# Patient Record
Sex: FEMALE | Race: WHITE | Employment: OTHER | URBAN - METROPOLITAN AREA
[De-identification: names, ages, dates, MRNs, and addresses within clinical notes are randomized per-mention and may not be internally consistent; named-entity substitution may affect disease eponyms.]

---

## 2017-01-05 ENCOUNTER — TELEPHONE (OUTPATIENT)
Dept: INTERNAL MEDICINE | Facility: CLINIC | Age: 77
End: 2017-01-05

## 2017-01-05 NOTE — TELEPHONE ENCOUNTER
Reason for Call:  Same Day Appointment, Requested Provider:  Abran Mott MD    PCP: Abran Mott    Reason for visit: chronic pain       Have you been treated for this in the past? Yes    Additional comments: patient not able to make appointment today due to asthma vs cold. She cannot make avail appt tomorrow but thinks Dr. Mott would like to see her this month. Please call her to work her in to his schedule.    Can we leave a detailed message on this number? YES    Phone number patient can be reached at: Home number on file [phone] (home)    Best Time: anytime    Call taken on 1/5/2017 at 12:43 PM by Lo Anderson

## 2017-01-06 NOTE — TELEPHONE ENCOUNTER
Unfortunately, aside from tomorrow (Friday), I really don't have many spots to work patients in the remainder of the month.    Please schedule her for first available, and put her on our wait list.

## 2017-01-16 ENCOUNTER — TELEPHONE (OUTPATIENT)
Dept: CARDIOLOGY | Facility: CLINIC | Age: 77
End: 2017-01-16

## 2017-01-16 NOTE — TELEPHONE ENCOUNTER
"Patient called and reported that she experienced increased SOB over the weekend. Patient reports that currently she is not SOB at rest, but when she moves approx 20ft she becomes SOB and it takes approx 60-90 seconds for her to \"catch her breath\" again. Patient reports that from her baseline weight of approx 151lbs she is up to 154lbs today and was as high as 157lbs over the weekend. Patient denies any chest pain and confirmed that she has been taking her Bumex and other medication as scheduled. Patient denies any recent illness and confirms that SOB is with exertion and not at rest. Advised patient I would forward message onto Dr. Butler and his team for further instructions. Patient acknowledged understanding and agreed with plan.       "

## 2017-01-17 NOTE — TELEPHONE ENCOUNTER
"Called patient this am to inform her of the plan Dr. Butler would like: Bumex 2 mg two tablets daily for 3 days with a BMP and NP visit and low sodium diet.  She stated this am she is back at her baseline weight of 151 pounds, denies shortness of breath, and is \"up and about\" this morning.  Patient stated she took an additional Bumex 2 mg yesterday evening and \"it did the trick\". She does not feel it's necessary to take the additional bumex for three days.  Writer inform her she will check with Dr. Butler to see if he would like to still have the patient take the additional bumex and follow up with a visit and BMP or if he would like to hold off at this time.  Informed patient writer would call her back with his response.  Also encouraged patient to eat a low salt diet.  She sated she is but will work harder at it.  Also informed patient its the symptoms return before she hears back to call team 4 back.  Direct number given.  Will route to Dr. Butler to see if he'd like the patient to take the additional bumex and follow up with an NP apt and BMP or hold off at this time.   "

## 2017-01-17 NOTE — TELEPHONE ENCOUNTER
I would like this patient to take Bumex 2 mg two tablets daily for 3 days.  I would then like her seen by my NP with a BMP the same day.  Low salt diet please.  Jae Butler MD, FACC  January 16, 2017 10:10 PM

## 2017-01-18 NOTE — TELEPHONE ENCOUNTER
So then back to baseline bumex 2 mg daily?  Thanks.  Jae Butler MD, FACC  January 17, 2017 9:43 PM

## 2017-01-31 ENCOUNTER — TRANSFERRED RECORDS (OUTPATIENT)
Dept: CARDIOLOGY | Facility: CLINIC | Age: 77
End: 2017-01-31

## 2017-02-02 ENCOUNTER — OFFICE VISIT (OUTPATIENT)
Dept: INTERNAL MEDICINE | Facility: CLINIC | Age: 77
End: 2017-02-02
Payer: COMMERCIAL

## 2017-02-02 VITALS
SYSTOLIC BLOOD PRESSURE: 122 MMHG | HEIGHT: 64 IN | HEART RATE: 79 BPM | TEMPERATURE: 97.4 F | WEIGHT: 155 LBS | BODY MASS INDEX: 26.46 KG/M2 | OXYGEN SATURATION: 95 % | DIASTOLIC BLOOD PRESSURE: 50 MMHG

## 2017-02-02 DIAGNOSIS — I10 ESSENTIAL HYPERTENSION: ICD-10-CM

## 2017-02-02 DIAGNOSIS — J45.30 MILD PERSISTENT ASTHMA WITHOUT COMPLICATION: ICD-10-CM

## 2017-02-02 DIAGNOSIS — M54.16 LUMBAR RADICULOPATHY: Primary | ICD-10-CM

## 2017-02-02 DIAGNOSIS — G89.4 CHRONIC PAIN SYNDROME: ICD-10-CM

## 2017-02-02 DIAGNOSIS — R32 URINARY INCONTINENCE, UNSPECIFIED TYPE: ICD-10-CM

## 2017-02-02 DIAGNOSIS — N18.30 CKD (CHRONIC KIDNEY DISEASE) STAGE 3, GFR 30-59 ML/MIN (H): ICD-10-CM

## 2017-02-02 LAB
ALBUMIN UR-MCNC: NEGATIVE MG/DL
APPEARANCE UR: CLEAR
BILIRUB UR QL STRIP: NEGATIVE
COLOR UR AUTO: YELLOW
CREAT SERPL-MCNC: 1.07 MG/DL (ref 0.52–1.04)
GFR SERPL CREATININE-BSD FRML MDRD: 50 ML/MIN/1.7M2
GLUCOSE UR STRIP-MCNC: NEGATIVE MG/DL
HGB UR QL STRIP: NEGATIVE
KETONES UR STRIP-MCNC: NEGATIVE MG/DL
LEUKOCYTE ESTERASE UR QL STRIP: NEGATIVE
NITRATE UR QL: NEGATIVE
PH UR STRIP: 5 PH (ref 5–7)
RBC #/AREA URNS AUTO: NORMAL /HPF (ref 0–2)
SP GR UR STRIP: 1.01 (ref 1–1.03)
URN SPEC COLLECT METH UR: NORMAL
UROBILINOGEN UR STRIP-ACNC: 0.2 EU/DL (ref 0.2–1)
WBC #/AREA URNS AUTO: NORMAL /HPF (ref 0–2)

## 2017-02-02 PROCEDURE — 81001 URINALYSIS AUTO W/SCOPE: CPT | Performed by: INTERNAL MEDICINE

## 2017-02-02 PROCEDURE — 36415 COLL VENOUS BLD VENIPUNCTURE: CPT | Performed by: INTERNAL MEDICINE

## 2017-02-02 PROCEDURE — 82565 ASSAY OF CREATININE: CPT | Performed by: INTERNAL MEDICINE

## 2017-02-02 PROCEDURE — 99214 OFFICE O/P EST MOD 30 MIN: CPT | Performed by: INTERNAL MEDICINE

## 2017-02-02 RX ORDER — TRAMADOL HYDROCHLORIDE 50 MG/1
TABLET ORAL
Qty: 100 TABLET | Refills: 0 | Status: SHIPPED | OUTPATIENT
Start: 2017-02-02 | End: 2017-02-16

## 2017-02-02 RX ORDER — METHYLPREDNISOLONE 4 MG
TABLET, DOSE PACK ORAL
Qty: 21 TABLET | Refills: 0 | Status: SHIPPED | OUTPATIENT
Start: 2017-02-02 | End: 2017-02-09

## 2017-02-02 NOTE — MR AVS SNAPSHOT
"              After Visit Summary   2/2/2017    Rupinder Tracy    MRN: [id number]           Patient Information     Date Of Birth          1940        Visit Information        Provider Department      2/2/2017 2:30 PM Abran Mott MD [facility]        Today's Diagnoses     Lumbar radiculopathy    -  1     Urinary incontinence, unspecified type         Mild persistent asthma without complication           Care Instructions    PLAN:                                                    1.  MEDICATIONS:        - Trial of medrol dosepack       - Discontinue ibuprofen for now       - Continue other medications without change, including tramadol for now  2.  Check kidney function today  3.  If things not better, would give short course of vicodin  4.  Call with an update early next week        Follow-ups after your visit        Who to contact     If you have questions or need follow up information about today's clinic visit or your schedule please contact [facility] directly at [phone].  Normal or non-critical lab and imaging results will be communicated to you by MyChart, letter or phone within 4 business days after the clinic has received the results. If you do not hear from us within 7 days, please contact the clinic through [facility] or phone. If you have a critical or abnormal lab result, we will notify you by phone as soon as possible.  Submit refill requests through [employer] or call your pharmacy and they will forward the refill request to us. Please allow 3 business days for your refill to be completed.          Additional Information About Your Visit        [facility] Information     [employer] lets you send messages to your doctor, view your test results, renew your prescriptions, schedule appointments and more. To sign up, go to [email or web address] . Click on \"Log in\" on the left side of the screen, which will take you to the Welcome page. Then click " "on \"Sign up Now\" on the right side of the page.     You will be asked to enter the access code listed below, as well as some personal information. Please follow the directions to create your username and password.     Your access code is: [email or web address]  Expires: 5/3/2017  3:36 PM     Your access code will  in 90 days. If you need help or a new code, please call your [facility] clinic or [phone].        Care EveryWhere ID     This is your Care EveryWhere ID. This could be used by other organizations to access your [facility] medical records  [id number]        Your Vitals Were     Pulse Temperature Height BMI (Body Mass Index) Pulse Oximetry Last Period    79 97.4  F (36.3  C) (Oral) 5' 4\" (1.626 m) 26.59 kg/m2 95% 1990       Blood Pressure from Last 3 Encounters:   17 122/50   16 114/46   10/21/16 120/56    Weight from Last 3 Encounters:   17 155 lb (70.308 kg)   16 152 lb (68.947 kg)   10/21/16 150 lb 6.4 oz (68.221 kg)              We Performed the Following     UA with Microscopic reflex to Culture          Today's Medication Changes          These changes are accurate as of: 17  3:36 PM.  If you have any questions, ask your nurse or doctor.               Start taking these medicines.        Dose/Directions    methylPREDNISolone 4 MG tablet   Commonly known as:  MEDROL DOSEPAK   Used for:  Lumbar radiculopathy   Started by:  Abran Mott MD        Take packet in tapering dose, per protocol   Quantity:  21 tablet   Refills:  0         These medicines have changed or have updated prescriptions.        Dose/Directions    fluticasone-salmeterol 250-50 MCG/DOSE diskus inhaler   Commonly known as:  ADVAIR DISKUS   This may have changed:  how to take this   Used for:  Mild persistent asthma without complication   Changed by:  Abran Mott MD        Dose:  1 puff   Inhale 1 puff into the lungs 2 times daily   Quantity:  60 Inhaler   Refills:  1       vitamin D  " UNITS tablet   This may have changed:  when to take this   Used for:  Vitamin D deficiency        Dose:  2000 Units   Take 2,000 Units by mouth daily   Refills:  0            Where to get your medicines      These medications were sent to [facility] [address]  [address], [facility] [address]     Phone:  [phone]    - fluticasone-salmeterol 250-50 MCG/DOSE diskus inhaler  - methylPREDNISolone 4 MG tablet             Primary Care Provider Office Phone # Fax #    Abran Mott [phone]       [facility] [address]  [facility] [address]        Thank you!     Thank you for choosing [facility]  for your care. Our goal is always to provide you with excellent care. Hearing back from our patients is one way we can continue to improve our services. Please take a few minutes to complete the written survey that you may receive in the mail after your visit with us. Thank you!             Your Updated Medication List - Protect others around you: Learn how to safely use, store and throw away your medicines at www.disposemymeds.org.          This list is accurate as of: 2/2/17  3:36 PM.  Always use your most recent med list.                   Brand Name Dispense Instructions for use    albuterol 108 (90 BASE) MCG/ACT Inhaler    albuterol    1 Inhaler    Inhale 2 puffs into the lungs every 4 hours as needed       bumetanide 2 MG tablet    BUMEX    30 tablet    Take 1 tablet (2 mg) by mouth daily       CALCIUM + D 500-1000-40 MG-UNT-MCG Chew   Generic drug:  Calcium-Vitamin D-Vitamin K      Take 2 tablets by mouth daily       CULTURELLE PO      Take 1 tablet by mouth daily OTC       DOXYCYCLINE HYCLATE PO      Take 50 mg by mouth 3 times per week Monday,wednesday, friday       EYE-VITES Tabs          Ashley-Eye with Lutein 2 tabs twice a day (Rx from [facility])       fluticasone-salmeterol 250-50  MCG/DOSE diskus inhaler    ADVAIR DISKUS    60 Inhaler    Inhale 1 puff into the lungs 2 times daily       methylPREDNISolone 4 MG tablet    MEDROL DOSEPAK    21 tablet    Take packet in tapering dose, per protocol       olmesartan 40 MG tablet    BENICAR    90 tablet    Take 1/2 tablet one time daily.       omeprazole 20 MG CR capsule    priLOSEC    180 capsule    Take 1 capsule (20 mg) by mouth 2 times daily       order for DME      2L of O2 at night       prednisoLONE acetate 1 % ophthalmic susp    PRED FORTE     Place 1 drop Into the left eye 4 times daily       PROLIA 60 MG/ML Soln injection   Generic drug:  denosumab      Inject 60 mg Subcutaneous every 6 months       RESTASIS 0.05 % ophthalmic emulsion   Generic drug:  cycloSPORINE      1 drop to left eye twice daily       rOPINIRole 0.25 MG tablet    REQUIP    30 tablet    Take 2-4 tablets (0.5-1 mg) by mouth At Bedtime       SYSTANE 0.4-0.3 % Soln ophthalmic solution   Generic drug:  polyethylene glycol 0.4%- propylene glycol 0.3%      1 drop to left eye 4 times daily       traMADol 50 MG tablet    ULTRAM    100 tablet    Take up to two twice daily.   Limit to 4 daily, on average.       vitamin D 2000 UNITS tablet      Take 2,000 Units by mouth daily

## 2017-02-02 NOTE — PROGRESS NOTES
"  SUBJECTIVE:                                                    Rupinder Tracy is a 76 year old female who presents to clinic today for the following health issues:      Pan Management:  Follow-up use of tramadol.  Not working very well for her, only take the edge off the pain even taking 2 at a time.   Pain is there mostly all the time, better when sitting (mostly gone) or lying on the R side.  - supplementing with ibuprofen 400 mg daily, which is helpful.   Seems almost to work better.  - most pain from L lumbar area down to mid thigh posteriorly  - also gets more intense burning pains L lumbar, a bit higher  Overall, symptoms much worse past 2 weeks.   Sometimes cannot bear weight on L leg, seems weak and might fall.  Using rolling walker part of the time.   Cane doesn't work for her.         Amount of exercise or physical activity: 2-3 days/week for an average of 30-45 minutes    Problems taking medications regularly: No    Medication side effects: none    Diet: regular (no restrictions)    Problem list and histories reviewed & adjusted, as indicated.  Additional history: as documented    Additional issues to address:  1.  Saw Dr. Jhony Ruiz this week, restless leg syndrome worse.  Refilled requip for her.     ROS:  no bowel control problems, bladder control worse - increased leakage/leaks when stands up.  Was having some dysuria, not now  Constitutional, HEENT, cardiovascular, pulmonary, gi and gu systems are negative, except as otherwise noted.    OBJECTIVE:                                                    /50 mmHg  Pulse 79  Temp(Src) 97.4  F (36.3  C) (Oral)  Ht 5' 4\" (1.626 m)  Wt 155 lb (70.308 kg)  BMI 26.59 kg/m2  SpO2 95%  LMP 01/01/1990  Body mass index is 26.59 kg/(m^2).   No apparent distress  Mild focal tenderness just lateral to left SI joint  Spine nontender, buttocks nontender  Positive right straight leg-raise at about 75   Probably negative left straight leg-raise   Normal " strength in lower extremities  Deep tendon reflexes equal and decreased, but present  Normal range of motion hips, without significant pain     PHQ-9 SCORE 8/18/2011 7/20/2016 2/2/2017   Total Score 9 - -   Total Score - 4 11        MRI from 12/2015 reviewed:       ASSESSMENT:                                                      Left lumbar radiculopathy, symptoms uncontrolled  Chronic pain  CKD, creat higher past year    Situational decrease in mood, without history of depression    PLAN:                                                    1.  MEDICATIONS:        - Trial of medrol dosepack       - Discontinue ibuprofen for now       - Continue other medications without change, including tramadol for now  2.  Check kidney function today -->  nearly normalized  3.  If things not better, would give short course of vicodin  4.  Call with an update early next week    Consider MRI  Will avoid muscle relaxants due to worry of sedation/side effects      Abran Mott MD  [facility]

## 2017-02-02 NOTE — PATIENT INSTRUCTIONS
PLAN:                                                    1.  MEDICATIONS:        - Trial of medrol dosepack       - Discontinue ibuprofen for now       - Continue other medications without change, including tramadol for now  2.  Check kidney function today  3.  If things not better, would give short course of vicodin  4.  Call with an update early next week

## 2017-02-03 ASSESSMENT — PATIENT HEALTH QUESTIONNAIRE - PHQ9: SUM OF ALL RESPONSES TO PHQ QUESTIONS 1-9: 11

## 2017-02-03 NOTE — PROGRESS NOTES
Quick Note:    Please notify patient regarding normal UA and kidney function nearly normalized.  ______

## 2017-02-07 ENCOUNTER — TELEPHONE (OUTPATIENT)
Dept: INTERNAL MEDICINE | Facility: CLINIC | Age: 77
End: 2017-02-07

## 2017-02-07 DIAGNOSIS — M54.16 LUMBAR RADICULOPATHY: Primary | ICD-10-CM

## 2017-02-07 NOTE — TELEPHONE ENCOUNTER
Reason for call: Other   Patient called regarding (reason for call): to let the dr know how she is doing, was seen on 02/02/2017 was told to call back in a few days to let him know how she is doing  Additional comments: Please call patient to discuss this issue    Phone Number Pt can be reached at: Home number on file [phone] (home)  Best Time: anytime  Can we leave a detailed message on this number? YES

## 2017-02-07 NOTE — TELEPHONE ENCOUNTER
Was seen in clinic 2/2/17 for low back pain radiating into left leg.  States Tramadol hadn't been helping her pain at all.  She started a Medrol Dospak and is calling to give update.  On 2/4 she did take Tramadol x2, 2/5 she took Tramadol x1 and had a good weekend, able to be much more productive.  Today feels more tightness in back, took Tramadol x1 and rates pain 1/10 and still feeling pretty good. Doesn't know if she has overdone it being more active Patient asking where to go from here. Pt will finish Medrol dosepak tomorrow.  She is feeling so good right now she would love to plan to go to [place] in March and is hoping that improvement is not too short lived.  JASON Bond R.N.

## 2017-02-08 NOTE — TELEPHONE ENCOUNTER
If symptoms don't remain stable, would suggest that she consider Physical Therapy.   Could also redo steroids, if needed.  Would not repeat imaging at this time.   Please contact patient and advise.

## 2017-02-09 PROBLEM — N18.30 CKD (CHRONIC KIDNEY DISEASE) STAGE 3, GFR 30-59 ML/MIN (H): Status: ACTIVE | Noted: 2017-02-09

## 2017-02-09 RX ORDER — PREDNISONE 20 MG/1
20 TABLET ORAL 2 TIMES DAILY
Qty: 14 TABLET | Refills: 0 | Status: SHIPPED | OUTPATIENT
Start: 2017-02-09 | End: 2017-02-16

## 2017-02-09 NOTE — TELEPHONE ENCOUNTER
Pt is doing really bad today.  Has taken 3 tramadol this morning and 2 this afternoon.  Could hardly get out of bed.  Using heating pad.  Would like to to try the steroids again and will also do PT if she can.  Please order.

## 2017-02-10 ENCOUNTER — TELEPHONE (OUTPATIENT)
Dept: INTERNAL MEDICINE | Facility: CLINIC | Age: 77
End: 2017-02-10

## 2017-02-10 NOTE — TELEPHONE ENCOUNTER
May use a private spot on 2/16.  If having worsened problems before then, may need to see other partner, possibly Lo.  If new neuro symptoms, would need to be seen this weekend.

## 2017-02-10 NOTE — TELEPHONE ENCOUNTER
Reason for call: Other   Patient called regarding (reason for call): needing a appointment within 5 to 7 days after taking new med, which would be 02/10/2017, only wants to see primary dr, Please see all her encounters  Additional comments: Please call patient to discuss her many issues    Phone Number Pt can be reached at: Home number on file [phone] (home)  Best Time: anytime  Can we leave a detailed message on this number? YES

## 2017-02-10 NOTE — TELEPHONE ENCOUNTER
Patient reached.  Pain is in lumbar area, possibly up a bit higher, and into L butt and leg, to ankle earlier today.  Had been doing better earlier.    I have changed her to prednisone, to see if this works better.   Usual dose is 5 days, given enough for 7 days if needed.  PT ordered.

## 2017-02-16 ENCOUNTER — OFFICE VISIT (OUTPATIENT)
Dept: INTERNAL MEDICINE | Facility: CLINIC | Age: 77
End: 2017-02-16
Payer: COMMERCIAL

## 2017-02-16 VITALS
SYSTOLIC BLOOD PRESSURE: 120 MMHG | OXYGEN SATURATION: 95 % | BODY MASS INDEX: 27.14 KG/M2 | TEMPERATURE: 97.5 F | HEART RATE: 70 BPM | DIASTOLIC BLOOD PRESSURE: 50 MMHG | WEIGHT: 159 LBS | HEIGHT: 64 IN

## 2017-02-16 DIAGNOSIS — N18.30 CKD (CHRONIC KIDNEY DISEASE) STAGE 3, GFR 30-59 ML/MIN (H): ICD-10-CM

## 2017-02-16 DIAGNOSIS — G89.4 CHRONIC PAIN SYNDROME: ICD-10-CM

## 2017-02-16 DIAGNOSIS — I10 ESSENTIAL HYPERTENSION: ICD-10-CM

## 2017-02-16 DIAGNOSIS — E55.9 VITAMIN D DEFICIENCY: ICD-10-CM

## 2017-02-16 DIAGNOSIS — M54.42 ACUTE LEFT-SIDED LOW BACK PAIN WITH LEFT-SIDED SCIATICA: ICD-10-CM

## 2017-02-16 DIAGNOSIS — M54.16 LUMBAR RADICULOPATHY: Primary | ICD-10-CM

## 2017-02-16 DIAGNOSIS — G25.81 RESTLESS LEGS SYNDROME (RLS): ICD-10-CM

## 2017-02-16 PROCEDURE — 80307 DRUG TEST PRSMV CHEM ANLYZR: CPT | Mod: 90 | Performed by: INTERNAL MEDICINE

## 2017-02-16 PROCEDURE — 99213 OFFICE O/P EST LOW 20 MIN: CPT | Performed by: INTERNAL MEDICINE

## 2017-02-16 PROCEDURE — 99000 SPECIMEN HANDLING OFFICE-LAB: CPT | Performed by: INTERNAL MEDICINE

## 2017-02-16 RX ORDER — TRAMADOL HYDROCHLORIDE 50 MG/1
TABLET ORAL
Qty: 100 TABLET | Refills: 0 | Status: SHIPPED | OUTPATIENT
Start: 2017-02-16 | End: 2017-02-16

## 2017-02-16 RX ORDER — ROPINIROLE 0.25 MG/1
0.5 TABLET, FILM COATED ORAL AT BEDTIME
COMMUNITY
Start: 2017-02-16 | End: 2017-11-07

## 2017-02-16 RX ORDER — PREDNISONE 20 MG/1
TABLET ORAL
Qty: 30 TABLET | Refills: 0 | Status: SHIPPED | OUTPATIENT
Start: 2017-02-16 | End: 2017-07-20

## 2017-02-16 RX ORDER — TRAMADOL HYDROCHLORIDE 50 MG/1
TABLET ORAL
Qty: 100 TABLET | Refills: 0 | Status: SHIPPED | OUTPATIENT
Start: 2017-02-16 | End: 2017-10-03

## 2017-02-16 RX ORDER — VITAMIN B COMPLEX
1 TABLET ORAL AT BEDTIME
COMMUNITY
Start: 2017-02-16

## 2017-02-16 NOTE — MR AVS SNAPSHOT
After Visit Summary   2/16/2017    Rupinder Tracy    MRN: [id number]           Patient Information     Date Of Birth          1940        Visit Information        Provider Department      2/16/2017 11:00 AM Abran Mott MD [facility]        Today's Diagnoses     Lumbar radiculopathy    -  1    Acute left-sided low back pain with left-sided sciatica        Chronic pain syndrome        Vitamin D deficiency        Restless legs syndrome (RLS)        Essential hypertension        CKD (chronic kidney disease) stage 3, GFR 30-59 ml/min          Care Instructions      PLAN:                                                    1.  MEDICATIONS:        - Discontinue prednisone, hope that radicular/sciatic symptoms do not return       - Continue other medications without change  2.  Collect urine specimen today, or in the near future  3.  If pain/leg symptoms return, resume prednisone at dose of 20 mg daily, and notify MD next week.        Follow-ups after your visit        Future tests that were ordered for you today     Open Future Orders        Priority Expected Expires Ordered    **Basic metabolic panel FUTURE 6mo Routine 8/15/2017 9/14/2017 2/16/2017    Drug Screen Comprehensive , Urine with Reported Meds (Pain Care Package) Routine 2/16/2017 2/16/2018 2/16/2017            Who to contact     If you have questions or need follow up information about today's clinic visit or your schedule please contact [facility] directly at [phone].  Normal or non-critical lab and imaging results will be communicated to you by MyChart, letter or phone within 4 business days after the clinic has received the results. If you do not hear from us within 7 days, please contact the clinic through MyChart or phone. If you have a critical or abnormal lab result, we will notify you by phone as soon as possible.  Submit refill requests through [employer] or call your  "pharmacy and they will forward the refill request to us. Please allow 3 business days for your refill to be completed.          Additional Information About Your Visit        [employer] Information     [employer] lets you send messages to your doctor, view your test results, renew your prescriptions, schedule appointments and more. To sign up, go to [email or web address] . Click on \"Log in\" on the left side of the screen, which will take you to the Welcome page. Then click on \"Sign up Now\" on the right side of the page.     You will be asked to enter the access code listed below, as well as some personal information. Please follow the directions to create your username and password.     Your access code is: [email or web address]  Expires: 5/3/2017  3:36 PM     Your access code will  in 90 days. If you need help or a new code, please call your [place] clinic or [phone].        Care EveryWhere ID     This is your Care EveryWhere ID. This could be used by other organizations to access your [place] medical records  [id number]        Your Vitals Were     Pulse Temperature Height Last Period Pulse Oximetry BMI (Body Mass Index)    70 97.5  F (36.4  C) (Oral) 5' 4\" (1.626 m) 1990 95% 27.29 kg/m2       Blood Pressure from Last 3 Encounters:   17 120/50   17 122/50   16 114/46    Weight from Last 3 Encounters:   17 159 lb (72.1 kg)   17 155 lb (70.3 kg)   16 152 lb (68.9 kg)                 Today's Medication Changes          These changes are accurate as of: 17 11:45 AM.  If you have any questions, ask your nurse or doctor.               Start taking these medicines.        Dose/Directions    traMADol 50 MG tablet   Commonly known as:  ULTRAM   Used for:  Chronic pain syndrome   Started by:  Abran Mott MD        Take up to two twice daily.   Limit to 4 daily, on average.   Quantity:  100 tablet   Refills:  0         These medicines have changed or have updated " prescriptions.        Dose/Directions    predniSONE 20 MG tablet   Commonly known as:  DELTASONE   This may have changed:    - how much to take  - how to take this  - when to take this  - additional instructions   Used for:  Lumbar radiculopathy   Changed by:  Abran Mott MD        Take one daily, as directed   Quantity:  30 tablet   Refills:  0       REQUIP 0.25 MG tablet   This may have changed:    - how much to take  - how to take this  - when to take this  - additional instructions   Used for:  Restless legs syndrome (RLS)   Generic drug:  rOPINIRole   Changed by:  Abran Mott MD        Taking 1 midday and 2 at bedtime, per Dr. Ruiz   Refills:  0            Where to get your medicines      Some of these will need a paper prescription and others can be bought over the counter.  Ask your nurse if you have questions.     Bring a paper prescription for each of these medications     predniSONE 20 MG tablet    traMADol 50 MG tablet                Primary Care Provider Office Phone # Fax #    Abran Mott [phone]       [address] [address]        Thank you!     Thank you for choosing [facility]  for your care. Our goal is always to provide you with excellent care. Hearing back from our patients is one way we can continue to improve our services. Please take a few minutes to complete the written survey that you may receive in the mail after your visit with us. Thank you!             Your Updated Medication List - Protect others around you: Learn how to safely use, store and throw away your medicines at www.disposemymeds.org.          This list is accurate as of: 2/16/17 11:45 AM.  Always use your most recent med list.                   Brand Name Dispense Instructions for use    albuterol 108 (90 BASE) MCG/ACT Inhaler    albuterol    1 Inhaler    Inhale 2 puffs into the lungs every 4 hours as needed        bumetanide 2 MG tablet    BUMEX    30 tablet    Take 1 tablet (2 mg) by mouth daily       CALCIUM + D 500-1000-40 MG-UNT-MCG Chew   Generic drug:  Calcium-Vitamin D-Vitamin K      Take 2 tablets by mouth daily       CULTURELLE PO      Take 1 tablet by mouth daily OTC       DOXYCYCLINE HYCLATE PO      Take 50 mg by mouth 3 times per week Monday,wednesday, friday       EYE-VITES Tabs          [facility] with Lutein 2 tabs twice a day (Rx from [facility])       fluticasone-salmeterol 250-50 MCG/DOSE diskus inhaler    ADVAIR DISKUS    60 Inhaler    Inhale 1 puff into the lungs 2 times daily       olmesartan 40 MG tablet    BENICAR    90 tablet    Take 1/2 tablet one time daily.       omeprazole 20 MG CR capsule    priLOSEC    180 capsule    Take 1 capsule (20 mg) by mouth 2 times daily       order for DME      2L of O2 at night       prednisoLONE acetate 1 % ophthalmic susp    PRED FORTE     Place 1 drop Into the left eye 4 times daily       predniSONE 20 MG tablet    DELTASONE    30 tablet    Take one daily, as directed       PROLIA 60 MG/ML Soln injection   Generic drug:  denosumab      Inject 60 mg Subcutaneous every 6 months       REQUIP 0.25 MG tablet   Generic drug:  rOPINIRole      Taking 1 midday and 2 at bedtime, per Dr. Ruiz       RESTASIS 0.05 % ophthalmic emulsion   Generic drug:  cycloSPORINE      1 drop to left eye twice daily       SYSTANE 0.4-0.3 % Soln ophthalmic solution   Generic drug:  polyethylene glycol 0.4%- propylene glycol 0.3%      1 drop to left eye 4 times daily       traMADol 50 MG tablet    ULTRAM    100 tablet    Take up to two twice daily.   Limit to 4 daily, on average.       vitamin D 2000 UNITS tablet      Take 2,000 Units by mouth 2 times daily

## 2017-02-16 NOTE — PATIENT INSTRUCTIONS
PLAN:                                                    1.  MEDICATIONS:        - Discontinue prednisone, hope that radicular/sciatic symptoms do not return       - Continue other medications without change  2.  Collect urine specimen today, or in the near future  3.  If pain/leg symptoms return, resume prednisone at dose of 20 mg daily, and notify MD next week.

## 2017-02-16 NOTE — PROGRESS NOTES
"  SUBJECTIVE:                                                    Rupinder Tracy is a 76 year old female who presents to clinic today for the following health issues:        1.  Follow-up acute sciatica  2.  Chronic Pain Follow-Up       Type / Location of Pain: lower back, radiating into left leg.   Symptoms   markedly better on medrol dosepack, but then returned when dose was lowered.   Has been on steroids high dose past week.  Analgesia/pain control:       Recent changes:  Improved, not quite resolved, pain in leg gone.   Still has pain at site of fracture.      Overall control: Comfortably manageable at this time.  Activity level/function:      Daily activities:  Able to do all daily activities    Work:  retired  Adverse effects:  No (weight gain)  Adherance    Taking medication as directed?  Yes    Participating in other treatments: no  Risk Factors:    Sleep:  Good    Mood/anxiety:  controlled    Recent family or social stressors:  none noted    Other aggravating factors: none  PHQ-9 SCORE 8/18/2011 7/20/2016 2/2/2017   Total Score 9 - -   Total Score - 4 11     No flowsheet data found.  Encounter-Level CSA - 03/31/2015:                 Controlled Substance Agreement - Scan on 4/1/2015  9:00 AM : Controlled Substance Agreement 3/31/15 (below)                 Amount of exercise or physical activity: 2-3 days/week for an average of 30-45 minutes, also walking more    Problems taking medications regularly: No    Medication side effects: none  Diet: regular (no restrictions)    Problem list and histories reviewed & adjusted, as indicated.  Additional history: as documented      ROS:    No constitutional, cardiorespiratory, gastrointestinal symptoms     OBJECTIVE:                                                    /50 (BP Location: Left arm, Patient Position: Chair, Cuff Size: Adult Regular)  Pulse 70  Temp 97.5  F (36.4  C) (Oral)  Ht 5' 4\" (1.626 m)  Wt 159 lb (72.1 kg)  LMP 01/01/1990  SpO2 95%  BMI " 27.29 kg/m2  Body mass index is 27.29 kg/(m^2).   No apparent distress  Moving well with limp  negative straight leg-raise, normal gait, strength        ASSESSMENT:                                                      1.  Acute sciatica L side, symptoms resolved with steroids  2.  Chronic low back pain, improved.   Currently needing less tramadol    PLAN:                                                    1.  MEDICATIONS:        - Discontinue prednisone, hope that radicular/sciatic symptoms do not return       - Continue other medications without change  2.  Collect urine specimen today, or in the near future -->  Urine tox as expected  3.  If pain/leg symptoms return, resume prednisone at dose of 20 mg daily, and notify MD next week.    Abran Mott MD  [facility]

## 2017-02-16 NOTE — NURSING NOTE
"Chief Complaint   Patient presents with     Musculoskeletal Problem     lower back pain       Initial /50 (BP Location: Left arm, Patient Position: Chair, Cuff Size: Adult Regular)  Pulse 70  Temp 97.5  F (36.4  C) (Oral)  Ht 5' 4\" (1.626 m)  Wt 159 lb (72.1 kg)  LMP 01/01/1990  SpO2 95%  BMI 27.29 kg/m2 Estimated body mass index is 27.29 kg/(m^2) as calculated from the following:    Height as of this encounter: 5' 4\" (1.626 m).    Weight as of this encounter: 159 lb (72.1 kg).  Medication Reconciliation: complete   Radha Hedrick CMA      "

## 2017-02-20 LAB — PAIN DRUG SCR UR W RPTD MEDS: NORMAL

## 2017-03-13 ENCOUNTER — OFFICE VISIT (OUTPATIENT)
Dept: CARDIOLOGY | Facility: CLINIC | Age: 77
End: 2017-03-13
Payer: COMMERCIAL

## 2017-03-13 ENCOUNTER — TELEPHONE (OUTPATIENT)
Dept: CARDIOLOGY | Facility: CLINIC | Age: 77
End: 2017-03-13

## 2017-03-13 VITALS
HEIGHT: 64 IN | SYSTOLIC BLOOD PRESSURE: 116 MMHG | OXYGEN SATURATION: 92 % | WEIGHT: 161.3 LBS | BODY MASS INDEX: 27.54 KG/M2 | HEART RATE: 89 BPM | DIASTOLIC BLOOD PRESSURE: 54 MMHG

## 2017-03-13 DIAGNOSIS — I51.89 DIASTOLIC DYSFUNCTION: ICD-10-CM

## 2017-03-13 DIAGNOSIS — R06.02 SHORTNESS OF BREATH: ICD-10-CM

## 2017-03-13 DIAGNOSIS — I27.20 PULMONARY HTN (H): ICD-10-CM

## 2017-03-13 DIAGNOSIS — I10 ESSENTIAL HYPERTENSION: ICD-10-CM

## 2017-03-13 DIAGNOSIS — I51.7 LVH (LEFT VENTRICULAR HYPERTROPHY): ICD-10-CM

## 2017-03-13 DIAGNOSIS — I73.9 PERIPHERAL VASCULAR DISEASE (H): ICD-10-CM

## 2017-03-13 DIAGNOSIS — I05.9 MITRAL VALVE DISORDER: ICD-10-CM

## 2017-03-13 DIAGNOSIS — E78.5 HYPERLIPIDEMIA LDL GOAL <100: ICD-10-CM

## 2017-03-13 DIAGNOSIS — I27.20 PULMONARY HTN (H): Primary | ICD-10-CM

## 2017-03-13 LAB
ANION GAP SERPL CALCULATED.3IONS-SCNC: 13.4 MMOL/L (ref 6–17)
BUN SERPL-MCNC: 38 MG/DL (ref 7–30)
CALCIUM SERPL-MCNC: 9.8 MG/DL (ref 8.5–10.5)
CHLORIDE SERPL-SCNC: 104 MMOL/L (ref 98–107)
CO2 SERPL-SCNC: 28 MMOL/L (ref 23–29)
CREAT SERPL-MCNC: 1.29 MG/DL (ref 0.7–1.3)
GFR SERPL CREATININE-BSD FRML MDRD: 40 ML/MIN/1.7M2
GLUCOSE SERPL-MCNC: 101 MG/DL (ref 70–105)
NT-PROBNP SERPL-MCNC: 3830 PG/ML (ref 0–450)
POTASSIUM SERPL-SCNC: 4.4 MMOL/L (ref 3.5–5.1)
SODIUM SERPL-SCNC: 141 MMOL/L (ref 136–145)

## 2017-03-13 PROCEDURE — 99214 OFFICE O/P EST MOD 30 MIN: CPT | Mod: 25 | Performed by: PHYSICIAN ASSISTANT

## 2017-03-13 PROCEDURE — 83880 ASSAY OF NATRIURETIC PEPTIDE: CPT | Performed by: PHYSICIAN ASSISTANT

## 2017-03-13 PROCEDURE — 93000 ELECTROCARDIOGRAM COMPLETE: CPT | Performed by: PHYSICIAN ASSISTANT

## 2017-03-13 PROCEDURE — 36415 COLL VENOUS BLD VENIPUNCTURE: CPT | Performed by: PHYSICIAN ASSISTANT

## 2017-03-13 PROCEDURE — 80048 BASIC METABOLIC PNL TOTAL CA: CPT | Performed by: PHYSICIAN ASSISTANT

## 2017-03-13 NOTE — LETTER
3/13/2017    Abran Mott MD  [facility]   [address] [facility] [address]    RE: Rupinder Tracy       Dear Colleague,    I had the pleasure of seeing Rupinder Tracy in the [facility].    PRIMARY CARDIOLOGIST:  Dr. Jae Butler.      REASON FOR CLINIC VISIT:  Weight gain and shortness of breath.      Rupinder Cast is a very delightful 76-year-old female with past medical history notable for hypertension, small PFO, aortic insufficiency, aortic root dilatation at 4.3 cm, ascending aorta dilatation at 3.8 cm, left-sided diastolic dysfunction, secondary pulmonary hypertension (likely secondary to left-sided diastolic dysfunction due to aortic insufficiency, hypertension, and obstructive sleep apnea/asthma), and chronic back pain (was on steroid therapy recently).      Rupinder Cast contacted our clinic today due to weight gain and shortness of breath.  She is leaving for a trip to [place] to visit a friend in 2 days and therefore she wanted to be seen for further evaluation.      Rupinder Cast was having a flare of back pain due to sciatica for which she was started on a high dose of Medrol Dosepak.  She noted that her weight went up from her dry weight of 150 pounds to 159.  Her prednisone was eventually discontinued; however, her weight only came down to 155 pounds.  She has been noticing a little bit more peripheral edema, shortness of breath, but no chest discomfort, palpitations, orthopnea, PND.  She is currently on Bumex 2 mg daily.  This was switched from torsemide in the past when she had significant weight gain.      CURRENT CARDIAC MEDICATIONS:   1.  Bumex 2 mg daily.   2.  Olmesartan 20 mg daily.      The remainder of her allergies, social history and review of systems were reviewed and as are documented separately.      PHYSICAL EXAMINATION:   VITAL SIGNS:  Blood pressure 116/54 mmHg, pulse 89 beats per minute, weight 161 pounds.   GENERAL:  NAD.   RESPIRATORY:   Clear to auscultation bilaterally.   CARDIAC:  Regular rate and rhythm without murmurs, clicks or gallop.   NECK:  Normal JVP, no carotid bruits.   ABDOMEN:  Soft, nontender.   VASCULAR:  Pulses are full and equal.   EXTREMITIES:  1+ pitting edema of lower extremities bilaterally, wearing support stocking.   NEUROLOGIC:  Affect appropriate, oriented to time, person and place.      LABORATORY DATA:  BMP shows sodium 141, potassium 4.4, BUN 38, creatinine 1.29 and GFR 40.      ASSESSMENT AND PLAN:  Rupinder Cast is a very delightful 76-year-old female with past medical history notable for systemic hypertension, small PFO, aortic insufficiency, secondary pulmonary hypertension (likely due to LV diastolic dysfunction, hypertension, obstructive sleep apnea, as well as asthma), aortic root dilatation at 4.3 cm, ascending aortic dilatation at 3.8 cm, and chronic back pain.      Rupinder Cast presents to Cardiology Clinic today for evaluation of recent weight gain as well as increased shortness of breath.  She does have history of asthma for which she is on Advair daily.  She states that this feels different than her shortness of breath that she gets from asthma.  She states that she was recently on Medrol Dosepak for a recent flareup of sciatica related back pain.  Her weight went up significantly from 150 pounds to 159 pounds during her prednisone therapy.  After the discontinuation of prednisone, her weight came down some but not completely.  The patient states that at home she feels much better when her weight is 150 pounds.  At home, her weight is currently 155 pounds.  Her dry weight correlates to 155 pounds in our Cardiology Clinic.  Rupinder Cast does have plans to leave for [place] in 2 days and she wants to make sure she is taking appropriate dose of her Bumex before she leaves.      At this time, I will have her increase her Bumex to 3 mg daily.  Even though on her medication list it says 2 mg tablet, patient states that she  is actually taking 1 mg tablets.  I will have her also stop by our lab before she leaves as we do not have a recent BMP.  If her labwork looks normal, I will have her continue with the increased dose for 3 days.  I will contact her next week for reevaluation of her symptoms.  We will also have her come back in 1 week when she returns from her trip for reevaluation of her symptoms.  Looking at her medication list, I do not see that she is on a beta blockade agent.  I believe lowering her heart rate may help with her symptoms.  We can also consider a small dose of spironolactone.  Addition of these medications may be challenging, however, due to her soft pressures.  At this time, we will focus on diuretic therapy as she is going out of town in 2 days.  Rupinder Cast is instructed to let us know if she has worsening shortness of breath or more weight gain.  She does have an established clinic down in [place] where she will be next week.  If she has worsening symptoms, she will be seen by them. I will also give her a call next week to see how she is doing.     All of her questions were answered to her satisfaction.      Thank you for allowing me to participate in the care of this very delightful patient today.     Sincerely,    Markell Alamo PA-C

## 2017-03-13 NOTE — PATIENT INSTRUCTIONS
Today's Plan:   1. Stop by lab today.     2. Increase Bumex to 3 tablets one time daily.      3. Come back next Wednesday with lab beforehand.    If you have questions or concerns please call my nurse at [phone].     Scheduling phone number: [phone]  Reminder: Please bring in all current medications, over the counter supplements and vitamin bottles to your next appointment.    It was a pleasure seeing you today!     Markell Alamo  3/13/2017

## 2017-03-13 NOTE — TELEPHONE ENCOUNTER
"Patient called to report that she is SOB and has a 4 pound weight gain.  Her baseline weight is 155 and she is up to 159.8.  Her BP is 123/65.      Per Dr. diaz on 11/8/16 \"1. Rupinder Tracy is a delightful 76-year-old female with a small patent foramen ovale that is inconsequential and without hemodynamic effects. She has pulmonary hypertension on a secondary basis, most likely due to diastolic dysfunction of the left ventricle, aortic insufficiency and possible pulmonary causes such as asthma or obstructive sleep apnea. She is tolerating current treatment modalities after the change of her torsemide to Bumex and dropping her Benicar dose down to 20 mg per day. I have made no changes in that. She will continue to follow her weight. \"    Patient is very nervous and leaves for [place] tomorrow and would like to be seen today.  Offered an OV with Markell HERNANDEZ for today at 150.    "

## 2017-03-13 NOTE — MR AVS SNAPSHOT
After Visit Summary   3/13/2017    Rupinder Tracy    MRN: [id number]           Patient Information     Date Of Birth          1940        Visit Information        Provider Department      3/13/2017 1:50 PM Markell Alamo PA-C [facility] AT [place]        Today's Diagnoses     Pulmonary HTN (H)    -  1    Mitral valve disorder        Hyperlipidemia LDL goal <100        Peripheral vascular disease (H)        Left Ventricular Hypertrophy        Essential hypertension        Shortness of breath           Care Instructions    Today's Plan:   1. Stop by lab today.     2. Increase Bumex to 3 tablets one time daily.      3. Come back next Wednesday with lab beforehand.    If you have questions or concerns please call my nurse at [phone].     Scheduling phone number: [phone]  Reminder: Please bring in all current medications, over the counter supplements and vitamin bottles to your next appointment.    It was a pleasure seeing you today!     Markell Alamo  3/13/2017            Follow-ups after your visit        Additional Services     Follow-Up with Cardiac Advanced Practice Provider                 Your next 10 appointments already scheduled     May 10, 2017  9:45 AM CDT   Return Visit with Jae Butler MD   [facility] [place] ([facility])    [address] [place] [id number] [phone]              Future tests that were ordered for you today     Open Future Orders        Priority Expected Expires Ordered    N terminal pro BNP outpatient Routine 3/22/2017 3/13/2018 3/13/2017    N terminal pro BNP outpatient Routine 3/13/2017 3/8/2018 3/13/2017    Basic metabolic panel Routine 3/13/2017 3/8/2018 3/13/2017    Follow-Up with Cardiac Advanced Practice Provider Routine 3/22/2017 3/13/2018 3/13/2017    Basic metabolic panel Routine 3/22/2017 3/13/2018 3/13/2017            Who to contact     If  "you have questions or need follow up information about today's clinic visit or your schedule please contact [facility] [place] directly at [phone].  Normal or non-critical lab and imaging results will be communicated to you by MyChart, letter or phone within 4 business days after the clinic has received the results. If you do not hear from us within 7 days, please contact the clinic through [employer] or phone. If you have a critical or abnormal lab result, we will notify you by phone as soon as possible.  Submit refill requests through [employer] or call your pharmacy and they will forward the refill request to us. Please allow 3 business days for your refill to be completed.          Additional Information About Your Visit        [employer] Information     [employer] lets you send messages to your doctor, view your test results, renew your prescriptions, schedule appointments and more. To sign up, go to [email or web address] . Click on \"Log in\" on the left side of the screen, which will take you to the Welcome page. Then click on \"Sign up Now\" on the right side of the page.     You will be asked to enter the access code listed below, as well as some personal information. Please follow the directions to create your username and password.     Your access code is: [email or web address]  Expires: 5/3/2017  4:36 PM     Your access code will  in 90 days. If you need help or a new code, please call your [place] clinic or [phone].        Care EveryWhere ID     This is your Care EveryWhere ID. This could be used by other organizations to access your [place] medical records  [id number]        Your Vitals Were     Pulse Height Last Period Pulse Oximetry BMI (Body Mass Index)       107 1.626 m (5' 4\") 1990 92% 27.69 kg/m2        Blood Pressure from Last 3 Encounters:   17 116/54   17 120/50   17 122/50    Weight from Last 3 Encounters:   17 73.2 kg (161 lb 4.8 " oz)   02/16/17 72.1 kg (159 lb)   02/02/17 70.3 kg (155 lb)              We Performed the Following     EKG 12-lead complete w/read - Clinics (performed today)        Primary Care Provider Office Phone # Fax #    Abran Mott [phone]       [facility] [address] [facility] [address]        Thank you!     Thank you for choosing [facility] [place]  for your care. Our goal is always to provide you with excellent care. Hearing back from our patients is one way we can continue to improve our services. Please take a few minutes to complete the written survey that you may receive in the mail after your visit with us. Thank you!             Your Updated Medication List - Protect others around you: Learn how to safely use, store and throw away your medicines at www.disposemymeds.org.          This list is accurate as of: 3/13/17  2:39 PM.  Always use your most recent med list.                   Brand Name Dispense Instructions for use    albuterol 108 (90 BASE) MCG/ACT Inhaler    albuterol    1 Inhaler    Inhale 2 puffs into the lungs every 4 hours as needed       bumetanide 2 MG tablet    BUMEX    30 tablet    Take 1 tablet (2 mg) by mouth daily       CALCIUM + D 500-1000-40 MG-UNT-MCG Chew   Generic drug:  Calcium-Vitamin D-Vitamin K      Take 2 tablets by mouth daily       CULTURELLE PO      Take 1 tablet by mouth daily OTC       DOXYCYCLINE HYCLATE PO      Take 50 mg by mouth 3 times per week Monday,wednesday, friday       EYE-VITES Tabs          Slater-Eye with Lutein 2 tabs twice a day (Rx from [facility])       fluticasone-salmeterol 250-50 MCG/DOSE diskus inhaler    ADVAIR DISKUS    60 Inhaler    Inhale 1 puff into the lungs 2 times daily       olmesartan 40 MG tablet    BENICAR    90 tablet    Take 1/2 tablet one time daily.       omeprazole 20 MG CR capsule    priLOSEC    180 capsule    Take 1 capsule (20 mg) by mouth 2 times  daily       order for DME      2L of O2 at night       prednisoLONE acetate 1 % ophthalmic susp    PRED FORTE     Place 1 drop Into the left eye 4 times daily       predniSONE 20 MG tablet    DELTASONE    30 tablet    Take one daily, as directed       PROLIA 60 MG/ML Soln injection   Generic drug:  denosumab      Inject 60 mg Subcutaneous every 6 months       REQUIP 0.25 MG tablet   Generic drug:  rOPINIRole      Taking 1 midday and 2 at bedtime, per Dr. Ruiz       RESTASIS 0.05 % ophthalmic emulsion   Generic drug:  cycloSPORINE      1 drop to left eye twice daily       SYSTANE 0.4-0.3 % Soln ophthalmic solution   Generic drug:  polyethylene glycol 0.4%- propylene glycol 0.3%      1 drop to left eye 4 times daily       traMADol 50 MG tablet    ULTRAM    100 tablet    Take up to two twice daily.   Limit to 4 daily, on average.       vitamin D 2000 UNITS tablet      Take 2,000 Units by mouth 2 times daily

## 2017-03-15 ENCOUNTER — TELEPHONE (OUTPATIENT)
Dept: CARDIOLOGY | Facility: CLINIC | Age: 77
End: 2017-03-15

## 2017-03-15 NOTE — PROGRESS NOTES
PRIMARY CARDIOLOGIST:  Dr. Jae Butler.      REASON FOR CLINIC VISIT:  Weight gain and shortness of breath.      HISTORY OF PRESENT ILLNESS:  Rupinder Cast is a very delightful 76-year-old female with past medical history notable for hypertension, small PFO, aortic insufficiency, aortic root dilatation at 4.3 cm, ascending aorta dilatation at 3.8 cm, left-sided diastolic dysfunction, secondary pulmonary hypertension (likely secondary to left-sided diastolic dysfunction due to aortic insufficiency, hypertension, and obstructive sleep apnea/asthma), and chronic back pain (was on steroid therapy recently).      Rupinder Cast contacted our clinic today due to weight gain and shortness of breath.  She is leaving for a trip to [place] to visit a friend in 2 days and therefore she wanted to be seen for further evaluation.      Rupinder Cast was having a flare of back pain due to sciatica for which she was started on a high dose of Medrol Dosepak.  She noted that her weight went up from her dry weight of 150 pounds to 159.  Her prednisone was eventually discontinued; however, her weight only came down to 155 pounds.  She has been noticing a little bit more peripheral edema, shortness of breath, but no chest discomfort, palpitations, orthopnea, PND.  She is currently on Bumex 2 mg daily.  This was switched from torsemide in the past when she had significant weight gain.      CURRENT CARDIAC MEDICATIONS:   1.  Bumex 2 mg daily.   2.  Olmesartan 20 mg daily.      The remainder of her allergies, social history and review of systems were reviewed and as are documented separately.      PHYSICAL EXAMINATION:   VITAL SIGNS:  Blood pressure 116/54 mmHg, pulse 89 beats per minute, weight 161 pounds.   GENERAL:  NAD.   RESPIRATORY:  Clear to auscultation bilaterally.   CARDIAC:  Regular rate and rhythm without murmurs, clicks or gallop.   NECK:  Normal JVP, no carotid bruits.   ABDOMEN:  Soft, nontender.   VASCULAR:  Pulses are full and equal.    EXTREMITIES:  1+ pitting edema of lower extremities bilaterally, wearing support stocking.   NEUROLOGIC:  Affect appropriate, oriented to time, person and place.      LABORATORY DATA:  BMP shows sodium 141, potassium 4.4, BUN 38, creatinine 1.29 and GFR 40.      ASSESSMENT AND PLAN:  Rupinder Cast is a very delightful 76-year-old female with past medical history notable for systemic hypertension, small PFO, aortic insufficiency, secondary pulmonary hypertension (likely due to LV diastolic dysfunction, hypertension, obstructive sleep apnea, as well as asthma), aortic root dilatation at 4.3 cm, ascending aortic dilatation at 3.8 cm, and chronic back pain.      Rupinder Cast presents to Cardiology Clinic today for evaluation of recent weight gain as well as increased shortness of breath.  She does have history of asthma for which she is on Advair daily.  She states that this feels different than her shortness of breath that she gets from asthma.  She states that she was recently on Medrol Dosepak for a recent flareup of sciatica related back pain.  Her weight went up significantly from 150 pounds to 159 pounds during her prednisone therapy.  After the discontinuation of prednisone, her weight came down some but not completely.  The patient states that at home she feels much better when her weight is 150 pounds.  At home, her weight is currently 155 pounds.  Her dry weight correlates to 155 pounds in our Cardiology Clinic.  Rupinder Cast does have plans to leave for [place] in 2 days and she wants to make sure she is taking appropriate dose of her Bumex before she leaves.      At this time, I will have her increase her Bumex to 3 mg daily.  Even though on her medication list it says 2 mg tablet, patient states that she is actually taking 1 mg tablets.  I will have her also stop by our lab before she leaves as we do not have a recent BMP.  If her labwork looks normal, I will have her continue with the increased dose for 3 days.  I  will contact her next week for reevaluation of her symptoms.  We will also have her come back in 1 week when she returns from her trip for reevaluation of her symptoms.  Looking at her medication list, I do not see that she is on a beta blockade agent.  I believe lowering her heart rate may help with her symptoms.  We can also consider a small dose of spironolactone.  Addition of these medications may be challenging, however, due to her soft pressures.  At this time, we will focus on diuretic therapy as she is going out of town in 2 days.  Judy Cast is instructed to let us know if she has worsening shortness of breath or more weight gain.  She does have an established clinic down in [place] where she will be next week.  If she has worsening symptoms, she will be seen by them. I will also give her a call next week to see how she is doing.     All of her questions were answered to her satisfaction.      Thank you for allowing me to participate in the care of this very delightful patient today.         FILIBERTO HESS PA-C             D: 03/15/2017 09:52   T: 03/15/2017 18:52   MT: al      Name:     JUDY RUSS   MRN:      [id number]        Account:      [id number]   :      1940           Service Date: 2017      Document: [id number]

## 2017-03-15 NOTE — TELEPHONE ENCOUNTER
Contacted Rupinder joya to evaluate her symptoms. She feels much better. I will have her continue with regular dose of Bumex and see her back next Wednesday. She was verbalized understanding and was in agreement.     Marklel Alamo PA-C   3/15/2017  Pager: [phone]

## 2017-03-15 NOTE — PROGRESS NOTES
HPI and Plan:   See dictation. Confirmation # [id number].    Orders this Visit:  Orders Placed This Encounter   Procedures     Basic metabolic panel     N terminal pro BNP outpatient     Basic metabolic panel     N terminal pro BNP outpatient     Follow-Up with Cardiac Advanced Practice Provider     EKG 12-lead complete w/read - Clinics (performed today)     No orders of the defined types were placed in this encounter.    There are no discontinued medications.      Encounter Diagnoses   Name Primary?     Pulmonary HTN (H) Yes     Mitral valve disorder      Hyperlipidemia LDL goal <100      Peripheral vascular disease (H)      Left Ventricular Hypertrophy      Essential hypertension      Shortness of breath       Diastolic dysfunction        CURRENT MEDICATIONS:  Current Outpatient Prescriptions   Medication Sig Dispense Refill     Cholecalciferol (VITAMIN D) 2000 UNITS tablet Take 2,000 Units by mouth 2 times daily       rOPINIRole (REQUIP) 0.25 MG tablet Taking 1 midday and 2 at bedtime, per Dr. Ruiz       traMADol (ULTRAM) 50 MG tablet Take up to two twice daily.   Limit to 4 daily, on average. 100 tablet 0     fluticasone-salmeterol (ADVAIR DISKUS) 250-50 MCG/DOSE diskus inhaler Inhale 1 puff into the lungs 2 times daily 60 Inhaler 1     bumetanide (BUMEX) 2 MG tablet Take 1 tablet (2 mg) by mouth daily 30 tablet 11     Lactobacillus Rhamnosus, GG, (CULTURELLE PO) Take 1 tablet by mouth daily OTC       olmesartan (BENICAR) 40 MG tablet Take 1/2 tablet one time daily. 90 tablet 3     omeprazole (PRILOSEC) 20 MG capsule Take 1 capsule (20 mg) by mouth 2 times daily 180 capsule prn     denosumab (PROLIA) 60 MG/ML SOLN Inject 60 mg Subcutaneous every 6 months       Calcium-Vitamin D-Vitamin K (CALCIUM + D) 500-1000-40 MG-UNT-MCG CHEW Take 2 tablets by mouth daily        DOXYCYCLINE HYCLATE PO Take 50 mg by mouth 3 times per week Monday,wednesday, friday       prednisoLONE acetate (PRED FORTE) 1 % ophthalmic suspension  "Place 1 drop Into the left eye 4 times daily        albuterol (ALBUTEROL) 108 (90 BASE) MCG/ACT inhaler Inhale 2 puffs into the lungs every 4 hours as needed 1 Inhaler 5     RESTASIS 0.05 % OP EMUL 1 drop to left eye twice daily       SYSTANE 0.4-0.3 % OP SOLN 1 drop to left eye 4 times daily       EYE-VITES OR TABS Hydro-Eye with Lutein 2 tabs twice a day (Rx from [facility])       predniSONE (DELTASONE) 20 MG tablet Take one daily, as directed (Patient not taking: Reported on 3/13/2017) 30 tablet 0     order for DME 2L of O2 at night         ALLERGIES     Allergies   Allergen Reactions     Atorvastatin Calcium      myalgias     Celecoxib Swelling     edema     Cholestyramine Diarrhea     Diarrhea       Crestor [Rosuvastatin Calcium]      leg aches, likely from medication     Cyclobenzaprine Hcl      flexeril--gets \"goofy\"     Dulera      tremors     Gabapentin      Nightmares.   Retrial of medication caused tremors.     Lisinopril Cough     Cough/ Dizziness at high dose.     Meperidine Hcl Nausea and Vomiting     demerol-severe nausea     Morphine Nausea and Vomiting     Naprosyn [Naproxen] GI Disturbance     Niaspan [Niacin]      flushing, severe     Percocet [Oxycodone-Acetaminophen] Nausea     Nausea, lightheadedness.   Tolerates med if taken with food.     Pravastatin Swelling     Edema, myalgias     Red Yeast Rice [Cholestin] GI Disturbance     Bloating, etc.     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     Duodenal ulcer, unspecified as acute or chronic, without mention of hemorrhage or perforation 1980's    "  felt due to high dose steroids     Female stress incontinence      GLAUCOMA      H/O right and left heart catheterization      1-     HTN      HYPERLIPIDEMIA      Ischemic colitis 7/2001     [facility]     Lactose Intolerance      Mild     Left Ventricular Hypertrophy      Malignant neoplasm (H) 8/31/2009     left breast DCIS     Mild intermittent asthma ~1980's     MITRAL VALVE Prolapse, with murmur      Obstructive sleep apnea 1/2011     nocturnal desaturation     Osteoporosis, unspecified ~2002     Patent foramen ovale      PERIPHERAL VASCULAR DISEASE 5/05     mesenteric arteries, angioplasty     PRESBYESOPHAGUS 6/04     Pulmonary HTN (H) 11/2014     Serous retinal detachment      False eye right     Subarachnoid hemorrhage following injury (H) 12/10     due to fall, vascular evaluation negative     Syncope and collapse 12/10       PAST SURGICAL HISTORY:  Past Surgical History   Procedure Laterality Date     C nonspecific procedure  1945     adenoidectomy     C appendectomy  1982     C removal gallbladder  1982     Cholecystectomy     C nonspecific procedure       bilat retinal detachment     C nonspecific procedure       blephoroplasty bilat     C nonspecific procedure  1997     glaucoma procedure L     C nonspecific procedure  1997, 2007, 2014     corneal transplant L     C nonspecific procedure  1945     strabismus procedure R eye     C nonspecific procedure  1960's     R breast bx     C nonspecific procedure  5/05     Angioplasty and stenting mesenteric vessels     C nonspecific procedure  2008     L corneal transplant     Mastectomy simple bilateral  10/5/09     bilateral mastectomy     Surgical history of -   6/2010     breast reconstruction     Biopsy       Colonoscopy       due 2015     Heart cath right and left heart cath  1/19/15     Herniorrhaphy ventral N/A 4/19/2016     Procedure: HERNIORRHAPHY VENTRAL;  Surgeon: Hamlet Ness MD;  Location: [facility] OR       FAMILY HISTORY:  Family  "History   Problem Relation Age of Onset     C.A.D. Paternal Uncle      MI 50's     Family History Negative Daughter        SOCIAL HISTORY:  Social History     Social History     Marital status:      Spouse name: N/A     Number of children: N/A     Years of education: N/A     Occupational History      Retired     Social History Main Topics     Smoking status: Never Smoker     Smokeless tobacco: Never Used     Alcohol use Yes      Comment: Occas     Drug use: No     Sexual activity: No     Other Topics Concern     Caffeine Concern No     1- 2 cups coffee     Sleep Concern No     Stress Concern No     Weight Concern No     Special Diet Yes     low sodium     Exercise No     2 days a week (abigail chi), walking program, stationary bike 10 minutes 3 times per week     Seat Belt Yes     Social History Narrative       Review of Systems:  Skin:  Positive for rash   Eyes:  Positive for glasses  ENT:  Negative    Respiratory:  Positive for dyspnea on exertion;wheezing;cough;sleep apnea  Cardiovascular:    edema;Positive for;chest pain  Gastroenterology: Negative    Genitourinary:  not assessed    Musculoskeletal:  Positive for back pain  Neurologic:  Negative    Psychiatric:  Negative    Heme/Lymph/Imm:  Negative    Endocrine:  Negative      Physical Exam:  Vitals: /54  Pulse 89  Ht 1.626 m (5' 4\")  Wt 73.2 kg (161 lb 4.8 oz)  LMP 01/01/1990  SpO2 92%  BMI 27.69 kg/m2   Please refer to dictation for physical exam    Recent Lab Results:  LIPID RESULTS:  Lab Results   Component Value Date    CHOL 208 (A) 01/05/2016    CHOL 208 (A) 01/05/2016    HDL 52 01/05/2016    HDL 52 01/05/2016     01/05/2016     01/05/2016    TRIG 131 01/05/2016    TRIG 131 01/05/2016    CHOLHDLRATIO 2.4 08/25/2014       LIVER ENZYME RESULTS:  Lab Results   Component Value Date    AST 34 02/12/2016    ALT 25 02/12/2016       CBC RESULTS:  Lab Results   Component Value Date    WBC 7.1 10/14/2016    RBC 3.73 (L) 10/14/2016    HGB 12.5 " 10/14/2016    HCT 36.6 10/14/2016    MCV 98 10/14/2016    MCH 33.5 (H) 10/14/2016    MCHC 34.2 10/14/2016    RDW 13.4 10/14/2016     10/14/2016       BMP RESULTS:  Lab Results   Component Value Date     03/13/2017    POTASSIUM 4.4 03/13/2017    CHLORIDE 104 03/13/2017    CO2 28 03/13/2017    ANIONGAP 13.4 03/13/2017     03/13/2017    BUN 38 (H) 03/13/2017    CR 1.29 03/13/2017    GFRESTIMATED 40 (L) 03/13/2017    GFRESTBLACK 48 (L) 03/13/2017    AXEL 9.8 03/13/2017        A1C RESULTS:  No results found for: A1C    INR RESULTS:  Lab Results   Component Value Date    INR 0.95 01/19/2015    INR 1.01 07/02/2010         Markell Alamo PA-C   3/15/2017  Pager: [phone]

## 2017-03-22 ENCOUNTER — OFFICE VISIT (OUTPATIENT)
Dept: CARDIOLOGY | Facility: CLINIC | Age: 77
End: 2017-03-22
Attending: PHYSICIAN ASSISTANT
Payer: COMMERCIAL

## 2017-03-22 VITALS
WEIGHT: 161 LBS | SYSTOLIC BLOOD PRESSURE: 134 MMHG | DIASTOLIC BLOOD PRESSURE: 58 MMHG | BODY MASS INDEX: 27.49 KG/M2 | HEIGHT: 64 IN | HEART RATE: 72 BPM

## 2017-03-22 DIAGNOSIS — R06.00 DYSPNEA, UNSPECIFIED TYPE: ICD-10-CM

## 2017-03-22 DIAGNOSIS — I27.20 PULMONARY HTN (H): ICD-10-CM

## 2017-03-22 DIAGNOSIS — R06.02 SHORTNESS OF BREATH: ICD-10-CM

## 2017-03-22 DIAGNOSIS — I51.89 DIASTOLIC DYSFUNCTION: Primary | ICD-10-CM

## 2017-03-22 DIAGNOSIS — I10 ESSENTIAL HYPERTENSION: ICD-10-CM

## 2017-03-22 DIAGNOSIS — I05.9 MITRAL VALVE DISORDER: ICD-10-CM

## 2017-03-22 LAB
ANION GAP SERPL CALCULATED.3IONS-SCNC: 12.1 MMOL/L (ref 6–17)
BUN SERPL-MCNC: 34 MG/DL (ref 7–30)
CALCIUM SERPL-MCNC: 9.9 MG/DL (ref 8.5–10.5)
CHLORIDE SERPL-SCNC: 107 MMOL/L (ref 98–107)
CO2 SERPL-SCNC: 25 MMOL/L (ref 23–29)
CREAT SERPL-MCNC: 1.26 MG/DL (ref 0.7–1.3)
GFR SERPL CREATININE-BSD FRML MDRD: 41 ML/MIN/1.7M2
GLUCOSE SERPL-MCNC: 133 MG/DL (ref 70–105)
NT-PROBNP SERPL-MCNC: 1628 PG/ML (ref 0–450)
POTASSIUM SERPL-SCNC: 4.1 MMOL/L (ref 3.5–5.1)
SODIUM SERPL-SCNC: 140 MMOL/L (ref 136–145)

## 2017-03-22 PROCEDURE — 36415 COLL VENOUS BLD VENIPUNCTURE: CPT | Performed by: PHYSICIAN ASSISTANT

## 2017-03-22 PROCEDURE — 99214 OFFICE O/P EST MOD 30 MIN: CPT | Performed by: PHYSICIAN ASSISTANT

## 2017-03-22 PROCEDURE — 83880 ASSAY OF NATRIURETIC PEPTIDE: CPT | Performed by: PHYSICIAN ASSISTANT

## 2017-03-22 PROCEDURE — 80048 BASIC METABOLIC PNL TOTAL CA: CPT | Performed by: PHYSICIAN ASSISTANT

## 2017-03-22 RX ORDER — SPIRONOLACTONE 25 MG/1
TABLET ORAL
Qty: 30 TABLET | Refills: 0 | Status: SHIPPED | OUTPATIENT
Start: 2017-03-22 | End: 2017-03-23

## 2017-03-22 RX ORDER — BUMETANIDE 2 MG/1
2 TABLET ORAL DAILY
Qty: 30 TABLET | Refills: 11 | Status: SHIPPED | OUTPATIENT
Start: 2017-03-22 | End: 2017-10-25

## 2017-03-22 NOTE — MR AVS SNAPSHOT
After Visit Summary   3/22/2017    Rupinder Tracy    MRN: [id number]           Patient Information     Date Of Birth          1940        Visit Information        Provider Department      3/22/2017 2:30 PM Markell Alamo PA-C [facility]        Today's Diagnoses     Diastolic dysfunction    -  1    Pulmonary HTN (H)        Essential hypertension        Dyspnea, unspecified type           Care Instructions    Today's Plan:   1. Start Spironolactone- take half tablet one time daily.     2. Come back in 1 week for re-check with non-fasting labs beforehand.     If you have questions or concerns please call my nurse, Essie, at [phone].     Scheduling phone number: [phone]  Reminder: Please bring in all current medications, over the counter supplements and vitamin bottles to your next appointment.    It was a pleasure seeing you today!     Markell Alamo  3/22/2017            Follow-ups after your visit        Additional Services     Follow-Up with Cardiac Advanced Practice Provider                 Your next 10 appointments already scheduled     May 10, 2017  9:45 AM CDT   Return Visit with Jae Butler MD   [facility] ([facility])    [address] [place] [id number] [phone]              Future tests that were ordered for you today     Open Future Orders        Priority Expected Expires Ordered    Basic metabolic panel Routine 3/29/2017 3/22/2018 3/22/2017    N terminal pro BNP outpatient Routine 3/29/2017 3/22/2018 3/22/2017    Follow-Up with Cardiac Advanced Practice Provider Routine 3/29/2017 3/22/2018 3/22/2017            Who to contact     If you have questions or need follow up information about today's clinic visit or your schedule please contact [facility] directly at [phone].  Normal or non-critical lab and  "imaging results will be communicated to you by MyChart, letter or phone within 4 business days after the clinic has received the results. If you do not hear from us within 7 days, please contact the clinic through [employer] or phone. If you have a critical or abnormal lab result, we will notify you by phone as soon as possible.  Submit refill requests through [employer] or call your pharmacy and they will forward the refill request to us. Please allow 3 business days for your refill to be completed.          Additional Information About Your Visit        [employer] Information     [employer] lets you send messages to your doctor, view your test results, renew your prescriptions, schedule appointments and more. To sign up, go to [email or web address] . Click on \"Log in\" on the left side of the screen, which will take you to the Welcome page. Then click on \"Sign up Now\" on the right side of the page.     You will be asked to enter the access code listed below, as well as some personal information. Please follow the directions to create your username and password.     Your access code is: [email or web address]  Expires: 5/3/2017  4:36 PM     Your access code will  in 90 days. If you need help or a new code, please call your [facility] clinic or [phone].        Care EveryWhere ID     This is your Care EveryWhere ID. This could be used by other organizations to access your [facility] medical records  [id number]        Your Vitals Were     Pulse Height Last Period BMI (Body Mass Index)          72 1.626 m (5' 4\") 1990 27.64 kg/m2         Blood Pressure from Last 3 Encounters:   17 134/58   17 116/54   17 120/50    Weight from Last 3 Encounters:   17 73 kg (161 lb)   17 73.2 kg (161 lb 4.8 oz)   17 72.1 kg (159 lb)              We Performed the Following     Follow-Up with Cardiac Advanced Practice Provider          Today's Medication Changes          These changes are accurate as of: " 3/22/17  3:24 PM.  If you have any questions, ask your nurse or doctor.               Start taking these medicines.        Dose/Directions    spironolactone 25 MG tablet   Commonly known as:  ALDACTONE   Used for:  Diastolic dysfunction   Started by:  Markell Alamo PA-C        Take half tablet one time daily.   Quantity:  30 tablet   Refills:  0            Where to get your medicines      These medications were sent to [facility]/pharmacy #[id number] [place], [place] - [address] [facility] [address]  [address] [place] [id number]     Phone:  [phone]     bumetanide 2 MG tablet    spironolactone 25 MG tablet                Primary Care Provider Office Phone # Fax #    Abran Mott [phone]       [facility] [address] [facility] [id number]        Thank you!     Thank you for choosing [facility] [place]  for your care. Our goal is always to provide you with excellent care. Hearing back from our patients is one way we can continue to improve our services. Please take a few minutes to complete the written survey that you may receive in the mail after your visit with us. Thank you!             Your Updated Medication List - Protect others around you: Learn how to safely use, store and throw away your medicines at www.disposemymeds.org.          This list is accurate as of: 3/22/17  3:24 PM.  Always use your most recent med list.                   Brand Name Dispense Instructions for use    albuterol 108 (90 BASE) MCG/ACT Inhaler    albuterol    1 Inhaler    Inhale 2 puffs into the lungs every 4 hours as needed       bumetanide 2 MG tablet    BUMEX    30 tablet    Take 1 tablet (2 mg) by mouth daily       CALCIUM + D 500-1000-40 MG-UNT-MCG Chew   Generic drug:  Calcium-Vitamin D-Vitamin K      Take 2 tablets by mouth daily       CULTURELLE PO      Take 1 tablet by mouth daily OTC       DOXYCYCLINE HYCLATE PO      Take 50 mg by mouth 3 times per  week Monday,wednesday, friday       EYE-VITES Tabs          [place]-Eye with Lutein 2 tabs twice a day (Rx from [facility])       fluticasone-salmeterol 250-50 MCG/DOSE diskus inhaler    ADVAIR DISKUS    60 Inhaler    Inhale 1 puff into the lungs 2 times daily       olmesartan 40 MG tablet    BENICAR    90 tablet    Take 1/2 tablet one time daily.       omeprazole 20 MG CR capsule    priLOSEC    180 capsule    Take 1 capsule (20 mg) by mouth 2 times daily       order for DME      2L of O2 at night       prednisoLONE acetate 1 % ophthalmic susp    PRED FORTE     Place 1 drop Into the left eye 4 times daily       predniSONE 20 MG tablet    DELTASONE    30 tablet    Take one daily, as directed       PROLIA 60 MG/ML Soln injection   Generic drug:  denosumab      Inject 60 mg Subcutaneous every 6 months       REQUIP 0.25 MG tablet   Generic drug:  rOPINIRole      Taking 1 midday and 2 at bedtime, per Dr. Ruiz       RESTASIS 0.05 % ophthalmic emulsion   Generic drug:  cycloSPORINE      1 drop to left eye twice daily       spironolactone 25 MG tablet    ALDACTONE    30 tablet    Take half tablet one time daily.       SYSTANE 0.4-0.3 % Soln ophthalmic solution   Generic drug:  polyethylene glycol 0.4%- propylene glycol 0.3%      1 drop to left eye 4 times daily       traMADol 50 MG tablet    ULTRAM    100 tablet    Take up to two twice daily.   Limit to 4 daily, on average.       vitamin D 2000 UNITS tablet      Take 2,000 Units by mouth 2 times daily

## 2017-03-22 NOTE — PATIENT INSTRUCTIONS
Today's Plan:   1. Start Spironolactone- take half tablet one time daily.     2. Come back in 1 week for re-check with non-fasting labs beforehand.     If you have questions or concerns please call my nurse, Essie, at [phone].     Scheduling phone number: [phone]  Reminder: Please bring in all current medications, over the counter supplements and vitamin bottles to your next appointment.    It was a pleasure seeing you today!     Markell Alamo  3/22/2017

## 2017-03-22 NOTE — LETTER
3/22/2017    Abran Mott MD  [facility]   [address] [facility] [address]    RE: Rupinder Tracy       Dear Colleague,    I had the pleasure of seeing Rupinder Tracy in the [facility].    PRIMARY CARDIOLOGIST:  Dr. Jae Butler.      REASON FOR CLINIC VISIT:  Two-week followup.     Rupinder Cast is a very delightful 76-year-old female with past medical history notable for hypertension, small PFO, aortic insufficiency, aortic root dilatation at 4.3 cm, ascending aortic dilatation at 3.8 cm, left-sided diastolic dysfunction, secondary pulmonary hypertension (likely due to aortic insufficiency, hypertension as well as obstructive sleep apnea/asthma), and chronic back pain.      I saw Rupinder Cast just prior to her trip to [place] on 03/13/2017 for weight gain as well as worsening shortness of breath.  Prior to the development of her symptoms, she was on a brief course of prednisone for back pain.  The patient noticed weight gain, and then later she developed worsening shortness of breath.  We elected to increase her Bumex to 3 mg daily for 3 days, and she was set up for followup 1 week from today for reevaluation.  Her N-terminal BNP at that time was around 3000.      Rupinder Cast presents to Cardiology Clinic today stating that she felt much better with the increase in Bumex for a few days.  She states that she is doing well; however, she has noticed that her weight has come up slightly.  She otherwise has no other questions or concerns at this time.      CURRENT CARDIAC MEDICATIONS:   1.  Bumex 2 mg daily.   2.  Olmesartan 20 mg daily.   3.  Spironolactone 12.5 mg 1 time daily (this was started today).      The remainder of her allergies, social history and review of systems were reviewed and as documented separately.      VITAL SIGNS:  Blood pressure 134/58 mmHg.  Pulse 72 beats per minute, weight 161 pounds.      PHYSICAL EXAMINATION:   GENERAL:  NAD.   RESPIRATORY:   Clear to auscultation bilaterally.   CARDIAC:  Regular rate and rhythm without murmurs, clicks or gallop.   NECK:  Normal JVP, no carotid bruits.   ABDOMEN:  Soft, nontender.   VASCULAR:  Pulses are full and equal.   EXTREMITIES:  1+ pitting edema of lower extremities bilaterally, wearing support stockings.   NEUROLOGIC:  Affect appropriate.  Oriented x3.      LABORATORY DATA:     BMP shows sodium 140, potassium 4.1, BUN 34, creatinine 1.26, GFR 41.   N-terminal BNP has come down to 1600 from 3800 in 1 week.      ASSESSMENT AND PLAN:     Rupinder Cast is a very delightful 76-year-old female who comes in for reevaluation of recent weight gain and shortness of breath.  Her past medical history is notable for hypertension, small PFO, AI, aortic root dilatation as well as ascending aortic dilatation.  She also has left-sided diastolic dysfunction and secondary pulmonary hypertension.  She has obstructive sleep apnea as well as asthma for which she has been using inhalers.      Rupinder Cast was seen by me 1 week ago for shortness of breath and weight gain.  She wanted to be seen in clinic as she was leaving out of town for [place] in a few days.  Her Bumex was increased to 3 mg daily for 3 days, and she was set up to follow up with me after her trip in 1 week.      Rupinder Cast states that she felt better, and her weight came down with 3 mg of Bumex.  She states that since the decrease back to 20 mg daily, she has not noticed any worsening in her symptoms.  She, however, has noticed that her weight has come up gradually.  Her weight today is 161 pounds, exactly the same as the day she came to see me prior to her trip.  Back in 11/2016, her weight was down to 152 pounds.  At this time, we will have her continue with Bumex 2 mg daily.  I will add a very small dose of spironolactone given her diastolic dysfunction.  I will have her come back in 1 week, and we will check her BMP.      Ideally, I would like to add a small dose of beta  blockade agent to control her heart rate a little bit better given her diastolic dysfunction.  She does have history of severe reaction to timolol maleate in the past.  At this time, I will hold off on initiating of beta blockade agent.  I will run this by Dr. Butler to see if she would be a good candidate.      We will also check her N-terminal BNP as well as BMP on her followup.      All of her questions were answered to her satisfaction.      Thank you for allowing me to participate in the care of this delightful patient today.     Sincerely,    Markell Giles PA-C

## 2017-03-22 NOTE — PROGRESS NOTES
HPI and Plan:   See dictation. Confirmation # [id number].    Orders this Visit:  Orders Placed This Encounter   Procedures     Basic metabolic panel     N terminal pro BNP outpatient     Follow-Up with Cardiac Advanced Practice Provider     Orders Placed This Encounter   Medications     spironolactone (ALDACTONE) 25 MG tablet     Sig: Take half tablet one time daily.     Dispense:  30 tablet     Refill:  0     bumetanide (BUMEX) 2 MG tablet     Sig: Take 1 tablet (2 mg) by mouth daily     Dispense:  30 tablet     Refill:  11     Medications Discontinued During This Encounter   Medication Reason     bumetanide (BUMEX) 2 MG tablet Reorder         Encounter Diagnoses   Name Primary?     Pulmonary HTN (H)      Essential hypertension      Diastolic dysfunction Yes     Dyspnea, unspecified type         CURRENT MEDICATIONS:  Current Outpatient Prescriptions   Medication Sig Dispense Refill     spironolactone (ALDACTONE) 25 MG tablet Take half tablet one time daily. 30 tablet 0     bumetanide (BUMEX) 2 MG tablet Take 1 tablet (2 mg) by mouth daily 30 tablet 11     Cholecalciferol (VITAMIN D) 2000 UNITS tablet Take 2,000 Units by mouth 2 times daily       rOPINIRole (REQUIP) 0.25 MG tablet Taking 1 midday and 2 at bedtime, per Dr. Ruiz       traMADol (ULTRAM) 50 MG tablet Take up to two twice daily.   Limit to 4 daily, on average. 100 tablet 0     fluticasone-salmeterol (ADVAIR DISKUS) 250-50 MCG/DOSE diskus inhaler Inhale 1 puff into the lungs 2 times daily 60 Inhaler 1     Lactobacillus Rhamnosus, GG, (CULTURELLE PO) Take 1 tablet by mouth daily OTC       olmesartan (BENICAR) 40 MG tablet Take 1/2 tablet one time daily. 90 tablet 3     omeprazole (PRILOSEC) 20 MG capsule Take 1 capsule (20 mg) by mouth 2 times daily 180 capsule prn     denosumab (PROLIA) 60 MG/ML SOLN Inject 60 mg Subcutaneous every 6 months       order for DME 2L of O2 at night       Calcium-Vitamin D-Vitamin K (CALCIUM + D) 500-1000-40 MG-UNT-MCG CHEW Take  "2 tablets by mouth daily        DOXYCYCLINE HYCLATE PO Take 50 mg by mouth 3 times per week Monday,wednesday, friday       prednisoLONE acetate (PRED FORTE) 1 % ophthalmic suspension Place 1 drop Into the left eye 4 times daily        albuterol (ALBUTEROL) 108 (90 BASE) MCG/ACT inhaler Inhale 2 puffs into the lungs every 4 hours as needed 1 Inhaler 5     RESTASIS 0.05 % OP EMUL 1 drop to left eye twice daily       SYSTANE 0.4-0.3 % OP SOLN 1 drop to left eye 4 times daily       EYE-VITES OR TABS [place]-Eye with Lutein 2 tabs twice a day (Rx from [facility])       predniSONE (DELTASONE) 20 MG tablet Take one daily, as directed (Patient not taking: Reported on 3/13/2017) 30 tablet 0     [DISCONTINUED] bumetanide (BUMEX) 2 MG tablet Take 1 tablet (2 mg) by mouth daily 30 tablet 11       ALLERGIES     Allergies   Allergen Reactions     Atorvastatin Calcium      myalgias     Celecoxib Swelling     edema     Cholestyramine Diarrhea     Diarrhea       Crestor [Rosuvastatin Calcium]      leg aches, likely from medication     Cyclobenzaprine Hcl      flexeril--gets \"goofy\"     Dulera      tremors     Gabapentin      Nightmares.   Retrial of medication caused tremors.     Lisinopril Cough     Cough/ Dizziness at high dose.     Meperidine Hcl Nausea and Vomiting     demerol-severe nausea     Morphine Nausea and Vomiting     Naprosyn [Naproxen] GI Disturbance     Niaspan [Niacin]      flushing, severe     Percocet [Oxycodone-Acetaminophen] Nausea     Nausea, lightheadedness.   Tolerates med if taken with food.     Pravastatin Swelling     Edema, myalgias     Red Yeast Rice [Cholestin] GI Disturbance     Bloating, etc.     Simvastatin      myalgias     Timolol Maleate Difficulty breathing     timoptic--respiratory problems     Hctz Rash     rash     Sulfa Drugs Itching and Rash     rash on all mucous membranes and palms of hands with intense itching       PAST MEDICAL HISTORY:  Past Medical History:   Diagnosis Date "     Arthritis      Cellulitis 4/13 in [place]    R ankle     Difficult airway for intubation      Ductal Carcinoma in Situ of Left Breast 9/09     Duodenal ulcer, unspecified as acute or chronic, without mention of hemorrhage or perforation 1980's    felt due to high dose steroids     Female stress incontinence      GLAUCOMA      H/O right and left heart catheterization     1-     HTN      HYPERLIPIDEMIA      Ischemic colitis 7/2001    [facility]     Lactose Intolerance     Mild     Left Ventricular Hypertrophy      Malignant neoplasm (H) 8/31/2009    left breast DCIS     Mild intermittent asthma ~1980's     MITRAL VALVE Prolapse, with murmur      Obstructive sleep apnea 1/2011    nocturnal desaturation     Osteoporosis, unspecified ~2002     Patent foramen ovale      PERIPHERAL VASCULAR DISEASE 5/05    mesenteric arteries, angioplasty     PRESBYESOPHAGUS 6/04     Pulmonary HTN (H) 11/2014     Serous retinal detachment     False eye right     Subarachnoid hemorrhage following injury (H) 12/10    due to fall, vascular evaluation negative     Syncope and collapse 12/10       PAST SURGICAL HISTORY:  Past Surgical History:   Procedure Laterality Date     BIOPSY       C APPENDECTOMY  1982     C NONSPECIFIC PROCEDURE  1945    adenoidectomy     C NONSPECIFIC PROCEDURE      bilat retinal detachment     C NONSPECIFIC PROCEDURE      blephoroplasty bilat     C NONSPECIFIC PROCEDURE  1997    glaucoma procedure L     C NONSPECIFIC PROCEDURE  1997, 2007, 2014    corneal transplant L     C NONSPECIFIC PROCEDURE  1945    strabismus procedure R eye     C NONSPECIFIC PROCEDURE  1960's    R breast bx     C NONSPECIFIC PROCEDURE  5/05    Angioplasty and stenting mesenteric vessels     C NONSPECIFIC PROCEDURE  2008    L corneal transplant     C REMOVAL GALLBLADDER  1982    Cholecystectomy     COLONOSCOPY      due 2015     HEART CATH RIGHT AND LEFT HEART CATH  1/19/15     HERNIORRHAPHY VENTRAL N/A 4/19/2016    Procedure:  "HERNIORRHAPHY VENTRAL;  Surgeon: Hamlet Ness MD;  Location: [facility] OR     MASTECTOMY SIMPLE BILATERAL  10/5/09    bilateral mastectomy     SURGICAL HISTORY OF -   6/2010    breast reconstruction       FAMILY HISTORY:  Family History   Problem Relation Age of Onset     C.A.D. Paternal Uncle      MI 50's     Family History Negative Daughter        SOCIAL HISTORY:  Social History     Social History     Marital status:      Spouse name: N/A     Number of children: N/A     Years of education: N/A     Occupational History      Retired     Social History Main Topics     Smoking status: Never Smoker     Smokeless tobacco: Never Used     Alcohol use Yes      Comment: Occas     Drug use: No     Sexual activity: No     Other Topics Concern     Caffeine Concern No     1- 2 cups coffee     Sleep Concern No     Stress Concern No     Weight Concern No     Special Diet Yes     low sodium     Exercise No     2 days a week (abigail chi), walking program, stationary bike 10 minutes 3 times per week     Seat Belt Yes     Social History Narrative       Review of Systems:  Skin:  Positive for rash   Eyes:  Positive for glasses  ENT:  Negative    Respiratory:  Positive for dyspnea on exertion;wheezing;cough;sleep apnea  Cardiovascular:    edema;Positive for;chest pain;fatigue  Gastroenterology: Negative    Genitourinary:  Negative    Musculoskeletal:  Positive for back pain  Neurologic:  Positive for tremors;memory problems  Psychiatric:  Negative    Heme/Lymph/Imm:  Negative    Endocrine:  Negative      Physical Exam:  Vitals: /58  Pulse 72  Ht 1.626 m (5' 4\")  Wt 73 kg (161 lb)  LMP 01/01/1990  BMI 27.64 kg/m2   Please refer to dictation for physical exam    Recent Lab Results:  LIPID RESULTS:  Lab Results   Component Value Date    CHOL 208 (A) 01/05/2016    CHOL 208 (A) 01/05/2016    HDL 52 01/05/2016    HDL 52 01/05/2016     01/05/2016     01/05/2016    TRIG 131 01/05/2016    TRIG 131 01/05/2016    " CHOLHDLRATIO 2.4 08/25/2014       LIVER ENZYME RESULTS:  Lab Results   Component Value Date    AST 34 02/12/2016    ALT 25 02/12/2016       CBC RESULTS:  Lab Results   Component Value Date    WBC 7.1 10/14/2016    RBC 3.73 (L) 10/14/2016    HGB 12.5 10/14/2016    HCT 36.6 10/14/2016    MCV 98 10/14/2016    MCH 33.5 (H) 10/14/2016    MCHC 34.2 10/14/2016    RDW 13.4 10/14/2016     10/14/2016       BMP RESULTS:  Lab Results   Component Value Date     03/22/2017    POTASSIUM 4.1 03/22/2017    CHLORIDE 107 03/22/2017    CO2 25 03/22/2017    ANIONGAP 12.1 03/22/2017     (H) 03/22/2017    BUN 34 (H) 03/22/2017    CR 1.26 03/22/2017    GFRESTIMATED 41 (L) 03/22/2017    GFRESTBLACK 50 (L) 03/22/2017    AXEL 9.9 03/22/2017        A1C RESULTS:  No results found for: A1C    INR RESULTS:  Lab Results   Component Value Date    INR 0.95 01/19/2015    INR 1.01 07/02/2010           CC  Markell Alamo PA-C   HEART [facility]  [id number] [id number]    Markell Alamo PA-C   3/22/2017  Pager: [phone]

## 2017-03-23 ENCOUNTER — TELEPHONE (OUTPATIENT)
Dept: CARDIOLOGY | Facility: CLINIC | Age: 77
End: 2017-03-23

## 2017-03-23 NOTE — PROGRESS NOTES
PRIMARY CARDIOLOGIST:  Dr. Jae Butler.      REASON FOR CLINIC VISIT:  Two-week followup.      HISTORY OF PRESENT ILLNESS:     Rupinder Cast is a very delightful 76-year-old female with past medical history notable for hypertension, small PFO, aortic insufficiency, aortic root dilatation at 4.3 cm, ascending aortic dilatation at 3.8 cm, left-sided diastolic dysfunction, secondary pulmonary hypertension (likely due to aortic insufficiency, hypertension as well as obstructive sleep apnea/asthma), and chronic back pain.      I saw Rupinder Cast just prior to her trip to [place] on 03/13/2017 for weight gain as well as worsening shortness of breath.  Prior to the development of her symptoms, she was on a brief course of prednisone for back pain.  The patient noticed weight gain, and then later she developed worsening shortness of breath.  We elected to increase her Bumex to 3 mg daily for 3 days, and she was set up for followup 1 week from today for reevaluation.  Her N-terminal BNP at that time was around 3000.      Rupinder Cast presents to Cardiology Clinic today stating that she felt much better with the increase in Bumex for a few days.  She states that she is doing well; however, she has noticed that her weight has come up slightly.  She otherwise has no other questions or concerns at this time.      CURRENT CARDIAC MEDICATIONS:   1.  Bumex 2 mg daily.   2.  Olmesartan 20 mg daily.   3.  Spironolactone 12.5 mg 1 time daily (this was started today).      The remainder of her allergies, social history and review of systems were reviewed and as documented separately.      VITAL SIGNS:  Blood pressure 134/58 mmHg.  Pulse 72 beats per minute, weight 161 pounds.      PHYSICAL EXAMINATION:   GENERAL:  NAD.   RESPIRATORY:  Clear to auscultation bilaterally.   CARDIAC:  Regular rate and rhythm without murmurs, clicks or gallop.   NECK:  Normal JVP, no carotid bruits.   ABDOMEN:  Soft, nontender.   VASCULAR:  Pulses are full and  equal.   EXTREMITIES:  1+ pitting edema of lower extremities bilaterally, wearing support stockings.   NEUROLOGIC:  Affect appropriate.  Oriented x3.      LABORATORY DATA:     BMP shows sodium 140, potassium 4.1, BUN 34, creatinine 1.26, GFR 41.   N-terminal BNP has come down to 1600 from 3800 in 1 week.      ASSESSMENT AND PLAN:     Rupinder Cast is a very delightful 76-year-old female who comes in for reevaluation of recent weight gain and shortness of breath.  Her past medical history is notable for hypertension, small PFO, AI, aortic root dilatation as well as ascending aortic dilatation.  She also has left-sided diastolic dysfunction and secondary pulmonary hypertension.  She has obstructive sleep apnea as well as asthma for which she has been using inhalers.      Rupinder Cast was seen by me 1 week ago for shortness of breath and weight gain.  She wanted to be seen in clinic as she was leaving out of town for [place] in a few days.  Her Bumex was increased to 3 mg daily for 3 days, and she was set up to follow up with me after her trip in 1 week.      Rupinder Cast states that she felt better, and her weight came down with 3 mg of Bumex.  She states that since the decrease back to 20 mg daily, she has not noticed any worsening in her symptoms.  She, however, has noticed that her weight has come up gradually.  Her weight today is 161 pounds, exactly the same as the day she came to see me prior to her trip.  Back in 11/2016, her weight was down to 152 pounds.  At this time, we will have her continue with Bumex 2 mg daily.  I will add a very small dose of spironolactone given her diastolic dysfunction.  I will have her come back in 1 week, and we will check her BMP.      Ideally, I would like to add a small dose of beta blockade agent to control her heart rate a little bit better given her diastolic dysfunction.  She does have history of severe reaction to timolol maleate in the past.  At this time, I will hold off on  initiating of beta blockade agent.  I will run this by Dr. Butler to see if she would be a good candidate.      We will also check her N-terminal BNP as well as BMP on her followup.      All of her questions were answered to her satisfaction.      Thank you for allowing me to participate in the care of this delightful patient today.         FILIBERTO HESS PA-C             D: 2017 15:58   T: 2017 14:32   MT: STACEY      Name:     JUDY RUSS   MRN:      [id number]        Account:      [id number]   :      1940           Service Date: 2017      Document: [id number]

## 2017-03-23 NOTE — TELEPHONE ENCOUNTER
Discussed with Rupinder Cast that Dr. Butler recommends increasing Olmesartan to decrease afterload. We will cancel the spironolactone prescription and have Rupinder Cast increase the Olmesartan to one full tablet. Patient verbailzed understanding.    Markell Alamo PA-C   3/23/2017  Pager: [phone]

## 2017-03-27 ENCOUNTER — TRANSFERRED RECORDS (OUTPATIENT)
Dept: HEALTH INFORMATION MANAGEMENT | Facility: CLINIC | Age: 77
End: 2017-03-27

## 2017-03-27 ENCOUNTER — TELEPHONE (OUTPATIENT)
Dept: INTERNAL MEDICINE | Facility: CLINIC | Age: 77
End: 2017-03-27

## 2017-03-27 DIAGNOSIS — M81.0 OSTEOPOROSIS: Primary | ICD-10-CM

## 2017-03-27 NOTE — TELEPHONE ENCOUNTER
Reason for Call: Request for an order or referral:    Order or referral being requested: Dexa scan order    Date needed: as soon as possible    Has the patient been seen by the PCP for this problem? NO    Additional comments: pt needs a dexa scan ASAP in order for her to continue taking prolia. Please put order in pt chart, she will be calling back tomorrow to ck in.    Phone number Patient can be reached at:  Home number on file [phone] (home)    Best Time:  anytime    Can we leave a detailed message on this number?  YES    Call taken on 3/27/2017 at 4:32 PM by SHARONDA ALDRIDGE

## 2017-03-29 ENCOUNTER — OFFICE VISIT (OUTPATIENT)
Dept: CARDIOLOGY | Facility: CLINIC | Age: 77
End: 2017-03-29
Attending: PHYSICIAN ASSISTANT
Payer: COMMERCIAL

## 2017-03-29 VITALS
HEIGHT: 64 IN | WEIGHT: 161.8 LBS | DIASTOLIC BLOOD PRESSURE: 60 MMHG | BODY MASS INDEX: 27.62 KG/M2 | SYSTOLIC BLOOD PRESSURE: 130 MMHG | HEART RATE: 75 BPM | OXYGEN SATURATION: 98 %

## 2017-03-29 DIAGNOSIS — R06.00 DYSPNEA, UNSPECIFIED TYPE: ICD-10-CM

## 2017-03-29 DIAGNOSIS — I51.89 DIASTOLIC DYSFUNCTION: ICD-10-CM

## 2017-03-29 DIAGNOSIS — I51.7 LVH (LEFT VENTRICULAR HYPERTROPHY): ICD-10-CM

## 2017-03-29 DIAGNOSIS — I10 ESSENTIAL HYPERTENSION: ICD-10-CM

## 2017-03-29 DIAGNOSIS — I51.89 DIASTOLIC DYSFUNCTION: Primary | ICD-10-CM

## 2017-03-29 DIAGNOSIS — I27.20 PULMONARY HTN (H): ICD-10-CM

## 2017-03-29 DIAGNOSIS — I73.9 PERIPHERAL VASCULAR DISEASE (H): ICD-10-CM

## 2017-03-29 DIAGNOSIS — E78.5 HYPERLIPIDEMIA LDL GOAL <100: ICD-10-CM

## 2017-03-29 DIAGNOSIS — I05.9 MITRAL VALVE DISORDER: ICD-10-CM

## 2017-03-29 LAB
ANION GAP SERPL CALCULATED.3IONS-SCNC: 13.8 MMOL/L (ref 6–17)
BUN SERPL-MCNC: 30 MG/DL (ref 7–30)
CALCIUM SERPL-MCNC: 10 MG/DL (ref 8.5–10.5)
CHLORIDE SERPL-SCNC: 108 MMOL/L (ref 98–107)
CO2 SERPL-SCNC: 28 MMOL/L (ref 23–29)
CREAT SERPL-MCNC: 1.33 MG/DL (ref 0.7–1.3)
GFR SERPL CREATININE-BSD FRML MDRD: 39 ML/MIN/1.7M2
GLUCOSE SERPL-MCNC: 119 MG/DL (ref 70–105)
NT-PROBNP SERPL-MCNC: 1314 PG/ML (ref 0–450)
POTASSIUM SERPL-SCNC: 3.8 MMOL/L (ref 3.5–5.1)
SODIUM SERPL-SCNC: 146 MMOL/L (ref 136–145)

## 2017-03-29 PROCEDURE — 80048 BASIC METABOLIC PNL TOTAL CA: CPT | Performed by: PHYSICIAN ASSISTANT

## 2017-03-29 PROCEDURE — 36415 COLL VENOUS BLD VENIPUNCTURE: CPT | Performed by: PHYSICIAN ASSISTANT

## 2017-03-29 PROCEDURE — 83880 ASSAY OF NATRIURETIC PEPTIDE: CPT | Performed by: PHYSICIAN ASSISTANT

## 2017-03-29 PROCEDURE — 99214 OFFICE O/P EST MOD 30 MIN: CPT | Performed by: PHYSICIAN ASSISTANT

## 2017-03-29 NOTE — MR AVS SNAPSHOT
After Visit Summary   3/29/2017    Rupinder Tracy    MRN: [id number]           Patient Information     Date Of Birth          1940        Visit Information        Provider Department      3/29/2017 2:30 PM Markell Alamo PA-C [facility]        Today's Diagnoses     Pulmonary HTN (H)    -  1    Diastolic dysfunction        Mitral valve disorder        Hyperlipidemia LDL goal <100        Peripheral vascular disease (H)        Left Ventricular Hypertrophy        Essential hypertension          Care Instructions    Today's Plan:   1. Your lab shows that you are a bit dehydrated. Let's monitor your symptoms for now. Continue with same dose of Bumex.     2. We will repeat lab when you see Dr. Butler back in May.     If you have questions or concerns please call my nurse at [phone].     Scheduling phone number: [phone]  Reminder: Please bring in all current medications, over the counter supplements and vitamin bottles to your next appointment.    It was a pleasure seeing you today!     Markell Alamo  3/29/2017            Follow-ups after your visit        Your next 10 appointments already scheduled     Apr 12, 2017  3:00 PM CDT   DX HIP/PELVIS/SPINE with [facility]   [facility]    [address] [address] [phone]           Please do not take any of the following 48 hours prior to your exam: vitamins, calcium tablets, antacids.            May 10, 2017  9:45 AM CDT   Return Visit with Jae Butler MD   [facility] ([facility])    [address] [place] [id number] [phone]              Who to contact     If you have questions or need follow up information about today's clinic visit or your schedule please contact [facility] directly at  "[phone].  Normal or non-critical lab and imaging results will be communicated to you by MyChart, letter or phone within 4 business days after the clinic has received the results. If you do not hear from us within 7 days, please contact the clinic through [employer] or phone. If you have a critical or abnormal lab result, we will notify you by phone as soon as possible.  Submit refill requests through [employer] or call your pharmacy and they will forward the refill request to us. Please allow 3 business days for your refill to be completed.          Additional Information About Your Visit        [employer] Information     [employer] lets you send messages to your doctor, view your test results, renew your prescriptions, schedule appointments and more. To sign up, go to [email or web address] . Click on \"Log in\" on the left side of the screen, which will take you to the Welcome page. Then click on \"Sign up Now\" on the right side of the page.     You will be asked to enter the access code listed below, as well as some personal information. Please follow the directions to create your username and password.     Your access code is: [email or web address]  Expires: 5/3/2017  4:36 PM     Your access code will  in 90 days. If you need help or a new code, please call your [facility] clinic or [phone].        Care EveryWhere ID     This is your Care EveryWhere ID. This could be used by other organizations to access your [facility] medical records  [id number]        Your Vitals Were     Pulse Height Last Period Pulse Oximetry BMI (Body Mass Index)       75 1.626 m (5' 4\") 1990 98% 27.77 kg/m2        Blood Pressure from Last 3 Encounters:   17 130/60   17 134/58   17 116/54    Weight from Last 3 Encounters:   17 73.4 kg (161 lb 12.8 oz)   17 73 kg (161 lb)   17 73.2 kg (161 lb 4.8 oz)              We Performed the Following     Follow-Up with Cardiac Advanced Practice Provider        Primary " Care Provider Office Phone # Fax #    Abran Mott [phone]       [facility] [address]  [facility] [address]        Thank you!     Thank you for choosing [facility] [place]  for your care. Our goal is always to provide you with excellent care. Hearing back from our patients is one way we can continue to improve our services. Please take a few minutes to complete the written survey that you may receive in the mail after your visit with us. Thank you!             Your Updated Medication List - Protect others around you: Learn how to safely use, store and throw away your medicines at www.disposemymeds.org.          This list is accurate as of: 3/29/17  2:56 PM.  Always use your most recent med list.                   Brand Name Dispense Instructions for use    albuterol 108 (90 BASE) MCG/ACT Inhaler    albuterol    1 Inhaler    Inhale 2 puffs into the lungs every 4 hours as needed       bumetanide 2 MG tablet    BUMEX    30 tablet    Take 1 tablet (2 mg) by mouth daily       CALCIUM + D 500-1000-40 MG-UNT-MCG Chew   Generic drug:  Calcium-Vitamin D-Vitamin K      Take 2 tablets by mouth daily       CULTURELLE PO      Take 1 tablet by mouth daily OTC       DOXYCYCLINE HYCLATE PO      Take 50 mg by mouth 3 times per week Monday,wednesday, friday       EYE-VITES Tabs          [place]-Eye with Lutein 2 tabs twice a day (Rx from [facility])       fluticasone-salmeterol 250-50 MCG/DOSE diskus inhaler    ADVAIR DISKUS    60 Inhaler    Inhale 1 puff into the lungs 2 times daily       olmesartan 40 MG tablet    BENICAR    90 tablet    Take 1 tablet one time daily.       omeprazole 20 MG CR capsule    priLOSEC    180 capsule    Take 1 capsule (20 mg) by mouth 2 times daily       order for DME      2L of O2 at night       prednisoLONE acetate 1 % ophthalmic susp    PRED FORTE     Place 1 drop Into the left eye 4 times daily       predniSONE  20 MG tablet    DELTASONE    30 tablet    Take one daily, as directed       PROLIA 60 MG/ML Soln injection   Generic drug:  denosumab      Inject 60 mg Subcutaneous every 6 months       REQUIP 0.25 MG tablet   Generic drug:  rOPINIRole      Taking 1 midday and 2 at bedtime, per Dr. Ruiz       RESTASIS 0.05 % ophthalmic emulsion   Generic drug:  cycloSPORINE      1 drop to left eye twice daily       SYSTANE 0.4-0.3 % Soln ophthalmic solution   Generic drug:  polyethylene glycol 0.4%- propylene glycol 0.3%      1 drop to left eye 4 times daily       traMADol 50 MG tablet    ULTRAM    100 tablet    Take up to two twice daily.   Limit to 4 daily, on average.       vitamin D 2000 UNITS tablet      Take 2,000 Units by mouth 2 times daily

## 2017-03-29 NOTE — PATIENT INSTRUCTIONS
Today's Plan:   1. Your lab shows that you are a bit dehydrated. Let's monitor your symptoms for now. Continue with same dose of Bumex.     2. We will repeat lab when you see Dr. Butler back in May.     If you have questions or concerns please call my nurse at [phone].     Scheduling phone number: [phone]  Reminder: Please bring in all current medications, over the counter supplements and vitamin bottles to your next appointment.    It was a pleasure seeing you today!     Markell Alamo  3/29/2017

## 2017-03-29 NOTE — PROGRESS NOTES
HPI and Plan:   See dictation. Confirmation # [id number].    Orders this Visit:  Orders Placed This Encounter   Procedures     Basic metabolic panel     No orders of the defined types were placed in this encounter.    There are no discontinued medications.      Encounter Diagnoses   Name Primary?     Diastolic dysfunction Yes     Pulmonary HTN (H)      Mitral valve disorder      Hyperlipidemia LDL goal <100      Peripheral vascular disease (H)      Left Ventricular Hypertrophy      Essential hypertension        CURRENT MEDICATIONS:  Current Outpatient Prescriptions   Medication Sig Dispense Refill     bumetanide (BUMEX) 2 MG tablet Take 1 tablet (2 mg) by mouth daily 30 tablet 11     Cholecalciferol (VITAMIN D) 2000 UNITS tablet Take 2,000 Units by mouth 2 times daily       rOPINIRole (REQUIP) 0.25 MG tablet Taking 1 midday and 2 at bedtime, per Dr. Ruiz       traMADol (ULTRAM) 50 MG tablet Take up to two twice daily.   Limit to 4 daily, on average. 100 tablet 0     predniSONE (DELTASONE) 20 MG tablet Take one daily, as directed 30 tablet 0     fluticasone-salmeterol (ADVAIR DISKUS) 250-50 MCG/DOSE diskus inhaler Inhale 1 puff into the lungs 2 times daily 60 Inhaler 1     Lactobacillus Rhamnosus, GG, (CULTURELLE PO) Take 1 tablet by mouth daily OTC       olmesartan (BENICAR) 40 MG tablet Take 1 tablet one time daily. 90 tablet 3     omeprazole (PRILOSEC) 20 MG capsule Take 1 capsule (20 mg) by mouth 2 times daily 180 capsule prn     denosumab (PROLIA) 60 MG/ML SOLN Inject 60 mg Subcutaneous every 6 months       order for DME 2L of O2 at night       Calcium-Vitamin D-Vitamin K (CALCIUM + D) 500-1000-40 MG-UNT-MCG CHEW Take 2 tablets by mouth daily        DOXYCYCLINE HYCLATE PO Take 50 mg by mouth 3 times per week Monday,wednesday, friday       prednisoLONE acetate (PRED FORTE) 1 % ophthalmic suspension Place 1 drop Into the left eye 4 times daily        albuterol (ALBUTEROL) 108 (90 BASE) MCG/ACT inhaler Inhale 2 puffs  "into the lungs every 4 hours as needed 1 Inhaler 5     RESTASIS 0.05 % OP EMUL 1 drop to left eye twice daily       SYSTANE 0.4-0.3 % OP SOLN 1 drop to left eye 4 times daily       EYE-VITES OR TABS Hydro-Eye with Lutein 2 tabs twice a day (Rx from [facility])         ALLERGIES     Allergies   Allergen Reactions     Atorvastatin Calcium      myalgias     Celecoxib Swelling     edema     Cholestyramine Diarrhea     Diarrhea       Crestor [Rosuvastatin Calcium]      leg aches, likely from medication     Cyclobenzaprine Hcl      flexeril--gets \"goofy\"     Dulera      tremors     Gabapentin      Nightmares.   Retrial of medication caused tremors.     Lisinopril Cough     Cough/ Dizziness at high dose.     Meperidine Hcl Nausea and Vomiting     demerol-severe nausea     Morphine Nausea and Vomiting     Naprosyn [Naproxen] GI Disturbance     Niaspan [Niacin]      flushing, severe     Percocet [Oxycodone-Acetaminophen] Nausea     Nausea, lightheadedness.   Tolerates med if taken with food.     Pravastatin Swelling     Edema, myalgias     Red Yeast Rice [Cholestin] GI Disturbance     Bloating, etc.     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     Duodenal ulcer, unspecified as acute or chronic, without mention of hemorrhage or perforation 1980's    felt due to high dose steroids     Female stress incontinence      GLAUCOMA      H/O right and left heart catheterization     1-     HTN      HYPERLIPIDEMIA      Ischemic colitis 7/2001    [facility]     Lactose Intolerance     Mild     Left Ventricular Hypertrophy  "     Malignant neoplasm (H) 8/31/2009    left breast DCIS     Mild intermittent asthma ~1980's     MITRAL VALVE Prolapse, with murmur      Obstructive sleep apnea 1/2011    nocturnal desaturation     Osteoporosis, unspecified ~2002     Patent foramen ovale      PERIPHERAL VASCULAR DISEASE 5/05    mesenteric arteries, angioplasty     PRESBYESOPHAGUS 6/04     Pulmonary HTN (H) 11/2014     Serous retinal detachment     False eye right     Subarachnoid hemorrhage following injury (H) 12/10    due to fall, vascular evaluation negative     Syncope and collapse 12/10       PAST SURGICAL HISTORY:  Past Surgical History:   Procedure Laterality Date     BIOPSY       C APPENDECTOMY  1982     C NONSPECIFIC PROCEDURE  1945    adenoidectomy     C NONSPECIFIC PROCEDURE      bilat retinal detachment     C NONSPECIFIC PROCEDURE      blephoroplasty bilat     C NONSPECIFIC PROCEDURE  1997    glaucoma procedure L     C NONSPECIFIC PROCEDURE  1997, 2007, 2014    corneal transplant L     C NONSPECIFIC PROCEDURE  1945    strabismus procedure R eye     C NONSPECIFIC PROCEDURE  1960's    R breast bx     C NONSPECIFIC PROCEDURE  5/05    Angioplasty and stenting mesenteric vessels     C NONSPECIFIC PROCEDURE  2008    L corneal transplant     C REMOVAL GALLBLADDER  1982    Cholecystectomy     COLONOSCOPY      due 2015     HEART CATH RIGHT AND LEFT HEART CATH  1/19/15     HERNIORRHAPHY VENTRAL N/A 4/19/2016    Procedure: HERNIORRHAPHY VENTRAL;  Surgeon: Hamlet Ness MD;  Location: [facility] OR     MASTECTOMY SIMPLE BILATERAL  10/5/09    bilateral mastectomy     SURGICAL HISTORY OF -   6/2010    breast reconstruction       FAMILY HISTORY:  Family History   Problem Relation Age of Onset     C.A.D. Paternal Uncle      MI 50's     Family History Negative Daughter        SOCIAL HISTORY:  Social History     Social History     Marital status:      Spouse name: N/A     Number of children: N/A     Years of education: N/A     Occupational History  "     Retired     Social History Main Topics     Smoking status: Never Smoker     Smokeless tobacco: Never Used     Alcohol use Yes      Comment: Occas     Drug use: No     Sexual activity: No     Other Topics Concern     Caffeine Concern No     1- 2 cups coffee     Sleep Concern No     Stress Concern No     Weight Concern No     Special Diet Yes     low sodium     Exercise No     2 days a week (abigail chi), walking program, stationary bike 10 minutes 3 times per week     Seat Belt Yes     Social History Narrative       Review of Systems:  Skin:  Negative     Eyes:  Positive for glasses  ENT:  Negative    Respiratory:  Positive for dyspnea on exertion  Cardiovascular:    edema;Positive for  Gastroenterology: Negative    Genitourinary:  not assessed    Musculoskeletal:    arthritis;joint pain;back pain  Neurologic:  Negative    Psychiatric:  Negative    Heme/Lymph/Imm:  Negative    Endocrine:  Negative      Physical Exam:  Vitals: /60  Pulse 75  Ht 1.626 m (5' 4\")  Wt 73.4 kg (161 lb 12.8 oz)  LMP 01/01/1990  SpO2 98%  BMI 27.77 kg/m2   Please refer to dictation for physical exam    Recent Lab Results:  LIPID RESULTS:  Lab Results   Component Value Date    CHOL 208 (A) 01/05/2016    CHOL 208 (A) 01/05/2016    HDL 52 01/05/2016    HDL 52 01/05/2016     01/05/2016     01/05/2016    TRIG 131 01/05/2016    TRIG 131 01/05/2016    CHOLHDLRATIO 2.4 08/25/2014       LIVER ENZYME RESULTS:  Lab Results   Component Value Date    AST 34 02/12/2016    ALT 25 02/12/2016       CBC RESULTS:  Lab Results   Component Value Date    WBC 7.1 10/14/2016    RBC 3.73 (L) 10/14/2016    HGB 12.5 10/14/2016    HCT 36.6 10/14/2016    MCV 98 10/14/2016    MCH 33.5 (H) 10/14/2016    MCHC 34.2 10/14/2016    RDW 13.4 10/14/2016     10/14/2016       BMP RESULTS:  Lab Results   Component Value Date     (H) 03/29/2017    POTASSIUM 3.8 03/29/2017    CHLORIDE 108 (H) 03/29/2017    CO2 28 03/29/2017    ANIONGAP 13.8 " 03/29/2017     (H) 03/29/2017    BUN 30 03/29/2017    CR 1.33 (H) 03/29/2017    GFRESTIMATED 39 (L) 03/29/2017    GFRESTBLACK 47 (L) 03/29/2017    AXEL 10.0 03/29/2017        A1C RESULTS:  No results found for: A1C    INR RESULTS:  Lab Results   Component Value Date    INR 0.95 01/19/2015    INR 1.01 07/02/2010           CC  Markell Alamo PA-C  [facility]  [address] [address]      Markell Alamo PA-C   3/29/2017  Pager: [phone]

## 2017-03-29 NOTE — LETTER
3/29/2017    Abran Mott MD  [facility]   [address] [facility] [address]    RE: Rupinder Tracy       Dear Colleague,    I had the pleasure of seeing Rupinder Tracy in the [facility].    PRIMARY CARDIOLOGIST:  Dr. Jae Butler.      REASON FOR CLINIC VISIT:  One-week followup after up-titration of olmesartan.     Rupinder Cast is a very delightful 76-year-old female with past medical history notable for hypertension, small PFO, aortic insufficiency, aortic root dilatation at 4.3 cm ascending aortic dilatation at 3.8 cm, left-sided diastolic dysfunction, secondary pulmonary hypertension (likely  due to combination of aortic insufficiency and hypertension as well as obstructive sleep apnea) and chronic back pain.      I have met Rupinder Cast in 10/2016 at which time she came in for evaluation of weight gain and shortness of breath.  Her Lasix was up-titrated.  Ultimately, her diuretic was switched to Bumex to which she responded markedly.  More recently, I met Rupinder Cast prior to her trip to [place] in 03/2017 for weight gain as well as shortness of breath.  We elected to increase her Bumex to 3 mg daily for a short period of time with reevaluation after her return.  Her N-terminal was initially 3800, which came down to 1600.  During her followup visit, we increased her olmesartan to 40 mg daily per Dr. Butler's recommendation.  She was set up to see me today for reevaluation.      Rupinder Cast states that she continues to be slightly short of breath.  She denies any chest pain, palpitations, near-syncope, syncope, worsening peripheral edema or abdominal distention.  She does admit to having dry mouth and being quite thirsty.  She has had a viral URI recently and she just recovered from it.      Of note, the patient had a coronary anatomy evaluation with coronary angiography in 01/2015 showing no intragrafic evidence of coronary artery disease.  At that time, she also  had right heart catheterization showing moderate pulmonary hypertension.      Rupinder Cast has been following with Dr. Ruiz through [facility].  She states that she has been getting serial PFTs.  She was recently seen by him last week and she states that she is doing well from a pulmonary standpoint.      Rupinder Cast otherwise has no other questions or concerns at this time.      CURRENT CARDIAC MEDICATIONS:   1.  Bumex 2 mg daily.   2.  Olmesartan 40 mg daily.      The remainder of her allergies, social history and review of systems were reviewed and as are documented separately.      VITAL SIGNS:  Blood pressure 130/60 mmHg, pulse 75 beats per minute, weight 161 pounds.      PHYSICAL EXAMINATION:   GENERAL:  NAD.   RESPIRATORY:  Clear to auscultation bilaterally.   CARDIAC:  Regular rate and rhythm without murmurs, clicks or gallop.   NECK:  Normal JVP.  No carotid bruits.   ABDOMEN:  Soft, nontender.   VASCULAR:  Pulses full and equal.   EXTREMITIES:  No pitting edema of the lower extremities, wearing support stockings.   NEUROLOGIC:  Affect appropriate.  Alert and oriented x3.      LABORATORY DATA:   BMP shows sodium 146, potassium 3.8, BUN 30, creatinine 1.33 and GFR 39.  Her N-terminal BNP is pending.      ASSESSMENT AND PLAN:   Rupinder Cast is a delightful 76-year-old female who I have been following since 10/2016 for shortness of breath and weight gain.  We have made numerous medication changes, ultimately switching her diuretic to Bumex.  More recently, her Bumex was increased to 3 mg daily for weight gain and shortness of breath.  She had improvement with this change and she was instructed to go back to 2 mg daily as she tends to have tenuous kidney function.  During her last office visit with me, we elected to increase her olmesartan to 40 mg daily as this was recommended by Dr. Butler given her diastolic dysfunction.      Rupinder Cast's past medical history is notable for hypertension, small PFO,  aortic insufficiency, aortic dilatation as well as ascending aortic dilatation.  She also has left-sided diastolic dysfunction secondary to pulmonary hypertension which was felt to be caused by aortic insufficiency, hypertension as well as her diagnosis of obstructive sleep apnea and asthma.  Rupinder Cast has been doing quite well from a cardiac standpoint.  She does have intermittent episodes where she has shortness of breath and weight gain.  We have been able to control the symptoms with short-term up-titration of her Bumex from 2 mg to 3 mg.  Her heart rates are well controlled.  She does have history of anaphylactic reaction to timolol maleate.  At this time, we will continue with the current medications.      Her BMP shows evidence of slight dehydration.  The patient does admit to being thirsty and having dry mouth.  She recently went through viral URI and was not eating or drinking enough.  At this time, we will recheck this when she comes back to see Dr. Butler in May.  I think she should continue with the current dose of Bumex.  She denies any lightheadedness, dizziness, near-syncope or syncope.      All of her questions were answered to her satisfaction.      Thank you for allowing me to participate in the care of this delightful patient today.     Sincerely,    Markell Alamo PA-C     [facility]

## 2017-03-30 NOTE — PROGRESS NOTES
PRIMARY CARDIOLOGIST:  Dr. Jae Butler.      REASON FOR CLINIC VISIT:  One-week followup after up-titration of olmesartan.      HISTORY OF PRESENT ILLNESS:     Rupinder Cast is a very delightful 76-year-old female with past medical history notable for hypertension, small PFO, aortic insufficiency, aortic root dilatation at 4.3 cm ascending aortic dilatation at 3.8 cm, left-sided diastolic dysfunction, secondary pulmonary hypertension (likely  due to combination of aortic insufficiency and hypertension as well as obstructive sleep apnea) and chronic back pain.      I have met Rupinder Cast in 10/2016 at which time she came in for evaluation of weight gain and shortness of breath.  Her Lasix was up-titrated.  Ultimately, her diuretic was switched to Bumex to which she responded markedly.  More recently, I met Rupinder Cast prior to her trip to [place] in 03/2017 for weight gain as well as shortness of breath.  We elected to increase her Bumex to 3 mg daily for a short period of time with reevaluation after her return.  Her N-terminal was initially 3800, which came down to 1600.  During her followup visit, we increased her olmesartan to 40 mg daily per Dr. Butler's recommendation.  She was set up to see me today for reevaluation.      Rupinder Cast states that she continues to be slightly short of breath.  She denies any chest pain, palpitations, near-syncope, syncope, worsening peripheral edema or abdominal distention.  She does admit to having dry mouth and being quite thirsty.  She has had a viral URI recently and she just recovered from it.      Of note, the patient had a coronary anatomy evaluation with coronary angiography in 01/2015 showing no intragrafic evidence of coronary artery disease.  At that time, she also had right heart catheterization showing moderate pulmonary hypertension.      Rupinder Cast has been following with Dr. Ruiz through [facility].  She states that she has been getting serial PFTs.  She  was recently seen by him last week and she states that she is doing well from a pulmonary standpoint.      Rupinder Cast otherwise has no other questions or concerns at this time.      CURRENT CARDIAC MEDICATIONS:   1.  Bumex 2 mg daily.   2.  Olmesartan 40 mg daily.      The remainder of her allergies, social history and review of systems were reviewed and as are documented separately.      VITAL SIGNS:  Blood pressure 130/60 mmHg, pulse 75 beats per minute, weight 161 pounds.      PHYSICAL EXAMINATION:   GENERAL:  NAD.   RESPIRATORY:  Clear to auscultation bilaterally.   CARDIAC:  Regular rate and rhythm without murmurs, clicks or gallop.   NECK:  Normal JVP.  No carotid bruits.   ABDOMEN:  Soft, nontender.   VASCULAR:  Pulses full and equal.   EXTREMITIES:  No pitting edema of the lower extremities, wearing support stockings.   NEUROLOGIC:  Affect appropriate.  Alert and oriented x3.      LABORATORY DATA:   BMP shows sodium 146, potassium 3.8, BUN 30, creatinine 1.33 and GFR 39.  Her N-terminal BNP is pending.      ASSESSMENT AND PLAN:   Rupinder Cast is a delightful 76-year-old female who I have been following since 10/2016 for shortness of breath and weight gain.  We have made numerous medication changes, ultimately switching her diuretic to Bumex.  More recently, her Bumex was increased to 3 mg daily for weight gain and shortness of breath.  She had improvement with this change and she was instructed to go back to 2 mg daily as she tends to have tenuous kidney function.  During her last office visit with me, we elected to increase her olmesartan to 40 mg daily as this was recommended by Dr. Butler given her diastolic dysfunction.      Rupinder Cast's past medical history is notable for hypertension, small PFO, aortic insufficiency, aortic dilatation as well as ascending aortic dilatation.  She also has left-sided diastolic dysfunction secondary to pulmonary hypertension which was felt to be caused by aortic insufficiency,  hypertension as well as her diagnosis of obstructive sleep apnea and asthma.  Judy Cast has been doing quite well from a cardiac standpoint.  She does have intermittent episodes where she has shortness of breath and weight gain.  We have been able to control the symptoms with short-term up-titration of her Bumex from 2 mg to 3 mg.  Her heart rates are well controlled.  She does have history of anaphylactic reaction to timolol maleate.  At this time, we will continue with the current medications.      Her BMP shows evidence of slight dehydration.  The patient does admit to being thirsty and having dry mouth.  She recently went through viral URI and was not eating or drinking enough.  At this time, we will recheck this when she comes back to see Dr. Butler in May.  I think she should continue with the current dose of Bumex.  She denies any lightheadedness, dizziness, near-syncope or syncope.      All of her questions were answered to her satisfaction.      Thank you for allowing me to participate in the care of this delightful patient today.         FILIBERTO HESS PA-C             D: 2017 15:10   T: 2017 11:02   MT: [id number]      Name:     JUDY RUSS   MRN:      [id number]        Account:      [id number]   :      1940           Service Date: 2017      Document: [id number]

## 2017-04-03 DIAGNOSIS — I10 ESSENTIAL HYPERTENSION WITH GOAL BLOOD PRESSURE LESS THAN 130/80: ICD-10-CM

## 2017-04-03 RX ORDER — OLMESARTAN MEDOXOMIL 40 MG/1
40 TABLET ORAL DAILY
Qty: 90 TABLET | Refills: 3 | Status: SHIPPED | OUTPATIENT
Start: 2017-04-03 | End: 2017-09-21

## 2017-05-03 ENCOUNTER — RADIANT APPOINTMENT (OUTPATIENT)
Dept: BONE DENSITY | Facility: CLINIC | Age: 77
End: 2017-05-03
Attending: INTERNAL MEDICINE
Payer: COMMERCIAL

## 2017-05-03 DIAGNOSIS — M81.0 OSTEOPOROSIS: ICD-10-CM

## 2017-05-03 PROCEDURE — 77085 DXA BONE DENSITY AXL VRT FX: CPT | Performed by: INTERNAL MEDICINE

## 2017-05-10 ENCOUNTER — OFFICE VISIT (OUTPATIENT)
Dept: CARDIOLOGY | Facility: CLINIC | Age: 77
End: 2017-05-10
Attending: INTERNAL MEDICINE
Payer: COMMERCIAL

## 2017-05-10 VITALS
SYSTOLIC BLOOD PRESSURE: 128 MMHG | HEART RATE: 72 BPM | HEIGHT: 64 IN | DIASTOLIC BLOOD PRESSURE: 58 MMHG | WEIGHT: 157 LBS | BODY MASS INDEX: 26.8 KG/M2

## 2017-05-10 DIAGNOSIS — I77.89 AORTIC ROOT ENLARGEMENT (H): ICD-10-CM

## 2017-05-10 DIAGNOSIS — I27.20 PULMONARY HTN (H): Primary | ICD-10-CM

## 2017-05-10 DIAGNOSIS — I51.89 DIASTOLIC DYSFUNCTION: ICD-10-CM

## 2017-05-10 DIAGNOSIS — I10 ESSENTIAL HYPERTENSION WITH GOAL BLOOD PRESSURE LESS THAN 130/85: ICD-10-CM

## 2017-05-10 DIAGNOSIS — Q21.12 PATENT FORAMEN OVALE: ICD-10-CM

## 2017-05-10 DIAGNOSIS — I73.9 PERIPHERAL VASCULAR DISEASE (H): ICD-10-CM

## 2017-05-10 DIAGNOSIS — I27.20 PULMONARY HTN (H): ICD-10-CM

## 2017-05-10 DIAGNOSIS — R06.02 SHORTNESS OF BREATH: ICD-10-CM

## 2017-05-10 LAB
ANION GAP SERPL CALCULATED.3IONS-SCNC: 12.5 MMOL/L (ref 6–17)
BUN SERPL-MCNC: 28 MG/DL (ref 7–30)
CALCIUM SERPL-MCNC: 9.7 MG/DL (ref 8.5–10.5)
CHLORIDE SERPL-SCNC: 105 MMOL/L (ref 98–107)
CO2 SERPL-SCNC: 27 MMOL/L (ref 23–29)
CREAT SERPL-MCNC: 1.3 MG/DL (ref 0.7–1.3)
GFR SERPL CREATININE-BSD FRML MDRD: 40 ML/MIN/1.7M2
GLUCOSE SERPL-MCNC: 108 MG/DL (ref 70–105)
POTASSIUM SERPL-SCNC: 3.5 MMOL/L (ref 3.5–5.1)
SODIUM SERPL-SCNC: 141 MMOL/L (ref 136–145)

## 2017-05-10 PROCEDURE — 99214 OFFICE O/P EST MOD 30 MIN: CPT | Performed by: INTERNAL MEDICINE

## 2017-05-10 PROCEDURE — 36415 COLL VENOUS BLD VENIPUNCTURE: CPT | Performed by: PHYSICIAN ASSISTANT

## 2017-05-10 PROCEDURE — 80048 BASIC METABOLIC PNL TOTAL CA: CPT | Performed by: PHYSICIAN ASSISTANT

## 2017-05-10 NOTE — LETTER
5/10/2017    Abran Mott MD  [facility]   [address] [facility] [address]    RE: Rupinder Tracy       Dear Colleague,    I again had the pleasure of seeing your patient, Rupinder Tracy, at [facility] for evaluation of pulmonary hypertension, systemic hypertension and patent foramen ovale.  Left and right heart catheterization was performed in 01/2015 for pulmonary hypertension and aortic insufficiency.  Coronary angiography demonstrated no significant coronary artery disease.  Aortogram showed moderate aortic insufficiency and moderate annuloaortic ectasia.  There was moderate pulmonary hypertension and a mean PA pressure of 32 mmHg and a wide pulse pressure in the PA tracing consistent with at least moderate pulmonary valve insufficiency.  Pulmonary vascular resistance was normal and there was no significant gradient between the diastolic and pulmonary capillary wedge pressure and left ventricular end-diastolic pressure.  Pulmonary capillary wedge pressure was only 16 mmHg and PAD 12 mmHg.  This would indicate no significant pulmonary hypertension.  It was felt the pulmonary hypertension was likely secondary to impaired relaxation from both aortic insufficiency, hypertension and pulmonary causes such as obstructive sleep apnea or asthma.  Testing for obstructive sleep apnea showed desaturations but she never reached REM sleep and did not snore.  Left ventricular end-diastolic pressure was 15 mmHg.  The patient's blood pressure has been under excellent control with her current medications.  The patient's weight is variable and I think her salt intake causes much of her weight change.  She is now doing well on Bumex 2 mg per day.  She feels considerably better when she loses weight.  She wears compression stockings on the lower extremities.  Her last echocardiogram 11/03 of last year showed ejection fraction of 60%-65% with mild to moderate concentric left  ventricular hypertrophy.  There was mild mitral stenosis but a valve gradient of only 4 mmHg.  Pulmonary hypertension was present at 46-55 mmHg.  Moderate aortic insufficiency.  Mild to moderate pulmonic valvular insufficiency.  Aortic root was dilated at 4.3 cm, ascending aorta 3.8 cm.  This is unchanged from 1 year before.      PHYSICAL EXAMINATION:  Current blood pressure is 128/58, pulse is 72 and regularly irregular.  Weight is 157 pounds, BMI 27.  O2 saturation sitting is between 94%-95%.  I then walked this patient for approximately 2 minutes in the hallway with her O2 saturations dropping to 90% and her heart rate up to 100 beats per minute.  She was profoundly short of breath with this.  Chest is clear to auscultation.  Cardiac exam:  Regular rate and rhythm with occasional ectopics.  At times, trigeminal ectopics.  No gallop or murmur.  No JVD or HJR.  Pulses are all intact without bruits.  Abdomen is benign.  Extremities are without cyanosis, clubbing or edema.  She wears bilateral support stockings.  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     Outpatient Encounter Prescriptions as of 5/10/2017   Medication Sig Dispense Refill     olmesartan (BENICAR) 40 MG tablet Take 1 tablet (40 mg) by mouth daily 90 tablet 3     bumetanide (BUMEX) 2 MG tablet Take 1 tablet (2 mg) by mouth daily 30 tablet 11     Cholecalciferol (VITAMIN D) 2000 UNITS tablet Take 2,000 Units by mouth 2 times daily       rOPINIRole (REQUIP) 0.25 MG tablet Taking 1 midday and 2 at bedtime, per Dr. Ruiz       traMADol (ULTRAM) 50 MG tablet Take up to two twice daily.   Limit to 4 daily, on average. 100 tablet 0     fluticasone-salmeterol (ADVAIR DISKUS) 250-50 MCG/DOSE diskus inhaler Inhale 1 puff into the lungs 2 times daily 60 Inhaler 1     Lactobacillus Rhamnosus, GG, (CULTURELLE PO) Take 1 tablet by mouth Three times a week       omeprazole (PRILOSEC) 20 MG capsule Take 1 capsule (20  mg) by mouth 2 times daily 180 capsule prn     denosumab (PROLIA) 60 MG/ML SOLN Inject 60 mg Subcutaneous every 6 months       order for DME 2L of O2 at night       Calcium-Vitamin D-Vitamin K (CALCIUM + D) 500-1000-40 MG-UNT-MCG CHEW Take 2 tablets by mouth daily        DOXYCYCLINE HYCLATE PO Take 50 mg by mouth 3 times per week Monday,wednesday, friday       prednisoLONE acetate (PRED FORTE) 1 % ophthalmic suspension Place 1 drop Into the left eye 4 times daily        albuterol (ALBUTEROL) 108 (90 BASE) MCG/ACT inhaler Inhale 2 puffs into the lungs every 4 hours as needed 1 Inhaler 5     RESTASIS 0.05 % OP EMUL 1 drop to left eye twice daily       SYSTANE 0.4-0.3 % OP SOLN 1 drop to left eye 4 times daily       EYE-VITES OR TABS Hydro-Eye with Lutein 2 tabs twice a day (Rx from [facility])       predniSONE (DELTASONE) 20 MG tablet Take one daily, as directed (Patient not taking: Reported on 5/10/2017) 30 tablet 0     No facility-administered encounter medications on file as of 5/10/2017.       ASSESSMENT:   1.  Rupinder Tracy is a delightful 76, almost 77-year-old female with a small patent foramen ovale that is inconsequential and without hemodynamic effects.  She has pulmonary hypertension on a secondary basis, most likely due to diastolic dysfunction of the left ventricle, aortic insufficiency and possible pulmonary causes such as asthma or obstructive sleep apnea.  She is tolerating her current dose of Bumex and her blood pressure is reasonably well controlled.  Her weight is stable and I have made no changes.  She feels reasonably well but does have dyspnea on exertion.   2.  The patient does desaturate with exercise.  I have asked her to begin an exercise program such as a NuStep or walking.   3.  The patient has normal coronary arteries.   4.  Mild aortic insufficiency with mild aortic root enlargement.  We will retest this in 6 months.   5.  She does desaturate a bit with exercise as  mentioned above.  She wears oxygen at night.  I do not believe she needs oxygen during the day.  She will continue to follow a low-salt diet.      It is my pleasure to assist in the care of Rupinder Tracy.  A BMP was performed today and is pending.  I will see her in 6 months with an echocardiogram at that time.  She will call for any worsening shortness of breath, edema or weight gain.  All her questions were answered to her satisfaction.     Sincerely,    Jae Butler MD     [facility]

## 2017-05-10 NOTE — MR AVS SNAPSHOT
"              After Visit Summary   5/10/2017    Rupinder Tracy    MRN: [id number]           Patient Information     Date Of Birth          1940        Visit Information        Provider Department      5/10/2017 9:45 AM Jae Butler MD [facility]        Today's Diagnoses     Pulmonary HTN (H)        Shortness of breath        Peripheral vascular disease (H)        Essential hypertension with goal blood pressure less than 130/85        Patent foramen ovale        Aortic root enlargement (H)           Follow-ups after your visit        Additional Services     Follow-Up with Cardiologist                 Future tests that were ordered for you today     Open Future Orders        Priority Expected Expires Ordered    Follow-Up with Cardiologist Routine 11/6/2017 5/10/2018 5/10/2017    Echocardiogram Routine 11/6/2017 5/10/2018 5/10/2017            Who to contact     If you have questions or need follow up information about today's clinic visit or your schedule please contact [facility] directly at [phone].  Normal or non-critical lab and imaging results will be communicated to you by Keradermhart, letter or phone within 4 business days after the clinic has received the results. If you do not hear from us within 7 days, please contact the clinic through [facility] [employer] or phone. If you have a critical or abnormal lab result, we will notify you by phone as soon as possible.  Submit refill requests through [employer] or call your pharmacy and they will forward the refill request to us. Please allow 3 business days for your refill to be completed.          Additional Information About Your Visit        Keradermhart Information     [employer] lets you send messages to your doctor, view your test results, renew your prescriptions, schedule appointments and more. To sign up, go to [email or web address] . Click on \"Log in\" on the left side of the " "screen, which will take you to the Welcome page. Then click on \"Sign up Now\" on the right side of the page.     You will be asked to enter the access code listed below, as well as some personal information. Please follow the directions to create your username and password.     Your access code is: [email or web address]  Expires: 2017 10:26 AM     Your access code will  in 90 days. If you need help or a new code, please call your [place] clinic or [phone].        Care EveryWhere ID     This is your Care EveryWhere ID. This could be used by other organizations to access your [facility] medical records  [id number]        Your Vitals Were     Pulse Height Last Period BMI (Body Mass Index)          72 1.626 m (5' 4\") 1990 26.95 kg/m2         Blood Pressure from Last 3 Encounters:   05/10/17 128/58   17 130/60   17 134/58    Weight from Last 3 Encounters:   05/10/17 71.2 kg (157 lb)   17 73.4 kg (161 lb 12.8 oz)   17 73 kg (161 lb)              We Performed the Following     Follow-Up with Cardiologist        Primary Care Provider Office Phone # Fax #    Abran Mott [phone]       [facility]3] [address] [place] [id number]        Thank you!     Thank you for choosing [facility] [place]  for your care. Our goal is always to provide you with excellent care. Hearing back from our patients is one way we can continue to improve our services. Please take a few minutes to complete the written survey that you may receive in the mail after your visit with us. Thank you!             Your Updated Medication List - Protect others around you: Learn how to safely use, store and throw away your medicines at www.disposemymeds.org.          This list is accurate as of: 5/10/17 10:26 AM.  Always use your most recent med list.                   Brand Name Dispense Instructions for use    albuterol 108 (90 BASE) MCG/ACT " Inhaler    albuterol    1 Inhaler    Inhale 2 puffs into the lungs every 4 hours as needed       bumetanide 2 MG tablet    BUMEX    30 tablet    Take 1 tablet (2 mg) by mouth daily       CALCIUM + D 500-1000-40 MG-UNT-MCG Chew   Generic drug:  Calcium-Vitamin D-Vitamin K      Take 2 tablets by mouth daily       CULTURELLE PO      Take 1 tablet by mouth Three times a week       DOXYCYCLINE HYCLATE PO      Take 50 mg by mouth 3 times per week Monday,wednesday, friday       EYE-VITES Tabs          [place]-Eye with Lutein 2 tabs twice a day (Rx from [facility])       fluticasone-salmeterol 250-50 MCG/DOSE diskus inhaler    ADVAIR DISKUS    60 Inhaler    Inhale 1 puff into the lungs 2 times daily       olmesartan 40 MG tablet    BENICAR    90 tablet    Take 1 tablet (40 mg) by mouth daily       omeprazole 20 MG CR capsule    priLOSEC    180 capsule    Take 1 capsule (20 mg) by mouth 2 times daily       order for DME      2L of O2 at night       prednisoLONE acetate 1 % ophthalmic susp    PRED FORTE     Place 1 drop Into the left eye 4 times daily       predniSONE 20 MG tablet    DELTASONE    30 tablet    Take one daily, as directed       PROLIA 60 MG/ML Soln injection   Generic drug:  denosumab      Inject 60 mg Subcutaneous every 6 months       REQUIP 0.25 MG tablet   Generic drug:  rOPINIRole      Taking 1 midday and 2 at bedtime, per Dr. Ruiz       RESTASIS 0.05 % ophthalmic emulsion   Generic drug:  cycloSPORINE      1 drop to left eye twice daily       SYSTANE 0.4-0.3 % Soln ophthalmic solution   Generic drug:  polyethylene glycol 0.4%- propylene glycol 0.3%      1 drop to left eye 4 times daily       traMADol 50 MG tablet    ULTRAM    100 tablet    Take up to two twice daily.   Limit to 4 daily, on average.       vitamin D 2000 UNITS tablet      Take 2,000 Units by mouth 2 times daily

## 2017-05-10 NOTE — PROGRESS NOTES
HPI and Plan:   See dictation:[id number]    Orders Placed This Encounter   Procedures     Follow-Up with Cardiologist     Echocardiogram       No orders of the defined types were placed in this encounter.      There are no discontinued medications.      Encounter Diagnoses   Name Primary?     Pulmonary HTN (H)      Shortness of breath      Peripheral vascular disease (H)      Essential hypertension with goal blood pressure less than 130/85      Patent foramen ovale      Aortic root enlargement (H)        CURRENT MEDICATIONS:  Current Outpatient Prescriptions   Medication Sig Dispense Refill     olmesartan (BENICAR) 40 MG tablet Take 1 tablet (40 mg) by mouth daily 90 tablet 3     bumetanide (BUMEX) 2 MG tablet Take 1 tablet (2 mg) by mouth daily 30 tablet 11     Cholecalciferol (VITAMIN D) 2000 UNITS tablet Take 2,000 Units by mouth 2 times daily       rOPINIRole (REQUIP) 0.25 MG tablet Taking 1 midday and 2 at bedtime, per Dr. Ruiz       traMADol (ULTRAM) 50 MG tablet Take up to two twice daily.   Limit to 4 daily, on average. 100 tablet 0     fluticasone-salmeterol (ADVAIR DISKUS) 250-50 MCG/DOSE diskus inhaler Inhale 1 puff into the lungs 2 times daily 60 Inhaler 1     Lactobacillus Rhamnosus, GG, (CULTURELLE PO) Take 1 tablet by mouth Three times a week       omeprazole (PRILOSEC) 20 MG capsule Take 1 capsule (20 mg) by mouth 2 times daily 180 capsule prn     denosumab (PROLIA) 60 MG/ML SOLN Inject 60 mg Subcutaneous every 6 months       order for DME 2L of O2 at night       Calcium-Vitamin D-Vitamin K (CALCIUM + D) 500-1000-40 MG-UNT-MCG CHEW Take 2 tablets by mouth daily        DOXYCYCLINE HYCLATE PO Take 50 mg by mouth 3 times per week Monday,wednesday, friday       prednisoLONE acetate (PRED FORTE) 1 % ophthalmic suspension Place 1 drop Into the left eye 4 times daily        albuterol (ALBUTEROL) 108 (90 BASE) MCG/ACT inhaler Inhale 2 puffs into the lungs every 4 hours as needed 1 Inhaler 5     RESTASIS 0.05 %  "OP EMUL 1 drop to left eye twice daily       SYSTANE 0.4-0.3 % OP SOLN 1 drop to left eye 4 times daily       EYE-VITES OR TABS Hydro-Eye with Lutein 2 tabs twice a day (Rx from [facility])       predniSONE (DELTASONE) 20 MG tablet Take one daily, as directed (Patient not taking: Reported on 5/10/2017) 30 tablet 0       ALLERGIES     Allergies   Allergen Reactions     Atorvastatin Calcium      myalgias     Celecoxib Swelling     edema     Cholestyramine Diarrhea     Diarrhea       Crestor [Rosuvastatin Calcium]      leg aches, likely from medication     Cyclobenzaprine Hcl      flexeril--gets \"goofy\"     Dulera      tremors     Gabapentin      Nightmares.   Retrial of medication caused tremors.     Lisinopril Cough     Cough/ Dizziness at high dose.     Meperidine Hcl Nausea and Vomiting     demerol-severe nausea     Morphine Nausea and Vomiting     Naprosyn [Naproxen] GI Disturbance     Niaspan [Niacin]      flushing, severe     Percocet [Oxycodone-Acetaminophen] Nausea     Nausea, lightheadedness.   Tolerates med if taken with food.     Pravastatin Swelling     Edema, myalgias     Red Yeast Rice [Cholestin] GI Disturbance     Bloating, etc.     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     Duodenal ulcer, unspecified as acute or chronic, without mention of hemorrhage or perforation 1980's    felt due to high dose steroids     Female stress incontinence      GLAUCOMA      H/O right and left heart catheterization     1-     HTN      HYPERLIPIDEMIA      Ischemic colitis 7/2001    [facility]     Lactose " Intolerance     Mild     Left Ventricular Hypertrophy      Malignant neoplasm (H) 8/31/2009    left breast DCIS     Mild intermittent asthma ~1980's     MITRAL VALVE Prolapse, with murmur      Obstructive sleep apnea 1/2011    nocturnal desaturation     Osteoporosis, unspecified ~2002     Patent foramen ovale      PERIPHERAL VASCULAR DISEASE 5/05    mesenteric arteries, angioplasty     PRESBYESOPHAGUS 6/04     Pulmonary HTN (H) 11/2014     Serous retinal detachment     False eye right     Subarachnoid hemorrhage following injury (H) 12/10    due to fall, vascular evaluation negative     Syncope and collapse 12/10       PAST SURGICAL HISTORY:  Past Surgical History:   Procedure Laterality Date     BIOPSY       C APPENDECTOMY  1982     C NONSPECIFIC PROCEDURE  1945    adenoidectomy     C NONSPECIFIC PROCEDURE      bilat retinal detachment     C NONSPECIFIC PROCEDURE      blephoroplasty bilat     C NONSPECIFIC PROCEDURE  1997    glaucoma procedure L     C NONSPECIFIC PROCEDURE  1997, 2007, 2014    corneal transplant L     C NONSPECIFIC PROCEDURE  1945    strabismus procedure R eye     C NONSPECIFIC PROCEDURE  1960's    R breast bx     C NONSPECIFIC PROCEDURE  5/05    Angioplasty and stenting mesenteric vessels     C NONSPECIFIC PROCEDURE  2008    L corneal transplant     C REMOVAL GALLBLADDER  1982    Cholecystectomy     COLONOSCOPY      due 2015     HEART CATH RIGHT AND LEFT HEART CATH  1/19/15     HERNIORRHAPHY VENTRAL N/A 4/19/2016    Procedure: HERNIORRHAPHY VENTRAL;  Surgeon: Hamlet Ness MD;  Location: [facility] OR     MASTECTOMY SIMPLE BILATERAL  10/5/09    bilateral mastectomy     SURGICAL HISTORY OF -   6/2010    breast reconstruction       FAMILY HISTORY:  Family History   Problem Relation Age of Onset     C.A.D. Paternal Uncle      MI 50's     Family History Negative Daughter        SOCIAL HISTORY:  Social History     Social History     Marital status:      Spouse name: N/A     Number of children: N/A  "    Years of education: N/A     Occupational History      Retired     Social History Main Topics     Smoking status: Never Smoker     Smokeless tobacco: Never Used     Alcohol use Yes      Comment: Occas     Drug use: No     Sexual activity: No     Other Topics Concern     Caffeine Concern No     1- 2 cups coffee     Sleep Concern No     Stress Concern No     Weight Concern No     Special Diet Yes     low sodium     Exercise No     2 days a week (abigail chi), walking program, stationary bike 10 minutes 3 times per week     Seat Belt Yes     Social History Narrative       Review of Systems:  Skin:  Negative       Eyes:  Positive for glasses    ENT:  Positive for    (difficulty swallowing episodes )  Respiratory:  Positive for dyspnea on exertion;mucoid expectorant even with normal activity   Cardiovascular:    Positive for;edema (improved in legs. recent morning hand swelling)    Gastroenterology: Positive for vomiting    Genitourinary:  not assessed      Musculoskeletal:  Positive for arthritis;joint pain;back pain    Neurologic:  Negative      Psychiatric:  Negative      Heme/Lymph/Imm:  Negative      Endocrine:  Negative        Physical Exam:  Vitals: /58 (BP Location: Left arm, Cuff Size: Adult Large)  Pulse 72  Ht 1.626 m (5' 4\")  Wt 71.2 kg (157 lb)  LMP 01/01/1990  BMI 26.95 kg/m2    Constitutional:  cooperative, alert and oriented, well developed, well nourished, in no acute distress        Skin:  warm and dry to the touch, no apparent skin lesions or masses noted        Head:  normocephalic, no masses or lesions        Eyes:  pupils equal and round   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    ENT:  no pallor or cyanosis, dentition good        Neck:  carotid pulses are full and equal bilaterally, JVP normal, no carotid bruit, no thyromegaly;JVP normal        Chest:  normal breath sounds, clear to auscultation, normal A-P diameter, normal symmetry, normal respiratory " excursion, no use of accessory muscles          Cardiac: regular rhythm;normal S1 and S2;no S3 or S4;apical impulse not displaced frequent premature beats   no presence of murmur            Abdomen:  abdomen soft, non-tender, BS normoactive, no mass, no HSM, no bruits        Vascular: pulses full and equal, no bruits auscultated                                        Extremities and Back:  no deformities, clubbing, cyanosis, erythema observed;no edema         wearing support stocking     Neurological:  affect appropriate, oriented to time, person and place;no gross motor deficits              CC  Jae Butler MD   [facility]  [address]  [place] [address]

## 2017-05-11 ENCOUNTER — TRANSFERRED RECORDS (OUTPATIENT)
Dept: HEALTH INFORMATION MANAGEMENT | Facility: CLINIC | Age: 77
End: 2017-05-11

## 2017-07-11 DIAGNOSIS — K21.9 GASTROESOPHAGEAL REFLUX DISEASE WITHOUT ESOPHAGITIS: ICD-10-CM

## 2017-07-11 NOTE — TELEPHONE ENCOUNTER
Omeprazole       Last Written Prescription Date: 6/2/16  Last Fill Quantity: 180,  # refills: 3   Last Office Visit with G, P or [facility] prescribing provider: 2/16/17                                         Next 5 appointments (look out 90 days)     Aug 31, 2017  1:15 PM CDT   Return Visit with John Haq MD   [facility] ([facility]    [address] [place] [id number] [phone]

## 2017-07-20 ENCOUNTER — OFFICE VISIT (OUTPATIENT)
Dept: INTERNAL MEDICINE | Facility: CLINIC | Age: 77
End: 2017-07-20
Payer: COMMERCIAL

## 2017-07-20 VITALS
OXYGEN SATURATION: 96 % | TEMPERATURE: 97.4 F | WEIGHT: 158 LBS | BODY MASS INDEX: 27.12 KG/M2 | DIASTOLIC BLOOD PRESSURE: 62 MMHG | SYSTOLIC BLOOD PRESSURE: 123 MMHG | HEART RATE: 76 BPM

## 2017-07-20 DIAGNOSIS — H92.02 OTALGIA OF LEFT EAR: Primary | ICD-10-CM

## 2017-07-20 DIAGNOSIS — S41.112A SKIN TEAR OF LEFT UPPER EXTREMITY: ICD-10-CM

## 2017-07-20 DIAGNOSIS — J30.9 ALLERGIC RHINITIS, UNSPECIFIED CHRONICITY, UNSPECIFIED SEASONALITY, UNSPECIFIED TRIGGER: ICD-10-CM

## 2017-07-20 PROCEDURE — 99214 OFFICE O/P EST MOD 30 MIN: CPT | Performed by: INTERNAL MEDICINE

## 2017-07-20 RX ORDER — FLUTICASONE PROPIONATE 50 MCG
2 SPRAY, SUSPENSION (ML) NASAL DAILY
Qty: 1 BOTTLE | Refills: 3 | Status: SHIPPED | OUTPATIENT
Start: 2017-07-20 | End: 2017-10-11

## 2017-07-20 RX ORDER — LORATADINE 10 MG/1
10 TABLET ORAL DAILY
COMMUNITY
Start: 2017-07-20 | End: 2017-10-11

## 2017-07-20 NOTE — PATIENT INSTRUCTIONS
PLAN:                                                      1.  MEDICATIONS:        - Start taking loratadine (claritin) 10 mg daily, until the end of summer       - Trial of steroid nasal spray daily, in each nostril       - Continue other medications without change  2.  Consider seeing ENT again -->  Dr. Whitney or Jose   [phone]  
shoulder L/shoulder R/thoracic spine/hip R/hip L

## 2017-07-20 NOTE — NURSING NOTE
"Chief Complaint   Patient presents with     Otalgia     Abrasion       Initial /62 (BP Location: Right arm, Patient Position: Chair, Cuff Size: Adult Regular)  Pulse 76  Temp 97.4  F (36.3  C) (Oral)  Wt 158 lb (71.7 kg)  LMP 01/01/1990  SpO2 96%  BMI 27.12 kg/m2 Estimated body mass index is 27.12 kg/(m^2) as calculated from the following:    Height as of 5/10/17: 5' 4\" (1.626 m).    Weight as of this encounter: 158 lb (71.7 kg).  Medication Reconciliation: complete    "

## 2017-07-20 NOTE — MR AVS SNAPSHOT
After Visit Summary   7/20/2017    Rupinder Tracy    MRN: [id number]           Patient Information     Date Of Birth          1940        Visit Information        Provider Department      7/20/2017 9:00 AM Abran Mott MD [facility]        Today's Diagnoses     Otalgia of left ear    -  1    Skin tear of left upper extremity        Allergic rhinitis, unspecified chronicity, unspecified seasonality, unspecified trigger          Care Instructions    PLAN:                                                      1.  MEDICATIONS:        - Start taking loratadine (claritin) 10 mg daily, until the end of summer       - Trial of steroid nasal spray daily, in each nostril       - Continue other medications without change  2.  Consider seeing ENT again -->  Dr. Whitney or Jose   [phone]          Follow-ups after your visit        Your next 10 appointments already scheduled     Aug 31, 2017  1:15 PM CDT   Return Visit with John Haq MD   [facility]    [address] [place] [address] [phone]              Who to contact     If you have questions or need follow up information about today's clinic visit or your schedule please contact [facility] directly at [phone].  Normal or non-critical lab and imaging results will be communicated to you by MyChart, letter or phone within 4 business days after the clinic has received the results. If you do not hear from us within 7 days, please contact the clinic through MyChart or phone. If you have a critical or abnormal lab result, we will notify you by phone as soon as possible.  Submit refill requests through [employer] or call your pharmacy and they will forward the refill request to us. Please allow 3 business days for your refill to be completed.          Additional Information About Your Visit        MyChart  "Information     [employer] lets you send messages to your doctor, view your test results, renew your prescriptions, schedule appointments and more. To sign up, go to [email or web address] . Click on \"Log in\" on the left side of the screen, which will take you to the Welcome page. Then click on \"Sign up Now\" on the right side of the page.     You will be asked to enter the access code listed below, as well as some personal information. Please follow the directions to create your username and password.     Your access code is: [email or web address]  Expires: 2017 10:26 AM     Your access code will  in 90 days. If you need help or a new code, please call your [facility] clinic or [phone].        Care EveryWhere ID     This is your Care EveryWhere ID. This could be used by other organizations to access your [facility] medical records  [id number]        Your Vitals Were     Pulse Temperature Last Period Pulse Oximetry BMI (Body Mass Index)       76 97.4  F (36.3  C) (Oral) 1990 96% 27.12 kg/m2        Blood Pressure from Last 3 Encounters:   17 123/62   05/10/17 128/58   17 130/60    Weight from Last 3 Encounters:   17 158 lb (71.7 kg)   05/10/17 157 lb (71.2 kg)   17 161 lb 12.8 oz (73.4 kg)              Today, you had the following     No orders found for display         Today's Medication Changes          These changes are accurate as of: 17 10:09 AM.  If you have any questions, ask your nurse or doctor.               Start taking these medicines.        Dose/Directions    fluticasone 50 MCG/ACT spray   Commonly known as:  FLONASE   Used for:  Allergic rhinitis, unspecified chronicity, unspecified seasonality, unspecified trigger   Started by:  Abran Mott MD        Dose:  2 spray   Spray 2 sprays into both nostrils daily   Quantity:  1 Bottle   Refills:  3       loratadine 10 MG tablet   Commonly known as:  CLARITIN   Used for:  Allergic rhinitis, unspecified chronicity, " unspecified seasonality, unspecified trigger   Started by:  Abran Mott MD        Dose:  10 mg   Take 1 tablet (10 mg) by mouth daily   Refills:  0         These medicines have changed or have updated prescriptions.        Dose/Directions    omeprazole 20 MG CR capsule   Commonly known as:  priLOSEC   This may have changed:  Another medication with the same name was removed. Continue taking this medication, and follow the directions you see here.   Used for:  Gastroesophageal reflux disease without esophagitis   Changed by:  Abran Mott MD        TAKE 1 CAPSULE (20 MG) BY MOUTH 2 TIMES DAILY   Quantity:  180 capsule   Refills:  1            Where to get your medicines      These medications were sent to [id number] - [place], [place] - [address]  [address] [address]     Phone:  [phone]     fluticasone 50 MCG/ACT spray         Some of these will need a paper prescription and others can be bought over the counter.  Ask your nurse if you have questions.     You don't need a prescription for these medications     loratadine 10 MG tablet                Primary Care Provider Office Phone # Fax #    Abran Mott [phone]       [facility] [address] [facility] [address]        Equal Access to Services     LINUS ASCENCIO AH: Hadii magen ku hadasho Soomaali, waaxda luqadaha, qaybta kaalmada adeegyada, waxay idiin haynicole alan. So [facility] [phone].    ATENCIÓN: Si habla español, tiene a lugo disposición servicios gratuitos de asistencia lingüística. Llmabel al [phone].    We comply with applicable federal civil rights laws and [place] laws. We do not discriminate on the basis of race, color, national origin, age, disability sex, sexual orientation or gender identity.            Thank you!     Thank you for choosing [facility]  for your care. Our goal is always to provide you with  excellent care. Hearing back from our patients is one way we can continue to improve our services. Please take a few minutes to complete the written survey that you may receive in the mail after your visit with us. Thank you!             Your Updated Medication List - Protect others around you: Learn how to safely use, store and throw away your medicines at www.disposemymeds.org.          This list is accurate as of: 7/20/17 10:09 AM.  Always use your most recent med list.                   Brand Name Dispense Instructions for use Diagnosis    albuterol 108 (90 BASE) MCG/ACT Inhaler    albuterol    1 Inhaler    Inhale 2 puffs into the lungs every 4 hours as needed    Mild intermittent asthma       bumetanide 2 MG tablet    BUMEX    30 tablet    Take 1 tablet (2 mg) by mouth daily    Diastolic dysfunction       CALCIUM + D 500-1000-40 MG-UNT-MCG Chew   Generic drug:  Calcium-Vitamin D-Vitamin K      Take 2 tablets by mouth daily        CULTURELLE PO      Take 1 tablet by mouth Three times a week        DOXYCYCLINE HYCLATE PO      Take 50 mg by mouth 3 times per week Monday,wednesday, friday        EYE-VITES Tabs          [place]-Eye with Lutein 2 tabs twice a day (Rx from [facility])        fluticasone 50 MCG/ACT spray    FLONASE    1 Bottle    Spray 2 sprays into both nostrils daily    Allergic rhinitis, unspecified chronicity, unspecified seasonality, unspecified trigger       fluticasone-salmeterol 250-50 MCG/DOSE diskus inhaler    ADVAIR DISKUS    60 Inhaler    Inhale 1 puff into the lungs 2 times daily    Mild persistent asthma without complication       loratadine 10 MG tablet    CLARITIN     Take 1 tablet (10 mg) by mouth daily    Allergic rhinitis, unspecified chronicity, unspecified seasonality, unspecified trigger       olmesartan 40 MG tablet    BENICAR    90 tablet    Take 1 tablet (40 mg) by mouth daily    Essential hypertension with goal blood pressure less than 130/80       omeprazole 20 MG  CR capsule    priLOSEC    180 capsule    TAKE 1 CAPSULE (20 MG) BY MOUTH 2 TIMES DAILY    Gastroesophageal reflux disease without esophagitis       order for DME      2L of O2 at night        prednisoLONE acetate 1 % ophthalmic susp    PRED FORTE     Place 1 drop Into the left eye 4 times daily        PROLIA 60 MG/ML Soln injection   Generic drug:  denosumab      Inject 60 mg Subcutaneous every 6 months        REQUIP 0.25 MG tablet   Generic drug:  rOPINIRole      Taking 1 midday and 2 at bedtime, per Dr. Ruiz    Restless legs syndrome (RLS)       RESTASIS 0.05 % ophthalmic emulsion   Generic drug:  cycloSPORINE      1 drop to left eye twice daily        SYSTANE 0.4-0.3 % Soln ophthalmic solution   Generic drug:  polyethylene glycol 0.4%- propylene glycol 0.3%      1 drop to left eye 4 times daily        traMADol 50 MG tablet    ULTRAM    100 tablet    Take up to two twice daily.   Limit to 4 daily, on average.    Chronic pain syndrome       vitamin D 2000 UNITS tablet      Take 2,000 Units by mouth 2 times daily    Vitamin D deficiency

## 2017-07-20 NOTE — PROGRESS NOTES
"  SUBJECTIVE:                                                    Rupinder Tracy is a 77 year old female who presents to clinic today for the following health issues:      Patient presents today with left ear pain for the last month. Pain is intermittent. Patient had her hearing aid checked and nothing was found, advised to see primary.   Some pain L TMJ area, and persistent \"sinus drainage\" with post nasal drip, thin.  Lots of sneezing.   Taking nothing.      Reviewed and updated as needed this visit by clinical staff:  Medications  Allergies  Soc Hx   Additional history: as documented    Additional issues to address:  1.  L forearm skin tear which happened at home this am when bumped the counter  2.  Very fatigued, can sleep a lot and still feel tired.     ROS:  L sciatica is still better, but some chronic pain L lateral thigh since, hurts to lay on (from trochanteric area down thigh laterally).   Hasn't gone back on steroids.  Went to spiritual healer.    Constitutional, HEENT, cardiovascular, pulmonary, gi and gu systems are negative, except as otherwise noted.      OBJECTIVE:                                                    /62 (BP Location: Right arm, Patient Position: Chair, Cuff Size: Adult Regular)  Pulse 76  Temp 97.4  F (36.3  C) (Oral)  Wt 158 lb (71.7 kg)  LMP 01/01/1990  SpO2 96%  BMI 27.12 kg/m2  Body mass index is 27.12 kg/(m^2).   No apparent distress  Nasal exam noncontributory, sinus nontender  Bilateral ear examination shows no erythema, swelling, abnormal TM or other findings  Subjective mild tenderness with speculum examination of the ear canal on the left  Nonreproducible tenderness anterior to the left mastoid process  Mouth and tongue, salivary glands, cervical nodes noncontributory  Patient has a greater than 3 cm skin tear of the left forearm, which can be approximated this was cleansed and Steri-Stripped       ASSESSMENT:                                                  "     1.  Otalgia, cause not clear.  With some pain related to ear speculum and otoscope, suspect related.  No rashes or signs of trigeminal neuralgia.    2.  Skin tear L forearm.   Dressed with steri strips  3.  Nasal symptoms suggesting allergic rhinitis     PLAN:                                                      1.  MEDICATIONS:        - Start taking loratadine (claritin) 10 mg daily, until the end of summer       - Trial of steroid nasal spray daily, in each nostril       - Continue other medications without change  2.  Consider seeing ENT again, if not improving -->  Dr. Whitney or Jose   [phone]    Abran Mott MD  [facility]     >25 minutes spent with patient, greater than 50% spent on discussion/education/planning - as outlined in assessment and plan

## 2017-08-17 ENCOUNTER — TRANSFERRED RECORDS (OUTPATIENT)
Dept: HEALTH INFORMATION MANAGEMENT | Facility: CLINIC | Age: 77
End: 2017-08-17

## 2017-08-31 ENCOUNTER — OFFICE VISIT (OUTPATIENT)
Dept: DERMATOLOGY | Facility: CLINIC | Age: 77
End: 2017-08-31
Payer: COMMERCIAL

## 2017-08-31 VITALS — OXYGEN SATURATION: 97 % | HEART RATE: 94 BPM | DIASTOLIC BLOOD PRESSURE: 56 MMHG | SYSTOLIC BLOOD PRESSURE: 99 MMHG

## 2017-08-31 DIAGNOSIS — L82.0 INFLAMED SEBORRHEIC KERATOSIS: ICD-10-CM

## 2017-08-31 DIAGNOSIS — D18.00 ANGIOMA: ICD-10-CM

## 2017-08-31 DIAGNOSIS — Z85.828 HISTORY OF SKIN CANCER: Primary | ICD-10-CM

## 2017-08-31 DIAGNOSIS — L82.1 SK (SEBORRHEIC KERATOSIS): ICD-10-CM

## 2017-08-31 DIAGNOSIS — L81.4 LENTIGO: ICD-10-CM

## 2017-08-31 PROCEDURE — 99203 OFFICE O/P NEW LOW 30 MIN: CPT | Performed by: DERMATOLOGY

## 2017-08-31 NOTE — NURSING NOTE
"Chief Complaint   Patient presents with     Derm Problem     FSE - lesion on right cheek       Initial BP 99/56  Pulse 94  LMP 01/01/1990  SpO2 97% Estimated body mass index is 27.12 kg/(m^2) as calculated from the following:    Height as of 5/10/17: 1.626 m (5' 4\").    Weight as of 7/20/17: 71.7 kg (158 lb).  Medication Reconciliation: complete    "

## 2017-08-31 NOTE — MR AVS SNAPSHOT
"              After Visit Summary   8/31/2017    Rupinder Tracy    MRN: [id number]           Patient Information     Date Of Birth          1940        Visit Information        Provider Department      8/31/2017 1:15 PM John Haq MD [facility]        Today's Diagnoses     History of skin cancer    -  1    Lentigo        SK (seborrheic keratosis)        Angioma        Inflamed seborrheic keratosis           Follow-ups after your visit        Your next 10 appointments already scheduled     Aug 31, 2017  1:15 PM CDT   Return Visit with John Haq MD   [facility] ([facility]    [address] [id number] [phone]              Who to contact     If you have questions or need follow up information about today's clinic visit or your schedule please contact [facility] directly at [phone].  Normal or non-critical lab and imaging results will be communicated to you by MyChart, letter or phone within 4 business days after the clinic has received the results. If you do not hear from us within 7 days, please contact the clinic through [employer] or phone. If you have a critical or abnormal lab result, we will notify you by phone as soon as possible.  Submit refill requests through [employer] or call your pharmacy and they will forward the refill request to us. Please allow 3 business days for your refill to be completed.          Additional Information About Your Visit        [employer] Information     [employer] lets you send messages to your doctor, view your test results, renew your prescriptions, schedule appointments and more. To sign up, go to [email or web address] . Click on \"Log in\" on the left side of the screen, which will take you to the Welcome page. Then click on \"Sign up Now\" on the right side of the page.     You will be asked to enter the access code " listed below, as well as some personal information. Please follow the directions to create your username and password.     Your access code is: [email or web address]  Expires: 2017  1:13 PM     Your access code will  in 90 days. If you need help or a new code, please call your [place] clinic or [phone].        Care EveryWhere ID     This is your Care EveryWhere ID. This could be used by other organizations to access your [place] medical records  [id number]        Your Vitals Were     Pulse Last Period Pulse Oximetry             94 1990 97%          Blood Pressure from Last 3 Encounters:   17 99/56   17 123/62   05/10/17 128/58    Weight from Last 3 Encounters:   17 71.7 kg (158 lb)   05/10/17 71.2 kg (157 lb)   17 73.4 kg (161 lb 12.8 oz)              Today, you had the following     No orders found for display       Primary Care Provider Office Phone # Fax #    Abran Pasquale Mott [phone]       [address] [facility] [address]        Equal Access to Services     NIKA ASCENCIO AH: Hadii aad ku hadasho Soomaali, waaxda luqadaha, qaybta kaalmada adeegyada, ethan alan. So [facility] [phone].    ATENCIÓN: Si habla español, tiene a lugo disposición servicios gratuitos de asistencia lingüística. Llame al [phone].    We comply with applicable federal civil rights laws and [place] laws. We do not discriminate on the basis of race, color, national origin, age, disability sex, sexual orientation or gender identity.            Thank you!     Thank you for choosing [facility]  for your care. Our goal is always to provide you with excellent care. Hearing back from our patients is one way we can continue to improve our services. Please take a few minutes to complete the written survey that you may receive in the mail after your visit with us. Thank you!             Your Updated Medication List - Protect  others around you: Learn how to safely use, store and throw away your medicines at www.disposemymeds.org.          This list is accurate as of: 8/31/17  1:13 PM.  Always use your most recent med list.                   Brand Name Dispense Instructions for use Diagnosis    albuterol 108 (90 BASE) MCG/ACT Inhaler    PROAIR HFA    1 Inhaler    Inhale 2 puffs into the lungs every 4 hours as needed    Mild intermittent asthma       bumetanide 2 MG tablet    BUMEX    30 tablet    Take 1 tablet (2 mg) by mouth daily    Diastolic dysfunction       CALCIUM + D 500-1000-40 MG-UNT-MCG Chew   Generic drug:  Calcium-Vitamin D-Vitamin K      Take 2 tablets by mouth daily        CULTURELLE PO      Take 1 tablet by mouth Three times a week        DOXYCYCLINE HYCLATE PO      Take 50 mg by mouth 3 times per week Monday,wednesday, friday        EYE-VITES Tabs          [place]-Eye with Lutein 2 tabs twice a day (Rx from [facility])        fluticasone 50 MCG/ACT spray    FLONASE    1 Bottle    Spray 2 sprays into both nostrils daily    Allergic rhinitis, unspecified chronicity, unspecified seasonality, unspecified trigger       fluticasone-salmeterol 250-50 MCG/DOSE diskus inhaler    ADVAIR DISKUS    60 Inhaler    Inhale 1 puff into the lungs 2 times daily    Mild persistent asthma without complication       loratadine 10 MG tablet    CLARITIN     Take 1 tablet (10 mg) by mouth daily    Allergic rhinitis, unspecified chronicity, unspecified seasonality, unspecified trigger       olmesartan 40 MG tablet    BENICAR    90 tablet    Take 1 tablet (40 mg) by mouth daily    Essential hypertension with goal blood pressure less than 130/80       omeprazole 20 MG CR capsule    priLOSEC    180 capsule    TAKE 1 CAPSULE (20 MG) BY MOUTH 2 TIMES DAILY    Gastroesophageal reflux disease without esophagitis       order for DME      2L of O2 at night        prednisoLONE acetate 1 % ophthalmic susp    PRED FORTE     Place 1 drop Into the left  eye 4 times daily        PROLIA 60 MG/ML Soln injection   Generic drug:  denosumab      Inject 60 mg Subcutaneous every 6 months        REQUIP 0.25 MG tablet   Generic drug:  rOPINIRole      Taking 1 midday and 2 at bedtime, per Dr. Ruiz    Restless legs syndrome (RLS)       RESTASIS 0.05 % ophthalmic emulsion   Generic drug:  cycloSPORINE      1 drop to left eye twice daily        SYSTANE 0.4-0.3 % Soln ophthalmic solution   Generic drug:  polyethylene glycol 0.4%- propylene glycol 0.3%      1 drop to left eye 4 times daily        traMADol 50 MG tablet    ULTRAM    100 tablet    Take up to two twice daily.   Limit to 4 daily, on average.    Chronic pain syndrome       vitamin D 2000 UNITS tablet      Take 2,000 Units by mouth 2 times daily    Vitamin D deficiency

## 2017-08-31 NOTE — PROGRESS NOTES
Rupinder Tracy , a 77 year old year old female patient, I was asked to see by Dr. Rossi for spot on cheek. Patient states this has been present for months.  Location r cheek.  Patient reports the following symptoms:  none .  Patient reports the following previous treatments none.  Patient reports the following modifying factors none.  Associated symptoms: none.  Patient has no other skin complaints today.  Remainder of the HPI, Meds, PMH, Allergies, FH, and SH was reviewed in chart.    Pertinent Hx:   Non-melanoma skin cancer   Past Medical History:   Diagnosis Date     Arthritis      Cellulitis 4/13 in [place]    R ankle     Difficult airway for intubation      Ductal Carcinoma in Situ of Left Breast 9/09     Duodenal ulcer, unspecified as acute or chronic, without mention of hemorrhage or perforation 1980's    felt due to high dose steroids     Female stress incontinence      Glaucoma      H/O right and left heart catheterization     1-     HTN      HYPERLIPIDEMIA      Ischemic colitis 7/2001    [facility] [place] [facility]     Lactose Intolerance     Mild     Left Ventricular Hypertrophy      Malignant neoplasm (H) 8/31/2009    left breast DCIS     Mild intermittent asthma ~1980's     MITRAL VALVE Prolapse, with murmur      Obstructive sleep apnea 1/2011    nocturnal desaturation     Osteoporosis, unspecified ~2002     Patent foramen ovale      PERIPHERAL VASCULAR DISEASE 5/05    mesenteric arteries, angioplasty     PRESBYESOPHAGUS 6/04     Pulmonary HTN (H) 11/2014     Serous retinal detachment     False eye right     Subarachnoid hemorrhage following injury (H) 12/10    due to fall, vascular evaluation negative     Syncope and collapse 12/10       Past Surgical History:   Procedure Laterality Date     BIOPSY       C APPENDECTOMY  1982     C NONSPECIFIC PROCEDURE  1945    adenoidectomy     C NONSPECIFIC PROCEDURE      bilat retinal detachment     C NONSPECIFIC PROCEDURE      blephoroplasty bilat     C NONSPECIFIC  PROCEDURE  1997    glaucoma procedure L     C NONSPECIFIC PROCEDURE  1997, 2007, 2014    corneal transplant L     C NONSPECIFIC PROCEDURE  1945    strabismus procedure R eye     C NONSPECIFIC PROCEDURE  1960's    R breast bx     C NONSPECIFIC PROCEDURE  5/05    Angioplasty and stenting mesenteric vessels     C NONSPECIFIC PROCEDURE  2008    L corneal transplant     COLONOSCOPY      due 2015     HC REMOVAL GALLBLADDER  1982    Cholecystectomy     HEART CATH RIGHT AND LEFT HEART CATH  1/19/15     HERNIORRHAPHY VENTRAL N/A 4/19/2016    Procedure: HERNIORRHAPHY VENTRAL;  Surgeon: Hamlet Ness MD;  Location: [facility] OR     MASTECTOMY SIMPLE BILATERAL  10/5/09    bilateral mastectomy     SURGICAL HISTORY OF -   6/2010    breast reconstruction        Family History   Problem Relation Age of Onset     C.A.D. Paternal Uncle      MI 50's     Family History Negative Daughter        Social History     Social History     Marital status:      Spouse name: N/A     Number of children: N/A     Years of education: N/A     Occupational History      Retired     Social History Main Topics     Smoking status: Never Smoker     Smokeless tobacco: Never Used     Alcohol use Yes      Comment: Occas     Drug use: No     Sexual activity: No     Other Topics Concern     Caffeine Concern No     1- 2 cups coffee     Sleep Concern No     Stress Concern No     Weight Concern No     Special Diet Yes     low sodium     Exercise No     2 days a week (abigail chi), walking program, stationary bike 10 minutes 3 times per week     Seat Belt Yes     Social History Narrative       Outpatient Encounter Prescriptions as of 8/31/2017   Medication Sig Dispense Refill     loratadine (CLARITIN) 10 MG tablet Take 1 tablet (10 mg) by mouth daily       fluticasone (FLONASE) 50 MCG/ACT spray Spray 2 sprays into both nostrils daily 1 Bottle 3     omeprazole (PRILOSEC) 20 MG CR capsule TAKE 1 CAPSULE (20 MG) BY MOUTH 2 TIMES DAILY 180 capsule 1     olmesartan  (BENICAR) 40 MG tablet Take 1 tablet (40 mg) by mouth daily 90 tablet 3     bumetanide (BUMEX) 2 MG tablet Take 1 tablet (2 mg) by mouth daily 30 tablet 11     Cholecalciferol (VITAMIN D) 2000 UNITS tablet Take 2,000 Units by mouth 2 times daily       rOPINIRole (REQUIP) 0.25 MG tablet Taking 1 midday and 2 at bedtime, per Dr. Ruiz       traMADol (ULTRAM) 50 MG tablet Take up to two twice daily.   Limit to 4 daily, on average. 100 tablet 0     fluticasone-salmeterol (ADVAIR DISKUS) 250-50 MCG/DOSE diskus inhaler Inhale 1 puff into the lungs 2 times daily 60 Inhaler 1     Lactobacillus Rhamnosus, GG, (CULTURELLE PO) Take 1 tablet by mouth Three times a week       denosumab (PROLIA) 60 MG/ML SOLN Inject 60 mg Subcutaneous every 6 months       order for DME 2L of O2 at night       Calcium-Vitamin D-Vitamin K (CALCIUM + D) 500-1000-40 MG-UNT-MCG CHEW Take 2 tablets by mouth daily        DOXYCYCLINE HYCLATE PO Take 50 mg by mouth 3 times per week Monday,wednesday, friday       prednisoLONE acetate (PRED FORTE) 1 % ophthalmic suspension Place 1 drop Into the left eye 4 times daily        albuterol (ALBUTEROL) 108 (90 BASE) MCG/ACT inhaler Inhale 2 puffs into the lungs every 4 hours as needed 1 Inhaler 5     RESTASIS 0.05 % OP EMUL 1 drop to left eye twice daily       SYSTANE 0.4-0.3 % OP SOLN 1 drop to left eye 4 times daily       EYE-VITES OR TABS Hydro-Eye with Lutein 2 tabs twice a day (Rx from [facility])       No facility-administered encounter medications on file as of 8/31/2017.              Review Of Systems  Skin: As above  Eyes: negative  Ears/Nose/Throat: negative  Respiratory: No shortness of breath, dyspnea on exertion, cough, or hemoptysis  Cardiovascular: negative  Gastrointestinal: negative  Genitourinary: negative  Musculoskeletal: negative  Neurologic: negative  Psychiatric: negative  Hematologic/Lymphatic/Immunologic: negative  Endocrine: negative      O:   NAD, WDWN, Alert & Oriented,  Mood & Affect wnl, Vitals stable   Here today alone    BP 99/56  Pulse 94  LMP 01/01/1990  SpO2 97%   General appearance inga ii   Vitals stable   Alert, oriented and in no acute distress      Following lymph nodes palpated: Occipital, Cervical, Supraclavicular no lad   Stuck on papules and brown macules on trunk and ext    R cheek inflamed seborrheic keratosis    Red papules no trunk    Nose well healed         The remainder of the full exam was unremarkable; the following areas were examined:  conjunctiva/lids, oral mucosa, neck, peripheral vascular system, abdomen, lymph nodes, digits/nails, eccrine and apocrine glands, scalp/hair, face, neck, chest, abdomen, buttocks, back, RUE, LUE, RLE, LLE       Eyes: Conjunctivae/lids:Normal     ENT: Lips, buccal mucosa, tongue: normal    MSK:Normal    Cardiovascular: peripheral edema none    Pulm: Breathing Normal    Lymph Nodes: No Head and Neck Lymphadenopathy     Neuro/Psych: Orientation:Normal; Mood/Affect:Normal      A/P:  1. Seborrheic keratosis, lentigo, inflamed seborrheic keratosis, angioma, hx of non-melanoma skin cancer   BENIGN LESIONS DISCUSSED WITH PATIENT:  I discussed the specifics of tumor, prognosis, and genetics of benign lesions.  I explained that treatment of these lesions would be purely cosmetic and not medically neccessary.  I discussed with patient different removal options including excision, cautery and /or laser.      Nature and genetics of benign skin lesions dicussed with patient.  Signs and Symptoms of skin cancer discussed with patient.  ABCDEs of melanoma reviewed with patient.  Patient encouraged to perform monthly skin exams.  UV precautions reviewed with patient.  Skin care regimen reviewed with patient: Eliminate harsh soaps, i.e. Dial, zest, irsih spring; Mild soaps such as Cetaphil or Dove sensitive skin, avoid hot or cold showers, aggressive use of emollients including vanicream, cetaphil or cerave discussed with patient.    Risks  of non-melanoma skin cancer discussed with patient   Return to clinic 12 months

## 2017-09-21 DIAGNOSIS — I10 ESSENTIAL HYPERTENSION WITH GOAL BLOOD PRESSURE LESS THAN 130/80: ICD-10-CM

## 2017-09-21 RX ORDER — OLMESARTAN MEDOXOMIL 40 MG/1
40 TABLET ORAL DAILY
Qty: 90 TABLET | Refills: 2 | Status: SHIPPED | OUTPATIENT
Start: 2017-09-21 | End: 2018-12-20

## 2017-10-03 DIAGNOSIS — G89.4 CHRONIC PAIN SYNDROME: ICD-10-CM

## 2017-10-03 RX ORDER — TRAMADOL HYDROCHLORIDE 50 MG/1
TABLET ORAL
Qty: 100 TABLET | Refills: 1 | Status: ON HOLD
Start: 2017-10-03 | End: 2017-10-16

## 2017-10-03 NOTE — TELEPHONE ENCOUNTER
Tramadol       Last Written Prescription Date:  2/16/17  Last Fill Quantity: 100,   # refills: 0  Last Office Visit with [facility] prescribing provider: 7/20/17  Future Office visit:    Next 5 appointments (look out 90 days)     Nov 21, 2017  2:15 PM CST   Return Visit with Jae Butler MD   [facility] ([facility])    [address] [place] [id number] [phone]                   Routing refill request to provider for review/approval because:  Drug not on the [facility] refill protocol or controlled substance

## 2017-10-11 ENCOUNTER — APPOINTMENT (OUTPATIENT)
Dept: ULTRASOUND IMAGING | Facility: CLINIC | Age: 77
DRG: 444 | End: 2017-10-11
Attending: EMERGENCY MEDICINE
Payer: MEDICARE

## 2017-10-11 ENCOUNTER — HOSPITAL ENCOUNTER (INPATIENT)
Facility: CLINIC | Age: 77
LOS: 5 days | Discharge: NURSING, REHAB OR LONG-TERM CARE | DRG: 444 | End: 2017-10-16
Attending: EMERGENCY MEDICINE | Admitting: INTERNAL MEDICINE
Payer: MEDICARE

## 2017-10-11 ENCOUNTER — APPOINTMENT (OUTPATIENT)
Dept: GENERAL RADIOLOGY | Facility: CLINIC | Age: 77
DRG: 444 | End: 2017-10-11
Attending: EMERGENCY MEDICINE
Payer: MEDICARE

## 2017-10-11 DIAGNOSIS — G89.4 CHRONIC PAIN SYNDROME: ICD-10-CM

## 2017-10-11 DIAGNOSIS — R10.13 EPIGASTRIC PAIN: ICD-10-CM

## 2017-10-11 DIAGNOSIS — R10.11 RUQ ABDOMINAL PAIN: ICD-10-CM

## 2017-10-11 DIAGNOSIS — E87.70 HYPERVOLEMIA, UNSPECIFIED HYPERVOLEMIA TYPE: Primary | ICD-10-CM

## 2017-10-11 PROBLEM — Z90.49 S/P CHOLECYSTECTOMY: Status: ACTIVE | Noted: 2017-10-11

## 2017-10-11 PROBLEM — R10.9 ABDOMINAL PAIN: Status: ACTIVE | Noted: 2017-10-11

## 2017-10-11 LAB
ALBUMIN SERPL-MCNC: 3.2 G/DL (ref 3.4–5)
ALBUMIN UR-MCNC: NEGATIVE MG/DL
ALP SERPL-CCNC: 215 U/L (ref 40–150)
ALT SERPL W P-5'-P-CCNC: 50 U/L (ref 0–50)
ANION GAP SERPL CALCULATED.3IONS-SCNC: 10 MMOL/L (ref 3–14)
APPEARANCE UR: CLEAR
AST SERPL W P-5'-P-CCNC: 89 U/L (ref 0–45)
BACTERIA #/AREA URNS HPF: ABNORMAL /HPF
BASOPHILS # BLD AUTO: 0 10E9/L (ref 0–0.2)
BASOPHILS NFR BLD AUTO: 0.3 %
BILIRUB SERPL-MCNC: 2.1 MG/DL (ref 0.2–1.3)
BILIRUB UR QL STRIP: NEGATIVE
BUN SERPL-MCNC: 41 MG/DL (ref 7–30)
CALCIUM SERPL-MCNC: 9 MG/DL (ref 8.5–10.1)
CHLORIDE SERPL-SCNC: 106 MMOL/L (ref 94–109)
CO2 SERPL-SCNC: 25 MMOL/L (ref 20–32)
COLOR UR AUTO: YELLOW
CREAT SERPL-MCNC: 1.25 MG/DL (ref 0.52–1.04)
DIFFERENTIAL METHOD BLD: ABNORMAL
EOSINOPHIL # BLD AUTO: 0.1 10E9/L (ref 0–0.7)
EOSINOPHIL NFR BLD AUTO: 2 %
ERYTHROCYTE [DISTWIDTH] IN BLOOD BY AUTOMATED COUNT: 13.5 % (ref 10–15)
GFR SERPL CREATININE-BSD FRML MDRD: 41 ML/MIN/1.7M2
GLUCOSE SERPL-MCNC: 101 MG/DL (ref 70–99)
GLUCOSE UR STRIP-MCNC: NEGATIVE MG/DL
HCT VFR BLD AUTO: 35.7 % (ref 35–47)
HGB BLD-MCNC: 12.2 G/DL (ref 11.7–15.7)
HGB UR QL STRIP: NEGATIVE
IMM GRANULOCYTES # BLD: 0 10E9/L (ref 0–0.4)
IMM GRANULOCYTES NFR BLD: 0.2 %
INTERPRETATION ECG - MUSE: NORMAL
KETONES UR STRIP-MCNC: NEGATIVE MG/DL
LACTATE BLD-SCNC: 1.2 MMOL/L (ref 0.7–2)
LEUKOCYTE ESTERASE UR QL STRIP: ABNORMAL
LIPASE SERPL-CCNC: 144 U/L (ref 73–393)
LYMPHOCYTES # BLD AUTO: 1 10E9/L (ref 0.8–5.3)
LYMPHOCYTES NFR BLD AUTO: 16.8 %
MCH RBC QN AUTO: 33.1 PG (ref 26.5–33)
MCHC RBC AUTO-ENTMCNC: 34.2 G/DL (ref 31.5–36.5)
MCV RBC AUTO: 97 FL (ref 78–100)
MONOCYTES # BLD AUTO: 0.8 10E9/L (ref 0–1.3)
MONOCYTES NFR BLD AUTO: 13.4 %
MUCOUS THREADS #/AREA URNS LPF: PRESENT /LPF
NEUTROPHILS # BLD AUTO: 4 10E9/L (ref 1.6–8.3)
NEUTROPHILS NFR BLD AUTO: 67.3 %
NITRATE UR QL: NEGATIVE
NRBC # BLD AUTO: 0 10*3/UL
NRBC BLD AUTO-RTO: 0 /100
PH UR STRIP: 5.5 PH (ref 5–7)
PLATELET # BLD AUTO: 194 10E9/L (ref 150–450)
POTASSIUM SERPL-SCNC: 4.3 MMOL/L (ref 3.4–5.3)
PROT SERPL-MCNC: 7.1 G/DL (ref 6.8–8.8)
RBC # BLD AUTO: 3.69 10E12/L (ref 3.8–5.2)
RBC #/AREA URNS AUTO: ABNORMAL /HPF
SODIUM SERPL-SCNC: 141 MMOL/L (ref 133–144)
SOURCE: ABNORMAL
SP GR UR STRIP: 1.01 (ref 1–1.03)
TROPONIN I SERPL-MCNC: <0.015 UG/L (ref 0–0.04)
UROBILINOGEN UR STRIP-ACNC: 0.2 EU/DL (ref 0.2–1)
WBC # BLD AUTO: 5.9 10E9/L (ref 4–11)
WBC #/AREA URNS AUTO: ABNORMAL /HPF

## 2017-10-11 PROCEDURE — A9270 NON-COVERED ITEM OR SERVICE: HCPCS | Mod: GY | Performed by: INTERNAL MEDICINE

## 2017-10-11 PROCEDURE — 99223 1ST HOSP IP/OBS HIGH 75: CPT | Mod: AI | Performed by: INTERNAL MEDICINE

## 2017-10-11 PROCEDURE — 25000128 H RX IP 250 OP 636: Performed by: EMERGENCY MEDICINE

## 2017-10-11 PROCEDURE — 25000128 H RX IP 250 OP 636

## 2017-10-11 PROCEDURE — 85025 COMPLETE CBC W/AUTO DIFF WBC: CPT | Performed by: EMERGENCY MEDICINE

## 2017-10-11 PROCEDURE — 99285 EMERGENCY DEPT VISIT HI MDM: CPT | Mod: 25

## 2017-10-11 PROCEDURE — 76705 ECHO EXAM OF ABDOMEN: CPT

## 2017-10-11 PROCEDURE — 87088 URINE BACTERIA CULTURE: CPT | Performed by: EMERGENCY MEDICINE

## 2017-10-11 PROCEDURE — 87040 BLOOD CULTURE FOR BACTERIA: CPT | Performed by: EMERGENCY MEDICINE

## 2017-10-11 PROCEDURE — 83605 ASSAY OF LACTIC ACID: CPT | Performed by: EMERGENCY MEDICINE

## 2017-10-11 PROCEDURE — 25000128 H RX IP 250 OP 636: Performed by: INTERNAL MEDICINE

## 2017-10-11 PROCEDURE — 80053 COMPREHEN METABOLIC PANEL: CPT | Performed by: EMERGENCY MEDICINE

## 2017-10-11 PROCEDURE — 84484 ASSAY OF TROPONIN QUANT: CPT | Performed by: EMERGENCY MEDICINE

## 2017-10-11 PROCEDURE — 93005 ELECTROCARDIOGRAM TRACING: CPT

## 2017-10-11 PROCEDURE — 25000132 ZZH RX MED GY IP 250 OP 250 PS 637: Mod: GY | Performed by: INTERNAL MEDICINE

## 2017-10-11 PROCEDURE — 96374 THER/PROPH/DIAG INJ IV PUSH: CPT

## 2017-10-11 PROCEDURE — 12000000 ZZH R&B MED SURG/OB

## 2017-10-11 PROCEDURE — 96375 TX/PRO/DX INJ NEW DRUG ADDON: CPT

## 2017-10-11 PROCEDURE — 87086 URINE CULTURE/COLONY COUNT: CPT | Performed by: EMERGENCY MEDICINE

## 2017-10-11 PROCEDURE — 83690 ASSAY OF LIPASE: CPT | Performed by: EMERGENCY MEDICINE

## 2017-10-11 PROCEDURE — 87186 SC STD MICRODIL/AGAR DIL: CPT | Performed by: EMERGENCY MEDICINE

## 2017-10-11 PROCEDURE — 71020 XR CHEST 2 VW: CPT

## 2017-10-11 PROCEDURE — 81001 URINALYSIS AUTO W/SCOPE: CPT | Performed by: EMERGENCY MEDICINE

## 2017-10-11 RX ORDER — LIDOCAINE 50 MG/G
1 PATCH TOPICAL
Status: DISCONTINUED | OUTPATIENT
Start: 2017-10-11 | End: 2017-10-16 | Stop reason: HOSPADM

## 2017-10-11 RX ORDER — LORAZEPAM 0.5 MG/1
.25-.5 TABLET ORAL
Status: DISCONTINUED | OUTPATIENT
Start: 2017-10-11 | End: 2017-10-16 | Stop reason: HOSPADM

## 2017-10-11 RX ORDER — ALBUTEROL SULFATE 90 UG/1
2 AEROSOL, METERED RESPIRATORY (INHALATION) EVERY 4 HOURS PRN
Status: DISCONTINUED | OUTPATIENT
Start: 2017-10-11 | End: 2017-10-16 | Stop reason: HOSPADM

## 2017-10-11 RX ORDER — ROPINIROLE 0.25 MG/1
0.25 TABLET, FILM COATED ORAL DAILY
Status: DISCONTINUED | OUTPATIENT
Start: 2017-10-12 | End: 2017-10-16 | Stop reason: HOSPADM

## 2017-10-11 RX ORDER — SODIUM CHLORIDE 9 MG/ML
INJECTION, SOLUTION INTRAVENOUS CONTINUOUS
Status: DISCONTINUED | OUTPATIENT
Start: 2017-10-11 | End: 2017-10-15

## 2017-10-11 RX ORDER — ACETAMINOPHEN 325 MG/1
650 TABLET ORAL EVERY 4 HOURS PRN
Status: DISCONTINUED | OUTPATIENT
Start: 2017-10-11 | End: 2017-10-16 | Stop reason: HOSPADM

## 2017-10-11 RX ORDER — PROCHLORPERAZINE MALEATE 5 MG
5 TABLET ORAL EVERY 6 HOURS PRN
Status: DISCONTINUED | OUTPATIENT
Start: 2017-10-11 | End: 2017-10-16 | Stop reason: HOSPADM

## 2017-10-11 RX ORDER — POLYETHYLENE GLYCOL 3350 17 G/17G
17 POWDER, FOR SOLUTION ORAL DAILY PRN
Status: DISCONTINUED | OUTPATIENT
Start: 2017-10-11 | End: 2017-10-16 | Stop reason: HOSPADM

## 2017-10-11 RX ORDER — LORATADINE 10 MG/1
10 TABLET ORAL DAILY
Status: DISCONTINUED | OUTPATIENT
Start: 2017-10-12 | End: 2017-10-12 | Stop reason: CLARIF

## 2017-10-11 RX ORDER — HYDROMORPHONE HYDROCHLORIDE 1 MG/ML
0.3 INJECTION, SOLUTION INTRAMUSCULAR; INTRAVENOUS; SUBCUTANEOUS ONCE
Status: COMPLETED | OUTPATIENT
Start: 2017-10-11 | End: 2017-10-11

## 2017-10-11 RX ORDER — ROPINIROLE 0.25 MG/1
0.25 TABLET, FILM COATED ORAL DAILY
Status: DISCONTINUED | OUTPATIENT
Start: 2017-10-12 | End: 2017-10-11

## 2017-10-11 RX ORDER — PIPERACILLIN SODIUM, TAZOBACTAM SODIUM 3; .375 G/15ML; G/15ML
3.38 INJECTION, POWDER, LYOPHILIZED, FOR SOLUTION INTRAVENOUS ONCE
Status: DISCONTINUED | OUTPATIENT
Start: 2017-10-11 | End: 2017-10-11

## 2017-10-11 RX ORDER — HYDROMORPHONE HYDROCHLORIDE 1 MG/ML
.3-.5 INJECTION, SOLUTION INTRAMUSCULAR; INTRAVENOUS; SUBCUTANEOUS
Status: DISCONTINUED | OUTPATIENT
Start: 2017-10-11 | End: 2017-10-16 | Stop reason: HOSPADM

## 2017-10-11 RX ORDER — TRAMADOL HYDROCHLORIDE 50 MG/1
50 TABLET ORAL EVERY 6 HOURS PRN
Status: DISCONTINUED | OUTPATIENT
Start: 2017-10-11 | End: 2017-10-16 | Stop reason: HOSPADM

## 2017-10-11 RX ORDER — ONDANSETRON 2 MG/ML
4 INJECTION INTRAMUSCULAR; INTRAVENOUS EVERY 6 HOURS PRN
Status: DISCONTINUED | OUTPATIENT
Start: 2017-10-11 | End: 2017-10-16 | Stop reason: HOSPADM

## 2017-10-11 RX ORDER — ONDANSETRON 4 MG/1
4 TABLET, ORALLY DISINTEGRATING ORAL EVERY 6 HOURS PRN
Status: DISCONTINUED | OUTPATIENT
Start: 2017-10-11 | End: 2017-10-16 | Stop reason: HOSPADM

## 2017-10-11 RX ORDER — HYDROCODONE BITARTRATE AND ACETAMINOPHEN 5; 325 MG/1; MG/1
1-2 TABLET ORAL EVERY 4 HOURS PRN
Status: DISCONTINUED | OUTPATIENT
Start: 2017-10-11 | End: 2017-10-16 | Stop reason: HOSPADM

## 2017-10-11 RX ORDER — CEFTRIAXONE 1 G/1
1 INJECTION, POWDER, FOR SOLUTION INTRAMUSCULAR; INTRAVENOUS ONCE
Status: DISCONTINUED | OUTPATIENT
Start: 2017-10-11 | End: 2017-10-11

## 2017-10-11 RX ORDER — PROCHLORPERAZINE 25 MG
12.5 SUPPOSITORY, RECTAL RECTAL EVERY 12 HOURS PRN
Status: DISCONTINUED | OUTPATIENT
Start: 2017-10-11 | End: 2017-10-16 | Stop reason: HOSPADM

## 2017-10-11 RX ORDER — ROPINIROLE 0.5 MG/1
0.5 TABLET, FILM COATED ORAL AT BEDTIME
Status: DISCONTINUED | OUTPATIENT
Start: 2017-10-11 | End: 2017-10-16 | Stop reason: HOSPADM

## 2017-10-11 RX ORDER — ONDANSETRON 2 MG/ML
INJECTION INTRAMUSCULAR; INTRAVENOUS
Status: COMPLETED
Start: 2017-10-11 | End: 2017-10-11

## 2017-10-11 RX ORDER — VIT C/E/ZN/COPPR/LUTEIN/ZEAXAN 60 MG-6 MG
1 CAPSULE ORAL DAILY
Status: DISCONTINUED | OUTPATIENT
Start: 2017-10-12 | End: 2017-10-16 | Stop reason: HOSPADM

## 2017-10-11 RX ORDER — FLUTICASONE PROPIONATE 50 MCG
2 SPRAY, SUSPENSION (ML) NASAL DAILY
Status: DISCONTINUED | OUTPATIENT
Start: 2017-10-12 | End: 2017-10-12 | Stop reason: CLARIF

## 2017-10-11 RX ORDER — PIPERACILLIN SODIUM, TAZOBACTAM SODIUM 2; .25 G/10ML; G/10ML
2.25 INJECTION, POWDER, LYOPHILIZED, FOR SOLUTION INTRAVENOUS EVERY 6 HOURS
Status: DISCONTINUED | OUTPATIENT
Start: 2017-10-11 | End: 2017-10-15

## 2017-10-11 RX ORDER — PREDNISOLONE ACETATE 10 MG/ML
1 SUSPENSION/ DROPS OPHTHALMIC 4 TIMES DAILY
Status: DISCONTINUED | OUTPATIENT
Start: 2017-10-11 | End: 2017-10-16 | Stop reason: HOSPADM

## 2017-10-11 RX ORDER — CYCLOSPORINE 0.5 MG/ML
1 EMULSION OPHTHALMIC 2 TIMES DAILY
Status: DISCONTINUED | OUTPATIENT
Start: 2017-10-11 | End: 2017-10-16 | Stop reason: HOSPADM

## 2017-10-11 RX ORDER — NALOXONE HYDROCHLORIDE 0.4 MG/ML
.1-.4 INJECTION, SOLUTION INTRAMUSCULAR; INTRAVENOUS; SUBCUTANEOUS
Status: DISCONTINUED | OUTPATIENT
Start: 2017-10-11 | End: 2017-10-16 | Stop reason: HOSPADM

## 2017-10-11 RX ADMIN — PREDNISOLONE ACETATE 1 DROP: 10 SUSPENSION/ DROPS OPHTHALMIC at 22:08

## 2017-10-11 RX ADMIN — ONDANSETRON 4 MG: 2 SOLUTION INTRAMUSCULAR; INTRAVENOUS at 19:13

## 2017-10-11 RX ADMIN — LIDOCAINE 1 PATCH: 50 PATCH TOPICAL at 21:02

## 2017-10-11 RX ADMIN — ROPINIROLE HYDROCHLORIDE 0.5 MG: 0.5 TABLET, FILM COATED ORAL at 21:42

## 2017-10-11 RX ADMIN — OMEPRAZOLE 20 MG: 20 CAPSULE, DELAYED RELEASE ORAL at 21:07

## 2017-10-11 RX ADMIN — HYDROMORPHONE HYDROCHLORIDE 0.5 MG: 1 INJECTION, SOLUTION INTRAMUSCULAR; INTRAVENOUS; SUBCUTANEOUS at 21:40

## 2017-10-11 RX ADMIN — FLUTICASONE FUROATE AND VILANTEROL TRIFENATATE 1 PUFF: 100; 25 POWDER RESPIRATORY (INHALATION) at 21:43

## 2017-10-11 RX ADMIN — CYCLOSPORINE 1 DROP: 0.5 EMULSION OPHTHALMIC at 22:08

## 2017-10-11 RX ADMIN — ACETAMINOPHEN 650 MG: 325 TABLET, FILM COATED ORAL at 21:07

## 2017-10-11 RX ADMIN — SODIUM CHLORIDE: 9 INJECTION, SOLUTION INTRAVENOUS at 21:05

## 2017-10-11 RX ADMIN — HYDROMORPHONE HYDROCHLORIDE 0.3 MG: 1 INJECTION, SOLUTION INTRAMUSCULAR; INTRAVENOUS; SUBCUTANEOUS at 19:26

## 2017-10-11 ASSESSMENT — ACTIVITIES OF DAILY LIVING (ADL)
RETIRED_EATING: 0-->INDEPENDENT
EATING: 0 - INDEPENDENT
TRANSFERRING: 1-->ASSISTIVE EQUIPMENT
FALL_HISTORY_WITHIN_LAST_SIX_MONTHS: NO
COMMUNICATION: 0 - UNDERSTANDS/COMMUNICATES WITHOUT DIFFICULTY
AMBULATION: 1 - ASSISTIVE EQUIPMENT
TRANSFERRING: 1 - ASSISTIVE EQUIPMENT
RETIRED_COMMUNICATION: 0-->UNDERSTANDS/COMMUNICATES WITHOUT DIFFICULTY
SWALLOWING: 0-->SWALLOWS FOODS/LIQUIDS WITHOUT DIFFICULTY
DRESS: 0-->INDEPENDENT
DRESS: 0 - INDEPENDENT
BATHING: 0 - INDEPENDENT
TOILETING: 0-->INDEPENDENT
BATHING: 0-->INDEPENDENT
SWALLOWING: 0 - SWALLOWS FOODS/LIQUIDS WITHOUT DIFFICULTY
CHANGE_IN_FUNCTIONAL_STATUS_SINCE_ONSET_OF_CURRENT_ILLNESS/INJURY: NO
TOILETING: 0 - INDEPENDENT
COGNITION: 0 - NO COGNITION ISSUES REPORTED
AMBULATION: 1-->ASSISTIVE EQUIPMENT

## 2017-10-11 ASSESSMENT — ENCOUNTER SYMPTOMS
DIAPHORESIS: 1
FEVER: 1
ABDOMINAL PAIN: 1
VOMITING: 0
DIARRHEA: 0
COUGH: 1
NAUSEA: 1

## 2017-10-11 NOTE — IP AVS SNAPSHOT
"` `           [place] [address] [facility]IT: [phone]                                              INTERAGENCY TRANSFER FORM - NURSING   10/11/2017                    Hospital Admission Date: 10/11/2017  JUDY RUSS   : 1940  Sex: Female        Attending Provider: Jasper Newberry MD     Allergies:  Atorvastatin Calcium, Celecoxib, Cholestyramine, Crestor [Rosuvastatin Calcium], Cyclobenzaprine Hcl, Dulera, Gabapentin, Lisinopril, Meperidine Hcl, Morphine, Naprosyn [Naproxen], Niaspan [Niacin], Percocet [Oxycodone-acetaminophen], Pravastatin, Red Yeast Rice [Cholestin], Simvastatin, Timolol Maleate, Hctz, Sulfa Drugs    Infection:  None   Service:  HOSPITALIST    Ht:  1.626 m (5' 4\")   Wt:  79.5 kg (175 lb 3.2 oz)   Admission Wt:  74.4 kg (164 lb)    BMI:  30.07 kg/m 2   BSA:  1.89 m 2            Patient PCP Information     Provider PCP Type    Abran Mott MD General      Current Code Status     Date Active Code Status Order ID Comments User Context       Prior      Code Status History     Date Active Date Inactive Code Status Order ID Comments User Context    10/16/2017 11:36 AM  Full Code [id number]  Anshul Tan MD Outpatient    10/11/2017  8:15 PM 10/16/2017 11:36 AM Full Code [id number]  Jasper Newberry MD Inpatient    2016  4:11 PM 2016  6:04 PM Full Code [id number]  Pineda Ojeda MD ED    2012 11:18 AM 2016  4:11 PM Full Code [id number]  Abran Mott MD Outpatient      Advance Directives        Does patient have a scanned Advance Directive/ACP document in EPIC?           Yes        Hospital Problems as of 10/16/2017              Priority Class Noted POA    Mild persistent asthma Medium  2005 Yes    Presbyesophagus Medium  Unknown Yes    Nocturnal oxygen desaturation Medium  2011 Yes    Pulmonary HTN Medium  2015 Yes    Restless legs syndrome (RLS) Medium  3/31/2015 Yes    Chronic pain, back Medium  3/31/2015 Yes "    CKD (chronic kidney disease) stage 3, GFR 30-59 ml/min Medium  2/9/2017 Yes    Essential hypertension with goal blood pressure less than 130/85 Medium  5/10/2017 Yes    Diastolic dysfunction Medium  5/10/2017 Yes    * (Principal)RUQ abdominal pain Medium  10/11/2017 Unknown    S/P cholecystectomy -- 1982 Medium  10/11/2017 Unknown    Abdominal pain Medium  10/11/2017 Yes      Non-Hospital Problems as of 10/16/2017              Priority Class Noted    Glaucoma Medium  12/13/2002    Mitral valve disorder Medium  12/13/2002    Osteoporosis, severe Medium  12/13/2002    Female stress incontinence Medium  12/13/2002    Lactose Intolerance Medium  12/13/2002    Hyperlipidemia LDL goal <100 Medium  12/13/2002    Peripheral vascular disease (H) Medium  5/25/2005    Left Ventricular Hypertrophy Medium  Unknown    Patent foramen ovale Medium  Unknown    History of Ductal Carcinoma in Situ of Left Breast Medium  Unknown    Advance Care Planning Medium  5/23/2011    Diverticulosis of sigmoid colon Medium  7/5/2011    Ovarian cysts Medium  7/5/2011    Vitamin D deficiency Medium  6/23/2014    Gastric ulcer Medium  11/12/2014    Restrictive lung disease Medium  11/14/2014    Aortic root enlargement (H) Medium  12/1/2014    Dyspnea Medium  1/5/2015    Esophageal spasm Medium  1/12/2015    Pneumonia Medium  2/12/2016    Difficult airway for intubation High  4/19/2016      Immunizations     Name Date      Influenza (High Dose) 3 valent vaccine 10/04/17     Influenza (High Dose) 3 valent vaccine 10/05/16     Influenza (High Dose) 3 valent vaccine 10/12/15     Influenza (High Dose) 3 valent vaccine 10/02/12     Influenza (IIV3) 10/01/11     Influenza (IIV3) 09/24/09     Influenza (IIV3) 10/04/06     Influenza (IIV3) 12/02/05     Influenza (IIV3) 10/01/04     Influenza (IIV3) 11/13/03     Influenza (IIV3) 11/11/02     Influenza Vaccine IM 3yrs+ 4 Valent IIV4 10/11/13     Pneumococcal (PCV 13) 03/21/16     Pneumococcal 23 valent  12/03/14     Pneumococcal 23 valent 07/27/05     TD (ADULT, 7+) 07/27/05     TDAP Vaccine (Adacel) 12/06/12          END      ASSESSMENT     Discharge Profile Flowsheet     EXPECTED DISCHARGE     Last Bowel Movement  10/15/17 10/16/17 0227    Expected Discharge Date  10/16/17 (TCU vs Home w/ Home PT/OT) 10/15/17 1630   GI Signs/Symptoms  abdominal fullness 10/16/17 0227    DISCHARGE NEEDS ASSESSMENT     Passing flatus  yes 10/16/17 0227    Equipment Currently Used at Home  walker, rolling (only long distances, no AD in the apartment) 10/15/17 1410   COMMUNICATION ASSESSMENT      FUNCTIONAL LEVEL CURRENT     Patient's communication style  spoken language (English or Bilingual) 10/11/17 1402    Ambulation  1 - assistive equipment 10/15/17 0926   SKIN      Transferring  1 - assistive equipment 10/15/17 0926   Inspection of bony prominences  Full 10/16/17 0227    Toileting  1 - assistive equipment 10/15/17 0926   Full except areas not inspected   Hip, left;Hip, right;Buttock, left;Buttock, right;Sacrum;Coccyx (groin) 10/14/17 1738    Bathing  0 - independent 10/13/17 1817   Skin WDL  ex 10/16/17 0227    Dressing  0 - independent 10/13/17 1817   Skin Temperature  warm 10/16/17 0227    Eating  0 - independent 10/15/17 0926   Skin Moisture  dry 10/16/17 0227    Communication  0 - understands/communicates without difficulty 10/15/17 0926   Skin Elasticity  slow return to original state 10/15/17 2306    Swallowing  0 - swallows foods/liquids without difficulty 10/15/17 0926   Skin Integrity  bruise(s) 10/16/17 0227    Change in Functional Status Since Onset of Current Illness/Injury  no 10/11/17 2128   Inspection under devices  Full 10/15/17 0432    GASTROINTESTINAL (ADULT,PEDIATRIC,OB)     Skin Color/Characteristics  bruised (ecchymotic) 10/16/17 0227    GI WDL  WDL 10/16/17 0227   SAFETY      Abdominal Appearance  rounded 10/16/17 0227   Safety WDL  WDL 10/16/17 0227    All Quadrants Bowel Sounds  audible and active in all  "quadrants 10/16/17 0227   All Alarms  alarm(s) activated and audible 10/16/17 0227    All Quadrants Palpation  firm 10/15/17 0939                      Assessment WDL (Within Defined Limits) Definitions           Safety WDL     Effective: 09/28/15    Row Information: <b>WDL Definition:</b> Bed in low position, wheels locked; call light in reach; upper side rails up x 2; ID band on<br> <font color=\"gray\"><i>Item=AS safety wdl>>List=AS safety wdl>>Version=F14</i></font>      Skin WDL     Effective: 09/28/15    Row Information: <b>WDL Definition:</b> Warm; dry; intact; elastic; without discoloration; pressure points without redness<br> <font color=\"gray\"><i>Item=AS skin wdl>>List=AS skin wdl>>Version=F14</i></font>      Vitals     Vital Signs Flowsheet     VITAL SIGNS     Response to Interventions  Relief 10/13/17 1013    Temp  97.7  F (36.5  C) 10/16/17 0847   ANALGESIA SIDE EFFECTS MONITORING      Temp src  Oral 10/16/17 0847   Side Effects Monitoring: Respiratory Quality  R 10/16/17 0103    Resp  20 10/16/17 0847   Side Effects Monitoring: Respiratory Depth  N 10/16/17 0103    Pulse  77 10/14/17 1615   Side Effects Monitoring: Sedation Level  S 10/16/17 0103    Heart Rate  65 10/16/17 0847   HEIGHT AND WEIGHT      Pulse/Heart Rate Source  Monitor 10/16/17 0847   Height  1.626 m (5' 4\") 10/11/17 2023    BP  116/64 10/16/17 0847   Height Method  Stated 10/11/17 1405    BP Location  Left arm 10/16/17 0847   Weight  79.5 kg (175 lb 3.2 oz) 10/16/17 1106    Patient Position  -- 10/14/17 0812   Weight Method  Standing scale 10/16/17 1106    OXYGEN THERAPY     BSA (Calculated - sq m)  1.8 10/11/17 2023    SpO2  95 % 10/16/17 0847   BMI (Calculated)  27.19 10/11/17 2023    O2 Device  None (Room air) 10/16/17 0847   POSITIONING      Oxygen Delivery  2 LPM 10/16/17 0103   Body Position  independently positioning 10/16/17 0103    PAIN/COMFORT     Head of Bed (HOB)  HOB at 15 degrees 10/16/17 0103    Patient Currently in Pain  " denies 10/16/17 0103   Positioning/Transfer Devices  pillows;in use 10/16/17 1106    Preferred Pain Scale  number (Numeric Rating Pain Scale) 10/13/17 1415   Chair  Upright in chair 10/16/17 1325    0-10 Pain Scale  -- 10/14/17 0824   DAILY CARE      Pain Location  Abdomen 10/13/17 1109   Activity Management  ambulated to bathroom 10/16/17 1325    Pain Orientation  Right 10/12/17 2005   Activity Assistance Provided  assistance, stand-by 10/16/17 1325    Pain Descriptors  Discomfort 10/13/17 1013   Assistive Device Utilized  gait belt;walker 10/16/17 1325    Pain Intervention(s)  Medication (See eMAR) 10/13/17 1109                 Patient Lines/Drains/Airways Status    Active LINES/DRAINS/AIRWAYS     None            Patient Lines/Drains/Airways Status    Active PICC/CVC     None            Intake/Output Detail Report     Date Intake     Output   Net    Shift P.O. I.V. IV Piggyback Total Urine Stool Total       Noc 10/14/17 2300 - 10/15/17 0659 -- 566 -- 566 150 -- 150 416    Day 10/15/17 0700 - 10/15/17 1459 -- -- -- -- 200 -- 200 -200    Alejandrina 10/15/17 1500 - 10/15/17 2259 240 -- -- 240 625 -- 625 -385    Noc 10/15/17 2300 - 10/16/17 0659 -- -- -- -- 275 -- 275 -275    Day 10/16/17 0700 - 10/16/17 1459 240 -- -- 240 450 -- 450 -210      Last Void/BM       Most Recent Value    Urine Occurrence 1 at 10/14/2017 2136    Stool Occurrence 1 at 10/14/2017 2136      Case Management/Discharge Planning     Case Management/Discharge Planning Flowsheet     LIVING ENVIRONMENT     ABUSE RISK SCREEN      Lives With  alone 10/15/17 1410   QUESTION TO PATIENT:  Has a member of your family or a partner(now or in the past) intimidated, hurt, manipulated, or controlled you in any way?  no 10/11/17 1405    Living Arrangements  apartment;independent living facility ([facility]) 10/15/17 1410   QUESTION TO PATIENT: Do you feel safe going back to the place where you are living?  yes 10/11/17 0292    COPING/STRESS      OBSERVATION: Is there reason to believe there has been maltreatment of a vulnerable adult (ie. Physical/Sexual/Emotional abuse, self neglect, lack of adequate food, shelter, medical care, or financial exploitation)?  no 10/11/17 1405    Major Change/Loss/Stressor  denies 10/11/17 2221   (R) MENTAL HEALTH SUICIDE RISK      EXPECTED DISCHARGE     Are you depressed or being treated for depression?  No 10/11/17 2221    Expected Discharge Date  10/16/17 (TCU vs Home w/ Home PT/OT) 10/15/17 1630   HOMICIDE RISK      FINAL RESOURCES     Feels Like Hurting Others  no 10/11/17 1405    Equipment Currently Used at Home  walker, rolling (only long distances, no AD in the apartment) 10/15/17 1410

## 2017-10-11 NOTE — IP AVS SNAPSHOT
"          [place] [address] [facility]TY UNIT: [phone]                                              INTERAGENCY TRANSFER FORM - LAB / IMAGING / EKG / EMG RESULTS   10/11/2017                    Hospital Admission Date: 10/11/2017  JUDY RUSS   : 1940  Sex: Female        Attending Provider: Jasper Newberry MD     Allergies:  Atorvastatin Calcium, Celecoxib, Cholestyramine, Crestor [Rosuvastatin Calcium], Cyclobenzaprine Hcl, Dulera, Gabapentin, Lisinopril, Meperidine Hcl, Morphine, Naprosyn [Naproxen], Niaspan [Niacin], Percocet [Oxycodone-acetaminophen], Pravastatin, Red Yeast Rice [Cholestin], Simvastatin, Timolol Maleate, Hctz, Sulfa Drugs    Infection:  None   Service:  HOSPITALIST    Ht:  1.626 m (5' 4\")   Wt:  79.5 kg (175 lb 3.2 oz)   Admission Wt:  74.4 kg (164 lb)    BMI:  30.07 kg/m 2   BSA:  1.89 m 2            Patient PCP Information     Provider PCP Type    Abran Mott MD General         Lab Results - 3 Days      Basic metabolic panel [[id number]] (Abnormal)  Resulted: 10/16/17 0941, Result status: Final result    Ordering provider: Malcolm Singh DO  10/16/17 0000 Resulting lab: [facility]    Specimen Information    Type Source Collected On   Blood  10/16/17 0840          Components       Value Reference Range Flag Lab   Sodium 142 133 - 144 mmol/L  FrStHsLb   Potassium 3.8 3.4 - 5.3 mmol/L  FrStHsLb   Chloride 114 94 - 109 mmol/L H FrStHsLb   Carbon Dioxide 19 20 - 32 mmol/L L FrStHsLb   Anion Gap 9 3 - 14 mmol/L  FrStHsLb   Glucose 93 70 - 99 mg/dL  FrStHsLb   Urea Nitrogen 29 7 - 30 mg/dL  FrStHsLb   Creatinine 1.05 0.52 - 1.04 mg/dL H FrStHsLb   GFR Estimate 51 >60 mL/min/1.7m2 L FrStHsLb   Comment:  Non  GFR Calc   GFR Estimate If Black 61 >60 mL/min/1.7m2  FrStHsLb   Comment:  African American GFR Calc   Calcium 8.1 8.5 - 10.1 mg/dL L FrStHsLb            CBC with platelets [[id number]] (Abnormal)  Resulted: 10/16/17 " 0921, Result status: Final result    Ordering provider: Malcolm Singh, DO  10/16/17 0000 Resulting lab: [facility]    Specimen Information    Type Source Collected On   Blood  10/16/17 0840          Components       Value Reference Range Flag Lab   WBC 6.9 4.0 - 11.0 10e9/L  FrStHsLb   RBC Count 3.43 3.8 - 5.2 10e12/L L FrStHsLb   Hemoglobin 11.1 11.7 - 15.7 g/dL L FrStHsLb   Hematocrit 33.3 35.0 - 47.0 % L FrStHsLb   MCV 97 78 - 100 fl  FrStHsLb   MCH 32.4 26.5 - 33.0 pg  FrStHsLb   MCHC 33.3 31.5 - 36.5 g/dL  FrStHsLb   RDW 14.3 10.0 - 15.0 %  FrStHsLb   Platelet Count 179 150 - 450 10e9/L  FrStHsLb            Blood culture [[id number]]  Resulted: 10/16/17 0523, Result status: Preliminary result    Ordering provider: Fernando Medina, DO  10/11/17 1824 Resulting lab: [facility]    Specimen Information    Type Source Collected On   Blood Arm, Forearm Only, Left 10/11/17 1515   Comment:  Left Arm          Components       Value Reference Range Flag Lab   Specimen Description Blood Left Arm      Special Requests Aerobic and anaerobic bottles received   FrStHsLb   Culture Micro No growth after 5 days   226            Blood culture [[id number]]  Resulted: 10/16/17 0523, Result status: Preliminary result    Ordering provider: Fernando Medina DO  10/11/17 1824 Resulting lab: [facility]    Specimen Information    Type Source Collected On   Blood Arm, Forearm Only, Left 10/11/17 1920   Comment:  Left Arm          Components       Value Reference Range Flag Lab   Specimen Description Blood Left Arm      Special Requests Aerobic and anaerobic bottles received   FrStHsLb   Culture Micro No growth after 5 days   226            Comprehensive metabolic panel [[id number]] (Abnormal)  Resulted: 10/15/17 0937, Result status: Final result    Ordering provider: Malcolm Singh, DO  10/15/17 0000 Resulting lab: [facility]    Specimen  Information    Type Source Collected On   Blood  10/15/17 0845          Components       Value Reference Range Flag Lab   Sodium 142 133 - 144 mmol/L  FrStHsLb   Potassium 4.0 3.4 - 5.3 mmol/L  FrStHsLb   Chloride 114 94 - 109 mmol/L H FrStHsLb   Carbon Dioxide 20 20 - 32 mmol/L  FrStHsLb   Anion Gap 8 3 - 14 mmol/L  FrStHsLb   Glucose 94 70 - 99 mg/dL  FrStHsLb   Urea Nitrogen 29 7 - 30 mg/dL  FrStHsLb   Creatinine 1.29 0.52 - 1.04 mg/dL H FrStHsLb   GFR Estimate 40 >60 mL/min/1.7m2 L FrStHsLb   Comment:  Non  GFR Calc   GFR Estimate If Black 48 >60 mL/min/1.7m2 L FrStHsLb   Comment:  African American GFR Calc   Calcium 7.4 8.5 - 10.1 mg/dL L FrStHsLb   Bilirubin Total 0.8 0.2 - 1.3 mg/dL  FrStHsLb   Albumin 2.3 3.4 - 5.0 g/dL L FrStHsLb   Protein Total 5.8 6.8 - 8.8 g/dL L FrStHsLb   Alkaline Phosphatase 224 40 - 150 U/L H FrStHsLb   ALT 39 0 - 50 U/L  FrStHsLb   AST 42 0 - 45 U/L  FrStHsLb            CBC with platelets [[id number]] (Abnormal)  Resulted: 10/15/17 0918, Result status: Final result    Ordering provider: Malcolm Singh DO  10/15/17 0000 Resulting lab: [facility]    Specimen Information    Type Source Collected On   Blood  10/15/17 0845          Components       Value Reference Range Flag Lab   WBC 6.7 4.0 - 11.0 10e9/L  FrStHsLb   RBC Count 3.13 3.8 - 5.2 10e12/L L FrStHsLb   Hemoglobin 10.3 11.7 - 15.7 g/dL L FrStHsLb   Hematocrit 30.7 35.0 - 47.0 % L FrStHsLb   MCV 98 78 - 100 fl  FrStHsLb   MCH 32.9 26.5 - 33.0 pg  FrStHsLb   MCHC 33.6 31.5 - 36.5 g/dL  FrStHsLb   RDW 14.3 10.0 - 15.0 %  FrStHsLb   Platelet Count 145 150 - 450 10e9/L L FrStHsLb            Clostridium difficile toxin B PCR [[id number]]  Resulted: 10/14/17 2207, Result status: Final result    Ordering provider: Malcolm Singh DO  10/14/17 1846 Resulting lab: [facility]    Specimen Information    Type Source Collected On   Feces  10/14/17 9341           Components       Value Reference Range Flag Lab   Specimen Description Feces   FrStHsLb   C Diff Toxin B PCR Negative NEG^Negative  75   Comment:         Negative: Clostridium difficile target DNA sequences NOT detected, presumed   negative for Clostridium difficile toxin B or the number of bacteria present   may be below the limit of detection for the test.  FDA approved assay performed using [employer] GeneXpert real-time PCR.  A negative result does not exclude actual disease due to Clostridium difficile   and may be due to improper collection, handling and storage of the specimen   or the number of organisms in the specimen is below the detection limit of the   assay.              Basic metabolic panel [[id number]] (Abnormal)  Resulted: 10/14/17 0855, Result status: Final result    Ordering provider: Miguel Cameron MD  10/14/17 0000 Resulting lab: [facility]    Specimen Information    Type Source Collected On   Blood  10/14/17 0820          Components       Value Reference Range Flag Lab   Sodium 145 133 - 144 mmol/L H FrStHsLb   Potassium 4.0 3.4 - 5.3 mmol/L  FrStHsLb   Chloride 115 94 - 109 mmol/L H FrStHsLb   Carbon Dioxide 20 20 - 32 mmol/L  FrStHsLb   Anion Gap 10 3 - 14 mmol/L  FrStHsLb   Glucose 91 70 - 99 mg/dL  FrStHsLb   Urea Nitrogen 30 7 - 30 mg/dL  FrStHsLb   Creatinine 1.19 0.52 - 1.04 mg/dL H FrStHsLb   GFR Estimate 44 >60 mL/min/1.7m2 L FrStHsLb   Comment:  Non  GFR Calc   GFR Estimate If Black 53 >60 mL/min/1.7m2 L FrStHsLb   Comment:  African American GFR Calc   Calcium 7.9 8.5 - 10.1 mg/dL L FrStHsLb            Hepatic panel [[id number]] (Abnormal)  Resulted: 10/14/17 0855, Result status: Final result    Ordering provider: Miguel Cameron MD  10/14/17 0000 Resulting lab: [facility]    Specimen Information    Type Source Collected On   Blood  10/14/17 0820          Components       Value Reference Range Flag Lab   Bilirubin Direct 0.6 0.0  - 0.2 mg/dL H FrStHsLb   Bilirubin Total 1.1 0.2 - 1.3 mg/dL  FrStHsLb   Albumin 2.7 3.4 - 5.0 g/dL L FrStHsLb   Protein Total 6.5 6.8 - 8.8 g/dL L FrStHsLb   Alkaline Phosphatase 259 40 - 150 U/L H FrStHsLb   ALT 52 0 - 50 U/L H FrStHsLb   AST 51 0 - 45 U/L H FrStHsLb            Lipase [[id number]] (Abnormal)  Resulted: 10/14/17 0855, Result status: Final result    Ordering provider: Miguel Cameron MD  10/14/17 0000 Resulting lab: [facility]    Specimen Information    Type Source Collected On   Blood  10/14/17 0820          Components       Value Reference Range Flag Lab   Lipase 523 73 - 393 U/L H FrStHsLb            CBC with platelets [[id number]] (Abnormal)  Resulted: 10/14/17 0838, Result status: Final result    Ordering provider: Miguel Cameron MD  10/14/17 0000 Resulting lab: [facility]    Specimen Information    Type Source Collected On   Blood  10/14/17 0820          Components       Value Reference Range Flag Lab   WBC 6.2 4.0 - 11.0 10e9/L  FrStHsLb   RBC Count 3.56 3.8 - 5.2 10e12/L L FrStHsLb   Hemoglobin 11.7 11.7 - 15.7 g/dL  FrStHsLb   Hematocrit 35.3 35.0 - 47.0 %  FrStHsLb   MCV 99 78 - 100 fl  FrStHsLb   MCH 32.9 26.5 - 33.0 pg  FrStHsLb   MCHC 33.1 31.5 - 36.5 g/dL  FrStHsLb   RDW 14.1 10.0 - 15.0 %  FrStHsLb   Platelet Count 179 150 - 450 10e9/L  FrStHsLb            Urine Culture [[id number]] (Abnormal)  Resulted: 10/13/17 2214, Result status: Final result    Ordering provider: Fernando Medina DO  10/11/17 1726 Resulting lab: [facility]    Specimen Information    Type Source Collected On   Catheterized Urine  10/11/17 1644          Components       Value Reference Range Flag Lab   Specimen Description Catheterized Urine      Culture Micro --  A 225   Result:         >100,000 colonies/mL  Klebsiella pneumoniae     Culture Micro --  A 225   Result:         50,000 to 100,000 colonies/mL  Strain 2  Klebsiella  pneumoniae              Basic metabolic panel [[id number]] (Abnormal)  Resulted: 10/13/17 0852, Result status: Final result    Ordering provider: Miguel Cameron MD  10/13/17 0001 Resulting lab: [facility]    Specimen Information    Type Source Collected On   Blood  10/13/17 0810          Components       Value Reference Range Flag Lab   Sodium 142 133 - 144 mmol/L  FrStHsLb   Potassium 4.1 3.4 - 5.3 mmol/L  FrStHsLb   Chloride 111 94 - 109 mmol/L H FrStHsLb   Carbon Dioxide 23 20 - 32 mmol/L  FrStHsLb   Anion Gap 8 3 - 14 mmol/L  FrStHsLb   Glucose 85 70 - 99 mg/dL  FrStHsLb   Urea Nitrogen 38 7 - 30 mg/dL H FrStHsLb   Creatinine 1.47 0.52 - 1.04 mg/dL H FrStHsLb   GFR Estimate 34 >60 mL/min/1.7m2 L FrStHsLb   Comment:  Non  GFR Calc   GFR Estimate If Black 41 >60 mL/min/1.7m2 L FrStHsLb   Comment:  African American GFR Calc   Calcium 8.1 8.5 - 10.1 mg/dL L FrStHsLb            Hepatic panel [[id number]] (Abnormal)  Resulted: 10/13/17 0852, Result status: Final result    Ordering provider: Miguel Cameron MD  10/13/17 0001 Resulting lab: [facility]    Specimen Information    Type Source Collected On   Blood  10/13/17 0810          Components       Value Reference Range Flag Lab   Bilirubin Direct 0.8 0.0 - 0.2 mg/dL H FrStHsLb   Bilirubin Total 1.3 0.2 - 1.3 mg/dL  FrStHsLb   Albumin 2.6 3.4 - 5.0 g/dL L FrStHsLb   Protein Total 6.3 6.8 - 8.8 g/dL L FrStHsLb   Alkaline Phosphatase 247 40 - 150 U/L H FrStHsLb   ALT 62 0 - 50 U/L H FrStHsLb   AST 77 0 - 45 U/L H FrStHsLb            CBC with platelets [[id number]] (Abnormal)  Resulted: 10/13/17 0823, Result status: Final result    Ordering provider: Miguel Cameron MD  10/13/17 0001 Resulting lab: [facility]    Specimen Information    Type Source Collected On   Blood  10/13/17 0810          Components       Value Reference Range Flag Lab   WBC 4.1 4.0 - 11.0 10e9/L  FrStHsLb   RBC Count 3.48  3.8 - 5.2 10e12/L L FrStHsLb   Hemoglobin 11.4 11.7 - 15.7 g/dL L FrStHsLb   Hematocrit 34.6 35.0 - 47.0 % L FrStHsLb   MCV 99 78 - 100 fl  FrStHsLb   MCH 32.8 26.5 - 33.0 pg  FrStHsLb   MCHC 32.9 31.5 - 36.5 g/dL  FrStHsLb   RDW 14.2 10.0 - 15.0 %  FrStHsLb   Platelet Count 168 150 - 450 10e9/L  FrStHsLb            Testing Performed By     Lab - Abbreviation Name Director Address Valid Date Range    14 - FrStHsLb [facility] Unknown [address] [place] [address] 05/08/15 1057 - Present    75 - Unknown [facility] Unknown [address] [facility] [address] 01/15/15 1019 - Present    225 - Unknown [facility] Unknown [address] [facility] [address] 12/19/14 0954 - Present    226 - Unknown [facility] Unknown [address] [facility] [address] 12/19/14 0955 - Present            Unresulted Labs     None         Imaging Results - 3 Days      XR ERCP [[id number]]  Resulted: 10/14/17 1453, Result status: Final result    Ordering provider: Magnolia Carvalho PA-C  10/12/17 1415 Resulted by: Mckenna Zuniga MD    Performed: 10/13/17 1335 - 10/13/17 1400 Resulting lab: RADIOLOGY RESULTS    Narrative:       ERCP   10/13/2017 2:00 PM     HISTORY: Common bile duct stone.    COMPARISON: None.    FINDINGS: Total fluoroscopy time was 4.1 minutes.  Five spot  fluoroscopic images, and/or cine clips were obtained. One view  obtained.       Impression:       IMPRESSION: Prominently dilated intra and extrahepatic biliary tree.  Demonstration of cannulation of the common duct with balloon sweeping.  Bubbles of gas within the biliary tree.    MCKENNA ZUNIGA MD      Testing Performed By     Lab - Abbreviation Name Director Address Valid Date Range    104 - Rad Rslts RADIOLOGY RESULTS Unknown Unknown 02/16/05 1553 - Present            Encounter-Level Documents:     There are no encounter-level documents.      Order-Level  Documents:     There are no order-level documents.

## 2017-10-11 NOTE — ED PROVIDER NOTES
History     Chief Complaint:  Epigastric/right upper quadrant pain    HPI   Rupinder Tracy is a 77 year old female with a history of pulmonary hypertension, breast cancer with mastectomy, multiple issues related to her eyes including detatched retinas, hypertension, and hyperlipidemia, among others. The patient presents to the emergency department today for evaluation of intermittent epigastric/right upper quadrant pain that started yesterday. Pain radiates to chest.. On Monday (2 days ago), the patient went to her [employer] class and then afterwards ate some chicken and sweet potatoes. The next day, she continued to feel the pain and also felt nauseated with some spitting up, but no vomiting. She denies diarrhea yesterday and did not eat anything. She did notes sleeping for roughly 20 hours, which is not normal for the patient. She reports a subjective fever, but no temperature reading was taken. The patient notes have an intermittent cough of late as well. She has been sweating more than her baseline. The patient is currently not experiencing her previously described pain except with palpation.      Allergies:  Atorvastatin Calcium - myalgias  Celecoxib - swelling   Cholestyramine - diarrhea  Crestor [Rosuvastatin Calcium] - achy legs  Cyclobenzaprine Hcl - altered mental status  Dulera - tremors  Gabapentin - nightmares  Lisinopril - dizziness  Meperidine Hcl - nausea  Morphine - nausea  Naprosyn [Naproxen] - GI disturbance  Niaspan [Niacin]   Percocet [Oxycodone-Acetaminophen] - nausea and lightheadedness   Pravastatin - swelling   Simvastatin - myalgias  Timolol Maleate - respiratory problems  Sulfa Drugs  - itchy and rash     Medications:    traMADol (ULTRAM) 50 MG tablet  olmesartan (BENICAR) 40 MG tablet  omeprazole (PRILOSEC) 20 MG CR capsule  bumetanide (BUMEX) 2 MG tablet  rOPINIRole (REQUIP) 0.25 MG tablet  fluticasone-salmeterol (ADVAIR DISKUS) 250-50 MCG/DOSE diskus inhaler  Lactobacillus Rhamnosus,  "GG, (CULTURELLE PO)  denosumab (PROLIA) 60 MG/ML SOLN  albuterol (ALBUTEROL) 108 (90 BASE) MCG/ACT inhaler    Past Medical History:    CKD  Diastolic dysfunction   Glaucoma  Serous retinal detachment   Osteoporosis   Mitral valve prolapse  Ischemic colitis   Asthma  Hyperlipidemia  Hypertension   Arthritis   Subarachnoid hemorrhage  Pulmonary hypertension  Breast cancer status post bilateral mastectomy       Past Surgical History:    Adenoidectomy - 1945  Appendectomy - 1982  Cholecystectomy - 1982  Bilateral retinal detachment  Blepharoplasty bilateral   Left corneal transplant - 1997, 2007, 2014  Angioplasty and stenting mesenteric vessels - 2005  Bilateral mastectomy - 10/5/2009  Breast reconstruction - 6/2010  Right and left heart cath - 1/2015     Family History:  History reviewed. No pertinent family history.      Social History:  Marital Status:   Presents to the ED with a friend  Tobacco Use: Never smoker  Alcohol Use: Yes, socially       Review of Systems   Constitutional: Positive for diaphoresis and fever (subjective).   Respiratory: Positive for cough.    Cardiovascular: Positive for chest pain.   Gastrointestinal: Positive for abdominal pain and nausea. Negative for diarrhea and vomiting.     Physical Exam     /51 (BP Location: Right arm)  Pulse 81  Temp 99.6  F (37.6  C) (Oral)  Resp 24  Ht 1.626 m (5' 4\")  Wt 71.7 kg (158 lb 1.1 oz)  LMP 01/01/1990  SpO2 93%  BMI 27.13 kg/m2    Physical Exam  General: Alert and cooperative with exam. Patient in mild distress. Normal mentation.  Head:  Scalp is NC/AT  Eyes:  Right eye: prosthesis    Left eye: eyelid stenosis, minimally reactive; chronic  ENT:  The external nose and ears are normal. The oropharynx is normal and without erythema; mucus membranes are somewhat dry. Uvula midline, no evidence of deep space infection.  Neck:  Normal range of motion without rigidity.  CV:  Regular rate and rhythm    No pathologic murmur   Resp:  Breath " sounds are clear bilaterally    Non-labored, no retractions or accessory muscle use  GI:  Abdomen is soft, no distension, tenderness to palpation in the right upper quadrant and epigastric area; positive Ramon's sign. No peritoneal signs  MS:  No lower extremity edema   Skin:  Warm and dry, No rash or lesions noted.  Neuro: Oriented x 3. No gross motor deficits.    Emergency Department Course     ECG:  ECG taken at 1405, ECG read at 1440  Sinus rhythm with occasional premature ventricular complexes  Low voltage QRS  Cannot rule out Anteroseptal infarct, age undetermined  Abnormal ECG   No significant changes from previous on 3/13/17  Rate 83 bpm. OK interval 176. QRS duration 88. QT/QTc 388/455. P-R-T axes 53,-16,82.     Imaging:  Radiology findings were communicated with the patient who voiced understanding of the findings.  Abdomen US, limited (RUQ only)  Prior cholecystectomy changes with dilated intrahepatic  and common bile duct similar to prior CT from 2016. Ringdown artifact  in the intrahepatic ducts near the confluence of the right and left  hepatic duct could indicate air in the biliary tree. Cholangitis or  prior sphincterotomy procedure could account for this finding.  Correlate with patient's liver enzymes.  FERMIN TOWNSEND MD    XR Chest 2 Views  Mildly enlarged cardiac silhouette. Mild left basilar  atelectasis/consolidation. Lungs are otherwise clear. No pleural  effusion or pneumothorax.  HECTOR MENARD MD    Laboratory:  Laboratory findings were communicated with the patient who voiced understanding of the findings.  Lipase: 144  Troponin (Collected 1515): <0.015  Lactic Acid: 1.2    CBC: WBC 5.9, HGB 12.2,   CMP: Creatinine 1.25 (H), BUN 41 (H), AST 89 (H), ALKPHOS 215 (H), GFR 41 (L), Albumin 3.2 (L), Bilirubin 2.1 (H), Glucose 101 (H), o/w WNL  UA with micro: LESLIE trace (A) o/w negative   UA, Microscopic: WBC 5-10 (A), RBC 0-2, Bacteria moderate (A), Mucous present (A)     Interventions:  1913  Zofran 4 mg IV  1926 Dilaudid 0.3 mg IV   Zosyn    Emergency Department Course:  Nursing notes and vitals reviewed.  1432 I entered the room.  1436 I performed an exam of the patient as documented above.   IV was inserted and blood was drawn for laboratory testing, results above.  The patient provided a urine sample here in the emergency department. This was sent for laboratory testing, findings above.   The patient was sent for a ultrasound and x-ray while in the emergency department, results above.    1615 the patient was rechecked.  1820 the patient was rechecked and she was updated on the results of her laboratory and imaging studies.    1833 I spoke with Dr. Newberry of the hospitalist service regarding patient's presentation, findings, and plan of care.   I discussed the treatment plan with the patient. They expressed understanding of this plan and consented to admission. I discussed the patient with Dr. Newberry, who will admit the patient to a monitored bed for further evaluation and treatment.     Impression & Plan      Medical Decision Making:  Rupinder Tracy is a 77 year old female who presents to the emergency department today for evaluation of epigastric/right upper quadrant pain that radiates to her chest. The patient's medical history and records were reviewed. Initial consideration for, but not limited to, atypical ACS/MI, esophageal spasm/GERD, pancreatitis, gastritis, biliary pathology, infectious process, among others. Labs, imaging, and EKG. EKG demonstrated no evidence of acute ischemia or infarction; troponin negative and the patient is chest pain-free; atypical ACS unlikely. Chest x-ray notable for atelectasis versus consolidation; more likely atelectasis the patient is without significant cough or respiratory symptoms; pain is right-sided on exam. Labs notable for elevated bilirubin (2.1) and elevated ALK PHOS (215) and mild elevation of AST (89). The patient also has chronic renal  insufficiency (Creatinine 1.25). Urinalysis with evidence of moderate bacteria and mild pyuria, though she is not reporting any significant urinary symptoms. Right upper quadrant ultrasound demonstrates prior cholecystectomy changes with dilated intrahepatic and common bile duct similar to prior CT, however, concern for possible air in the biliary tree concerning for cholangitis; the patient is without recent GI procedures. Given reported subjective fevers over the last 2 days and significant right upper quadrant pain to palpation, concerned for possible cholangitis versus biliary obstruction. The patient was provided dilaudid for pain control and was then admitted to the hospitalist service for further evaluation and care. Additionally, blood cultures were obtained and the patient was started on empiric zosyn.     Diagnosis:    ICD-10-CM    1. RUQ abdominal pain R10.11    2. Epigastric pain R10.13        Disposition:   The patient was admitted to the hospital for further evaluation and care.     Scribe Disclosure:  Geoff MORROW, am serving as a scribe at 2:32 PM on 10/11/2017 to document services personally performed by Fernando Medina DO, based on my observations and the provider's statements to me.    EMERGENCY DEPARTMENT       Fernando Medina DO  10/11/17 6236

## 2017-10-11 NOTE — IP AVS SNAPSHOT
` `     [place] [address] [facility]: [phone]            Medication Administration Report for Rupinder Tracy as of 10/16/17 8490   Legend:    Given Hold Not Given Due Canceled Entry Other Actions    Time Time (Time) Time  Time-Action       Inactive    Active    Linked        Medications 10/10/17 10/11/17 10/12/17 10/13/17 10/14/17 10/15/17 10/16/17    acetaminophen (TYLENOL) tablet 650 mg  Dose: 650 mg Freq: EVERY 4 HOURS PRN Route: PO  PRN Reason: mild pain  Start: 10/11/17 2015   Admin Instructions: Alternate ibuprofen (if ordered) with acetaminophen.  Maximum acetaminophen dose from all sources = 75 mg/kg/day not to exceed 4 grams/day.      2107 (650 mg)-Given         1220-Auto Hold       1505-Unhold              albuterol (PROAIR HFA/PROVENTIL HFA/VENTOLIN HFA) Inhaler 2 puff  Dose: 2 puff Freq: EVERY 4 HOURS PRN Route: IN  PRN Reasons: wheezing,shortness of breath / dyspnea  Start: 10/11/17 2015       1220-Auto Hold       1505-Unhold          0540 (2 puff)-Given           bumetanide (BUMEX) tablet 2 mg  Dose: 2 mg Freq: DAILY Route: PO  Start: 10/15/17 1145         1325 (2 mg)-Given        0936 (2 mg)-Given           cycloSPORINE (RESTASIS) 0.05 % ophthalmic emulsion 1 drop  Dose: 1 drop Freq: 2 TIMES DAILY Route: LEFT EYE  Start: 10/11/17 2100     2208 (1 drop)-Given        0935 (1 drop)-Given       2016 (1 drop)-Given        0956 (1 drop)-Given       1220-Auto Hold       1505-Unhold       2118 (1 drop)-Given        (0858)-Not Given [C]       1322 (1 drop)-Given       2148 (1 drop)-Given        1038 (1 drop)-Given       (2255)-Not Given [C]        0940 (1 drop)-Given       [ ] 2100           fluticasone-salmeterol (ADVAIR) 250-50 MCG/DOSE diskus inhaler 1 puff  Dose: 1 puff Freq: 2 TIMES DAILY Route: IN  Start: 10/12/17 0930   Admin Instructions: *Do not use more frequently than twice daily.*  Rinse mouth after use.<br>USE PATIENT'S OWN SUPPLY<br>       (0333)-Not Given       2015 (1  puff)-Given        0824 (1 puff)-Given       1220-Auto Hold       1505-Unhold       2116 (1 puff)-Given        0846 (1 puff)-Given       2148 (1 puff)-Given        0929 (1 puff)-Given       2255 (1 puff)-Given        0940 (1 puff)-Given       [ ] 2100           HYDROcodone-acetaminophen (NORCO) 5-325 MG per tablet 1-2 tablet  Dose: 1-2 tablet Freq: EVERY 4 HOURS PRN Route: PO  PRN Reason: moderate to severe pain  Start: 10/11/17 2015   Admin Instructions: Maximum acetaminophen dose from all sources= 75 mg/kg/day not to exceed 4 grams       0934 (2 tablet)-Given       2005 (2 tablet)-Given        1220-Auto Hold       1505-Unhold              HYDROmorphone (PF) (DILAUDID) injection 0.3-0.5 mg  Dose: 0.3-0.5 mg Freq: EVERY 2 HOURS PRN Route: IV  PRN Reason: severe pain  Start: 10/11/17 2001     2140 (0.5 mg)-Given        0304 (0.5 mg)-Given        1106 (0.5 mg)-Given       1220-Auto Hold       1505-Unhold        0954 (0.5 mg)-Given             lidocaine (LIDODERM) 5 % Patch 1 patch  Dose: 1 patch Freq: EVERY 24 HOURS 2000 Route: TD  Start: 10/11/17 2100   Admin Instructions: Apply patch(s) to lumbar sacral area. To prevent lidocaine toxicity, patient should be patch free for 12 hrs daily. Patches may be cut to smaller size prior to removing release liner.  NEVER APPLY HEAT OVER PATCH which will increase absorption and may lead to risk of local anesthetic toxicity. Do not apply over area where liposomal bupivacaine was injected for 96 hours post injection.      2102 (1 patch)-Given [C]        2006 (1 patch)-Given        1220-Auto Hold       1505-Unhold       2115 (1 patch)-Given        2017 (1 patch)-Given        2256 (1 patch)-Given [C]        [ ] 2000          And  lidocaine (LIDODERM) patch REMOVAL  Freq: EVERY 24 HOURS 0800 Route: TD  Start: 10/12/17 0800   Admin Instructions: Remove lidocaine Patch.       0738 ( )-Patch Removed [C]        0822 ( )-Patch Removed       1220-Auto Hold       1505-Unhold        0858 (  )-Given        0847 ( )-Given        0935 ( )-Patch Removed          And  lidocaine (LIDODERM) patch in PLACE  Freq: EVERY 8 HOURS Route: TD  Start: 10/11/17 2015   Admin Instructions: Chart every shift, confirming that patch is still in place on patient (no barcode scan needed). See patch order for dose information.  NEVER APPLY HEAT OVER PATCH which will increase absorption and may lead to risk of local anesthetic toxicity. Do not apply over area where liposomal bupivacaine injected for 96 hours.      2110 ( )-Patch in Place        0419 ( )-Positive       (1118)-Not Given [C]       2015 ( )-Patch in Place        0415 ( )-Positive       (1200)-Not Given [C]       1220-Auto Hold       1505-Unhold       2118 ( )-Patch in Place        0415 ( )-Positive              2021 ( )-Patch in Place        0415 ( )-Positive       1224 ( )-Negative       2258 ( )-Patch in Place        0315 ( )-Patch in Place       1256 ( )-Patch Removed [C]       [ ] 2015           LORazepam (ATIVAN) half-tab 0.25-0.5 mg  Dose: 0.25-0.5 mg Freq: AT BEDTIME PRN Route: PO  PRN Reason: other  PRN Comment: restlessness, insomnia  Start: 10/11/17 2013       1220-Auto Hold       1505-Unhold              multivitamin  with lutein (OCUVITE WITH LTEIN) per capsule 1 capsule  Dose: 1 capsule Freq: DAILY Route: PO  Start: 10/12/17 0900   Admin Instructions: October 11, 2017 dose and product adjusted per formulary       0831 (1 capsule)-Given        0819 (1 capsule)-Given       1220-Auto Hold       1505-Unhold        0846 (1 capsule)-Given        0929 (1 capsule)-Given        0936 (1 capsule)-Given           naloxone (NARCAN) injection 0.1-0.4 mg  Dose: 0.1-0.4 mg Freq: EVERY 2 MIN PRN Route: IV  PRN Reason: opioid reversal  Start: 10/11/17 2015   Admin Instructions: For respiratory rate LESS than or EQUAL to 8.  Partial reversal dose:  0.1 mg titrated q 2 minutes for Analgesia Side Effects Monitoring Sedation Level of 3 (frequently drowsy, arousable, drifts  to sleep during conversation).Full reversal dose:  0.4 mg bolus for Analgesia Side Effects Monitoring Sedation Level of 4 (somnolent, minimal or no response to stimulation).        1220-Auto Hold       1505-Unhold              olmesartan (BENICAR) tablet 40 mg  Dose: 40 mg Freq: DAILY Route: PO  Start: 10/15/17 1200   Admin Instructions: Non-formulary          1321 (40 mg)-Given        0936 (40 mg)-Given           omeprazole (priLOSEC) CR capsule 20 mg  Dose: 20 mg Freq: 2 TIMES DAILY BEFORE MEALS Route: PO  Start: 10/15/17 1745         1824 (20 mg)-Given        0628 (20 mg)-Given              [ ] 1630           ondansetron (ZOFRAN-ODT) ODT tab 4 mg  Dose: 4 mg Freq: EVERY 6 HOURS PRN Route: PO  PRN Reasons: nausea,vomiting  Start: 10/11/17 2015   Admin Instructions: This is Step 1 of nausea and vomiting management.  If nausea not resolved in 15 minutes, go to Step 2 prochlorperazine (COMPAZINE). Do not push through foil backing. Peel back foil and gently remove. Place on tongue immediately. Administration with liquid unnecessary        1220-Auto Hold       1505-Unhold             Or  ondansetron (ZOFRAN) injection 4 mg  Dose: 4 mg Freq: EVERY 6 HOURS PRN Route: IV  PRN Reasons: nausea,vomiting  Start: 10/11/17 2015   Admin Instructions: This is Step 1 of nausea and vomiting management.  If nausea not resolved in 15 minutes, go to Step 2 prochlorperazine (COMPAZINE).  Irritant.        1220-Auto Hold       1505-Unhold              polyethylene glycol (MIRALAX/GLYCOLAX) Packet 17 g  Dose: 17 g Freq: DAILY PRN Route: PO  PRN Reason: constipation  Start: 10/11/17 2015   Admin Instructions: Give in 8oz of  water, juice, or soda. Hold for loose stools.  This is the second step of a three step constipation treatment protocol.  1 Packet = 17 grams. Mixed prescribed dose in 8 ounces of water. Follow with 8 oz. of water.        1220-Auto Hold       1505-Unhold              polyethylene glycol 0.4%- propylene glycol 0.3%  (SYSTANE ULTRA) ophthalmic solution 1 drop  Dose: 1 drop Freq: 4 TIMES DAILY Route: LEFT EYE  Start: 10/11/17 2200     2208 (1 drop)-Given        1017 (1 drop)-Given       1332 (1 drop)-Given       1802 (1 drop)-Given               1026 (1 drop)-Given       1220-Auto Hold       1300-Automatically Held       1505-Unhold       1840 (1 drop)-Given       2119 (1 drop)-Given        1034 (1 drop)-Given       1402 (1 drop)-Given       2015 (1 drop)-Given        0104 (1 drop)-Given       1051 (1 drop)-Given       1350 (1 drop)-Given       1824 (1 drop)-Given        0058 (1 drop)-Given       0939 (1 drop)-Given       1303 (1 drop)-Given       [ ] 1900           prednisoLONE acetate (PRED FORTE) 1 % ophthalmic susp 1 drop  Dose: 1 drop Freq: 4 TIMES DAILY Route: LEFT EYE  Start: 10/11/17 2200     2208 (1 drop)-Given        0830 (1 drop)-Given       1206 (1 drop)-Given       1802 (1 drop)-Given       2017 (1 drop)-Given               0820 (1 drop)-Given       1220-Auto Hold       1505-Unhold       1510 (1 drop)-Given       1732 (1 drop)-Given       2113 (1 drop)-Given        0848 (1 drop)-Given       1242 (1 drop)-Given       1717 (1 drop)-Given       2259 (1 drop)-Given        0929 (1 drop)-Given       1323 (1 drop)-Given       1725 (1 drop)-Given       2253 (1 drop)-Given        0939 (1 drop)-Given       1303 (1 drop)-Given       [ ] 1800       [ ] 2200           prochlorperazine (COMPAZINE) injection 5 mg  Dose: 5 mg Freq: EVERY 6 HOURS PRN Route: IV  PRN Reasons: nausea,vomiting  Start: 10/11/17 2015   Admin Instructions: This is Step 2 of nausea and vomiting management.   If nausea not resolved in 15 minutes, give metoclopramide (REGLAN) if ordered (step 3 of nausea and vomiting management)        1220-Auto Hold       1505-Unhold             Or  prochlorperazine (COMPAZINE) tablet 5 mg  Dose: 5 mg Freq: EVERY 6 HOURS PRN Route: PO  PRN Reason: vomiting  Start: 10/11/17 2015   Admin Instructions: This is Step 2 of nausea  and vomiting management.   If nausea not resolved in 15 minutes, give metoclopramide (REGLAN) if ordered (step 3 of nausea and vomiting management)        1220-Auto Hold       1505-Unhold             Or  prochlorperazine (COMPAZINE) Suppository 12.5 mg  Dose: 12.5 mg Freq: EVERY 12 HOURS PRN Route: RE  PRN Reasons: nausea,vomiting  Start: 10/11/17 2015   Admin Instructions: This is Step 2 of nausea and vomiting management.   If nausea not resolved in 15 minutes, give metoclopramide (REGLAN) if ordered (step 3 of nausea and vomiting management)        1220-Auto Hold       1505-Unhold              rOPINIRole (REQUIP) tablet 0.25 mg  Dose: 0.25 mg Freq: DAILY Route: PO  Start: 10/12/17 1500      1650 (0.25 mg)-Given        1220-Auto Hold       1500-Automatically Held       1505-Unhold        1429 (0.25 mg)-Given        1729 (0.25 mg)-Given        [ ] 1500           rOPINIRole (REQUIP) tablet 0.5 mg  Dose: 0.5 mg Freq: AT BEDTIME Route: PO  Start: 10/11/17 2200     2142 (0.5 mg)-Given        2019 (0.5 mg)-Given               1220-Auto Hold       1505-Unhold       2115 (0.5 mg)-Given        2149 (0.5 mg)-Given        2254 (0.5 mg)-Given        [ ] 2200           sodium chloride (PF) 0.9% PF flush 3 mL  Dose: 3 mL Freq: EVERY 1 HOUR PRN Route: IK  PRN Reason: line flush  Start: 10/14/17 1715   Admin Instructions: for peripheral IV flush post IV meds          0736 (6 mL)-Given [C]            traMADol (ULTRAM) tablet 50 mg  Dose: 50 mg Freq: EVERY 6 HOURS PRN Route: PO  PRN Reason: moderate pain  Start: 10/11/17 2015       1220-Auto Hold       1505-Unhold             Completed Medications  Medications 10/10/17 10/11/17 10/12/17 10/13/17 10/14/17 10/15/17 10/16/17         Dose: 2 mg Freq: ONCE Route: PO  Start: 10/16/17 1045   End: 10/16/17 1254          1254 (2 mg)-Given          Discontinued Medications  Medications 10/10/17 10/11/17 10/12/17 10/13/17 10/14/17 10/15/17 10/16/17         Rate: 75 mL/hr Freq: CONTINUOUS  Route: IV  Start: 10/11/17 2030   End: 10/15/17 1025     2105 ( )-New Bag        0534 ( )-Rate/Dose Verify       0730 ( )-New Bag        0656 ( )-New Bag       1308 ( )-Anesthesia Volume Adjustment       1402 ( )-Anesthesia Volume Adjustment        0112 ( )-New Bag       1153 ( )-Rate/Dose Change       1323 ( )-New Bag        0430 ( )-New Bag       1025-Med Discontinued          Dose: 2 mg Freq: DAILY Route: PO  Start: 10/15/17 1115   End: 10/15/17 1143         1143-Med Discontinued                Dose: 40 mg Freq: DAILY Route: PO  Start: 10/15/17 1115   End: 10/15/17 1135                1135-Med Discontinued          Dose: 40 mg Freq: 2 TIMES DAILY BEFORE MEALS Route: IV  Start: 10/12/17 0730   End: 10/15/17 1731   Admin Instructions: Reconstitute vial with 10mLs Saline and administer IV Push  Irritant.       0730 (40 mg)-Given       1650 (40 mg)-Given        0733 (40 mg)-Given       1220-Auto Hold       1505-Unhold       1732 (40 mg)-Given        0705 (40 mg)-Given       1716 (40 mg)-Given        0735 (40 mg)-Given       (1721)-Not Given       1731-Med Discontinued          Dose: 2.25 g Freq: EVERY 6 HOURS Route: IV  Indications of Use: INTRA-ABDOMINAL INFECTION  Last Dose: 2.25 g (10/14/17 2016)  Start: 10/11/17 2000   End: 10/15/17 0852   Admin Instructions: October 11, 2017 dose adjusted for crcl = 36.1      (2144)-Not Given [C]        0235 (2.25 g)-New Bag       0831 (2.25 g)-New Bag       1424 (2.25 g)-New Bag       2012 (2.25 g)-New Bag        0251 (2.25 g)-New Bag       0819 (2.25 g)-New Bag       1220-Auto Hold       1430-Automatically Held       1505-Unhold       1602 (2.25 g)-New Bag       2111 (2.25 g)-New Bag        0229 (2.25 g)-New Bag       0847 (2.25 g)-New Bag       1429 (2.25 g)-New Bag       2016 (2.25 g)-New Bag        0300 (2.25 g)-New Bag       0743 (2.25 g)-New Bag       0852-Med Discontinued          Dose: 3 mL Freq: EVERY 8 HOURS Route: IK  Start: 10/14/17 1730   End: 10/16/17 0022   Admin  Instructions: And Q1H PRN, to lock peripheral IV dormant line.         1716 (3 mL)-Given        (0302)-Not Given       0936 (3 mL)-Given [C]       (1721)-Not Given        0022-Med Discontinued    Medications 10/10/17 10/11/17 10/12/17 10/13/17 10/14/17 10/15/17 10/16/17

## 2017-10-11 NOTE — IP AVS SNAPSHOT
"    [place] [address] MEDICAL SPECIALTY UNIT: [phone]                                              INTERAGENCY TRANSFER FORM - PHYSICIAN ORDERS   10/11/2017                    Hospital Admission Date: 10/11/2017  JUDY RUSS   : 1940  Sex: Female        Attending Provider: Jasper Newberry MD     Allergies:  Atorvastatin Calcium, Celecoxib, Cholestyramine, Crestor [Rosuvastatin Calcium], Cyclobenzaprine Hcl, Dulera, Gabapentin, Lisinopril, Meperidine Hcl, Morphine, Naprosyn [Naproxen], Niaspan [Niacin], Percocet [Oxycodone-acetaminophen], Pravastatin, Red Yeast Rice [Cholestin], Simvastatin, Timolol Maleate, Hctz, Sulfa Drugs    Infection:  None   Service:  HOSPITALIST    Ht:  1.626 m (5' 4\")   Wt:  79.5 kg (175 lb 3.2 oz)   Admission Wt:  74.4 kg (164 lb)    BMI:  30.07 kg/m 2   BSA:  1.89 m 2            Patient PCP Information     Provider PCP Type    Abran Mott MD General      ED Clinical Impression     Diagnosis Description Comment Added By Time Added    RUQ abdominal pain [R10.11] RUQ abdominal pain [R10.11]  Fernando Medina,  10/11/2017 11:21 PM    Epigastric pain [R10.13] Epigastric pain [R10.13]  Fernando Medina, DO 10/11/2017 11:21 PM      Hospital Problems as of 10/16/2017              Priority Class Noted POA    Mild persistent asthma Medium  2005 Yes    Presbyesophagus Medium  Unknown Yes    Nocturnal oxygen desaturation Medium  2011 Yes    Pulmonary HTN Medium  2015 Yes    Restless legs syndrome (RLS) Medium  3/31/2015 Yes    Chronic pain, back Medium  3/31/2015 Yes    CKD (chronic kidney disease) stage 3, GFR 30-59 ml/min Medium  2017 Yes    Essential hypertension with goal blood pressure less than 130/85 Medium  5/10/2017 Yes    Diastolic dysfunction Medium  5/10/2017 Yes    * (Principal)RUQ abdominal pain Medium  10/11/2017 Unknown    S/P cholecystectomy -- 1982 Medium  10/11/2017 Unknown    Abdominal pain Medium  10/11/2017 " Yes      Non-Hospital Problems as of 10/16/2017              Priority Class Noted    Glaucoma Medium  12/13/2002    Mitral valve disorder Medium  12/13/2002    Osteoporosis, severe Medium  12/13/2002    Female stress incontinence Medium  12/13/2002    Lactose Intolerance Medium  12/13/2002    Hyperlipidemia LDL goal <100 Medium  12/13/2002    Peripheral vascular disease (H) Medium  5/25/2005    Left Ventricular Hypertrophy Medium  Unknown    Patent foramen ovale Medium  Unknown    History of Ductal Carcinoma in Situ of Left Breast Medium  Unknown    Advance Care Planning Medium  5/23/2011    Diverticulosis of sigmoid colon Medium  7/5/2011    Ovarian cysts Medium  7/5/2011    Vitamin D deficiency Medium  6/23/2014    Gastric ulcer Medium  11/12/2014    Restrictive lung disease Medium  11/14/2014    Aortic root enlargement (H) Medium  12/1/2014    Dyspnea Medium  1/5/2015    Esophageal spasm Medium  1/12/2015    Pneumonia Medium  2/12/2016    Difficult airway for intubation High  4/19/2016      Code Status History     Date Active Date Inactive Code Status Order ID Comments User Context    10/16/2017 11:36 AM  Full Code [id number]  Anshul Tan MD Outpatient    10/11/2017  8:15 PM 10/16/2017 11:36 AM Full Code [id number]  Jasper Newberry MD Inpatient    2/12/2016  4:11 PM 2/13/2016  6:04 PM Full Code [id number]  Pineda Ojeda MD ED    5/14/2012 11:18 AM 2/12/2016  4:11 PM Full Code [id number]  Abran Mott MD Outpatient         Medication Review      CONTINUE these medications which may have CHANGED, or have new prescriptions. If we are uncertain of the size of tablets/capsules you have at home, strength may be listed as something that might have changed.        Dose / Directions Comments    * bumetanide 2 MG tablet   Commonly known as:  BUMEX   This may have changed:  Another medication with the same name was added. Make sure you understand how and when to take each.   Used for:  Diastolic  dysfunction        Dose:  2 mg   Take 1 tablet (2 mg) by mouth daily   Quantity:  30 tablet   Refills:  11        * bumetanide 1 MG tablet   Commonly known as:  BUMEX   This may have changed:  You were already taking a medication with the same name, and this prescription was added. Make sure you understand how and when to take each.   Used for:  Hypervolemia, unspecified hypervolemia type        Dose:  2 mg   Take 2 tablets (2 mg) by mouth every 24 hours for 3 days   Refills:  0    With Evening meds for 3-4 days pending daily weights and kidney function to be followed       traMADol 50 MG tablet   Commonly known as:  ULTRAM   This may have changed:  See the new instructions.   Used for:  Chronic pain syndrome        Dose:  50 mg   Take 1 tablet (50 mg) by mouth every 6 hours as needed for moderate pain   Quantity:  50 tablet   Refills:  0        * Notice:  This list has 2 medication(s) that are the same as other medications prescribed for you. Read the directions carefully, and ask your doctor or other care provider to review them with you.      CONTINUE these medications which have NOT CHANGED        Dose / Directions Comments    albuterol 108 (90 BASE) MCG/ACT Inhaler   Commonly known as:  PROAIR HFA   Used for:  Mild intermittent asthma        Dose:  2 puff   Inhale 2 puffs into the lungs every 4 hours as needed   Quantity:  1 Inhaler   Refills:  5        CALCIUM + D 500-1000-40 MG-UNT-MCG Chew   Generic drug:  Calcium-Vitamin D-Vitamin K        Dose:  2 tablet   Take 2 tablets by mouth daily   Refills:  0        CULTURELLE PO        Dose:  1 tablet   Take 1 tablet by mouth Three times a week   Refills:  0        DOXYCYCLINE HYCLATE PO        Dose:  50 mg   Take 50 mg by mouth 3 times per week Monday,wednesday, friday   Refills:  0        EYE-VITES Tabs        [place]-Eye with Lutein 2 tabs twice a day (Rx from [facility])   Quantity:      Refills:  0        fluticasone-salmeterol 250-50 MCG/DOSE diskus  inhaler   Commonly known as:  ADVAIR DISKUS   Used for:  Mild persistent asthma without complication        Dose:  1 puff   Inhale 1 puff into the lungs 2 times daily   Quantity:  60 Inhaler   Refills:  1        olmesartan 40 MG tablet   Commonly known as:  BENICAR   Used for:  Essential hypertension with goal blood pressure less than 130/80        Dose:  40 mg   Take 1 tablet (40 mg) by mouth daily   Quantity:  90 tablet   Refills:  2        omeprazole 20 MG CR capsule   Commonly known as:  priLOSEC   Used for:  Gastroesophageal reflux disease without esophagitis        TAKE 1 CAPSULE (20 MG) BY MOUTH 2 TIMES DAILY   Quantity:  180 capsule   Refills:  1        order for DME        2L of O2 at night   Refills:  0        prednisoLONE acetate 1 % ophthalmic susp   Commonly known as:  PRED FORTE        Dose:  1 drop   Place 1 drop Into the left eye 4 times daily   Refills:  0        PROLIA 60 MG/ML Soln injection   Generic drug:  denosumab        Dose:  60 mg   Inject 60 mg Subcutaneous every 6 months   Refills:  0        * REQUIP PO        Dose:  0.25 mg   Take 0.25 mg by mouth daily 3PM   Refills:  0        * REQUIP 0.25 MG tablet   Used for:  Restless legs syndrome (RLS)   Generic drug:  rOPINIRole        Dose:  0.5 mg   Take 0.5 mg by mouth At Bedtime   Refills:  0        RESTASIS 0.05 % ophthalmic emulsion   Generic drug:  cycloSPORINE        1 drop to left eye twice daily   Refills:  0        SYSTANE 0.4-0.3 % Soln ophthalmic solution   Generic drug:  polyethylene glycol 0.4%- propylene glycol 0.3%        1 drop to left eye 4 times daily   Refills:  0        vitamin D 2000 UNITS tablet   Used for:  Vitamin D deficiency        Dose:  2000 Units   Take 2,000 Units by mouth 2 times daily   Refills:  0        * Notice:  This list has 2 medication(s) that are the same as other medications prescribed for you. Read the directions carefully, and ask your doctor or other care provider to review them with you.               Further instructions from your care team       Discharge to [facility]  [phone]    Summary of Visit     Reason for your hospital stay       You were admitted with a stone in your biliary track that has been removed.  Will slowly return your diet.  Also you are volume overloaded so will have to watch this closely over the next few days.             After Care     Activity - Up with nursing assistance           Advance Diet as Tolerated       Follow this diet upon discharge: Orders Placed This Encounter      Advance Diet as Tolerated: Regular Diet Adult, Low Sodium, 2g.       Daily weights       Call Provider for weight gain of more than 2 pounds per day or 5 pounds per week.       Fall precautions           General info for SNF       Length of Stay Estimate: Short Term Care: Estimated # of Days <30  Condition at Discharge: Improving  Level of care:skilled   Rehabilitation Potential: Good  Admission H&P remains valid and up-to-date: Yes  Recent Chemotherapy: N/A  Use Nursing Home Standing Orders: Yes       Intake and output       Every shift       Mantoux instructions       Give two-step Mantoux (PPD) Per Facility Policy Yes             Procedures     Oxygen - Nasal cannula       2 Lpm by nasal cannula to keep O2 sats 92% or greater, baseline is nightly oxygen.             Referrals     Occupational Therapy Adult Consult       Evaluate and treat as clinically indicated.    Reason:  Deconditioning, legally blind       Physical Therapy Adult Consult       Evaluate and treat as clinically indicated.    Reason:  Deconditiong             Your next 10 appointments already scheduled     Nov 21, 2017 10:30 AM CST   Ech Complete with [id number]   [facility] CV Echocardiography ([facility])    [address] [place] [id number] [phone]           1. Please bring or wear a comfortable two-piece outfit. 2. You may eat, drink and take your normal  medicines. 3. For any questions that cannot be answered, please contact the ordering physician            Nov 21, 2017  2:15 PM CST   Return Visit with Jae Butler MD   [facility] AT [place] ([facility])    [facility] [place] [id number]  [facility] [address] [phone]              Follow-Up Appointment Instructions     Future Labs/Procedures    Follow Up and recommended labs and tests     Comments:    Follow up with [facility] physician.  The following labs/tests are recommended: BMP, Daily weights (goal is 165 lbs), Wean Bumex to PTA dose of 2 mg daily..  Follow up with Dr. Butler in after TCU discharge.  Also check with GI to see if they want follow up for her.      Follow-Up Appointment Instructions     Follow Up and recommended labs and tests       Follow up with [facility] physician.  The following labs/tests are recommended: BMP, Daily weights (goal is 165 lbs), Wean Bumex to PTA dose of 2 mg daily..  Follow up with Dr. Butler in after TCU discharge.  Also check with GI to see if they want follow up for her.             Statement of Approval     Ordered          10/16/17 1322  I have reviewed and agree with all the recommendations and orders detailed in this document.  EFFECTIVE NOW     Approved and electronically signed by:  Anshul Tan MD

## 2017-10-11 NOTE — IP AVS SNAPSHOT
` ` Patient Information     Patient Name Sex     Rupinder Tracy ([id number]) Female 1940       Room Bed    [facility] 6608-      Patient Demographics     Address Phone E-mail Address    40 [address] [place] [address] [place] [address] [phone] (Home)  none (Work)  [phone] (Mobile) *Preferred* [email or web address]      Patient Ethnicity & Race     Ethnic Group Patient Race    American White      Emergency Contact(s)     Name Relation Home Work Mobile    laura king Relative [phone] NONE NONE      Documents on File        Status Date Received Description       Documents for the Patient    Privacy Notice - [place] Received 03     Face Sheet  () 05/15/08     Insurance Card  () 03     Insurance Card  () 04     Insurance Card  () 06     Consent Form  08     Other  08 medicare questions    Face Sheet Received () 09     External Medication Information Consent Accepted () 12/21/10     Insurance Card Received () 10/14/09     Face Sheet Received () 10/14/09     Patient ID Received 14 [place] ID - [email or web address]    Consent for Services - Hospital/Clinic Received () 12/21/10     Consent for Services - Hospital/Clinic Received () 12/31/10     Face Sheet Received () 12/31/10     Consent for Services - Hospital/Clinic Received () 11     Consent to Communicate Received () 11     External Medication Information Consent Accepted () 12     Consent for Services - Hospital/Clinic Received () 12     Privacy Notice - [place] Received 12     Insurance Card  ()  BCBS SENIOR GOLD    Other Received 12 Virtual Visit Medical Authorization(Verbal)    Consent for EHR Access  13 Copied from existing Consent for services - C/HOD collected on 2012    [facility] Specified Other       Insurance Card Received ()  13 MEDICARE card    Insurance Card Received () 13 BCBS    Consent to Communicate Received 13     HIM LUCIE Authorization - File Only   MyChart Access  2013    Consent for Services - Hospital/Clinic Received () 13     External Medication Information Consent Accepted 13     Business/Insurance/Care Coordination/Health Form - Patient Received 10/21/13     Insurance Card Received () 14 BCBS    Insurance Card Received () 14     Consent for Services - Hospital/Clinic Received () 14     Advance Directives and Living Will Received 14      Advance Directives and Living Will Received 09/10/14 Health Care Directive 14    Advance Directives and Living Will Not Received  Validation of AD 14    Insurance Card Received () 03/17/15 BCBS    Insurance Card Received 03/17/15 MEDICARE    Consent for Services - Hospital/Clinic Received () 07/24/15     Occupational Therapy Certification Received 08/14/15 7/30/15- 9/30/15    Patient ID Received 16 [place] ID - [email or web address]    Consent for Services/Privacy Notice - Hospital/Clinic Received () 16     HIM LUCIE Authorization  16 [facility] [employer]    Insurance Card Received 16 BCBS PLATINUM    Business/Insurance/Care Coordination/Health Form - Patient  16 [employer]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    HIM LUCIE Authorization - File Only  16 [facility]- LUCIE    Consent for Services/Privacy Notice - Hospital/Clinic Received 17     Insurance Card Received 17 BCBS    Insurance Card Received 10/11/17     [employer] Medicare Estimate Letter  (Deleted)      Advance Directives and Living Will Received (Deleted) 14 Health Care Directive  14    Advance Directives and Living  Will Received (Deleted) 08/26/14 Validation of AD 8/25/14       Documents for the Encounter    CMS IM for Patient Signature Received 10/14/17 1MM    CMS IM for Patient Signature Received 10/16/17 2MM      Admission Information     Attending Provider Admitting Provider Admission Type Admission Date/Time    Jasper Newberry MD McRaith, Joseph Charles, MD Emergency 10/11/17  1421    Discharge Date Hospital Service Auth/Cert Status Service Area     Hospitalist [facility] SERVICES    Unit Room/Bed Admission Status        [facility] SPEC UNIT 6608/6608-01 Admission (Confirmed)       Admission     Complaint    Abdominal pain, COIMMON BILE DUCT STONE       Hospital Account     Name Acct ID Class Status Primary Coverage    Rupinder Tracy [id number] Inpatient Open MEDICARE - MEDICARE FOR HB SUPPLEMENT            Guarantor Account (for Hospital Account #[id number])     Name Relation to Pt Service Area Active? Acct Type    Rupinder Tracy  FCS Yes Personal/Family    Address Phone          [address]  [address]  [place] [address] [phone](H)  none(O)              Coverage Information (for Hospital Account #[id number])     1. MEDICARE/MEDICARE FOR HB SUPPLEMENT     F/O Payor/Plan Precert #    MEDICARE/MEDICARE FOR HB SUPPLEMENT     Subscriber Subscriber #    Rupinder Tracy [id number]    Address Phone    ATT[employer] CLAIMS  [address]  [place] [address] [phone]          2. BCBS/BCBS PLATINUM BLUE     F/O Payor/Plan Precert #    BCBS/BCBS PLATINUM BLUE     Subscriber Subscriber #    Rupinder Tracy [id number]    Address Phone    [address] [address]  [place] [address] [phone]

## 2017-10-11 NOTE — IP AVS SNAPSHOT
MRN:[id number]                      After Visit Summary   10/11/2017    Rupinder Tracy    MRN: [id number]           Thank you!     Thank you for choosing [facility] for your care. Our goal is always to provide you with excellent care. Hearing back from our patients is one way we can continue to improve our services. Please take a few minutes to complete the written survey that you may receive in the mail after you visit with us. Thank you!        Patient Information     Date Of Birth          1940        Designated Caregiver       Most Recent Value    Caregiver    Will someone help with your care after discharge? no      About your hospital stay     You were admitted on:  October 11, 2017 You last received care in the:  [place] [address] [facility]    You were discharged on:  October 16, 2017        Reason for your hospital stay       You were admitted with a stone in your biliary track that has been removed.  Will slowly return your diet.  Also you are volume overloaded so will have to watch this closely over the next few days.                  Who to Call     For medical emergencies, please call 911.  For non-urgent questions about your medical care, please call your primary care provider or clinic, [phone]  For questions related to your surgery, please call your surgery clinic        Attending Provider     Provider Specialty    Fernando Medina DO Emergency Medicine    Jasper Newberry MD Internal Medicine       Primary Care Provider Office Phone # Fax #    Abran Mott [phone]      After Care Instructions     Activity - Up with nursing assistance           Advance Diet as Tolerated       Follow this diet upon discharge: Orders Placed This Encounter      Advance Diet as Tolerated: Regular Diet Adult, Low Sodium, 2g.            Daily weights       Call Provider for weight gain of more than 2 pounds per day or 5 pounds per  week.            Fall precautions           General info for SNF       Length of Stay Estimate: Short Term Care: Estimated # of Days <30  Condition at Discharge: Improving  Level of care:skilled   Rehabilitation Potential: Good  Admission H&P remains valid and up-to-date: Yes  Recent Chemotherapy: N/A  Use Nursing Home Standing Orders: Yes            Intake and output       Every shift            Mantoux instructions       Give two-step Mantoux (PPD) Per Facility Policy Yes            Oxygen - Nasal cannula       2 Lpm by nasal cannula to keep O2 sats 92% or greater, baseline is nightly oxygen.                  Follow-up Appointments     Follow Up and recommended labs and tests       Follow up with longterm physician.  The following labs/tests are recommended: BMP, Daily weights (goal is 165 lbs), Wean Bumex to PTA dose of 2 mg daily..  Follow up with Dr. Butelr in after TCU discharge.  Also check with GI to see if they want follow up for her.                  Your next 10 appointments already scheduled     Nov 21, 2017 10:30 AM CST   Ech Complete with [id number]   [facility] CV Echocardiography ([facility])    [address]  [address]  [place] [address] [phone]           1. Please bring or wear a comfortable two-piece outfit. 2. You may eat, drink and take your normal medicines. 3. For any questions that cannot be answered, please contact the ordering physician            Nov 21, 2017  2:15 PM CST   Return Visit with Jae Butler MD   [facility] [place] ([facility])    [address]  [place] [id number] [phone]              Additional Services     Occupational Therapy Adult Consult       Evaluate and treat as clinically indicated.    Reason:  Deconditioning, legally blind            Physical Therapy Adult Consult       Evaluate and treat as clinically indicated.    Reason:  " Deconditiong                  Further instructions from your care team       [facility]  [phone]    Pending Results     Date and Time Order Name Status Description    10/11/2017 1824 Blood culture Preliminary     10/11/2017 1824 Blood culture Preliminary             Statement of Approval     Ordered          10/16/17 1322  I have reviewed and agree with all the recommendations and orders detailed in this document.  EFFECTIVE NOW     Approved and electronically signed by:  Anshul Tan MD             Admission Information     Date & Time Provider Department Dept. Phone    10/11/2017 Jasper Newberry MD [place] [address] [facility] [phone]      Your Vitals Were     Blood Pressure Pulse Temperature Respirations Height Weight    116/64 (BP Location: Left arm) 77 97.7  F (36.5  C) (Oral) 20 1.626 m (5' 4\") 79.5 kg (175 lb 3.2 oz)    Last Period Pulse Oximetry BMI (Body Mass Index)             1990 95% 30.07 kg/m2         MyChart Information     [email or web address] lets you send messages to your doctor, view your test results, renew your prescriptions, schedule appointments and more. To sign up, go to [email or web address] . Click on \"Log in\" on the left side of the screen, which will take you to the Welcome page. Then click on \"Sign up Now\" on the right side of the page.     You will be asked to enter the access code listed below, as well as some personal information. Please follow the directions to create your username and password.     Your access code is: [email or web address]  Expires: 2017  1:13 PM     Your access code will  in 90 days. If you need help or a new code, please call your [place] clinic or [phone].        Care EveryWhere ID     This is your Care EveryWhere ID. This could be used by other organizations to access your [place] medical records  [id number]        Equal Access to Services     LINUS ASCENCIO AH: jerrell Beasley " osiris jgmu moradesmond alexander, ethan luitony ah. So [facility] [phone].    ATENCIÓN: Si suzanne del angel, tiene a lugo disposición servicios gratuitos de asistencia lingüística. [place] [facility] [phone].    We comply with applicable federal civil rights laws and [place] laws. We do not discriminate on the basis of race, color, national origin, age, disability, sex, sexual orientation, or gender identity.               Review of your medicines      CONTINUE these medicines which may have CHANGED, or have new prescriptions. If we are uncertain of the size of tablets/capsules you have at home, strength may be listed as something that might have changed.        Dose / Directions    * bumetanide 2 MG tablet   Commonly known as:  BUMEX   This may have changed:  Another medication with the same name was added. Make sure you understand how and when to take each.   Used for:  Diastolic dysfunction        Dose:  2 mg   Take 1 tablet (2 mg) by mouth daily   Quantity:  30 tablet   Refills:  11       * bumetanide 1 MG tablet   Commonly known as:  BUMEX   This may have changed:  You were already taking a medication with the same name, and this prescription was added. Make sure you understand how and when to take each.   Used for:  Hypervolemia, unspecified hypervolemia type        Dose:  2 mg   Take 2 tablets (2 mg) by mouth every 24 hours for 3 days   Refills:  0       traMADol 50 MG tablet   Commonly known as:  ULTRAM   This may have changed:  See the new instructions.   Used for:  Chronic pain syndrome        Dose:  50 mg   Take 1 tablet (50 mg) by mouth every 6 hours as needed for moderate pain   Quantity:  50 tablet   Refills:  0       * Notice:  This list has 2 medication(s) that are the same as other medications prescribed for you. Read the directions carefully, and ask your doctor or other care provider to review them with you.      CONTINUE these medicines which have NOT CHANGED        Dose /  Directions    albuterol 108 (90 BASE) MCG/ACT Inhaler   Commonly known as:  PROAIR HFA   Used for:  Mild intermittent asthma        Dose:  2 puff   Inhale 2 puffs into the lungs every 4 hours as needed   Quantity:  1 Inhaler   Refills:  5       CALCIUM + D 500-1000-40 MG-UNT-MCG Chew   Generic drug:  Calcium-Vitamin D-Vitamin K        Dose:  2 tablet   Take 2 tablets by mouth daily   Refills:  0       CULTURELLE PO        Dose:  1 tablet   Take 1 tablet by mouth Three times a week   Refills:  0       DOXYCYCLINE HYCLATE PO        Dose:  50 mg   Take 50 mg by mouth 3 times per week Monday,wednesday, friday   Refills:  0       EYE-VITES Tabs        [place]-Eye with Lutein 2 tabs twice a day (Rx from [address] [facility])   Quantity:      Refills:  0       fluticasone-salmeterol 250-50 MCG/DOSE diskus inhaler   Commonly known as:  ADVAIR DISKUS   Used for:  Mild persistent asthma without complication        Dose:  1 puff   Inhale 1 puff into the lungs 2 times daily   Quantity:  60 Inhaler   Refills:  1       olmesartan 40 MG tablet   Commonly known as:  BENICAR   Used for:  Essential hypertension with goal blood pressure less than 130/80        Dose:  40 mg   Take 1 tablet (40 mg) by mouth daily   Quantity:  90 tablet   Refills:  2       omeprazole 20 MG CR capsule   Commonly known as:  priLOSEC   Used for:  Gastroesophageal reflux disease without esophagitis        TAKE 1 CAPSULE (20 MG) BY MOUTH 2 TIMES DAILY   Quantity:  180 capsule   Refills:  1       order for DME        2L of O2 at night   Refills:  0       prednisoLONE acetate 1 % ophthalmic susp   Commonly known as:  PRED FORTE        Dose:  1 drop   Place 1 drop Into the left eye 4 times daily   Refills:  0       PROLIA 60 MG/ML Soln injection   Generic drug:  denosumab        Dose:  60 mg   Inject 60 mg Subcutaneous every 6 months   Refills:  0       * REQUIP PO        Dose:  0.25 mg   Take 0.25 mg by mouth daily 3PM   Refills:  0       * REQUIP 0.25 MG  tablet   Used for:  Restless legs syndrome (RLS)   Generic drug:  rOPINIRole        Dose:  0.5 mg   Take 0.5 mg by mouth At Bedtime   Refills:  0       RESTASIS 0.05 % ophthalmic emulsion   Generic drug:  cycloSPORINE        1 drop to left eye twice daily   Refills:  0       SYSTANE 0.4-0.3 % Soln ophthalmic solution   Generic drug:  polyethylene glycol 0.4%- propylene glycol 0.3%        1 drop to left eye 4 times daily   Refills:  0       vitamin D 2000 UNITS tablet   Used for:  Vitamin D deficiency        Dose:  2000 Units   Take 2,000 Units by mouth 2 times daily   Refills:  0       * Notice:  This list has 2 medication(s) that are the same as other medications prescribed for you. Read the directions carefully, and ask your doctor or other care provider to review them with you.         Where to get your medicines      Some of these will need a paper prescription and others can be bought over the counter. Ask your nurse if you have questions.     You don't need a prescription for these medications     bumetanide 1 MG tablet         Information about where to get these medications is not yet available     ! Ask your nurse or doctor about these medications     traMADol 50 MG tablet                Protect others around you: Learn how to safely use, store and throw away your medicines at www.disposemymeds.org.             Medication List: This is a list of all your medications and when to take them. Check marks below indicate your daily home schedule. Keep this list as a reference.      Medications           Morning Afternoon Evening Bedtime As Needed    albuterol 108 (90 BASE) MCG/ACT Inhaler   Commonly known as:  PROAIR HFA   Inhale 2 puffs into the lungs every 4 hours as needed   Last time this was given:  2 puffs on 10/16/2017  5:40 AM                                   * bumetanide 2 MG tablet   Commonly known as:  BUMEX   Take 1 tablet (2 mg) by mouth daily   Last time this was given:  2 mg on 10/16/2017 12:54 PM                                    * bumetanide 1 MG tablet   Commonly known as:  BUMEX   Take 2 tablets (2 mg) by mouth every 24 hours for 3 days   Last time this was given:  2 mg on 10/16/2017 12:54 PM                                   CALCIUM + D 500-1000-40 MG-UNT-MCG Chew   Take 2 tablets by mouth daily   Generic drug:  Calcium-Vitamin D-Vitamin K                                CULTURELLE PO   Take 1 tablet by mouth Three times a week                                DOXYCYCLINE HYCLATE PO   Take 50 mg by mouth 3 times per week Monday,wednesday, friday                                EYE-VITES Tabs   [place]-Eye with Lutein 2 tabs twice a day (Rx from [facility])                                fluticasone-salmeterol 250-50 MCG/DOSE diskus inhaler   Commonly known as:  ADVAIR DISKUS   Inhale 1 puff into the lungs 2 times daily   Last time this was given:  1 puff on 10/16/2017  9:40 AM                                      olmesartan 40 MG tablet   Commonly known as:  BENICAR   Take 1 tablet (40 mg) by mouth daily   Last time this was given:  40 mg on 10/16/2017  9:36 AM                                   omeprazole 20 MG CR capsule   Commonly known as:  priLOSEC   TAKE 1 CAPSULE (20 MG) BY MOUTH 2 TIMES DAILY   Last time this was given:  20 mg on 10/16/2017  6:28 AM                                      order for DME   2L of O2 at night                                prednisoLONE acetate 1 % ophthalmic susp   Commonly known as:  PRED FORTE   Place 1 drop Into the left eye 4 times daily   Last time this was given:  1 drop on 10/16/2017  1:03 PM                                            PROLIA 60 MG/ML Soln injection   Inject 60 mg Subcutaneous every 6 months   Generic drug:  denosumab                                * REQUIP PO   Take 0.25 mg by mouth daily 3PM   Last time this was given:  0.5 mg on 10/15/2017 10:54 PM                                   * REQUIP 0.25 MG tablet   Take 0.5 mg by mouth At Bedtime    Last time this was given:  0.5 mg on 10/15/2017 10:54 PM   Generic drug:  rOPINIRole                                   RESTASIS 0.05 % ophthalmic emulsion   1 drop to left eye twice daily   Last time this was given:  1 drop on 10/16/2017  9:40 AM   Generic drug:  cycloSPORINE                                      SYSTANE 0.4-0.3 % Soln ophthalmic solution   1 drop to left eye 4 times daily   Last time this was given:  1 drop on 10/16/2017  1:03 PM   Generic drug:  polyethylene glycol 0.4%- propylene glycol 0.3%                                            traMADol 50 MG tablet   Commonly known as:  ULTRAM   Take 1 tablet (50 mg) by mouth every 6 hours as needed for moderate pain                                   vitamin D 2000 UNITS tablet   Take 2,000 Units by mouth 2 times daily                                      * Notice:  This list has 4 medication(s) that are the same as other medications prescribed for you. Read the directions carefully, and ask your doctor or other care provider to review them with you.

## 2017-10-11 NOTE — IP AVS SNAPSHOT
` `           [place] [address] [facility]: [phone]                 INTERAGENCY TRANSFER FORM - NOTES (H&P, Discharge Summary, Consults, Procedures, Therapies)   10/11/2017                    Hospital Admission Date: 10/11/2017  JUDY TRACY   : 1940  Sex: Female        Patient PCP Information     Provider PCP Type    Abran Mott MD General         History & Physicals      H&P by Jasper Newberry MD at 10/11/2017  8:01 PM     Author:  Jasper Newberry MD Service:  Hospitalist Author Type:  Physician    Filed:  10/12/2017 12:29 AM Date of Service:  10/11/2017  8:01 PM Creation Time:  10/11/2017  8:01 PM    Status:  Signed :  Jasper Newberry MD (Physician)         [facility]    History and Physical  Hospitalist       Date of Admission:  10/11/2017[JM1.1]    Assessment & Plan[JM1.2]   Judy Tracy is a 77 year old female who presents with[JM1.1] RUQ abd pain:    Principal Problem:    RUQ abdominal pain -- S/P alexi, but tenderness with elevated AST and Alk Phos concerning for possible Cholangitis   Also has hx of significant PUD, with last EGD about 5 years ago, and has sx of GERD and dysphagia but is on   Prilosec 20 mg bid.  Dilated bile ducts may just be related to post-cholecystectomy, can not exclude a CBD    Stone.     Active Problems:    Mild persistent asthma    Presbyesophagus    Nocturnal oxygen desaturation -- used 2 lpm O2 at nighttime only    Pulmonary HTN -- mild (not medically treated)    Restless legs syndrome (RLS)    Chronic pain, back    CKD (chronic kidney disease) stage 3, GFR 30-59 ml/min    Essential hypertension with goal blood pressure less than 130/85    Diastolic dysfunction    S/P cholecystectomy -- 1982    Abdominal pain       Plan[JM1.3]:[JM1.1]  Will get blood cultures and start IV Zosyn and repeat LFT's in AM.  Will ask GI to see patient in AM -- ?further evaluation if LFT's not improving or  "persistent pain (?abdominal CT or MRCP), or pursue EGD if GI feels warranted to address underlying PUD and dysphagia (has had prior motility studies at [facility] as an outpt).[JM1.3]      DVT Prophylaxis:[JM1.1] Pneumatic Compression Devices[JM1.3]  Code Status:[JM1.1] Full Code[JM1.3]    Disposition: Expected discharge in[JM1.1] 3[JM1.3] days once[JM1.1] abd pain better and LFT's improved[JM1.3].[JM1.1]    Jasper Smith[JM1.2], MD[JM1.1]  Pager: [phone]  Cell Phone:  [phone][JM1.3]     Primary Care Physician   Abran Mott    Chief Complaint[JM1.2]   RUQ abdominal pain    History is obtained from the patient[JM1.3]    History of Present Illness[JM1.2]   Rupinder Tracy is a 77 year old female[JM1.1] with mild Pulm Htn, Asthma, and has had a cholecystectomy in 1982 and awoke yesterday AM with epigastric pain which radiated substernal area \"like my heart burn\", but she wasn't sure if she was having a heart attack or it was her stomach.  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  No fever or chills, but nausea.  She says the pain does radiate to her RUQ area, but she has not noticed it in her back.  No problems with gall stones since her gall bladder was removed in 1982.  She does have loose stools 3 times a day, but no BM for 1 day, but also hasn't eating anything.[JM1.3]     Past Medical History[JM1.2]    I have reviewed this patient's medical history and updated it with pertinent information if needed.[JM1.3]   Past Medical History:   Diagnosis Date     Arthritis      Cellulitis 4/13 in [place]    R ankle     Difficult airway for intubation      Ductal Carcinoma in Situ of Left Breast 9/09     Duodenal ulcer, unspecified as acute or chronic, without mention of hemorrhage or perforation 1980's "    felt due to high dose steroids     Female stress incontinence      H/O right and left heart catheterization     1-     HTN      HYPERLIPIDEMIA      Ischemic colitis 7/2001    [facility]     Lactose Intolerance     Mild     Left Ventricular Hypertrophy      Legal blindness     Artificial right eye, severe vision loss left (Detached retina's, glaucoma, corneal transplants)     Mild intermittent asthma ~1980's     MITRAL VALVE Prolapse, with murmur      Nocturnal oxygen desaturation      Osteoporosis, unspecified ~2002     Patent foramen ovale      PERIPHERAL VASCULAR DISEASE 5/05    mesenteric arteries, angioplasty     PRESBYESOPHAGUS 6/04     Pulmonary HTN 11/2014     Serous retinal detachment     False eye right     Subarachnoid hemorrhage following injury (H) 12/10    due to fall, vascular evaluation negative     Syncope and collapse 12/10     Unspecified glaucoma(365.9)[JM1.4]        Past Surgical History[JM1.2]   I have reviewed this patient's surgical history and updated it with pertinent information if needed.[JM1.3]  Past Surgical History:   Procedure Laterality Date     BIOPSY       C APPENDECTOMY  1982     C NONSPECIFIC PROCEDURE  1945    adenoidectomy     C NONSPECIFIC PROCEDURE      bilat retinal detachment     C NONSPECIFIC PROCEDURE      blephoroplasty bilat     C NONSPECIFIC PROCEDURE  1997    glaucoma procedure L     C NONSPECIFIC PROCEDURE  1997, 2007, 2014    corneal transplant L     C NONSPECIFIC PROCEDURE  1945    strabismus procedure R eye     C NONSPECIFIC PROCEDURE  1960's    R breast bx     C NONSPECIFIC PROCEDURE  5/05    Angioplasty and stenting mesenteric vessels     C NONSPECIFIC PROCEDURE  2008    L corneal transplant     COLONOSCOPY      due 2015     HC REMOVAL GALLBLADDER  1982    Cholecystectomy     HEART CATH RIGHT AND LEFT HEART CATH  1/19/15     HERNIORRHAPHY VENTRAL N/A 4/19/2016    Procedure: HERNIORRHAPHY VENTRAL;  Surgeon: Hamlet Ness MD;  Location:  OR      MASTECTOMY SIMPLE BILATERAL  10/5/09    bilateral mastectomy     SURGICAL HISTORY OF -   2010    breast reconstruction[JM1.4]       Prior to Admission Medications   Prior to Admission Medications   Prescriptions Last Dose Informant Patient Reported? Taking?   Calcium-Vitamin D-Vitamin K (CALCIUM + D) 500-1000-40 MG-UNT-MCG CHEW Past Week at Unknown time Self Yes Yes   Sig: Take 2 tablets by mouth daily    Cholecalciferol (VITAMIN D) 2000 UNITS tablet Past Week at Unknown time Self Yes Yes   Sig: Take 2,000 Units by mouth 2 times daily   DOXYCYCLINE HYCLATE PO Past Week at Unknown time Self Yes Yes   Sig: Take 50 mg by mouth 3 times per week Monday,wednesday, friday   EYE-VITES OR TABS Past Week at Unknown time Self Yes Yes   Sig: Hydro-Eye with Lutein 2 tabs twice a day (Rx from [facility])   Lactobacillus Rhamnosus, GG, (CULTURELLE PO) Past Week at Unknown time Self Yes Yes   Sig: Take 1 tablet by mouth Three times a week   RESTASIS 0.05 % OP EMUL 10/11/2017 at Unknown time Self Yes Yes   Si drop to left eye twice daily   ROPINIRole HCl (REQUIP PO)  Self Yes Yes   Sig: Take 0.25 mg by mouth daily 3PM   SYSTANE 0.4-0.3 % OP SOLN 10/11/2017 at Unknown time Self Yes Yes   Si drop to left eye 4 times daily   albuterol (ALBUTEROL) 108 (90 BASE) MCG/ACT inhaler  at PRN Self No Yes   Sig: Inhale 2 puffs into the lungs every 4 hours as needed   bumetanide (BUMEX) 2 MG tablet Past Week at Unknown time Self No Yes   Sig: Take 1 tablet (2 mg) by mouth daily   denosumab (PROLIA) 60 MG/ML SOLN  Self Yes Yes   Sig: Inject 60 mg Subcutaneous every 6 months   fluticasone-salmeterol (ADVAIR DISKUS) 250-50 MCG/DOSE diskus inhaler 10/11/2017 at Unknown time Self No Yes   Sig: Inhale 1 puff into the lungs 2 times daily   olmesartan (BENICAR) 40 MG tablet Past Week at Unknown time Self No Yes   Sig: Take 1 tablet (40 mg) by mouth daily   omeprazole (PRILOSEC) 20 MG CR capsule 10/10/2017 at Unknown time Self No  "Yes   Sig: TAKE 1 CAPSULE (20 MG) BY MOUTH 2 TIMES DAILY   order for DME  Self Yes No   SiL of O2 at night   prednisoLONE acetate (PRED FORTE) 1 % ophthalmic suspension 10/11/2017 at Unknown time Self Yes Yes   Sig: Place 1 drop Into the left eye 4 times daily    rOPINIRole (REQUIP) 0.25 MG tablet Past Week at Unknown time Self Yes Yes   Sig: Take 0.5 mg by mouth At Bedtime    traMADol (ULTRAM) 50 MG tablet  at PRN Self No Yes   Sig: TAKE UP TO 2 TABLETS BY MOUTH 2 TIMES DAILY. LIMIT TO 4 TABLET DAILY, ON AVERAGE      Facility-Administered Medications: None     Allergies   Allergies   Allergen Reactions     Atorvastatin Calcium      myalgias     Celecoxib Swelling     edema     Cholestyramine Diarrhea     Diarrhea       Crestor [Rosuvastatin Calcium]      leg aches, likely from medication     Cyclobenzaprine Hcl      flexeril--gets \"goofy\"     Dulera      tremors     Gabapentin      Nightmares.   Retrial of medication caused tremors.     Lisinopril Cough     Cough/ Dizziness at high dose.     Meperidine Hcl Nausea and Vomiting     demerol-severe nausea     Morphine Nausea and Vomiting     Naprosyn [Naproxen] GI Disturbance     Niaspan [Niacin]      flushing, severe     Percocet [Oxycodone-Acetaminophen] Nausea     Nausea, lightheadedness.   Tolerates med if taken with food.     Pravastatin Swelling     Edema, myalgias     Red Yeast Rice [Cholestin] GI Disturbance     Bloating, etc.     Simvastatin      myalgias     Timolol Maleate Difficulty breathing     timoptic--respiratory problems     Hctz Rash     rash     Sulfa Drugs Itching and Rash     rash on all mucous membranes and palms of hands with intense itching       Social History[JM1.2]   I have reviewed this patient's social history and updated it with pertinent information if needed. Rupinder Tracy[JM1.3]  reports that she has never smoked. She has never used smokeless tobacco. She reports that she drinks alcohol. She reports that she does not use " illicit drugs.[JM1.4]    Family History[JM1.2]   I have reviewed this patient's family history and updated it with pertinent information if needed.[JM1.3]   Family History   Problem Relation Age of Onset     Adopted: Yes     C.A.D. Paternal Uncle      MI 50's     Family History Negative Daughter[JM1.4]        Review of Systems[JM1.2]   The 10 point Review of Systems is negative other than noted in the HPI or here.  Does use Lidoderm patch for low back pain related to severe osteoporosis and stress sacral fractures.[JM1.3]     Physical Exam   Temp: 97.9  F (36.6  C) Temp src: Oral BP: 125/60   Heart Rate: 87 Resp: (!) 85 SpO2: 90 % O2 Device: None (Room air)[JM1.2]    Vital Signs with Ranges[JM1.1]  Temp:  [97.9  F (36.6  C)] 97.9  F (36.6  C)  Heart Rate:  [77-98] 87  Resp:  [16-85] 85  BP: (116-143)/(55-89) 125/60  SpO2:  [90 %-97 %] 90 %  164 lbs 0 oz[JM1.2]    Constitutional: Awake, alert, cooperative,[JM1.1] moderately uncomfortable with pain and nausea[JM1.3].  Eyes:[JM1.1] false right eye and left eye lid blepharoplasty to cover most of globe, minimal vision in left eye[JM1.3]  HEENT: Moist mucous membranes, normal dentition.  Respiratory: Clear to auscultation bilaterally, no crackles or wheezing.  Cardiovascular: Regular rate and rhythm, normal S1 and S2, and no murmur noted.  GI: Soft, non-distended[JM1.1], tender over epigastric area and right upper quadrant area, but also right mid abdominal area, BS present but decreased[JM1.3]  Lymph/Hematologic: No anterior cervical or supraclavicular adenopathy.  Skin: No rashes, no cyanosis, no edema.  Musculoskeletal: No joint swelling, erythema or tenderness.  Neurologic: Cranial nerves 2-12 intact, normal strength and sensation.  Psychiatric: Alert, oriented to person, place and time, no obvious anxiety or depression.[JM1.1]    Data[JM1.2]   Data reviewed today:  I personally reviewed[JM1.1] the EKG tracing showing nsr with rate 83 and frequent PVC's, and  PRWP[JM1.3]    Recent Labs  Lab 10/11/17  1515   WBC 5.9   HGB 12.2   MCV 97         POTASSIUM 4.3   CHLORIDE 106   CO2 25   BUN 41*   CR 1.25*   ANIONGAP 10   AXEL 9.0   *   ALBUMIN 3.2*   PROTTOTAL 7.1   BILITOTAL 2.1*   ALKPHOS 215*   ALT 50   AST 89*   LIPASE 144   TROPI <0.015[JM1.2]       Imaging:[JM1.1]  Recent Results (from the past 24 hour(s))   XR Chest 2 Views    Narrative    XR CHEST 2 VW 10/11/2017 4:01 PM    COMPARISON: 10/14/2016    HISTORY: Chest pain.      Impression    IMPRESSION: Mildly enlarged cardiac silhouette. Mild left basilar  atelectasis/consolidation. Lungs are otherwise clear. No pleural  effusion or pneumothorax.    HECTOR MENARD MD   Abdomen US, limited (RUQ only)    Narrative    US ABDOMEN LIMITED 10/11/2017 5:28 PM     HISTORY: RUQ pain.     FINDINGS:  Liver is normal in echogenicity without focal lesions.  Prominent intrahepatic bile ducts are present with ringdown artifact  possibly indicating pneumobilia. Prior CT from February 2016 showed  dilated intrahepatic and common bile duct as well. These findings may  be related to prior cholecystectomy changes. Question whether patient  has had prior sphincterotomy procedure to account for the potential  air in the biliary system. Otherwise cholangitis is possible. The  gallbladder is surgically absent. Common bile duct is normal in  diameter. Pancreas is normal where visualized. Examination of the  right kidney is unremarkable.      Impression    IMPRESSION:  Prior cholecystectomy changes with dilated intrahepatic  and common bile duct similar to prior CT from 2016. Ringdown artifact  in the intrahepatic ducts near the confluence of the right and left  hepatic duct could indicate air in the biliary tree. Cholangitis or  prior sphincterotomy procedure could account for this finding.  Correlate with patient's liver enzymes.    FERMIN TOWNSEND MD[JM1.2]         Revision History        User Key Date/Time User Provider Type  "Action    > JM1.4 10/12/2017 12:29 AM Jasper Newberry MD Physician Sign     JM1.3 10/12/2017 12:10 AM Jasper Newberry MD Physician      JM1.2 10/11/2017  8:04 PM Jasper Newberry MD Physician      JM1.1 10/11/2017  8:01 PM Jasper Newberry MD Physician                      Discharge Summaries      Discharge Summaries by Anshul Tan MD at 10/16/2017 11:39 AM     Author:  Anshul Tan MD Service:  Hospitalist Author Type:  Physician    Filed:  10/16/2017 11:47 AM Date of Service:  10/16/2017 11:39 AM Creation Time:  10/16/2017 11:38 AM    Status:  Signed :  Anshul Tan MD (Physician)         [facility]    Discharge Summary  Hospitalist    Date of Admission:  10/11/2017  Date of Discharge:[JK1.1]  10/16/2017[JK1.2]  Discharging Provider:[CATK1.1] Anshul Tan    Discharge Diagnoses[JK1.2]      RUQ abdominal pain  Choledocholithiasis  Hypervolemia, unspecified hypervolemia type[JK1.1]    History of Present Illness[JK1.2]   Rupinder Tracy is a 77 year old female with mild Pulm Htn, diastolic dysfunction followed by Dr. Butler of cardiology, mild intermittent Asthma, and who also had a cholecystectomy in 1982 awoke on the day of admission with epigastric pain which radiated substernal area \"like my heart burn\", but she wasn't sure if she was having a heart attack or it was her stomach.  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  No fever or chills, but nausea.  She says the pain does radiate to her RUQ area, but she has not noticed it in her back.  No problems with gall stones since her gall bladder was removed in 1982.  US demonstrated findings consistent with possible biliary dysfunction.[JK1.1]    [facility] " Course[JK1.2]     RUQ pain: MRCP with sludge in bile duct, so an ERCP completed, see report below but is now s/p biliary stone extraction with subsequent improvement in pain, and LFTs.  Abx stopped.  Appreciated  GI.  F/u per their request.  ADAT.      Volume overload:  Multifactorial.  IVS.  PTA Bumex on hold.  Gained 20 lbs.  Now on continuous oxygen (usual only on night time).  Restarted PTA Bumex prior to admit.  Given extra dose of Bumex here today.  Will plan for 3 more days of Bumex 2 mg BID (PTA is daily).  Recommend daily weight and is and os at TCU.  Recheck labs in a couple days and adjust Bumex dosing accordingly.  Follow up with Dr. Butler.       Mild persistent asthma - Stable.  Home Advair. Continue albuterol prn.      RLS - Continue Requip      DVT Prophylaxis: Pneumatic Compression Devices      Code: Full Code.  Px legally blind.  To TCU for deconditioning.    Anshul Tan[JK1.1]    Significant Results and Procedures[JK1.2]   None[JK1.1]    Pending Results[JK1.2]   These results will be followed up by None[JK1.1]  Unresulted Labs Ordered in the Past 30 Days of this Admission     Date and Time Order Name Status Description    10/11/2017 1824 Blood culture Preliminary     10/11/2017 1824 Blood culture Preliminary           Code Status[JK1.2]   Full Code[JK1.1]       Primary Care Physician   Abran Mott    Physical Exam   Temp: 97.7  F (36.5  C) Temp src: Oral BP: 116/64   Heart Rate: 65 Resp: 20 SpO2: 95 % O2 Device: None (Room air) Oxygen Delivery: 2 LPM  Vitals:    10/15/17 2255 10/16/17 0703 10/16/17 1100   Weight: 83.3 kg (183 lb 11.2 oz) 86.2 kg (190 lb 0.6 oz) 79.5 kg (175 lb 3.2 oz)[JK1.2]     Vital Signs with Ranges[JK1.1]  Temp:  [97.5  F (36.4  C)-97.7  F (36.5  C)] 97.7  F (36.5  C)  Heart Rate:  [65-70] 65  Resp:  [16-20] 20  BP: (110-153)/(50-64) 116/64  SpO2:  [90 %-99 %] 95 %  I/O last 3 completed shifts:  In: 240 [P.O.:240]  Out: 1100 [Urine:1100][JK1.2]    Constitutional: NAD,    HEENT:  Legally blind.   Respiratory: CTAB, No Wheeze, Rhonchi, or Crackles appreciated.   Cardiovascular: RRR, No m/r/r  GI: Soft, Non-tender, Non-distended.  Ext: No lower extremity edema  Skin/Integumen: Intact, No erythema  Neuro: No new focal deficits appreiated  Psych:  Appropriate Affect   Other:[JK1.1]      Discharge Disposition[JK1.2]   Discharged to rehabilitation facility  Condition at discharge: Stable[JK1.1]    Consultations This Hospital Stay   GASTROENTEROLOGY IP CONSULT  PHYSICAL THERAPY ADULT IP CONSULT  SOCIAL WORK IP CONSULT  PHYSICAL THERAPY ADULT IP CONSULT  OCCUPATIONAL THERAPY ADULT IP CONSULT    Time Spent on this Encounter[JK1.2]   Anshul MORROW, personally saw the patient today and spent greater than 30 minutes discharging this patient.[JK1.1]    Discharge Orders     General info for SNF   Length of Stay Estimate: Short Term Care: Estimated # of Days <30  Condition at Discharge: Improving  Level of care:skilled   Rehabilitation Potential: Good  Admission H&P remains valid and up-to-date: Yes  Recent Chemotherapy: N/A  Use Nursing Home Standing Orders: Yes     Mantoux instructions   Give two-step Mantoux (PPD) Per Facility Policy Yes     Reason for your hospital stay   You were admitted with a stone in your biliary track that has been removed.  Will slowly return your diet.  Also you are volume overloaded so will have to watch this closely over the next few days.     Intake and output   Every shift     Daily weights   Call Provider for weight gain of more than 2 pounds per day or 5 pounds per week.     Follow Up and recommended labs and tests   Follow up with [facility] physician.  The following labs/tests are recommended: BMP, Daily weights (goal is 165 lbs), Wean Bumex to PTA dose of 2 mg daily..  Follow up with Dr. Butler in after TCU discharge.  Also check with GI to see if they want follow up for her.     Activity - Up with nursing assistance     Full Code     Physical  Therapy Adult Consult   Evaluate and treat as clinically indicated.    Reason:  Deconditiong     Occupational Therapy Adult Consult   Evaluate and treat as clinically indicated.    Reason:  Deconditioning, legally blind     Oxygen - Nasal cannula   2 Lpm by nasal cannula to keep O2 sats 92% or greater, baseline is nightly oxygen.     Fall precautions     Advance Diet as Tolerated   Follow this diet upon discharge: Orders Placed This Encounter     Advance Diet as Tolerated: Regular Diet Adult, Low Sodium, 2g.       Discharge Medications   Current Discharge Medication List      START taking these medications    Details   !! bumetanide (BUMEX) 1 MG tablet Take 2 tablets (2 mg) by mouth every 24 hours for 3 days    Comments: With Evening meds for 3-4 days pending daily weights and kidney function to be followed  Associated Diagnoses: Hypervolemia, unspecified hypervolemia type       !! - Potential duplicate medications found. Please discuss with provider.      CONTINUE these medications which have NOT CHANGED    Details   !! ROPINIRole HCl (REQUIP PO) Take 0.25 mg by mouth daily 3PM      traMADol (ULTRAM) 50 MG tablet TAKE UP TO 2 TABLETS BY MOUTH 2 TIMES DAILY. LIMIT TO 4 TABLET DAILY, ON AVERAGE  Qty: 100 tablet, Refills: 1    Comments: This request is for a new prescription for a controlled substance as required by Federal/State law.PT IS REQUESTING.  Associated Diagnoses: Chronic pain syndrome      olmesartan (BENICAR) 40 MG tablet Take 1 tablet (40 mg) by mouth daily  Qty: 90 tablet, Refills: 2    Associated Diagnoses: Essential hypertension with goal blood pressure less than 130/80      omeprazole (PRILOSEC) 20 MG CR capsule TAKE 1 CAPSULE (20 MG) BY MOUTH 2 TIMES DAILY  Qty: 180 capsule, Refills: 1    Associated Diagnoses: Gastroesophageal reflux disease without esophagitis      !! bumetanide (BUMEX) 2 MG tablet Take 1 tablet (2 mg) by mouth daily  Qty: 30 tablet, Refills: 11    Associated Diagnoses: Diastolic  "dysfunction      Cholecalciferol (VITAMIN D) 2000 UNITS tablet Take 2,000 Units by mouth 2 times daily    Associated Diagnoses: Vitamin D deficiency      !! rOPINIRole (REQUIP) 0.25 MG tablet Take 0.5 mg by mouth At Bedtime     Associated Diagnoses: Restless legs syndrome (RLS)      fluticasone-salmeterol (ADVAIR DISKUS) 250-50 MCG/DOSE diskus inhaler Inhale 1 puff into the lungs 2 times daily  Qty: 60 Inhaler, Refills: 1    Associated Diagnoses: Mild persistent asthma without complication      Lactobacillus Rhamnosus, GG, (CULTURELLE PO) Take 1 tablet by mouth Three times a week      denosumab (PROLIA) 60 MG/ML SOLN Inject 60 mg Subcutaneous every 6 months      Calcium-Vitamin D-Vitamin K (CALCIUM + D) 500-1000-40 MG-UNT-MCG CHEW Take 2 tablets by mouth daily       DOXYCYCLINE HYCLATE PO Take 50 mg by mouth 3 times per week Monday,wednesday, friday      prednisoLONE acetate (PRED FORTE) 1 % ophthalmic suspension Place 1 drop Into the left eye 4 times daily       albuterol (ALBUTEROL) 108 (90 BASE) MCG/ACT inhaler Inhale 2 puffs into the lungs every 4 hours as needed  Qty: 1 Inhaler, Refills: 5    Associated Diagnoses: Mild intermittent asthma      RESTASIS 0.05 % OP EMUL 1 drop to left eye twice daily      SYSTANE 0.4-0.3 % OP SOLN 1 drop to left eye 4 times daily      EYE-VITES OR TABS Hydro-Eye with Lutein 2 tabs twice a day (Rx from [facility])  Qty:        order for DME 2L of O2 at night       !! - Potential duplicate medications found. Please discuss with provider.        Allergies   Allergies   Allergen Reactions     Atorvastatin Calcium      myalgias     Celecoxib Swelling     edema     Cholestyramine Diarrhea     Diarrhea       Crestor [Rosuvastatin Calcium]      leg aches, likely from medication     Cyclobenzaprine Hcl      flexeril--gets \"goofy\"     Dulera      tremors     Gabapentin      Nightmares.   Retrial of medication caused tremors.     Lisinopril Cough     Cough/ Dizziness at high " dose.     Meperidine Hcl Nausea and Vomiting     demerol-severe nausea     Morphine Nausea and Vomiting     Naprosyn [Naproxen] GI Disturbance     Niaspan [Niacin]      flushing, severe     Percocet [Oxycodone-Acetaminophen] Nausea     Nausea, lightheadedness.   Tolerates med if taken with food.     Pravastatin Swelling     Edema, myalgias     Red Yeast Rice [Cholestin] GI Disturbance     Bloating, etc.     Simvastatin      myalgias     Timolol Maleate Difficulty breathing     timoptic--respiratory problems     Hctz Rash     rash     Sulfa Drugs Itching and Rash     rash on all mucous membranes and palms of hands with intense itching     Data[JK1.2]   Most Recent 3 CBC's:[JK1.1]  Recent Labs   Lab Test  10/16/17   0840  10/15/17   0845  10/14/17   0820   WBC  6.9  6.7  6.2   HGB  11.1*  10.3*  11.7   MCV  97  98  99   PLT  179  145*  179[JK1.2]      Most Recent 3 BMP's:[JK1.1]  Recent Labs   Lab Test  10/16/17   0840  10/15/17   0845  10/14/17   0820   NA  142  142  145*   POTASSIUM  3.8  4.0  4.0   CHLORIDE  114*  114*  115*   CO2  19*  20  20   BUN  29  29  30   CR  1.05*  1.29*  1.19*   ANIONGAP  9  8  10   AXEL  8.1*  7.4*  7.9*   GLC  93  94  91[JK1.2]     Most Recent 2 LFT's:[JK1.1]  Recent Labs   Lab Test  10/15/17   0845  10/14/17   0820   AST  42  51*   ALT  39  52*   ALKPHOS  224*  259*   BILITOTAL  0.8  1.1[JK1.2]     Most Recent INR's and Anticoagulation Dosing History:  Anticoagulation Dose History     Recent Dosing and Labs Latest Ref Rng & Units 7/2/2010 1/19/2015 10/12/2017    INR 0.86 - 1.14 1.01 0.95 1.09        Most Recent 3 Troponin's:[JK1.1]  Recent Labs   Lab Test  10/11/17   1515  02/12/16   0957  07/02/10   1445   TROPI  <0.015  <0.015  The 99th percentile for upper reference range is 0.045 ug/L.  Troponin values in   the range of 0.045 - 0.120 ug/L may be associated with risks of adverse   clinical events.    <0.012[JK1.2]     Most Recent Cholesterol Panel:[JK1.1]  Recent Labs   Lab Test  01/05/16   CHOL  208*  208*   LDL  130  130   HDL  52  52   TRIG  131  131[JK1.2]     Most Recent 6 Bacteria Isolates From Any Culture (See EPIC Reports for Culture Details):[JK1.1]  Recent Labs   Lab Test  10/11/17   1920  10/11/17   1644  10/11/17   1515  12/29/10   1150  07/02/10   1510  07/02/10   1450   CULT  No growth after 5 days  >100,000 colonies/mL  Klebsiella pneumoniae  *  50,000 to 100,000 colonies/mL  Strain 2  Klebsiella pneumoniae  *  No growth after 5 days  No growth  No growth after 6 days  No growth after 6 days[JK1.2]     Most Recent TSH, T4 and A1c Labs:[JK1.1]  Recent Labs   Lab Test  01/20/16   1506   TSH  1.94[JK1.2]     Results for orders placed or performed during the hospital encounter of 10/11/17   XR Chest 2 Views    Narrative    XR CHEST 2 VW 10/11/2017 4:01 PM    COMPARISON: 10/14/2016    HISTORY: Chest pain.      Impression    IMPRESSION: Mildly enlarged cardiac silhouette. Mild left basilar  atelectasis/consolidation. Lungs are otherwise clear. No pleural  effusion or pneumothorax.    HECTOR MENARD MD   Abdomen US, limited (RUQ only)    Narrative    US ABDOMEN LIMITED 10/11/2017 5:28 PM     HISTORY: RUQ pain.     FINDINGS:  Liver is normal in echogenicity without focal lesions.  Prominent intrahepatic bile ducts are present with ringdown artifact  possibly indicating pneumobilia. Prior CT from February 2016 showed  dilated intrahepatic and common bile duct as well. These findings may  be related to prior cholecystectomy changes. Question whether patient  has had prior sphincterotomy procedure to account for the potential  air in the biliary system. Otherwise cholangitis is possible. The  gallbladder is surgically absent. Common bile duct is normal in  diameter. Pancreas is normal where visualized. Examination of the  right kidney is unremarkable.      Impression    IMPRESSION:  Prior cholecystectomy changes with dilated intrahepatic  and common bile duct similar to prior CT from  2016. Ringdown artifact  in the intrahepatic ducts near the confluence of the right and left  hepatic duct could indicate air in the biliary tree. Cholangitis or  prior sphincterotomy procedure could account for this finding.  Correlate with patient's liver enzymes.    FERMIN TOWNSEND MD   MR Abdomen MRCP w/o & w Contrast    Narrative    MAGNETIC RESONANCE CHOLANGIOPANCREATOGRAPHY  10/12/2017 1:10 PM     HISTORY: Possible air in biliary tree on ultrasound. Elevated liver  function tests.    COMPARISON: Ultrasound from 10/11/2017.    TECHNIQUE: Multiplanar, multisequence images of the abdomen acquired  before and after administration of 7 mL Gadavist intravenous contrast.    FINDINGS: The gallbladder is absent. No obvious air is seen in the  biliary system but this would be better evaluated with CT. There is  intra and extrahepatic biliary dilatation which is moderate to severe  with the extrahepatic bile duct measuring up to 2 cm. This extends to  the sphincter of Oddi. There is some possible sludge and/or stones in  the distal common bile duct. No biliary strictures. The pancreatic  duct is dilated up to 5 mm. No pancreatic duct strictures.  Mild-moderate significant sidebranch visualization. No cystic lesions  within the pancreas. The signal intensity of the liver is within  normal limits without evidence for hepatic steatosis. The signal  intensity of the pancreas within normal limits without evidence for  pancreatitis. Visualized kidneys demonstrate unremarkable signal  intensity without hydronephrosis. Renal cysts noted in both kidneys.  Adrenal glands and spleen are unremarkable. Visualized osseous  structures unremarkable. After administration of intravenous contrast,  solid organs demonstrate no enhancing focal lesions.      Impression    IMPRESSION: Possible sludge and/or stones in the distal common bile  duct, consider ERCP for further evaluation.    SILVIA WILLIAM MD   XR ERCP    Narrative    ERCP   10/13/2017  2:00 PM     HISTORY: Common bile duct stone.    COMPARISON: None.    FINDINGS: Total fluoroscopy time was 4.1 minutes.  Five spot  fluoroscopic images, and/or cine clips were obtained. One view  obtained.       Impression    IMPRESSION: Prominently dilated intra and extrahepatic biliary tree.  Demonstration of cannulation of the common duct with balloon sweeping.  Bubbles of gas within the biliary tree.    MCKENNA ZUNIGA MD[JK1.1]          Revision History        User Key Date/Time User Provider Type Action    > JK1.2 10/16/2017 11:47 AM Anshul Tan MD Physician Sign     JK1.1 10/16/2017 11:38 AM Anshul Tan MD Physician                      Consult Notes      Consults by Mckenna Winn PA-C at 10/12/2017  9:19 AM     Author:  Mckenna Winn PA-C Service:  Gastroenterology Author Type:  Physician Assistant    Filed:  10/12/2017 10:30 AM Date of Service:  10/12/2017  9:19 AM Creation Time:  10/12/2017  9:18 AM    Status:  Attested :  Mckenna Winn PA-C (Physician Assistant)    Cosigner:  Alex Melvin DO at 10/12/2017  3:59 PM         Consult Orders:    1. Gastroenterology IP Consult: ruq pain, ?cholangitis vs PUD; Consultant may enter orders: Yes; Patient to be seen: Routine - within 24 hours; Call back #: Call Coni at [phone] if questions; Requested Clinic/Group: [facility]... [[id number]] ordered by Jasper Newberry MD at 10/11/17 2000           Attestation signed by Alex Melvin DO at 10/12/2017  3:59 PM (Updated)        Physician Attestation   IAlex, have reviewed and discussed with the advanced practice provider their history, physical and plan for Rupinder Tracy. I did not participate in a shared visit by interviewing or examining the patient and this should be billed as an advanced practice provider only visit.    Pt unavailable for interview - having MRCP.  Agree with Mr. Winn's assessment and plan  with working dx of possible retained stone/biliary obstruction, currently without evidence of SIRS/sepsis/cholangitis.     Alex Melvin  Date of Service (when I saw the patient): I did not personally see this patient today.                               GASTROENTEROLOGY CONSULTATION     Rupinder Tracy   [address]  [facility] [id number]   77 year old female   Admission Date/Time: 10/11/2017   Encounter Date: 10/12/2017  Primary Care Provider: Abran Mott     Referring / Attending Physician: Jasper Newberry   We were asked to see the patient in consultation by Dr. Newberry for evaluation of possible cholangitis.     HPI: Rupinder Tracy is a 77 year old female with a past medical history significant for HTN, HLD, PVD, mitral valve prolapse, pulmonary hypertension, leagl blindness and previous duodenal ulcer who presented to the ED for evaluation of RUQ pain. She began having pain two days ago which would originate in her RUQ and radiate to her sternum. She tried eating later that day but it made the pain worse so she came in to the ED for further evaluation.  In the ED she was found to be hemodynamically stable. Alk phose was elevated at 215, AST 89, total bili 2.1 and a normal white count of 5.9. Ultrasound was done which showed a prior cholecystectomy and dilation of intrahepatic and common bile duct similar to prior CT in 2016. There was also a ringdown artifact in the intrahepatic duct which could indicated air in the biliary tree. She was admitted to the floor and started on Zosyn. GI was consulted for further recommendations.[AB1.1]   This morning the patient continues to have abdominal pain mainly in her RUQ/Epigastrium but it also radiates to her sternum. She denies radiation to her back. She denies nausea or vomiting. Her appetite has been decreased since her pain began. She denies vomiting.[AB1.2]     Past Medical History[AB1.1]  Past Medical History:   Diagnosis Date      Arthritis      Cellulitis 4/13 in [place]    R ankle     Difficult airway for intubation      Ductal Carcinoma in Situ of Left Breast 9/09     Duodenal ulcer, unspecified as acute or chronic, without mention of hemorrhage or perforation 1980's    felt due to high dose steroids     Female stress incontinence      H/O right and left heart catheterization     1-     HTN      HYPERLIPIDEMIA      Ischemic colitis 7/2001    [facility]     Lactose Intolerance     Mild     Left Ventricular Hypertrophy      Legal blindness     Artificial right eye, severe vision loss left (Detached retina's, glaucoma, corneal transplants)     Mild intermittent asthma ~1980's     MITRAL VALVE Prolapse, with murmur      Nocturnal oxygen desaturation      Osteoporosis, unspecified ~2002     Patent foramen ovale      PERIPHERAL VASCULAR DISEASE 5/05    mesenteric arteries, angioplasty     PRESBYESOPHAGUS 6/04     Pulmonary HTN 11/2014     Serous retinal detachment     False eye right     Subarachnoid hemorrhage following injury (H) 12/10    due to fall, vascular evaluation negative     Syncope and collapse 12/10     Unspecified glaucoma(365.9)[AB1.3]        Past Surgical History[AB1.1]  Past Surgical History:   Procedure Laterality Date     BIOPSY       C APPENDECTOMY  1982     C NONSPECIFIC PROCEDURE  1945    adenoidectomy     C NONSPECIFIC PROCEDURE      bilat retinal detachment     C NONSPECIFIC PROCEDURE      blephoroplasty bilat     C NONSPECIFIC PROCEDURE  1997    glaucoma procedure L     C NONSPECIFIC PROCEDURE  1997, 2007, 2014    corneal transplant L     C NONSPECIFIC PROCEDURE  1945    strabismus procedure R eye     C NONSPECIFIC PROCEDURE  1960's    R breast bx     C NONSPECIFIC PROCEDURE  5/05    Angioplasty and stenting mesenteric vessels     C NONSPECIFIC PROCEDURE  2008    L corneal transplant     COLONOSCOPY      due 2015     HC REMOVAL GALLBLADDER  1982    Cholecystectomy     HEART CATH RIGHT AND LEFT HEART CATH  1/19/15      HERNIORRHAPHY VENTRAL N/A 4/19/2016    Procedure: HERNIORRHAPHY VENTRAL;  Surgeon: Hamlet Ness MD;  Location: [facility] OR     MASTECTOMY SIMPLE BILATERAL  10/5/09    bilateral mastectomy     SURGICAL HISTORY OF -   6/2010    breast reconstruction[AB1.3]       Family History[AB1.1]  Family History   Problem Relation Age of Onset     Adopted: Yes     C.A.D. Paternal Uncle      MI 50's     Family History Negative Daughter[AB1.3]        Social History[AB1.1]  Social History     Social History     Marital status:      Spouse name: N/A     Number of children: 2     Years of education: N/A     Occupational History     Retired nurse Retired     Social History Main Topics     Smoking status: Never Smoker     Smokeless tobacco: Never Used     Alcohol use Yes      Comment: 3 glasses of wine a week     Drug use: No     Sexual activity: No     Other Topics Concern     Caffeine Concern No     1- 2 cups coffee     Sleep Concern No     Stress Concern No     Weight Concern No     Special Diet Yes     low sodium     Exercise No     2 days a week (abigail chi), walking program, stationary bike 10 minutes 3 times per week     Seat Belt Yes     Social History Narrative    , 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  She walks with a walker.  Is a full code.  (last updated 10/11/2017)[AB1.3]        Medications  Prior to Admission medications    Medication Sig Start Date End Date Taking? Authorizing Provider   ROPINIRole HCl (REQUIP PO) Take 0.25 mg by mouth daily 3PM   Yes Unknown, Entered By History   traMADol (ULTRAM) 50 MG tablet TAKE UP TO 2 TABLETS BY MOUTH 2 TIMES DAILY. LIMIT TO 4 TABLET DAILY, ON AVERAGE 10/3/17  Yes Abran Mott MD   olmesartan (BENICAR) 40 MG tablet Take 1 tablet (40 mg) by mouth daily 9/21/17  Yes Jae Butler MD   omeprazole (PRILOSEC) 20 MG CR capsule TAKE 1 CAPSULE (20 MG) BY MOUTH 2 TIMES DAILY 7/11/17  Yes Abran Mott MD    bumetanide (BUMEX) 2 MG tablet Take 1 tablet (2 mg) by mouth daily 3/22/17  Yes Markell Giles PA-C   Cholecalciferol (VITAMIN D) 2000 UNITS tablet Take 2,000 Units by mouth 2 times daily 2/16/17  Yes Abran Mott MD   rOPINIRole (REQUIP) 0.25 MG tablet Take 0.5 mg by mouth At Bedtime  2/16/17  Yes Abran Mott MD   fluticasone-salmeterol (ADVAIR DISKUS) 250-50 MCG/DOSE diskus inhaler Inhale 1 puff into the lungs 2 times daily 2/2/17  Yes Abran Mott MD   Lactobacillus Rhamnosus, GG, (CULTURELLE PO) Take 1 tablet by mouth Three times a week   Yes Reported, Patient   denosumab (PROLIA) 60 MG/ML SOLN Inject 60 mg Subcutaneous every 6 months   Yes Reported, Patient   Calcium-Vitamin D-Vitamin K (CALCIUM + D) 500-1000-40 MG-UNT-MCG CHEW Take 2 tablets by mouth daily    Yes Reported, Patient   DOXYCYCLINE HYCLATE PO Take 50 mg by mouth 3 times per week Monday,wednesday, friday   Yes Reported, Patient   prednisoLONE acetate (PRED FORTE) 1 % ophthalmic suspension Place 1 drop Into the left eye 4 times daily  6/3/14  Yes Abran Mott MD   albuterol (ALBUTEROL) 108 (90 BASE) MCG/ACT inhaler Inhale 2 puffs into the lungs every 4 hours as needed 12/9/13  Yes Abran Mott MD   RESTASIS 0.05 % OP EMUL 1 drop to left eye twice daily 6/24/10  Yes Abran Mott MD   SYSTANE 0.4-0.3 % OP SOLN 1 drop to left eye 4 times daily 9/28/09  Yes Abran Mott MD   EYE-VITES OR TABS Hydro-Eye with Lutein 2 tabs twice a day (Rx from [facility])   Yes Abran Mott MD   order for DME 2L of O2 at night    Reported, Patient       Allergies:[AB1.1]  Atorvastatin calcium; Celecoxib; Cholestyramine; Crestor [rosuvastatin calcium]; Cyclobenzaprine hcl; Dulera; Gabapentin; Lisinopril; Meperidine hcl; Morphine; Naprosyn [naproxen]; Niaspan [niacin]; Percocet [oxycodone-acetaminophen]; Pravastatin; Red yeast rice [cholestin]; Simvastatin; Timolol maleate; Hctz; and Sulfa  "drugs[AB1.3]    ROS: A ten point review of systems was obtained and negative other than the symptoms noted above in the HPI.     Physical Exam:[AB1.1]   BP 99/55 (BP Location: Left arm)  Pulse 81  Temp 97.3  F (36.3  C) (Oral)  Resp 16  Ht 1.626 m (5' 4\")  Wt 72.3 kg (159 lb 6.3 oz)  LMP 01/01/1990  SpO2 94%  BMI 27.36 kg/m2[AB1.3]   Constitutional:[AB1.1] elderly[AB1.2], alert, no acute distress  Cardiovascular: regular rate and rhythm, no murmurs,rubs or gallops  Respiratory: clear to auscultation bilaterally  Psychiatric: normal pleasant affect  Head: atraumatic, normocephalic  Neck: supple, no thyromegaly  ENT: mucous membranes are moist, no oral lesions are noted  Abdomen: soft, tender, non-distended, normally active bowel sound. No masses or hepatosplenomegaly is appreciated. No rebound tenderness or guarding  Neuro: Neurologically intact grossly  Skin: warm, dry, no rashes are noted    ADDITIONAL COMMENTS:   I reviewed the patient's new clinical lab test results.[AB1.1]   Recent Labs   Lab Test  10/11/17   1515  10/14/16   1627  02/12/16   0957   01/19/15   0834   07/02/10   1445   WBC  5.9  7.1  5.9   < >  5.5   < >  5.8   HGB  12.2  12.5  13.4   < >  13.9   < >  13.2   MCV  97  98  98   < >  99   < >  99   PLT  194  199  282   < >  216   < >  175   INR   --    --    --    --   0.95   --   1.01    < > = values in this interval not displayed.      Recent Labs   Lab Test  10/11/17   1515  05/10/17   0945  03/29/17   1328   NA  141  141  146*   POTASSIUM  4.3  3.5  3.8   CHLORIDE  106  105  108*   CO2  25  27  28   BUN  41*  28  30   CR  1.25*  1.30  1.33*   ANIONGAP  10  12.5  13.8   AXEL  9.0  9.7  10.0      Recent Labs   Lab Test  10/11/17   1644  10/11/17   1515  02/02/17   1550  02/12/16   0957 01/05/16 11/12/14   1021   10/21/13   1050  10/21/13   0945   ALBUMIN   --   3.2*   --   3.2*   --   3.7   --    --   3.8   BILITOTAL   --   2.1*   --   0.7   --   0.6   < >   --   0.7   ALT   --   50   --   " 25   --   19   < >   --   59*   AST   --   89*   --   34  25  20   < >   --   67*   ALKPHOS   --   215*   --   238*   --   79   < >   --   47   PROTEIN  Negative   --   Negative   --    --    --    --   30*   --    LIPASE   --   144   --   153   --    --    --    --   83    < > = values in this interval not displayed.[AB1.3]      10/11/17 Ultrasound abdomen  FINDINGS:  Liver is normal in echogenicity without focal lesions.  Prominent intrahepatic bile ducts are present with ringdown artifact  possibly indicating pneumobilia. Prior CT from February 2016 showed  dilated intrahepatic and common bile duct as well. These findings may  be related to prior cholecystectomy changes. Question whether patient  has had prior sphincterotomy procedure to account for the potential  air in the biliary system. Otherwise cholangitis is possible. The  gallbladder is surgically absent. Common bile duct is normal in  diameter. Pancreas is normal where visualized. Examination of the  right kidney is unremarkable.[AB1.2]    Assessment:[AB1.1]  77 year old female presenting with acute abdominal pain. Found to have elevated total bili and alkphos. Ultrasound showed biliary dilation that appears stable when compared with last year but there is a possible ringdown artifact indicating possible pneumobilia. She has not had an ERCP before and her cholecystectomy was over 30 years ago. Her white count his normal and she is afebrile so I doubt current cholangitis. A retained stone or sludge is possible. PUD is also in the differential, however she has been maintained on a PPI as an outpatient for her reflux symptoms.[AB1.2]     Plan:[AB1.1]   -NPO  -MRCP today  -Will consider EUS/ERCP depending on MRCP results  -Follow LFTs  -Abx per IM  -Continue PPI IV BID for now  -GI following[AB1.2]    I discussed the patient's findings and plan with .[AB1.1] Alex Melvin[AB1.2] who will also independently see and examine the patient.     Sam Winn,  MORENA  [facility]  Cell:  [phone] Monday through Friday [id number]  Office: [phone][AB1.1]       Revision History        User Key Date/Time User Provider Type Action    > AB1.2 10/12/2017 10:30 AM Sam Winn PA-C Physician Assistant Sign     AB1.3 10/12/2017  9:19 AM Sam Winn PA-C Physician Assistant      AB1.1 10/12/2017  9:18 AM Sam Winn PA-C Physician Assistant                      Progress Notes - Physician (Notes from 10/13/17 through 10/16/17)      Progress Notes by Katelin Royal LICSW at 10/16/2017 11:09 AM     Author:  Katelin Royal LICSW Service:  (none) Author Type:      Filed:  10/16/2017  2:06 PM Date of Service:  10/16/2017 11:09 AM Creation Time:  10/16/2017 11:09 AM    Status:  Addendum :  Katelin Royal LICSW ()         SW  D:  Patient ready for d/c today.  TCU is recommended. CC spoke with patient and patient is accepting of entering a TCU.  Her preference is a semi-private/double room at [facility].  Referral made to [facility] thru DOD process, awaiting to hear they have a vacancy for today.[KH1.1]    1400 Update:  Patient accepted at [facility] TCU into a semi-private room.  She has a friend who will transport her. Her friend knows where [facility] is located.  Writer reviewed Medicare SNF benefit and what to expect at the TCU.[KH1.2]       Revision History        User Key Date/Time User Provider Type Action    > KH1.2 10/16/2017  2:06 PM Katelin Royal LICSW  Addend     KH1.1 10/16/2017 11:10 AM Katelin Royal LICSW  Sign            Progress Notes by Roxane Lawson RN at 10/16/2017 10:57 AM     Author:  Roxane Lawson RN Service:  Care Coordinator Author Type:      Filed:  10/16/2017 11:14 AM Date of Service:  10/16/2017 10:57 AM Creation Time:  10/16/2017 10:57 AM    Status:  Signed :  Roxane Lawson RN ()         Care  Coordinator met with pt, with Dr Tan, concerning planning for discharge today.  PT is recommending TCU and noted pt wanted to discharge home.  After further discussion, pt was in agreement for TCU and would like [facility].  SW was updated for referral processing.[HN1.1]     Revision History        User Key Date/Time User Provider Type Action    > HN1.1 10/16/2017 11:14 AM Roxane Lawson, RN Case Manager Sign            Progress Notes by Sanket Claudio at 10/16/2017 10:39 AM     Author:  Sanket Claudio Service:  Spiritual Health Author Type:      Filed:  10/16/2017 10:46 AM Date of Service:  10/16/2017 10:39 AM Creation Time:  10/16/2017 10:39 AM    Status:  Signed :  Sanket Claudio ()         SPIRITUAL HEALTH SERVICES Progress Note  [facility] 64    D: SH, LOS visit. The patient talked about possible discharge and wanting to get home. With emphasis the patient told about being grateful for medical care, home, and [employer] community. The patient recounted life stories of mother's death in childbirth and being raised by a stepmom. The patient talked about here involvement in a spiritual healing ministry at her [employer], every other week.      I:  offered non judegemental reflective listieng.  prayed and offered a scripture blessing.      A: patient is coping well and looks forward to being home in a supportive cooperative community living, support from [employer] friends from spiritual healing ministry (Merritt Iraheta), and some support from stepmothers family    P:  has no further plans          Sanket Claudio  Chaplain Resident[DC1.1]     Revision History        User Key Date/Time User Provider Type Action    > DC1.1 10/16/2017 10:46 AM Sanket Claudioin Sign            Progress Notes by Mabel Watson PT at 10/15/2017  2:33 PM     Author:  Mabel Watson PT Service:  (none) Author Type:  Physical Therapist    Filed:  10/15/2017  2:33 PM Date of Service:  10/15/2017  2:33 PM  Creation Time:  10/15/2017  2:33 PM    Status:  Signed :  Mabel Watson, PT (Physical Therapist)            10/15/17 1400   Quick Adds   Type of Visit Initial PT Evaluation   Living Environment   Lives With alone   Living Arrangements apartment;independent living facility  ([place] [facility]   Home Accessibility no concerns   Number of Stairs to Enter Home 0   Number of Stairs Within Home 0   Self-Care   Usual Activity Tolerance moderate   Current Activity Tolerance fair   Regular Exercise no   Equipment Currently Used at Home walker, rolling  (only long distances, no AD in the apartment)   Functional Level Prior   Ambulation 1-->assistive equipment   Transferring 1-->assistive equipment   Fall history within last six months yes   Number of times patient has fallen within last six months 1   Which of the above functional risks had a recent onset or change? ambulation;transferring   General Information   Onset of Illness/Injury or Date of Surgery - Date 10/11/17   Referring Physician Malcolm Singh MD   Patient/Family Goals Statement return home   Pertinent History of Current Problem (include personal factors and/or comorbidities that impact the POC) Admitted with R UQ pain, had ERCP on 10/13 with biliary stone extraction. Now with SOB, fluid overload. PMH: CHF, asthma.   Precautions/Limitations fall precautions   Weight-Bearing Status - LLE full weight-bearing   Weight-Bearing Status - RLE full weight-bearing   Cognitive Status Examination   Orientation orientation to person, place and time   Level of Consciousness alert   Follows Commands and Answers Questions 100% of the time;able to follow multistep instructions   Personal Safety and Judgment intact   Memory intact   Pain Assessment   Patient Currently in Pain No   Integumentary/Edema   Integumentary/Edema Comments B LE 4+ edema. Pt has compression socks at home. Stated she has used wraps in the past. Declines using wraps here and would  "prefer to wait until she got home.   Posture    Posture Forward head position;Protracted shoulders   Range of Motion (ROM)   ROM Quick Adds No deficits were identified   Strength   Strength Comments B hip flex 3+/5, knee ext 4/5, DF 4/5. Pt endorses feeling weaker.   Bed Mobility   Bed Mobility Comments NT, pt in chair   Transfer Skills   Transfer Comments Sit to stand with SBA and FWW   Gait   Gait Comments Pt amb 10 ft with FWW and SBA, steady balance   Balance   Balance Comments Balance steady with FWW   Sensory Examination   Sensory Perception Comments Denies numbness or tingling   General Therapy Interventions   Planned Therapy Interventions bed mobility training;gait training;neuromuscular re-education;strengthening;transfer training   Clinical Impression   Criteria for Skilled Therapeutic Intervention yes, treatment indicated   PT Diagnosis Difficulty ambulating   Influenced by the following impairments Dec strength, balance, activity tolerance, SOB   Functional limitations due to impairments Difficulty ambulating and transferring   Clinical Presentation Stable/Uncomplicated   Clinical Presentation Rationale medically improved   Clinical Decision Making (Complexity) Low complexity   Therapy Frequency` daily   Predicted Duration of Therapy Intervention (days/wks) 4 days   Anticipated Discharge Disposition Home with Home Therapy   Risk & Benefits of therapy have been explained Yes   Patient, Family & other staff in agreement with plan of care Yes   [facility] [employer] TM \"6 Clicks\"   2016, Trustees of [facility], under license to [employer]..  All rights reserved.   6 Clicks Short Forms Basic Mobility Inpatient Short Form   [facility] [employer] [facility]  \"6 Clicks\" V.2 Basic Mobility Inpatient Short Form   1. Turning from your back to your side while in a flat bed without using bedrails? 3 - A Little   2. Moving from lying on your back to sitting on the side of a flat bed without using bedrails? 3 - A " Little   3. Moving to and from a bed to a chair (including a wheelchair)? 3 - A Little   4. Standing up from a chair using your arms (e.g., wheelchair, or bedside chair)? 3 - A Little   5. To walk in hospital room? 3 - A Little   6. Climbing 3-5 steps with a railing? 2 - A Lot   Basic Mobility Raw Score (Score out of 24.Lower scores equate to lower levels of function) 17   Total Evaluation Time   Total Evaluation Time (Minutes) 15[EW1.1]        Revision History        User Key Date/Time User Provider Type Action    > EW1.1 10/15/2017  2:33 PM Mabel Watson, PT Physical Therapist Sign            Progress Notes by Harrison Gilbert at 10/15/2017  1:32 PM     Author:  Harrison Gilbert Service:  (none) Author Type:  Nursing Student    Filed:  10/15/2017  1:46 PM Date of Service:  10/15/2017  1:32 PM Creation Time:  10/15/2017  1:32 PM    Status:  Attested :  Harrison Gilbert (Nursing Student)    Cosigner:  Mandy Roberts RN at 10/15/2017  2:14 PM        Attestation signed by Mandy Roberts RN at 10/15/2017  2:14 PM        Approved student nurse charting. Mandy Roberts RN                                Pt AOx4. VSS. SBA with gait belt and walker. Pt tolerating regular diet.[HD1.1] After weight taken[HD1.2] yesterday, pt has gained 18 lbs. MD notified. Pt is now taking diuretics and anti-hypertensive medication. Pt abulated about 20 ft. While ambulating, pt c/p of CRUZ[HD1.1]. 89% RA after walking[HD1.3].[HD1.1] Pt oxygen saturation returned to 98% within minutes.[HD1.3]      Revision History        User Key Date/Time User Provider Type Action    > HD1.3 10/15/2017  1:46 PM Harrison Gilbert Nursing Student Sign     HD1.2 10/15/2017  1:42 PM Harrison Gilbert Nursing Student      HD1.1 10/15/2017  1:32 PM Harrison Gilbert Nursing Student             Progress Notes by Malcolm Singh DO at 10/15/2017  8:52 AM     Author:  Malcolm Singh DO Service:  Hospitalist Author Type:  Physician    Filed:  10/15/2017 12:07 PM Date of Service:   "10/15/2017  8:52 AM Creation Time:  10/15/2017  8:52 AM    Status:  Signed :  Malcolm Singh DO (Physician)         [facility]  Hospitalist Progress Note        Malcolm Singh DO  10/15/2017        Interval History:[ZA1.1]      Patient states she is feeling short of breath and fluid overloaded today.[ZA1.2]          Assessment and Plan:        Rupinder Tracy is a 77 year old female who was admitted on 10/11/2017 with RUQ abdominal pain.      RUQ pain: MRCP with sludge in bile duct, ERCP completed, see report. Pt is s/p cholecystectomy. RUQ US demonstrates possible air in biliary tree, concerning for cholangitis or prior sphincterotomy.  - GI following.   - Continue dilaudid.      - S/p ERCP 10/13 with biliary stone extraction (prior alexi).     [ZA1.1]  Chronic diastolic heart failure: Follows with Dr. Butler in cardiology clinic.   - Resumed pta bumex 10/15.[ZA1.2]     Mild persistent asthma - Stable.   - home Advair.    - Continue albuterol prn.      RLS - Continue Requip      DVT Prophylaxis: Pneumatic Compression Devices     Code: Full Code      Disposition: Expected discharge in 1[ZA1.1] day pending improvement in activity and respiratory status.[ZA1.2]                    Physical Exam:      Heart Rate: 65    Blood pressure 106/51, pulse 77, temperature 97.8  F (36.6  C), temperature source Oral, resp. rate 16, height 1.626 m (5' 4\"), weight 73.9 kg (162 lb 14.7 oz), last menstrual period 1990, SpO2 98 %.    Vitals:    10/11/17 2022 10/12/17 0700 10/14/17 0654   Weight: 71.7 kg (158 lb 1.1 oz) 72.3 kg (159 lb 6.3 oz) 73.9 kg (162 lb 14.7 oz)       Vital Sign Ranges  Temperature Temp  Av.8  F (36.6  C)  Min: 97.4  F (36.3  C)  Max: 98.4  F (36.9  C)   Blood pressure Systolic (24hrs), Av , Min:104 , Max:137        Diastolic (24hrs), Av, Min:46, Max:56      Pulse Pulse  Av  Min: 77  Max: 77   Respirations Resp  Av  Min: 16  Max: 16   Pulse " oximetry SpO2  Av.3 %  Min: 96 %  Max: 98 %     Vital Signs with Ranges  Temp:  [97.4  F (36.3  C)-98.4  F (36.9  C)] 97.8  F (36.6  C)  Pulse:  [77] 77  Heart Rate:  [63-65] 65  Resp:  [16] 16  BP: (104-137)/(46-56) 106/51  SpO2:  [96 %-98 %] 98 %    I/O Last 3 Shifts:   I/O last 3 completed shifts:  In: 3440 [P.O.:970; I.V.:2470]  Out: 425 [Urine:425]    I/O past 24 hours:     Intake/Output Summary (Last 24 hours) at 10/15/17 0829  Last data filed at 10/15/17 0430   Gross per 24 hour   Intake             2857 ml   Output                0 ml   Net             2857 ml     GENERAL: Alert and oriented. NAD. Conversational, appropriate.   HEENT: Normocephalic. EOMI. No icterus or injection. Nares normal.   LUNGS: Clear to auscultation[ZA1.1], mild decrement[ZA1.2]. No dyspnea at rest.   HEART: Regular rate. Extremities perfused.   ABDOMEN: Soft, mild RUQ tenderness, and nondistended. Positive bowel sounds.   EXTREMITIES:[ZA1.1] Mild[ZA1.2] LE edema noted.   NEUROLOGIC: Moves extremities x4 on command. No acute focal neurologic abnormalities noted.          Prior to Admission Medications:        Prescriptions Prior to Admission   Medication Sig Dispense Refill Last Dose     ROPINIRole HCl (REQUIP PO) Take 0.25 mg by mouth daily 3PM        traMADol (ULTRAM) 50 MG tablet TAKE UP TO 2 TABLETS BY MOUTH 2 TIMES DAILY. LIMIT TO 4 TABLET DAILY, ON AVERAGE 100 tablet 1  at PRN     olmesartan (BENICAR) 40 MG tablet Take 1 tablet (40 mg) by mouth daily 90 tablet 2 Past Week at Unknown time     omeprazole (PRILOSEC) 20 MG CR capsule TAKE 1 CAPSULE (20 MG) BY MOUTH 2 TIMES DAILY 180 capsule 1 10/10/2017 at Unknown time     bumetanide (BUMEX) 2 MG tablet Take 1 tablet (2 mg) by mouth daily 30 tablet 11 Past Week at Unknown time     Cholecalciferol (VITAMIN D) 2000 UNITS tablet Take 2,000 Units by mouth 2 times daily   Past Week at Unknown time     rOPINIRole (REQUIP) 0.25 MG tablet Take 0.5 mg by mouth At Bedtime    Past Week  at Unknown time     fluticasone-salmeterol (ADVAIR DISKUS) 250-50 MCG/DOSE diskus inhaler Inhale 1 puff into the lungs 2 times daily 60 Inhaler 1 10/11/2017 at Unknown time     Lactobacillus Rhamnosus, GG, (CULTURELLE PO) Take 1 tablet by mouth Three times a week   Past Week at Unknown time     denosumab (PROLIA) 60 MG/ML SOLN Inject 60 mg Subcutaneous every 6 months   Taking     Calcium-Vitamin D-Vitamin K (CALCIUM + D) 500-1000-40 MG-UNT-MCG CHEW Take 2 tablets by mouth daily    Past Week at Unknown time     DOXYCYCLINE HYCLATE PO Take 50 mg by mouth 3 times per week Monday,wednesday, friday   Past Week at Unknown time     prednisoLONE acetate (PRED FORTE) 1 % ophthalmic suspension Place 1 drop Into the left eye 4 times daily    10/11/2017 at Unknown time     albuterol (ALBUTEROL) 108 (90 BASE) MCG/ACT inhaler Inhale 2 puffs into the lungs every 4 hours as needed 1 Inhaler 5  at PRN     RESTASIS 0.05 % OP EMUL 1 drop to left eye twice daily   10/11/2017 at Unknown time     SYSTANE 0.4-0.3 % OP SOLN 1 drop to left eye 4 times daily   10/11/2017 at Unknown time     EYE-VITES OR TABS Hydro-Eye with Lutein 2 tabs twice a day (Rx from [facility])   Past Week at Unknown time     order for DME 2L of O2 at night   Taking            Medications:        Current Facility-Administered Medications   Medication Last Rate     NaCl 75 mL/hr at 10/15/17 0430     Current Facility-Administered Medications   Medication Dose Route Frequency     sodium chloride (PF)  3 mL Intracatheter Q8H     pantoprazole  40 mg Intravenous BID AC     fluticasone-salmeterol  1 puff Inhalation BID     multivitamin  with lutein  1 capsule Oral Daily     polyethylene glycol 0.4%- propylene glycol 0.3%  1 drop Left Eye 4x Daily     rOPINIRole  0.5 mg Oral At Bedtime     cycloSPORINE  1 drop Left Eye BID     prednisoLONE acetate  1 drop Left Eye 4x Daily     piperacillin-tazobactam  2.25 g Intravenous Q6H     lidocaine  1 patch Transdermal Q24h     And     lidocaine   Transdermal Q24H    And     lidocaine   Transdermal Q8H     rOPINIRole  0.25 mg Oral Daily     Current Facility-Administered Medications   Medication Dose Route Frequency     sodium chloride (PF)  3 mL Intracatheter Q1H PRN     albuterol  2 puff Inhalation Q4H PRN     traMADol  50 mg Oral Q6H PRN     naloxone  0.1-0.4 mg Intravenous Q2 Min PRN     acetaminophen  650 mg Oral Q4H PRN     polyethylene glycol  17 g Oral Daily PRN     ondansetron  4 mg Oral Q6H PRN    Or     ondansetron  4 mg Intravenous Q6H PRN     prochlorperazine  5 mg Intravenous Q6H PRN    Or     prochlorperazine  5 mg Oral Q6H PRN    Or     prochlorperazine  12.5 mg Rectal Q12H PRN     HYDROcodone-acetaminophen  1-2 tablet Oral Q4H PRN     HYDROmorphone  0.3-0.5 mg Intravenous Q2H PRN     LORazepam  0.25-0.5 mg Oral At Bedtime PRN            Data:      Lab data reviewed.     Recent Labs  Lab 10/14/17  0820 10/13/17  0810 10/12/17  1437 10/12/17  0927 10/11/17  1515   HGB 11.7 11.4*  --  12.3 12.2   MCV 99 99  --  99 97    168  --  184 194   INR  --   --  1.09  --   --    * 142  --  143 141   POTASSIUM 4.0 4.1  --  4.3 4.3   CHLORIDE 115* 111*  --  109 106   CO2 20 23  --  25 25   BUN 30 38*  --  40* 41*   CR 1.19* 1.47*  --  1.47* 1.25*   ANIONGAP 10 8  --  9 10   AXEL 7.9* 8.1*  --  8.6 9.0   GLC 91 85  --  91 101*   TROPI  --   --   --   --  <0.015           Imaging:      Imaging data reviewed.     Dr. Malcolm Singh D.O.  [place] Hospitalist  Pager [phone][ZA1.1]       Revision History        User Key Date/Time User Provider Type Action    > ZA1.2 10/15/2017 12:07 PM Malcolm Singh DO Physician Sign     ZA1.1 10/15/2017  8:52 AM Malcolm Singh, DO Physician             Progress Notes by Harrison Gilbert at 10/14/2017 12:58 PM     Author:  Harrison Gilbert Service:  (none) Author Type:  Nursing Student    Filed:  10/14/2017  1:54 PM Date of Service:  10/14/2017 12:58 PM Creation Time:   10/14/2017 12:58 PM    Status:  Attested :  Harrison Gilbert (Nursing Student)    Cosigner:  Mandy Roberts, RN at 10/14/2017  1:54 PM        Attestation signed by Mandy Roberts, RN at 10/14/2017  1:54 PM        Approved student nurse charting. Mandy Roberts, RN                                Pt AOx4. VSS. SBA. Pt advanced from clear liquid diet to full liquid diet. During shift, pt had an episode of rigor and chills. Afebrile. Pt given PRN Dilaudid at 0954 and warm blankets as treatment. Multiple small loose stools throughout shift.[HD1.1] Pt ambulated about[HD1.2] 30 ft. While walking, pt reported shortness of breath more than her baseline at home.[HD1.3] Plans for discharge to home upon advancing to regular diet.[HD1.1]      Revision History        User Key Date/Time User Provider Type Action    > HD1.3 10/14/2017  1:54 PM Harrison Gilbert Nursing Student Sign     HD1.2 10/14/2017  1:47 PM Harrison Gilbert Nursing Student Incomplete Revision     HD1.1 10/14/2017  1:13 PM Harrison Gilbert Nursing Student Sign            Progress Notes by Malcolm Singh DO at 10/14/2017  8:36 AM     Author:  Malcolm Singh DO Service:  Hospitalist Author Type:  Physician    Filed:  10/14/2017 11:51 AM Date of Service:  10/14/2017  8:36 AM Creation Time:  10/14/2017  8:36 AM    Status:  Signed :  Malcolm Singh DO (Physician)         [facility]  Hospitalist Progress Note        Malcolm Singh DO  10/14/2017        Interval History:[ZA1.1]      Patient states she is feeling better. Discussed plan of care, verbalized understanding.[ZA1.2]          Assessment and Plan:        Rupinder Tracy is a 77 year old female who was admitted on 10/11/2017 with RUQ abdominal pain.     RUQ pain[ZA1.1]:[ZA1.2] MRCP[ZA1.1] with[ZA1.2] sludge in bile duct, ERCP[ZA1.1] completed, see report.[ZA1.2] Pt is s/p cholecystectomy. RUQ US demonstrates possible air in biliary tree, concerning for cholangitis or prior  "sphincterotomy.[ZA1.1]  - GI following.   -[ZA1.2] Continue Zosyn and dilaudid.[ZA1.1]      - S/p ERCP 10/13 with biliary stone extraction (prior alexi).[ZA1.2]      Mild persistent asthma -[ZA1.1] Stable.   -[ZA1.2] home Advair.[ZA1.1]    -[ZA1.2] Continue albuterol prn.     RLS - Continue Requip     DVT Prophylaxis: Pneumatic Compression Devices    Code: Full Code     Disposition: Expected discharge in[ZA1.1] 1-2[ZA1.2] days pending recovery.                   Physical Exam:      Heart Rate: 61    Blood pressure 103/57, pulse 81, temperature 97.8  F (36.6  C), temperature source Oral, resp. rate 14, height 1.626 m (5' 4\"), weight 73.9 kg (162 lb 14.7 oz), last menstrual period 1990, SpO2 98 %.    Vitals:    10/11/17 2022 10/12/17 0700 10/14/17 0654   Weight: 71.7 kg (158 lb 1.1 oz) 72.3 kg (159 lb 6.3 oz) 73.9 kg (162 lb 14.7 oz)       Vital Sign Ranges  Temperature Temp  Av.9  F (36.1  C)  Min: 96.3  F (35.7  C)  Max: 97.8  F (36.6  C)   Blood pressure Systolic (24hrs), Av , Min:83 , Max:128        Diastolic (24hrs), Av, Min:44, Max:60      Pulse No Data Recorded   Respirations Resp  Av.3  Min: 13  Max: 22   Pulse oximetry SpO2  Av.4 %  Min: 98 %  Max: 100 %     Vital Signs with Ranges  Temp:  [96.3  F (35.7  C)-97.8  F (36.6  C)] 97.8  F (36.6  C)  Heart Rate:  [53-69] 61  Resp:  [13-22] 14  BP: ()/(44-60) 103/57  SpO2:  [98 %-100 %] 98 %    I/O Last 3 Shifts:   I/O last 3 completed shifts:  In: 1796 [P.O.:145; I.V.:1651]  Out: 625 [Urine:625]    I/O past 24 hours:     Intake/Output Summary (Last 24 hours) at 10/14/17 0836  Last data filed at 10/14/17 0804   Gross per 24 hour   Intake             2354 ml   Output              850 ml   Net             1504 ml     GENERAL: Alert and oriented. NAD. Conversational, appropriate.   HEENT: Normocephalic. EOMI. No icterus or injection. Nares normal.   LUNGS: Clear to auscultation. No dyspnea at rest.   HEART: Regular rate. Extremities " perfused.   ABDOMEN: Soft, mild RUQ tenderness, and nondistended. Positive bowel sounds.   EXTREMITIES: No LE edema noted.   NEUROLOGIC: Moves extremities x4 on command. No acute focal neurologic abnormalities noted.          Prior to Admission Medications:        Prescriptions Prior to Admission   Medication Sig Dispense Refill Last Dose     ROPINIRole HCl (REQUIP PO) Take 0.25 mg by mouth daily 3PM        traMADol (ULTRAM) 50 MG tablet TAKE UP TO 2 TABLETS BY MOUTH 2 TIMES DAILY. LIMIT TO 4 TABLET DAILY, ON AVERAGE 100 tablet 1  at PRN     olmesartan (BENICAR) 40 MG tablet Take 1 tablet (40 mg) by mouth daily 90 tablet 2 Past Week at Unknown time     omeprazole (PRILOSEC) 20 MG CR capsule TAKE 1 CAPSULE (20 MG) BY MOUTH 2 TIMES DAILY 180 capsule 1 10/10/2017 at Unknown time     bumetanide (BUMEX) 2 MG tablet Take 1 tablet (2 mg) by mouth daily 30 tablet 11 Past Week at Unknown time     Cholecalciferol (VITAMIN D) 2000 UNITS tablet Take 2,000 Units by mouth 2 times daily   Past Week at Unknown time     rOPINIRole (REQUIP) 0.25 MG tablet Take 0.5 mg by mouth At Bedtime    Past Week at Unknown time     fluticasone-salmeterol (ADVAIR DISKUS) 250-50 MCG/DOSE diskus inhaler Inhale 1 puff into the lungs 2 times daily 60 Inhaler 1 10/11/2017 at Unknown time     Lactobacillus Rhamnosus, GG, (CULTURELLE PO) Take 1 tablet by mouth Three times a week   Past Week at Unknown time     denosumab (PROLIA) 60 MG/ML SOLN Inject 60 mg Subcutaneous every 6 months   Taking     Calcium-Vitamin D-Vitamin K (CALCIUM + D) 500-1000-40 MG-UNT-MCG CHEW Take 2 tablets by mouth daily    Past Week at Unknown time     DOXYCYCLINE HYCLATE PO Take 50 mg by mouth 3 times per week Monday,wednesday, friday   Past Week at Unknown time     prednisoLONE acetate (PRED FORTE) 1 % ophthalmic suspension Place 1 drop Into the left eye 4 times daily    10/11/2017 at Unknown time     albuterol (ALBUTEROL) 108 (90 BASE) MCG/ACT inhaler Inhale 2 puffs into the  lungs every 4 hours as needed 1 Inhaler 5  at PRN     RESTASIS 0.05 % OP EMUL 1 drop to left eye twice daily   10/11/2017 at Unknown time     SYSTANE 0.4-0.3 % OP SOLN 1 drop to left eye 4 times daily   10/11/2017 at Unknown time     EYE-VITES OR TABS Hydro-Eye with Lutein 2 tabs twice a day (Rx from [facility])   Past Week at Unknown time     order for DME 2L of O2 at night   Taking            Medications:        Current Facility-Administered Medications   Medication Last Rate     NaCl 100 mL/hr at 10/14/17 0112     Current Facility-Administered Medications   Medication Dose Route Frequency     pantoprazole  40 mg Intravenous BID AC     fluticasone-salmeterol  1 puff Inhalation BID     multivitamin  with lutein  1 capsule Oral Daily     polyethylene glycol 0.4%- propylene glycol 0.3%  1 drop Left Eye 4x Daily     rOPINIRole  0.5 mg Oral At Bedtime     cycloSPORINE  1 drop Left Eye BID     prednisoLONE acetate  1 drop Left Eye 4x Daily     piperacillin-tazobactam  2.25 g Intravenous Q6H     lidocaine  1 patch Transdermal Q24h    And     lidocaine   Transdermal Q24H    And     lidocaine   Transdermal Q8H     rOPINIRole  0.25 mg Oral Daily     Current Facility-Administered Medications   Medication Dose Route Frequency     albuterol  2 puff Inhalation Q4H PRN     traMADol  50 mg Oral Q6H PRN     naloxone  0.1-0.4 mg Intravenous Q2 Min PRN     acetaminophen  650 mg Oral Q4H PRN     polyethylene glycol  17 g Oral Daily PRN     ondansetron  4 mg Oral Q6H PRN    Or     ondansetron  4 mg Intravenous Q6H PRN     prochlorperazine  5 mg Intravenous Q6H PRN    Or     prochlorperazine  5 mg Oral Q6H PRN    Or     prochlorperazine  12.5 mg Rectal Q12H PRN     HYDROcodone-acetaminophen  1-2 tablet Oral Q4H PRN     HYDROmorphone  0.3-0.5 mg Intravenous Q2H PRN     LORazepam  0.25-0.5 mg Oral At Bedtime PRN            Data:      Lab data reviewed.     Recent Labs  Lab 10/13/17  0810 10/12/17  3584 10/12/17  0665  10/11/17  1515   HGB 11.4*  --  12.3 12.2   MCV 99  --  99 97     --  184 194   INR  --  1.09  --   --      --  143 141   POTASSIUM 4.1  --  4.3 4.3   CHLORIDE 111*  --  109 106   CO2 23  --  25 25   BUN 38*  --  40* 41*   CR 1.47*  --  1.47* 1.25*   ANIONGAP 8  --  9 10   AXEL 8.1*  --  8.6 9.0   GLC 85  --  91 101*   TROPI  --   --   --  <0.015           Imaging:      Imaging data reviewed.     Dr. Malcolm Singh D.O.  [facility]  Pager [phone][ZA1.1]       Revision History        User Key Date/Time User Provider Type Action    > ZA1.2 10/14/2017 11:51 AM Malcolm Singh DO Physician Sign     ZA1.1 10/14/2017  8:36 AM Malcolm Singh DO Physician             Progress Notes by Alex Melvin DO at 10/14/2017  8:58 AM     Author:  Alex Melvin DO Service:  Gastroenterology Author Type:  Physician    Filed:  10/14/2017  9:03 AM Date of Service:  10/14/2017  8:58 AM Creation Time:  10/14/2017  8:58 AM    Status:  Signed :  Alex Melvin DO (Physician)         GI    S/p ERCP yesterday with biliary stone extraction (prior alexi).   Without pain.  LFTs mostly improving    Okay to ADAT, plan dispo once tolerating full diet without narcotics.   Okay to stop abx    Please call with questions.   Will sign off.     Alex Melvin DO   [facility]  Cell [phone][KW1.1]       Revision History        User Key Date/Time User Provider Type Action    > KW1.1 10/14/2017  9:03 AM Alex Melvin DO Physician Sign            Progress Notes by Magnolia Carvalho PA-C at 10/13/2017 10:57 AM     Author:  Magnolia Carvalho PA-C Service:  Gastroenterology Author Type:  Physician Assistant    Filed:  10/13/2017 10:57 AM Date of Service:  10/13/2017 10:57 AM Creation Time:  10/13/2017 10:57 AM    Status:  Signed :  Magnolia Carvalho PA-C (Physician Assistant)         GI  Patient nervous about procedure.  I saw and  explained the procedure as well as the risks/benefits of this procedure.  She had no further questions.    Planned for 1:00 today.      MARLEY Araiza  [facility]  Office:  [phone] call if needed after 5PM  Cell:  [phone], not available after 5PM at this number[AF1.1]       Revision History        User Key Date/Time User Provider Type Action    > AF1.1 10/13/2017 10:57 AM Magnolia Carvalho PA-C Physician Assistant Sign            Progress Notes by Miguel Cameron MD at 10/13/2017 10:47 AM     Author:  Miguel Cameron MD Service:  Hospitalist Author Type:  Physician    Filed:  10/13/2017 10:50 AM Date of Service:  10/13/2017 10:47 AM Creation Time:  10/13/2017 10:47 AM    Status:  Signed :  Miguel Cameron MD (Physician)         [facility]    Hospitalist Progress Note    Date of Service (when I saw the patient): 10/13/2017    Assessment & Plan   Rupinder Tracy is a 77 year old female who was admitted on 10/11/2017 with RUQ abdominal pain.    1. RUQ pain - MRCP suggests sludge in bile duct, ERCP this afternoon.  GI consult appreciated.  Continue Zosyn and dilaudid.  Pt is s/p cholecystectomy.  RUQ US demonstrates possible air in biliary tree, concerning for cholangitis or prior sphincterotomy.    2. Mild persistent asthma - Restart home Advair.  Continue albuterol prn.    3. RLS - Continue Requip    DVT Prophylaxis: Pneumatic Compression Devices  Code Status: Full Code    Disposition: Expected discharge in 2-3 days pending recovery.    Miguel Cameron  Text Page (7 am to 6 pm)    Interval History   The patient continues to have RUQ pain that radiates to chest.  It is worse with deep breaths.    -Data reviewed today: I reviewed all new labs and imaging results over the last 24 hours. I personally reviewed no images or EKG's today.    Physical Exam   Temp: 97.3  F (36.3  C) Temp src: Oral BP: 128/58   Heart Rate: 60 Resp: 22 SpO2: 93 % O2  Device: None (Room air) Oxygen Delivery: 2 LPM  Vitals:    10/11/17 1404 10/11/17 2022 10/12/17 0700   Weight: 74.4 kg (164 lb) 71.7 kg (158 lb 1.1 oz) 72.3 kg (159 lb 6.3 oz)     Vital Signs with Ranges  Temp:  [97.3  F (36.3  C)-97.9  F (36.6  C)] 97.3  F (36.3  C)  Heart Rate:  [58-65] 60  Resp:  [16-22] 22  BP: (103-128)/(48-58) 128/58  SpO2:  [92 %-98 %] 93 %  I/O last 3 completed shifts:  In: 2101 [P.O.:365; I.V.:1736]  Out: 525 [Urine:525]    Gen: Well nourished, well developed, alert and oriented x 3, no acute distressed  HEENT: Atraumatic, normocephalic  Lungs: Clear to ausculation without wheezes, rhonchi, or rales  Heart: Regular rate and rhythm, no murmurs, gallops, or rubs  GI: Bowel sound normal, no hepatosplenomegaly or masses, mild RUQ tenderness w/o guarding  Lymph: No lymphadenopathy or edema  Skin: No rashes     Medications     NaCl 100 mL/hr at 10/13/17 0656       pantoprazole  40 mg Intravenous BID AC     fluticasone-salmeterol  1 puff Inhalation BID     multivitamin  with lutein  1 capsule Oral Daily     polyethylene glycol 0.4%- propylene glycol 0.3%  1 drop Left Eye 4x Daily     rOPINIRole  0.5 mg Oral At Bedtime     cycloSPORINE  1 drop Left Eye BID     prednisoLONE acetate  1 drop Left Eye 4x Daily     piperacillin-tazobactam  2.25 g Intravenous Q6H     lidocaine  1 patch Transdermal Q24h    And     lidocaine   Transdermal Q24H    And     lidocaine   Transdermal Q8H     rOPINIRole  0.25 mg Oral Daily       Data     Recent Labs  Lab 10/13/17  0810 10/12/17  1437 10/12/17  0927 10/11/17  1515   WBC 4.1  --  5.9 5.9   HGB 11.4*  --  12.3 12.2   MCV 99  --  99 97     --  184 194   INR  --  1.09  --   --      --  143 141   POTASSIUM 4.1  --  4.3 4.3   CHLORIDE 111*  --  109 106   CO2 23  --  25 25   BUN 38*  --  40* 41*   CR 1.47*  --  1.47* 1.25*   ANIONGAP 8  --  9 10   AXEL 8.1*  --  8.6 9.0   GLC 85  --  91 101*   ALBUMIN 2.6*  --   --  3.2*   PROTTOTAL 6.3*  --   --  7.1    BILITOTAL 1.3  --  2.6* 2.1*   ALKPHOS 247*  --  267* 215*   ALT 62*  --   --  50   AST 77*  --  114* 89*   LIPASE  --   --   --  144   TROPI  --   --   --  <0.015       Recent Results (from the past 24 hour(s))   MR Abdomen MRCP w/o & w Contrast    Narrative    MAGNETIC RESONANCE CHOLANGIOPANCREATOGRAPHY  10/12/2017 1:10 PM     HISTORY: Possible air in biliary tree on ultrasound. Elevated liver  function tests.    COMPARISON: Ultrasound from 10/11/2017.    TECHNIQUE: Multiplanar, multisequence images of the abdomen acquired  before and after administration of 7 mL Gadavist intravenous contrast.    FINDINGS: The gallbladder is absent. No obvious air is seen in the  biliary system but this would be better evaluated with CT. There is  intra and extrahepatic biliary dilatation which is moderate to severe  with the extrahepatic bile duct measuring up to 2 cm. This extends to  the sphincter of Oddi. There is some possible sludge and/or stones in  the distal common bile duct. No biliary strictures. The pancreatic  duct is dilated up to 5 mm. No pancreatic duct strictures.  Mild-moderate significant sidebranch visualization. No cystic lesions  within the pancreas. The signal intensity of the liver is within  normal limits without evidence for hepatic steatosis. The signal  intensity of the pancreas within normal limits without evidence for  pancreatitis. Visualized kidneys demonstrate unremarkable signal  intensity without hydronephrosis. Renal cysts noted in both kidneys.  Adrenal glands and spleen are unremarkable. Visualized osseous  structures unremarkable. After administration of intravenous contrast,  solid organs demonstrate no enhancing focal lesions.      Impression    IMPRESSION: Possible sludge and/or stones in the distal common bile  duct, consider ERCP for further evaluation.    SILVIA WILLIAM MD[MC1.1]          Revision History        User Key Date/Time User Provider Type Action    > MC1.1 10/13/2017 10:50  AM Miguel Cameron MD Physician Sign                  Procedure Notes     No notes of this type exist for this encounter.         Progress Notes - Therapies (Notes from 10/13/17 through 10/16/17)      Progress Notes by Mabel Watson PT at 10/15/2017  2:33 PM     Author:  Mabel Watson PT Service:  (none) Author Type:  Physical Therapist    Filed:  10/15/2017  2:33 PM Date of Service:  10/15/2017  2:33 PM Creation Time:  10/15/2017  2:33 PM    Status:  Signed :  Mabel Watson PT (Physical Therapist)            10/15/17 1400   Quick Adds   Type of Visit Initial PT Evaluation   Living Environment   Lives With alone   Living Arrangements apartment;independent living facility  ([facility])   Home Accessibility no concerns   Number of Stairs to Enter Home 0   Number of Stairs Within Home 0   Self-Care   Usual Activity Tolerance moderate   Current Activity Tolerance fair   Regular Exercise no   Equipment Currently Used at Home walker, rolling  (only long distances, no AD in the apartment)   Functional Level Prior   Ambulation 1-->assistive equipment   Transferring 1-->assistive equipment   Fall history within last six months yes   Number of times patient has fallen within last six months 1   Which of the above functional risks had a recent onset or change? ambulation;transferring   General Information   Onset of Illness/Injury or Date of Surgery - Date 10/11/17   Referring Physician Malcolm Singh MD   Patient/Family Goals Statement return home   Pertinent History of Current Problem (include personal factors and/or comorbidities that impact the POC) Admitted with R UQ pain, had ERCP on 10/13 with biliary stone extraction. Now with SOB, fluid overload. PMH: CHF, asthma.   Precautions/Limitations fall precautions   Weight-Bearing Status - LLE full weight-bearing   Weight-Bearing Status - RLE full weight-bearing   Cognitive Status Examination   Orientation orientation to  "person, place and time   Level of Consciousness alert   Follows Commands and Answers Questions 100% of the time;able to follow multistep instructions   Personal Safety and Judgment intact   Memory intact   Pain Assessment   Patient Currently in Pain No   Integumentary/Edema   Integumentary/Edema Comments B LE 4+ edema. Pt has compression socks at home. Stated she has used wraps in the past. Declines using wraps here and would prefer to wait until she got home.   Posture    Posture Forward head position;Protracted shoulders   Range of Motion (ROM)   ROM Quick Adds No deficits were identified   Strength   Strength Comments B hip flex 3+/5, knee ext 4/5, DF 4/5. Pt endorses feeling weaker.   Bed Mobility   Bed Mobility Comments NT, pt in chair   Transfer Skills   Transfer Comments Sit to stand with SBA and FWW   Gait   Gait Comments Pt amb 10 ft with FWW and SBA, steady balance   Balance   Balance Comments Balance steady with FWW   Sensory Examination   Sensory Perception Comments Denies numbness or tingling   General Therapy Interventions   Planned Therapy Interventions bed mobility training;gait training;neuromuscular re-education;strengthening;transfer training   Clinical Impression   Criteria for Skilled Therapeutic Intervention yes, treatment indicated   PT Diagnosis Difficulty ambulating   Influenced by the following impairments Dec strength, balance, activity tolerance, SOB   Functional limitations due to impairments Difficulty ambulating and transferring   Clinical Presentation Stable/Uncomplicated   Clinical Presentation Rationale medically improved   Clinical Decision Making (Complexity) Low complexity   Therapy Frequency` daily   Predicted Duration of Therapy Intervention (days/wks) 4 days   Anticipated Discharge Disposition Home with Home Therapy   Risk & Benefits of therapy have been explained Yes   Patient, Family & other staff in agreement with plan of care Yes   [facility] AM-PAC TM \"6 Clicks\"   " "2016, Trustees of [facility], under license to [employer].  All rights reserved.   6 Clicks Short Forms Basic Mobility Inpatient Short Form   [facility] AM-PAC  \"6 Clicks\" V.2 Basic Mobility Inpatient Short Form   1. Turning from your back to your side while in a flat bed without using bedrails? 3 - A Little   2. Moving from lying on your back to sitting on the side of a flat bed without using bedrails? 3 - A Little   3. Moving to and from a bed to a chair (including a wheelchair)? 3 - A Little   4. Standing up from a chair using your arms (e.g., wheelchair, or bedside chair)? 3 - A Little   5. To walk in hospital room? 3 - A Little   6. Climbing 3-5 steps with a railing? 2 - A Lot   Basic Mobility Raw Score (Score out of 24.Lower scores equate to lower levels of function) 17   Total Evaluation Time   Total Evaluation Time (Minutes) 15[EW1.1]        Revision History        User Key Date/Time User Provider Type Action    > EW1.1 10/15/2017  2:33 PM Mabel Watson PT Physical Therapist Sign            "

## 2017-10-12 ENCOUNTER — APPOINTMENT (OUTPATIENT)
Dept: MRI IMAGING | Facility: CLINIC | Age: 77
DRG: 444 | End: 2017-10-12
Attending: PHYSICIAN ASSISTANT
Payer: MEDICARE

## 2017-10-12 LAB
ALP SERPL-CCNC: 267 U/L (ref 40–150)
ANION GAP SERPL CALCULATED.3IONS-SCNC: 9 MMOL/L (ref 3–14)
AST SERPL W P-5'-P-CCNC: 114 U/L (ref 0–45)
BILIRUB DIRECT SERPL-MCNC: 1.5 MG/DL (ref 0–0.2)
BILIRUB SERPL-MCNC: 2.6 MG/DL (ref 0.2–1.3)
BUN SERPL-MCNC: 40 MG/DL (ref 7–30)
CALCIUM SERPL-MCNC: 8.6 MG/DL (ref 8.5–10.1)
CHLORIDE SERPL-SCNC: 109 MMOL/L (ref 94–109)
CO2 SERPL-SCNC: 25 MMOL/L (ref 20–32)
CREAT SERPL-MCNC: 1.47 MG/DL (ref 0.52–1.04)
ERYTHROCYTE [DISTWIDTH] IN BLOOD BY AUTOMATED COUNT: 14.1 % (ref 10–15)
GFR SERPL CREATININE-BSD FRML MDRD: 34 ML/MIN/1.7M2
GLUCOSE SERPL-MCNC: 91 MG/DL (ref 70–99)
HCT VFR BLD AUTO: 36.8 % (ref 35–47)
HGB BLD-MCNC: 12.3 G/DL (ref 11.7–15.7)
INR PPP: 1.09 (ref 0.86–1.14)
MCH RBC QN AUTO: 33.1 PG (ref 26.5–33)
MCHC RBC AUTO-ENTMCNC: 33.4 G/DL (ref 31.5–36.5)
MCV RBC AUTO: 99 FL (ref 78–100)
PLATELET # BLD AUTO: 184 10E9/L (ref 150–450)
POTASSIUM SERPL-SCNC: 4.3 MMOL/L (ref 3.4–5.3)
RBC # BLD AUTO: 3.72 10E12/L (ref 3.8–5.2)
SODIUM SERPL-SCNC: 143 MMOL/L (ref 133–144)
WBC # BLD AUTO: 5.9 10E9/L (ref 4–11)

## 2017-10-12 PROCEDURE — 25000128 H RX IP 250 OP 636: Performed by: INTERNAL MEDICINE

## 2017-10-12 PROCEDURE — 25000125 ZZHC RX 250: Performed by: INTERNAL MEDICINE

## 2017-10-12 PROCEDURE — 74183 MRI ABD W/O CNTR FLWD CNTR: CPT

## 2017-10-12 PROCEDURE — 36415 COLL VENOUS BLD VENIPUNCTURE: CPT | Performed by: PHYSICIAN ASSISTANT

## 2017-10-12 PROCEDURE — 82247 BILIRUBIN TOTAL: CPT | Performed by: INTERNAL MEDICINE

## 2017-10-12 PROCEDURE — 84075 ASSAY ALKALINE PHOSPHATASE: CPT | Performed by: INTERNAL MEDICINE

## 2017-10-12 PROCEDURE — 99232 SBSQ HOSP IP/OBS MODERATE 35: CPT | Performed by: INTERNAL MEDICINE

## 2017-10-12 PROCEDURE — 85610 PROTHROMBIN TIME: CPT | Performed by: PHYSICIAN ASSISTANT

## 2017-10-12 PROCEDURE — A9270 NON-COVERED ITEM OR SERVICE: HCPCS | Mod: GY | Performed by: INTERNAL MEDICINE

## 2017-10-12 PROCEDURE — 85027 COMPLETE CBC AUTOMATED: CPT | Performed by: INTERNAL MEDICINE

## 2017-10-12 PROCEDURE — 12000000 ZZH R&B MED SURG/OB

## 2017-10-12 PROCEDURE — 84450 TRANSFERASE (AST) (SGOT): CPT | Performed by: INTERNAL MEDICINE

## 2017-10-12 PROCEDURE — 82248 BILIRUBIN DIRECT: CPT | Performed by: INTERNAL MEDICINE

## 2017-10-12 PROCEDURE — 25000132 ZZH RX MED GY IP 250 OP 250 PS 637: Mod: GY | Performed by: INTERNAL MEDICINE

## 2017-10-12 PROCEDURE — 80048 BASIC METABOLIC PNL TOTAL CA: CPT | Performed by: INTERNAL MEDICINE

## 2017-10-12 PROCEDURE — 36415 COLL VENOUS BLD VENIPUNCTURE: CPT | Performed by: INTERNAL MEDICINE

## 2017-10-12 PROCEDURE — A9585 GADOBUTROL INJECTION: HCPCS | Performed by: INTERNAL MEDICINE

## 2017-10-12 RX ORDER — ROPINIROLE 0.25 MG/1
0.25 TABLET, FILM COATED ORAL DAILY
Status: DISCONTINUED | OUTPATIENT
Start: 2017-10-12 | End: 2017-10-12

## 2017-10-12 RX ORDER — GADOBUTROL 604.72 MG/ML
7 INJECTION INTRAVENOUS ONCE
Status: COMPLETED | OUTPATIENT
Start: 2017-10-12 | End: 2017-10-12

## 2017-10-12 RX ADMIN — PIPERACILLIN SODIUM,TAZOBACTAM SODIUM 2.25 G: 2; .25 INJECTION, POWDER, FOR SOLUTION INTRAVENOUS at 08:31

## 2017-10-12 RX ADMIN — PREDNISOLONE ACETATE 1 DROP: 10 SUSPENSION/ DROPS OPHTHALMIC at 08:30

## 2017-10-12 RX ADMIN — CYCLOSPORINE 1 DROP: 0.5 EMULSION OPHTHALMIC at 20:16

## 2017-10-12 RX ADMIN — HYDROMORPHONE HYDROCHLORIDE 0.5 MG: 1 INJECTION, SOLUTION INTRAMUSCULAR; INTRAVENOUS; SUBCUTANEOUS at 03:04

## 2017-10-12 RX ADMIN — PIPERACILLIN SODIUM,TAZOBACTAM SODIUM 2.25 G: 2; .25 INJECTION, POWDER, FOR SOLUTION INTRAVENOUS at 02:35

## 2017-10-12 RX ADMIN — PANTOPRAZOLE SODIUM 40 MG: 40 INJECTION, POWDER, FOR SOLUTION INTRAVENOUS at 16:50

## 2017-10-12 RX ADMIN — PANTOPRAZOLE SODIUM 40 MG: 40 INJECTION, POWDER, FOR SOLUTION INTRAVENOUS at 07:30

## 2017-10-12 RX ADMIN — PIPERACILLIN SODIUM,TAZOBACTAM SODIUM 2.25 G: 2; .25 INJECTION, POWDER, FOR SOLUTION INTRAVENOUS at 14:24

## 2017-10-12 RX ADMIN — PREDNISOLONE ACETATE 1 DROP: 10 SUSPENSION/ DROPS OPHTHALMIC at 12:06

## 2017-10-12 RX ADMIN — LIDOCAINE 1 PATCH: 50 PATCH TOPICAL at 20:06

## 2017-10-12 RX ADMIN — ROPINIROLE HYDROCHLORIDE 0.5 MG: 0.5 TABLET, FILM COATED ORAL at 20:19

## 2017-10-12 RX ADMIN — SODIUM CHLORIDE: 9 INJECTION, SOLUTION INTRAVENOUS at 07:30

## 2017-10-12 RX ADMIN — PIPERACILLIN SODIUM,TAZOBACTAM SODIUM 2.25 G: 2; .25 INJECTION, POWDER, FOR SOLUTION INTRAVENOUS at 20:12

## 2017-10-12 RX ADMIN — ROPINIROLE HYDROCHLORIDE 0.25 MG: 0.25 TABLET, FILM COATED ORAL at 16:50

## 2017-10-12 RX ADMIN — CYCLOSPORINE 1 DROP: 0.5 EMULSION OPHTHALMIC at 09:35

## 2017-10-12 RX ADMIN — PREDNISOLONE ACETATE 1 DROP: 10 SUSPENSION/ DROPS OPHTHALMIC at 18:02

## 2017-10-12 RX ADMIN — GADOBUTROL 7 ML: 604.72 INJECTION INTRAVENOUS at 13:11

## 2017-10-12 RX ADMIN — HYDROCODONE BITARTRATE AND ACETAMINOPHEN 2 TABLET: 5; 325 TABLET ORAL at 20:05

## 2017-10-12 RX ADMIN — HYDROCODONE BITARTRATE AND ACETAMINOPHEN 2 TABLET: 5; 325 TABLET ORAL at 09:34

## 2017-10-12 RX ADMIN — Medication 1 CAPSULE: at 08:31

## 2017-10-12 RX ADMIN — PREDNISOLONE ACETATE 1 DROP: 10 SUSPENSION/ DROPS OPHTHALMIC at 20:17

## 2017-10-12 NOTE — PLAN OF CARE
Problem: Patient Care Overview  Goal: Plan of Care/Patient Progress Review  Outcome: No Change  Pt is A/O x 4. Up with SBA and walker. VSS on 2L of oxygen except soft BP. Pt uses 2L of oxygen overnight, baseline. C/o RUQ abdominal pain, Dilaudid given x1 with relief. No BM this shift, positive BS. Denies nausea. NPO, ice chips ok. Continues on IV antibiotic. Lidocaine patch to the back for chronic pain. IVF infusing at 100 ml/hr.

## 2017-10-12 NOTE — PLAN OF CARE
Problem: Patient Care Overview  Goal: Plan of Care/Patient Progress Review  Outcome: No Change  Pt remains A&OX4, calm and coop, visually impaired. Up with SBA in room, up in chair x1, amb to BR. VSS. [place] PRN effective for abd pain. NPO x ice/meds. C/O restless legs, PTA requip sched. IVF's, zosyn. Pt states she is feeling better since admission. MRI performed, per GI, ok to start on clear liq, npo after midnight for ERCP tomorrow, pt aware.

## 2017-10-12 NOTE — PHARMACY-ADMISSION MEDICATION HISTORY
Admission medication history interview status for the 10/11/2017  admission is complete. See EPIC admission navigator for prior to admission medications     Medication history source reliability:Good    Actions taken by pharmacist (provider contacted, etc):None     Additional medication history information not noted on PTA med list :PATIENT HAS HER OWN EYE DROPS WITH HER AND WOULD LIKE TO USE HER OWN    Medication reconciliation/reorder completed by provider prior to medication history? Yes    Time spent in this activity: 15 MINUTES    Prior to Admission medications    Medication Sig Last Dose Taking? Auth Provider   ROPINIRole HCl (REQUIP PO) Take 0.25 mg by mouth daily 3PM  Yes Unknown, Entered By History   traMADol (ULTRAM) 50 MG tablet TAKE UP TO 2 TABLETS BY MOUTH 2 TIMES DAILY. LIMIT TO 4 TABLET DAILY, ON AVERAGE  at PRN Yes Abran Mott MD   olmesartan (BENICAR) 40 MG tablet Take 1 tablet (40 mg) by mouth daily Past Week at Unknown time Yes Jae Butler MD   omeprazole (PRILOSEC) 20 MG CR capsule TAKE 1 CAPSULE (20 MG) BY MOUTH 2 TIMES DAILY 10/10/2017 at Unknown time Yes Abran Mott MD   bumetanide (BUMEX) 2 MG tablet Take 1 tablet (2 mg) by mouth daily Past Week at Unknown time Yes Markell Giles PA-C   Cholecalciferol (VITAMIN D) 2000 UNITS tablet Take 2,000 Units by mouth 2 times daily Past Week at Unknown time Yes Abran Mott MD   rOPINIRole (REQUIP) 0.25 MG tablet Take 0.5 mg by mouth At Bedtime  Past Week at Unknown time Yes Abran Mott MD   fluticasone-salmeterol (ADVAIR DISKUS) 250-50 MCG/DOSE diskus inhaler Inhale 1 puff into the lungs 2 times daily 10/11/2017 at Unknown time Yes Abran Mott MD   Lactobacillus Rhamnosus, GG, (CULTURELLE PO) Take 1 tablet by mouth Three times a week Past Week at Unknown time Yes Reported, Patient   denosumab (PROLIA) 60 MG/ML SOLN Inject 60 mg Subcutaneous every 6 months  Yes Reported, Patient   Calcium-Vitamin  D-Vitamin K (CALCIUM + D) 500-1000-40 MG-UNT-MCG CHEW Take 2 tablets by mouth daily  Past Week at Unknown time Yes Reported, Patient   DOXYCYCLINE HYCLATE PO Take 50 mg by mouth 3 times per week Monday,wednesday, friday Past Week at Unknown time Yes Reported, Patient   prednisoLONE acetate (PRED FORTE) 1 % ophthalmic suspension Place 1 drop Into the left eye 4 times daily  10/11/2017 at Unknown time Yes Abran Mott MD   albuterol (ALBUTEROL) 108 (90 BASE) MCG/ACT inhaler Inhale 2 puffs into the lungs every 4 hours as needed  at PRN Yes Abran Mott MD   RESTASIS 0.05 % OP EMUL 1 drop to left eye twice daily 10/11/2017 at Unknown time Yes Abran Mott MD   SYSTANE 0.4-0.3 % OP SOLN 1 drop to left eye 4 times daily 10/11/2017 at Unknown time Yes Abran Mott MD   EYE-VITES OR TABS Hydro-Eye with Lutein 2 tabs twice a day (Rx from [facility]) Past Week at Unknown time Yes Abran Mott MD   order for DME 2L of O2 at night   Reported, Patient       Eloina Goode, PharmD, BCPS  October 11, 2017

## 2017-10-12 NOTE — CONSULTS
GASTROENTEROLOGY CONSULTATION     Rupinder Tracy   [address]  [facility] [address]   77 year old female   Admission Date/Time: 10/11/2017   Encounter Date: 10/12/2017  Primary Care Provider: Abran Mott     Referring / Attending Physician: Jasper Newberry   We were asked to see the patient in consultation by Dr. Newberry for evaluation of possible cholangitis.     HPI: Rupinder Tracy is a 77 year old female with a past medical history significant for HTN, HLD, PVD, mitral valve prolapse, pulmonary hypertension, leagl blindness and previous duodenal ulcer who presented to the ED for evaluation of RUQ pain. She began having pain two days ago which would originate in her RUQ and radiate to her sternum. She tried eating later that day but it made the pain worse so she came in to the ED for further evaluation.  In the ED she was found to be hemodynamically stable. Alk phose was elevated at 215, AST 89, total bili 2.1 and a normal white count of 5.9. Ultrasound was done which showed a prior cholecystectomy and dilation of intrahepatic and common bile duct similar to prior CT in 2016. There was also a ringdown artifact in the intrahepatic duct which could indicated air in the biliary tree. She was admitted to the floor and started on Zosyn. GI was consulted for further recommendations.   This morning the patient continues to have abdominal pain mainly in her RUQ/Epigastrium but it also radiates to her sternum. She denies radiation to her back. She denies nausea or vomiting. Her appetite has been decreased since her pain began. She denies vomiting.     Past Medical History  Past Medical History:   Diagnosis Date     Arthritis      Cellulitis 4/13 in [place]    R ankle     Difficult airway for intubation      Ductal Carcinoma in Situ of Left Breast 9/09     Duodenal ulcer, unspecified as acute or chronic, without mention of hemorrhage or perforation 1980's    felt due to high dose steroids     Female  stress incontinence      H/O right and left heart catheterization     1-     HTN      HYPERLIPIDEMIA      Ischemic colitis 7/2001    [facility]     Lactose Intolerance     Mild     Left Ventricular Hypertrophy      Legal blindness     Artificial right eye, severe vision loss left (Detached retina's, glaucoma, corneal transplants)     Mild intermittent asthma ~1980's     MITRAL VALVE Prolapse, with murmur      Nocturnal oxygen desaturation      Osteoporosis, unspecified ~2002     Patent foramen ovale      PERIPHERAL VASCULAR DISEASE 5/05    mesenteric arteries, angioplasty     PRESBYESOPHAGUS 6/04     Pulmonary HTN 11/2014     Serous retinal detachment     False eye right     Subarachnoid hemorrhage following injury (H) 12/10    due to fall, vascular evaluation negative     Syncope and collapse 12/10     Unspecified glaucoma(365.9)        Past Surgical History  Past Surgical History:   Procedure Laterality Date     BIOPSY       C APPENDECTOMY  1982     C NONSPECIFIC PROCEDURE  1945    adenoidectomy     C NONSPECIFIC PROCEDURE      bilat retinal detachment     C NONSPECIFIC PROCEDURE      blephoroplasty bilat     C NONSPECIFIC PROCEDURE  1997    glaucoma procedure L     C NONSPECIFIC PROCEDURE  1997, 2007, 2014    corneal transplant L     C NONSPECIFIC PROCEDURE  1945    strabismus procedure R eye     C NONSPECIFIC PROCEDURE  1960's    R breast bx     C NONSPECIFIC PROCEDURE  5/05    Angioplasty and stenting mesenteric vessels     C NONSPECIFIC PROCEDURE  2008    L corneal transplant     COLONOSCOPY      due 2015     HC REMOVAL GALLBLADDER  1982    Cholecystectomy     HEART CATH RIGHT AND LEFT HEART CATH  1/19/15     HERNIORRHAPHY VENTRAL N/A 4/19/2016    Procedure: HERNIORRHAPHY VENTRAL;  Surgeon: Hamlet Ness MD;  Location: [facility] OR     MASTECTOMY SIMPLE BILATERAL  10/5/09    bilateral mastectomy     SURGICAL HISTORY OF -   6/2010    breast reconstruction       Family History  Family History   Problem  Relation Age of Onset     Adopted: Yes     C.A.D. Paternal Uncle      MI 50's     Family History Negative Daughter        Social History  Social History     Social History     Marital status:      Spouse name: N/A     Number of children: 2     Years of education: N/A     Occupational History     Retired nurse Retired     Social History Main Topics     Smoking status: Never Smoker     Smokeless tobacco: Never Used     Alcohol use Yes      Comment: 3 glasses of wine a week     Drug use: No     Sexual activity: No     Other Topics Concern     Caffeine Concern No     1- 2 cups coffee     Sleep Concern No     Stress Concern No     Weight Concern No     Special Diet Yes     low sodium     Exercise No     2 days a week (abigail chi), walking program, stationary bike 10 minutes 3 times per week     Seat Belt Yes     Social History Narrative    , 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  She walks with a walker.  Is a full code.  (last updated 10/11/2017)        Medications  Prior to Admission medications    Medication Sig Start Date End Date Taking? Authorizing Provider   ROPINIRole HCl (REQUIP PO) Take 0.25 mg by mouth daily 3PM   Yes Unknown, Entered By History   traMADol (ULTRAM) 50 MG tablet TAKE UP TO 2 TABLETS BY MOUTH 2 TIMES DAILY. LIMIT TO 4 TABLET DAILY, ON AVERAGE 10/3/17  Yes Abran Mott MD   olmesartan (BENICAR) 40 MG tablet Take 1 tablet (40 mg) by mouth daily 9/21/17  Yes Jae Butler MD   omeprazole (PRILOSEC) 20 MG CR capsule TAKE 1 CAPSULE (20 MG) BY MOUTH 2 TIMES DAILY 7/11/17  Yes Abran Mott MD   bumetanide (BUMEX) 2 MG tablet Take 1 tablet (2 mg) by mouth daily 3/22/17  Yes Markell Giles PA-C   Cholecalciferol (VITAMIN D) 2000 UNITS tablet Take 2,000 Units by mouth 2 times daily 2/16/17  Yes Abran Mott MD   rOPINIRole (REQUIP) 0.25 MG tablet Take 0.5 mg by mouth At Bedtime  2/16/17  Yes Abran Mott  "MD   fluticasone-salmeterol (ADVAIR DISKUS) 250-50 MCG/DOSE diskus inhaler Inhale 1 puff into the lungs 2 times daily 2/2/17  Yes Abran Mott MD   Lactobacillus Rhamnosus, GG, (CULTURELLE PO) Take 1 tablet by mouth Three times a week   Yes Reported, Patient   denosumab (PROLIA) 60 MG/ML SOLN Inject 60 mg Subcutaneous every 6 months   Yes Reported, Patient   Calcium-Vitamin D-Vitamin K (CALCIUM + D) 500-1000-40 MG-UNT-MCG CHEW Take 2 tablets by mouth daily    Yes Reported, Patient   DOXYCYCLINE HYCLATE PO Take 50 mg by mouth 3 times per week Monday,wednesday, friday   Yes Reported, Patient   prednisoLONE acetate (PRED FORTE) 1 % ophthalmic suspension Place 1 drop Into the left eye 4 times daily  6/3/14  Yes Abran Mott MD   albuterol (ALBUTEROL) 108 (90 BASE) MCG/ACT inhaler Inhale 2 puffs into the lungs every 4 hours as needed 12/9/13  Yes Abran Mott MD   RESTASIS 0.05 % OP EMUL 1 drop to left eye twice daily 6/24/10  Yes Abran Mott MD   SYSTANE 0.4-0.3 % OP SOLN 1 drop to left eye 4 times daily 9/28/09  Yes Abran Mott MD   EYE-VITES OR TABS Hydro-Eye with Lutein 2 tabs twice a day (Rx from [facility])   Yes Abran Mott MD   order for DME 2L of O2 at night    Reported, Patient       Allergies:  Atorvastatin calcium; Celecoxib; Cholestyramine; Crestor [rosuvastatin calcium]; Cyclobenzaprine hcl; Dulera; Gabapentin; Lisinopril; Meperidine hcl; Morphine; Naprosyn [naproxen]; Niaspan [niacin]; Percocet [oxycodone-acetaminophen]; Pravastatin; Red yeast rice [cholestin]; Simvastatin; Timolol maleate; Hctz; and Sulfa drugs    ROS: A ten point review of systems was obtained and negative other than the symptoms noted above in the HPI.     Physical Exam:   BP 99/55 (BP Location: Left arm)  Pulse 81  Temp 97.3  F (36.3  C) (Oral)  Resp 16  Ht 1.626 m (5' 4\")  Wt 72.3 kg (159 lb 6.3 oz)  LMP 01/01/1990  SpO2 94%  BMI 27.36 kg/m2   Constitutional: elderly, alert, " no acute distress  Cardiovascular: regular rate and rhythm, no murmurs,rubs or gallops  Respiratory: clear to auscultation bilaterally  Psychiatric: normal pleasant affect  Head: atraumatic, normocephalic  Neck: supple, no thyromegaly  ENT: mucous membranes are moist, no oral lesions are noted  Abdomen: soft, tender, non-distended, normally active bowel sound. No masses or hepatosplenomegaly is appreciated. No rebound tenderness or guarding  Neuro: Neurologically intact grossly  Skin: warm, dry, no rashes are noted    ADDITIONAL COMMENTS:   I reviewed the patient's new clinical lab test results.   Recent Labs   Lab Test  10/11/17   1515  10/14/16   1627  02/12/16   0957   01/19/15   0834   07/02/10   1445   WBC  5.9  7.1  5.9   < >  5.5   < >  5.8   HGB  12.2  12.5  13.4   < >  13.9   < >  13.2   MCV  97  98  98   < >  99   < >  99   PLT  194  199  282   < >  216   < >  175   INR   --    --    --    --   0.95   --   1.01    < > = values in this interval not displayed.      Recent Labs   Lab Test  10/11/17   1515  05/10/17   0945  03/29/17   1328   NA  141  141  146*   POTASSIUM  4.3  3.5  3.8   CHLORIDE  106  105  108*   CO2  25  27  28   BUN  41*  28  30   CR  1.25*  1.30  1.33*   ANIONGAP  10  12.5  13.8   AXEL  9.0  9.7  10.0      Recent Labs   Lab Test  10/11/17   1644  10/11/17   1515  02/02/17   1550  02/12/16   0957 01/05/16 11/12/14   1021   10/21/13   1050  10/21/13   0945   ALBUMIN   --   3.2*   --   3.2*   --   3.7   --    --   3.8   BILITOTAL   --   2.1*   --   0.7   --   0.6   < >   --   0.7   ALT   --   50   --   25   --   19   < >   --   59*   AST   --   89*   --   34  25  20   < >   --   67*   ALKPHOS   --   215*   --   238*   --   79   < >   --   47   PROTEIN  Negative   --   Negative   --    --    --    --   30*   --    LIPASE   --   144   --   153   --    --    --    --   83    < > = values in this interval not displayed.      10/11/17 Ultrasound abdomen  FINDINGS:  Liver is normal in echogenicity  without focal lesions.  Prominent intrahepatic bile ducts are present with ringdown artifact  possibly indicating pneumobilia. Prior CT from February 2016 showed  dilated intrahepatic and common bile duct as well. These findings may  be related to prior cholecystectomy changes. Question whether patient  has had prior sphincterotomy procedure to account for the potential  air in the biliary system. Otherwise cholangitis is possible. The  gallbladder is surgically absent. Common bile duct is normal in  diameter. Pancreas is normal where visualized. Examination of the  right kidney is unremarkable.    Assessment:  77 year old female presenting with acute abdominal pain. Found to have elevated total bili and alkphos. Ultrasound showed biliary dilation that appears stable when compared with last year but there is a possible ringdown artifact indicating possible pneumobilia. She has not had an ERCP before and her cholecystectomy was over 30 years ago. Her white count his normal and she is afebrile so I doubt current cholangitis. A retained stone or sludge is possible. PUD is also in the differential, however she has been maintained on a PPI as an outpatient for her reflux symptoms.     Plan:   -NPO  -MRCP today  -Will consider EUS/ERCP depending on MRCP results  -Follow LFTs  -Abx per IM  -Continue PPI IV BID for now  -GI following    I discussed the patient's findings and plan with Dr. Alex Melvin who will also independently see and examine the patient.     Sam Winn PA-C  [facility]  Cell:  [phone] Monday through Friday 1228-0849  Office: [phone]

## 2017-10-12 NOTE — H&P
"[facility]    History and Physical  Hospitalist       Date of Admission:  10/11/2017    Assessment & Plan   Rupinder Tracy is a 77 year old female who presents with RUQ abd pain:    Principal Problem:    RUQ abdominal pain -- S/P alexi, but tenderness with elevated AST and Alk Phos concerning for possible Cholangitis   Also has hx of significant PUD, with last EGD about 5 years ago, and has sx of GERD and dysphagia but is on   Prilosec 20 mg bid.  Dilated bile ducts may just be related to post-cholecystectomy, can not exclude a CBD    Stone.     Active Problems:    Mild persistent asthma    Presbyesophagus    Nocturnal oxygen desaturation -- used 2 lpm O2 at nighttime only    Pulmonary HTN -- mild (not medically treated)    Restless legs syndrome (RLS)    Chronic pain, back    CKD (chronic kidney disease) stage 3, GFR 30-59 ml/min    Essential hypertension with goal blood pressure less than 130/85    Diastolic dysfunction    S/P cholecystectomy -- 1982    Abdominal pain       Plan:  Will get blood cultures and start IV Zosyn and repeat LFT's in AM.  Will ask GI to see patient in AM -- ?further evaluation if LFT's not improving or persistent pain (?abdominal CT or MRCP), or pursue EGD if GI feels warranted to address underlying PUD and dysphagia (has had prior motility studies at [facility] as an outpt).      DVT Prophylaxis: Pneumatic Compression Devices  Code Status: Full Code    Disposition: Expected discharge in 3 days once abd pain better and LFT's improved.    Jasper Smith MD  Pager: [phone]  Cell Phone:  [phone]     Primary Care Physician   Abran Mott    Chief Complaint   RUQ abdominal pain    History is obtained from the patient    History of Present Illness   Rupinder Tracy is a 77 year old female with mild Pulm Htn, Asthma, and has had a cholecystectomy in 1982 and awoke yesterday AM with epigastric pain which radiated substernal area \"like my heart burn\", but she " wasn't sure if she was having a heart attack or it was her stomach.  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  No fever or chills, but nausea.  She says the pain does radiate to her RUQ area, but she has not noticed it in her back.  No problems with gall stones since her gall bladder was removed in 1982.  She does have loose stools 3 times a day, but no BM for 1 day, but also hasn't eating anything.     Past Medical History    I have reviewed this patient's medical history and updated it with pertinent information if needed.   Past Medical History:   Diagnosis Date     Arthritis      Cellulitis 4/13 in [place]    R ankle     Difficult airway for intubation      Ductal Carcinoma in Situ of Left Breast 9/09     Duodenal ulcer, unspecified as acute or chronic, without mention of hemorrhage or perforation 1980's    felt due to high dose steroids     Female stress incontinence      H/O right and left heart catheterization     1-     HTN      HYPERLIPIDEMIA      Ischemic colitis 7/2001    [facility]     Lactose Intolerance     Mild     Left Ventricular Hypertrophy      Legal blindness     Artificial right eye, severe vision loss left (Detached retina's, glaucoma, corneal transplants)     Mild intermittent asthma ~1980's     MITRAL VALVE Prolapse, with murmur      Nocturnal oxygen desaturation      Osteoporosis, unspecified ~2002     Patent foramen ovale      PERIPHERAL VASCULAR DISEASE 5/05    mesenteric arteries, angioplasty     PRESBYESOPHAGUS 6/04     Pulmonary HTN 11/2014     Serous retinal detachment     False eye right     Subarachnoid hemorrhage following injury (H) 12/10    due to fall, vascular evaluation negative     Syncope and collapse 12/10     Unspecified glaucoma(365.9)        Past  Surgical History   I have reviewed this patient's surgical history and updated it with pertinent information if needed.  Past Surgical History:   Procedure Laterality Date     BIOPSY       C APPENDECTOMY  1982     C NONSPECIFIC PROCEDURE  1945    adenoidectomy     C NONSPECIFIC PROCEDURE      bilat retinal detachment     C NONSPECIFIC PROCEDURE      blephoroplasty bilat     C NONSPECIFIC PROCEDURE  1997    glaucoma procedure L     C NONSPECIFIC PROCEDURE  1997, 2007, 2014    corneal transplant L     C NONSPECIFIC PROCEDURE  1945    strabismus procedure R eye     C NONSPECIFIC PROCEDURE  1960's    R breast bx     C NONSPECIFIC PROCEDURE  5/05    Angioplasty and stenting mesenteric vessels     C NONSPECIFIC PROCEDURE  2008    L corneal transplant     COLONOSCOPY      due 2015     HC REMOVAL GALLBLADDER  1982    Cholecystectomy     HEART CATH RIGHT AND LEFT HEART CATH  1/19/15     HERNIORRHAPHY VENTRAL N/A 4/19/2016    Procedure: HERNIORRHAPHY VENTRAL;  Surgeon: Hamlet Ness MD;  Location: [facility] OR     MASTECTOMY SIMPLE BILATERAL  10/5/09    bilateral mastectomy     SURGICAL HISTORY OF -   6/2010    breast reconstruction       Prior to Admission Medications   Prior to Admission Medications   Prescriptions Last Dose Informant Patient Reported? Taking?   Calcium-Vitamin D-Vitamin K (CALCIUM + D) 500-1000-40 MG-UNT-MCG CHEW Past Week at Unknown time Self Yes Yes   Sig: Take 2 tablets by mouth daily    Cholecalciferol (VITAMIN D) 2000 UNITS tablet Past Week at Unknown time Self Yes Yes   Sig: Take 2,000 Units by mouth 2 times daily   DOXYCYCLINE HYCLATE PO Past Week at Unknown time Self Yes Yes   Sig: Take 50 mg by mouth 3 times per week Monday,wednesday, friday   EYE-VITES OR TABS Past Week at Unknown time Self Yes Yes   Sig: Hydro-Eye with Lutein 2 tabs twice a day (Rx from [facility])   Lactobacillus Rhamnosus, GG, (CULTURELLE PO) Past Week at Unknown time Self Yes Yes   Sig: Take 1 tablet by mouth Three  "times a week   RESTASIS 0.05 % OP EMUL 10/11/2017 at Unknown time Self Yes Yes   Si drop to left eye twice daily   ROPINIRole HCl (REQUIP PO)  Self Yes Yes   Sig: Take 0.25 mg by mouth daily 3PM   SYSTANE 0.4-0.3 % OP SOLN 10/11/2017 at Unknown time Self Yes Yes   Si drop to left eye 4 times daily   albuterol (ALBUTEROL) 108 (90 BASE) MCG/ACT inhaler  at PRN Self No Yes   Sig: Inhale 2 puffs into the lungs every 4 hours as needed   bumetanide (BUMEX) 2 MG tablet Past Week at Unknown time Self No Yes   Sig: Take 1 tablet (2 mg) by mouth daily   denosumab (PROLIA) 60 MG/ML SOLN  Self Yes Yes   Sig: Inject 60 mg Subcutaneous every 6 months   fluticasone-salmeterol (ADVAIR DISKUS) 250-50 MCG/DOSE diskus inhaler 10/11/2017 at Unknown time Self No Yes   Sig: Inhale 1 puff into the lungs 2 times daily   olmesartan (BENICAR) 40 MG tablet Past Week at Unknown time Self No Yes   Sig: Take 1 tablet (40 mg) by mouth daily   omeprazole (PRILOSEC) 20 MG CR capsule 10/10/2017 at Unknown time Self No Yes   Sig: TAKE 1 CAPSULE (20 MG) BY MOUTH 2 TIMES DAILY   order for DME  Self Yes No   SiL of O2 at night   prednisoLONE acetate (PRED FORTE) 1 % ophthalmic suspension 10/11/2017 at Unknown time Self Yes Yes   Sig: Place 1 drop Into the left eye 4 times daily    rOPINIRole (REQUIP) 0.25 MG tablet Past Week at Unknown time Self Yes Yes   Sig: Take 0.5 mg by mouth At Bedtime    traMADol (ULTRAM) 50 MG tablet  at PRN Self No Yes   Sig: TAKE UP TO 2 TABLETS BY MOUTH 2 TIMES DAILY. LIMIT TO 4 TABLET DAILY, ON AVERAGE      Facility-Administered Medications: None     Allergies   Allergies   Allergen Reactions     Atorvastatin Calcium      myalgias     Celecoxib Swelling     edema     Cholestyramine Diarrhea     Diarrhea       Crestor [Rosuvastatin Calcium]      leg aches, likely from medication     Cyclobenzaprine Hcl      flexeril--gets \"goofy\"     Dulera      tremors     Gabapentin      Nightmares.   Retrial of medication " caused tremors.     Lisinopril Cough     Cough/ Dizziness at high dose.     Meperidine Hcl Nausea and Vomiting     demerol-severe nausea     Morphine Nausea and Vomiting     Naprosyn [Naproxen] GI Disturbance     Niaspan [Niacin]      flushing, severe     Percocet [Oxycodone-Acetaminophen] Nausea     Nausea, lightheadedness.   Tolerates med if taken with food.     Pravastatin Swelling     Edema, myalgias     Red Yeast Rice [Cholestin] GI Disturbance     Bloating, etc.     Simvastatin      myalgias     Timolol Maleate Difficulty breathing     timoptic--respiratory problems     Hctz Rash     rash     Sulfa Drugs Itching and Rash     rash on all mucous membranes and palms of hands with intense itching       Social History   I have reviewed this patient's social history and updated it with pertinent information if needed. Rupinder Tracy  reports that she has never smoked. She has never used smokeless tobacco. She reports that she drinks alcohol. She reports that she does not use illicit drugs.    Family History   I have reviewed this patient's family history and updated it with pertinent information if needed.   Family History   Problem Relation Age of Onset     Adopted: Yes     C.A.D. Paternal Uncle      MI 50's     Family History Negative Daughter        Review of Systems   The 10 point Review of Systems is negative other than noted in the HPI or here.  Does use Lidoderm patch for low back pain related to severe osteoporosis and stress sacral fractures.     Physical Exam   Temp: 97.9  F (36.6  C) Temp src: Oral BP: 125/60   Heart Rate: 87 Resp: (!) 85 SpO2: 90 % O2 Device: None (Room air)    Vital Signs with Ranges  Temp:  [97.9  F (36.6  C)] 97.9  F (36.6  C)  Heart Rate:  [77-98] 87  Resp:  [16-85] 85  BP: (116-143)/(55-89) 125/60  SpO2:  [90 %-97 %] 90 %  164 lbs 0 oz    Constitutional: Awake, alert, cooperative, moderately uncomfortable with pain and nausea.  Eyes: false right eye and left eye lid  blepharoplasty to cover most of globe, minimal vision in left eye  HEENT: Moist mucous membranes, normal dentition.  Respiratory: Clear to auscultation bilaterally, no crackles or wheezing.  Cardiovascular: Regular rate and rhythm, normal S1 and S2, and no murmur noted.  GI: Soft, non-distended, tender over epigastric area and right upper quadrant area, but also right mid abdominal area, BS present but decreased  Lymph/Hematologic: No anterior cervical or supraclavicular adenopathy.  Skin: No rashes, no cyanosis, no edema.  Musculoskeletal: No joint swelling, erythema or tenderness.  Neurologic: Cranial nerves 2-12 intact, normal strength and sensation.  Psychiatric: Alert, oriented to person, place and time, no obvious anxiety or depression.    Data   Data reviewed today:  I personally reviewed the EKG tracing showing nsr with rate 83 and frequent PVC's, and PRWP    Recent Labs  Lab 10/11/17  1515   WBC 5.9   HGB 12.2   MCV 97         POTASSIUM 4.3   CHLORIDE 106   CO2 25   BUN 41*   CR 1.25*   ANIONGAP 10   AXEL 9.0   *   ALBUMIN 3.2*   PROTTOTAL 7.1   BILITOTAL 2.1*   ALKPHOS 215*   ALT 50   AST 89*   LIPASE 144   TROPI <0.015       Imaging:  Recent Results (from the past 24 hour(s))   XR Chest 2 Views    Narrative    XR CHEST 2 VW 10/11/2017 4:01 PM    COMPARISON: 10/14/2016    HISTORY: Chest pain.      Impression    IMPRESSION: Mildly enlarged cardiac silhouette. Mild left basilar  atelectasis/consolidation. Lungs are otherwise clear. No pleural  effusion or pneumothorax.    HECTOR MENARD MD   Abdomen US, limited (RUQ only)    Narrative    US ABDOMEN LIMITED 10/11/2017 5:28 PM     HISTORY: RUQ pain.     FINDINGS:  Liver is normal in echogenicity without focal lesions.  Prominent intrahepatic bile ducts are present with ringdown artifact  possibly indicating pneumobilia. Prior CT from February 2016 showed  dilated intrahepatic and common bile duct as well. These findings may  be related to  prior cholecystectomy changes. Question whether patient  has had prior sphincterotomy procedure to account for the potential  air in the biliary system. Otherwise cholangitis is possible. The  gallbladder is surgically absent. Common bile duct is normal in  diameter. Pancreas is normal where visualized. Examination of the  right kidney is unremarkable.      Impression    IMPRESSION:  Prior cholecystectomy changes with dilated intrahepatic  and common bile duct similar to prior CT from 2016. Ringdown artifact  in the intrahepatic ducts near the confluence of the right and left  hepatic duct could indicate air in the biliary tree. Cholangitis or  prior sphincterotomy procedure could account for this finding.  Correlate with patient's liver enzymes.    FERMIN TOWNSEND MD

## 2017-10-12 NOTE — ED NOTES
"[facility]  ED Nurse Handoff Report    ED Chief complaint: Chest Pain (Started yesterday.  )      ED Diagnosis:   Final diagnoses:   Chest pain       Code Status: Full Code    Allergies:   Allergies   Allergen Reactions     Atorvastatin Calcium      myalgias     Celecoxib Swelling     edema     Cholestyramine Diarrhea     Diarrhea       Crestor [Rosuvastatin Calcium]      leg aches, likely from medication     Cyclobenzaprine Hcl      flexeril--gets \"goofy\"     Dulera      tremors     Gabapentin      Nightmares.   Retrial of medication caused tremors.     Lisinopril Cough     Cough/ Dizziness at high dose.     Meperidine Hcl Nausea and Vomiting     demerol-severe nausea     Morphine Nausea and Vomiting     Naprosyn [Naproxen] GI Disturbance     Niaspan [Niacin]      flushing, severe     Percocet [Oxycodone-Acetaminophen] Nausea     Nausea, lightheadedness.   Tolerates med if taken with food.     Pravastatin Swelling     Edema, myalgias     Red Yeast Rice [Cholestin] GI Disturbance     Bloating, etc.     Simvastatin      myalgias     Timolol Maleate Difficulty breathing     timoptic--respiratory problems     Hctz Rash     rash     Sulfa Drugs Itching and Rash     rash on all mucous membranes and palms of hands with intense itching       Activity level - Baseline/Home:  Independent    Activity Level - Current:   Stand with Assist     Needed?: No    Isolation: No  Infection: Not Applicable    Bariatric?: No    Vital Signs:   Vitals:    10/11/17 1404   BP: 129/89   Resp: 16   Temp: 97.9  F (36.6  C)   TempSrc: Oral   SpO2: 97%   Weight: 74.4 kg (164 lb)   Height: 1.626 m (5' 4\")       Cardiac Rhythm: ,        Pain level: 0-10 Pain Scale: 3    Is this patient confused?: No    Patient Report: Initial Complaint:  intermittent chest and abdominal pain that started yesterday  Focused Assessment: Abd pain, nausea  Tests Performed: labs, ekg, u/s  Abnormal Results: air in abdomen  Treatments " provided: zosyn, dilaudid, ondatestron, pt may need to take her own eyedrops (specialty)  Has not taken meds for a couple of days    Family Comments: friend here    OBS brochure/video discussed/provided to patient: N/A    ED Medications:   Medications   piperacillin-tazobactam (ZOSYN) 3.375 g vial to attach to  mL bag (not administered)   HYDROmorphone (PF) (DILAUDID) injection 0.3 mg (0.3 mg Intravenous Given 10/11/17 1926)   ondansetron (ZOFRAN) 2 MG/ML injection (4 mg  Given 10/11/17 1913)       Drips infusing?:  Yes      ED NURSE PHONE NUMBER: *[phone]

## 2017-10-12 NOTE — PLAN OF CARE
Problem: Patient Care Overview  Goal: Plan of Care/Patient Progress Review  Outcome: No Change  Admitted from ED this evening with abdominal pain.  Pain redeveloped this evening which was relieved by 1 dose of IV dilaudid 0.3 mg.  Tylenol previously given for headache with relief.  NPO except for sips with meds and ice chips.  Up with SBA/walker, gait steady; fall risk due to vision impairment.  Oxygen at 2 lpm overnight per her usual routine.  Continues on IVF and IV Abx.  GI to consult tomorrow.

## 2017-10-13 ENCOUNTER — ANESTHESIA EVENT (OUTPATIENT)
Dept: SURGERY | Facility: CLINIC | Age: 77
DRG: 444 | End: 2017-10-13
Payer: MEDICARE

## 2017-10-13 ENCOUNTER — SURGERY (OUTPATIENT)
Age: 77
End: 2017-10-13

## 2017-10-13 ENCOUNTER — ANESTHESIA (OUTPATIENT)
Dept: SURGERY | Facility: CLINIC | Age: 77
DRG: 444 | End: 2017-10-13
Payer: MEDICARE

## 2017-10-13 ENCOUNTER — APPOINTMENT (OUTPATIENT)
Dept: GENERAL RADIOLOGY | Facility: CLINIC | Age: 77
DRG: 444 | End: 2017-10-13
Attending: PHYSICIAN ASSISTANT
Payer: MEDICARE

## 2017-10-13 LAB
ALBUMIN SERPL-MCNC: 2.6 G/DL (ref 3.4–5)
ALP SERPL-CCNC: 247 U/L (ref 40–150)
ALT SERPL W P-5'-P-CCNC: 62 U/L (ref 0–50)
ANION GAP SERPL CALCULATED.3IONS-SCNC: 8 MMOL/L (ref 3–14)
AST SERPL W P-5'-P-CCNC: 77 U/L (ref 0–45)
BACTERIA SPEC CULT: ABNORMAL
BACTERIA SPEC CULT: ABNORMAL
BILIRUB DIRECT SERPL-MCNC: 0.8 MG/DL (ref 0–0.2)
BILIRUB SERPL-MCNC: 1.3 MG/DL (ref 0.2–1.3)
BUN SERPL-MCNC: 38 MG/DL (ref 7–30)
CALCIUM SERPL-MCNC: 8.1 MG/DL (ref 8.5–10.1)
CHLORIDE SERPL-SCNC: 111 MMOL/L (ref 94–109)
CO2 SERPL-SCNC: 23 MMOL/L (ref 20–32)
CREAT SERPL-MCNC: 1.47 MG/DL (ref 0.52–1.04)
ERCP: NORMAL
ERYTHROCYTE [DISTWIDTH] IN BLOOD BY AUTOMATED COUNT: 14.2 % (ref 10–15)
GFR SERPL CREATININE-BSD FRML MDRD: 34 ML/MIN/1.7M2
GLUCOSE SERPL-MCNC: 85 MG/DL (ref 70–99)
HCT VFR BLD AUTO: 34.6 % (ref 35–47)
HGB BLD-MCNC: 11.4 G/DL (ref 11.7–15.7)
MCH RBC QN AUTO: 32.8 PG (ref 26.5–33)
MCHC RBC AUTO-ENTMCNC: 32.9 G/DL (ref 31.5–36.5)
MCV RBC AUTO: 99 FL (ref 78–100)
PLATELET # BLD AUTO: 168 10E9/L (ref 150–450)
POTASSIUM SERPL-SCNC: 4.1 MMOL/L (ref 3.4–5.3)
PROT SERPL-MCNC: 6.3 G/DL (ref 6.8–8.8)
RBC # BLD AUTO: 3.48 10E12/L (ref 3.8–5.2)
SODIUM SERPL-SCNC: 142 MMOL/L (ref 133–144)
SPECIMEN SOURCE: ABNORMAL
WBC # BLD AUTO: 4.1 10E9/L (ref 4–11)

## 2017-10-13 PROCEDURE — 99232 SBSQ HOSP IP/OBS MODERATE 35: CPT | Performed by: INTERNAL MEDICINE

## 2017-10-13 PROCEDURE — 36000056 ZZH SURGERY LEVEL 3 1ST 30 MIN: Performed by: INTERNAL MEDICINE

## 2017-10-13 PROCEDURE — 25000125 ZZHC RX 250: Performed by: NURSE ANESTHETIST, CERTIFIED REGISTERED

## 2017-10-13 PROCEDURE — C1887 CATHETER, GUIDING: HCPCS | Performed by: INTERNAL MEDICINE

## 2017-10-13 PROCEDURE — 80076 HEPATIC FUNCTION PANEL: CPT | Performed by: INTERNAL MEDICINE

## 2017-10-13 PROCEDURE — 36000058 ZZH SURGERY LEVEL 3 EA 15 ADDTL MIN: Performed by: INTERNAL MEDICINE

## 2017-10-13 PROCEDURE — 36415 COLL VENOUS BLD VENIPUNCTURE: CPT | Performed by: INTERNAL MEDICINE

## 2017-10-13 PROCEDURE — 74330 X-RAY BILE/PANC ENDOSCOPY: CPT

## 2017-10-13 PROCEDURE — A9270 NON-COVERED ITEM OR SERVICE: HCPCS | Mod: GY | Performed by: INTERNAL MEDICINE

## 2017-10-13 PROCEDURE — 25000128 H RX IP 250 OP 636: Performed by: NURSE ANESTHETIST, CERTIFIED REGISTERED

## 2017-10-13 PROCEDURE — 85027 COMPLETE CBC AUTOMATED: CPT | Performed by: INTERNAL MEDICINE

## 2017-10-13 PROCEDURE — 71000012 ZZH RECOVERY PHASE 1 LEVEL 1 FIRST HR: Performed by: INTERNAL MEDICINE

## 2017-10-13 PROCEDURE — C1769 GUIDE WIRE: HCPCS | Performed by: INTERNAL MEDICINE

## 2017-10-13 PROCEDURE — 25000125 ZZHC RX 250: Performed by: INTERNAL MEDICINE

## 2017-10-13 PROCEDURE — 37000009 ZZH ANESTHESIA TECHNICAL FEE, EACH ADDTL 15 MIN: Performed by: INTERNAL MEDICINE

## 2017-10-13 PROCEDURE — 25000128 H RX IP 250 OP 636: Performed by: INTERNAL MEDICINE

## 2017-10-13 PROCEDURE — 40000170 ZZH STATISTIC PRE-PROCEDURE ASSESSMENT II: Performed by: INTERNAL MEDICINE

## 2017-10-13 PROCEDURE — 25000132 ZZH RX MED GY IP 250 OP 250 PS 637: Mod: GY | Performed by: INTERNAL MEDICINE

## 2017-10-13 PROCEDURE — 27210286 ZZH BALLOON ADDITIONAL: Performed by: INTERNAL MEDICINE

## 2017-10-13 PROCEDURE — 80048 BASIC METABOLIC PNL TOTAL CA: CPT | Performed by: INTERNAL MEDICINE

## 2017-10-13 PROCEDURE — 37000008 ZZH ANESTHESIA TECHNICAL FEE, 1ST 30 MIN: Performed by: INTERNAL MEDICINE

## 2017-10-13 PROCEDURE — 0FC98ZZ EXTIRPATION OF MATTER FROM COMMON BILE DUCT, VIA NATURAL OR ARTIFICIAL OPENING ENDOSCOPIC: ICD-10-PCS | Performed by: INTERNAL MEDICINE

## 2017-10-13 PROCEDURE — 12000000 ZZH R&B MED SURG/OB

## 2017-10-13 RX ORDER — EPHEDRINE SULFATE 50 MG/ML
INJECTION, SOLUTION INTRAMUSCULAR; INTRAVENOUS; SUBCUTANEOUS PRN
Status: DISCONTINUED | OUTPATIENT
Start: 2017-10-13 | End: 2017-10-13

## 2017-10-13 RX ORDER — ONDANSETRON 2 MG/ML
INJECTION INTRAMUSCULAR; INTRAVENOUS PRN
Status: DISCONTINUED | OUTPATIENT
Start: 2017-10-13 | End: 2017-10-13

## 2017-10-13 RX ORDER — ALBUTEROL SULFATE 0.83 MG/ML
2.5 SOLUTION RESPIRATORY (INHALATION) EVERY 4 HOURS PRN
Status: CANCELLED | OUTPATIENT
Start: 2017-10-13

## 2017-10-13 RX ORDER — SODIUM CHLORIDE, SODIUM LACTATE, POTASSIUM CHLORIDE, CALCIUM CHLORIDE 600; 310; 30; 20 MG/100ML; MG/100ML; MG/100ML; MG/100ML
INJECTION, SOLUTION INTRAVENOUS CONTINUOUS
Status: DISCONTINUED | OUTPATIENT
Start: 2017-10-13 | End: 2017-10-13 | Stop reason: HOSPADM

## 2017-10-13 RX ORDER — FENTANYL CITRATE 50 UG/ML
25-50 INJECTION, SOLUTION INTRAMUSCULAR; INTRAVENOUS
Status: CANCELLED | OUTPATIENT
Start: 2017-10-13

## 2017-10-13 RX ORDER — HYDRALAZINE HYDROCHLORIDE 20 MG/ML
2.5-5 INJECTION INTRAMUSCULAR; INTRAVENOUS EVERY 10 MIN PRN
Status: CANCELLED | OUTPATIENT
Start: 2017-10-13

## 2017-10-13 RX ORDER — HYDROMORPHONE HYDROCHLORIDE 1 MG/ML
.3-.5 INJECTION, SOLUTION INTRAMUSCULAR; INTRAVENOUS; SUBCUTANEOUS EVERY 5 MIN PRN
Status: CANCELLED | OUTPATIENT
Start: 2017-10-13

## 2017-10-13 RX ORDER — ONDANSETRON 2 MG/ML
4 INJECTION INTRAMUSCULAR; INTRAVENOUS EVERY 30 MIN PRN
Status: CANCELLED | OUTPATIENT
Start: 2017-10-13

## 2017-10-13 RX ORDER — PROPOFOL 10 MG/ML
INJECTION, EMULSION INTRAVENOUS CONTINUOUS PRN
Status: DISCONTINUED | OUTPATIENT
Start: 2017-10-13 | End: 2017-10-13

## 2017-10-13 RX ORDER — LABETALOL HYDROCHLORIDE 5 MG/ML
10 INJECTION, SOLUTION INTRAVENOUS
Status: CANCELLED | OUTPATIENT
Start: 2017-10-13

## 2017-10-13 RX ORDER — MEPERIDINE HYDROCHLORIDE 25 MG/ML
12.5 INJECTION INTRAMUSCULAR; INTRAVENOUS; SUBCUTANEOUS EVERY 5 MIN PRN
Status: CANCELLED | OUTPATIENT
Start: 2017-10-13

## 2017-10-13 RX ORDER — LIDOCAINE 40 MG/G
CREAM TOPICAL
Status: DISCONTINUED | OUTPATIENT
Start: 2017-10-13 | End: 2017-10-13 | Stop reason: HOSPADM

## 2017-10-13 RX ORDER — SODIUM CHLORIDE, SODIUM LACTATE, POTASSIUM CHLORIDE, CALCIUM CHLORIDE 600; 310; 30; 20 MG/100ML; MG/100ML; MG/100ML; MG/100ML
INJECTION, SOLUTION INTRAVENOUS CONTINUOUS
Status: CANCELLED | OUTPATIENT
Start: 2017-10-13

## 2017-10-13 RX ORDER — ONDANSETRON 4 MG/1
4 TABLET, ORALLY DISINTEGRATING ORAL EVERY 30 MIN PRN
Status: CANCELLED | OUTPATIENT
Start: 2017-10-13

## 2017-10-13 RX ADMIN — PIPERACILLIN SODIUM,TAZOBACTAM SODIUM 2.25 G: 2; .25 INJECTION, POWDER, FOR SOLUTION INTRAVENOUS at 21:11

## 2017-10-13 RX ADMIN — LIDOCAINE 1 PATCH: 50 PATCH TOPICAL at 21:15

## 2017-10-13 RX ADMIN — PREDNISOLONE ACETATE 1 DROP: 10 SUSPENSION/ DROPS OPHTHALMIC at 17:32

## 2017-10-13 RX ADMIN — DEXMEDETOMIDINE HYDROCHLORIDE 8 MCG: 100 INJECTION, SOLUTION INTRAVENOUS at 13:33

## 2017-10-13 RX ADMIN — DEXMEDETOMIDINE HYDROCHLORIDE 12 MCG: 100 INJECTION, SOLUTION INTRAVENOUS at 13:24

## 2017-10-13 RX ADMIN — PIPERACILLIN SODIUM,TAZOBACTAM SODIUM 2.25 G: 2; .25 INJECTION, POWDER, FOR SOLUTION INTRAVENOUS at 08:19

## 2017-10-13 RX ADMIN — DEXMEDETOMIDINE HYDROCHLORIDE 4 MCG: 100 INJECTION, SOLUTION INTRAVENOUS at 13:44

## 2017-10-13 RX ADMIN — Medication 5 MG: at 14:03

## 2017-10-13 RX ADMIN — SODIUM CHLORIDE: 9 INJECTION, SOLUTION INTRAVENOUS at 06:56

## 2017-10-13 RX ADMIN — PREDNISOLONE ACETATE 1 DROP: 10 SUSPENSION/ DROPS OPHTHALMIC at 08:20

## 2017-10-13 RX ADMIN — HYDROMORPHONE HYDROCHLORIDE 0.5 MG: 1 INJECTION, SOLUTION INTRAMUSCULAR; INTRAVENOUS; SUBCUTANEOUS at 11:06

## 2017-10-13 RX ADMIN — PROPOFOL 75 MCG/KG/MIN: 10 INJECTION, EMULSION INTRAVENOUS at 13:23

## 2017-10-13 RX ADMIN — CYCLOSPORINE 1 DROP: 0.5 EMULSION OPHTHALMIC at 09:56

## 2017-10-13 RX ADMIN — DEXMEDETOMIDINE HYDROCHLORIDE 8 MCG: 100 INJECTION, SOLUTION INTRAVENOUS at 13:28

## 2017-10-13 RX ADMIN — PREDNISOLONE ACETATE 1 DROP: 10 SUSPENSION/ DROPS OPHTHALMIC at 21:13

## 2017-10-13 RX ADMIN — PIPERACILLIN SODIUM,TAZOBACTAM SODIUM 2.25 G: 2; .25 INJECTION, POWDER, FOR SOLUTION INTRAVENOUS at 16:02

## 2017-10-13 RX ADMIN — ONDANSETRON 4 MG: 2 INJECTION INTRAMUSCULAR; INTRAVENOUS at 13:27

## 2017-10-13 RX ADMIN — MIDAZOLAM HYDROCHLORIDE 0.5 MG: 1 INJECTION, SOLUTION INTRAMUSCULAR; INTRAVENOUS at 13:23

## 2017-10-13 RX ADMIN — DEXMEDETOMIDINE HYDROCHLORIDE 8 MCG: 100 INJECTION, SOLUTION INTRAVENOUS at 13:47

## 2017-10-13 RX ADMIN — DEXMEDETOMIDINE HYDROCHLORIDE 8 MCG: 100 INJECTION, SOLUTION INTRAVENOUS at 13:31

## 2017-10-13 RX ADMIN — PIPERACILLIN SODIUM,TAZOBACTAM SODIUM 2.25 G: 2; .25 INJECTION, POWDER, FOR SOLUTION INTRAVENOUS at 02:51

## 2017-10-13 RX ADMIN — ROPINIROLE HYDROCHLORIDE 0.5 MG: 0.5 TABLET, FILM COATED ORAL at 21:15

## 2017-10-13 RX ADMIN — PANTOPRAZOLE SODIUM 40 MG: 40 INJECTION, POWDER, FOR SOLUTION INTRAVENOUS at 07:33

## 2017-10-13 RX ADMIN — Medication 1 CAPSULE: at 08:19

## 2017-10-13 RX ADMIN — PANTOPRAZOLE SODIUM 40 MG: 40 INJECTION, POWDER, FOR SOLUTION INTRAVENOUS at 17:32

## 2017-10-13 RX ADMIN — PREDNISOLONE ACETATE 1 DROP: 10 SUSPENSION/ DROPS OPHTHALMIC at 15:10

## 2017-10-13 RX ADMIN — CYCLOSPORINE 1 DROP: 0.5 EMULSION OPHTHALMIC at 21:18

## 2017-10-13 NOTE — PROGRESS NOTES
[facility]    Hospitalist Progress Note    Date of Service (when I saw the patient): 10/13/2017    Assessment & Plan   Rupinder Tracy is a 77 year old female who was admitted on 10/11/2017 with RUQ abdominal pain.    1. RUQ pain - MRCP suggests sludge in bile duct, ERCP this afternoon.  GI consult appreciated.  Continue Zosyn and dilaudid.  Pt is s/p cholecystectomy.  RUQ US demonstrates possible air in biliary tree, concerning for cholangitis or prior sphincterotomy.    2. Mild persistent asthma - Restart home Advair.  Continue albuterol prn.    3. RLS - Continue Requip    DVT Prophylaxis: Pneumatic Compression Devices  Code Status: Full Code    Disposition: Expected discharge in 2-3 days pending recovery.    Miguel Cameron  Text Page (7 am to 6 pm)    Interval History   The patient continues to have RUQ pain that radiates to chest.  It is worse with deep breaths.    -Data reviewed today: I reviewed all new labs and imaging results over the last 24 hours. I personally reviewed no images or EKG's today.    Physical Exam   Temp: 97.3  F (36.3  C) Temp src: Oral BP: 128/58   Heart Rate: 60 Resp: 22 SpO2: 93 % O2 Device: None (Room air) Oxygen Delivery: 2 LPM  Vitals:    10/11/17 1404 10/11/17 2022 10/12/17 0700   Weight: 74.4 kg (164 lb) 71.7 kg (158 lb 1.1 oz) 72.3 kg (159 lb 6.3 oz)     Vital Signs with Ranges  Temp:  [97.3  F (36.3  C)-97.9  F (36.6  C)] 97.3  F (36.3  C)  Heart Rate:  [58-65] 60  Resp:  [16-22] 22  BP: (103-128)/(48-58) 128/58  SpO2:  [92 %-98 %] 93 %  I/O last 3 completed shifts:  In: 2101 [P.O.:365; I.V.:1736]  Out: 525 [Urine:525]    Gen: Well nourished, well developed, alert and oriented x 3, no acute distressed  HEENT: Atraumatic, normocephalic  Lungs: Clear to ausculation without wheezes, rhonchi, or rales  Heart: Regular rate and rhythm, no murmurs, gallops, or rubs  GI: Bowel sound normal, no hepatosplenomegaly or masses, mild RUQ tenderness w/o guarding  Lymph: No  lymphadenopathy or edema  Skin: No rashes     Medications     NaCl 100 mL/hr at 10/13/17 0656       pantoprazole  40 mg Intravenous BID AC     fluticasone-salmeterol  1 puff Inhalation BID     multivitamin  with lutein  1 capsule Oral Daily     polyethylene glycol 0.4%- propylene glycol 0.3%  1 drop Left Eye 4x Daily     rOPINIRole  0.5 mg Oral At Bedtime     cycloSPORINE  1 drop Left Eye BID     prednisoLONE acetate  1 drop Left Eye 4x Daily     piperacillin-tazobactam  2.25 g Intravenous Q6H     lidocaine  1 patch Transdermal Q24h    And     lidocaine   Transdermal Q24H    And     lidocaine   Transdermal Q8H     rOPINIRole  0.25 mg Oral Daily       Data     Recent Labs  Lab 10/13/17  0810 10/12/17  1437 10/12/17  0927 10/11/17  1515   WBC 4.1  --  5.9 5.9   HGB 11.4*  --  12.3 12.2   MCV 99  --  99 97     --  184 194   INR  --  1.09  --   --      --  143 141   POTASSIUM 4.1  --  4.3 4.3   CHLORIDE 111*  --  109 106   CO2 23  --  25 25   BUN 38*  --  40* 41*   CR 1.47*  --  1.47* 1.25*   ANIONGAP 8  --  9 10   AXEL 8.1*  --  8.6 9.0   GLC 85  --  91 101*   ALBUMIN 2.6*  --   --  3.2*   PROTTOTAL 6.3*  --   --  7.1   BILITOTAL 1.3  --  2.6* 2.1*   ALKPHOS 247*  --  267* 215*   ALT 62*  --   --  50   AST 77*  --  114* 89*   LIPASE  --   --   --  144   TROPI  --   --   --  <0.015       Recent Results (from the past 24 hour(s))   MR Abdomen MRCP w/o & w Contrast    Narrative    MAGNETIC RESONANCE CHOLANGIOPANCREATOGRAPHY  10/12/2017 1:10 PM     HISTORY: Possible air in biliary tree on ultrasound. Elevated liver  function tests.    COMPARISON: Ultrasound from 10/11/2017.    TECHNIQUE: Multiplanar, multisequence images of the abdomen acquired  before and after administration of 7 mL Gadavist intravenous contrast.    FINDINGS: The gallbladder is absent. No obvious air is seen in the  biliary system but this would be better evaluated with CT. There is  intra and extrahepatic biliary dilatation which is moderate  to severe  with the extrahepatic bile duct measuring up to 2 cm. This extends to  the sphincter of Oddi. There is some possible sludge and/or stones in  the distal common bile duct. No biliary strictures. The pancreatic  duct is dilated up to 5 mm. No pancreatic duct strictures.  Mild-moderate significant sidebranch visualization. No cystic lesions  within the pancreas. The signal intensity of the liver is within  normal limits without evidence for hepatic steatosis. The signal  intensity of the pancreas within normal limits without evidence for  pancreatitis. Visualized kidneys demonstrate unremarkable signal  intensity without hydronephrosis. Renal cysts noted in both kidneys.  Adrenal glands and spleen are unremarkable. Visualized osseous  structures unremarkable. After administration of intravenous contrast,  solid organs demonstrate no enhancing focal lesions.      Impression    IMPRESSION: Possible sludge and/or stones in the distal common bile  duct, consider ERCP for further evaluation.    SILVIA WILLIAM MD

## 2017-10-13 NOTE — ANESTHESIA PREPROCEDURE EVALUATION
Anesthesia Evaluation     .             ROS/MED HX    ENT/Pulmonary:     (+)asthma , . .   (-) sleep apnea   Neurologic:     (+)other neuro RLS    Cardiovascular:     (+) hypertension----. : . . . :. .       METS/Exercise Tolerance:     Hematologic:         Musculoskeletal:         GI/Hepatic:        (-) GERD   Renal/Genitourinary:     (+) chronic renal disease, type: CRI,       Endo:         Psychiatric:         Infectious Disease:         Malignancy:         Other:                                    Anesthesia Plan      History & Physical Review  History and physical reviewed and following examination; no interval change.    ASA Status:  2 .    NPO Status:  > 8 hours    Plan for MAC Reason for MAC:  Deep or markedly invasive procedure (G8)  PONV prophylaxis:  Ondansetron (or other 5HT-3) (Propofol gtt)       Postoperative Care  Postoperative pain management:  IV analgesics and Oral pain medications.      Consents  Anesthetic plan, risks, benefits and alternatives discussed with:  Patient..                          .

## 2017-10-13 NOTE — PROGRESS NOTES
GI  Patient nervous about procedure.  I saw and explained the procedure as well as the risks/benefits of this procedure.  She had no further questions.    Planned for 1:00 today.      Magnolia Carvalho, [facility]  Office:  [phone] call if needed after 5PM  Cell:  [phone], not available after 5PM at this number

## 2017-10-13 NOTE — PLAN OF CARE
Problem: Patient Care Overview  Goal: Plan of Care/Patient Progress Review  Outcome: Improving  VSS on __, up SBA, A&Ox4. Pt slept well this shift. IVF infusing. IV abx. NPO as of midnight; pt complained of gastro pain with Clear Liquid diet. ERCP planned for today. Lidoderm patch in place. Denies pain. Voiding appropriately. Some crackles in the bases, improved with coughing. Plan to DC 10/4. Continue to monitor.

## 2017-10-13 NOTE — ANESTHESIA CARE TRANSFER NOTE
Patient: Rupinder Tracy    Procedure(s):  ENDOSCOPIC RETROGRADE CHOLANGIOPANCREATOGRAM  - Wound Class: II-Clean Contaminated    Diagnosis: COIMMON BILE DUCT STONE   Diagnosis Additional Information: No value filed.    Anesthesia Type:   MAC     Note:  Airway :Nasal Cannula  Patient transferred to:PACU  Comments: Pt to PACU with O2 via nasal cannula, airway patent, VSS.  Report to RN.Handoff Report: Identifed the Patient, Identified the Reponsible Provider, Reviewed the pertinent medical history, Discussed the surgical course, Reviewed Intra-OP anesthesia mangement and issues during anesthesia, Set expectations for post-procedure period and Allowed opportunity for questions and acknowledgement of understanding      Vitals: (Last set prior to Anesthesia Care Transfer)    CRNA VITALS  10/13/2017 1335 - 10/13/2017 1410      10/13/2017             Resp Rate (set): 10                Electronically Signed By: YUNIEL Ernst CRNA  October 13, 2017  2:10 PM

## 2017-10-13 NOTE — PLAN OF CARE
Problem: Patient Care Overview  Goal: Plan of Care/Patient Progress Review  Outcome: Improving  Patient eating small amount of clear liquids, states pain gets worse after eating. Patient taking Norco prn for pain management and is getting adequate relief. Patient up with assist of one, A&O x4. Patient will be NPO after midnight for ERCP tomorrow.

## 2017-10-13 NOTE — ANESTHESIA POSTPROCEDURE EVALUATION
Patient: Rupinder Tracy    Procedure(s):  ENDOSCOPIC RETROGRADE CHOLANGIOPANCREATOGRAM  - Wound Class: II-Clean Contaminated    Diagnosis:COIMMON BILE DUCT STONE   Diagnosis Additional Information: No value filed.    Anesthesia Type:  MAC    Note:  Anesthesia Post Evaluation    Patient location during evaluation: PACU  Patient participation: Able to fully participate in evaluation  Level of consciousness: awake  Pain management: adequate  Airway patency: patent  Cardiovascular status: acceptable  Respiratory status: acceptable  Hydration status: acceptable  PONV: none     Anesthetic complications: None          Last vitals:  Vitals:    10/13/17 1130 10/13/17 1408 10/13/17 1410   BP:  90/48 94/44   Pulse:      Resp: 18 16 13   Temp:  35.9  C (96.7  F)    SpO2:   99%         Electronically Signed By: Keturah Cota MD, MD  October 13, 2017  2:32 PM

## 2017-10-14 LAB
ALBUMIN SERPL-MCNC: 2.7 G/DL (ref 3.4–5)
ALP SERPL-CCNC: 259 U/L (ref 40–150)
ALT SERPL W P-5'-P-CCNC: 52 U/L (ref 0–50)
ANION GAP SERPL CALCULATED.3IONS-SCNC: 10 MMOL/L (ref 3–14)
AST SERPL W P-5'-P-CCNC: 51 U/L (ref 0–45)
BILIRUB DIRECT SERPL-MCNC: 0.6 MG/DL (ref 0–0.2)
BILIRUB SERPL-MCNC: 1.1 MG/DL (ref 0.2–1.3)
BUN SERPL-MCNC: 30 MG/DL (ref 7–30)
C DIFF TOX B STL QL: NEGATIVE
CALCIUM SERPL-MCNC: 7.9 MG/DL (ref 8.5–10.1)
CHLORIDE SERPL-SCNC: 115 MMOL/L (ref 94–109)
CO2 SERPL-SCNC: 20 MMOL/L (ref 20–32)
CREAT SERPL-MCNC: 1.19 MG/DL (ref 0.52–1.04)
ERYTHROCYTE [DISTWIDTH] IN BLOOD BY AUTOMATED COUNT: 14.1 % (ref 10–15)
GFR SERPL CREATININE-BSD FRML MDRD: 44 ML/MIN/1.7M2
GLUCOSE SERPL-MCNC: 91 MG/DL (ref 70–99)
HCT VFR BLD AUTO: 35.3 % (ref 35–47)
HGB BLD-MCNC: 11.7 G/DL (ref 11.7–15.7)
LIPASE SERPL-CCNC: 523 U/L (ref 73–393)
MCH RBC QN AUTO: 32.9 PG (ref 26.5–33)
MCHC RBC AUTO-ENTMCNC: 33.1 G/DL (ref 31.5–36.5)
MCV RBC AUTO: 99 FL (ref 78–100)
PLATELET # BLD AUTO: 179 10E9/L (ref 150–450)
POTASSIUM SERPL-SCNC: 4 MMOL/L (ref 3.4–5.3)
PROT SERPL-MCNC: 6.5 G/DL (ref 6.8–8.8)
RBC # BLD AUTO: 3.56 10E12/L (ref 3.8–5.2)
SODIUM SERPL-SCNC: 145 MMOL/L (ref 133–144)
SPECIMEN SOURCE: NORMAL
WBC # BLD AUTO: 6.2 10E9/L (ref 4–11)

## 2017-10-14 PROCEDURE — 25000132 ZZH RX MED GY IP 250 OP 250 PS 637: Mod: GY | Performed by: INTERNAL MEDICINE

## 2017-10-14 PROCEDURE — 25000128 H RX IP 250 OP 636: Performed by: INTERNAL MEDICINE

## 2017-10-14 PROCEDURE — 80048 BASIC METABOLIC PNL TOTAL CA: CPT | Performed by: INTERNAL MEDICINE

## 2017-10-14 PROCEDURE — 25000125 ZZHC RX 250: Performed by: INTERNAL MEDICINE

## 2017-10-14 PROCEDURE — 83690 ASSAY OF LIPASE: CPT | Performed by: INTERNAL MEDICINE

## 2017-10-14 PROCEDURE — 87493 C DIFF AMPLIFIED PROBE: CPT | Performed by: HOSPITALIST

## 2017-10-14 PROCEDURE — 25000128 H RX IP 250 OP 636: Performed by: HOSPITALIST

## 2017-10-14 PROCEDURE — 80076 HEPATIC FUNCTION PANEL: CPT | Performed by: INTERNAL MEDICINE

## 2017-10-14 PROCEDURE — 36415 COLL VENOUS BLD VENIPUNCTURE: CPT | Performed by: INTERNAL MEDICINE

## 2017-10-14 PROCEDURE — 12000000 ZZH R&B MED SURG/OB

## 2017-10-14 PROCEDURE — A9270 NON-COVERED ITEM OR SERVICE: HCPCS | Mod: GY | Performed by: INTERNAL MEDICINE

## 2017-10-14 PROCEDURE — 99232 SBSQ HOSP IP/OBS MODERATE 35: CPT | Performed by: HOSPITALIST

## 2017-10-14 PROCEDURE — 85027 COMPLETE CBC AUTOMATED: CPT | Performed by: INTERNAL MEDICINE

## 2017-10-14 RX ADMIN — PIPERACILLIN SODIUM,TAZOBACTAM SODIUM 2.25 G: 2; .25 INJECTION, POWDER, FOR SOLUTION INTRAVENOUS at 20:16

## 2017-10-14 RX ADMIN — PREDNISOLONE ACETATE 1 DROP: 10 SUSPENSION/ DROPS OPHTHALMIC at 08:48

## 2017-10-14 RX ADMIN — PIPERACILLIN SODIUM,TAZOBACTAM SODIUM 2.25 G: 2; .25 INJECTION, POWDER, FOR SOLUTION INTRAVENOUS at 14:29

## 2017-10-14 RX ADMIN — ROPINIROLE HYDROCHLORIDE 0.25 MG: 0.25 TABLET, FILM COATED ORAL at 14:29

## 2017-10-14 RX ADMIN — Medication 1 CAPSULE: at 08:46

## 2017-10-14 RX ADMIN — CYCLOSPORINE 1 DROP: 0.5 EMULSION OPHTHALMIC at 13:22

## 2017-10-14 RX ADMIN — ROPINIROLE HYDROCHLORIDE 0.5 MG: 0.5 TABLET, FILM COATED ORAL at 21:49

## 2017-10-14 RX ADMIN — PANTOPRAZOLE SODIUM 40 MG: 40 INJECTION, POWDER, FOR SOLUTION INTRAVENOUS at 07:05

## 2017-10-14 RX ADMIN — PANTOPRAZOLE SODIUM 40 MG: 40 INJECTION, POWDER, FOR SOLUTION INTRAVENOUS at 17:16

## 2017-10-14 RX ADMIN — PREDNISOLONE ACETATE 1 DROP: 10 SUSPENSION/ DROPS OPHTHALMIC at 12:42

## 2017-10-14 RX ADMIN — PREDNISOLONE ACETATE 1 DROP: 10 SUSPENSION/ DROPS OPHTHALMIC at 22:59

## 2017-10-14 RX ADMIN — LIDOCAINE 1 PATCH: 50 PATCH TOPICAL at 20:17

## 2017-10-14 RX ADMIN — PREDNISOLONE ACETATE 1 DROP: 10 SUSPENSION/ DROPS OPHTHALMIC at 17:17

## 2017-10-14 RX ADMIN — PIPERACILLIN SODIUM,TAZOBACTAM SODIUM 2.25 G: 2; .25 INJECTION, POWDER, FOR SOLUTION INTRAVENOUS at 08:47

## 2017-10-14 RX ADMIN — PIPERACILLIN SODIUM,TAZOBACTAM SODIUM 2.25 G: 2; .25 INJECTION, POWDER, FOR SOLUTION INTRAVENOUS at 02:29

## 2017-10-14 RX ADMIN — SODIUM CHLORIDE: 9 INJECTION, SOLUTION INTRAVENOUS at 13:23

## 2017-10-14 RX ADMIN — CYCLOSPORINE 1 DROP: 0.5 EMULSION OPHTHALMIC at 21:48

## 2017-10-14 RX ADMIN — SODIUM CHLORIDE: 9 INJECTION, SOLUTION INTRAVENOUS at 01:12

## 2017-10-14 RX ADMIN — HYDROMORPHONE HYDROCHLORIDE 0.5 MG: 1 INJECTION, SOLUTION INTRAMUSCULAR; INTRAVENOUS; SUBCUTANEOUS at 09:54

## 2017-10-14 NOTE — PLAN OF CARE
Problem: Patient Care Overview  Goal: Plan of Care/Patient Progress Review  Outcome: Improving  A&O.  VSS, RA.  CMS intact.  Legally blind, and [place] has hearing aid.  Denies pain.  Lidoderm patch on lower back.  +2 edema on Bilateral LE.  Tolerating clear liquid diet.  IV infusing @ 100 ml/hr.  Eye drops are in pt's room.  Niece wants to be notified of any update.

## 2017-10-14 NOTE — PROVIDER NOTIFICATION
Notified MD that patient is requiring an Ax1 to SBA at times, she lives at home independently, PT eval?

## 2017-10-14 NOTE — PROGRESS NOTES
"[facility]  Hospitalist Progress Note        Malcolm Singh, DO  10/14/2017        Interval History:      Patient states she is feeling better. Discussed plan of care, verbalized understanding.          Assessment and Plan:        Rupinder Tracy is a 77 year old female who was admitted on 10/11/2017 with RUQ abdominal pain.     RUQ pain: MRCP with sludge in bile duct, ERCP completed, see report. Pt is s/p cholecystectomy. RUQ US demonstrates possible air in biliary tree, concerning for cholangitis or prior sphincterotomy.  - GI following.   - Continue Zosyn and dilaudid.      - S/p ERCP 10/13 with biliary stone extraction (prior alexi).      Mild persistent asthma - Stable.   - home Advair.    - Continue albuterol prn.     RLS - Continue Requip     DVT Prophylaxis: Pneumatic Compression Devices    Code: Full Code     Disposition: Expected discharge in 1-2 days pending recovery.                   Physical Exam:      Heart Rate: 61    Blood pressure 103/57, pulse 81, temperature 97.8  F (36.6  C), temperature source Oral, resp. rate 14, height 1.626 m (5' 4\"), weight 73.9 kg (162 lb 14.7 oz), last menstrual period 1990, SpO2 98 %.    Vitals:    10/11/17 2022 10/12/17 0700 10/14/17 0654   Weight: 71.7 kg (158 lb 1.1 oz) 72.3 kg (159 lb 6.3 oz) 73.9 kg (162 lb 14.7 oz)       Vital Sign Ranges  Temperature Temp  Av.9  F (36.1  C)  Min: 96.3  F (35.7  C)  Max: 97.8  F (36.6  C)   Blood pressure Systolic (24hrs), Av , Min:83 , Max:128        Diastolic (24hrs), Av, Min:44, Max:60      Pulse No Data Recorded   Respirations Resp  Av.3  Min: 13  Max: 22   Pulse oximetry SpO2  Av.4 %  Min: 98 %  Max: 100 %     Vital Signs with Ranges  Temp:  [96.3  F (35.7  C)-97.8  F (36.6  C)] 97.8  F (36.6  C)  Heart Rate:  [53-69] 61  Resp:  [13-22] 14  BP: ()/(44-60) 103/57  SpO2:  [98 %-100 %] 98 %    I/O Last 3 Shifts:   I/O last 3 completed shifts:  In: 1796 [P.O.:145; " I.V.:1651]  Out: 625 [Urine:625]    I/O past 24 hours:     Intake/Output Summary (Last 24 hours) at 10/14/17 0836  Last data filed at 10/14/17 0804   Gross per 24 hour   Intake             2354 ml   Output              850 ml   Net             1504 ml     GENERAL: Alert and oriented. NAD. Conversational, appropriate.   HEENT: Normocephalic. EOMI. No icterus or injection. Nares normal.   LUNGS: Clear to auscultation. No dyspnea at rest.   HEART: Regular rate. Extremities perfused.   ABDOMEN: Soft, mild RUQ tenderness, and nondistended. Positive bowel sounds.   EXTREMITIES: No LE edema noted.   NEUROLOGIC: Moves extremities x4 on command. No acute focal neurologic abnormalities noted.          Prior to Admission Medications:        Prescriptions Prior to Admission   Medication Sig Dispense Refill Last Dose     ROPINIRole HCl (REQUIP PO) Take 0.25 mg by mouth daily 3PM        traMADol (ULTRAM) 50 MG tablet TAKE UP TO 2 TABLETS BY MOUTH 2 TIMES DAILY. LIMIT TO 4 TABLET DAILY, ON AVERAGE 100 tablet 1  at PRN     olmesartan (BENICAR) 40 MG tablet Take 1 tablet (40 mg) by mouth daily 90 tablet 2 Past Week at Unknown time     omeprazole (PRILOSEC) 20 MG CR capsule TAKE 1 CAPSULE (20 MG) BY MOUTH 2 TIMES DAILY 180 capsule 1 10/10/2017 at Unknown time     bumetanide (BUMEX) 2 MG tablet Take 1 tablet (2 mg) by mouth daily 30 tablet 11 Past Week at Unknown time     Cholecalciferol (VITAMIN D) 2000 UNITS tablet Take 2,000 Units by mouth 2 times daily   Past Week at Unknown time     rOPINIRole (REQUIP) 0.25 MG tablet Take 0.5 mg by mouth At Bedtime    Past Week at Unknown time     fluticasone-salmeterol (ADVAIR DISKUS) 250-50 MCG/DOSE diskus inhaler Inhale 1 puff into the lungs 2 times daily 60 Inhaler 1 10/11/2017 at Unknown time     Lactobacillus Rhamnosus, GG, (CULTURELLE PO) Take 1 tablet by mouth Three times a week   Past Week at Unknown time     denosumab (PROLIA) 60 MG/ML SOLN Inject 60 mg Subcutaneous every 6 months    Taking     Calcium-Vitamin D-Vitamin K (CALCIUM + D) 500-1000-40 MG-UNT-MCG CHEW Take 2 tablets by mouth daily    Past Week at Unknown time     DOXYCYCLINE HYCLATE PO Take 50 mg by mouth 3 times per week Monday,wednesday, friday   Past Week at Unknown time     prednisoLONE acetate (PRED FORTE) 1 % ophthalmic suspension Place 1 drop Into the left eye 4 times daily    10/11/2017 at Unknown time     albuterol (ALBUTEROL) 108 (90 BASE) MCG/ACT inhaler Inhale 2 puffs into the lungs every 4 hours as needed 1 Inhaler 5  at PRN     RESTASIS 0.05 % OP EMUL 1 drop to left eye twice daily   10/11/2017 at Unknown time     SYSTANE 0.4-0.3 % OP SOLN 1 drop to left eye 4 times daily   10/11/2017 at Unknown time     EYE-VITES OR TABS Hydro-Eye with Lutein 2 tabs twice a day (Rx from [facility])   Past Week at Unknown time     order for DME 2L of O2 at night   Taking            Medications:        Current Facility-Administered Medications   Medication Last Rate     NaCl 100 mL/hr at 10/14/17 0112     Current Facility-Administered Medications   Medication Dose Route Frequency     pantoprazole  40 mg Intravenous BID AC     fluticasone-salmeterol  1 puff Inhalation BID     multivitamin  with lutein  1 capsule Oral Daily     polyethylene glycol 0.4%- propylene glycol 0.3%  1 drop Left Eye 4x Daily     rOPINIRole  0.5 mg Oral At Bedtime     cycloSPORINE  1 drop Left Eye BID     prednisoLONE acetate  1 drop Left Eye 4x Daily     piperacillin-tazobactam  2.25 g Intravenous Q6H     lidocaine  1 patch Transdermal Q24h    And     lidocaine   Transdermal Q24H    And     lidocaine   Transdermal Q8H     rOPINIRole  0.25 mg Oral Daily     Current Facility-Administered Medications   Medication Dose Route Frequency     albuterol  2 puff Inhalation Q4H PRN     traMADol  50 mg Oral Q6H PRN     naloxone  0.1-0.4 mg Intravenous Q2 Min PRN     acetaminophen  650 mg Oral Q4H PRN     polyethylene glycol  17 g Oral Daily PRN     ondansetron  4  mg Oral Q6H PRN    Or     ondansetron  4 mg Intravenous Q6H PRN     prochlorperazine  5 mg Intravenous Q6H PRN    Or     prochlorperazine  5 mg Oral Q6H PRN    Or     prochlorperazine  12.5 mg Rectal Q12H PRN     HYDROcodone-acetaminophen  1-2 tablet Oral Q4H PRN     HYDROmorphone  0.3-0.5 mg Intravenous Q2H PRN     LORazepam  0.25-0.5 mg Oral At Bedtime PRN            Data:      Lab data reviewed.     Recent Labs  Lab 10/13/17  0810 10/12/17  1437 10/12/17  0927 10/11/17  1515   HGB 11.4*  --  12.3 12.2   MCV 99  --  99 97     --  184 194   INR  --  1.09  --   --      --  143 141   POTASSIUM 4.1  --  4.3 4.3   CHLORIDE 111*  --  109 106   CO2 23  --  25 25   BUN 38*  --  40* 41*   CR 1.47*  --  1.47* 1.25*   ANIONGAP 8  --  9 10   AXEL 8.1*  --  8.6 9.0   GLC 85  --  91 101*   TROPI  --   --   --  <0.015           Imaging:      Imaging data reviewed.     NORMA StrongO.  [facility]  Pager [phone]

## 2017-10-14 NOTE — PLAN OF CARE
Problem: Patient Care Overview  Goal: Plan of Care/Patient Progress Review  Outcome: Improving  VSS on 2L, up SBA, A&Ox4. Pt denies pain, no tenderness in abd.Oxygen on overnight. IVF infusing. IV abx. Tolerating clears, BS hypoactive and distant. Plan to DC pending progress. Continue to monitor.

## 2017-10-14 NOTE — PLAN OF CARE
Problem: Patient Care Overview  Goal: Plan of Care/Patient Progress Review  Outcome: Improving  A/O x4, legally blind and [place]. VSS on RA, wears 2 L at NOC/PRN comfort. Up w/1 GB/walker, gen weakness, ambulated x1, PT consulted. C/o abdominal cramping/pain, given dilaudid x1 with relief. Denies nausea. Had episode of rigors this morning with SOB, afebrile/sats mid 90's on RA, MD aware, resolved after dilaudid given. GI saw patient, okay to advance diet as tolerated, tolerated clears (poor appetite) and advanced to fulls. IVF decreased to 75 cc/hr. MD wants to continue abx given rigors this AM. Creat improving 1.19. LFTs improving. Small looses stools x2, incontinent at times, monitor. Nursing will continue to monitor.

## 2017-10-14 NOTE — PROGRESS NOTES
GI    S/p ERCP yesterday with biliary stone extraction (prior alexi).   Without pain.  LFTs mostly improving    Okay to ADAT, plan dispo once tolerating full diet without narcotics.   Okay to stop abx    Please call with questions.   Will sign off.     DO Charli [facility]  Cell [phone]

## 2017-10-14 NOTE — PROGRESS NOTES
Pt AOx4. VSS. SBA. Pt advanced from clear liquid diet to full liquid diet. During shift, pt had an episode of rigor and chills. Afebrile. Pt given PRN Dilaudid at 0954 and warm blankets as treatment. Multiple small loose stools throughout shift. Pt ambulated about 30 ft. While walking, pt reported shortness of breath more than her baseline at home. Plans for discharge to home upon advancing to regular diet.

## 2017-10-15 ENCOUNTER — APPOINTMENT (OUTPATIENT)
Dept: PHYSICAL THERAPY | Facility: CLINIC | Age: 77
DRG: 444 | End: 2017-10-15
Attending: HOSPITALIST
Payer: MEDICARE

## 2017-10-15 LAB
ALBUMIN SERPL-MCNC: 2.3 G/DL (ref 3.4–5)
ALP SERPL-CCNC: 224 U/L (ref 40–150)
ALT SERPL W P-5'-P-CCNC: 39 U/L (ref 0–50)
ANION GAP SERPL CALCULATED.3IONS-SCNC: 8 MMOL/L (ref 3–14)
AST SERPL W P-5'-P-CCNC: 42 U/L (ref 0–45)
BILIRUB SERPL-MCNC: 0.8 MG/DL (ref 0.2–1.3)
BUN SERPL-MCNC: 29 MG/DL (ref 7–30)
CALCIUM SERPL-MCNC: 7.4 MG/DL (ref 8.5–10.1)
CHLORIDE SERPL-SCNC: 114 MMOL/L (ref 94–109)
CO2 SERPL-SCNC: 20 MMOL/L (ref 20–32)
CREAT SERPL-MCNC: 1.29 MG/DL (ref 0.52–1.04)
ERYTHROCYTE [DISTWIDTH] IN BLOOD BY AUTOMATED COUNT: 14.3 % (ref 10–15)
GFR SERPL CREATININE-BSD FRML MDRD: 40 ML/MIN/1.7M2
GLUCOSE SERPL-MCNC: 94 MG/DL (ref 70–99)
HCT VFR BLD AUTO: 30.7 % (ref 35–47)
HGB BLD-MCNC: 10.3 G/DL (ref 11.7–15.7)
MCH RBC QN AUTO: 32.9 PG (ref 26.5–33)
MCHC RBC AUTO-ENTMCNC: 33.6 G/DL (ref 31.5–36.5)
MCV RBC AUTO: 98 FL (ref 78–100)
PLATELET # BLD AUTO: 145 10E9/L (ref 150–450)
POTASSIUM SERPL-SCNC: 4 MMOL/L (ref 3.4–5.3)
PROT SERPL-MCNC: 5.8 G/DL (ref 6.8–8.8)
RBC # BLD AUTO: 3.13 10E12/L (ref 3.8–5.2)
SODIUM SERPL-SCNC: 142 MMOL/L (ref 133–144)
WBC # BLD AUTO: 6.7 10E9/L (ref 4–11)

## 2017-10-15 PROCEDURE — A9270 NON-COVERED ITEM OR SERVICE: HCPCS | Mod: GY | Performed by: INTERNAL MEDICINE

## 2017-10-15 PROCEDURE — 97116 GAIT TRAINING THERAPY: CPT | Mod: GP

## 2017-10-15 PROCEDURE — 25000128 H RX IP 250 OP 636: Performed by: INTERNAL MEDICINE

## 2017-10-15 PROCEDURE — 25000132 ZZH RX MED GY IP 250 OP 250 PS 637: Mod: GY | Performed by: INTERNAL MEDICINE

## 2017-10-15 PROCEDURE — 99232 SBSQ HOSP IP/OBS MODERATE 35: CPT | Performed by: HOSPITALIST

## 2017-10-15 PROCEDURE — A9270 NON-COVERED ITEM OR SERVICE: HCPCS | Mod: GY

## 2017-10-15 PROCEDURE — 25000128 H RX IP 250 OP 636: Performed by: HOSPITALIST

## 2017-10-15 PROCEDURE — 25000132 ZZH RX MED GY IP 250 OP 250 PS 637: Mod: GY

## 2017-10-15 PROCEDURE — 97161 PT EVAL LOW COMPLEX 20 MIN: CPT | Mod: GP

## 2017-10-15 PROCEDURE — A9270 NON-COVERED ITEM OR SERVICE: HCPCS | Mod: GY | Performed by: HOSPITALIST

## 2017-10-15 PROCEDURE — 25000132 ZZH RX MED GY IP 250 OP 250 PS 637: Mod: GY | Performed by: HOSPITALIST

## 2017-10-15 PROCEDURE — 25000125 ZZHC RX 250: Performed by: INTERNAL MEDICINE

## 2017-10-15 PROCEDURE — 12000000 ZZH R&B MED SURG/OB

## 2017-10-15 PROCEDURE — 97110 THERAPEUTIC EXERCISES: CPT | Mod: GP

## 2017-10-15 PROCEDURE — 85027 COMPLETE CBC AUTOMATED: CPT | Performed by: HOSPITALIST

## 2017-10-15 PROCEDURE — 36415 COLL VENOUS BLD VENIPUNCTURE: CPT | Performed by: HOSPITALIST

## 2017-10-15 PROCEDURE — 40000275 ZZH STATISTIC RCP TIME EA 10 MIN

## 2017-10-15 PROCEDURE — 40000274 ZZH STATISTIC RCP CONSULT EA 30 MIN

## 2017-10-15 PROCEDURE — 80053 COMPREHEN METABOLIC PANEL: CPT | Performed by: HOSPITALIST

## 2017-10-15 PROCEDURE — 40000193 ZZH STATISTIC PT WARD VISIT

## 2017-10-15 RX ORDER — OLMESARTAN MEDOXOMIL 20 MG/1
40 TABLET ORAL DAILY
Status: DISCONTINUED | OUTPATIENT
Start: 2017-10-15 | End: 2017-10-16 | Stop reason: HOSPADM

## 2017-10-15 RX ORDER — BUMETANIDE 2 MG/1
2 TABLET ORAL DAILY
Status: DISCONTINUED | OUTPATIENT
Start: 2017-10-15 | End: 2017-10-15

## 2017-10-15 RX ORDER — OLMESARTAN MEDOXOMIL 20 MG/1
40 TABLET ORAL DAILY
Status: DISCONTINUED | OUTPATIENT
Start: 2017-10-15 | End: 2017-10-15 | Stop reason: CLARIF

## 2017-10-15 RX ORDER — BUMETANIDE 1 MG/1
2 TABLET ORAL DAILY
Status: DISCONTINUED | OUTPATIENT
Start: 2017-10-15 | End: 2017-10-16 | Stop reason: HOSPADM

## 2017-10-15 RX ORDER — LOSARTAN POTASSIUM 100 MG/1
100 TABLET ORAL DAILY
Status: DISCONTINUED | OUTPATIENT
Start: 2017-10-15 | End: 2017-10-15 | Stop reason: CLARIF

## 2017-10-15 RX ADMIN — SODIUM CHLORIDE: 9 INJECTION, SOLUTION INTRAVENOUS at 04:30

## 2017-10-15 RX ADMIN — PIPERACILLIN SODIUM,TAZOBACTAM SODIUM 2.25 G: 2; .25 INJECTION, POWDER, FOR SOLUTION INTRAVENOUS at 03:00

## 2017-10-15 RX ADMIN — PREDNISOLONE ACETATE 1 DROP: 10 SUSPENSION/ DROPS OPHTHALMIC at 17:25

## 2017-10-15 RX ADMIN — OMEPRAZOLE 20 MG: 20 CAPSULE, DELAYED RELEASE ORAL at 18:24

## 2017-10-15 RX ADMIN — OLMESARTAN MEDOXOMIL 40 MG: 20 TABLET, FILM COATED ORAL at 13:21

## 2017-10-15 RX ADMIN — LIDOCAINE 1 PATCH: 50 PATCH TOPICAL at 22:56

## 2017-10-15 RX ADMIN — PREDNISOLONE ACETATE 1 DROP: 10 SUSPENSION/ DROPS OPHTHALMIC at 09:29

## 2017-10-15 RX ADMIN — PREDNISOLONE ACETATE 1 DROP: 10 SUSPENSION/ DROPS OPHTHALMIC at 13:23

## 2017-10-15 RX ADMIN — PIPERACILLIN SODIUM,TAZOBACTAM SODIUM 2.25 G: 2; .25 INJECTION, POWDER, FOR SOLUTION INTRAVENOUS at 07:43

## 2017-10-15 RX ADMIN — ROPINIROLE HYDROCHLORIDE 0.5 MG: 0.5 TABLET, FILM COATED ORAL at 22:54

## 2017-10-15 RX ADMIN — CYCLOSPORINE 1 DROP: 0.5 EMULSION OPHTHALMIC at 10:38

## 2017-10-15 RX ADMIN — BUMETANIDE 2 MG: 1 TABLET ORAL at 13:25

## 2017-10-15 RX ADMIN — Medication 1 CAPSULE: at 09:29

## 2017-10-15 RX ADMIN — PREDNISOLONE ACETATE 1 DROP: 10 SUSPENSION/ DROPS OPHTHALMIC at 22:53

## 2017-10-15 RX ADMIN — PANTOPRAZOLE SODIUM 40 MG: 40 INJECTION, POWDER, FOR SOLUTION INTRAVENOUS at 07:35

## 2017-10-15 RX ADMIN — ROPINIROLE HYDROCHLORIDE 0.25 MG: 0.25 TABLET, FILM COATED ORAL at 17:29

## 2017-10-15 NOTE — PROGRESS NOTES
Pt AOx4. VSS. SBA with gait belt and walker. Pt tolerating regular diet. After weight taken yesterday, pt has gained 18 lbs. MD notified. Pt is now taking diuretics and anti-hypertensive medication. Pt abulated about 20 ft. While ambulating, pt c/p of CRUZ. 89% RA after walking. Pt oxygen saturation returned to 98% within minutes.

## 2017-10-15 NOTE — PLAN OF CARE
Problem: Patient Care Overview  Goal: Plan of Care/Patient Progress Review  PT: Orders received, eval completed, treatment initiated. Pt admitted with R upper quadrant pain, POD 3 biliary stone extraction with ERCP, now with SOB and fluid overload. Prior to admit pt was living in an independent living apartment, uses 4ww only in the community, independent with mobility and ADLs. Pt is legally blind. Pt demonstrates dec strength, activity tolerance and difficulty ambulating and would benefit from skilled PT services in order to improve this.      Discharge Planner PT   Patient plan for discharge: Return home  Current status: Currently requires SBA for transfers, gait of 75 ft with FWW on RA with sats 90% post with complaints of CRUZ. Participated in standing LE strengthening with standing rest break needed due to SOB.  Barriers to return to prior living situation: Dec activity tolerance  Recommendations for discharge: Pt would benefit from at TCU stay to improve activity tolerance, strength and balance but pt very much wants to return home. If she continues to refuse TCU, then recommend home PT and OT.  Rationale for recommendations: Pt would benefit from continued PT to improve strength, balance, mobility to increase independence, reduce falls risk and increase safety before returning home.       Entered by: Mabel Watson 10/15/2017 2:49 PM

## 2017-10-15 NOTE — PROGRESS NOTES
10/15/17 1400   Quick Adds   Type of Visit Initial PT Evaluation   Living Environment   Lives With alone   Living Arrangements apartment;independent living facility  ([place] [email or web address])   Home Accessibility no concerns   Number of Stairs to Enter Home 0   Number of Stairs Within Home 0   Self-Care   Usual Activity Tolerance moderate   Current Activity Tolerance fair   Regular Exercise no   Equipment Currently Used at Home walker, rolling  (only long distances, no AD in the apartment)   Functional Level Prior   Ambulation 1-->assistive equipment   Transferring 1-->assistive equipment   Fall history within last six months yes   Number of times patient has fallen within last six months 1   Which of the above functional risks had a recent onset or change? ambulation;transferring   General Information   Onset of Illness/Injury or Date of Surgery - Date 10/11/17   Referring Physician Malcolm Singh MD   Patient/Family Goals Statement return home   Pertinent History of Current Problem (include personal factors and/or comorbidities that impact the POC) Admitted with R UQ pain, had ERCP on 10/13 with biliary stone extraction. Now with SOB, fluid overload. PMH: CHF, asthma.   Precautions/Limitations fall precautions   Weight-Bearing Status - LLE full weight-bearing   Weight-Bearing Status - RLE full weight-bearing   Cognitive Status Examination   Orientation orientation to person, place and time   Level of Consciousness alert   Follows Commands and Answers Questions 100% of the time;able to follow multistep instructions   Personal Safety and Judgment intact   Memory intact   Pain Assessment   Patient Currently in Pain No   Integumentary/Edema   Integumentary/Edema Comments B LE 4+ edema. Pt has compression socks at home. Stated she has used wraps in the past. Declines using wraps here and would prefer to wait until she got home.   Posture    Posture Forward head position;Protracted shoulders   Range of Motion  "(ROM)   ROM Quick Adds No deficits were identified   Strength   Strength Comments B hip flex 3+/5, knee ext 4/5, DF 4/5. Pt endorses feeling weaker.   Bed Mobility   Bed Mobility Comments NT, pt in chair   Transfer Skills   Transfer Comments Sit to stand with SBA and FWW   Gait   Gait Comments Pt amb 10 ft with FWW and SBA, steady balance   Balance   Balance Comments Balance steady with FWW   Sensory Examination   Sensory Perception Comments Denies numbness or tingling   General Therapy Interventions   Planned Therapy Interventions bed mobility training;gait training;neuromuscular re-education;strengthening;transfer training   Clinical Impression   Criteria for Skilled Therapeutic Intervention yes, treatment indicated   PT Diagnosis Difficulty ambulating   Influenced by the following impairments Dec strength, balance, activity tolerance, SOB   Functional limitations due to impairments Difficulty ambulating and transferring   Clinical Presentation Stable/Uncomplicated   Clinical Presentation Rationale medically improved   Clinical Decision Making (Complexity) Low complexity   Therapy Frequency` daily   Predicted Duration of Therapy Intervention (days/wks) 4 days   Anticipated Discharge Disposition Home with Home Therapy   Risk & Benefits of therapy have been explained Yes   Patient, Family & other staff in agreement with plan of care Yes   [facility] TM \"6 Clicks\"   2016, Trustees of [facility], under license to [employer].  All rights reserved.   6 Clicks Short Forms Basic Mobility Inpatient Short Form   [facility]  \"6 Clicks\" V.2 Basic Mobility Inpatient Short Form   1. Turning from your back to your side while in a flat bed without using bedrails? 3 - A Little   2. Moving from lying on your back to sitting on the side of a flat bed without using bedrails? 3 - A Little   3. Moving to and from a bed to a chair (including a wheelchair)? 3 - A Little   4. Standing up from a chair " using your arms (e.g., wheelchair, or bedside chair)? 3 - A Little   5. To walk in hospital room? 3 - A Little   6. Climbing 3-5 steps with a railing? 2 - A Lot   Basic Mobility Raw Score (Score out of 24.Lower scores equate to lower levels of function) 17   Total Evaluation Time   Total Evaluation Time (Minutes) 15

## 2017-10-15 NOTE — PROGRESS NOTES
"[facility]  Hospitalist Progress Note        Malcolm Daphne Francisco, DO  10/15/2017        Interval History:      Patient states she is feeling short of breath and fluid overloaded today.          Assessment and Plan:        Rupinder Tracy is a 77 year old female who was admitted on 10/11/2017 with RUQ abdominal pain.      RUQ pain: MRCP with sludge in bile duct, ERCP completed, see report. Pt is s/p cholecystectomy. RUQ US demonstrates possible air in biliary tree, concerning for cholangitis or prior sphincterotomy.  - GI following.   - Continue dilaudid.      - S/p ERCP 10/13 with biliary stone extraction (prior alexi).       Chronic diastolic heart failure: Follows with Dr. Butler in cardiology clinic.   - Resumed pta bumex 10/15.     Mild persistent asthma - Stable.   - home Advair.    - Continue albuterol prn.      RLS - Continue Requip      DVT Prophylaxis: Pneumatic Compression Devices     Code: Full Code      Disposition: Expected discharge in 1 day pending improvement in activity and respiratory status.                    Physical Exam:      Heart Rate: 65    Blood pressure 106/51, pulse 77, temperature 97.8  F (36.6  C), temperature source Oral, resp. rate 16, height 1.626 m (5' 4\"), weight 73.9 kg (162 lb 14.7 oz), last menstrual period 1990, SpO2 98 %.    Vitals:    10/11/17 2022 10/12/17 0700 10/14/17 0654   Weight: 71.7 kg (158 lb 1.1 oz) 72.3 kg (159 lb 6.3 oz) 73.9 kg (162 lb 14.7 oz)       Vital Sign Ranges  Temperature Temp  Av.8  F (36.6  C)  Min: 97.4  F (36.3  C)  Max: 98.4  F (36.9  C)   Blood pressure Systolic (24hrs), Av , Min:104 , Max:137        Diastolic (24hrs), Av, Min:46, Max:56      Pulse Pulse  Av  Min: 77  Max: 77   Respirations Resp  Av  Min: 16  Max: 16   Pulse oximetry SpO2  Av.3 %  Min: 96 %  Max: 98 %     Vital Signs with Ranges  Temp:  [97.4  F (36.3  C)-98.4  F (36.9  C)] 97.8  F (36.6  C)  Pulse:  [77] 77  Heart Rate:  " [63-65] 65  Resp:  [16] 16  BP: (104-137)/(46-56) 106/51  SpO2:  [96 %-98 %] 98 %    I/O Last 3 Shifts:   I/O last 3 completed shifts:  In: 3440 [P.O.:970; I.V.:2470]  Out: 425 [Urine:425]    I/O past 24 hours:     Intake/Output Summary (Last 24 hours) at 10/15/17 0852  Last data filed at 10/15/17 0430   Gross per 24 hour   Intake             2857 ml   Output                0 ml   Net             2857 ml     GENERAL: Alert and oriented. NAD. Conversational, appropriate.   HEENT: Normocephalic. EOMI. No icterus or injection. Nares normal.   LUNGS: Clear to auscultation, mild decrement. No dyspnea at rest.   HEART: Regular rate. Extremities perfused.   ABDOMEN: Soft, mild RUQ tenderness, and nondistended. Positive bowel sounds.   EXTREMITIES: Mild LE edema noted.   NEUROLOGIC: Moves extremities x4 on command. No acute focal neurologic abnormalities noted.          Prior to Admission Medications:        Prescriptions Prior to Admission   Medication Sig Dispense Refill Last Dose     ROPINIRole HCl (REQUIP PO) Take 0.25 mg by mouth daily 3PM        traMADol (ULTRAM) 50 MG tablet TAKE UP TO 2 TABLETS BY MOUTH 2 TIMES DAILY. LIMIT TO 4 TABLET DAILY, ON AVERAGE 100 tablet 1  at PRN     olmesartan (BENICAR) 40 MG tablet Take 1 tablet (40 mg) by mouth daily 90 tablet 2 Past Week at Unknown time     omeprazole (PRILOSEC) 20 MG CR capsule TAKE 1 CAPSULE (20 MG) BY MOUTH 2 TIMES DAILY 180 capsule 1 10/10/2017 at Unknown time     bumetanide (BUMEX) 2 MG tablet Take 1 tablet (2 mg) by mouth daily 30 tablet 11 Past Week at Unknown time     Cholecalciferol (VITAMIN D) 2000 UNITS tablet Take 2,000 Units by mouth 2 times daily   Past Week at Unknown time     rOPINIRole (REQUIP) 0.25 MG tablet Take 0.5 mg by mouth At Bedtime    Past Week at Unknown time     fluticasone-salmeterol (ADVAIR DISKUS) 250-50 MCG/DOSE diskus inhaler Inhale 1 puff into the lungs 2 times daily 60 Inhaler 1 10/11/2017 at Unknown time     Lactobacillus Rhamnosus,  GG, (CULTURELLE PO) Take 1 tablet by mouth Three times a week   Past Week at Unknown time     denosumab (PROLIA) 60 MG/ML SOLN Inject 60 mg Subcutaneous every 6 months   Taking     Calcium-Vitamin D-Vitamin K (CALCIUM + D) 500-1000-40 MG-UNT-MCG CHEW Take 2 tablets by mouth daily    Past Week at Unknown time     DOXYCYCLINE HYCLATE PO Take 50 mg by mouth 3 times per week Monday,wednesday, friday   Past Week at Unknown time     prednisoLONE acetate (PRED FORTE) 1 % ophthalmic suspension Place 1 drop Into the left eye 4 times daily    10/11/2017 at Unknown time     albuterol (ALBUTEROL) 108 (90 BASE) MCG/ACT inhaler Inhale 2 puffs into the lungs every 4 hours as needed 1 Inhaler 5  at PRN     RESTASIS 0.05 % OP EMUL 1 drop to left eye twice daily   10/11/2017 at Unknown time     SYSTANE 0.4-0.3 % OP SOLN 1 drop to left eye 4 times daily   10/11/2017 at Unknown time     EYE-VITES OR TABS Hydro-Eye with Lutein 2 tabs twice a day (Rx from [facility])   Past Week at Unknown time     order for DME 2L of O2 at night   Taking            Medications:        Current Facility-Administered Medications   Medication Last Rate     NaCl 75 mL/hr at 10/15/17 0430     Current Facility-Administered Medications   Medication Dose Route Frequency     sodium chloride (PF)  3 mL Intracatheter Q8H     pantoprazole  40 mg Intravenous BID AC     fluticasone-salmeterol  1 puff Inhalation BID     multivitamin  with lutein  1 capsule Oral Daily     polyethylene glycol 0.4%- propylene glycol 0.3%  1 drop Left Eye 4x Daily     rOPINIRole  0.5 mg Oral At Bedtime     cycloSPORINE  1 drop Left Eye BID     prednisoLONE acetate  1 drop Left Eye 4x Daily     piperacillin-tazobactam  2.25 g Intravenous Q6H     lidocaine  1 patch Transdermal Q24h    And     lidocaine   Transdermal Q24H    And     lidocaine   Transdermal Q8H     rOPINIRole  0.25 mg Oral Daily     Current Facility-Administered Medications   Medication Dose Route Frequency      sodium chloride (PF)  3 mL Intracatheter Q1H PRN     albuterol  2 puff Inhalation Q4H PRN     traMADol  50 mg Oral Q6H PRN     naloxone  0.1-0.4 mg Intravenous Q2 Min PRN     acetaminophen  650 mg Oral Q4H PRN     polyethylene glycol  17 g Oral Daily PRN     ondansetron  4 mg Oral Q6H PRN    Or     ondansetron  4 mg Intravenous Q6H PRN     prochlorperazine  5 mg Intravenous Q6H PRN    Or     prochlorperazine  5 mg Oral Q6H PRN    Or     prochlorperazine  12.5 mg Rectal Q12H PRN     HYDROcodone-acetaminophen  1-2 tablet Oral Q4H PRN     HYDROmorphone  0.3-0.5 mg Intravenous Q2H PRN     LORazepam  0.25-0.5 mg Oral At Bedtime PRN            Data:      Lab data reviewed.     Recent Labs  Lab 10/14/17  0820 10/13/17  0810 10/12/17  1437 10/12/17  0927 10/11/17  1515   HGB 11.7 11.4*  --  12.3 12.2   MCV 99 99  --  99 97    168  --  184 194   INR  --   --  1.09  --   --    * 142  --  143 141   POTASSIUM 4.0 4.1  --  4.3 4.3   CHLORIDE 115* 111*  --  109 106   CO2 20 23  --  25 25   BUN 30 38*  --  40* 41*   CR 1.19* 1.47*  --  1.47* 1.25*   ANIONGAP 10 8  --  9 10   AXEL 7.9* 8.1*  --  8.6 9.0   GLC 91 85  --  91 101*   TROPI  --   --   --   --  <0.015           Imaging:      Imaging data reviewed.     Dr. Malcolm Singh D.O.  [facility]  Pager [phone]

## 2017-10-15 NOTE — PLAN OF CARE
Problem: Patient Care Overview  Goal: Plan of Care/Patient Progress Review  Outcome: Improving  VSS on 2L, up SBA/belt/walker, A&Ox4. Pt c/o CRUZ when amb to bathroom. Gave PRN albuterol. IVF at 75 mL/hr. IV abx. Smear soft BM. Voiding appropriately. BS audible/normoactive, tolerating regular diet. Plan to DC 10/15. Continue to monitor.

## 2017-10-15 NOTE — PLAN OF CARE
Problem: Patient Care Overview  Goal: Plan of Care/Patient Progress Review  Outcome: Improving  A&O x4.  Legally blind.  Wears hearing aids.  SBA with GB and walker.  Ambulated halls x1.  Refuses PCD's.  Tolerated advancement to regular diet without issue.  Denies pain. NS @ 75/hr.  Tolerated IV Zosyn tonight at a slower rate (100/hr); no chills, SOB, or rigors with administration.  Several more small loose stools this evening; C. Diff negative.  Incontinent of stool due to urgency.  Continent of urine.  VSS.  O2 at 1.5 LPM PRN this hayden due to CRUZ.  2LPM via NC at HS.  GI signed off.  Discharge 1-2 days pending toleration of diet and off narcotics.

## 2017-10-15 NOTE — PLAN OF CARE
Problem: Patient Care Overview  Goal: Plan of Care/Patient Progress Review  Outcome: Improving  VSS on RA, up with SBA, A&Ox4. Voiding appropriately, wt is up from yesterday. Lasix PO given. /50. HTN medication restarted. Pt states she has not been on home meds (diuretic/HTN) for the last week. Denies pain. DC pending progress. Continue to monitor.

## 2017-10-15 NOTE — PROVIDER NOTIFICATION
MD Notification    Notified Person:  DO    Notified Persons Name: Dr. Singh    Notification Date/Time: 10/15/17 3602    Notification Interaction:  Web paged Physician    Purpose of Notification: Lost IV access and is due for IV Protonix.  Patient to discharge tomorrow and does not want another access placed at this time.    Orders Received: Start PTA Omeprazole.    Comments:

## 2017-10-16 VITALS
BODY MASS INDEX: 29.91 KG/M2 | OXYGEN SATURATION: 95 % | DIASTOLIC BLOOD PRESSURE: 64 MMHG | TEMPERATURE: 97.7 F | SYSTOLIC BLOOD PRESSURE: 116 MMHG | HEIGHT: 64 IN | HEART RATE: 77 BPM | RESPIRATION RATE: 20 BRPM | WEIGHT: 175.2 LBS

## 2017-10-16 LAB
ANION GAP SERPL CALCULATED.3IONS-SCNC: 9 MMOL/L (ref 3–14)
BUN SERPL-MCNC: 29 MG/DL (ref 7–30)
CALCIUM SERPL-MCNC: 8.1 MG/DL (ref 8.5–10.1)
CHLORIDE SERPL-SCNC: 114 MMOL/L (ref 94–109)
CO2 SERPL-SCNC: 19 MMOL/L (ref 20–32)
CREAT SERPL-MCNC: 1.05 MG/DL (ref 0.52–1.04)
ERYTHROCYTE [DISTWIDTH] IN BLOOD BY AUTOMATED COUNT: 14.3 % (ref 10–15)
GFR SERPL CREATININE-BSD FRML MDRD: 51 ML/MIN/1.7M2
GLUCOSE SERPL-MCNC: 93 MG/DL (ref 70–99)
HCT VFR BLD AUTO: 33.3 % (ref 35–47)
HGB BLD-MCNC: 11.1 G/DL (ref 11.7–15.7)
MCH RBC QN AUTO: 32.4 PG (ref 26.5–33)
MCHC RBC AUTO-ENTMCNC: 33.3 G/DL (ref 31.5–36.5)
MCV RBC AUTO: 97 FL (ref 78–100)
PLATELET # BLD AUTO: 179 10E9/L (ref 150–450)
POTASSIUM SERPL-SCNC: 3.8 MMOL/L (ref 3.4–5.3)
RBC # BLD AUTO: 3.43 10E12/L (ref 3.8–5.2)
SODIUM SERPL-SCNC: 142 MMOL/L (ref 133–144)
WBC # BLD AUTO: 6.9 10E9/L (ref 4–11)

## 2017-10-16 PROCEDURE — A9270 NON-COVERED ITEM OR SERVICE: HCPCS | Mod: GY | Performed by: HOSPITALIST

## 2017-10-16 PROCEDURE — A9270 NON-COVERED ITEM OR SERVICE: HCPCS | Mod: GY | Performed by: INTERNAL MEDICINE

## 2017-10-16 PROCEDURE — 36415 COLL VENOUS BLD VENIPUNCTURE: CPT | Performed by: HOSPITALIST

## 2017-10-16 PROCEDURE — 25000132 ZZH RX MED GY IP 250 OP 250 PS 637: Mod: GY | Performed by: HOSPITALIST

## 2017-10-16 PROCEDURE — 25000132 ZZH RX MED GY IP 250 OP 250 PS 637: Mod: GY | Performed by: INTERNAL MEDICINE

## 2017-10-16 PROCEDURE — A9270 NON-COVERED ITEM OR SERVICE: HCPCS | Mod: GY

## 2017-10-16 PROCEDURE — 80048 BASIC METABOLIC PNL TOTAL CA: CPT | Performed by: HOSPITALIST

## 2017-10-16 PROCEDURE — 99239 HOSP IP/OBS DSCHRG MGMT >30: CPT | Performed by: HOSPITALIST

## 2017-10-16 PROCEDURE — 85027 COMPLETE CBC AUTOMATED: CPT | Performed by: HOSPITALIST

## 2017-10-16 PROCEDURE — 25000132 ZZH RX MED GY IP 250 OP 250 PS 637: Mod: GY

## 2017-10-16 RX ORDER — TRAMADOL HYDROCHLORIDE 50 MG/1
50 TABLET ORAL EVERY 6 HOURS PRN
Qty: 50 TABLET | Refills: 0 | Status: SHIPPED | DISCHARGE
Start: 2017-10-16 | End: 2018-03-08

## 2017-10-16 RX ORDER — BUMETANIDE 1 MG/1
2 TABLET ORAL ONCE
Status: COMPLETED | OUTPATIENT
Start: 2017-10-16 | End: 2017-10-16

## 2017-10-16 RX ORDER — BUMETANIDE 1 MG/1
2 TABLET ORAL EVERY 24 HOURS
DISCHARGE
Start: 2017-10-16 | End: 2017-10-20

## 2017-10-16 RX ADMIN — OMEPRAZOLE 20 MG: 20 CAPSULE, DELAYED RELEASE ORAL at 06:28

## 2017-10-16 RX ADMIN — PREDNISOLONE ACETATE 1 DROP: 10 SUSPENSION/ DROPS OPHTHALMIC at 09:39

## 2017-10-16 RX ADMIN — Medication 1 CAPSULE: at 09:36

## 2017-10-16 RX ADMIN — PREDNISOLONE ACETATE 1 DROP: 10 SUSPENSION/ DROPS OPHTHALMIC at 13:03

## 2017-10-16 RX ADMIN — CYCLOSPORINE 1 DROP: 0.5 EMULSION OPHTHALMIC at 09:40

## 2017-10-16 RX ADMIN — ALBUTEROL SULFATE 2 PUFF: 90 AEROSOL, METERED RESPIRATORY (INHALATION) at 05:40

## 2017-10-16 RX ADMIN — BUMETANIDE 2 MG: 1 TABLET ORAL at 12:54

## 2017-10-16 RX ADMIN — OLMESARTAN MEDOXOMIL 40 MG: 20 TABLET, FILM COATED ORAL at 09:36

## 2017-10-16 RX ADMIN — BUMETANIDE 2 MG: 1 TABLET ORAL at 09:36

## 2017-10-16 NOTE — PLAN OF CARE
Problem: Patient Care Overview  Goal: Plan of Care/Patient Progress Review  PT-  Pt discharged to TCU prior to PT appt.     Physical Therapy Discharge Summary    Reason for therapy discharge:    Discharged to transitional care facility.    Progress towards therapy goal(s). See goals on Care Plan in [facility] electronic health record for goal details.  Goals not met.  Barriers to achieving goals:   discharge from facility.    Therapy recommendation(s):    Continued therapy is recommended.  Rationale/Recommendations:  PT at TCU to progress mobility.

## 2017-10-16 NOTE — PROGRESS NOTES
ROSITA  D:  Patient ready for d/c today.  TCU is recommended. CC spoke with patient and patient is accepting of entering a TCU.  Her preference is a semi-private/double room at [facility].  Referral made to [facility] thru DOD process, awaiting to hear they have a vacancy for today.    1400 Update:  Patient accepted at [facility] TCU into a semi-private room.  She has a friend who will transport her. Her friend knows where [facility] is located.  Writer reviewed Medicare SNF benefit and what to expect at the TCU.   PAS-RR    D: Per DHS regulation, SW completed and submitted PAS-RR to [place] [employer] Direct Connect via the Senior LinkAge Line.  PAS-RR confirmation # is : [id number]    I: SW spoke with patient and they are aware a PAS-RR has been submitted.  SW reviewed with patient that they may be contacted for a follow up appointment within 10 days of hospital discharge if their SNF stay is < 30 days.  Contact information for Senior LinkAge Line was also provided.    A: Patient verbalized understanding.    P: Further questions may be directed to Senior LinkAge Line at #[phone], option #4 for PAS-RR staff.

## 2017-10-16 NOTE — PLAN OF CARE
Problem: Patient Care Overview  Goal: Plan of Care/Patient Progress Review  Outcome: No Change  A&O x4.  Legally blind and [place].  Hearing aids out at HS.  Tolerating regular diet well.  SBA with GB/walker.  Ambulated hallways x2.  Refuses PCD's.  VSS on RA.  On 2 LPM via NC at HS per baseline.  CRUZ.  Lungs with expiratory wheezes post ambulation; fatigued.  Continues with BLE non-pitting edema.  I&O's charted.  Up in chair most of shift and does not elevate legs when up.  Lost IV access and did not want another access placed.  Discharge 10/16 if activity level/tolerance and respiratory status has improved.

## 2017-10-16 NOTE — DISCHARGE SUMMARY
"[facility]    Discharge Summary  Hospitalist    Date of Admission:  10/11/2017  Date of Discharge:  10/16/2017  Discharging Provider: Anshul Tan    Discharge Diagnoses      RUQ abdominal pain  Choledocholithiasis  Hypervolemia, unspecified hypervolemia type    History of Present Illness   Rupinder Tracy is a 77 year old female with mild Pulm Htn, diastolic dysfunction followed by Dr. Butler of cardiology, mild intermittent Asthma, and who also had a cholecystectomy in 1982 awoke on the day of admission with epigastric pain which radiated substernal area \"like my heart burn\", but she wasn't sure if she was having a heart attack or it was her stomach.  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  No fever or chills, but nausea.  She says the pain does radiate to her RUQ area, but she has not noticed it in her back.  No problems with gall stones since her gall bladder was removed in 1982.  US demonstrated findings consistent with possible biliary dysfunction.    Hospital Course     RUQ pain: MRCP with sludge in bile duct, so an ERCP completed, see report below but is now s/p biliary stone extraction with subsequent improvement in pain, and LFTs.  Abx stopped.  Appreciated  GI.  F/u per their request.  ADAT.      Volume overload suspect primarily to acute on chronic diastolic congestive heart failure: But also likely Multifactorial with IVS and PTA Bumex requirement of being on hold.  Gained 20 lbs.  Now on continuous oxygen (usual only on night time).  Restarted PTA Bumex prior to admit.  Given extra dose of Bumex here today.  Will plan for 3 more days of Bumex 2 mg BID (PTA is daily).  Recommend daily weight and is and os at TCU.  Recheck labs in a couple days and adjust Bumex " dosing accordingly.  Follow up with Dr. Butler.       Mild persistent asthma - Stable.  Home Advair. Continue albuterol prn.      RLS - Continue Requip      DVT Prophylaxis: Pneumatic Compression Devices      Code: Full Code.  Px legally blind.  To TCU for deconditioning.    Anshul Tan    Significant Results and Procedures   None    Pending Results   These results will be followed up by None  Unresulted Labs Ordered in the Past 30 Days of this Admission     Date and Time Order Name Status Description    10/11/2017 1824 Blood culture Preliminary     10/11/2017 1824 Blood culture Preliminary           Code Status   Full Code       Primary Care Physician   Abran Mtot    Physical Exam   Temp: 97.7  F (36.5  C) Temp src: Oral BP: 116/64   Heart Rate: 65 Resp: 20 SpO2: 95 % O2 Device: None (Room air) Oxygen Delivery: 2 LPM  Vitals:    10/15/17 2255 10/16/17 0703 10/16/17 1100   Weight: 83.3 kg (183 lb 11.2 oz) 86.2 kg (190 lb 0.6 oz) 79.5 kg (175 lb 3.2 oz)     Vital Signs with Ranges  Temp:  [97.5  F (36.4  C)-97.7  F (36.5  C)] 97.7  F (36.5  C)  Heart Rate:  [65-70] 65  Resp:  [16-20] 20  BP: (110-153)/(50-64) 116/64  SpO2:  [90 %-99 %] 95 %  I/O last 3 completed shifts:  In: 240 [P.O.:240]  Out: 1100 [Urine:1100]    Constitutional: NAD,   HEENT:  Legally blind.   Respiratory: CTAB, No Wheeze, Rhonchi, or Crackles appreciated.   Cardiovascular: RRR, No m/r/r  GI: Soft, Non-tender, Non-distended.  Ext: No lower extremity edema  Skin/Integumen: Intact, No erythema  Neuro: No new focal deficits appreiated  Psych:  Appropriate Affect   Other:      Discharge Disposition   Discharged to rehabilitation facility  Condition at discharge: Stable    Consultations This Hospital Stay   GASTROENTEROLOGY IP CONSULT  PHYSICAL THERAPY ADULT IP CONSULT  SOCIAL WORK IP CONSULT  PHYSICAL THERAPY ADULT IP CONSULT  OCCUPATIONAL THERAPY ADULT IP CONSULT    Time Spent on this Encounter   I, Anshul Tan, personally saw the  patient today and spent greater than 30 minutes discharging this patient.    Discharge Orders     General info for SNF   Length of Stay Estimate: Short Term Care: Estimated # of Days <30  Condition at Discharge: Improving  Level of care:skilled   Rehabilitation Potential: Good  Admission H&P remains valid and up-to-date: Yes  Recent Chemotherapy: N/A  Use Nursing Home Standing Orders: Yes     Mantoux instructions   Give two-step Mantoux (PPD) Per Facility Policy Yes     Reason for your hospital stay   You were admitted with a stone in your biliary track that has been removed.  Will slowly return your diet.  Also you are volume overloaded so will have to watch this closely over the next few days.     Intake and output   Every shift     Daily weights   Call Provider for weight gain of more than 2 pounds per day or 5 pounds per week.     Follow Up and recommended labs and tests   Follow up with correction physician.  The following labs/tests are recommended: BMP, Daily weights (goal is 165 lbs), Wean Bumex to PTA dose of 2 mg daily..  Follow up with Dr. Butler in after TCU discharge.  Also check with GI to see if they want follow up for her.     Activity - Up with nursing assistance     Full Code     Physical Therapy Adult Consult   Evaluate and treat as clinically indicated.    Reason:  Deconditiong     Occupational Therapy Adult Consult   Evaluate and treat as clinically indicated.    Reason:  Deconditioning, legally blind     Oxygen - Nasal cannula   2 Lpm by nasal cannula to keep O2 sats 92% or greater, baseline is nightly oxygen.     Fall precautions     Advance Diet as Tolerated   Follow this diet upon discharge: Orders Placed This Encounter     Advance Diet as Tolerated: Regular Diet Adult, Low Sodium, 2g.       Discharge Medications   Current Discharge Medication List      START taking these medications    Details   !! bumetanide (BUMEX) 1 MG tablet Take 2 tablets (2 mg) by mouth every 24 hours for 3 days     Comments: With Evening meds for 3-4 days pending daily weights and kidney function to be followed  Associated Diagnoses: Hypervolemia, unspecified hypervolemia type       !! - Potential duplicate medications found. Please discuss with provider.      CONTINUE these medications which have NOT CHANGED    Details   !! ROPINIRole HCl (REQUIP PO) Take 0.25 mg by mouth daily 3PM      traMADol (ULTRAM) 50 MG tablet TAKE UP TO 2 TABLETS BY MOUTH 2 TIMES DAILY. LIMIT TO 4 TABLET DAILY, ON AVERAGE  Qty: 100 tablet, Refills: 1    Comments: This request is for a new prescription for a controlled substance as required by Federal/State law.PT IS REQUESTING.  Associated Diagnoses: Chronic pain syndrome      olmesartan (BENICAR) 40 MG tablet Take 1 tablet (40 mg) by mouth daily  Qty: 90 tablet, Refills: 2    Associated Diagnoses: Essential hypertension with goal blood pressure less than 130/80      omeprazole (PRILOSEC) 20 MG CR capsule TAKE 1 CAPSULE (20 MG) BY MOUTH 2 TIMES DAILY  Qty: 180 capsule, Refills: 1    Associated Diagnoses: Gastroesophageal reflux disease without esophagitis      !! bumetanide (BUMEX) 2 MG tablet Take 1 tablet (2 mg) by mouth daily  Qty: 30 tablet, Refills: 11    Associated Diagnoses: Diastolic dysfunction      Cholecalciferol (VITAMIN D) 2000 UNITS tablet Take 2,000 Units by mouth 2 times daily    Associated Diagnoses: Vitamin D deficiency      !! rOPINIRole (REQUIP) 0.25 MG tablet Take 0.5 mg by mouth At Bedtime     Associated Diagnoses: Restless legs syndrome (RLS)      fluticasone-salmeterol (ADVAIR DISKUS) 250-50 MCG/DOSE diskus inhaler Inhale 1 puff into the lungs 2 times daily  Qty: 60 Inhaler, Refills: 1    Associated Diagnoses: Mild persistent asthma without complication      Lactobacillus Rhamnosus, GG, (CULTURELLE PO) Take 1 tablet by mouth Three times a week      denosumab (PROLIA) 60 MG/ML SOLN Inject 60 mg Subcutaneous every 6 months      Calcium-Vitamin D-Vitamin K (CALCIUM + D)  "500-1000-40 MG-UNT-MCG CHEW Take 2 tablets by mouth daily       DOXYCYCLINE HYCLATE PO Take 50 mg by mouth 3 times per week Monday,wednesday, friday      prednisoLONE acetate (PRED FORTE) 1 % ophthalmic suspension Place 1 drop Into the left eye 4 times daily       albuterol (ALBUTEROL) 108 (90 BASE) MCG/ACT inhaler Inhale 2 puffs into the lungs every 4 hours as needed  Qty: 1 Inhaler, Refills: 5    Associated Diagnoses: Mild intermittent asthma      RESTASIS 0.05 % OP EMUL 1 drop to left eye twice daily      SYSTANE 0.4-0.3 % OP SOLN 1 drop to left eye 4 times daily      EYE-VITES OR TABS Hydro-Eye with Lutein 2 tabs twice a day (Rx from [facility])  Qty:        order for DME 2L of O2 at night       !! - Potential duplicate medications found. Please discuss with provider.        Allergies   Allergies   Allergen Reactions     Atorvastatin Calcium      myalgias     Celecoxib Swelling     edema     Cholestyramine Diarrhea     Diarrhea       Crestor [Rosuvastatin Calcium]      leg aches, likely from medication     Cyclobenzaprine Hcl      flexeril--gets \"goofy\"     Dulera      tremors     Gabapentin      Nightmares.   Retrial of medication caused tremors.     Lisinopril Cough     Cough/ Dizziness at high dose.     Meperidine Hcl Nausea and Vomiting     demerol-severe nausea     Morphine Nausea and Vomiting     Naprosyn [Naproxen] GI Disturbance     Niaspan [Niacin]      flushing, severe     Percocet [Oxycodone-Acetaminophen] Nausea     Nausea, lightheadedness.   Tolerates med if taken with food.     Pravastatin Swelling     Edema, myalgias     Red Yeast Rice [Cholestin] GI Disturbance     Bloating, etc.     Simvastatin      myalgias     Timolol Maleate Difficulty breathing     timoptic--respiratory problems     Hctz Rash     rash     Sulfa Drugs Itching and Rash     rash on all mucous membranes and palms of hands with intense itching     Data   Most Recent 3 CBC's:  Recent Labs   Lab Test  10/16/17   0840  " 10/15/17   0845  10/14/17   0820   WBC  6.9  6.7  6.2   HGB  11.1*  10.3*  11.7   MCV  97  98  99   PLT  179  145*  179      Most Recent 3 BMP's:  Recent Labs   Lab Test  10/16/17   0840  10/15/17   0845  10/14/17   0820   NA  142  142  145*   POTASSIUM  3.8  4.0  4.0   CHLORIDE  114*  114*  115*   CO2  19*  20  20   BUN  29  29  30   CR  1.05*  1.29*  1.19*   ANIONGAP  9  8  10   AXEL  8.1*  7.4*  7.9*   GLC  93  94  91     Most Recent 2 LFT's:  Recent Labs   Lab Test  10/15/17   0845  10/14/17   0820   AST  42  51*   ALT  39  52*   ALKPHOS  224*  259*   BILITOTAL  0.8  1.1     Most Recent INR's and Anticoagulation Dosing History:  Anticoagulation Dose History     Recent Dosing and Labs Latest Ref Rng & Units 7/2/2010 1/19/2015 10/12/2017    INR 0.86 - 1.14 1.01 0.95 1.09        Most Recent 3 Troponin's:  Recent Labs   Lab Test  10/11/17   1515  02/12/16   0957  07/02/10   1445   TROPI  <0.015  <0.015  The 99th percentile for upper reference range is 0.045 ug/L.  Troponin values in   the range of 0.045 - 0.120 ug/L may be associated with risks of adverse   clinical events.    <0.012     Most Recent Cholesterol Panel:  Recent Labs   Lab Test 01/05/16   CHOL  208*  208*   LDL  130  130   HDL  52  52   TRIG  131  131     Most Recent 6 Bacteria Isolates From Any Culture (See EPIC Reports for Culture Details):  Recent Labs   Lab Test  10/11/17   1920  10/11/17   1644  10/11/17   1515  12/29/10   1150  07/02/10   1510  07/02/10   1450   CULT  No growth after 5 days  >100,000 colonies/mL  Klebsiella pneumoniae  *  50,000 to 100,000 colonies/mL  Strain 2  Klebsiella pneumoniae  *  No growth after 5 days  No growth  No growth after 6 days  No growth after 6 days     Most Recent TSH, T4 and A1c Labs:  Recent Labs   Lab Test  01/20/16   1506   TSH  1.94     Results for orders placed or performed during the hospital encounter of 10/11/17   XR Chest 2 Views    Narrative    XR CHEST 2 VW 10/11/2017 4:01 PM    COMPARISON:  10/14/2016    HISTORY: Chest pain.      Impression    IMPRESSION: Mildly enlarged cardiac silhouette. Mild left basilar  atelectasis/consolidation. Lungs are otherwise clear. No pleural  effusion or pneumothorax.    HECTOR MENARD MD   Abdomen US, limited (RUQ only)    Narrative    US ABDOMEN LIMITED 10/11/2017 5:28 PM     HISTORY: RUQ pain.     FINDINGS:  Liver is normal in echogenicity without focal lesions.  Prominent intrahepatic bile ducts are present with ringdown artifact  possibly indicating pneumobilia. Prior CT from February 2016 showed  dilated intrahepatic and common bile duct as well. These findings may  be related to prior cholecystectomy changes. Question whether patient  has had prior sphincterotomy procedure to account for the potential  air in the biliary system. Otherwise cholangitis is possible. The  gallbladder is surgically absent. Common bile duct is normal in  diameter. Pancreas is normal where visualized. Examination of the  right kidney is unremarkable.      Impression    IMPRESSION:  Prior cholecystectomy changes with dilated intrahepatic  and common bile duct similar to prior CT from 2016. Ringdown artifact  in the intrahepatic ducts near the confluence of the right and left  hepatic duct could indicate air in the biliary tree. Cholangitis or  prior sphincterotomy procedure could account for this finding.  Correlate with patient's liver enzymes.    FERMIN TOWNSEND MD   MR Abdomen MRCP w/o & w Contrast    Narrative    MAGNETIC RESONANCE CHOLANGIOPANCREATOGRAPHY  10/12/2017 1:10 PM     HISTORY: Possible air in biliary tree on ultrasound. Elevated liver  function tests.    COMPARISON: Ultrasound from 10/11/2017.    TECHNIQUE: Multiplanar, multisequence images of the abdomen acquired  before and after administration of 7 mL Gadavist intravenous contrast.    FINDINGS: The gallbladder is absent. No obvious air is seen in the  biliary system but this would be better evaluated with CT. There is  intra  and extrahepatic biliary dilatation which is moderate to severe  with the extrahepatic bile duct measuring up to 2 cm. This extends to  the sphincter of Oddi. There is some possible sludge and/or stones in  the distal common bile duct. No biliary strictures. The pancreatic  duct is dilated up to 5 mm. No pancreatic duct strictures.  Mild-moderate significant sidebranch visualization. No cystic lesions  within the pancreas. The signal intensity of the liver is within  normal limits without evidence for hepatic steatosis. The signal  intensity of the pancreas within normal limits without evidence for  pancreatitis. Visualized kidneys demonstrate unremarkable signal  intensity without hydronephrosis. Renal cysts noted in both kidneys.  Adrenal glands and spleen are unremarkable. Visualized osseous  structures unremarkable. After administration of intravenous contrast,  solid organs demonstrate no enhancing focal lesions.      Impression    IMPRESSION: Possible sludge and/or stones in the distal common bile  duct, consider ERCP for further evaluation.    SILVIA WILLIAM MD   XR ERCP    Narrative    ERCP   10/13/2017 2:00 PM     HISTORY: Common bile duct stone.    COMPARISON: None.    FINDINGS: Total fluoroscopy time was 4.1 minutes.  Five spot  fluoroscopic images, and/or cine clips were obtained. One view  obtained.       Impression    IMPRESSION: Prominently dilated intra and extrahepatic biliary tree.  Demonstration of cannulation of the common duct with balloon sweeping.  Bubbles of gas within the biliary tree.    MCKENNA ZUNIGA MD

## 2017-10-16 NOTE — PLAN OF CARE
Problem: Patient Care Overview  Goal: Plan of Care/Patient Progress Review  Outcome: No Change  A&O x4. VSS on 2L NC, which is baseline at home to wear oxygen at HS. SBA with walker, ambulates to bathroom. Continent, some dribbling. CRUZ. BLE edema. Refused to elevate legs on pillows. Legally blind and [place]. Hearing aids out. No IV access. Wheezing on exertion, PRN albuterol inhaler given x1.

## 2017-10-16 NOTE — PROGRESS NOTES
Care Coordinator met with pt, with Dr Tan, concerning planning for discharge today.  PT is recommending TCU and noted pt wanted to discharge home.  After further discussion, pt was in agreement for TCU and would like [facility].  SW was updated for referral processing.

## 2017-10-16 NOTE — PROGRESS NOTES
SPIRITUAL HEALTH SERVICES Progress Note  [facility] 64    D: , LOS visit. The patient talked about possible discharge and wanting to get home. With emphasis the patient told about being grateful for medical care, home, and [employer] community. The patient recounted life stories of mother's death in childbirth and being raised by a stepmom. The patient talked about here involvement in a spiritual healing ministry at her [employer], every other week.      I:  offered non judegemental reflective listieng.  prayed and offered a scripture blessing.      A: patient is coping well and looks forward to being home in a supportive cooperative community living, support from [employer] friends from spiritual healing ministry (Merritt Iraheta), and some support from stepmothers family    P:  has no further plans          Sanket Claudio  Chaplain Resident

## 2017-10-16 NOTE — PLAN OF CARE
Problem: Patient Care Overview  Goal: Plan of Care/Patient Progress Review  Outcome: Adequate for Discharge Date Met:  10/16/17  A&Ox4, [place] corrected with bilat hearing aides. VSS on RA. Mild CRUZ. Up SBA/W/GB. BLE edema. Extra dose Bumex per orders, voiding adequately. Regular diet, tolerates well. +BM. Denies pain. D/C to [facility] @1520 with medications, Tramadol script, paperwork and pt belongings. Transport via friend.

## 2017-10-17 ENCOUNTER — NURSING HOME VISIT (OUTPATIENT)
Dept: GERIATRICS | Facility: CLINIC | Age: 77
End: 2017-10-17
Payer: COMMERCIAL

## 2017-10-17 VITALS
OXYGEN SATURATION: 97 % | HEART RATE: 77 BPM | HEIGHT: 63 IN | WEIGHT: 180.3 LBS | DIASTOLIC BLOOD PRESSURE: 82 MMHG | RESPIRATION RATE: 20 BRPM | BODY MASS INDEX: 31.95 KG/M2 | SYSTOLIC BLOOD PRESSURE: 157 MMHG | TEMPERATURE: 98.2 F

## 2017-10-17 DIAGNOSIS — I10 ESSENTIAL HYPERTENSION WITH GOAL BLOOD PRESSURE LESS THAN 130/85: ICD-10-CM

## 2017-10-17 DIAGNOSIS — R53.81 PHYSICAL DECONDITIONING: ICD-10-CM

## 2017-10-17 DIAGNOSIS — J98.4 RESTRICTIVE LUNG DISEASE: ICD-10-CM

## 2017-10-17 DIAGNOSIS — R10.11 RUQ ABDOMINAL PAIN: Primary | ICD-10-CM

## 2017-10-17 DIAGNOSIS — I50.33 ACUTE ON CHRONIC DIASTOLIC CONGESTIVE HEART FAILURE (H): ICD-10-CM

## 2017-10-17 DIAGNOSIS — N18.30 CKD (CHRONIC KIDNEY DISEASE) STAGE 3, GFR 30-59 ML/MIN (H): ICD-10-CM

## 2017-10-17 LAB
BACTERIA SPEC CULT: NO GROWTH
BACTERIA SPEC CULT: NO GROWTH
Lab: NORMAL
Lab: NORMAL
SPECIMEN SOURCE: NORMAL
SPECIMEN SOURCE: NORMAL

## 2017-10-17 PROCEDURE — 99309 SBSQ NF CARE MODERATE MDM 30: CPT | Performed by: NURSE PRACTITIONER

## 2017-10-17 NOTE — PROGRESS NOTES
[facility]  PRIMARY CARE PROVIDER AND CLINIC:  Abran Mott [address]  / [facility] [id number]  Chief Complaint   Patient presents with     Hospital F/U       HPI:    Rupinder Tracy is a 77 year old  (1940),PMH of mild Pulm Htn, diastolic dysfunction followed by Dr. Butler of cardiology, mild intermittent Asthma, and who also had a cholecystectomy in 1982 awoke on the day of admission with epigastric pain which radiated substernal area admitted to the [facility] from [facility].  Hospital stay 10/11/17 through 10/16/17.    RUQ pain: s/p ERCP, biliary stone extraction (prior cholecystectomy) with improvement in pain and LFT's  Hypervolemia: restarted bumex PTA dosing is once a day increased to twice daily at discharge   Admitted to this facility for  rehab, medical management and nursing care.  HPI information obtained from: facility chart records, facility staff, patient report and [facility] chart review.  Current issues are:  On exam today patient is alert, sitting up in WC denies abdominal pain or discomfort, states she has had 2 BM today one was formed and one slightly loose, denies N/V, main c/o today is increased SOB, having SOB at rest and CRUZ worse than at baseline, patient states her normal weight ranges from 159-164lbs and today weight was 180lbs, patient has LE edema which is new. No other concerns at this time.      Last 3 BPs:146/66, 122/70, 123/56  HR Ranges: 57-77  Admission Weight: 180.3lbs  Current Weight: 180.3lbs    CPR/Full code   CODE STATUS/ADVANCE DIRECTIVES DISCUSSION:     Patient's living condition: lives in a skilled nursing facility    ALLERGIES:Atorvastatin calcium; Celecoxib; Cholestyramine; Crestor [rosuvastatin calcium]; Cyclobenzaprine hcl; Dulera; Gabapentin; Lisinopril; Meperidine hcl; Morphine; Naprosyn [naproxen]; Niaspan [niacin]; Percocet [oxycodone-acetaminophen]; Pravastatin; Red yeast rice [cholestin];  Simvastatin; Timolol maleate; Hctz; and Sulfa drugs  PAST MEDICAL HISTORY:  has a past medical history of Arthritis; Cellulitis (4/13 in [place]); Difficult airway for intubation; 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); Duodenal ulcer, unspecified as acute or chronic, without mention of hemorrhage or perforation (1980's); Female stress incontinence; H/O right and left heart catheterization; HTN; HYPERLIPIDEMIA; Ischemic colitis (7/2001); Lactose Intolerance; Left Ventricular Hypertrophy; Legal blindness; Mild intermittent asthma (~1980's); MITRAL VALVE Prolapse, with murmur; Nocturnal oxygen desaturation; Osteoporosis, unspecified (~2002); Patent foramen ovale; PERIPHERAL VASCULAR DISEASE (5/05); PRESBYESOPHAGUS (6/04); Pulmonary HTN (11/2014); Serous retinal detachment; Subarachnoid hemorrhage following injury (H) (12/10); Syncope and collapse (12/10); and Unspecified glaucoma(365.9). She also has no past medical history of Basal cell carcinoma; Blood transfusion; Chronic infection; Congestive heart failure, unspecified; COPD (chronic obstructive pulmonary disease) (H); Diabetes mellitus (H); Malignant hyperthermia; Malignant melanoma (H); Skin cancer; Squamous cell carcinoma; Thyroid disease; or Unspecified cerebral artery occlusion with cerebral infarction.  PAST SURGICAL HISTORY:  has a past surgical history that includes NONSPECIFIC PROCEDURE (1945); APPENDECTOMY (1982); REMOVAL GALLBLADDER (1982); NONSPECIFIC PROCEDURE; NONSPECIFIC PROCEDURE; NONSPECIFIC PROCEDURE (1997); NONSPECIFIC PROCEDURE (1997, 2007, 2014); NONSPECIFIC PROCEDURE (1945); NONSPECIFIC PROCEDURE (1960's); NONSPECIFIC PROCEDURE (5/05); NONSPECIFIC PROCEDURE (2008); Mastectomy simple bilateral (10/5/09); surgical history of - (6/2010); biopsy; colonoscopy; Heart Cath Right and left heart cath (1/19/15); Herniorrhaphy ventral (N/A, 4/19/2016); and Endoscopic retrograde cholangiopancreatogram (N/A, 10/13/2017).  FAMILY HISTORY: family history  includes C.A.D. in her paternal uncle; Family History Negative in her daughter. She was adopted.  SOCIAL HISTORY:  reports that she has never smoked. She has never used smokeless tobacco. She reports that she drinks alcohol. She reports that she does not use illicit drugs.    Post Discharge Medication Reconciliation Status: discharge medications reconciled, continue medications without change.  Current Outpatient Prescriptions   Medication Sig Dispense Refill     bumetanide (BUMEX) 1 MG tablet Take 2 tablets (2 mg) by mouth every 24 hours for 3 days       traMADol (ULTRAM) 50 MG tablet Take 1 tablet (50 mg) by mouth every 6 hours as needed for moderate pain 50 tablet 0     ROPINIRole HCl (REQUIP PO) Take 0.25 mg by mouth daily 3PM       olmesartan (BENICAR) 40 MG tablet Take 1 tablet (40 mg) by mouth daily 90 tablet 2     omeprazole (PRILOSEC) 20 MG CR capsule TAKE 1 CAPSULE (20 MG) BY MOUTH 2 TIMES DAILY 180 capsule 1     bumetanide (BUMEX) 2 MG tablet Take 1 tablet (2 mg) by mouth daily 30 tablet 11     Cholecalciferol (VITAMIN D) 2000 UNITS tablet Take 2,000 Units by mouth 2 times daily       rOPINIRole (REQUIP) 0.25 MG tablet Take 0.5 mg by mouth At Bedtime        fluticasone-salmeterol (ADVAIR DISKUS) 250-50 MCG/DOSE diskus inhaler Inhale 1 puff into the lungs 2 times daily 60 Inhaler 1     Lactobacillus Rhamnosus, GG, (CULTURELLE PO) Take 1 tablet by mouth Three times a week       order for DME 2L of O2 at night       Calcium-Vitamin D-Vitamin K (CALCIUM + D) 500-1000-40 MG-UNT-MCG CHEW Take 2 tablets by mouth daily        DOXYCYCLINE HYCLATE PO Take 50 mg by mouth 3 times per week Monday,wednesday, friday       prednisoLONE acetate (PRED FORTE) 1 % ophthalmic suspension Place 1 drop Into the left eye 4 times daily        albuterol (ALBUTEROL) 108 (90 BASE) MCG/ACT inhaler Inhale 2 puffs into the lungs every 4 hours as needed 1 Inhaler 5     RESTASIS 0.05 % OP EMUL 1 drop to left eye twice daily       SYSTANE  "0.4-0.3 % OP SOLN 1 drop to left eye 4 times daily       denosumab (PROLIA) 60 MG/ML SOLN Inject 60 mg Subcutaneous every 6 months       EYE-VITES OR TABS Hydro-Eye with Lutein 2 tabs twice a day (Rx from [facility])         ROS:  10 point ROS of systems including Constitutional, Eyes, Respiratory, Cardiovascular, Gastroenterology, Genitourinary, Integumentary, Muscularskeletal, Psychiatric were all negative except for pertinent positives noted in my HPI.    Exam:  /82  Pulse 77  Temp 98.2  F (36.8  C)  Resp 20  Ht 5' 3\" (1.6 m)  Wt 180 lb 4.8 oz (81.8 kg)  LMP 01/01/1990  SpO2 97%  BMI 31.94 kg/m2  GENERAL APPEARANCE:  Alert, slightly SOB with conversation  ENT:  Mouth and posterior oropharynx normal, moist mucous membranes, [place]  RESP:  respiratory effort and palpation of chest normal, lungs clear to auscultation , diminished breath sounds bases bilaterally, no adventitious sounds, slightly SOB with conversation  CV:  Palpation and auscultation of heart done , regular rate and rhythm, no murmur, rub, or gallop, peripheral edema 1+ in LE bilaterally  ABDOMEN:  normal bowel sounds, soft, nontender, no hepatosplenomegaly or other masses  M/S:   Examination of:   right upper extremity, left upper extremity, right lower extremity and left lower extremity  Inspection, ROM, stability and muscle strength normal  SKIN:  Inspection of skin and subcutaneous tissue baseline  NEURO:   Cranial nerves 2-12 are normal tested and grossly at patient's baseline, speech WNL    Lab/Diagnostic data:  CBC RESULTS:   Recent Labs   Lab Test  10/16/17   0840  10/15/17   0845   WBC  6.9  6.7   RBC  3.43*  3.13*   HGB  11.1*  10.3*   HCT  33.3*  30.7*   MCV  97  98   MCH  32.4  32.9   MCHC  33.3  33.6   RDW  14.3  14.3   PLT  179  145*       Last Basic Metabolic Panel:  Recent Labs   Lab Test  10/16/17   0840  10/15/17   0845   NA  142  142   POTASSIUM  3.8  4.0   CHLORIDE  114*  114*   AXEL  8.1*  7.4*   CO2  19*  20 "   BUN  29  29   CR  1.05*  1.29*   GLC  93  94       Liver Function Studies -   Recent Labs   Lab Test  10/15/17   0845  10/14/17   0820   PROTTOTAL  5.8*  6.5*   ALBUMIN  2.3*  2.7*   BILITOTAL  0.8  1.1   ALKPHOS  224*  259*   AST  42  51*   ALT  39  52*       TSH   Date Value Ref Range Status   01/20/2016 1.94 0.40 - 4.00 mU/L Final   11/12/2014 1.71 0.40 - 4.00 mU/L Final     Comment:     Effective 7/30/2014, the reference range for this assay has changed to reflect   new instrumentation/methodology.     ]    No results found for: A1C    ASSESSMENT/PLAN:  RUQ abdominal pain  S/p ERCP and biliary stone extraction   Physical deconditioning  Acute/ongoing: PT and OT for strengthening, ultram 50mg q 6 hours prn     Acute on chronic diastolic congestive heart failure (H)  Acute/ongoing: daily weights, vitals daily and prn, BMP follow, continue bumex 2mg QD (PTA dose is 1mg QD) continue olmesartan 40mg QD    Essential hypertension with goal blood pressure less than 130/85  CKD (chronic kidney disease) stage 3, GFR 30-59 ml/min  Ongoing: vitals daily and prn, BMP follow, continue olmesartan 40mg QD and bumex as above,     Restrictive lung disease  Ongoing: monitor SaO2 at rest and with activity continue    albuterol MDI 108mcg/act 2 puffs q 4 hours prn, advair 250/50mcg 1 puff BID    Orders:  BMP and Hgb in AM  bumex 2mg QD no end date        Electronically signed by:  Tonya Lynn Haase, APRN CNP

## 2017-10-18 ENCOUNTER — TRANSFERRED RECORDS (OUTPATIENT)
Dept: HEALTH INFORMATION MANAGEMENT | Facility: CLINIC | Age: 77
End: 2017-10-18

## 2017-10-18 LAB
BUN SERPL-MCNC: 26 MG/DL (ref 9–26)
CALCIUM SERPL-MCNC: 8.7 MG/DL (ref 8.4–10.2)
CHLORIDE SERPLBLD-SCNC: 110 MMOL/L (ref 98–109)
CO2 SERPL-SCNC: 21 MMOL/L (ref 22–31)
CREAT SERPL-MCNC: 1.03 MG/DL (ref 0.55–1.02)
GFR SERPL CREATININE-BSD FRML MDRD: 52 ML/MIN/1.73M2
GLUCOSE SERPL-MCNC: 96 MG/DL (ref 70–100)
HEMOGLOBIN: 10.9 G/DL (ref 11.8–15.5)
POTASSIUM SERPL-SCNC: 3.9 MMOL/L (ref 3.5–5.2)
SODIUM SERPL-SCNC: 141 MMOL/L (ref 136–145)

## 2017-10-19 ENCOUNTER — TRANSFERRED RECORDS (OUTPATIENT)
Dept: HEALTH INFORMATION MANAGEMENT | Facility: CLINIC | Age: 77
End: 2017-10-19

## 2017-10-19 LAB
DIFFERENTIAL: ABNORMAL
ERYTHROCYTE [DISTWIDTH] IN BLOOD BY AUTOMATED COUNT: 14.7 % (ref 11–15)
HCT VFR BLD AUTO: 33.9 % (ref 35–46)
HEMOGLOBIN: 11 G/DL (ref 11.8–15.5)
MCV RBC AUTO: 98.5 FL (ref 80–100)
PLATELET # BLD AUTO: 243 K/CMM (ref 140–450)
RBC # BLD AUTO: 3.44 M/CMM (ref 3.7–5.2)
WBC # BLD AUTO: 7.2 K/CMM (ref 3.8–11)

## 2017-10-20 ENCOUNTER — NURSING HOME VISIT (OUTPATIENT)
Dept: GERIATRICS | Facility: CLINIC | Age: 77
End: 2017-10-20
Payer: COMMERCIAL

## 2017-10-20 VITALS
SYSTOLIC BLOOD PRESSURE: 158 MMHG | TEMPERATURE: 96.5 F | RESPIRATION RATE: 18 BRPM | DIASTOLIC BLOOD PRESSURE: 82 MMHG | BODY MASS INDEX: 31.17 KG/M2 | HEART RATE: 83 BPM | OXYGEN SATURATION: 94 % | WEIGHT: 175.9 LBS | HEIGHT: 63 IN

## 2017-10-20 DIAGNOSIS — R53.81 PHYSICAL DECONDITIONING: ICD-10-CM

## 2017-10-20 DIAGNOSIS — M10.9 ACUTE GOUT OF LEFT FOOT, UNSPECIFIED CAUSE: Primary | ICD-10-CM

## 2017-10-20 DIAGNOSIS — M79.662 PAIN OF LEFT LOWER LEG: ICD-10-CM

## 2017-10-20 DIAGNOSIS — J98.4 RESTRICTIVE LUNG DISEASE: ICD-10-CM

## 2017-10-20 DIAGNOSIS — I50.33 ACUTE ON CHRONIC DIASTOLIC CONGESTIVE HEART FAILURE (H): ICD-10-CM

## 2017-10-20 DIAGNOSIS — R10.11 ABDOMINAL PAIN, RIGHT UPPER QUADRANT: ICD-10-CM

## 2017-10-20 DIAGNOSIS — I10 ESSENTIAL HYPERTENSION WITH GOAL BLOOD PRESSURE LESS THAN 130/85: ICD-10-CM

## 2017-10-20 PROCEDURE — 99310 SBSQ NF CARE HIGH MDM 45: CPT | Performed by: NURSE PRACTITIONER

## 2017-10-20 RX ORDER — CALCIUM CARBONATE 500 MG/1
2 TABLET, CHEWABLE ORAL 3 TIMES DAILY PRN
COMMUNITY
End: 2017-11-02

## 2017-10-20 RX ORDER — LACTOBACILLUS RHAMNOSUS GG 10B CELL
1 CAPSULE ORAL DAILY
COMMUNITY
End: 2018-03-02

## 2017-10-20 RX ORDER — ZINC OXIDE 216 MG/ML
LOTION TOPICAL
COMMUNITY
End: 2017-11-02

## 2017-10-20 NOTE — PROGRESS NOTES
[facility]    Chief Complaint   Patient presents with     Nursing Home Acute       HPI:    Rupinder Tracy is a 77 year old  (1940), who is being seen today for an episodic care visit at [facility].  HPI information obtained from: facility chart records, facility staff, patient report and [facility] chart review.Today's concern is:    Acute gout of left foot, unspecified cause  Pain of left lower leg  Patient with acute on chronic edema, developed extra edema redness and warmth left distal lateral foot on 10/19. Area marked - did not increase in size today and actually appears improved with use of ACE wraps last night. No fevers and no changes in CBC. Check uric acid on 10/19 and it is elevated at 11. Patient also c/o left calf pain and bruising is present on back of left lower leg. Has been at TCU and in therapies with recent hospitalization due to biliary stone and ERCP.   Lab Results   Component Value Date    WBC 7.2 10/19/2017    WBC 6.9 10/16/2017       Acute on chronic diastolic congestive heart failure (H)  Essential hypertension with goal blood pressure less than 130/85  Seen by NP on 10/17 and Bumex dose increased to 2 mg PO in AM. Recent BPs:152/82, 144/69, 146/69 and HR Ranges: 57-83 with Admission Weight: 180.3lbs and Current Weight: (10/17) 179.4, (10/18)178.1, (10/19) 175.9 lbs. Takes Bumex 3 mg PO daily on 10/19-10/21, then back to 2 mg PO daily.   Lab Results   Component Value Date    CR 1.03 10/18/2017    CR 1.05 10/16/2017     Lab Results   Component Value Date    POTASSIUM 3.9 10/18/2017    POTASSIUM 3.8 10/16/2017     Lab Results   Component Value Date    BUN 26 10/18/2017    BUN 29 10/16/2017       Restrictive lung disease  On room air at this time, 94% room air. No cough. Some dyspnea when resting flat in bed d/t HF as noted above.     Abdominal pain, right upper quadrant  Currently no discomfort but does c/o reflux/heart burn after breakfast. On PPI, agrees to  adding PRN TUMs.     Physical deconditioning  Continues therapies. Per therapies:  Standing exercises in // bars x15 reps: marching, mini squats, hip abd, hip flexion w/extended knee. Verbal  and tactile cues throughout for performance with exercises, short rest breaks as needed throughout for muscle fatigue. After exercises O2 sats 92% on RA after standing exercise. Seated exercises with blue t-band x15 reps each: ham  curls, hip abd, hip ext, hip add with ball squeeze. At rest, O2 sats 95% on RA and HR 73 bpm. Pt ambulated 90' x1, 35' x1, 80' x1, 40' x1 with 4ww and CGASBA. Pt fatigues quickly with increase in SOB. After ambulation O2 sats 85-86% on RA and HR 73 bpm. Pt needs seated rests between reps and cues for PLB to recover, able to increase O2 sats to >90% within approx 2 minutes seated rests.Pt performs sit<>stand from w/c and also from 4ww with mod (I)       ALLERGIES: Atorvastatin calcium; Celecoxib; Cholestyramine; Crestor [rosuvastatin calcium]; Cyclobenzaprine hcl; Dulera; Gabapentin; Lisinopril; Meperidine hcl; Morphine; Naprosyn [naproxen]; Niaspan [niacin]; Percocet [oxycodone-acetaminophen]; Pravastatin; Red yeast rice [cholestin]; Simvastatin; Timolol maleate; Hctz; and Sulfa drugs  Past Medical, Surgical, Family and Social History reviewed and updated in [facility].    Current Outpatient Prescriptions   Medication Sig Dispense Refill     NUTRITIONAL SUPPLEMENT LIQD Ensure Plus  one time a day for poor appetite. 8  oz chocolate.       [START ON 10/21/2017] COLCHICINE PO Take 0.6 mg by mouth daily On 10/20 Colchicine 1.2 mg PO, then 0.6 mg PO one hour later. Starting 10/21 Colchicine 0.6 mg PO daily x 14 days diagnosis gout       traMADol (ULTRAM) 50 MG tablet Take 1 tablet (50 mg) by mouth every 6 hours as needed for moderate pain 50 tablet 0     ROPINIRole HCl (REQUIP PO) Take 0.25 mg by mouth daily 3PM       olmesartan (BENICAR) 40 MG tablet Take 1 tablet (40 mg) by mouth daily 90 tablet 2      omeprazole (PRILOSEC) 20 MG CR capsule TAKE 1 CAPSULE (20 MG) BY MOUTH 2 TIMES DAILY 180 capsule 1     bumetanide (BUMEX) 2 MG tablet Take 1 tablet (2 mg) by mouth daily (Patient taking differently: Take 2 mg by mouth daily Give 3 mg by mouth one time a  day for edema for 3 Days take  3mg for 3 days and then go back  to 2mg daily (done on 21st last dose of 3mg)) 30 tablet 11     Cholecalciferol (VITAMIN D) 2000 UNITS tablet Take 2,000 Units by mouth 2 times daily       rOPINIRole (REQUIP) 0.25 MG tablet Take 0.5 mg by mouth At Bedtime        fluticasone-salmeterol (ADVAIR DISKUS) 250-50 MCG/DOSE diskus inhaler Inhale 1 puff into the lungs 2 times daily 60 Inhaler 1     Lactobacillus Rhamnosus, GG, (CULTURELLE PO) Take 1 tablet by mouth Three times a week  *ALSO TAKING Give 1 capsule by mouth one time  a day for diarrhea obtain C-diff  sample for subsequent loose  stools. Per NP Vazquez       order for DME 2L of O2 at night       Calcium-Vitamin D-Vitamin K (CALCIUM + D) 500-1000-40 MG-UNT-MCG CHEW Take 2 tablets by mouth daily        DOXYCYCLINE HYCLATE PO Take 50 mg by mouth 3 times per week Monday,wednesday, friday       prednisoLONE acetate (PRED FORTE) 1 % ophthalmic suspension Place 1 drop Into the left eye 4 times daily        albuterol (ALBUTEROL) 108 (90 BASE) MCG/ACT inhaler Inhale 2 puffs into the lungs every 4 hours as needed 1 Inhaler 5     RESTASIS 0.05 % OP EMUL 1 drop to left eye twice daily       SYSTANE 0.4-0.3 % OP SOLN 1 drop to left eye 4 times daily       [DISCONTINUED] bumetanide (BUMEX) 1 MG tablet Take 2 tablets (2 mg) by mouth every 24 hours for 3 days       Medications reviewed:  Medications reconciled to facility chart and changes were made to reflect current medications as identified as above med list. Below are the changes that were made:   Medications stopped since last EPIC medication reconciliation:   There are no discontinued medications.    Medications started since last EPIC  "medication reconciliation:  No orders of the defined types were placed in this encounter.    REVIEW OF SYSTEMS:  10 point ROS of systems including Constitutional, Eyes, Respiratory, Cardiovascular, Gastroenterology, Genitourinary, Integumentary, Musculoskeletal, Psychiatric were all negative except for pertinent positives noted in my HPI.    Physical Exam:  /82  Pulse 83  Temp 96.5  F (35.8  C)  Resp 18  Ht 5' 3\" (1.6 m)  Wt 175 lb 14.4 oz (79.8 kg)  LMP 01/01/1990  SpO2 94%  BMI 31.16 kg/m2  GENERAL APPEARANCE:  Alert, in no distress, pleasant, cooperative, oriented x 4  EYES:  EOM, lids, pupils and irises normal, sclera clear and conjunctiva normal, no discharge or mattering on lids or lashes noted  ENT:  Mouth normal, moist mucous membranes, nose normal without drainage or crusting, external ears without lesions, hearing acuity impaired wears hearing aids  NECK:  Nontender, supple, symmetrical; no adenopathy, masses or thyromegaly, trachea midline  RESP:  respiratory effort and palpation of chest normal, no chest wall tenderness, no respiratory distress, Lung sounds diminished at bases, patient is on room air at rest  CV:  Palpation and auscultation of heart done, rate and rhythm controlled and regular today, no murmur, no rub or gallop. Edema +3 left leg/foot and + 2 right pitting bilateral lower extremities. VASCULAR: left foot lateral redness minimal, left calf bruising new  ABDOMEN:  normal bowel sounds, soft, nontender, no grimacing or guarding with palpation, no hepatosplenomegaly or other masses  M/S:   Gait and station walks with assist  and unsafe without assistance, Digits and nails normal, no tenderness or swelling of the joints; able to move all extremities  NEURO: cranial nerves 2-12 grossly intact, no facial asymmetry, no speech deficits and able to follow directions, moves all extremities symmetrically  PSYCH:  insight and judgement intact, memory intact, affect and mood normal "     Recent Labs:    CBC RESULTS:   Recent Labs   Lab Test 10/19/17 10/18/17  10/16/17   0840  10/15/17   0845   WBC  7.2   --   6.9  6.7   RBC  3.44*   --   3.43*  3.13*   HGB  11.0*  10.9*  11.1*  10.3*   HCT  33.9*   --   33.3*  30.7*   MCV  98.5   --   97  98   MCH   --    --   32.4  32.9   MCHC   --    --   33.3  33.6   RDW  14.7   --   14.3  14.3   PLT  243   --   179  145*       Last Basic Metabolic Panel:  Recent Labs   Lab Test 10/18/17  10/16/17   0840   NA  141  142   POTASSIUM  3.9  3.8   CHLORIDE  110*  114*   AXEL  8.7  8.1*   CO2  21*  19*   BUN  26  29   CR  1.03*  1.05*   GLC  96  93       Liver Function Studies -   Recent Labs   Lab Test  10/15/17   0845  10/14/17   0820   PROTTOTAL  5.8*  6.5*   ALBUMIN  2.3*  2.7*   BILITOTAL  0.8  1.1   ALKPHOS  224*  259*   AST  42  51*   ALT  39  52*       TSH   Date Value Ref Range Status   01/20/2016 1.94 0.40 - 4.00 mU/L Final   11/12/2014 1.71 0.40 - 4.00 mU/L Final     Comment:     Effective 7/30/2014, the reference range for this assay has changed to reflect   new instrumentation/methodology.         10/19 Uric acid 11.3 (ref range 2.6-6)    Assessment/Plan:  Acute gout of left foot, unspecified cause  Pain of left lower leg  Acute onset gout, edema, bruising. Start colchicine, doppler left lower leg today. F/U with results.     Acute on chronic diastolic congestive heart failure (H)  Essential hypertension with goal blood pressure less than 130/85  Chronic issues - wt decreasing, last BMP stable. Monitor VS, wt as ordered and increased Bumex dose as ordered. F/U PRN.     Restrictive lung disease  Chronic. Monitor, f/u as needed.     Abdominal pain, right upper quadrant  Appears to be stable. Adding TUMS for reflux/heart burn    Physical deconditioning  Ongoing therapies, poor endurance due to decreased sats. Monitor.       Orders:  1. Colchicine 1.2 mg PO now, then 0.6 mg PO one hour after initial dose. Starting 10/21 give Colchicine 0.6 mg PO daily x 14  days diagnosis gout. Staff to update provider if not effective.  2. Left lower leg doppler venous US today diagnosis pain, bruising, edema. Let provider know if positive.   3. TUMS 2 tabs PO TID PRN heart burn  4. Change Culturelle order to 1 capsule 10 billion PO daily diagnosis nutritional supplement    Total time spent with patient visit at the [facility] nursing facility was 35 min including patient visit and review of past records. Greater than 50% of total time spent with counseling and coordinating care due to review history, status and POC    Electronically signed by  YUNIEL Manzano CNP

## 2017-10-20 NOTE — MR AVS SNAPSHOT
After Visit Summary   10/20/2017    Rupinder Tracy    MRN: [id number]           Patient Information     Date Of Birth          1940        Visit Information        Provider Department      10/20/2017 9:00 AM Xin Hernandez APRN CNP Geriatrics Transitional Care        Today's Diagnoses     Acute gout of left foot, unspecified cause    -  1    Pain of left lower leg        Acute on chronic diastolic congestive heart failure (H)        Essential hypertension with goal blood pressure less than 130/85        Restrictive lung disease        Abdominal pain, right upper quadrant        Physical deconditioning           Follow-ups after your visit        Your next 10 appointments already scheduled     Nov 21, 2017 10:30 AM CST   Ech Complete with SHCVECHR2   [facility] CV Echocardiography (Cardiovascular Imaging at [facility])    [address]  [address]  [facility] [address] [phone]           1. Please bring or wear a comfortable two-piece outfit. 2. You may eat, drink and take your normal medicines. 3. For any questions that cannot be answered, please contact the ordering physician            Nov 21, 2017  2:15 PM CST   Return Visit with Jae Butler MD   [facility] PHYSICI[facility] HEART AT [place] ([facility])    [address]  [facility] [id number] [phone]              Who to contact     If you have questions or need follow up information about today's clinic visit or your schedule please contact GERIATRICS TRANSITIONAL CARE directly at [phone].  Normal or non-critical lab and imaging results will be communicated to you by MyChart, letter or phone within 4 business days after the clinic has received the results. If you do not hear from us within 7 days, please contact the clinic through MyChart or phone. If you have a critical or abnormal lab result, we will notify you by phone as soon as possible.  Submit  "refill requests through [employer] or call your pharmacy and they will forward the refill request to us. Please allow 3 business days for your refill to be completed.          Additional Information About Your Visit        CareFamilyhareBrevia Information     [employer] lets you send messages to your doctor, view your test results, renew your prescriptions, schedule appointments and more. To sign up, go to [email or web address] . Click on \"Log in\" on the left side of the screen, which will take you to the Welcome page. Then click on \"Sign up Now\" on the right side of the page.     You will be asked to enter the access code listed below, as well as some personal information. Please follow the directions to create your username and password.     Your access code is: [email or web address]  Expires: 2017  1:13 PM     Your access code will  in 90 days. If you need help or a new code, please call your [facility] clinic or [phone].        Care EveryWhere ID     This is your Care EveryWhere ID. This could be used by other organizations to access your [facility] medical records  [id number]        Your Vitals Were     Pulse Temperature Respirations Height Last Period Pulse Oximetry    83 96.5  F (35.8  C) 18 5' 3\" (1.6 m) 1990 94%    BMI (Body Mass Index)                   31.16 kg/m2            Blood Pressure from Last 3 Encounters:   10/20/17 158/82   10/17/17 157/82   10/16/17 116/64    Weight from Last 3 Encounters:   10/20/17 175 lb 14.4 oz (79.8 kg)   10/17/17 180 lb 4.8 oz (81.8 kg)   10/16/17 175 lb 3.2 oz (79.5 kg)              Today, you had the following     No orders found for display         Today's Medication Changes          These changes are accurate as of: 10/20/17 11:29 AM.  If you have any questions, ask your nurse or doctor.               These medicines have changed or have updated prescriptions.        Dose/Directions    bumetanide 2 MG tablet   Commonly known as:  BUMEX   This may have changed:    - " additional instructions  - Another medication with the same name was removed. Continue taking this medication, and follow the directions you see here.   Used for:  Diastolic dysfunction   Changed by:  Markell Giles PA-C        Dose:  2 mg   Take 1 tablet (2 mg) by mouth daily   Quantity:  30 tablet   Refills:  11       lactobacillus rhamnosus (GG) capsule   This may have changed:  Another medication with the same name was removed. Continue taking this medication, and follow the directions you see here.   Changed by:  Xin Hernandez, YUNIEL CNP        Dose:  1 capsule   Take 1 capsule by mouth daily   Refills:  0                Primary Care Provider Office Phone # Fax #    Abran Pasquale Mott [phone]       [address] [place] [phone]        Equal Access to Services     NIKA ASCENCIO : Richard marieo Sorosario, waaxda luqadaha, qaybta kaalmada adeegyada, ethan huntley . [facility] [phone].    ATENCIÓN: Si habla español, tiene a lugo disposición servicios gratuitos de asistencia lingüística. [facility] [phone].    We comply with applicable federal civil rights laws and [place] laws. We do not discriminate on the basis of race, color, national origin, age, disability, sex, sexual orientation, or gender identity.            Thank you!     Thank you for choosing GERIATRICS TRANSITIONAL CARE  for your care. Our goal is always to provide you with excellent care. Hearing back from our patients is one way we can continue to improve our services. Please take a few minutes to complete the written survey that you may receive in the mail after your visit with us. Thank you!             Your Updated Medication List - Protect others around you: Learn how to safely use, store and throw away your medicines at www.disposemymeds.org.          This list is accurate as of: 10/20/17 11:29 AM.  Always use your most recent med list.                   Brand Name  Dispense Instructions for use Diagnosis    albuterol 108 (90 BASE) MCG/ACT Inhaler    PROAIR HFA    1 Inhaler    Inhale 2 puffs into the lungs every 4 hours as needed    Mild intermittent asthma       bumetanide 2 MG tablet    BUMEX    30 tablet    Take 1 tablet (2 mg) by mouth daily    Diastolic dysfunction       CALCIUM + D 500-1000-40 MG-UNT-MCG Chew   Generic drug:  Calcium-Vitamin D-Vitamin K      Take 2 tablets by mouth daily        calcium carbonate 500 MG chewable tablet    TUMS     Take 2 chew tab by mouth 3 times daily as needed for heartburn        COLCHICINE PO   Start taking on:  10/21/2017      Take 0.6 mg by mouth daily On 10/20 Colchicine 1.2 mg PO, then 0.6 mg PO one hour later. Starting 10/21 Colchicine 0.6 mg PO daily x 14 days diagnosis gout        DOXYCYCLINE HYCLATE PO      Take 50 mg by mouth 3 times per week Monday,wednesday, friday        fluticasone-salmeterol 250-50 MCG/DOSE diskus inhaler    ADVAIR DISKUS    60 Inhaler    Inhale 1 puff into the lungs 2 times daily    Mild persistent asthma without complication       lactobacillus rhamnosus (GG) capsule      Take 1 capsule by mouth daily        NUTRITIONAL SUPPLEMENT Liqd      Ensure Plus one time a day for poor appetite. 8 oz chocolate.        olmesartan 40 MG tablet    BENICAR    90 tablet    Take 1 tablet (40 mg) by mouth daily    Essential hypertension with goal blood pressure less than 130/80       omeprazole 20 MG CR capsule    priLOSEC    180 capsule    TAKE 1 CAPSULE (20 MG) BY MOUTH 2 TIMES DAILY    Gastroesophageal reflux disease without esophagitis       order for DME      2L of O2 at night        prednisoLONE acetate 1 % ophthalmic susp    PRED FORTE     Place 1 drop Into the left eye 4 times daily        * REQUIP PO      Take 0.25 mg by mouth daily 3PM        * REQUIP 0.25 MG tablet   Generic drug:  rOPINIRole      Take 0.5 mg by mouth At Bedtime    Restless legs syndrome (RLS)       RESTASIS 0.05 % ophthalmic emulsion    Generic drug:  cycloSPORINE      1 drop to left eye twice daily        SYSTANE 0.4-0.3 % Soln ophthalmic solution   Generic drug:  polyethylene glycol 0.4%- propylene glycol 0.3%      1 drop to left eye 4 times daily        traMADol 50 MG tablet    ULTRAM    50 tablet    Take 1 tablet (50 mg) by mouth every 6 hours as needed for moderate pain    Chronic pain syndrome       vitamin D 2000 UNITS tablet      Take 2,000 Units by mouth 2 times daily    Vitamin D deficiency       * Notice:  This list has 2 medication(s) that are the same as other medications prescribed for you. Read the directions carefully, and ask your doctor or other care provider to review them with you.

## 2017-10-21 ENCOUNTER — TRANSFERRED RECORDS (OUTPATIENT)
Dept: HEALTH INFORMATION MANAGEMENT | Facility: CLINIC | Age: 77
End: 2017-10-21

## 2017-10-24 ENCOUNTER — TELEPHONE (OUTPATIENT)
Dept: CARDIOLOGY | Facility: CLINIC | Age: 77
End: 2017-10-24

## 2017-10-24 NOTE — PROGRESS NOTES
"[facility] DISCHARGE SUMMARY    PATIENT'S NAME: Rupinder Tracy  YOB: 1940  MEDICAL RECORD NUMBER:  [id number]    PRIMARY CARE PROVIDER AND CLINIC RESPONSIBLE AFTER TRANSFER: Abran Mott [address] [place] [phone]     CODE STATUS/ADVANCE DIRECTIVES DISCUSSION:   DNR / DNI       Allergies   Allergen Reactions     Atorvastatin Calcium      myalgias     Celecoxib Swelling     edema     Cholestyramine Diarrhea     Diarrhea       Crestor [Rosuvastatin Calcium]      leg aches, likely from medication     Cyclobenzaprine Hcl      flexeril--gets \"goofy\"     Dulera      tremors     Gabapentin      Nightmares.   Retrial of medication caused tremors.     Lisinopril Cough     Cough/ Dizziness at high dose.     Meperidine Hcl Nausea and Vomiting     demerol-severe nausea     Morphine Nausea and Vomiting     Naprosyn [Naproxen] GI Disturbance     Niaspan [Niacin]      flushing, severe     Percocet [Oxycodone-Acetaminophen] Nausea     Nausea, lightheadedness.   Tolerates med if taken with food.     Pravastatin Swelling     Edema, myalgias     Red Yeast Rice [Cholestin] GI Disturbance     Bloating, etc.     Simvastatin      myalgias     Timolol Maleate Difficulty breathing     timoptic--respiratory problems     Hctz Rash     rash     Sulfa Drugs Itching and Rash     rash on all mucous membranes and palms of hands with intense itching       TRANSFERRING PROVIDERS: Tonya Lynn Haase, APRN CNP, Dr. Sanket Maddox MD  DATE OF SNF ADMISSION:  October / 16 / 2017  DATE OF SNF (anticipated) DISCHARGE: October / 26 / 2017  DISCHARGE DISPOSITION: FM Provider   Nursing Facility: [facility] stay 10/11/17 to 10/16/17.     Condition on Discharge:  Stable.  Function:  Walking up to 200 feet with a RW indep, indep with ADL's  Cognitive Scores: BIMS 15 and SBT -0    Equipment: walker    DISCHARGE DIAGNOSIS:   1. RUQ abdominal pain    2. S/P " cholecystectomy -- 1982    3. Physical deconditioning    4. Acute on chronic diastolic congestive heart failure (H)    5. Essential hypertension with goal blood pressure less than 130/85    6. CKD (chronic kidney disease) stage 3, GFR 30-59 ml/min    7. 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); Duodenal ulcer, unspecified as acute or chronic, without mention of hemorrhage or perforation (1980's); Female stress incontinence; H/O right and left heart catheterization; HTN; HYPERLIPIDEMIA; Ischemic colitis (7/2001); Lactose Intolerance; Left Ventricular Hypertrophy; Legal blindness; Mild intermittent asthma (~1980's); MITRAL VALVE Prolapse, with murmur; Nocturnal oxygen desaturation; Osteoporosis, unspecified (~2002); Patent foramen ovale; PERIPHERAL VASCULAR DISEASE (5/05); PRESBYESOPHAGUS (6/04); Pulmonary HTN (11/2014); Serous retinal detachment; Subarachnoid hemorrhage following injury (H) (12/10); Syncope and collapse (12/10); and Unspecified glaucoma(365.9). She also has no past medical history of Basal cell carcinoma; Blood transfusion; Chronic infection; Congestive heart failure, unspecified; COPD (chronic obstructive pulmonary disease) (H); Diabetes mellitus (H); Malignant hyperthermia; Malignant melanoma (H); Skin cancer; Squamous cell carcinoma; Thyroid disease; or Unspecified cerebral artery occlusion with cerebral infarction.    DISCHARGE MEDICATIONS:  Current Outpatient Prescriptions   Medication Sig Dispense Refill     BUMETANIDE PO Take 2 mg by mouth daily       NUTRITIONAL SUPPLEMENT LIQD Ensure Plus  one time a day for poor appetite. 8  oz chocolate.       COLCHICINE PO Take 0.6 mg by mouth daily Give 0.6 mg by mouth one time a  day for Gout for 15 Administrations  Give 0.6 mg 1 hour after 1.2 mg on  10/20/17, then 0.6 mg PO daily  starting  10/21       calcium carbonate (TUMS) 500 MG chewable tablet Take 2 chew tab by mouth 3 times daily as needed for heartburn       lactobacillus rhamnosus, GG, (CULTURELL) capsule Take 1 capsule by mouth daily       traMADol (ULTRAM) 50 MG tablet Take 1 tablet (50 mg) by mouth every 6 hours as needed for moderate pain 50 tablet 0     ROPINIRole HCl (REQUIP PO) Take 0.25 mg by mouth daily 3PM       olmesartan (BENICAR) 40 MG tablet Take 1 tablet (40 mg) by mouth daily 90 tablet 2     omeprazole (PRILOSEC) 20 MG CR capsule TAKE 1 CAPSULE (20 MG) BY MOUTH 2 TIMES DAILY 180 capsule 1     Cholecalciferol (VITAMIN D) 2000 UNITS tablet Take 2,000 Units by mouth 2 times daily       rOPINIRole (REQUIP) 0.25 MG tablet Take 0.5 mg by mouth At Bedtime        fluticasone-salmeterol (ADVAIR DISKUS) 250-50 MCG/DOSE diskus inhaler Inhale 1 puff into the lungs 2 times daily 60 Inhaler 1     order for DME 2L of O2 at night       Calcium-Vitamin D-Vitamin K (CALCIUM + D) 500-1000-40 MG-UNT-MCG CHEW Take 2 tablets by mouth daily        DOXYCYCLINE HYCLATE PO Take 50 mg by mouth 3 times per week Monday,wednesday, friday       prednisoLONE acetate (PRED FORTE) 1 % ophthalmic suspension Place 1 drop Into the left eye 4 times daily        albuterol (ALBUTEROL) 108 (90 BASE) MCG/ACT inhaler Inhale 2 puffs into the lungs every 4 hours as needed 1 Inhaler 5     RESTASIS 0.05 % OP EMUL 1 drop to left eye twice daily       SYSTANE 0.4-0.3 % OP SOLN 1 drop to left eye 4 times daily         MEDICATION CHANGES/RATIONALE:   Increased bumex to 3mg QD for 3 days due to LE edema, now at discharge patient back to PTA bumex 2mg QD  Last 3 BPs:150/66, 162/77, 163/81  HR Ranges: 56-83  Admission Weight: 180.3lbs  Current Weight: (10/24) 168.9, (10/23) 170.5, (10/22) 170.6 lbs    Controlled medications sent with patient: not applicable/none     ROS:    10 point ROS of systems including Constitutional, Eyes, Respiratory, Cardiovascular, Gastroenterology,  "Genitourinary, Integumentary, Muscularskeletal, Psychiatric were all negative except for pertinent positives noted in my HPI.    Physical Exam:   Vitals: /66  Pulse 74  Temp 97  F (36.1  C)  Resp 18  Ht 5' 2\" (1.575 m)  Wt 168 lb 14.4 oz (76.6 kg)  LMP 01/01/1990  SpO2 94%  BMI 30.89 kg/m2  BMI= Body mass index is 30.89 kg/(m^2).     GENERAL APPEARANCE:  Alert, slightly SOB with conversation  ENT:  Mouth and posterior oropharynx normal, moist mucous membranes, [place]  RESP:  respiratory effort and palpation of chest normal, lungs clear to auscultation , diminished breath sounds bases bilaterally, no adventitious sounds, slightly SOB with conversation  CV:  Palpation and auscultation of heart done , regular rate and rhythm, no murmur, rub, or gallop, peripheral edema 1+ in LE bilaterally  ABDOMEN:  normal bowel sounds, soft, nontender, no hepatosplenomegaly or other masses  M/S:   Examination of:   right upper extremity, left upper extremity, right lower extremity and left lower extremity  Inspection, ROM, stability and muscle strength normal  SKIN:  Inspection of skin and subcutaneous tissue baseline  NEURO:   Cranial nerves 2-12 are normal tested and grossly at patient's baseline, speech WNL     HPI Nursing Facility Course:  HPI information obtained from: facility chart records, facility staff, patient report and [facility] chart review.      ASSESSMENT/PLAN:  RUQ abdominal pain  S/p ERCP and biliary stone extraction   Physical deconditioning  Acute/ongoing: DC home with home PT and HHA, no further pain at this time      Acute on chronic diastolic congestive heart failure (H)  Acute/ongoing: continue daily weights,   Cardiology is Dr. Maurice patient states she has f/u appt next week   continue bumex 2mg QD patient states PTA dose is 2mg QD , continue olmesartan 40mg QD     Essential hypertension with goal blood pressure less than 130/85  CKD (chronic kidney disease) stage 3, GFR 30-59 " ml/min  Ongoing: continue olmesartan 40mg QD and bumex as above,      Restrictive lung disease  Ongoing:  continue albuterol MDI 108mcg/act 2 puffs q 4 hours prn, advair 250/50mcg 1 puff BID     DISCHARGE PLAN:  Physical Therapy  Patient instructed to follow-up with:  PCP in 7 days      [facility] scheduled appointments:  Future Appointments  Date Time Provider Department Center   11/7/2017 1:40 PM Miguel Richey MD OXIM OX   11/21/2017 10:30 AM SHCVECHR2 [facility] CVIMG   11/21/2017 2:15 PM [facility]       MTM referral needed and placed by this provider: No    Pending labs: none  SNF labs   CBC RESULTS:   Recent Labs   Lab Test 10/19/17 10/18/17  10/16/17   0840  10/15/17   0845   WBC  7.2   --   6.9  6.7   RBC  3.44*   --   3.43*  3.13*   HGB  11.0*  10.9*  11.1*  10.3*   HCT  33.9*   --   33.3*  30.7*   MCV  98.5   --   97  98   MCH   --    --   32.4  32.9   MCHC   --    --   33.3  33.6   RDW  14.7   --   14.3  14.3   PLT  243   --   179  145*       Last Basic Metabolic Panel:  Recent Labs   Lab Test 10/18/17  10/16/17   0840   NA  141  142   POTASSIUM  3.9  3.8   CHLORIDE  110*  114*   AXEL  8.7  8.1*   CO2  21*  19*   BUN  26  29   CR  1.03*  1.05*   GLC  96  93       Liver Function Studies -   Recent Labs   Lab Test  10/15/17   0845  10/14/17   0820   PROTTOTAL  5.8*  6.5*   ALBUMIN  2.3*  2.7*   BILITOTAL  0.8  1.1   ALKPHOS  224*  259*   AST  42  51*   ALT  39  52*       TSH   Date Value Ref Range Status   01/20/2016 1.94 0.40 - 4.00 mU/L Final   11/12/2014 1.71 0.40 - 4.00 mU/L Final     Comment:     Effective 7/30/2014, the reference range for this assay has changed to reflect   new instrumentation/methodology.         Discharge Treatments:none    TOTAL DISCHARGE TIME:   Greater than 30 minutes  Electronically signed by:  Tonya Lynn Haase, APRN CNP

## 2017-10-24 NOTE — TELEPHONE ENCOUNTER
Patient called and left a message asking to be called back. Called patient back but she did not answer. Left a message to call team 4 with the direct number.

## 2017-10-25 ENCOUNTER — NURSING HOME VISIT (OUTPATIENT)
Dept: GERIATRICS | Facility: CLINIC | Age: 77
End: 2017-10-25
Payer: COMMERCIAL

## 2017-10-25 VITALS
DIASTOLIC BLOOD PRESSURE: 66 MMHG | RESPIRATION RATE: 18 BRPM | SYSTOLIC BLOOD PRESSURE: 150 MMHG | HEIGHT: 62 IN | OXYGEN SATURATION: 94 % | TEMPERATURE: 97 F | WEIGHT: 168.9 LBS | HEART RATE: 74 BPM | BODY MASS INDEX: 31.08 KG/M2

## 2017-10-25 DIAGNOSIS — Z90.49 S/P CHOLECYSTECTOMY: ICD-10-CM

## 2017-10-25 DIAGNOSIS — I50.33 ACUTE ON CHRONIC DIASTOLIC CONGESTIVE HEART FAILURE (H): ICD-10-CM

## 2017-10-25 DIAGNOSIS — I10 ESSENTIAL HYPERTENSION WITH GOAL BLOOD PRESSURE LESS THAN 130/85: ICD-10-CM

## 2017-10-25 DIAGNOSIS — R10.11 RUQ ABDOMINAL PAIN: Primary | ICD-10-CM

## 2017-10-25 DIAGNOSIS — J98.4 RESTRICTIVE LUNG DISEASE: ICD-10-CM

## 2017-10-25 DIAGNOSIS — R53.81 PHYSICAL DECONDITIONING: ICD-10-CM

## 2017-10-25 DIAGNOSIS — N18.30 CKD (CHRONIC KIDNEY DISEASE) STAGE 3, GFR 30-59 ML/MIN (H): ICD-10-CM

## 2017-10-25 PROCEDURE — 99316 NF DSCHRG MGMT 30 MIN+: CPT | Performed by: NURSE PRACTITIONER

## 2017-10-25 NOTE — TELEPHONE ENCOUNTER
Pt left  stating that she is at [facility] post being admitted for RUQ abdominal pain , Choledocholithiasis and Hypervolemia. Pt is discharging from the TCU and they recommend patient get Lymphedema therapy. Pt wondering if Dr. Butler would be willing to order it. Pt has OV with Dr. Butler set up for 11/21/17. Writer reviewed TCU notes and hospital notes. Did not find anything about lymphedema. Tried to call patient back. No answer. Left team 4 direct number for patient to call back.     Spoke to care coordinator at [facility] stating patient is being discharged from the TCU tomorrow (10/26/17) but is still 10 lbs from baseline of 160lbs. Care coordinator stated the patient was originally up 20 lbs but did Bumex 2 mg BID for 3 days and has now resumed to Bumex 2 mg daily. TCU is requesting recommendations from Dr. Butler and also wondering if he would like patient to have lymphedema therapy.     Chart reviewed:   10/16/17 DC summary   Volume overload:  Multifactorial.  IVS.  PTA Bumex on hold.  Gained 20 lbs.  Now on continuous oxygen (usual only on night time).  Restarted PTA Bumex prior to admit.  Given extra dose of Bumex here today.  Will plan for 3 more days of Bumex 2 mg BID (PTA is daily).  Recommend daily weight and is and os at TCU.  Recheck labs in a couple days and adjust Bumex dosing accordingly.  Follow up with Dr. Butler.      Pt previously scheduled to see Dr. Butler 11/21/17 with an echo prior. Writer rescheduled patient to see Dr. Butler 10/30/17 and echo 10/27/17. Writer will route to Dr. Butler to see if he would like any labs prior to OV  And/or make any recommendations prior to OV.     Care Coordinator requested writer call her back with Dr. Butler's recommendations:  [phone]

## 2017-10-26 NOTE — TELEPHONE ENCOUNTER
Spoke to Regina- care coordinator at TCU, she stated patient would like to be seen by Dr. Butler and then move forward if she needs vascular consult. Regina agrees to plan, they will call with any status changes between now and Monday. Pt scheduled for echo on 10/27/17 and OV 10/30/17. Team 4 direct number given.

## 2017-10-26 NOTE — TELEPHONE ENCOUNTER
If the question of lymphedema is in question, I would have the patient see Dr. Serna or Travis to assess.  Thanks.  Jae Butler MD, FACC  October 25, 2017 10:49 PM

## 2017-10-27 ENCOUNTER — HOSPITAL ENCOUNTER (OUTPATIENT)
Dept: CARDIOLOGY | Facility: CLINIC | Age: 77
Discharge: HOME | End: 2017-10-27
Attending: INTERNAL MEDICINE | Admitting: INTERNAL MEDICINE
Payer: MEDICARE

## 2017-10-27 ENCOUNTER — TELEPHONE (OUTPATIENT)
Dept: INTERNAL MEDICINE | Facility: CLINIC | Age: 77
End: 2017-10-27

## 2017-10-27 DIAGNOSIS — I77.89 AORTIC ROOT ENLARGEMENT (H): ICD-10-CM

## 2017-10-27 DIAGNOSIS — Q21.12 PATENT FORAMEN OVALE: ICD-10-CM

## 2017-10-27 DIAGNOSIS — I27.20 PULMONARY HTN (H): ICD-10-CM

## 2017-10-27 PROCEDURE — 93306 TTE W/DOPPLER COMPLETE: CPT

## 2017-10-27 PROCEDURE — 93306 TTE W/DOPPLER COMPLETE: CPT | Mod: 26 | Performed by: INTERNAL MEDICINE

## 2017-10-30 ENCOUNTER — OFFICE VISIT (OUTPATIENT)
Dept: CARDIOLOGY | Facility: CLINIC | Age: 77
End: 2017-10-30
Attending: INTERNAL MEDICINE
Payer: COMMERCIAL

## 2017-10-30 VITALS
BODY MASS INDEX: 27.66 KG/M2 | OXYGEN SATURATION: 96 % | HEIGHT: 64 IN | WEIGHT: 162 LBS | SYSTOLIC BLOOD PRESSURE: 126 MMHG | DIASTOLIC BLOOD PRESSURE: 58 MMHG | HEART RATE: 76 BPM

## 2017-10-30 DIAGNOSIS — I73.9 PERIPHERAL VASCULAR DISEASE (H): ICD-10-CM

## 2017-10-30 DIAGNOSIS — I87.2 EDEMA OF BOTH LOWER LEGS DUE TO PERIPHERAL VENOUS INSUFFICIENCY: ICD-10-CM

## 2017-10-30 DIAGNOSIS — I77.89 AORTIC ROOT ENLARGEMENT (H): ICD-10-CM

## 2017-10-30 DIAGNOSIS — Q21.12 PATENT FORAMEN OVALE: ICD-10-CM

## 2017-10-30 DIAGNOSIS — I27.20 PULMONARY HTN (H): Primary | ICD-10-CM

## 2017-10-30 DIAGNOSIS — I10 ESSENTIAL HYPERTENSION WITH GOAL BLOOD PRESSURE LESS THAN 130/85: ICD-10-CM

## 2017-10-30 DIAGNOSIS — R60.0 EDEMA OF BOTH LOWER LEGS DUE TO PERIPHERAL VENOUS INSUFFICIENCY: ICD-10-CM

## 2017-10-30 DIAGNOSIS — R06.02 SHORTNESS OF BREATH: ICD-10-CM

## 2017-10-30 PROCEDURE — 99214 OFFICE O/P EST MOD 30 MIN: CPT | Performed by: INTERNAL MEDICINE

## 2017-10-30 NOTE — LETTER
10/30/2017    Abran Mott MD  [address] [facility] [address]    RE: Rupinder Tracy       Dear Colleague,    PRIMARY CARE PHYSICIAN:  Dr. Abran Mott.      I again had the pleasure of seeing your patient, Rupinder Tracy, at [facility] for evaluation of pulmonary hypertension, systemic hypertension and patent foramen ovale.  She also has a history of mild aortic root enlargement.  Left and right heart catheterization was performed 01/2015 for pulmonary hypertension and aortic insufficiency.  Coronary angiography demonstrated no significant coronary artery disease.  Aortogram showed moderate aortic insufficiency and moderate annuloaortic ectasia.  There was moderate pulmonary hypertension and a mean PA pressure of 32 mmHg and a wide pulse pressure in the PA tracing consistent with at least moderate pulmonary valve insufficiency.  Pulmonary vascular resistance was normal and there was no significant gradient between the end-diastolic pressure and the pulmonary capillary wedge pressure and the left ventricular end-diastolic pressure.  Pulmonary capillary wedge pressure was only 16 mmHg and PAD 12 mmHg.  This indicated no significant pulmonary hypertension.  It was felt the elevated pulmonary pressure was likely secondary to impaired relaxation both from aortic insufficiency, hypertension and perhaps pulmonary causes such as obstructive sleep apnea or asthma.  We tested for obstructive sleep apnea and showed desaturations but she never reached REM sleep and did not snore.  The patient's blood pressure has been under excellent control with her current medications.  The patient was hospitalized on 10/16 for acute abdominal pain.  She was found to have choledocholithiasis.  She was treated with antibiotics and her discomfort resolved.  She gained considerable weight due to IV fluids.  She notes that her weight went up to 190 and she was quite swollen.  She has gradually lost the water  weight.  She is avoiding salt in her diet.  She remains on Bumex 2 mg per day.  Her blood pressure has been under excellent control with olmesartan 40 mg per day.  She wears compression stockings on her lower extremities.  Her left lower extremity is much more swollen than usual.  She has been diagnosed with gout and is currently on colchicine as well.  Echocardiogram was performed on 10/27 and compared to her previous study of 11/03/2016.  The echocardiogram demonstrated severe pulmonary hypertension with RVSP of 55 mmHg plus right atrial pressure.  The inferior vena cava was dilated with less than 50% respiratory collapse consistent with a right atrial pressure of 15-20 mmHg.  There was mild to moderate aortic insufficiency and no aortic stenosis.  The aortic root was 4.3 cm and the ascending aorta had moved from 3.8 to 4 cm.  Left ventricular size and function was normal, although there was moderate concentric left ventricular hypertrophy and increased left ventricular filling pressures.  The right ventricular size was normal but there was mildly decreased systolic function.  Left atrium was mildly dilated and the right atrium interestingly was normal size.  A patent foramen ovale was suspected.  Only 1+ tricuspid regurgitation.  The patient notes shortness of breath at 60 feet of walking.  She has had 2 episodes of coughing up foamy phlegm.  This is not chronic for her.  She notes that her peripheral edema was much improved previously with [facility] wrapping.  She uses 2 liters of nasal cannula oxygen at night.  She denies hemoptysis.      PHYSICAL EXAMINATION:   VITAL SIGNS:  Current blood pressure is 126/58, pulse is 76 and regularly irregular.  Weight is 162 pounds compared to 157 pounds in May.  BMI is 28.   CHEST:  Demonstrates dry bibasilar rales.   CARDIAC:  Regular rate and rhythm with occasional ectopics.  At times there is trigeminal ectopics.  No gallop or murmur.  No JVD or HJR.  Pulses  were all intact without bruits.   ABDOMEN:  Benign.   EXTREMITIES:  Show trace pitting edema on the right lower extremity with knee high support stockings.  No support stockings on the left and she has 1 to 2+ pitting edema to the midcalf area but no higher.  She is wearing a calf sock on the left lower extremity.   HEENT:  She is blind in the right eye and her left eye is partially stitched closed.     Outpatient Encounter Prescriptions as of 10/30/2017   Medication Sig Dispense Refill     BUMETANIDE PO Take 2 mg by mouth daily       NUTRITIONAL SUPPLEMENT LIQD Ensure Plus  one time a day for poor appetite. 8  oz chocolate.       COLCHICINE PO Take 0.6 mg by mouth daily Give 0.6 mg by mouth one time a  day for Gout for 15 Administrations  Give 0.6 mg 1 hour after 1.2 mg on  10/20/17, then 0.6 mg PO daily  starting 10/21       calcium carbonate (TUMS) 500 MG chewable tablet Take 2 chew tab by mouth 3 times daily as needed for heartburn       lactobacillus rhamnosus, GG, (CULTURELL) capsule Take 1 capsule by mouth daily       traMADol (ULTRAM) 50 MG tablet Take 1 tablet (50 mg) by mouth every 6 hours as needed for moderate pain 50 tablet 0     ROPINIRole HCl (REQUIP PO) Take 0.25 mg by mouth daily 3PM       olmesartan (BENICAR) 40 MG tablet Take 1 tablet (40 mg) by mouth daily 90 tablet 2     omeprazole (PRILOSEC) 20 MG CR capsule TAKE 1 CAPSULE (20 MG) BY MOUTH 2 TIMES DAILY 180 capsule 1     Cholecalciferol (VITAMIN D) 2000 UNITS tablet Take 2,000 Units by mouth 2 times daily       rOPINIRole (REQUIP) 0.25 MG tablet Take 0.5 mg by mouth At Bedtime        fluticasone-salmeterol (ADVAIR DISKUS) 250-50 MCG/DOSE diskus inhaler Inhale 1 puff into the lungs 2 times daily 60 Inhaler 1     order for DME 2L of O2 at night       Calcium-Vitamin D-Vitamin K (CALCIUM + D) 500-1000-40 MG-UNT-MCG CHEW Take 2 tablets by mouth daily        DOXYCYCLINE HYCLATE PO Take 50 mg by mouth 3 times per week Monday,wednesday, friday        prednisoLONE acetate (PRED FORTE) 1 % ophthalmic suspension Place 1 drop Into the left eye 4 times daily        albuterol (ALBUTEROL) 108 (90 BASE) MCG/ACT inhaler Inhale 2 puffs into the lungs every 4 hours as needed 1 Inhaler 5     RESTASIS 0.05 % OP EMUL 1 drop to left eye twice daily       SYSTANE 0.4-0.3 % OP SOLN 1 drop to left eye 4 times daily       No facility-administered encounter medications on file as of 10/30/2017.       ASSESSMENT:   1.  Rupinder Tracy is a delightful 77-year-old female with a small patent foramen ovale that is inconsequential and without hemodynamic effects.  She has pulmonary hypertension on a secondary basis most likely due to diastolic dysfunction of the left ventricle, aortic insufficiency and possibly some pulmonary causes such as asthma or obstructive sleep apnea.  She is tolerating her current dose of Bumex and her blood pressure is reasonably well controlled.  Her weight is moving back towards her standard dry weight.  She continues to have dyspnea on exertion.  I am concerned enough about her ongoing pulmonary hypertension where I am going to ask Dr. Regla Silva to review this patient's chart to see if there are any other reasons for pulmonary hypertension that we have not considered.   2.  The patient does desaturate with exercise in the past and we will see if she is able to do some exercising.   3.  Normal coronary arteries.   4.  Mild aortic insufficiency with mild aortic root enlargement.  We will continue to monitor this annually.   5.  The patient's peripheral edema is worse than previous.  Although her inferior vena cava is enlarged she does not appear to have significant right heart failure.  I think for now we will ask this patient to be seen in the [facility] to see if there is some venous insufficiency contributing to her peripheral edema.   6.  The patient appears stable from both gout and choledocholithiasis.  We will continue to monitor.      It is my  pleasure to assist in the care of Rupinder Tracy.  I will see her again in 6 months with an echocardiogram in 1 year.  She will call for any worsening shortness of breath, edema or weight gain.  All her questions were answered to her satisfaction.  I will conference with Dr. Regla Silva on this patient's pulmonary hypertension.     Sincerely,    Jae Butler MD     [facility]

## 2017-10-30 NOTE — MR AVS SNAPSHOT
After Visit Summary   10/30/2017    Rupinder Tracy    MRN: [id number]           Patient Information     Date Of Birth          1940        Visit Information        Provider Department      10/30/2017 8:15 AM Jae Butler MD [facility] [place]        Today's Diagnoses     Pulmonary HTN    -  1    Shortness of breath        Peripheral vascular disease (H)        Essential hypertension with goal blood pressure less than 130/85        Patent foramen ovale        Aortic root enlargement (H)        Edema of both lower legs due to peripheral venous insufficiency           Follow-ups after your visit        Additional Services     Follow-Up with Vascular Cardiologist           Follow-Up with Cardiologist                 Your next 10 appointments already scheduled     Nov 07, 2017  1:40 PM CST   [facility]    [address] [place] [id number] [phone]           Bring a current list of meds and any records pertaining to this visit. For Physicals, please bring immunization records and any forms needing to be filled out. Please arrive 10 minutes early to complete paperwork.              Future tests that were ordered for you today     Open Future Orders        Priority Expected Expires Ordered    Follow-Up with Cardiologist Routine 4/28/2018 10/30/2018 10/30/2017    Follow-Up with Vascular Cardiologist Routine 11/8/2017 10/30/2018 10/30/2017            Who to contact     If you have questions or need follow up information about today's clinic visit or your schedule please contact [facility] directly at [phone].  Normal or non-critical lab and imaging results will be communicated to you by MyChart, letter or phone within 4 business days after the clinic has received the results. If you do not hear  "from us within 7 days, please contact the clinic through [employer] or phone. If you have a critical or abnormal lab result, we will notify you by phone as soon as possible.  Submit refill requests through [employer] or call your pharmacy and they will forward the refill request to us. Please allow 3 business days for your refill to be completed.          Additional Information About Your Visit        [employer] Information     [employer] lets you send messages to your doctor, view your test results, renew your prescriptions, schedule appointments and more. To sign up, go to [email or web address] . Click on \"Log in\" on the left side of the screen, which will take you to the Welcome page. Then click on \"Sign up Now\" on the right side of the page.     You will be asked to enter the access code listed below, as well as some personal information. Please follow the directions to create your username and password.     Your access code is: [email or web address]  Expires: 2017  1:13 PM     Your access code will  in 90 days. If you need help or a new code, please call your [place] clinic or [phone].        Care EveryWhere ID     This is your Care EveryWhere ID. This could be used by other organizations to access your [place] medical records  [id number]        Your Vitals Were     Pulse Height Last Period Pulse Oximetry BMI (Body Mass Index)       76 1.626 m (5' 4\") 1990 96% 27.81 kg/m2        Blood Pressure from Last 3 Encounters:   10/30/17 126/58   10/24/17 150/66   10/20/17 158/82    Weight from Last 3 Encounters:   10/30/17 73.5 kg (162 lb)   10/24/17 76.6 kg (168 lb 14.4 oz)   10/20/17 79.8 kg (175 lb 14.4 oz)              We Performed the Following     Follow-Up with Cardiologist        Primary Care Provider Office Phone # Fax #    Abran Mott [phone]       [address] [place] [id number]        Equal Access to Services     LINUS ASCENCIO AH: jerrell Beasley " osiris jgmu moraethan mead. So [facility] [phone].    ATENCIÓN: Si suzanne del angel, tiene a lugo disposición servicios gratuitos de asistencia lingüística. [place] al [phone].    We comply with applicable federal civil rights laws and [place] laws. We do not discriminate on the basis of race, color, national origin, age, disability, sex, sexual orientation, or gender identity.            Thank you!     Thank you for choosing [facility] [place]  for your care. Our goal is always to provide you with excellent care. Hearing back from our patients is one way we can continue to improve our services. Please take a few minutes to complete the written survey that you may receive in the mail after your visit with us. Thank you!             Your Updated Medication List - Protect others around you: Learn how to safely use, store and throw away your medicines at www.disposemymeds.org.          This list is accurate as of: 10/30/17  8:56 AM.  Always use your most recent med list.                   Brand Name Dispense Instructions for use Diagnosis    albuterol 108 (90 BASE) MCG/ACT Inhaler    PROAIR HFA    1 Inhaler    Inhale 2 puffs into the lungs every 4 hours as needed    Mild intermittent asthma       BUMETANIDE PO      Take 2 mg by mouth daily        CALCIUM + D 500-1000-40 MG-UNT-MCG Chew   Generic drug:  Calcium-Vitamin D-Vitamin K      Take 2 tablets by mouth daily        calcium carbonate 500 MG chewable tablet    TUMS     Take 2 chew tab by mouth 3 times daily as needed for heartburn        COLCHICINE PO      Take 0.6 mg by mouth daily Give 0.6 mg by mouth one time a day for Gout for 15 Administrations Give 0.6 mg 1 hour after 1.2 mg on 10/20/17, then 0.6 mg PO daily starting 10/21        DOXYCYCLINE HYCLATE PO      Take 50 mg by mouth 3 times per week Monday,wednesday, friday        fluticasone-salmeterol 250-50 MCG/DOSE diskus  inhaler    ADVAIR DISKUS    60 Inhaler    Inhale 1 puff into the lungs 2 times daily    Mild persistent asthma without complication       lactobacillus rhamnosus (GG) capsule      Take 1 capsule by mouth daily        NUTRITIONAL SUPPLEMENT Liqd      Ensure Plus one time a day for poor appetite. 8 oz chocolate.        olmesartan 40 MG tablet    BENICAR    90 tablet    Take 1 tablet (40 mg) by mouth daily    Essential hypertension with goal blood pressure less than 130/80       omeprazole 20 MG CR capsule    priLOSEC    180 capsule    TAKE 1 CAPSULE (20 MG) BY MOUTH 2 TIMES DAILY    Gastroesophageal reflux disease without esophagitis       order for DME      2L of O2 at night        prednisoLONE acetate 1 % ophthalmic susp    PRED FORTE     Place 1 drop Into the left eye 4 times daily        * REQUIP PO      Take 0.25 mg by mouth daily 3PM        * REQUIP 0.25 MG tablet   Generic drug:  rOPINIRole      Take 0.5 mg by mouth At Bedtime    Restless legs syndrome (RLS)       RESTASIS 0.05 % ophthalmic emulsion   Generic drug:  cycloSPORINE      1 drop to left eye twice daily        SYSTANE 0.4-0.3 % Soln ophthalmic solution   Generic drug:  polyethylene glycol 0.4%- propylene glycol 0.3%      1 drop to left eye 4 times daily        traMADol 50 MG tablet    ULTRAM    50 tablet    Take 1 tablet (50 mg) by mouth every 6 hours as needed for moderate pain    Chronic pain syndrome       vitamin D 2000 UNITS tablet      Take 2,000 Units by mouth 2 times daily    Vitamin D deficiency       * Notice:  This list has 2 medication(s) that are the same as other medications prescribed for you. Read the directions carefully, and ask your doctor or other care provider to review them with you.

## 2017-10-30 NOTE — PROGRESS NOTES
HPI and Plan:   See dictation:[id number]    Orders Placed This Encounter   Procedures     Follow-Up with Vascular Cardiologist     Follow-Up with Cardiologist       No orders of the defined types were placed in this encounter.      There are no discontinued medications.      Encounter Diagnoses   Name Primary?     Pulmonary HTN Yes     Shortness of breath      Peripheral vascular disease (H)      Essential hypertension with goal blood pressure less than 130/85      Patent foramen ovale      Aortic root enlargement (H)      Edema of both lower legs due to peripheral venous insufficiency        CURRENT MEDICATIONS:  Current Outpatient Prescriptions   Medication Sig Dispense Refill     BUMETANIDE PO Take 2 mg by mouth daily       NUTRITIONAL SUPPLEMENT LIQD Ensure Plus  one time a day for poor appetite. 8  oz chocolate.       COLCHICINE PO Take 0.6 mg by mouth daily Give 0.6 mg by mouth one time a  day for Gout for 15 Administrations  Give 0.6 mg 1 hour after 1.2 mg on  10/20/17, then 0.6 mg PO daily  starting 10/21       calcium carbonate (TUMS) 500 MG chewable tablet Take 2 chew tab by mouth 3 times daily as needed for heartburn       lactobacillus rhamnosus, GG, (CULTURELL) capsule Take 1 capsule by mouth daily       traMADol (ULTRAM) 50 MG tablet Take 1 tablet (50 mg) by mouth every 6 hours as needed for moderate pain 50 tablet 0     ROPINIRole HCl (REQUIP PO) Take 0.25 mg by mouth daily 3PM       olmesartan (BENICAR) 40 MG tablet Take 1 tablet (40 mg) by mouth daily 90 tablet 2     omeprazole (PRILOSEC) 20 MG CR capsule TAKE 1 CAPSULE (20 MG) BY MOUTH 2 TIMES DAILY 180 capsule 1     Cholecalciferol (VITAMIN D) 2000 UNITS tablet Take 2,000 Units by mouth 2 times daily       rOPINIRole (REQUIP) 0.25 MG tablet Take 0.5 mg by mouth At Bedtime        fluticasone-salmeterol (ADVAIR DISKUS) 250-50 MCG/DOSE diskus inhaler Inhale 1 puff into the lungs 2 times daily 60 Inhaler 1     order for DME 2L of O2 at night        "Calcium-Vitamin D-Vitamin K (CALCIUM + D) 500-1000-40 MG-UNT-MCG CHEW Take 2 tablets by mouth daily        DOXYCYCLINE HYCLATE PO Take 50 mg by mouth 3 times per week Monday,wednesday, friday       prednisoLONE acetate (PRED FORTE) 1 % ophthalmic suspension Place 1 drop Into the left eye 4 times daily        albuterol (ALBUTEROL) 108 (90 BASE) MCG/ACT inhaler Inhale 2 puffs into the lungs every 4 hours as needed 1 Inhaler 5     RESTASIS 0.05 % OP EMUL 1 drop to left eye twice daily       SYSTANE 0.4-0.3 % OP SOLN 1 drop to left eye 4 times daily         ALLERGIES     Allergies   Allergen Reactions     Atorvastatin Calcium      myalgias     Celecoxib Swelling     edema     Cholestyramine Diarrhea     Diarrhea       Crestor [Rosuvastatin Calcium]      leg aches, likely from medication     Cyclobenzaprine Hcl      flexeril--gets \"goofy\"     Dulera      tremors     Gabapentin      Nightmares.   Retrial of medication caused tremors.     Lisinopril Cough     Cough/ Dizziness at high dose.     Meperidine Hcl Nausea and Vomiting     demerol-severe nausea     Morphine Nausea and Vomiting     Naprosyn [Naproxen] GI Disturbance     Niaspan [Niacin]      flushing, severe     Percocet [Oxycodone-Acetaminophen] Nausea     Nausea, lightheadedness.   Tolerates med if taken with food.     Pravastatin Swelling     Edema, myalgias     Red Yeast Rice [Cholestin] GI Disturbance     Bloating, etc.     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     Duodenal ulcer, unspecified as acute or chronic, without mention of hemorrhage or perforation 1980's    " felt due to high dose steroids     Female stress incontinence      H/O right and left heart catheterization     1-     HTN      HYPERLIPIDEMIA      Ischemic colitis 7/2001    [facility]     Lactose Intolerance     Mild     Left Ventricular Hypertrophy      Legal blindness     Artificial right eye, severe vision loss left (Detached retina's, glaucoma, corneal transplants)     Mild intermittent asthma ~1980's     MITRAL VALVE Prolapse, with murmur      Nocturnal oxygen desaturation      Osteoporosis, unspecified ~2002     Patent foramen ovale      PERIPHERAL VASCULAR DISEASE 5/05    mesenteric arteries, angioplasty     PRESBYESOPHAGUS 6/04     Pulmonary HTN 11/2014     Serous retinal detachment     False eye right     Subarachnoid hemorrhage following injury (H) 12/10    due to fall, vascular evaluation negative     Syncope and collapse 12/10     Unspecified glaucoma(365.9)        PAST SURGICAL HISTORY:  Past Surgical History:   Procedure Laterality Date     BIOPSY       C APPENDECTOMY  1982     C NONSPECIFIC PROCEDURE  1945    adenoidectomy     C NONSPECIFIC PROCEDURE      bilat retinal detachment     C NONSPECIFIC PROCEDURE      blephoroplasty bilat     C NONSPECIFIC PROCEDURE  1997    glaucoma procedure L     C NONSPECIFIC PROCEDURE  1997, 2007, 2014    corneal transplant L     C NONSPECIFIC PROCEDURE  1945    strabismus procedure R eye     C NONSPECIFIC PROCEDURE  1960's    R breast bx     C NONSPECIFIC PROCEDURE  5/05    Angioplasty and stenting mesenteric vessels     C NONSPECIFIC PROCEDURE  2008    L corneal transplant     COLONOSCOPY      due 2015     ENDOSCOPIC RETROGRADE CHOLANGIOPANCREATOGRAM N/A 10/13/2017    Procedure: ENDOSCOPIC RETROGRADE CHOLANGIOPANCREATOGRAM;  ENDOSCOPIC RETROGRADE CHOLANGIOPANCREATOGRAM ;  Surgeon: Isabel Yousif MD;  Location: [facility] OR     HC REMOVAL GALLBLADDER  1982    Cholecystectomy     HEART CATH RIGHT AND LEFT HEART CATH  1/19/15     HERNIORRHAPHY VENTRAL N/A  "4/19/2016    Procedure: HERNIORRHAPHY VENTRAL;  Surgeon: Hamlet Ness MD;  Location: [facility] OR     MASTECTOMY SIMPLE BILATERAL  10/5/09    bilateral mastectomy     SURGICAL HISTORY OF -   6/2010    breast reconstruction       FAMILY HISTORY:  Family History   Problem Relation Age of Onset     Adopted: Yes     C.A.D. Paternal Uncle      MI 50's     Family History Negative Daughter        SOCIAL HISTORY:  Social History     Social History     Marital status:      Spouse name: N/A     Number of children: 2     Years of education: N/A     Occupational History     Retired nurse Retired     Social History Main Topics     Smoking status: Never Smoker     Smokeless tobacco: Never Used     Alcohol use Yes      Comment: 3 glasses of wine a week     Drug use: No     Sexual activity: No     Other Topics Concern     Caffeine Concern No     1- 2 cups coffee     Sleep Concern No     Stress Concern No     Weight Concern No     Special Diet Yes     low sodium     Exercise No     2 days a week (abigail chi), walking program, stationary bike 10 minutes 3 times per week     Seat Belt Yes     Social History Narrative    , 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  She walks with a walker.  Is a full code.  (last updated 10/11/2017)        Review of Systems:  Skin:  Negative       Eyes:  Positive for glasses    ENT:  Positive for nasal congestion    Respiratory:  Positive for dyspnea on exertion;mucoid expectorant     Cardiovascular:    Positive for;edema;lower extremity symptoms;chest pain    Gastroenterology: Positive for heartburn    Genitourinary:  not assessed      Musculoskeletal:  Positive for arthritis;joint pain;back pain;gout    Neurologic:  Negative      Psychiatric:  Negative      Heme/Lymph/Imm:  Positive for weight loss    Endocrine:  Negative        Physical Exam:  Vitals: /58  Pulse 76  Ht 1.626 m (5' 4\")  Wt 73.5 kg (162 lb)  LMP 01/01/1990  SpO2 " 96%  BMI 27.81 kg/m2    Constitutional:  cooperative, alert and oriented, well developed, well nourished, in no acute distress        Skin:  warm and dry to the touch, no apparent skin lesions or masses noted        Head:  normocephalic, no masses or lesions        Eyes:  pupils equal and round   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    ENT:  no pallor or cyanosis, dentition good        Neck:  carotid pulses are full and equal bilaterally, JVP normal, no carotid bruit, no thyromegaly        Chest:  normal breath sounds, clear to auscultation, normal A-P diameter, normal symmetry, normal respiratory excursion, no use of accessory muscles          Cardiac: regular rhythm;normal S1 and S2;no S3 or S4;apical impulse not displaced frequent premature beats   no presence of murmur            Abdomen:  abdomen soft, non-tender, BS normoactive, no mass, no HSM, no bruits        Vascular: pulses full and equal, no bruits auscultated                                        Extremities and Back:  no deformities, clubbing, cyanosis, erythema observed     RLE edema;2+;pitting LLE edema;trace;pitting wearing support stocking on right LE    Neurological:  affect appropriate, oriented to time, person and place;no gross motor deficits              CC  Jae Butler MD  [address]  [place] [id number]

## 2017-10-30 NOTE — PROGRESS NOTES
PRIMARY CARE PHYSICIAN:  Dr. Abran Mott.      HISTORY OF PRESENT ILLNESS:  I again had the pleasure of seeing your patient, Rupinder Tracy, at [facility] for evaluation of pulmonary hypertension, systemic hypertension and patent foramen ovale.  She also has a history of mild aortic root enlargement.  Left and right heart catheterization was performed 01/2015 for pulmonary hypertension and aortic insufficiency.  Coronary angiography demonstrated no significant coronary artery disease.  Aortogram showed moderate aortic insufficiency and moderate annuloaortic ectasia.  There was moderate pulmonary hypertension and a mean PA pressure of 32 mmHg and a wide pulse pressure in the PA tracing consistent with at least moderate pulmonary valve insufficiency.  Pulmonary vascular resistance was normal and there was no significant gradient between the end-diastolic pressure and the pulmonary capillary wedge pressure and the left ventricular end-diastolic pressure.  Pulmonary capillary wedge pressure was only 16 mmHg and PAD 12 mmHg.  This indicated no significant pulmonary hypertension.  It was felt the elevated pulmonary pressure was likely secondary to impaired relaxation both from aortic insufficiency, hypertension and perhaps pulmonary causes such as obstructive sleep apnea or asthma.  We tested for obstructive sleep apnea and showed desaturations but she never reached REM sleep and did not snore.  The patient's blood pressure has been under excellent control with her current medications.  The patient was hospitalized on 10/16 for acute abdominal pain.  She was found to have choledocholithiasis.  She was treated with antibiotics and her discomfort resolved.  She gained considerable weight due to IV fluids.  She notes that her weight went up to 190 and she was quite swollen.  She has gradually lost the water weight.  She is avoiding salt in her diet.  She remains on Bumex 2 mg per day.  Her blood  pressure has been under excellent control with olmesartan 40 mg per day.  She wears compression stockings on her lower extremities.  Her left lower extremity is much more swollen than usual.  She has been diagnosed with gout and is currently on colchicine as well.  Echocardiogram was performed on 10/27 and compared to her previous study of 11/03/2016.  The echocardiogram demonstrated severe pulmonary hypertension with RVSP of 55 mmHg plus right atrial pressure.  The inferior vena cava was dilated with less than 50% respiratory collapse consistent with a right atrial pressure of 15-20 mmHg.  There was mild to moderate aortic insufficiency and no aortic stenosis.  The aortic root was 4.3 cm and the ascending aorta had moved from 3.8 to 4 cm.  Left ventricular size and function was normal, although there was moderate concentric left ventricular hypertrophy and increased left ventricular filling pressures.  The right ventricular size was normal but there was mildly decreased systolic function.  Left atrium was mildly dilated and the right atrium interestingly was normal size.  A patent foramen ovale was suspected.  Only 1+ tricuspid regurgitation.  The patient notes shortness of breath at 60 feet of walking.  She has had 2 episodes of coughing up foamy phlegm.  This is not chronic for her.  She notes that her peripheral edema was much improved previously with [facility] wrapping.  She uses 2 liters of nasal cannula oxygen at night.  She denies hemoptysis.      PHYSICAL EXAMINATION:   VITAL SIGNS:  Current blood pressure is 126/58, pulse is 76 and regularly irregular.  Weight is 162 pounds compared to 157 pounds in May.  BMI is 28.   CHEST:  Demonstrates dry bibasilar rales.   CARDIAC:  Regular rate and rhythm with occasional ectopics.  At times there is trigeminal ectopics.  No gallop or murmur.  No JVD or HJR.  Pulses were all intact without bruits.   ABDOMEN:  Benign.   EXTREMITIES:  Show trace pitting  edema on the right lower extremity with knee high support stockings.  No support stockings on the left and she has 1 to 2+ pitting edema to the midcalf area but no higher.  She is wearing a calf sock on the left lower extremity.   HEENT:  She is blind in the right eye and her left eye is partially stitched closed.      ASSESSMENT:   1.  Rupinder Tracy is a delightful 77-year-old female with a small patent foramen ovale that is inconsequential and without hemodynamic effects.  She has pulmonary hypertension on a secondary basis most likely due to diastolic dysfunction of the left ventricle, aortic insufficiency and possibly some pulmonary causes such as asthma or obstructive sleep apnea.  She is tolerating her current dose of Bumex and her blood pressure is reasonably well controlled.  Her weight is moving back towards her standard dry weight.  She continues to have dyspnea on exertion.  I am concerned enough about her ongoing pulmonary hypertension where I am going to ask Dr. Regla Silva to review this patient's chart to see if there are any other reasons for pulmonary hypertension that we have not considered.   2.  The patient does desaturate with exercise in the past and we will see if she is able to do some exercising.   3.  Normal coronary arteries.   4.  Mild aortic insufficiency with mild aortic root enlargement.  We will continue to monitor this annually.   5.  The patient's peripheral edema is worse than previous.  Although her inferior vena cava is enlarged she does not appear to have significant right heart failure.  I think for now we will ask this patient to be seen in the [facility] to see if there is some venous insufficiency contributing to her peripheral edema.   6.  The patient appears stable from both gout and choledocholithiasis.  We will continue to monitor.      It is my pleasure to assist in the care of Rupinder Tracy.  I will see her again in 6 months with an echocardiogram in 1 year.   She will call for any worsening shortness of breath, edema or weight gain.  All her questions were answered to her satisfaction.  I will conference with Dr. Regla Silva on this patient's pulmonary hypertension.      Henrietta [place] MD       cc:   Abran Mott MD    [address]  [id number]         [place]EN[place] MD, [facility]             D: 10/30/2017 09:12   T: 10/30/2017 12:02   MT: STACEY      Name:     JUDY RUSS   MRN:      [id number]        Account:      [id number]   :      1940           Service Date: 10/30/2017      Document: [id number]

## 2017-10-31 ENCOUNTER — TRANSFERRED RECORDS (OUTPATIENT)
Dept: HEALTH INFORMATION MANAGEMENT | Facility: CLINIC | Age: 77
End: 2017-10-31

## 2017-11-01 ENCOUNTER — TELEPHONE (OUTPATIENT)
Dept: INTERNAL MEDICINE | Facility: CLINIC | Age: 77
End: 2017-11-01

## 2017-11-01 NOTE — TELEPHONE ENCOUNTER
HC RN saw patient today for services,   Patient admitted to services with the request of orders for disease management, education, medication management with education, cardio/pulmonary monitoring, nutrition/hydration, skin integrity and safety.   1 time a week for this week,   then 2 times a week for 2 weeks  then 1 time a week for 2 weeks.  and 3 as needed visits.   Also requesting PT, OT and SW for eval and treat.   Any further questions, please don't hesitant to call   Feli Singh RN BSN  [phone]

## 2017-11-02 ENCOUNTER — OFFICE VISIT (OUTPATIENT)
Dept: CARDIOLOGY | Facility: CLINIC | Age: 77
End: 2017-11-02
Attending: INTERNAL MEDICINE
Payer: COMMERCIAL

## 2017-11-02 VITALS
WEIGHT: 159.3 LBS | DIASTOLIC BLOOD PRESSURE: 57 MMHG | SYSTOLIC BLOOD PRESSURE: 129 MMHG | BODY MASS INDEX: 27.2 KG/M2 | HEART RATE: 73 BPM | HEIGHT: 64 IN

## 2017-11-02 DIAGNOSIS — I87.2 EDEMA OF BOTH LOWER LEGS DUE TO PERIPHERAL VENOUS INSUFFICIENCY: ICD-10-CM

## 2017-11-02 DIAGNOSIS — R60.0 EDEMA OF BOTH LOWER LEGS DUE TO PERIPHERAL VENOUS INSUFFICIENCY: ICD-10-CM

## 2017-11-02 DIAGNOSIS — I89.0 LYMPHEDEMA OF EXTREMITY: Primary | ICD-10-CM

## 2017-11-02 PROCEDURE — 99204 OFFICE O/P NEW MOD 45 MIN: CPT | Performed by: INTERNAL MEDICINE

## 2017-11-02 NOTE — TELEPHONE ENCOUNTER
Per standing orders, verbal order given to home care.  PCP will receive a faxed copy of orders for signature.  Myrna Coffman RN

## 2017-11-02 NOTE — MR AVS SNAPSHOT
After Visit Summary   11/2/2017    Rupinder Tracy    MRN: [id number]           Patient Information     Date Of Birth          1940        Visit Information        Provider Department      11/2/2017 9:15 AM Mika Robles MD [facility]        Today's Diagnoses     Lymphedema of extremity    -  1    Edema of both lower legs due to peripheral venous insufficiency           Follow-ups after your visit        Additional Services     Follow-Up with Cardiac Advanced Practice Provider                 Your next 10 appointments already scheduled     Nov 06, 2017  1:00 PM CST   US LOWER EXTREMITY VENOUS COMPETENCY BILATERAL with SUVUS1   [facility] AT [place] ([facility])    [address] [place], [place] [address]  [facility] [address] [phone]           Please bring a list of your medicines (including vitamins, minerals and over-the-counter drugs). Also, tell your doctor about any allergies you may have. Wear comfortable clothes and leave your valuables at home.  You do not need to do anything special to prepare for your exam.  Please call the Imaging Department at your exam site with any questions.            Nov 07, 2017  1:40 PM CST   Office Visit with Miguel Richey MD   [facility]    [address] [address] [phone]           Bring a current list of meds and any records pertaining to this visit. For Physicals, please bring immunization records and any forms needing to be filled out. Please arrive 10 minutes early to complete paperwork.            Dec 14, 2017 10:30 AM CST   Return Visit with YUNIEL Solorzano [facility] ([facility])    [address] [place], [place] [address]  [facility] [id number] [phone]              Future tests that were ordered for you today   "   Open Future Orders        Priority Expected Expires Ordered    Follow-Up with Cardiac Advanced Practice Provider Routine 2017    US Venous Competency Bilateral Routine 2017            Who to contact     If you have questions or need follow up information about today's clinic visit or your schedule please contact [facility] directly at [phone].  Normal or non-critical lab and imaging results will be communicated to you by Tellagencehart, letter or phone within 4 business days after the clinic has received the results. If you do not hear from us within 7 days, please contact the clinic through [employer] or phone. If you have a critical or abnormal lab result, we will notify you by phone as soon as possible.  Submit refill requests through [employer] or call your pharmacy and they will forward the refill request to us. Please allow 3 business days for your refill to be completed.          Additional Information About Your Visit        Tellagenceharwalkby Information     [employer] lets you send messages to your doctor, view your test results, renew your prescriptions, schedule appointments and more. To sign up, go to [email or web address] . Click on \"Log in\" on the left side of the screen, which will take you to the Welcome page. Then click on \"Sign up Now\" on the right side of the page.     You will be asked to enter the access code listed below, as well as some personal information. Please follow the directions to create your username and password.     Your access code is: [email or web address]  Expires: 2017  1:13 PM     Your access code will  in 90 days. If you need help or a new code, please call your [facility] clinic or [phone].        Care EveryWhere ID     This is your Care EveryWhere ID. This could be used by other organizations to access your [facility] medical records  [id number]        Your Vitals Were     Pulse Height Last " "Period BMI (Body Mass Index)          73 1.626 m (5' 4\") 01/01/1990 27.34 kg/m2         Blood Pressure from Last 3 Encounters:   11/02/17 129/57   10/30/17 126/58   10/24/17 150/66    Weight from Last 3 Encounters:   11/02/17 72.3 kg (159 lb 4.8 oz)   10/30/17 73.5 kg (162 lb)   10/24/17 76.6 kg (168 lb 14.4 oz)              We Performed the Following     Follow-Up with Vascular Cardiologist        Primary Care Provider Office Phone # Fax #    Abran Mott [phone]       [address] [place] [phone]        Equal Access to Services     LINUS ASCENCIO : Hadii magen marieo Sorosario, waaxda luqadaha, qaybta kaalmada adeegyada, ethan huntley . So [facility] [phone].    ATENCIÓN: Si habla español, tiene a lugo disposición servicios gratuitos de asistencia lingüística. [facility] [phone].    We comply with applicable federal civil rights laws and [place] laws. We do not discriminate on the basis of race, color, national origin, age, disability, sex, sexual orientation, or gender identity.            Thank you!     Thank you for choosing [facility]  for your care. Our goal is always to provide you with excellent care. Hearing back from our patients is one way we can continue to improve our services. Please take a few minutes to complete the written survey that you may receive in the mail after your visit with us. Thank you!             Your Updated Medication List - Protect others around you: Learn how to safely use, store and throw away your medicines at www.disposemymeds.org.          This list is accurate as of: 11/2/17 10:31 AM.  Always use your most recent med list.                   Brand Name Dispense Instructions for use Diagnosis    albuterol 108 (90 BASE) MCG/ACT Inhaler    PROAIR HFA    1 Inhaler    Inhale 2 puffs into the lungs every 4 hours as needed    Mild intermittent asthma       BUMETANIDE PO      Take " 2 mg by mouth daily        CALCIUM + D 500-1000-40 MG-UNT-MCG Chew   Generic drug:  Calcium-Vitamin D-Vitamin K      Take 2 tablets by mouth daily        COLCHICINE PO      Take 0.6 mg by mouth daily Give 0.6 mg by mouth one time a day for Gout for 15 Administrations Give 0.6 mg 1 hour after 1.2 mg on 10/20/17, then 0.6 mg PO daily starting 10/21        DOXYCYCLINE HYCLATE PO      Take 50 mg by mouth 3 times per week Monday,wednesday, friday        fluticasone-salmeterol 250-50 MCG/DOSE diskus inhaler    ADVAIR DISKUS    60 Inhaler    Inhale 1 puff into the lungs 2 times daily    Mild persistent asthma without complication       lactobacillus rhamnosus (GG) capsule      Take 1 capsule by mouth daily        olmesartan 40 MG tablet    BENICAR    90 tablet    Take 1 tablet (40 mg) by mouth daily    Essential hypertension with goal blood pressure less than 130/80       omeprazole 20 MG CR capsule    priLOSEC    180 capsule    TAKE 1 CAPSULE (20 MG) BY MOUTH 2 TIMES DAILY    Gastroesophageal reflux disease without esophagitis       order for DME      2L of O2 at night        prednisoLONE acetate 1 % ophthalmic susp    PRED FORTE     Place 1 drop Into the left eye 4 times daily        * REQUIP PO      Take 0.25 mg by mouth daily 3PM        * REQUIP 0.25 MG tablet   Generic drug:  rOPINIRole      Take 0.5 mg by mouth At Bedtime    Restless legs syndrome (RLS)       RESTASIS 0.05 % ophthalmic emulsion   Generic drug:  cycloSPORINE      1 drop to left eye twice daily        SPIRIVA RESPIMAT 1.25 MCG/ACT Aers   Generic drug:  Tiotropium Bromide Monohydrate           SYSTANE 0.4-0.3 % Soln ophthalmic solution   Generic drug:  polyethylene glycol 0.4%- propylene glycol 0.3%      1 drop to left eye 4 times daily        traMADol 50 MG tablet    ULTRAM    50 tablet    Take 1 tablet (50 mg) by mouth every 6 hours as needed for moderate pain    Chronic pain syndrome       vitamin D 2000 UNITS tablet      Take 2,000 Units by mouth 2  times daily    Vitamin D deficiency       * Notice:  This list has 2 medication(s) that are the same as other medications prescribed for you. Read the directions carefully, and ask your doctor or other care provider to review them with you.

## 2017-11-02 NOTE — PROGRESS NOTES
HPI and Plan:   See dictation    Orders Placed This Encounter   Procedures     US Venous Competency Bilateral     Follow-Up with Cardiac Advanced Practice Provider       Orders Placed This Encounter   Medications     Tiotropium Bromide Monohydrate (SPIRIVA RESPIMAT) 1.25 MCG/ACT AERS       Medications Discontinued During This Encounter   Medication Reason     calcium carbonate (TUMS) 500 MG chewable tablet Stopped by Patient     NUTRITIONAL SUPPLEMENT LIQD Stopped by Patient         Encounter Diagnoses   Name Primary?     Edema of both lower legs due to peripheral venous insufficiency      Lymphedema of extremity Yes       CURRENT MEDICATIONS:  Current Outpatient Prescriptions   Medication Sig Dispense Refill     Tiotropium Bromide Monohydrate (SPIRIVA RESPIMAT) 1.25 MCG/ACT AERS        BUMETANIDE PO Take 2 mg by mouth daily       COLCHICINE PO Take 0.6 mg by mouth daily Give 0.6 mg by mouth one time a  day for Gout for 15 Administrations  Give 0.6 mg 1 hour after 1.2 mg on  10/20/17, then 0.6 mg PO daily  starting 10/21       lactobacillus rhamnosus, GG, (CULTURELL) capsule Take 1 capsule by mouth daily       traMADol (ULTRAM) 50 MG tablet Take 1 tablet (50 mg) by mouth every 6 hours as needed for moderate pain 50 tablet 0     ROPINIRole HCl (REQUIP PO) Take 0.25 mg by mouth daily 3PM       olmesartan (BENICAR) 40 MG tablet Take 1 tablet (40 mg) by mouth daily 90 tablet 2     omeprazole (PRILOSEC) 20 MG CR capsule TAKE 1 CAPSULE (20 MG) BY MOUTH 2 TIMES DAILY 180 capsule 1     Cholecalciferol (VITAMIN D) 2000 UNITS tablet Take 2,000 Units by mouth 2 times daily       rOPINIRole (REQUIP) 0.25 MG tablet Take 0.5 mg by mouth At Bedtime        fluticasone-salmeterol (ADVAIR DISKUS) 250-50 MCG/DOSE diskus inhaler Inhale 1 puff into the lungs 2 times daily 60 Inhaler 1     order for DME 2L of O2 at night       Calcium-Vitamin D-Vitamin K (CALCIUM + D) 500-1000-40 MG-UNT-MCG CHEW Take 2 tablets by mouth daily         "DOXYCYCLINE HYCLATE PO Take 50 mg by mouth 3 times per week Monday,wednesday, friday       prednisoLONE acetate (PRED FORTE) 1 % ophthalmic suspension Place 1 drop Into the left eye 4 times daily        albuterol (ALBUTEROL) 108 (90 BASE) MCG/ACT inhaler Inhale 2 puffs into the lungs every 4 hours as needed 1 Inhaler 5     RESTASIS 0.05 % OP EMUL 1 drop to left eye twice daily       SYSTANE 0.4-0.3 % OP SOLN 1 drop to left eye 4 times daily         ALLERGIES     Allergies   Allergen Reactions     Atorvastatin Calcium      myalgias     Celecoxib Swelling     edema     Cholestyramine Diarrhea     Diarrhea       Crestor [Rosuvastatin Calcium]      leg aches, likely from medication     Cyclobenzaprine Hcl      flexeril--gets \"goofy\"     Dulera      tremors     Gabapentin      Nightmares.   Retrial of medication caused tremors.     Lisinopril Cough     Cough/ Dizziness at high dose.     Meperidine Hcl Nausea and Vomiting     demerol-severe nausea     Morphine Nausea and Vomiting     Naprosyn [Naproxen] GI Disturbance     Niaspan [Niacin]      flushing, severe     Percocet [Oxycodone-Acetaminophen] Nausea     Nausea, lightheadedness.   Tolerates med if taken with food.     Pravastatin Swelling     Edema, myalgias     Red Yeast Rice [Cholestin] GI Disturbance     Bloating, etc.     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     Duodenal ulcer, unspecified as acute or chronic, without mention of hemorrhage or perforation 1980's    felt due to high dose steroids     Female stress incontinence      H/O right and left heart catheterization  "    1-     HTN      HYPERLIPIDEMIA      Ischemic colitis 7/2001    [facility]     Lactose Intolerance     Mild     Left Ventricular Hypertrophy      Legal blindness     Artificial right eye, severe vision loss left (Detached retina's, glaucoma, corneal transplants)     Mild intermittent asthma ~1980's     MITRAL VALVE Prolapse, with murmur      Nocturnal oxygen desaturation      Osteoporosis, unspecified ~2002     Patent foramen ovale      PERIPHERAL VASCULAR DISEASE 5/05    mesenteric arteries, angioplasty     PRESBYESOPHAGUS 6/04     Pulmonary HTN 11/2014     Serous retinal detachment     False eye right     Subarachnoid hemorrhage following injury (H) 12/10    due to fall, vascular evaluation negative     Syncope and collapse 12/10     Unspecified glaucoma(365.9)        PAST SURGICAL HISTORY:  Past Surgical History:   Procedure Laterality Date     BIOPSY       C APPENDECTOMY  1982     C NONSPECIFIC PROCEDURE  1945    adenoidectomy     C NONSPECIFIC PROCEDURE      bilat retinal detachment     C NONSPECIFIC PROCEDURE      blephoroplasty bilat     C NONSPECIFIC PROCEDURE  1997    glaucoma procedure L     C NONSPECIFIC PROCEDURE  1997, 2007, 2014    corneal transplant L     C NONSPECIFIC PROCEDURE  1945    strabismus procedure R eye     C NONSPECIFIC PROCEDURE  1960's    R breast bx     C NONSPECIFIC PROCEDURE  5/05    Angioplasty and stenting mesenteric vessels     C NONSPECIFIC PROCEDURE  2008    L corneal transplant     COLONOSCOPY      due 2015     ENDOSCOPIC RETROGRADE CHOLANGIOPANCREATOGRAM N/A 10/13/2017    Procedure: ENDOSCOPIC RETROGRADE CHOLANGIOPANCREATOGRAM;  ENDOSCOPIC RETROGRADE CHOLANGIOPANCREATOGRAM ;  Surgeon: Isabel Yousif MD;  Location: [facility] OR     HC REMOVAL GALLBLADDER  1982    Cholecystectomy     HEART CATH RIGHT AND LEFT HEART CATH  1/19/15     HERNIORRHAPHY VENTRAL N/A 4/19/2016    Procedure: HERNIORRHAPHY VENTRAL;  Surgeon: Hamlet Ness MD;  Location: [facility] OR     MASTECTOMY  "SIMPLE BILATERAL  10/5/09    bilateral mastectomy     SURGICAL HISTORY OF -   6/2010    breast reconstruction       FAMILY HISTORY:  Family History   Problem Relation Age of Onset     Adopted: Yes     C.A.D. Paternal Uncle      MI 50's     Family History Negative Daughter        SOCIAL HISTORY:  Social History     Social History     Marital status:      Spouse name: N/A     Number of children: 2     Years of education: N/A     Occupational History     Retired nurse Retired     Social History Main Topics     Smoking status: Never Smoker     Smokeless tobacco: Never Used     Alcohol use Yes      Comment: 3 glasses of wine a week     Drug use: No     Sexual activity: No     Other Topics Concern     Caffeine Concern No     1- 2 cups coffee     Sleep Concern No     Stress Concern No     Weight Concern No     Special Diet Yes     low sodium     Exercise No     2 days a week (abigail chi), walking program, stationary bike 10 minutes 3 times per week     Seat Belt Yes     Social History Narrative    , 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  She walks with a walker.  Is a full code.  (last updated 10/11/2017)        Review of Systems:  Skin:  Positive for scaling left leg   Eyes:    glasses    ENT:  Negative      Respiratory:  Positive for dyspnea on exertion O2 at night   Cardiovascular:    edema;Positive for    Gastroenterology: Positive for heartburn    Genitourinary:  Negative      Musculoskeletal:  Positive for gout;back pain    Neurologic:  Negative      Psychiatric:  Negative      Heme/Lymph/Imm:  Negative      Endocrine:  Negative        Physical Exam:  Vitals: /57  Pulse 73  Ht 1.626 m (5' 4\")  Wt 72.3 kg (159 lb 4.8 oz)  LMP 01/01/1990  BMI 27.34 kg/m2    Constitutional:  cooperative;in no acute distress        Skin:  warm and dry to the touch venous stasis changes        Head:  normocephalic        Eyes:  conjunctivae and lids unremarkable   "      Lymph:No Cervical lymphadenopathy present     ENT:  no pallor or cyanosis        Neck:  carotid pulses are full and equal bilaterally        Respiratory:  clear to auscultation         Cardiac: regular rhythm;normal S1 and S2;no murmurs, gallops or rubs detected                pulses full and equal                                        GI:  abdomen soft;non-tender        Extremities and Muscular Skeletal:    stasis pigmentation   RLE edema;1+ LLE edema;2+      Neurological:  no gross motor deficits        Psych:  affect appropriate, oriented to time, person and place        CC  Abran Mott MD  [address] [place] [address]

## 2017-11-02 NOTE — PROGRESS NOTES
REASON FOR CONSULTATION:  Bilateral lower extremity edema.      HISTORY OF PRESENT ILLNESS:  The patient is a very pleasant 77-year-old female with a history of hypertension and chronic lower extremity edema who was referred for further evaluation.       She reports lower extremity edema over the last 5-10 years.  However, over the last month, the swelling in her left lower extremity became more severe.  She was diagnosed with gout and has been treated for that.  With the treatment for gout, her symptoms have improved somewhat.  She did have an ultrasound at an outside facility which showed no evidence of left lower extremity DVT.      PHYSICAL EXAMINATION:     EXTREMITIES:  She has evidence of chronic venous stasis changes as well as healed venous ulcers.  She also has findings consistent with advanced lymphedema.  She denies any symptoms concerning for arterial insufficiency.      IMPRESSION, REPORT AND PLAN:   1.  Chronic lower extremity edema due to chronic venous insufficiency, CEAP classification 5.   2.  Lymphedema.   3.  Hypertension.      The etiology of the patient's lower extremity edema is likely multifactorial.  She has evidence of advanced lymphedema.  This is most likely secondary lymphedema, perhaps due to chronic lower extremity venous insufficiency.  She does have evidence of chronic venous stasis changes as well as healed venous ulcerations.  Her symptoms have been stable over the last several years with compression stockings.  However, over the last several months, her symptoms progressed.      We will obtain venous ultrasound to confirm presence of significant venous reflux.  I also recommended that we would measure her for compression socks here, as she will benefit from continuous use of compression garments.        Finally, she will need treatment for lymphedema.  We will likely refer her to the Lymphedema Clinic after the venous ultrasound is reviewed.      I appreciate the opportunity to be  part of this patient's care.         SHANKAR AMOR MD             D: 2017 10:27   T: 2017 13:24   MT: AISHA      Name:     JUDY RUSS   MRN:      [id number]        Account:      [id number]   :      1940           Service Date: 2017      Document: [id number]

## 2017-11-02 NOTE — LETTER
2017    Abran Mott MD  [address] [place] [address]    RE: Rupinder Tracy       Dear Colleague,    I had the pleasure of seeing Rupinder Tracy in the [facility].    REASON FOR CONSULTATION:  Bilateral lower extremity edema.      HISTORY OF PRESENT ILLNESS:  The patient is a very pleasant 77-year-old female with a history of hypertension and chronic lower extremity edema who was referred for further evaluation.       She reports lower extremity edema over the last 5-10 years.  However, over the last month, the swelling in her left lower extremity became more severe.  She was diagnosed with gout and has been treated for that.  With the treatment for gout, her symptoms have improved somewhat.  She did have an ultrasound at an outside facility which showed no evidence of left lower extremity DVT.      PHYSICAL EXAMINATION:     EXTREMITIES:  She has evidence of chronic venous stasis changes as well as healed venous ulcers.  She also has findings consistent with advanced lymphedema.  She denies any symptoms concerning for arterial insufficiency.     Outpatient Encounter Prescriptions as of 2017   Medication Sig Dispense Refill     [DISCONTINUED] Tiotropium Bromide Monohydrate (SPIRIVA RESPIMAT) 1.25 MCG/ACT AERS        [DISCONTINUED] BUMETANIDE PO Take 2 mg by mouth daily       [] COLCHICINE PO Take 0.6 mg by mouth daily Give 0.6 mg by mouth one time a  day for Gout for 15 Administrations  Give 0.6 mg 1 hour after 1.2 mg on  10/20/17, then 0.6 mg PO daily  starting 10/21       lactobacillus rhamnosus, GG, (CULTURELL) capsule Take 1 capsule by mouth daily       traMADol (ULTRAM) 50 MG tablet Take 1 tablet (50 mg) by mouth every 6 hours as needed for moderate pain 50 tablet 0     [DISCONTINUED] ROPINIRole HCl (REQUIP PO) Take 0.25 mg by mouth daily 3PM       olmesartan (BENICAR) 40 MG tablet Take 1 tablet (40 mg) by mouth daily 90 tablet 2      omeprazole (PRILOSEC) 20 MG CR capsule TAKE 1 CAPSULE (20 MG) BY MOUTH 2 TIMES DAILY 180 capsule 1     Cholecalciferol (VITAMIN D) 2000 UNITS tablet Take 2,000 Units by mouth 2 times daily       [DISCONTINUED] rOPINIRole (REQUIP) 0.25 MG tablet Take 0.5 mg by mouth At Bedtime        fluticasone-salmeterol (ADVAIR DISKUS) 250-50 MCG/DOSE diskus inhaler Inhale 1 puff into the lungs 2 times daily 60 Inhaler 1     order for DME 2L of O2 at night       Calcium-Vitamin D-Vitamin K (CALCIUM + D) 500-1000-40 MG-UNT-MCG CHEW Take 2 tablets by mouth daily        DOXYCYCLINE HYCLATE PO Take 50 mg by mouth 3 times per week Monday,wednesday, friday       prednisoLONE acetate (PRED FORTE) 1 % ophthalmic suspension Place 1 drop Into the left eye 4 times daily        albuterol (ALBUTEROL) 108 (90 BASE) MCG/ACT inhaler Inhale 2 puffs into the lungs every 4 hours as needed 1 Inhaler 5     RESTASIS 0.05 % OP EMUL 1 drop to left eye twice daily       SYSTANE 0.4-0.3 % OP SOLN 1 drop to left eye 4 times daily       [DISCONTINUED] NUTRITIONAL SUPPLEMENT LIQD Ensure Plus  one time a day for poor appetite. 8  oz chocolate.       [DISCONTINUED] calcium carbonate (TUMS) 500 MG chewable tablet Take 2 chew tab by mouth 3 times daily as needed for heartburn       No facility-administered encounter medications on file as of 11/2/2017.       IMPRESSION, REPORT AND PLAN:   1.  Chronic lower extremity edema due to chronic venous insufficiency, CEAP classification 5.   2.  Lymphedema.   3.  Hypertension.      The etiology of the patient's lower extremity edema is likely multifactorial.  She has evidence of advanced lymphedema.  This is most likely secondary lymphedema, perhaps due to chronic lower extremity venous insufficiency.  She does have evidence of chronic venous stasis changes as well as healed venous ulcerations.  Her symptoms have been stable over the last several years with compression stockings.  However, over the last several months,  her symptoms progressed.      We will obtain venous ultrasound to confirm presence of significant venous reflux.  I also recommended that we would measure her for compression socks here, as she will benefit from continuous use of compression garments.        Finally, she will need treatment for lymphedema.  We will likely refer her to the Lymphedema Clinic after the venous ultrasound is reviewed.      I appreciate the opportunity to be part of this patient's care.     Sincerely,    Mika Robles MD     [facility]

## 2017-11-06 ENCOUNTER — TELEPHONE (OUTPATIENT)
Dept: INTERNAL MEDICINE | Facility: CLINIC | Age: 77
End: 2017-11-06

## 2017-11-06 NOTE — TELEPHONE ENCOUNTER
Called patient, she is not available to make it today for the 4:30 spot. Where else can you fit her in this week?

## 2017-11-06 NOTE — TELEPHONE ENCOUNTER
Reason for Call:  Same Day Appointment, Requested Provider:  Dr Mott    PCP: Abran Mott    Reason for visit: TCU follow up for gravel in her bile duct    Duration of symptoms:     Have you been treated for this in the past? Yes    Additional comments: Pt only wants to see Dr Mott.  She discharged 10/26/17.  She is seeing several other specialists.    Can we leave a detailed message on this number? YES    Phone number patient can be reached at: Home number on file [phone] (home)    Best Time: anytime    Call taken on 11/6/2017 at 1:26 PM by BILL PALOMARES

## 2017-11-07 ENCOUNTER — OFFICE VISIT (OUTPATIENT)
Dept: INTERNAL MEDICINE | Facility: CLINIC | Age: 77
End: 2017-11-07
Payer: COMMERCIAL

## 2017-11-07 VITALS
DIASTOLIC BLOOD PRESSURE: 52 MMHG | OXYGEN SATURATION: 96 % | WEIGHT: 159.4 LBS | HEIGHT: 64 IN | HEART RATE: 84 BPM | SYSTOLIC BLOOD PRESSURE: 108 MMHG | TEMPERATURE: 98.1 F | BODY MASS INDEX: 27.21 KG/M2

## 2017-11-07 DIAGNOSIS — N18.30 CKD (CHRONIC KIDNEY DISEASE) STAGE 3, GFR 30-59 ML/MIN (H): ICD-10-CM

## 2017-11-07 DIAGNOSIS — E87.79 OTHER HYPERVOLEMIA: Primary | ICD-10-CM

## 2017-11-07 DIAGNOSIS — I77.89 AORTIC ROOT ENLARGEMENT (H): ICD-10-CM

## 2017-11-07 DIAGNOSIS — G89.4 CHRONIC PAIN SYNDROME: ICD-10-CM

## 2017-11-07 DIAGNOSIS — H53.131 ACUTE LOSS OF VISION, RIGHT: ICD-10-CM

## 2017-11-07 DIAGNOSIS — J45.30 MILD PERSISTENT ASTHMA WITHOUT COMPLICATION: ICD-10-CM

## 2017-11-07 DIAGNOSIS — H54.40: ICD-10-CM

## 2017-11-07 DIAGNOSIS — G25.81 RESTLESS LEGS SYNDROME (RLS): ICD-10-CM

## 2017-11-07 LAB
ANION GAP SERPL CALCULATED.3IONS-SCNC: 8 MMOL/L (ref 3–14)
BUN SERPL-MCNC: 24 MG/DL (ref 7–30)
CALCIUM SERPL-MCNC: 9.3 MG/DL (ref 8.5–10.1)
CHLORIDE SERPL-SCNC: 105 MMOL/L (ref 94–109)
CO2 SERPL-SCNC: 29 MMOL/L (ref 20–32)
CREAT SERPL-MCNC: 1.12 MG/DL (ref 0.52–1.04)
GFR SERPL CREATININE-BSD FRML MDRD: 47 ML/MIN/1.7M2
GLUCOSE SERPL-MCNC: 114 MG/DL (ref 70–99)
POTASSIUM SERPL-SCNC: 4.1 MMOL/L (ref 3.4–5.3)
SODIUM SERPL-SCNC: 142 MMOL/L (ref 133–144)

## 2017-11-07 PROCEDURE — 36415 COLL VENOUS BLD VENIPUNCTURE: CPT | Performed by: INTERNAL MEDICINE

## 2017-11-07 PROCEDURE — 99215 OFFICE O/P EST HI 40 MIN: CPT | Performed by: INTERNAL MEDICINE

## 2017-11-07 PROCEDURE — 80048 BASIC METABOLIC PNL TOTAL CA: CPT | Performed by: INTERNAL MEDICINE

## 2017-11-07 RX ORDER — ROPINIROLE 0.25 MG/1
TABLET, FILM COATED ORAL
COMMUNITY
Start: 2017-11-07 | End: 2020-01-01

## 2017-11-07 ASSESSMENT — PATIENT HEALTH QUESTIONNAIRE - PHQ9: 5. POOR APPETITE OR OVEREATING: NOT AT ALL

## 2017-11-07 ASSESSMENT — ANXIETY QUESTIONNAIRES
GAD7 TOTAL SCORE: 3
1. FEELING NERVOUS, ANXIOUS, OR ON EDGE: MORE THAN HALF THE DAYS
5. BEING SO RESTLESS THAT IT IS HARD TO SIT STILL: NOT AT ALL
2. NOT BEING ABLE TO STOP OR CONTROL WORRYING: NOT AT ALL
6. BECOMING EASILY ANNOYED OR IRRITABLE: NOT AT ALL
3. WORRYING TOO MUCH ABOUT DIFFERENT THINGS: NOT AT ALL
7. FEELING AFRAID AS IF SOMETHING AWFUL MIGHT HAPPEN: SEVERAL DAYS

## 2017-11-07 NOTE — PATIENT INSTRUCTIONS
PLAN:                                                      1.  MEDICATIONS:  Continue current medications without change  2.  Weight self daily, contact cardiology if increasing  3.  Check labs today  4.  Follow-up Dr. Robles 12/14, as planned

## 2017-11-07 NOTE — NURSING NOTE
"Chief Complaint   Patient presents with     Hospital F/U     TCU f/u        Initial /52  Pulse 84  Temp 98.1  F (36.7  C) (Oral)  Ht 5' 4\" (1.626 m)  Wt 159 lb 6.4 oz (72.3 kg)  LMP 01/01/1990  SpO2 96%  BMI 27.36 kg/m2 Estimated body mass index is 27.36 kg/(m^2) as calculated from the following:    Height as of this encounter: 5' 4\" (1.626 m).    Weight as of this encounter: 159 lb 6.4 oz (72.3 kg).  Medication Reconciliation: complete   Lorna Gonzalez MA   "

## 2017-11-07 NOTE — PROGRESS NOTES
SUBJECTIVE:   Rupinder Tracy is a 77 year old female who presents to clinic today for the following health issues:      Hospital Follow-up Visit:    Hospital/Nursing Home/IP Rehab Facility: [facility]  Date of Admission: 10/16/2017  Date of Discharge: 10/26/2017  Reason(s) for Admission: RUQ abdominal pain             Problems taking medications regularly:  None       Medication changes since discharge: cholchacine d/c'd       Problems adhering to non-medication therapy:  None    Summary of hospitalization:  [facility] discharge summary reviewed  Diagnostic Tests/Treatments reviewed.  Follow up needed: none  Other Healthcare Providers Involved in Patient s Care:         as described  Update since discharge: improved.     Post Discharge Medication Reconciliation: discharge medications reconciled and changed, per note/orders (see AVS).  Plan of care communicated with patient     Coding guidelines for this visit:  Type of Medical   Decision Making Face-to-Face Visit       within 7 Days of discharge Face-to-Face Visit        within 14 days of discharge   Moderate Complexity [id number]   High Complexity [id number]          Patient presented for RUQ pain, found to have sludge in bile duct.   ERCP completed, with sphinterotomy and biliary stone extraction.   While in hospital, diuretic was not given for a few days, and patient reports weight up significantly (patient reports 26 lbs, discharge summary states 20 lbs)   Bumex was resumed, and weight dropped.   Was discharged to rehab.   Significant diuresis.  No abdominal symptoms since hospital discharge.      Reviewed and updated as needed this visit by clinical staff:  Medications  Allergies  Soc Hx   Additional history: as documented    Additional issues to address:  1.  Follow-up pulm visit to Dr. Ruiz last week, O2 level was down to 86% after walking.  PFT was worse.  doing home PT, and O2 sat dropping into mid 80's then.  Started on spiriva last  "week, has noted improvement in clearing phlegm (clear white, frothy), and feels her breathing is significantly better.   -  Still wearing O2 at night  2.  Follow-up cardiology visit.  Echo showed ascending aorta increased to 4.0, pulm HTN severe, as before.   Referred to Dr. Robles, seen last week, felt lower extremity lymphedema was multifactorial - venous insuff, started back on support hose.   Can now get her shoes on once again.   Ultrasound ordered, pending, advised to be seen in lymphedema clinic  3.  Patient has chosen to become DNR/DNI, has completed POLST.      ROS:    Constitutional, HEENT, cardiovascular, pulmonary, gi and gu systems are negative, except as otherwise noted.      OBJECTIVE:                                                    /52  Pulse 84  Temp 98.1  F (36.7  C) (Oral)  Ht 5' 4\" (1.626 m)  Wt 159 lb 6.4 oz (72.3 kg)  LMP 01/01/1990  SpO2 96%  BMI 27.36 kg/m2  Body mass index is 27.36 kg/(m^2).   No apparent distress  Reg heart tones  Abdomen soft, nontender   1+ pitting edema, wearing support hose        ASSESSMENT:                                                      Choledocholithiasis, status post successful instrumentation  Volume overload while hospitalized, much improved, possibly back to baseline  Venous insufficiency,/Chronic lymphedemac, probably stable  Ascending aortic enlargement, plan recheck in 2 years  Asthma, symptoms improved on spiriva  DNR per patient wishes, POLST signed  Severely limited vision, metro mobility form completed    PLAN:                                                      1.  MEDICATIONS:  Continue current medications without change  2.  Weight self daily, contact cardiology if increasing  3.  Check labs -->  Stable BMP  4.  Follow-up Dr. Robles 12/14, as planned    Abran Mott MD  [facility]    >40 min spent with patient, greater than 50% spent on discussion/education/planning - as outlined in assessment and plan     "

## 2017-11-07 NOTE — MR AVS SNAPSHOT
After Visit Summary   11/7/2017    Rupinder Tracy    MRN: [id number]           Patient Information     Date Of Birth          1940        Visit Information        Provider Department      11/7/2017 2:30 PM Abran Mott MD [facility]        Today's Diagnoses     Other hypervolemia    -  1    Mild persistent asthma without complication        Restless legs syndrome (RLS)        CKD (chronic kidney disease) stage 3, GFR 30-59 ml/min        Chronic pain syndrome        Severe vision loss of one eye        Acute loss of vision, right          Care Instructions    PLAN:                                                      1.  MEDICATIONS:  Continue current medications without change  2.  Weight self daily, contact cardiology if increasing  3.  Check labs today  4.  Follow-up Dr. Robles 12/14, as planned          Follow-ups after your visit        Your next 10 appointments already scheduled     Nov 14, 2017  2:00 PM CST   US LOWER EXTREMITY VENOUS COMPETENCY BILATERAL with SHVUS1   [facility] ([facility])    [address] [place] [address]. [address]  [place] [address]   [phone]           Please bring a list of your medicines (including vitamins, minerals and over-the-counter drugs). Also, tell your doctor about any allergies you may have. Wear comfortable clothes and leave your valuables at home.  You do not need to do anything special to prepare for your exam.  Please call the Imaging Department at your exam site with any questions.            Dec 14, 2017 10:30 AM CST   Return Visit with YUNIEL Solorzano CNP   [facility]   [place] ([facility])    [address]  [facility] [id number] [phone]              Who to contact     If you have questions or need follow up information about today's clinic visit or your schedule please contact [place]  [facility] directly at [phone].  Normal or non-critical lab and imaging results will be communicated to you by MyChart, letter or phone within 4 business days after the clinic has received the results. If you do not hear from us within 7 days, please contact the clinic through [employer] or phone. If you have a critical or abnormal lab result, we will notify you by phone as soon as possible.  Submit refill requests through [facility] [employer] ([facility] [place]) or call your pharmacy and they will forward the refill request to us. Please allow 3 business days for your refill to be completed.          Additional Information About Your Visit        [employer] Information     [facility] [employer] ([facility] [place]) lets you send messages to your doctor, view your test results, renew your prescriptions, schedule appointments and more. To sign up, go to [email or web address] ([facility] [place]) . Click on \"Log in\" on the left side of the screen, which will take you to the Welcome page. Then click on \"Sign up Now\" on the right side of the page.     You will be asked to enter the access code listed below, as well as some personal information. Please follow the directions to create your username and password.     Your access code is: [email or web address]  Expires: 2017 12:13 PM     Your access code will  in 90 days. If you need help or a new code, please call your [facility] clinic or [phone].        Care EveryWhere ID     This is your Care EveryWhere ID. This could be used by other organizations to access your [facility] medical records  [id number]        Your Vitals Were     Pulse Temperature Height Last Period Pulse Oximetry BMI (Body Mass Index)    84 98.1  F (36.7  C) (Oral) 5' 4\" (1.626 m) 1990 96% 27.36 kg/m2       Blood Pressure from Last 3 Encounters:   17 108/52   17 129/57   10/30/17 126/58    Weight from Last 3 Encounters:   17 159 lb 6.4 oz (72.3 kg)   17 159 lb 4.8 oz (72.3 kg)   10/30/17 162 lb (73.5 kg)              We Performed the " Following     Basic metabolic panel     DNR/DNI          Today's Medication Changes          These changes are accurate as of: 11/7/17  3:53 PM.  If you have any questions, ask your nurse or doctor.               These medicines have changed or have updated prescriptions.        Dose/Directions    REQUIP 0.25 MG tablet   This may have changed:    - how much to take  - how to take this  - when to take this  - additional instructions  - Another medication with the same name was removed. Continue taking this medication, and follow the directions you see here.   Used for:  Restless legs syndrome (RLS)   Generic drug:  rOPINIRole   Changed by:  Abrna Mott MD        Take 2 nightly, and one in the afternoon as needed   Refills:  0       SPIRIVA RESPIMAT 1.25 MCG/ACT Aers   This may have changed:    - how much to take  - when to take this   Used for:  Mild persistent asthma without complication   Generic drug:  Tiotropium Bromide Monohydrate   Changed by:  Abran Mott MD        Dose:  2 puff   Inhale 2 puffs into the lungs daily   Refills:  0                Primary Care Provider Office Phone # Fax #    Abran Mott [phone]       [address] [facility] [address]        Equal Access to Services     LINUS ASCENCIO AH: Hadii magen fountain Sorosario, waaxda luqadaha, qaybta kaalmada adeegyada, ethan alan. So [facility] [phone].    ATENCIÓN: Si habla español, tiene a lugo disposición servicios gratuitos de asistencia lingüística. [place] al [phone].    We comply with applicable federal civil rights laws and [place] laws. We do not discriminate on the basis of race, color, national origin, age, disability, sex, sexual orientation, or gender identity.            Thank you!     Thank you for choosing [facility]  for your care. Our goal is always to provide you with excellent care. Hearing back from our patients is one way we can  continue to improve our services. Please take a few minutes to complete the written survey that you may receive in the mail after your visit with us. Thank you!             Your Updated Medication List - Protect others around you: Learn how to safely use, store and throw away your medicines at www.disposemymeds.org.          This list is accurate as of: 11/7/17  3:53 PM.  Always use your most recent med list.                   Brand Name Dispense Instructions for use Diagnosis    albuterol 108 (90 BASE) MCG/ACT Inhaler    PROAIR HFA    1 Inhaler    Inhale 2 puffs into the lungs every 4 hours as needed    Mild intermittent asthma       BUMETANIDE PO      Take 2 mg by mouth daily        CALCIUM + D 500-1000-40 MG-UNT-MCG Chew   Generic drug:  Calcium-Vitamin D-Vitamin K      Take 2 tablets by mouth daily        DOXYCYCLINE HYCLATE PO      Take 50 mg by mouth 3 times per week Monday,wednesday, friday        fluticasone-salmeterol 250-50 MCG/DOSE diskus inhaler    ADVAIR DISKUS    60 Inhaler    Inhale 1 puff into the lungs 2 times daily    Mild persistent asthma without complication       lactobacillus rhamnosus (GG) capsule      Take 1 capsule by mouth daily        olmesartan 40 MG tablet    BENICAR    90 tablet    Take 1 tablet (40 mg) by mouth daily    Essential hypertension with goal blood pressure less than 130/80       omeprazole 20 MG CR capsule    priLOSEC    180 capsule    TAKE 1 CAPSULE (20 MG) BY MOUTH 2 TIMES DAILY    Gastroesophageal reflux disease without esophagitis       order for DME      2L of O2 at night        prednisoLONE acetate 1 % ophthalmic susp    PRED FORTE     Place 1 drop Into the left eye 4 times daily        REQUIP 0.25 MG tablet   Generic drug:  rOPINIRole      Take 2 nightly, and one in the afternoon as needed    Restless legs syndrome (RLS)       RESTASIS 0.05 % ophthalmic emulsion   Generic drug:  cycloSPORINE      1 drop to left eye twice daily        SPIRIVA RESPIMAT 1.25 MCG/ACT  Aers   Generic drug:  Tiotropium Bromide Monohydrate      Inhale 2 puffs into the lungs daily    Mild persistent asthma without complication       SYSTANE 0.4-0.3 % Soln ophthalmic solution   Generic drug:  polyethylene glycol 0.4%- propylene glycol 0.3%      1 drop to left eye 4 times daily        traMADol 50 MG tablet    ULTRAM    50 tablet    Take 1 tablet (50 mg) by mouth every 6 hours as needed for moderate pain    Chronic pain syndrome       vitamin D 2000 UNITS tablet      Take 2,000 Units by mouth 2 times daily    Vitamin D deficiency

## 2017-11-08 ASSESSMENT — ASTHMA QUESTIONNAIRES: ACT_TOTALSCORE: 19

## 2017-11-08 ASSESSMENT — ANXIETY QUESTIONNAIRES: GAD7 TOTAL SCORE: 3

## 2017-11-10 ENCOUNTER — CARE COORDINATION (OUTPATIENT)
Dept: CARDIOLOGY | Facility: CLINIC | Age: 77
End: 2017-11-10

## 2017-11-14 ENCOUNTER — RADIANT APPOINTMENT (OUTPATIENT)
Dept: VASCULAR ULTRASOUND | Facility: CLINIC | Age: 77
End: 2017-11-14
Attending: INTERNAL MEDICINE
Payer: COMMERCIAL

## 2017-11-14 ENCOUNTER — DOCUMENTATION ONLY (OUTPATIENT)
Dept: CARDIOLOGY | Facility: CLINIC | Age: 77
End: 2017-11-14

## 2017-11-14 DIAGNOSIS — I89.0 LYMPHEDEMA OF EXTREMITY: ICD-10-CM

## 2017-11-14 DIAGNOSIS — I27.0 PRIMARY PULMONARY HYPERTENSION (H): Primary | ICD-10-CM

## 2017-11-14 DIAGNOSIS — I87.2 EDEMA OF BOTH LOWER LEGS DUE TO PERIPHERAL VENOUS INSUFFICIENCY: ICD-10-CM

## 2017-11-14 DIAGNOSIS — R60.0 EDEMA OF BOTH LOWER LEGS DUE TO PERIPHERAL VENOUS INSUFFICIENCY: ICD-10-CM

## 2017-11-14 PROCEDURE — 93970 EXTREMITY STUDY: CPT | Performed by: INTERNAL MEDICINE

## 2017-11-14 NOTE — PROGRESS NOTES
Faxed Dr. Silva's recommendations and Dr. Butler's last OV note to patient's Pulmonologist. Dr. Silva's RN stated she will order the 6 minute walk. See below.     Dear Regla,   Thank you for reviewing this chart.  I agree with your analysis (which is the reasoning I did not send her to your clinic first!).  We will proceed with continued COPD treatment and assess her 6 minute walk.     Jaquan Butler MD, [facility]   November 12, 2017 11:01 PM            Previous Messages       ----- Message -----      From: Antoni Silva MD      Sent: 11/10/2017   5:22 PM        To: Jae Butler MD, Venkatesh Chen RN     Dear Jaquan     Thank you for involving me in the care of this patient. I reviewed all her testing including the right heart cath, echocardiogram as well as the [place] lung records.     Although her RHC in 2015 suggested mild pulmonary hypertension (mean PAP 32, PVR 4.3, PCWP 16), based on review of her most recent echocardiogram, I suspect the pulmonary hypertension might have worsened. Right ventricle is now slightly dilated but systolic function is preserved. PH is likely predominantly from her severe COPD, with some contribution from left heart disease. PH therapies have not been proven to be particularly helpful for these patients.     I recommend:   1. Six minute walk and VQ scan.   2. Continuous oxygen supplementation. Per Dr. Mott's note, she has been desaturating to 86%.   3. I am happy to see her in the PH clinic after that. I might repeat right heart catheter and consider Tadalafil. However, I note that she has chosen DNR status, so I am not sure she would want all this.     I am copying my nurse, Rupinder on this message, and we will be happy to coordinate care.

## 2017-11-15 NOTE — PROGRESS NOTES
Antoni Silva MD Ketroser, Robert A, MD Cc: Venkatesh Chen RN                   Dear Jaquan     Thank you for involving me in the care of this patient. I reviewed all her testing including the right heart cath, echocardiogram as well as the [place] lung records.     Although her RHC in 2015 suggested mild pulmonary hypertension (mean PAP 32, PVR 4.3, PCWP 16), based on review of her most recent echocardiogram, I suspect the pulmonary hypertension might have worsened. Right ventricle is now slightly dilated but systolic function is preserved. PH is likely predominantly from her severe COPD, with some contribution from left heart disease. PH therapies have not been proven to be particularly helpful for these patients.     I recommend:   1. Six minute walk and VQ scan.   2. Continuous oxygen supplementation. Per Dr. Mott's note, she has been desaturating to 86%.   3. I am happy to see her in the PH clinic after that. I might repeat right heart catheter and consider Tadalafil. However, I note that she has chosen DNR status, so I am not sure she would want all this.     I am copying my nurse, Rupinder on this message, and we will be happy to coordinate care.                Called to patient with above - she agrees to plan.  Added to Dr Silva's schedule for 12/22/2017 (non- PH day). Transferred to scheduling to arrange testing.  Rupinder Riddle RN 11/15/17 10:41 AM

## 2017-11-17 NOTE — PROGRESS NOTES
Pt called stating that she got a call to schedule the tests that Dr. Silva had recommended. Pt ok with doing the 6 minute walk and chest x ray but wants to know more information about the VQ scan. Writer informed patient that Rupinder AVALOS for Dr. Silva will give her a call as she is more knowledgeable about the PH tests.    Will route to Rupinder AVALOS.

## 2017-11-27 ENCOUNTER — TELEPHONE (OUTPATIENT)
Dept: INTERNAL MEDICINE | Facility: CLINIC | Age: 77
End: 2017-11-27

## 2017-11-27 ENCOUNTER — TELEPHONE (OUTPATIENT)
Dept: CARDIOLOGY | Facility: CLINIC | Age: 77
End: 2017-11-27

## 2017-11-27 DIAGNOSIS — I10 HTN (HYPERTENSION): ICD-10-CM

## 2017-11-27 DIAGNOSIS — R60.9 EDEMA: Primary | ICD-10-CM

## 2017-11-28 RX ORDER — BUMETANIDE 1 MG/1
1 TABLET ORAL DAILY
Qty: 1 TABLET | Refills: 0 | COMMUNITY
Start: 2017-11-28 | End: 2017-12-14

## 2017-11-28 NOTE — TELEPHONE ENCOUNTER
[facility] would like to extend orders for SN,     1 time a week for 2 more weeks with 3 prn.     Pt has upcoming cardiology appts, c/o diziness, and wt loss.   Cardiology updated.     Thank you  Feli Singh RN BSN   Cell [phone]

## 2017-11-28 NOTE — TELEPHONE ENCOUNTER
Agree with plan of care. Authorize care.    Elana Nj MD   [place] [address] [address] [address]  T: [phone], F: [phone]

## 2017-11-28 NOTE — TELEPHONE ENCOUNTER
I am fine going to Bumex 1 mg every day.  I would like a BMP and an update on her weight after one week of this new treatment course.  Thanks.  Jae Butler MD, FACC  November 27, 2017 8:28 PM

## 2017-11-28 NOTE — TELEPHONE ENCOUNTER
Med list updated with Bumex 1 mg daily and ordered BMP for one week. Routed Dr. Butler's recommendations [facility] RN Singh

## 2017-11-28 NOTE — TELEPHONE ENCOUNTER
[facility] RN involved in patient care in the home,     Concern with patient being dry, pt has lost about 10 pounds since 11/1  Current wt 147 today, with 145 on Saturday. Pt admission wt with [facility] 11/1 was 157  Pt skin is dry, and eyes appear sunken. Pt does feel dizzy occasionally. Chronic SOB is stable with O2 at [facility].    Pt BP today in the home 120's over 50s, HR 90s   Lymphedema better in last 2 weeks with support socks, bilat legs 1 plus     Could we try Bumex 2 mg 3x a week MWF and 1 mg the other days? Or reduce to 1mg QD with wt parameters?     I could also grab labs on patient if you are interested?     Concern with the wt loss, pt expressed concern also.     Thank you  Feli Singh RN BSN   [facility]   Cell [phone]

## 2017-12-06 ENCOUNTER — APPOINTMENT (OUTPATIENT)
Dept: LAB | Facility: CLINIC | Age: 77
End: 2017-12-06
Attending: INTERNAL MEDICINE
Payer: MEDICARE

## 2017-12-07 DIAGNOSIS — Z53.9 DIAGNOSIS NOT YET DEFINED: Primary | ICD-10-CM

## 2017-12-07 PROCEDURE — G0180 MD CERTIFICATION HHA PATIENT: HCPCS | Performed by: INTERNAL MEDICINE

## 2017-12-07 NOTE — TELEPHONE ENCOUNTER
Hello! Update  Wt trended nice until yesterday, 145, 147... Then 12/4 147.2 then 12/5 150.2 and again today wt 150.2   Pt reports her breathing is a little more difficult, and edema a trace worse.   BP also lower 102/54 x 2, otherwise since admission 118-124/50s, HT RR. Denies dizziness.   I failed at lab draw x 2, she has frail tiny veins.   Pt willing to go into lab or otherwise she has appt next week in Cardiology.     Plan [facility] DC 12/18 unless change in condition.     Thanks  Feli Singh RN BSN  [facility]   [phone]

## 2017-12-14 ENCOUNTER — TELEPHONE (OUTPATIENT)
Dept: CARDIOLOGY | Facility: CLINIC | Age: 77
End: 2017-12-14

## 2017-12-14 ENCOUNTER — HOSPITAL ENCOUNTER (OUTPATIENT)
Dept: GENERAL RADIOLOGY | Facility: CLINIC | Age: 77
Discharge: HOME | End: 2017-12-14
Attending: INTERNAL MEDICINE | Admitting: INTERNAL MEDICINE
Payer: MEDICARE

## 2017-12-14 ENCOUNTER — OFFICE VISIT (OUTPATIENT)
Dept: CARDIOLOGY | Facility: CLINIC | Age: 77
End: 2017-12-14
Attending: INTERNAL MEDICINE
Payer: COMMERCIAL

## 2017-12-14 ENCOUNTER — HOSPITAL ENCOUNTER (OUTPATIENT)
Dept: RESPIRATORY THERAPY | Facility: CLINIC | Age: 77
End: 2017-12-14
Attending: INTERNAL MEDICINE
Payer: MEDICARE

## 2017-12-14 VITALS
HEIGHT: 64 IN | DIASTOLIC BLOOD PRESSURE: 83 MMHG | SYSTOLIC BLOOD PRESSURE: 121 MMHG | WEIGHT: 152.2 LBS | BODY MASS INDEX: 25.99 KG/M2 | HEART RATE: 70 BPM

## 2017-12-14 DIAGNOSIS — I27.0 PRIMARY PULMONARY HYPERTENSION (H): ICD-10-CM

## 2017-12-14 DIAGNOSIS — R60.0 LOCALIZED EDEMA: ICD-10-CM

## 2017-12-14 DIAGNOSIS — I87.2 EDEMA OF BOTH LOWER LEGS DUE TO PERIPHERAL VENOUS INSUFFICIENCY: ICD-10-CM

## 2017-12-14 DIAGNOSIS — R60.9 EDEMA: Primary | ICD-10-CM

## 2017-12-14 DIAGNOSIS — R60.9 EDEMA: ICD-10-CM

## 2017-12-14 DIAGNOSIS — I27.20 PULMONARY HTN (H): ICD-10-CM

## 2017-12-14 DIAGNOSIS — R60.0 EDEMA OF BOTH LOWER LEGS DUE TO PERIPHERAL VENOUS INSUFFICIENCY: ICD-10-CM

## 2017-12-14 DIAGNOSIS — I10 HTN (HYPERTENSION): ICD-10-CM

## 2017-12-14 DIAGNOSIS — I10 ESSENTIAL HYPERTENSION: Primary | ICD-10-CM

## 2017-12-14 DIAGNOSIS — I89.0 LYMPHEDEMA OF EXTREMITY: ICD-10-CM

## 2017-12-14 DIAGNOSIS — I10 ESSENTIAL HYPERTENSION WITH GOAL BLOOD PRESSURE LESS THAN 130/85: ICD-10-CM

## 2017-12-14 LAB
6 MIN WALK (FT): 1000 FT
6 MIN WALK (M): 305 M
ANION GAP SERPL CALCULATED.3IONS-SCNC: 13.9 MMOL/L (ref 6–17)
BUN SERPL-MCNC: 46 MG/DL (ref 7–30)
CALCIUM SERPL-MCNC: 9.8 MG/DL (ref 8.5–10.5)
CHLORIDE SERPL-SCNC: 105 MMOL/L (ref 98–107)
CO2 SERPL-SCNC: 25 MMOL/L (ref 23–29)
CREAT SERPL-MCNC: 1.36 MG/DL (ref 0.7–1.3)
GFR SERPL CREATININE-BSD FRML MDRD: 38 ML/MIN/1.7M2
GLUCOSE SERPL-MCNC: 100 MG/DL (ref 70–105)
HGB BLD-MCNC: 13.2 G/DL (ref 11.7–15.7)
POTASSIUM SERPL-SCNC: 4.9 MMOL/L (ref 3.5–5.1)
SODIUM SERPL-SCNC: 139 MMOL/L (ref 136–145)

## 2017-12-14 PROCEDURE — 80048 BASIC METABOLIC PNL TOTAL CA: CPT | Performed by: INTERNAL MEDICINE

## 2017-12-14 PROCEDURE — 71020 XR CHEST 2 VW: CPT

## 2017-12-14 PROCEDURE — 36415 COLL VENOUS BLD VENIPUNCTURE: CPT | Performed by: INTERNAL MEDICINE

## 2017-12-14 PROCEDURE — 94620 ZZHC SIX MINUTE WALK TEST: CPT

## 2017-12-14 PROCEDURE — 99215 OFFICE O/P EST HI 40 MIN: CPT | Performed by: NURSE PRACTITIONER

## 2017-12-14 PROCEDURE — 85018 HEMOGLOBIN: CPT | Performed by: NURSE PRACTITIONER

## 2017-12-14 RX ORDER — BUMETANIDE 0.5 MG/1
0.5 TABLET ORAL DAILY
Qty: 30 TABLET | Refills: 0 | Status: SHIPPED | OUTPATIENT
Start: 2017-12-14 | End: 2018-01-15

## 2017-12-14 RX ORDER — BUMETANIDE 1 MG/1
1 TABLET ORAL DAILY
Qty: 30 TABLET | Refills: 1 | Status: SHIPPED | OUTPATIENT
Start: 2017-12-14 | End: 2018-01-15

## 2017-12-14 NOTE — MR AVS SNAPSHOT
After Visit Summary   12/14/2017    Rupinder Tracy    MRN: [id number]           Patient Information     Date Of Birth          1940        Visit Information        Provider Department      12/14/2017 10:30 AM Lilliana Truong APRN CNP [facility]        Today's Diagnoses     Essential hypertension    -  1    Edema of both lower legs due to peripheral venous insufficiency        Lymphedema of extremity        Essential hypertension with goal blood pressure less than 130/85        Pulmonary HTN        Localized edema           Follow-ups after your visit        Your next 10 appointments already scheduled     Hector 15, 2018  9:45 AM CST   Pulmonary Hypertension New with Mothilal Regla Silva MD   [facility] ([facility])    [address]  [facility] [id number] [phone]              Future tests that were ordered for you today     Open Future Orders        Priority Expected Expires Ordered    Basic metabolic panel Routine 1/13/2018 12/14/2018 12/14/2017            Who to contact     If you have questions or need follow up information about today's clinic visit or your schedule please contact [facility] directly at [phone].  Normal or non-critical lab and imaging results will be communicated to you by MyChart, letter or phone within 4 business days after the clinic has received the results. If you do not hear from us within 7 days, please contact the clinic through MyChart or phone. If you have a critical or abnormal lab result, we will notify you by phone as soon as possible.  Submit refill requests through Secret or call your pharmacy and they will forward the refill request to us. Please allow 3 business days for your refill to be completed.          Additional Information About Your Visit        MyChart Information     Secret lets you  "send messages to your doctor, view your test results, renew your prescriptions, schedule appointments and more. To sign up, go to [email or web address] . Click on \"Log in\" on the left side of the screen, which will take you to the Welcome page. Then click on \"Sign up Now\" on the right side of the page.     You will be asked to enter the access code listed below, as well as some personal information. Please follow the directions to create your username and password.     Your access code is: [email or web address]  Expires: 3/14/2018 12:47 PM     Your access code will  in 90 days. If you need help or a new code, please call your [facility] clinic or [phone].        Care EveryWhere ID     This is your [facility] EveryWhere ID. This could be used by other organizations to access your [facility] medical records  [id number]        Your Vitals Were     Pulse Height Last Period BMI (Body Mass Index)          70 1.626 m (5' 4\") 1990 26.13 kg/m2         Blood Pressure from Last 3 Encounters:   17 121/83   17 108/52   17 129/57    Weight from Last 3 Encounters:   17 69 kg (152 lb 3.2 oz)   17 72.3 kg (159 lb 6.4 oz)   17 72.3 kg (159 lb 4.8 oz)              We Performed the Following     Follow-Up with Cardiac Advanced Practice Provider          Today's Medication Changes          These changes are accurate as of: 17 12:47 PM.  If you have any questions, ask your nurse or doctor.               These medicines have changed or have updated prescriptions.        Dose/Directions    * bumetanide 1 MG tablet   Commonly known as:  BUMEX   Indication:  Edema   This may have changed:  Another medication with the same name was added. Make sure you understand how and when to take each.   Used for:  Localized edema, Essential hypertension   Changed by:  Lilliana Truong APRN CNP        Dose:  1 mg   Take 1 tablet (1 mg) by mouth daily   Quantity:  30 tablet   Refills:  1       * " bumetanide 0.5 MG tablet   Commonly known as:  BUMEX   This may have changed:  You were already taking a medication with the same name, and this prescription was added. Make sure you understand how and when to take each.   Used for:  Pulmonary HTN, Localized edema   Changed by:  Lilliana Truong APRN CNP        Dose:  0.5 mg   Take 1 tablet (0.5 mg) by mouth daily   Quantity:  30 tablet   Refills:  0       * Notice:  This list has 2 medication(s) that are the same as other medications prescribed for you. Read the directions carefully, and ask your doctor or other care provider to review them with you.         Where to get your medicines      These medications were sent to [id number] - [place], [place] - [address] [facility]  [address] [place] [address]     Phone:  [phone]     bumetanide 0.5 MG tablet    bumetanide 1 MG tablet                Primary Care Provider Office Phone # Fax #    Abran Mott [phone]       [address] [address]        Equal Access to Services     [facility] : Hadii magen kirkpatrick hadasho Sorosario, waaxda luqadaha, qaybta kaalmada adebrendayakaushik, ethan huntley . So [facility] [phone].    ATENCIÓN: Si habla español, tiene a lugo disposición servicios gratuitos de asistencia lingüística. [facility] [phone].    We comply with applicable federal civil rights laws and [place] laws. We do not discriminate on the basis of race, color, national origin, age, disability, sex, sexual orientation, or gender identity.            Thank you!     Thank you for choosing [facility]  for your care. Our goal is always to provide you with excellent care. Hearing back from our patients is one way we can continue to improve our services. Please take a few minutes to complete the written survey that you may receive in the mail after your visit with us. Thank you!              Your Updated Medication List - Protect others around you: Learn how to safely use, store and throw away your medicines at www.disposemymeds.org.          This list is accurate as of: 12/14/17 12:47 PM.  Always use your most recent med list.                   Brand Name Dispense Instructions for use Diagnosis    albuterol 108 (90 BASE) MCG/ACT Inhaler    PROAIR HFA    1 Inhaler    Inhale 2 puffs into the lungs every 4 hours as needed    Mild intermittent asthma       * bumetanide 1 MG tablet    BUMEX    30 tablet    Take 1 tablet (1 mg) by mouth daily    Localized edema, Essential hypertension       * bumetanide 0.5 MG tablet    BUMEX    30 tablet    Take 1 tablet (0.5 mg) by mouth daily    Pulmonary HTN, Localized edema       CALCIUM + D 500-1000-40 MG-UNT-MCG Chew   Generic drug:  Calcium-Vitamin D-Vitamin K      Take 2 tablets by mouth daily        DOXYCYCLINE HYCLATE PO      Take 50 mg by mouth 3 times per week Monday,wednesday, friday        fluticasone-salmeterol 250-50 MCG/DOSE diskus inhaler    ADVAIR DISKUS    60 Inhaler    Inhale 1 puff into the lungs 2 times daily    Mild persistent asthma without complication       lactobacillus rhamnosus (GG) capsule      Take 1 capsule by mouth daily        olmesartan 40 MG tablet    BENICAR    90 tablet    Take 1 tablet (40 mg) by mouth daily    Essential hypertension with goal blood pressure less than 130/80       omeprazole 20 MG CR capsule    priLOSEC    180 capsule    TAKE 1 CAPSULE (20 MG) BY MOUTH 2 TIMES DAILY    Gastroesophageal reflux disease without esophagitis       order for DME      2L of O2 at night        prednisoLONE acetate 1 % ophthalmic susp    PRED FORTE     Place 1 drop Into the left eye 4 times daily        REQUIP 0.25 MG tablet   Generic drug:  rOPINIRole      Take 2 nightly, and one in the afternoon as needed    Restless legs syndrome (RLS)       RESTASIS 0.05 % ophthalmic emulsion   Generic drug:  cycloSPORINE      1 drop to left eye twice  daily        SYSTANE 0.4-0.3 % Soln ophthalmic solution   Generic drug:  polyethylene glycol 0.4%- propylene glycol 0.3%      1 drop to left eye 4 times daily        traMADol 50 MG tablet    ULTRAM    50 tablet    Take 1 tablet (50 mg) by mouth every 6 hours as needed for moderate pain    Chronic pain syndrome       vitamin D 2000 UNITS tablet      Take 2,000 Units by mouth 2 times daily    Vitamin D deficiency       * Notice:  This list has 2 medication(s) that are the same as other medications prescribed for you. Read the directions carefully, and ask your doctor or other care provider to review them with you.

## 2017-12-14 NOTE — TELEPHONE ENCOUNTER
Received call from scheduling stating that patient is here to see Gail but she thought she was supposed to get a lab drawn before and nothing is scheduled. Chart reviewed. Appears as though homecare RN was unable to get BMP. Writer ordered and scheduled BMP for prior to OV with Gail NP.     Home care RN message: 12/6/17   Hello! Update  Wt trended nice until yesterday, 145, 147... Then 12/4 147.2 then 12/5 150.2 and again today wt 150.2   Pt reports her breathing is a little more difficult, and edema a trace worse.   BP also lower 102/54 x 2, otherwise since admission 118-124/50s, HT RR. Denies dizziness.   I failed at lab draw x 2, she has frail tiny veins.   Pt willing to go into lab or otherwise she has appt next week in Cardiology.      Plan [facility] DC 12/18 unless change in condition.      Thanks  Feli Singh RN BSN  [facility]   [phone]

## 2017-12-14 NOTE — LETTER
12/14/2017    Abran Mott MD  [address] [facility] [address]    RE: Rupinder Tracy       Dear Colleague,    I had the pleasure of seeing Rupinder Tracy in the [facility].    HPI and Plan:   I had the pleasure of meeting Marguerite Tracy today in cardiology clinic follow-up.  She is a pleasant 77-year-old patient of Dr. Butler.  She also follows with Dr. Robles for her venous insufficiency.  She is here today to review the results of her venous insufficiency studies as well as her response to diuretic therapy.      She has a history of pulmonary hypertension, systemic hypertension and patent foramen ovale.  She has a history of mild aortic root enlargement.  In 2015 she had a right and left heart cath for pulmonary hypertension and aortic insufficiency.  Coronary angiography has not demonstrated significant coronary artery disease.  Aortogram showed moderate aortic insufficiency and moderate annual aortic ecstasia.  There was moderate pulmonary hypertension and a mean PA pressure 32 and a wide pulse pressure and the PA tracing consistent with at least moderate pulmonary valve insufficiency. Pulmonary vascular resistance was normal and there was no significant gradient between the end-diastolic pressure and the pulmonary capillary wedge pressure and the left ventricular end-diastolic pressure.  Pulmonary capillary wedge pressure was only 16 mmHg and PAD 12 mmHg.  This indicated no significant pulmonary hypertension.  It was felt the elevated pulmonary pressure was likely secondary to impaired relaxation both from aortic insufficiency, hypertension and perhaps pulmonary causes such as obstructive sleep apnea or asthma.  We tested for obstructive sleep apnea and showed desaturations but she never reached REM sleep and did not snore.  Recently Dr. Butler recommended pulmonary function tests and follow-up with Dr. Caldwell, this is set up for early January.  She did have a 6  minute walk this morning and I will have Dr. Silva's nurse review if it was a adequate study or if it needs to be repeated, the patient had to urinate urgently and the tech wondered if her study was adequate.    Mrs. Tracy was hospitalized earlier this year when she saw Dr. Butler in follow-up she was significantly fluid overloaded.  She did well on Bumex 2 mg daily and ultimately diuresed a bit too much and was down 145 pounds at [facility], at that time her Bumex was decreased to 1 mg.  Dr. Robles was consulted at that time to look for venous insufficiency as a cause of her lower extremity edema.  Her venous competency studies do suggest some bilateral venous insufficiency, however at this time I would describe it as mild and would recommend compression therapy until she has had her workup completed for pulmonary hypertension.  She continues to wear her compression stockings daily and gets good relief with this, in fact she tells me her legs look better than they have been quite some time.    Today Rupinder Cast feels great.  Her weight is down 10 pounds from when she saw Dr. barb delgado at the end of October.  She denies any worsening of her breathlessness, and denies exertional chest discomfort.  She is very happy with the appearance of her legs and ankles now that she is lost weight and had resolution of most of her lower extremity edema.    Labs reviewed.  Sodium 139, potassium 4.9, BUN 46, creatinine 1.36 and GFR 38.  Hemoglobin 11.    Physical exam  Please see below    Assessment and plan  1. Diastolic heart failure.  Her fluid volume status is better on exam, by symptoms.  It looks like her creatinine and BUN both increased, suggesting she is getting a little bit on the dry side.  These results came back to me after she left my office.  I called her and asked her to alternate doses of 1 mg, and 0.5 mg of Bumex every other day.  She can repeat her BMP when she sees Dr. Silva in January.  She understands that  she should call if she has more than a 2 pound weight gain in a day or 5 pound weight gain in a week.  2. Venous insufficiency.  She does have venous stasis changes.  She had evidence of focal venous insufficiency of the GS V on the right at the level of her ankle at 2.4 seconds and mild focal venous insufficiency of the GS V at the level of the midcalf.  I recommend that she continue to wear her compression stockings and would  pursue conservative management at this time.  3. Hypertension.  This is controlled.  She is currently on Benicar 40    >50% of this 45 min visit was spent face-to-face counseling and coordination of care     YUNIEL Cage, CNP    Orders Placed This Encounter   Procedures     Hemoglobin       Orders Placed This Encounter   Medications     bumetanide (BUMEX) 1 MG tablet     Sig: Take 1 tablet (1 mg) by mouth daily     Dispense:  30 tablet     Refill:  1       Medications Discontinued During This Encounter   Medication Reason     Tiotropium Bromide Monohydrate (SPIRIVA RESPIMAT) 1.25 MCG/ACT AERS Therapy completed     bumetanide (BUMEX) 1 MG tablet Reorder         Encounter Diagnoses   Name Primary?     Edema of both lower legs due to peripheral venous insufficiency      Lymphedema of extremity      Essential hypertension with goal blood pressure less than 130/85 Yes     Pulmonary HTN      Localized edema      Essential hypertension        CURRENT MEDICATIONS:  Current Outpatient Prescriptions   Medication Sig Dispense Refill     bumetanide (BUMEX) 1 MG tablet Take 1 tablet (1 mg) by mouth daily 30 tablet 1     rOPINIRole (REQUIP) 0.25 MG tablet Take 2 nightly, and one in the afternoon as needed       lactobacillus rhamnosus, GG, (CULTURELL) capsule Take 1 capsule by mouth daily       traMADol (ULTRAM) 50 MG tablet Take 1 tablet (50 mg) by mouth every 6 hours as needed for moderate pain 50 tablet 0     olmesartan (BENICAR) 40 MG tablet Take 1 tablet (40 mg) by mouth daily 90 tablet 2  "    omeprazole (PRILOSEC) 20 MG CR capsule TAKE 1 CAPSULE (20 MG) BY MOUTH 2 TIMES DAILY 180 capsule 1     Cholecalciferol (VITAMIN D) 2000 UNITS tablet Take 2,000 Units by mouth 2 times daily       fluticasone-salmeterol (ADVAIR DISKUS) 250-50 MCG/DOSE diskus inhaler Inhale 1 puff into the lungs 2 times daily 60 Inhaler 1     order for DME 2L of O2 at night       Calcium-Vitamin D-Vitamin K (CALCIUM + D) 500-1000-40 MG-UNT-MCG CHEW Take 2 tablets by mouth daily        DOXYCYCLINE HYCLATE PO Take 50 mg by mouth 3 times per week Monday,wednesday, friday       prednisoLONE acetate (PRED FORTE) 1 % ophthalmic suspension Place 1 drop Into the left eye 4 times daily        albuterol (ALBUTEROL) 108 (90 BASE) MCG/ACT inhaler Inhale 2 puffs into the lungs every 4 hours as needed 1 Inhaler 5     RESTASIS 0.05 % OP EMUL 1 drop to left eye twice daily       SYSTANE 0.4-0.3 % OP SOLN 1 drop to left eye 4 times daily       [DISCONTINUED] bumetanide (BUMEX) 1 MG tablet Take 1 tablet (1 mg) by mouth daily 1 tablet 0       ALLERGIES     Allergies   Allergen Reactions     Atorvastatin Calcium      myalgias     Celecoxib Swelling     edema     Cholestyramine Diarrhea     Diarrhea       Crestor [Rosuvastatin Calcium]      leg aches, likely from medication     Cyclobenzaprine Hcl      flexeril--gets \"goofy\"     Dulera      tremors     Gabapentin      Nightmares.   Retrial of medication caused tremors.     Lisinopril Cough     Cough/ Dizziness at high dose.     Meperidine Hcl Nausea and Vomiting     demerol-severe nausea     Morphine Nausea and Vomiting     Naprosyn [Naproxen] GI Disturbance     Niaspan [Niacin]      flushing, severe     Percocet [Oxycodone-Acetaminophen] Nausea     Nausea, lightheadedness.   Tolerates med if taken with food.     Pravastatin Swelling     Edema, myalgias     Red Yeast Rice [Cholestin] GI Disturbance     Bloating, etc.     Simvastatin      myalgias     Timolol Maleate Difficulty breathing     " timoptic--respiratory problems     Hctz Rash     rash     Sulfa Drugs Itching and Rash     rash on all mucous membranes and palms of hands with intense itching       PAST MEDICAL HISTORY:  Past Medical History:   Diagnosis Date     Arthritis      Cellulitis 4/13 in [place]    R ankle     Difficult airway for intubation      Ductal Carcinoma in Situ of Left Breast 9/09     Duodenal ulcer, unspecified as acute or chronic, without mention of hemorrhage or perforation 1980's    felt due to high dose steroids     Female stress incontinence      H/O right and left heart catheterization     1-     HTN      HYPERLIPIDEMIA      Ischemic colitis 7/2001    [facility]     Lactose Intolerance     Mild     Left Ventricular Hypertrophy      Legal blindness     Artificial right eye, severe vision loss left (Detached retina's, glaucoma, corneal transplants)     Mild intermittent asthma ~1980's     MITRAL VALVE Prolapse, with murmur      Nocturnal oxygen desaturation      Osteoporosis, unspecified ~2002     Patent foramen ovale      PERIPHERAL VASCULAR DISEASE 5/05    mesenteric arteries, angioplasty     PRESBYESOPHAGUS 6/04     Pulmonary HTN 11/2014     Serous retinal detachment     False eye right     Subarachnoid hemorrhage following injury (H) 12/10    due to fall, vascular evaluation negative     Syncope and collapse 12/10     Unspecified glaucoma(365.9)        PAST SURGICAL HISTORY:  Past Surgical History:   Procedure Laterality Date     BIOPSY       C APPENDECTOMY  1982     C NONSPECIFIC PROCEDURE  1945    adenoidectomy     C NONSPECIFIC PROCEDURE      bilat retinal detachment     C NONSPECIFIC PROCEDURE      blephoroplasty bilat     C NONSPECIFIC PROCEDURE  1997    glaucoma procedure L     C NONSPECIFIC PROCEDURE  1997, 2007, 2014    corneal transplant L     C NONSPECIFIC PROCEDURE  1945    strabismus procedure R eye     C NONSPECIFIC PROCEDURE  1960's    R breast bx     C NONSPECIFIC PROCEDURE  5/05    Angioplasty and  stenting mesenteric vessels     C NONSPECIFIC PROCEDURE  2008    L corneal transplant     COLONOSCOPY      due 2015     ENDOSCOPIC RETROGRADE CHOLANGIOPANCREATOGRAM N/A 10/13/2017    Procedure: ENDOSCOPIC RETROGRADE CHOLANGIOPANCREATOGRAM;  ENDOSCOPIC RETROGRADE CHOLANGIOPANCREATOGRAM ;  Surgeon: Isabel Yousif MD;  Location: [facility] OR     HC REMOVAL GALLBLADDER  1982    Cholecystectomy     HEART CATH RIGHT AND LEFT HEART CATH  1/19/15     HERNIORRHAPHY VENTRAL N/A 4/19/2016    Procedure: HERNIORRHAPHY VENTRAL;  Surgeon: Hamlet Ness MD;  Location: [facility] OR     MASTECTOMY SIMPLE BILATERAL  10/5/09    bilateral mastectomy     SURGICAL HISTORY OF -   6/2010    breast reconstruction       FAMILY HISTORY:  Family History   Problem Relation Age of Onset     Adopted: Yes     C.A.D. Paternal Uncle      MI 50's     Family History Negative Daughter        SOCIAL HISTORY:  Social History     Social History     Marital status:      Spouse name: N/A     Number of children: 2     Years of education: N/A     Occupational History     Retired nurse Retired     Social History Main Topics     Smoking status: Never Smoker     Smokeless tobacco: Never Used     Alcohol use Yes      Comment: 3 glasses of wine a week     Drug use: No     Sexual activity: No     Other Topics Concern     Caffeine Concern No     1- 2 cups coffee     Sleep Concern No     Stress Concern No     Weight Concern No     Special Diet Yes     low sodium     Exercise No     2 days a week (abigail chi), walking program, stationary bike 10 minutes 3 times per week     Seat Belt Yes     Social History Narrative    , 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  She walks with a walker.  Is a full code.  (last updated 10/11/2017)        Review of Systems:  Skin:  Positive for scaling left leg   Eyes:  Positive for glasses Foggy vision  ENT:  Positive for   Dry nose  Respiratory:  Positive for dyspnea on  "exertion;shortness of breath;cough O2 at night, SOB and CRUZ and coughing are occassionally   Cardiovascular:  Negative      Gastroenterology: Positive for heartburn    Genitourinary:  not assessed      Musculoskeletal:  Positive for back pain    Neurologic:  Negative      Psychiatric:  Positive for depression When she can't leave her house  Heme/Lymph/Imm:  Positive for allergies    Endocrine:  Negative        Physical Exam:  Vitals: /83  Pulse 70  Ht 1.626 m (5' 4\")  Wt 69 kg (152 lb 3.2 oz)  LMP 01/01/1990  BMI 26.13 kg/m2    Constitutional:  cooperative;in no acute distress        Skin:  warm and dry to the touch venous stasis changes        Head:  normocephalic        Eyes:      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    Lymph:No Cervical lymphadenopathy present     ENT:  no pallor or cyanosis        Neck:           Respiratory:  clear to auscultation         Cardiac: regular rhythm;normal S1 and S2;no murmurs, gallops or rubs detected     no presence of murmur                                                   GI:  abdomen soft;non-tender        Extremities and Muscular Skeletal:    stasis pigmentation bilateral LE edema;trace;1+     wearing compression stockings    Neurological:  no gross motor deficits   walks with a walker    Psych:  affect appropriate, oriented to time, person and place      Thank you for allowing me to participate in the care of your patient.    Sincerely,     YUNIEL Johnson [facility]    "

## 2017-12-14 NOTE — PROGRESS NOTES
HPI and Plan:   I had the pleasure of meeting Marguerite Tracy today in cardiology clinic follow-up.  She is a pleasant 77-year-old patient of Dr. Butler.  She also follows with Dr. Robles for her venous insufficiency.  She is here today to review the results of her venous insufficiency studies as well as her response to diuretic therapy.      She has a history of pulmonary hypertension, systemic hypertension and patent foramen ovale.  She has a history of mild aortic root enlargement.  In 2015 she had a right and left heart cath for pulmonary hypertension and aortic insufficiency.  Coronary angiography has not demonstrated significant coronary artery disease.  Aortogram showed moderate aortic insufficiency and moderate annual aortic ecstasia.  There was moderate pulmonary hypertension and a mean PA pressure 32 and a wide pulse pressure and the PA tracing consistent with at least moderate pulmonary valve insufficiency. Pulmonary vascular resistance was normal and there was no significant gradient between the end-diastolic pressure and the pulmonary capillary wedge pressure and the left ventricular end-diastolic pressure.  Pulmonary capillary wedge pressure was only 16 mmHg and PAD 12 mmHg.  This indicated no significant pulmonary hypertension.  It was felt the elevated pulmonary pressure was likely secondary to impaired relaxation both from aortic insufficiency, hypertension and perhaps pulmonary causes such as obstructive sleep apnea or asthma.  We tested for obstructive sleep apnea and showed desaturations but she never reached REM sleep and did not snore.  Recently Dr. Butler recommended pulmonary function tests and follow-up with Dr. Caldwell, this is set up for early January.  She did have a 6 minute walk this morning and I will have Dr. Silva's nurse review if it was a adequate study or if it needs to be repeated, the patient had to urinate urgently and the tech wondered if her study was adequate.    Mrs.  Rossana was hospitalized earlier this year when she saw Dr. Butler in follow-up she was significantly fluid overloaded.  She did well on Bumex 2 mg daily and ultimately diuresed a bit too much and was down 145 pounds at [facility], at that time her Bumex was decreased to 1 mg.  Dr. Robles was consulted at that time to look for venous insufficiency as a cause of her lower extremity edema.  Her venous competency studies do suggest some bilateral venous insufficiency, however at this time I would describe it as mild and would recommend compression therapy until she has had her workup completed for pulmonary hypertension.  She continues to wear her compression stockings daily and gets good relief with this, in fact she tells me her legs look better than they have been quite some time.    Today Rupinder Cast feels great.  Her weight is down 10 pounds from when she saw Dr. barb delgado at the end of October.  She denies any worsening of her breathlessness, and denies exertional chest discomfort.  She is very happy with the appearance of her legs and ankles now that she is lost weight and had resolution of most of her lower extremity edema.    Labs reviewed.  Sodium 139, potassium 4.9, BUN 46, creatinine 1.36 and GFR 38.  Hemoglobin 11.    Physical exam  Please see below    Assessment and plan  1. Diastolic heart failure.  Her fluid volume status is better on exam, by symptoms.  It looks like her creatinine and BUN both increased, suggesting she is getting a little bit on the dry side.  These results came back to me after she left my office.  I called her and asked her to alternate doses of 1 mg, and 0.5 mg of Bumex every other day.  She can repeat her BMP when she sees Dr. Silva in January.  She understands that she should call if she has more than a 2 pound weight gain in a day or 5 pound weight gain in a week.  2. Venous insufficiency.  She does have venous stasis changes.  She had evidence of focal venous insufficiency of  the GS V on the right at the level of her ankle at 2.4 seconds and mild focal venous insufficiency of the GS V at the level of the midcalf.  I recommend that she continue to wear her compression stockings and would  pursue conservative management at this time.  3. Hypertension.  This is controlled.  She is currently on Benicar 40    >50% of this 45 min visit was spent face-to-face counseling and coordination of care     YUNIEL Cage, CNP    Orders Placed This Encounter   Procedures     Hemoglobin       Orders Placed This Encounter   Medications     bumetanide (BUMEX) 1 MG tablet     Sig: Take 1 tablet (1 mg) by mouth daily     Dispense:  30 tablet     Refill:  1       Medications Discontinued During This Encounter   Medication Reason     Tiotropium Bromide Monohydrate (SPIRIVA RESPIMAT) 1.25 MCG/ACT AERS Therapy completed     bumetanide (BUMEX) 1 MG tablet Reorder         Encounter Diagnoses   Name Primary?     Edema of both lower legs due to peripheral venous insufficiency      Lymphedema of extremity      Essential hypertension with goal blood pressure less than 130/85 Yes     Pulmonary HTN      Localized edema      Essential hypertension        CURRENT MEDICATIONS:  Current Outpatient Prescriptions   Medication Sig Dispense Refill     bumetanide (BUMEX) 1 MG tablet Take 1 tablet (1 mg) by mouth daily 30 tablet 1     rOPINIRole (REQUIP) 0.25 MG tablet Take 2 nightly, and one in the afternoon as needed       lactobacillus rhamnosus, GG, (CULTURELL) capsule Take 1 capsule by mouth daily       traMADol (ULTRAM) 50 MG tablet Take 1 tablet (50 mg) by mouth every 6 hours as needed for moderate pain 50 tablet 0     olmesartan (BENICAR) 40 MG tablet Take 1 tablet (40 mg) by mouth daily 90 tablet 2     omeprazole (PRILOSEC) 20 MG CR capsule TAKE 1 CAPSULE (20 MG) BY MOUTH 2 TIMES DAILY 180 capsule 1     Cholecalciferol (VITAMIN D) 2000 UNITS tablet Take 2,000 Units by mouth 2 times daily        "fluticasone-salmeterol (ADVAIR DISKUS) 250-50 MCG/DOSE diskus inhaler Inhale 1 puff into the lungs 2 times daily 60 Inhaler 1     order for DME 2L of O2 at night       Calcium-Vitamin D-Vitamin K (CALCIUM + D) 500-1000-40 MG-UNT-MCG CHEW Take 2 tablets by mouth daily        DOXYCYCLINE HYCLATE PO Take 50 mg by mouth 3 times per week Monday,wednesday, friday       prednisoLONE acetate (PRED FORTE) 1 % ophthalmic suspension Place 1 drop Into the left eye 4 times daily        albuterol (ALBUTEROL) 108 (90 BASE) MCG/ACT inhaler Inhale 2 puffs into the lungs every 4 hours as needed 1 Inhaler 5     RESTASIS 0.05 % OP EMUL 1 drop to left eye twice daily       SYSTANE 0.4-0.3 % OP SOLN 1 drop to left eye 4 times daily       [DISCONTINUED] bumetanide (BUMEX) 1 MG tablet Take 1 tablet (1 mg) by mouth daily 1 tablet 0       ALLERGIES     Allergies   Allergen Reactions     Atorvastatin Calcium      myalgias     Celecoxib Swelling     edema     Cholestyramine Diarrhea     Diarrhea       Crestor [Rosuvastatin Calcium]      leg aches, likely from medication     Cyclobenzaprine Hcl      flexeril--gets \"goofy\"     Dulera      tremors     Gabapentin      Nightmares.   Retrial of medication caused tremors.     Lisinopril Cough     Cough/ Dizziness at high dose.     Meperidine Hcl Nausea and Vomiting     demerol-severe nausea     Morphine Nausea and Vomiting     Naprosyn [Naproxen] GI Disturbance     Niaspan [Niacin]      flushing, severe     Percocet [Oxycodone-Acetaminophen] Nausea     Nausea, lightheadedness.   Tolerates med if taken with food.     Pravastatin Swelling     Edema, myalgias     Red Yeast Rice [Cholestin] GI Disturbance     Bloating, etc.     Simvastatin      myalgias     Timolol Maleate Difficulty breathing     timoptic--respiratory problems     Hctz Rash     rash     Sulfa Drugs Itching and Rash     rash on all mucous membranes and palms of hands with intense itching       PAST MEDICAL HISTORY:  Past Medical History: "   Diagnosis Date     Arthritis      Cellulitis 4/13 in [place]    R ankle     Difficult airway for intubation      Ductal Carcinoma in Situ of Left Breast 9/09     Duodenal ulcer, unspecified as acute or chronic, without mention of hemorrhage or perforation 1980's    felt due to high dose steroids     Female stress incontinence      H/O right and left heart catheterization     1-     HTN      HYPERLIPIDEMIA      Ischemic colitis 7/2001    [facility]     Lactose Intolerance     Mild     Left Ventricular Hypertrophy      Legal blindness     Artificial right eye, severe vision loss left (Detached retina's, glaucoma, corneal transplants)     Mild intermittent asthma ~1980's     MITRAL VALVE Prolapse, with murmur      Nocturnal oxygen desaturation      Osteoporosis, unspecified ~2002     Patent foramen ovale      PERIPHERAL VASCULAR DISEASE 5/05    mesenteric arteries, angioplasty     PRESBYESOPHAGUS 6/04     Pulmonary HTN 11/2014     Serous retinal detachment     False eye right     Subarachnoid hemorrhage following injury (H) 12/10    due to fall, vascular evaluation negative     Syncope and collapse 12/10     Unspecified glaucoma(365.9)        PAST SURGICAL HISTORY:  Past Surgical History:   Procedure Laterality Date     BIOPSY       C APPENDECTOMY  1982     C NONSPECIFIC PROCEDURE  1945    adenoidectomy     C NONSPECIFIC PROCEDURE      bilat retinal detachment     C NONSPECIFIC PROCEDURE      blephoroplasty bilat     C NONSPECIFIC PROCEDURE  1997    glaucoma procedure L     C NONSPECIFIC PROCEDURE  1997, 2007, 2014    corneal transplant L     C NONSPECIFIC PROCEDURE  1945    strabismus procedure R eye     C NONSPECIFIC PROCEDURE  1960's    R breast bx     C NONSPECIFIC PROCEDURE  5/05    Angioplasty and stenting mesenteric vessels     C NONSPECIFIC PROCEDURE  2008    L corneal transplant     COLONOSCOPY      due 2015     ENDOSCOPIC RETROGRADE CHOLANGIOPANCREATOGRAM N/A 10/13/2017    Procedure: ENDOSCOPIC  RETROGRADE CHOLANGIOPANCREATOGRAM;  ENDOSCOPIC RETROGRADE CHOLANGIOPANCREATOGRAM ;  Surgeon: Isabel Yousif MD;  Location: [facility] OR     HC REMOVAL GALLBLADDER  1982    Cholecystectomy     HEART CATH RIGHT AND LEFT HEART CATH  1/19/15     HERNIORRHAPHY VENTRAL N/A 4/19/2016    Procedure: HERNIORRHAPHY VENTRAL;  Surgeon: Hamlet Ness MD;  Location: [facility] OR     MASTECTOMY SIMPLE BILATERAL  10/5/09    bilateral mastectomy     SURGICAL HISTORY OF -   6/2010    breast reconstruction       FAMILY HISTORY:  Family History   Problem Relation Age of Onset     Adopted: Yes     C.A.D. Paternal Uncle      MI 50's     Family History Negative Daughter        SOCIAL HISTORY:  Social History     Social History     Marital status:      Spouse name: N/A     Number of children: 2     Years of education: N/A     Occupational History     Retired nurse Retired     Social History Main Topics     Smoking status: Never Smoker     Smokeless tobacco: Never Used     Alcohol use Yes      Comment: 3 glasses of wine a week     Drug use: No     Sexual activity: No     Other Topics Concern     Caffeine Concern No     1- 2 cups coffee     Sleep Concern No     Stress Concern No     Weight Concern No     Special Diet Yes     low sodium     Exercise No     2 days a week (abigail chi), walking program, stationary bike 10 minutes 3 times per week     Seat Belt Yes     Social History Narrative    , 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  She walks with a walker.  Is a full code.  (last updated 10/11/2017)        Review of Systems:  Skin:  Positive for scaling left leg   Eyes:  Positive for glasses Foggy vision  ENT:  Positive for   Dry nose  Respiratory:  Positive for dyspnea on exertion;shortness of breath;cough O2 at night, SOB and CRUZ and coughing are occassionally   Cardiovascular:  Negative      Gastroenterology: Positive for heartburn    Genitourinary:  not assessed     "  Musculoskeletal:  Positive for back pain    Neurologic:  Negative      Psychiatric:  Positive for depression When she can't leave her house  Heme/Lymph/Imm:  Positive for allergies    Endocrine:  Negative        Physical Exam:  Vitals: /83  Pulse 70  Ht 1.626 m (5' 4\")  Wt 69 kg (152 lb 3.2 oz)  LMP 01/01/1990  BMI 26.13 kg/m2    Constitutional:  cooperative;in no acute distress        Skin:  warm and dry to the touch venous stasis changes        Head:  normocephalic        Eyes:      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    Lymph:No Cervical lymphadenopathy present     ENT:  no pallor or cyanosis        Neck:           Respiratory:  clear to auscultation         Cardiac: regular rhythm;normal S1 and S2;no murmurs, gallops or rubs detected     no presence of murmur                                                   GI:  abdomen soft;non-tender        Extremities and Muscular Skeletal:    stasis pigmentation bilateral LE edema;trace;1+     wearing compression stockings    Neurological:  no gross motor deficits   walks with a walker    Psych:  affect appropriate, oriented to time, person and place          Mika Robles MD  [address]  [place] [id number]              "

## 2017-12-20 ENCOUNTER — CARE COORDINATION (OUTPATIENT)
Dept: CARDIOLOGY | Facility: CLINIC | Age: 77
End: 2017-12-20

## 2017-12-20 DIAGNOSIS — I27.20 PULMONARY HYPERTENSION (H): Primary | ICD-10-CM

## 2017-12-20 DIAGNOSIS — I50.30 DIASTOLIC HEART FAILURE (H): ICD-10-CM

## 2017-12-20 NOTE — PROGRESS NOTES
6 minute walk result placed on Dr Silva desk for review.  Rupinder Riddle RN 12/20/17 4:32 PM    12/20/2017 Reviewed with Dr Silva - plan to review 6 minute walk at visit 1/15/2017 - do not repeat at this time.  Rupinder Riddle RN 12/20/17 5:14 PM

## 2017-12-21 ENCOUNTER — TELEPHONE (OUTPATIENT)
Dept: CARDIOLOGY | Facility: CLINIC | Age: 77
End: 2017-12-21

## 2017-12-21 NOTE — TELEPHONE ENCOUNTER
Call from patient.  She had seen Lilliana Truong on 12/14/17 and Lilliana had called to tell her to take 1 mg Bumex one day and 1/2 mg the next day, in ongoing alternating doses.  2 mg every day had been too much, and she had gotten too dry.  On the prescription, it says to take both medications once a day.  She just wanted to be sure that she was dosing herself correctly.  Read Gail's last visit note to pt where it gives directions on how Lilliana wanted patient to take the 1 mg tablet and the 1/2 mg tablet on alternate days, so she was reassured that she has been taking them correctly.  Will send this information to Lilliana's nurses.  BAILEY Simmons 12/21/2017

## 2018-01-15 ENCOUNTER — OFFICE VISIT (OUTPATIENT)
Dept: CARDIOLOGY | Facility: CLINIC | Age: 78
End: 2018-01-15
Attending: INTERNAL MEDICINE
Payer: COMMERCIAL

## 2018-01-15 VITALS
WEIGHT: 153.8 LBS | DIASTOLIC BLOOD PRESSURE: 52 MMHG | HEART RATE: 81 BPM | SYSTOLIC BLOOD PRESSURE: 104 MMHG | HEIGHT: 64 IN | OXYGEN SATURATION: 99 % | BODY MASS INDEX: 26.26 KG/M2

## 2018-01-15 DIAGNOSIS — I27.20 PULMONARY HTN (H): ICD-10-CM

## 2018-01-15 DIAGNOSIS — I10 BENIGN ESSENTIAL HYPERTENSION: ICD-10-CM

## 2018-01-15 DIAGNOSIS — I50.30 DIASTOLIC HEART FAILURE (H): ICD-10-CM

## 2018-01-15 DIAGNOSIS — I27.20 PULMONARY HYPERTENSION (H): ICD-10-CM

## 2018-01-15 DIAGNOSIS — I10 ESSENTIAL HYPERTENSION: ICD-10-CM

## 2018-01-15 DIAGNOSIS — I27.20 PULMONARY HYPERTENSION (H): Primary | ICD-10-CM

## 2018-01-15 DIAGNOSIS — R60.0 EDEMA OF BOTH LOWER LEGS DUE TO PERIPHERAL VENOUS INSUFFICIENCY: ICD-10-CM

## 2018-01-15 DIAGNOSIS — I35.1 AORTIC VALVE REGURGITATION, NONRHEUMATIC: ICD-10-CM

## 2018-01-15 DIAGNOSIS — J44.9 CHRONIC OBSTRUCTIVE PULMONARY DISEASE, UNSPECIFIED COPD TYPE (H): ICD-10-CM

## 2018-01-15 DIAGNOSIS — R60.0 LOCALIZED EDEMA: ICD-10-CM

## 2018-01-15 DIAGNOSIS — I87.2 EDEMA OF BOTH LOWER LEGS DUE TO PERIPHERAL VENOUS INSUFFICIENCY: ICD-10-CM

## 2018-01-15 LAB
ANION GAP SERPL CALCULATED.3IONS-SCNC: 14.6 MMOL/L (ref 6–17)
BUN SERPL-MCNC: 55 MG/DL (ref 7–30)
CALCIUM SERPL-MCNC: 9.3 MG/DL (ref 8.5–10.5)
CHLORIDE SERPL-SCNC: 110 MMOL/L (ref 98–107)
CO2 SERPL-SCNC: 21 MMOL/L (ref 23–29)
CREAT SERPL-MCNC: 1.63 MG/DL (ref 0.7–1.3)
GFR SERPL CREATININE-BSD FRML MDRD: 30 ML/MIN/1.7M2
GLUCOSE SERPL-MCNC: 110 MG/DL (ref 70–105)
POTASSIUM SERPL-SCNC: 4.6 MMOL/L (ref 3.5–5.1)
RHEUMATOID FACT SER NEPH-ACNC: <20 IU/ML (ref 0–20)
SODIUM SERPL-SCNC: 141 MMOL/L (ref 136–145)
T4 FREE SERPL-MCNC: 0.93 NG/DL (ref 0.76–1.46)
TSH SERPL DL<=0.005 MIU/L-ACNC: 4.13 MU/L (ref 0.4–4)

## 2018-01-15 PROCEDURE — 80048 BASIC METABOLIC PNL TOTAL CA: CPT | Performed by: NURSE PRACTITIONER

## 2018-01-15 PROCEDURE — 36415 COLL VENOUS BLD VENIPUNCTURE: CPT | Performed by: NURSE PRACTITIONER

## 2018-01-15 PROCEDURE — 86038 ANTINUCLEAR ANTIBODIES: CPT | Performed by: NURSE PRACTITIONER

## 2018-01-15 PROCEDURE — 84443 ASSAY THYROID STIM HORMONE: CPT | Performed by: NURSE PRACTITIONER

## 2018-01-15 PROCEDURE — 86431 RHEUMATOID FACTOR QUANT: CPT | Performed by: NURSE PRACTITIONER

## 2018-01-15 PROCEDURE — 84439 ASSAY OF FREE THYROXINE: CPT | Performed by: NURSE PRACTITIONER

## 2018-01-15 PROCEDURE — 99215 OFFICE O/P EST HI 40 MIN: CPT | Performed by: INTERNAL MEDICINE

## 2018-01-15 RX ORDER — BUMETANIDE 0.5 MG/1
0.5 TABLET ORAL
Qty: 30 TABLET | Refills: 11 | Status: SHIPPED | OUTPATIENT
Start: 2018-01-15 | End: 2018-05-10

## 2018-01-15 RX ORDER — SILICONE ADHESIVE 1.5" X 3"
1 SHEET (EA) TOPICAL AT BEDTIME
COMMUNITY
End: 2019-01-01

## 2018-01-15 RX ORDER — BUMETANIDE 0.5 MG/1
0.5 TABLET ORAL
Qty: 30 TABLET | Refills: 3 | COMMUNITY
Start: 2018-01-15 | End: 2018-01-15

## 2018-01-15 NOTE — PROGRESS NOTES
LOCATION:  Pulmonary Hypertension Clinic, [facility]      REFERRING PROVIDER:  Jae Butler MD, cardiologist at [facility]       PRIMARY CARE PROVIDER:  Abran Mott MD      REASON FOR VISIT:  Pulmonary hypertension.      HISTORY OF PRESENT ILLNESS:  Rupinder Cast is new to my practice, but she has established Cardiology care with my cardiologist partner, Dr. Jae Butler.  The patient was unaccompanied today.  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  She has significant medical comorbidities as listed below.  She lives alone in an apartment in [place].  She has a daughter who lives in [place].  She has 2 nieces who live locally, and one, Lo Mohamud, has the power of .      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  Despite all of this, patient is fairly independent at self-caring.  She lives in an apartment on her own, is , does her own cooking, understandably does not drive and is fairly technically savvy and has a smartphone.        I am seeing her for the question of pulmonary hypertension in the context of recent worsening dyspnea.  Her last echocardiogram in 10/2017 revealed an estimated RVSP of about 60 mmHg with normal right ventricular size and mildly decreased systolic function.      The patient was diagnosed with asthma in the 1980s.  She says she gets some improvement  with inhalers.  She follows with Dr. Jhony Ruiz at the [facility].  She has never smoked, has had a couple of sleep studies and was noted to have nocturnal desaturation in the absence of sleep apnea (did not achieve REM sleep), never had a DVT or a PE.  In 10/2017, she was admitted with acute choledocholithiasis (status post previous cholecystectomy) and underwent an ERCP.  During that hospitalization, she had significant fluid overload, which was deemed secondary to diastolic heart failure.  At that time an echocardiogram was obtained, which showed moderate concentric left ventricular hypertrophy with an LVEF of 55%-60%, grade 2 diastolic function, borderline dilated right ventricle with mild to moderately decreased systolic function (TAPSE 13), moderate left atrial enlargement with a suspected patent foramen ovale, mild tricuspid regurgitation, estimated RVSP of 60 mmHg, aortic root dilatation (4.3 cm), ascending aorta dilatation (4.0 cm) with moderate aortic regurgitation, mild mitral regurgitation, mild tricuspid regurgitation and a dilated IVC with trivial pericardial effusion.  In 2015, she had a right and left heart cath.  This showed that she had fairly normal right hemodynamics with a mean RA pressure of 7, mean RV of 9, mildly elevated wedge pressure of 16, PA pressure 58/12 with a mean of 32, Elvira cardiac output 3.8, thermodilution cardiac output 3.25, no evidence of shunt on oximetry run and mildly elevated pulmonary vascular resistance of 4.2 Wood units.  She did not have any significant coronary artery disease on angiogram.  There was moderate aortic insufficiency on aortogram.  She was also noted to have moderate pulmonary regurgitation, which was confirmed on echocardiography.      Since October, the patient has had recurrent issues with fluid overload.  She is currently on bumetanide 0.5 mg alternating with 1 mg.  Her most recent renal function shows a sodium of 141, potassium 4.6, BUN  55 and creatinine 1.6 (up from her baseline of 1.0-1.1).  She is mildly anemic with a hemoglobin of 11.0.  She says that she has been getting progressively short of breath.  Because of her background training as an RN, she is able to give an excellent history.  She says she walks about 100-200 feet and has to stop because of dyspnea, which is relieved within 2-3 minutes of resting.  At home, her sats have been noted to drop as low as 89%.  She had a 6 minute walk test, in which she desaturated down to 87% at 5 minutes 30 seconds with an appropriate tachycardic response of 117, and with oxygen, this went up to 96%.  She does wear supplemental oxygen at night because of nocturnal hypoxemia demonstrated on her recent sleep study.  She says her inhalers help her.  She was seen for her lower extremity edema.  She was seen in the [facility] and deemed to have mild venous insufficiency as well as lymphedema.  Today she has nonpitting edema.  She wears compression stockings, and this has been stable.  There is no chest pain, orthopnea, PND, palpitations, presyncope or syncope.  She has never known to have previous arrhythmias.      There is no personal history of hepatitis, anorexiant drug use or connective tissue disease.  No family history of pulmonary hypertension.      CURRENT MEDICATIONS:     1.  Bumetanide 0.5 mg on odd days and 1 mg on even days.   2.  Ropinirole 0.5 mg as needed for leg cramps.   3.  Tramadol 50 mg as needed.   4.  Olmesartan (Benicar) 40 mg daily.     5.  Omeprazole 20 mg b.i.d.   6.  Vitamin D supplement.   7.  Advair inhaler 1 puff b.i.d.   8.  Calcium, vitamin D, vitamin K supplement.   9.  Doxycycline 50 mg per mouth 3 times a week.   10.  Albuterol inhaler.   11.  Restasis eyedrops.   12.  Systane eyedrops.   13.  Hyaluronate eyedrops and sodium chloride eyedrops.      Please see my attached note in Epic for a comprehensive review of systems, medications, past medical, past surgical, social  and family history.        PHYSICAL EXAMINATION:   VITAL SIGNS:  /52, pulse 81 per minute and regular, height 1.626 m, weight 69.8 kg (153 pounds), respiratory rate 14 per minute, saturations 99% on room air, BMI 26.4 kg/m2.   CONSTITUTIONAL:  The patient has an obvious prosthetic right eye and is legally blind, but she is comfortable at rest without conversational dyspnea.  Cooperative, alert, oriented, well developed and nourished and able to give a detailed and excellent medical history.     EYES:  As above.     ENT:  Satisfactory dentition.  No cyanosis or pallor.   NECK:  Normal carotid pulses.  No carotid bruit or thyromegaly.     RESPIRATORY:  Normal respiratory effort, symmetrical chest wall movements, bilateral good air entry.  Normal breath sounds.  No rales or wheeze.   CARDIOVASCULAR:  Normal JVP, no carotid bruit.  Apical impulses normal to palpation.  No parasternal heave or thrill.  First heart sound is normal.  Second heart sound has a slightly prominent P2 component.  Very soft, early diastolic murmur consistent with mild to moderate aortic regurgitation.  No S3, S4 or friction rub.     GASTROINTESTINAL:  Has multiple abdominal scars, but overall soft, nontender.  No hepatosplenomegaly or masses.  Active bowel sounds.   MUSCULOSKELETAL:  Normal muscle tone and strength.  No spinal deformities.     NEUROPSYCHIATRIC:  Oriented to time, place and person.  Normal affect.  No gross motor deficits.     EXTREMITIES:  She has lymphedema (nonpitting) with skin changes suggestive of chronic venous stasis of both lower extremities.  No clubbing or deformities.        DIAGNOSTIC DATA:   LABORATORY:  Sodium 141, potassium 4.6, BUN 55 (from a baseline of 26), creatinine 1.63 (up from baseline of 1.0), TSH 4.13, free T4 of 0.93, serum albumin is low at 2.3, total bilirubin 0.8, alkaline phosphatase elevated at 224 (but these were last obtained in October during her acute hospitalization), hemoglobin 13.2.       ECG:  Sinus rhythm, poor R-wave progression in anterior leads with low QRS voltage, occasional PVC.      I have reviewed her right and left heart catheterization from 2015, recent transthoracic echocardiogram (10/2017), a 6 minute walk, pulmonary function tests as documented in HPI.      Her PFTs as documented in the [facility] report suggest moderate to severe obstructive impairment with an FVC that is 2.26 or 83% of predicted, FEV1 of 1.1 or 57% of predicted and a ratio of 51%.  DLCO not included.      DIAGNOSES:     1.  Moderate pulmonary hypertension based on right heart catheterization in 2015 (mean PA pressure 32, PVR 4.7, wedge pressure 16 mmHg) with interval clinical deterioration.   2.  Mild right ventricular enlargement with mildly decreased systolic function.   3.  Moderate to severe COPD, presumed secondary to asthma.   4.  Nocturnal and exertional desaturation.   5.  Systemic hypertension, well controlled.   6.  Moderate aortic regurgitation secondary to aortic root and ascending aorta dilatation.   7.  Moderate pulmonary artery enlargement with moderate pulmonary regurgitation.   8.  Legally blind.   9.  Stage 3 chronic kidney disease, baseline creatinine 1.1.      ASSESSMENT/PLAN:    1.  Based on symptoms, review of tests and exam, it is likely the patient has at least moderate pulmonary hypertension.  In fact, I suspect that the pulmonary hypertension may have worsened since her right heart catheterization in 2015.  She will need additional testing and optimization of her oxygen therapy and lung status before we will repeat her right heart catheterization and consider pulmonary hypertension-specific drugs.     2.  I suspect the pulmonary hypertension is secondary to her severe COPD - presumed asthma that started in the 1980s.  She has never smoked or had a pulmonary embolus.  I would like her to have a Pulmonology opinion at the [facility] to assess just how extensive her  lung disease is.  Downstream, if we choose to start her on a PD5 inhibitor, such as tadalafil, it can potentially make her hypoxemia worse.     3.  Additional testing:  V/Q scan, abdominal ultrasound with Doppler (to assess liver, portal vein and mesenteric circulation), repeat transthoracic echocardiogram with bubble study to rule out inter-atrial shunt.   4.  Start oxygen supplementation to keep sats above 95% during sleep and exertion.   5.  Referral to pulmonary rehabilitation for COPD and pulmonary hypertension.   6.  BUN and creatinine are increasing.  Cut back bumetanide to 0.5 mg every day.  Check basic metabolic panel and NT-proBNP in 1 week.   7.  Follow up in the Pulmonary Hypertension Clinic with test results.  At that point, we will discuss proceeding to right heart catheterization.      It was my pleasure to be involved in the care of this highly intelligent patient with realistic expectations and a great attitude about her medical comorbidities. I spent 60 minutes with her; greater than 50% of the time was spent in counseling and coordination of care.  I look forward to seeing her again.      cc:   Jae Butler MD, [facility] [place] [address]       Abran Mott MD   [address] [address]         [facility] LEONARD YOUSSEF MD             D: 01/15/2018 11:36   T: 01/15/2018 13:14   MT: shannon      Name:     JUDY RUSS   MRN:      [id number]        Account:      [id number]   :      1940           Service Date: 01/15/2018      Document: [id number]

## 2018-01-15 NOTE — MR AVS SNAPSHOT
After Visit Summary   1/15/2018    Rupinder Tracy    MRN: [id number]           Patient Information     Date Of Birth          1940        Visit Information        Provider Department      1/15/2018 9:45 AM Antoni Silva MD [facility]   [place]        Today's Diagnoses     Pulmonary hypertension    -  1    Chronic obstructive pulmonary disease, unspecified COPD type (H)        Aortic valve regurgitation, nonrheumatic        Benign essential hypertension        Pulmonary HTN        Localized edema          Care Instructions    1. V/Q scan.    2. Apply for continuous oxygen supplementation 2 L/m, to keep sats above 95%.    3. Refer to the [facility] pulmonology for a second opinion.  [facility]: [facility] [phone]      4. Abdominal ultrasound with Doppler - to assess liver, portal veins, mesenteric circulation.    5. Repeat transthoracic echocardiogram with bubble study.    6. Refer to pulmonary rehabilitation. If you have not heard to schedule please call: [phone]    7. Change Bumex dose 0.5 mg, one tablet every day.    8. Basic metabolic panel and NT proBNP in one week.     9. Follow-up for test results. At that point, we will discuss proceeding to right heart catheterization.    If you have any questions or concerns, please call my nurse Rupinder Chen at [phone].            Follow-ups after your visit        Additional Services     PULMONARY MEDICINE REFERRAL       Your provider has referred you to: [facility]: [facility] [phone]   [email or web address]    Please be aware that coverage of these services is subject to the terms and limitations of your health insurance plan.  Call member services at your health plan with any benefit or coverage questions.      Please bring the following with you to your  appointment:    (1) Any X-Rays, CTs or MRIs which have been performed.  Contact the facility where they were done to arrange for  prior to your scheduled appointment.    (2) List of current medications   (3) This referral request   (4) Any documents/labs given to you for this referral            PULMONARY REHAB REFERRAL           Follow-Up with Pulmonary Hypertension Clinic                 Future tests that were ordered for you today     Open Future Orders        Priority Expected Expires Ordered    N terminal pro BNP outpatient Routine 1/22/2018 1/15/2019 1/15/2018    Echo Complete Bubble Routine 1/22/2018 1/15/2019 1/15/2018    US Abdomen Complete w Doppler Complete Routine 1/29/2018 1/15/2019 1/15/2018    PULMONARY REHAB REFERRAL Routine 1/22/2018 1/15/2019 1/15/2018    Follow-Up with Pulmonary Hypertension Clinic Routine 2/28/2018 1/15/2019 1/15/2018    V/Q Scan Routine 1/22/2018 1/15/2019 1/15/2018    Basic metabolic panel Routine 1/23/2018 1/15/2019 1/15/2018            Who to contact     If you have questions or need follow up information about today's clinic visit or your schedule please contact [facility] directly at [phone].  Normal or non-critical lab and imaging results will be communicated to you by [employer], letter or phone within 4 business days after the clinic has received the results. If you do not hear from us within 7 days, please contact the clinic through [employer] or phone. If you have a critical or abnormal lab result, we will notify you by phone as soon as possible.  Submit refill requests through [employer] or call your pharmacy and they will forward the refill request to us. Please allow 3 business days for your refill to be completed.          Additional Information About Your Visit        [employer] Information     [employer] lets you send messages to your doctor, view your test results, renew your prescriptions, schedule appointments and more. To  "sign up, go to [email or web address] . Click on \"Log in\" on the left side of the screen, which will take you to the Welcome page. Then click on \"Sign up Now\" on the right side of the page.     You will be asked to enter the access code listed below, as well as some personal information. Please follow the directions to create your username and password.     Your access code is: [email or web address]  Expires: 3/14/2018 12:47 PM     Your access code will  in 90 days. If you need help or a new code, please call your [place] clinic or [phone].        Care EveryWhere ID     This is your Care EveryWhere ID. This could be used by other organizations to access your [place] medical records  [id number]        Your Vitals Were     Pulse Height Last Period Pulse Oximetry BMI (Body Mass Index)       81 1.626 m (5' 4\") 1990 99% 26.4 kg/m2        Blood Pressure from Last 3 Encounters:   01/15/18 104/52   17 121/83   17 108/52    Weight from Last 3 Encounters:   01/15/18 69.8 kg (153 lb 12.8 oz)   17 69 kg (152 lb 3.2 oz)   17 72.3 kg (159 lb 6.4 oz)              We Performed the Following     Follow-Up with Pulmonary Hypertension Clinic     PULMONARY MEDICINE REFERRAL          Today's Medication Changes          These changes are accurate as of: 1/15/18 11:31 AM.  If you have any questions, ask your nurse or doctor.               These medicines have changed or have updated prescriptions.        Dose/Directions    bumetanide 0.5 MG tablet   Commonly known as:  BUMEX   This may have changed:    - when to take this  - Another medication with the same name was removed. Continue taking this medication, and follow the directions you see here.   Used for:  Pulmonary HTN, Localized edema   Changed by:  Antoni Silva MD        Dose:  0.5 mg   Take 1 tablet (0.5 mg) by mouth daily (with breakfast)   Quantity:  30 tablet   Refills:  3                Primary Care Provider Office Phone # Fax #    " Abran Mott [phone]       [address] [facility] [address]        Equal Access to Services     LINUS ASCENCIO : Richard magen kirkpatrick александр Jama, waanahyda luqfareed, jorgeta kaalmada benjamin, ethan jean lapettytony dayanna. So [facility] [phone].    ATENCIÓN: Si habla español, tiene a lugo disposición servicios gratuitos de asistencia lingüística. Llame al [phone].    We comply with applicable federal civil rights laws and [place] laws. We do not discriminate on the basis of race, color, national origin, age, disability, sex, sexual orientation, or gender identity.            Thank you!     Thank you for choosing [facility]  for your care. Our goal is always to provide you with excellent care. Hearing back from our patients is one way we can continue to improve our services. Please take a few minutes to complete the written survey that you may receive in the mail after your visit with us. Thank you!             Your Updated Medication List - Protect others around you: Learn how to safely use, store and throw away your medicines at www.disposemymeds.org.          This list is accurate as of: 1/15/18 11:31 AM.  Always use your most recent med list.                   Brand Name Dispense Instructions for use Diagnosis    albuterol 108 (90 BASE) MCG/ACT Inhaler    PROAIR HFA    1 Inhaler    Inhale 2 puffs into the lungs every 4 hours as needed    Mild intermittent asthma       bumetanide 0.5 MG tablet    BUMEX    30 tablet    Take 1 tablet (0.5 mg) by mouth daily (with breakfast)    Pulmonary HTN, Localized edema       CALCIUM + D 500-1000-40 MG-UNT-MCG Chew   Generic drug:  Calcium-Vitamin D-Vitamin K      Take 2 tablets by mouth daily        DOXYCYCLINE HYCLATE PO      Take 50 mg by mouth 3 times per week Monday,wednesday, friday        fluticasone-salmeterol 250-50 MCG/DOSE diskus inhaler    ADVAIR DISKUS    60 Inhaler    Inhale 1 puff  into the lungs 2 times daily    Mild persistent asthma without complication       hyaluronate in balanced salt ophthalmic solution solution    HEALON IN BSS     1 drop every 4 hours (while awake)        lactobacillus rhamnosus (GG) capsule      Take 1 capsule by mouth daily        MARGAUX 128 5 % ophthalmic solution   Generic drug:  sodium chloride      1 drop At Bedtime        OCUSOFT EYE WASH OP           olmesartan 40 MG tablet    BENICAR    90 tablet    Take 1 tablet (40 mg) by mouth daily    Essential hypertension with goal blood pressure less than 130/80       omeprazole 20 MG CR capsule    priLOSEC    180 capsule    TAKE 1 CAPSULE (20 MG) BY MOUTH 2 TIMES DAILY    Gastroesophageal reflux disease without esophagitis       order for DME      2L of O2 at night        prednisoLONE acetate 1 % ophthalmic susp    PRED FORTE     Place 1 drop Into the left eye 4 times daily        REQUIP 0.25 MG tablet   Generic drug:  rOPINIRole      Take 2 nightly, and one in the afternoon as needed    Restless legs syndrome (RLS)       RESTASIS 0.05 % ophthalmic emulsion   Generic drug:  cycloSPORINE      1 drop to left eye twice daily        SYSTANE 0.4-0.3 % Soln ophthalmic solution   Generic drug:  polyethylene glycol 0.4%- propylene glycol 0.3%      1 drop to left eye 4 times daily        traMADol 50 MG tablet    ULTRAM    50 tablet    Take 1 tablet (50 mg) by mouth every 6 hours as needed for moderate pain    Chronic pain syndrome       vitamin D 2000 UNITS tablet      Take 2,000 Units by mouth 2 times daily    Vitamin D deficiency

## 2018-01-15 NOTE — PROGRESS NOTES
Dictation reference number - [id number]      PRIMARY CARE PROVIDER:  Abran Mott  [address] [facility] [address]      REVIEW OF SYSTEMS:  A comprehensive 10-point review of systems was completed and the pertinent positives are documented in the history of present illness.    Skin:  Negative     Eyes:  Positive for glasses  ENT:  Positive for nasal congestion  Respiratory:  Positive for dyspnea on exertion;cough  Cardiovascular:    Positive for;edema  Gastroenterology: Positive for heartburn  Genitourinary:  not assessed    Musculoskeletal:  Positive for back pain  Neurologic:  Negative    Psychiatric:  Negative    Heme/Lymph/Imm:  Positive for allergies  Endocrine:  Negative      CURRENT MEDICATIONS:  Current Outpatient Prescriptions   Medication Sig Dispense Refill     hyaluronate in balanced salt ophthalmic solution (HEALON IN BSS) solution 1 drop every 4 hours (while awake)       sodium chloride (MARGAUX 128) 5 % ophthalmic solution 1 drop At Bedtime       Ophthalmic Irrigation Solution (OCUSOFT EYE WASH OP)        bumetanide (BUMEX) 0.5 MG tablet Take 1 tablet (0.5 mg) by mouth daily (with breakfast) 30 tablet 3     rOPINIRole (REQUIP) 0.25 MG tablet Take 2 nightly, and one in the afternoon as needed       lactobacillus rhamnosus, GG, (CULTURELL) capsule Take 1 capsule by mouth daily       traMADol (ULTRAM) 50 MG tablet Take 1 tablet (50 mg) by mouth every 6 hours as needed for moderate pain 50 tablet 0     olmesartan (BENICAR) 40 MG tablet Take 1 tablet (40 mg) by mouth daily 90 tablet 2     omeprazole (PRILOSEC) 20 MG CR capsule TAKE 1 CAPSULE (20 MG) BY MOUTH 2 TIMES DAILY 180 capsule 1     Cholecalciferol (VITAMIN D) 2000 UNITS tablet Take 2,000 Units by mouth 2 times daily       fluticasone-salmeterol (ADVAIR DISKUS) 250-50 MCG/DOSE diskus inhaler Inhale 1 puff into the lungs 2 times daily 60 Inhaler 1     order for DME 2L of O2 at night       Calcium-Vitamin D-Vitamin K (CALCIUM + D) 500-1000-40  "MG-UNT-MCG CHEW Take 2 tablets by mouth daily        DOXYCYCLINE HYCLATE PO Take 50 mg by mouth 3 times per week Monday,wednesday, friday       prednisoLONE acetate (PRED FORTE) 1 % ophthalmic suspension Place 1 drop Into the left eye 4 times daily        albuterol (ALBUTEROL) 108 (90 BASE) MCG/ACT inhaler Inhale 2 puffs into the lungs every 4 hours as needed 1 Inhaler 5     RESTASIS 0.05 % OP EMUL 1 drop to left eye twice daily       SYSTANE 0.4-0.3 % OP SOLN 1 drop to left eye 4 times daily       [DISCONTINUED] bumetanide (BUMEX) 1 MG tablet Take 1 tablet (1 mg) by mouth daily (Patient taking differently: Take 1 mg by mouth 1 tab on even days) 30 tablet 1     [DISCONTINUED] bumetanide (BUMEX) 0.5 MG tablet Take 1 tablet (0.5 mg) by mouth daily (Patient taking differently: Take 0.5 mg by mouth 1 tab on odd days) 30 tablet 0         ALLERGIES:  Allergies   Allergen Reactions     Atorvastatin Calcium      myalgias     Celecoxib Swelling     edema     Cholestyramine Diarrhea     Diarrhea       Crestor [Rosuvastatin Calcium]      leg aches, likely from medication     Cyclobenzaprine Hcl      flexeril--gets \"goofy\"     Dulera      tremors     Gabapentin      Nightmares.   Retrial of medication caused tremors.     Lisinopril Cough     Cough/ Dizziness at high dose.     Meperidine Hcl Nausea and Vomiting     demerol-severe nausea     Morphine Nausea and Vomiting     Naprosyn [Naproxen] GI Disturbance     Niaspan [Niacin]      flushing, severe     Percocet [Oxycodone-Acetaminophen] Nausea     Nausea, lightheadedness.   Tolerates med if taken with food.     Pravastatin Swelling     Edema, myalgias     Red Yeast Rice [Cholestin] GI Disturbance     Bloating, etc.     Simvastatin      myalgias     Timolol Maleate Difficulty breathing     timoptic--respiratory problems     Hctz Rash     rash     Sulfa Drugs Itching and Rash     rash on all mucous membranes and palms of hands with intense itching       PAST MEDICAL " HISTORY:    Past Medical History:   Diagnosis Date     Arthritis      Asthma      Cellulitis 4/13 in [place]    R ankle     Difficult airway for intubation      Ductal Carcinoma in Situ of Left Breast 9/09     Duodenal ulcer, unspecified as acute or chronic, without mention of hemorrhage or perforation 1980's    felt due to high dose steroids     Female stress incontinence      H/O right and left heart catheterization     1-     HYPERLIPIDEMIA      Hypertension      Ischemic colitis 7/2001    [facility]     Lactose Intolerance     Mild     Legal blindness     Artificial right eye, severe vision loss left (Detached retina's, glaucoma, corneal transplants)     Mild intermittent asthma ~1980's     Nocturnal oxygen desaturation      Osteoporosis, unspecified ~2002     Patent foramen ovale      PERIPHERAL VASCULAR DISEASE 5/05    mesenteric arteries, angioplasty     PRESBYESOPHAGUS 6/04     Pulmonary HTN 11/2014     Serous retinal detachment     False eye right     Subarachnoid hemorrhage following injury (H) 12/10    due to fall, vascular evaluation negative     Syncope and collapse 12/10     Unspecified glaucoma(365.9)        PAST SURGICAL HISTORY:    Past Surgical History:   Procedure Laterality Date     BIOPSY       C APPENDECTOMY  1982     C NONSPECIFIC PROCEDURE  1945    adenoidectomy     C NONSPECIFIC PROCEDURE      bilat retinal detachment     C NONSPECIFIC PROCEDURE      blephoroplasty bilat     C NONSPECIFIC PROCEDURE  1997    glaucoma procedure L     C NONSPECIFIC PROCEDURE  1997, 2007, 2014    corneal transplant L     C NONSPECIFIC PROCEDURE  1945    strabismus procedure R eye     C NONSPECIFIC PROCEDURE  1960's    R breast bx     C NONSPECIFIC PROCEDURE  5/05    Angioplasty and stenting mesenteric vessels     C NONSPECIFIC PROCEDURE  2008    L corneal transplant     COLONOSCOPY      due 2015     ENDOSCOPIC RETROGRADE CHOLANGIOPANCREATOGRAM N/A 10/13/2017    Procedure: ENDOSCOPIC RETROGRADE  "CHOLANGIOPANCREATOGRAM;  ENDOSCOPIC RETROGRADE CHOLANGIOPANCREATOGRAM ;  Surgeon: Isabel Yousif MD;  Location: [facility] OR     HC REMOVAL GALLBLADDER  1982    Cholecystectomy     HEART CATH RIGHT AND LEFT HEART CATH  1/19/15     HERNIORRHAPHY VENTRAL N/A 4/19/2016    Procedure: HERNIORRHAPHY VENTRAL;  Surgeon: Hamlet Ness MD;  Location: [facility] OR     MASTECTOMY SIMPLE BILATERAL  10/5/09    bilateral mastectomy     SURGICAL HISTORY OF -   6/2010    breast reconstruction       FAMILY HISTORY:    Family History   Problem Relation Age of Onset     Adopted: Yes     C.A.D. Paternal Uncle      MI 50's     Family History Negative Daughter        SOCIAL HISTORY:    Social History     Social History     Marital status:      Spouse name: N/A     Number of children: 4     Years of education: N/A     Occupational History     Retired nurse Retired     Social History Main Topics     Smoking status: Never Smoker     Smokeless tobacco: Never Used     Alcohol use 4.2 oz/week     7 Glasses of wine per week     Drug use: No     Sexual activity: No     Other Topics Concern     Caffeine Concern No     1- 2 cups coffee     Sleep Concern No     Stress Concern No     Weight Concern No     Special Diet Yes     low sodium     Exercise No     2 days a week (abigail chi), walking program, stationary bike 10 minutes 3 times per week     Seat Belt Yes     Social History Narrative    , 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  She walks with a walker.  Is a full code.  (last updated 10/11/2017)        PHYSICAL EXAM:    Vitals: /52  Pulse 81  Ht 1.626 m (5' 4\")  Wt 69.8 kg (153 lb 12.8 oz)  LMP 01/01/1990  SpO2 99%  BMI 26.4 kg/m2  Wt Readings from Last 5 Encounters:   01/15/18 69.8 kg (153 lb 12.8 oz)   12/14/17 69 kg (152 lb 3.2 oz)   11/07/17 72.3 kg (159 lb 6.4 oz)   11/02/17 72.3 kg (159 lb 4.8 oz)   10/30/17 73.5 kg (162 lb)           Encounter Diagnoses   Name Primary? "     Pulmonary hypertension Yes     Chronic obstructive pulmonary disease, unspecified COPD type (H)      Aortic valve regurgitation, nonrheumatic      Benign essential hypertension      Pulmonary HTN      Localized edema        Orders Placed This Encounter   Procedures     V/Q Scan     US Abdomen Complete w Doppler Complete     Basic metabolic panel     N terminal pro BNP outpatient     Follow-Up with Pulmonary Hypertension Clinic     PULMONARY REHAB REFERRAL     PULMONARY MEDICINE REFERRAL     Echo Complete Bubble       CC  REFERRING PROVIDER:  Antoni Silva MD  [address] [facility]  [address]  [place] [address]

## 2018-01-15 NOTE — LETTER
1/15/2018      Abran Mott MD  [address] [facility] [address]      RE: Rupinder Tracy       Dear Colleague,    I had the pleasure of seeing Rupinder Tracy in the [facility].    LOCATION:  Pulmonary Hypertension Clinic, [facility]      REFERRING PROVIDER:  Jae Butler MD, cardiologist at [facility]       PRIMARY CARE PROVIDER:  Abran Mott MD      REASON FOR VISIT:  Pulmonary hypertension.      HISTORY OF PRESENT ILLNESS:  Rupinder Cast is new to my practice, but she has established Cardiology care with my cardiologist partner, Dr. Jae Butler.  The patient was unaccompanied today.  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  She has significant medical comorbidities as listed below.  She lives alone in an apartment in [place].  She has a daughter who lives in [place].  She has 2 nieces who live locally, and one, Lo Mohamud, has the power of .      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  Despite all of this, patient is fairly independent at self-caring.  She lives in an apartment on her own, is , does her own cooking, understandably does not drive and is fairly technically savvy and has a smartphone.        I am seeing her for the question of pulmonary hypertension in the context of recent worsening dyspnea.  Her last echocardiogram  in 10/2017 revealed an estimated RVSP of about 60 mmHg with normal right ventricular size and mildly decreased systolic function.      The patient was diagnosed with asthma in the 1980s.  She says she gets some improvement with inhalers.  She follows with Dr. Jhony Ruiz at the [facility].  She has never smoked, has had a couple of sleep studies and was noted to have nocturnal desaturation in the absence of sleep apnea (did not achieve REM sleep), never had a DVT or a PE.  In 10/2017, she was admitted with acute choledocholithiasis (status post previous cholecystectomy) and underwent an ERCP.  During that hospitalization, she had significant fluid overload, which was deemed secondary to diastolic heart failure.  At that time an echocardiogram was obtained, which showed moderate concentric left ventricular hypertrophy with an LVEF of 55%-60%, grade 2 diastolic function, borderline dilated right ventricle with mild to moderately decreased systolic function (TAPSE 13), moderate left atrial enlargement with a suspected patent foramen ovale, mild tricuspid regurgitation, estimated RVSP of 60 mmHg, aortic root dilatation (4.3 cm), ascending aorta dilatation (4.0 cm) with moderate aortic regurgitation, mild mitral regurgitation, mild tricuspid regurgitation and a dilated IVC with trivial pericardial effusion.  In 2015, she had a right and left heart cath.  This showed that she had fairly normal right hemodynamics with a mean RA pressure of 7, mean RV of 9, mildly elevated wedge pressure of 16, PA pressure 58/12 with a mean of 32, Elvira cardiac output 3.8, thermodilution cardiac output 3.25, no evidence of shunt on oximetry run and mildly elevated pulmonary vascular resistance of 4.2 Wood units.  She did not have any significant coronary artery disease on angiogram.  There was moderate aortic insufficiency on aortogram.  She was also noted to have moderate pulmonary regurgitation, which was confirmed on  echocardiography.      Since October, the patient has had recurrent issues with fluid overload.  She is currently on bumetanide 0.5 mg alternating with 1 mg.  Her most recent renal function shows a sodium of 141, potassium 4.6, BUN 55 and creatinine 1.6 (up from her baseline of 1.0-1.1).  She is mildly anemic with a hemoglobin of 11.0.  She says that she has been getting progressively short of breath.  Because of her background training as an RN, she is able to give an excellent history.  She says she walks about 100-200 feet and has to stop because of dyspnea, which is relieved within 2-3 minutes of resting.  At home, her sats have been noted to drop as low as 89%.  She had a 6 minute walk test, in which she desaturated down to 87% at 5 minutes 30 seconds with an appropriate tachycardic response of 117, and with oxygen, this went up to 96%.  She does wear supplemental oxygen at night because of nocturnal hypoxemia demonstrated on her recent sleep study.  She says her inhalers help her.  She was seen for her lower extremity edema.  She was seen in the [facility] and deemed to have mild venous insufficiency as well as lymphedema.  Today she has nonpitting edema.  She wears compression stockings, and this has been stable.  There is no chest pain, orthopnea, PND, palpitations, presyncope or syncope.  She has never known to have previous arrhythmias.      There is no personal history of hepatitis, anorexiant drug use or connective tissue disease.  No family history of pulmonary hypertension.      CURRENT MEDICATIONS:     1.  Bumetanide 0.5 mg on odd days and 1 mg on even days.   2.  Ropinirole 0.5 mg as needed for leg cramps.   3.  Tramadol 50 mg as needed.   4.  Olmesartan (Benicar) 40 mg daily.     5.  Omeprazole 20 mg b.i.d.   6.  Vitamin D supplement.   7.  Advair inhaler 1 puff b.i.d.   8.  Calcium, vitamin D, vitamin K supplement.   9.  Doxycycline 50 mg per mouth 3 times a week.   10.  Albuterol inhaler.   11.   Restasis eyedrops.   12.  Systane eyedrops.   13.  Hyaluronate eyedrops and sodium chloride eyedrops.      Please see my attached note in Epic for a comprehensive review of systems, medications, past medical, past surgical, social and family history.        PHYSICAL EXAMINATION:   VITAL SIGNS:  /52, pulse 81 per minute and regular, height 1.626 m, weight 69.8 kg (153 pounds), respiratory rate 14 per minute, saturations 99% on room air, BMI 26.4 kg/m2.   CONSTITUTIONAL:  The patient has an obvious prosthetic right eye and is legally blind, but she is comfortable at rest without conversational dyspnea.  Cooperative, alert, oriented, well developed and nourished and able to give a detailed and excellent medical history.     EYES:  As above.     ENT:  Satisfactory dentition.  No cyanosis or pallor.   NECK:  Normal carotid pulses.  No carotid bruit or thyromegaly.     RESPIRATORY:  Normal respiratory effort, symmetrical chest wall movements, bilateral good air entry.  Normal breath sounds.  No rales or wheeze.   CARDIOVASCULAR:  Normal JVP, no carotid bruit.  Apical impulses normal to palpation.  No parasternal heave or thrill.  First heart sound is normal.  Second heart sound has a slightly prominent P2 component.  Very soft, early diastolic murmur consistent with mild to moderate aortic regurgitation.  No S3, S4 or friction rub.     GASTROINTESTINAL:  Has multiple abdominal scars, but overall soft, nontender.  No hepatosplenomegaly or masses.  Active bowel sounds.   MUSCULOSKELETAL:  Normal muscle tone and strength.  No spinal deformities.     NEUROPSYCHIATRIC:  Oriented to time, place and person.  Normal affect.  No gross motor deficits.     EXTREMITIES:  She has lymphedema (nonpitting) with skin changes suggestive of chronic venous stasis of both lower extremities.  No clubbing or deformities.        DIAGNOSTIC DATA:   LABORATORY:  Sodium 141, potassium 4.6, BUN 55 (from a baseline of 26), creatinine 1.63 (up  from baseline of 1.0), TSH 4.13, free T4 of 0.93, serum albumin is low at 2.3, total bilirubin 0.8, alkaline phosphatase elevated at 224 (but these were last obtained in October during her acute hospitalization), hemoglobin 13.2.      ECG:  Sinus rhythm, poor R-wave progression in anterior leads with low QRS voltage, occasional PVC.      I have reviewed her right and left heart catheterization from 2015, recent transthoracic echocardiogram (10/2017), a 6 minute walk, pulmonary function tests as documented in HPI.      Her PFTs as documented in the [facility] report suggest moderate to severe obstructive impairment with an FVC that is 2.26 or 83% of predicted, FEV1 of 1.1 or 57% of predicted and a ratio of 51%.  DLCO not included.      DIAGNOSES:     1.  Moderate pulmonary hypertension based on right heart catheterization in 2015 (mean PA pressure 32, PVR 4.7, wedge pressure 16 mmHg) with interval clinical deterioration.   2.  Mild right ventricular enlargement with mildly decreased systolic function.   3.  Moderate to severe COPD, presumed secondary to asthma.   4.  Nocturnal and exertional desaturation.   5.  Systemic hypertension, well controlled.   6.  Moderate aortic regurgitation secondary to aortic root and ascending aorta dilatation.   7.  Moderate pulmonary artery enlargement with moderate pulmonary regurgitation.   8.  Legally blind.   9.  Stage 3 chronic kidney disease, baseline creatinine 1.1.      ASSESSMENT/PLAN:    1.  Based on symptoms, review of tests and exam, it is likely the patient has at least moderate pulmonary hypertension.  In fact, I suspect that the pulmonary hypertension may have worsened since her right heart catheterization in 2015.  She will need additional testing and optimization of her oxygen therapy and lung status before we will repeat her right heart catheterization and consider pulmonary hypertension-specific drugs.     2.  I suspect the pulmonary hypertension is  secondary to her severe COPD - presumed asthma that started in the 1980s.  She has never smoked or had a pulmonary embolus.  I would like her to have a Pulmonology opinion at the [facility] to assess just how extensive her lung disease is.  Downstream, if we choose to start her on a PD5 inhibitor, such as tadalafil, it can potentially make her hypoxemia worse.     3.  Additional testing:  V/Q scan, abdominal ultrasound with Doppler (to assess liver, portal vein and mesenteric circulation), repeat transthoracic echocardiogram with bubble study to rule out inter-atrial shunt.   4.  Start oxygen supplementation to keep sats above 95% during sleep and exertion.   5.  Referral to pulmonary rehabilitation for COPD and pulmonary hypertension.   6.  BUN and creatinine are increasing.  Cut back bumetanide to 0.5 mg every day.  Check basic metabolic panel and NT-proBNP in 1 week.   7.  Follow up in the Pulmonary Hypertension Clinic with test results.  At that point, we will discuss proceeding to right heart catheterization.      It was my pleasure to be involved in the care of this highly intelligent patient with realistic expectations and a great attitude about her medical comorbidities. I spent 60 minutes with her; greater than 50% of the time was spent in counseling and coordination of care.  I look forward to seeing her again.      cc:   Jae Butler MD, [facility]   [place] [address]       Abran Mott MD   [address] [address]         [facility] LEONARD YOUSSEF MD             D: 01/15/2018 11:36   T: 01/15/2018 13:14   MT: shannon      Name:     JUDY RUSS   MRN:      [id number]        Account:      [id number]   :      1940           Service Date: 01/15/2018      Document: [id number]           Outpatient Encounter Prescriptions as of 1/15/2018   Medication Sig Dispense Refill     hyaluronate  in balanced salt ophthalmic solution (HEALON IN BSS) solution 1 drop every 4 hours (while awake)       sodium chloride (MARGAUX 128) 5 % ophthalmic solution 1 drop At Bedtime       Ophthalmic Irrigation Solution (OCUSOFT EYE WASH OP)        bumetanide (BUMEX) 0.5 MG tablet Take 1 tablet (0.5 mg) by mouth daily (with breakfast) 30 tablet 11     rOPINIRole (REQUIP) 0.25 MG tablet Take 2 nightly, and one in the afternoon as needed       lactobacillus rhamnosus, GG, (CULTURELL) capsule Take 1 capsule by mouth daily       traMADol (ULTRAM) 50 MG tablet Take 1 tablet (50 mg) by mouth every 6 hours as needed for moderate pain 50 tablet 0     olmesartan (BENICAR) 40 MG tablet Take 1 tablet (40 mg) by mouth daily 90 tablet 2     [DISCONTINUED] omeprazole (PRILOSEC) 20 MG CR capsule TAKE 1 CAPSULE (20 MG) BY MOUTH 2 TIMES DAILY 180 capsule 1     Cholecalciferol (VITAMIN D) 2000 UNITS tablet Take 2,000 Units by mouth 2 times daily       fluticasone-salmeterol (ADVAIR DISKUS) 250-50 MCG/DOSE diskus inhaler Inhale 1 puff into the lungs 2 times daily 60 Inhaler 1     order for DME 2L of O2 at night       Calcium-Vitamin D-Vitamin K (CALCIUM + D) 500-1000-40 MG-UNT-MCG CHEW Take 2 tablets by mouth daily        DOXYCYCLINE HYCLATE PO Take 50 mg by mouth 3 times per week Monday,wednesday, friday       prednisoLONE acetate (PRED FORTE) 1 % ophthalmic suspension Place 1 drop Into the left eye 4 times daily        albuterol (ALBUTEROL) 108 (90 BASE) MCG/ACT inhaler Inhale 2 puffs into the lungs every 4 hours as needed 1 Inhaler 5     RESTASIS 0.05 % OP EMUL 1 drop to left eye twice daily       SYSTANE 0.4-0.3 % OP SOLN 1 drop to left eye 4 times daily       [DISCONTINUED] bumetanide (BUMEX) 0.5 MG tablet Take 1 tablet (0.5 mg) by mouth daily (with breakfast) 30 tablet 3     [DISCONTINUED] bumetanide (BUMEX) 1 MG tablet Take 1 tablet (1 mg) by mouth daily (Patient taking differently: Take 1 mg by mouth 1 tab on even days) 30 tablet 1      [DISCONTINUED] bumetanide (BUMEX) 0.5 MG tablet Take 1 tablet (0.5 mg) by mouth daily (Patient taking differently: Take 0.5 mg by mouth 1 tab on odd days) 30 tablet 0     No facility-administered encounter medications on file as of 1/15/2018.        Again, thank you for allowing me to participate in the care of your patient.      Sincerely,    Antoni Silva MD     [facility]

## 2018-01-15 NOTE — PATIENT INSTRUCTIONS
1. V/Q scan.    2. Apply for continuous oxygen supplementation 2 L/m, to keep sats above 95%.    3. Refer to the [facility] pulmonology for a second opinion.  [facility] [phone]      4. Abdominal ultrasound with Doppler - to assess liver, portal veins, mesenteric circulation.    5. Repeat transthoracic echocardiogram with bubble study.    6. Refer to pulmonary rehabilitation. If you have not heard to schedule please call: [phone]    7. Change Bumex dose 0.5 mg, one tablet every day.    8. Basic metabolic panel and NT proBNP in one week.     9. Follow-up for test results. At that point, we will discuss proceeding to right heart catheterization.    If you have any questions or concerns, please call my nurse Rupinder Chen at [phone].

## 2018-01-15 NOTE — NURSING NOTE
The After Visit Summary was reviewed with the patient following their office visit with Dr. Silva. Patient was educated about any medication changes, the importance of follow up testing, importance of recording daily weights, and when to call PHTN clinic. Patient verbalized understanding of the information and agrees to call with any questions or concerns. Patient given numbers for Pulmonary Rehab and Pulmonary Referral scheduling at [facility]--advised her to call these places if she hasn't heard from them by 1/18/18.    Placed orders per Dr. Silva.     Return appointment: Patient was walked to scheduling to arrange for multiple f/u visits, imaging, lab.     Medication changes: Decrease bumex to 0.5mg daily. Call if wt gain >2# in 24 hrs, 5# in one day.       Claudette Waller RN BSN  [facility] Nurse  [phone]

## 2018-01-16 ENCOUNTER — DOCUMENTATION ONLY (OUTPATIENT)
Dept: CARDIOLOGY | Facility: CLINIC | Age: 78
End: 2018-01-16

## 2018-01-16 DIAGNOSIS — J44.9 COPD (CHRONIC OBSTRUCTIVE PULMONARY DISEASE) (H): Primary | ICD-10-CM

## 2018-01-16 LAB — ANA SER QL IF: NEGATIVE

## 2018-01-16 NOTE — PROGRESS NOTES
Updated Dr. Silva's RN that Dr. Silva rec'd to pt that she begin wearing O2 24/7. Requested that Dr. Silva's RN coordinate pt's need for addnl O2 supplies and RN agrees to plan. Of note, pt told me yesterday she thought her original O2 order came from pulmonary/Dr. Ruiz and her supplier is [employer].    Claudette Waller [facility] RN 12:09 PM 01/16/18  [phone]

## 2018-01-18 ENCOUNTER — TELEPHONE (OUTPATIENT)
Dept: INTERNAL MEDICINE | Facility: CLINIC | Age: 78
End: 2018-01-18

## 2018-01-18 NOTE — TELEPHONE ENCOUNTER
Patient is requesting a phone visit. She went to a pulmonologist who ordered an echo with bubbles and a lung scan. Wants more info on these, right now she doesn't want them. Wants to discuss with Dr. Mott if he thinks these tests are necessary. Thinks pulmonologist said 79% ineffective.   [phone] today before 6:30 or tomorrow morning before noon. Ok to lv detailed message, may have voicemail difficulty. Can text the message.

## 2018-01-19 NOTE — TELEPHONE ENCOUNTER
Unfortunately, these times do not work, as I have other patients scheduled at these times.  I could do this after about 3 pm Friday, or could do it during the day on Saturday if she would want.   Will need to be added to my schedule...

## 2018-01-22 ENCOUNTER — TELEPHONE (OUTPATIENT)
Dept: INTERNAL MEDICINE | Facility: CLINIC | Age: 78
End: 2018-01-22

## 2018-01-22 NOTE — TELEPHONE ENCOUNTER
Reason for Call:  Other call back    Detailed comments: Pt wants a call from Dr Mott about an appt she has 1/25 for an echo with bubbles.   She had a scheduled telephone appt 1/19 at 3pm with Dr Mott, but states that she didn't receive a call.  Please call pt back.  Phone Number Patient can be reached at: Home number on file [phone] (home)    Best Time: 1/23/18 before 1:30pm or after 3:30pm    Can we leave a detailed message on this number? YES    Call taken on 1/22/2018 at 2:07 PM by BILL PALOMARES

## 2018-01-23 ENCOUNTER — HOSPITAL ENCOUNTER (OUTPATIENT)
Dept: CARDIAC REHAB | Facility: CLINIC | Age: 78
End: 2018-01-23
Attending: INTERNAL MEDICINE
Payer: MEDICARE

## 2018-01-23 DIAGNOSIS — I27.20 PULMONARY HYPERTENSION (H): ICD-10-CM

## 2018-01-23 PROCEDURE — 40000244 ZZH STATISTIC VISIT PULM REHAB

## 2018-01-23 PROCEDURE — G0238 OTH RESP PROC, INDIV: HCPCS

## 2018-01-23 PROCEDURE — G0237 THERAPEUTIC PROCD STRG ENDUR: HCPCS

## 2018-01-23 NOTE — TELEPHONE ENCOUNTER
I can do a telephone visit this afternoon about 5 pm, or tomorrow afternoon around 2 or 4 pm.    Please apologize for the confusion last week.   I never heard back on time, and the time I realized things, it was past.

## 2018-01-23 NOTE — TELEPHONE ENCOUNTER
I had called this patient for a med rec before I left at noon on 1/19, but there was no answer. Did you try calling her again at 3 when her appt was? Anyways, would you like to schedule another telephone visit? Please advise.

## 2018-01-24 ENCOUNTER — VIRTUAL VISIT (OUTPATIENT)
Dept: INTERNAL MEDICINE | Facility: CLINIC | Age: 78
End: 2018-01-24
Payer: COMMERCIAL

## 2018-01-24 DIAGNOSIS — R30.0 DYSURIA: ICD-10-CM

## 2018-01-24 DIAGNOSIS — I27.20 PULMONARY HTN (H): Primary | ICD-10-CM

## 2018-01-24 DIAGNOSIS — N18.30 CKD (CHRONIC KIDNEY DISEASE) STAGE 3, GFR 30-59 ML/MIN (H): ICD-10-CM

## 2018-01-24 DIAGNOSIS — N39.0 URINARY TRACT INFECTION WITHOUT HEMATURIA, SITE UNSPECIFIED: ICD-10-CM

## 2018-01-24 PROCEDURE — 99442 ZZC PHYSICIAN TELEPHONE EVALUATION 11-20 MIN: CPT | Performed by: INTERNAL MEDICINE

## 2018-01-24 NOTE — TELEPHONE ENCOUNTER
Left pt a detailed VM that she needs to call us back if she would like a telephone visit today at 4pm with Dr. Mott. She may be put on the schedule if she calls back before the afternoon so Dr. Mott has enough time to get the message.

## 2018-01-24 NOTE — Clinical Note
Please contact patient and advise positive UC, prescription sent. Needs follow-up UA/UC if symptoms not fully resolved

## 2018-01-24 NOTE — TELEPHONE ENCOUNTER
Pt called .  She will be available today, 1/24 at 4 pm for a phone visit.  Please call the pt back.

## 2018-01-24 NOTE — TELEPHONE ENCOUNTER
Dr. Mott-patient is available at 4pm today for you to call her. Your schedule is closed, so we are waiting for Esperanza to open up your schedule so we can put the appt in.

## 2018-01-24 NOTE — PROGRESS NOTES
SCHEDULED PHONE ENCOUNTER (Virtual)  The medication list has been reconciled by nursing staff.     Reason for visit:   1.  Patient was referred to Dr. Silva by Dr. Butler for pulm HTN.   Dr. Silva advised 24 hour use of O2 - hated due to the tubing, etc.   O2 sat was 97%+ on O2.    Reports without O2 was 93-94 at rest, lowest 90-91% with light activity.  - referred for respiratory therapy, first visit yesterday.   O2 sats remained > 90 with walking, per patient.  - has an echo with bubble tomorrow, wonders if that is needed as had echo 10/2017  2.  Lab work planned for tomorrow, to follow-up elevated creat, etc.  bumex dose decreased on 1/15 -->  Weight up a bit, 1-3 lbs since.  Slight increase edema, despite support hose.   3.  Was told she was depressed.   Patient feels her mood is OK.   Sleeping more than usual, fatigued during the day, concentration fine, motivation OK to down a bit.  Vision gets her down some, some mild secondary anhedonia - cannot read or watch TV as well.  No thoughts of harm.    ROS: no cardiac symptoms, GI symptoms.   Some dysuria x 2-3 days, no vaginal symptoms     ASSESSMENT:   1.  Pulmonary HTN.   At this time, ongoing evaluation appears appropriate to complete, to assist in decision-making.  2.  CKD, recheck pending  3.  Rule out depression.   Also had decreased mood when seen 2/2017.    Patient does not want to go on medication, will continue to observe, for now.      PLAN:  1.  Check UA - will get us a sample tomorrow.  2.  I support doing this further evaluation, at least until the point that her quality of life does not warrant this aggressive approach.   She concurs.    Time spent on phone with patient:  20 minutes         Addendum 2/4/2018:   UC positive, prescription sent

## 2018-01-24 NOTE — MR AVS SNAPSHOT
After Visit Summary   1/24/2018    Rupinder Tracy    MRN: [id number]           Patient Information     Date Of Birth          1940        Visit Information        Provider Department      1/24/2018 4:00 PM Abran Mott MD [facility]        Today's Diagnoses     Dysuria    -  1       Follow-ups after your visit        Your next 10 appointments already scheduled     Jan 25, 2018 10:00 AM CST   LAB with MEJIA LAB   [facility] HEART AT [place] ([facility])    [address]  [facility] [address] [phone]           Please do not eat 10-12 hours before your appointment if you are coming in fasting for labs on lipids, cholesterol, or glucose (sugar). This does not apply to pregnant women. Water, hot tea and black coffee (with nothing added) are okay. Do not drink other fluids, diet soda or chew gum.            Jan 25, 2018 10:30 AM CST   Ech Complete Bubble with [id number]   [facility] CV Echocardiography (Cardiovascular Imaging at [facility])    [address]  [place] [facility] [place] [address] [phone]           1.  Please bring or wear a comfortable two-piece outfit. 2.  You may eat, drink and take your normal medicines. 3.  For any questions that cannot be answered, please contact the ordering physician            Jan 30, 2018  1:00 PM CST   CONSULT with  Pulmonary Rehab 1   [facility] Cardiac Rehab ([facility])    [address], [address]  [facility] [id number] [phone]            Feb 01, 2018  9:50 AM CST   US ABDOMEN/PELVIS DUPLEX COMPLETE with US1   [facility] Ultrasound ([facility])    [address] [id number] [phone]           Please bring a list of your medicines (including vitamins, minerals and over-the-counter drugs). Also, tell your doctor about any allergies you may have. Wear  comfortable clothes and leave your valuables at home.  Adults: No eating or drinking for 8 hours before the exam. You may take medicine with a small sip of water.  Children: - Children 6+ years: No food or drink for 6 hours before exam. - Children 1-5 years: No food or drink for 4 hours before exam. - Infants, breast-fed: may have breast milk up to 2 hours before exam. - Infants, formula: may have bottle until 4 hours before exam.  Please call the Imaging Department at your exam site with any questions.            Feb 01, 2018 10:40 AM CST   (Arrive by 10:25 AM)   XR CHEST 2 VIEWS with XR3   [facility] Rad[facility] ([facility])    [address] [address] [phone]           Please bring a list of your current medicines to your exam. (Include vitamins, minerals and over-thecounter medicines.) Leave your valuables at home.  Tell your doctor if there is a chance you may be pregnant.  You do not need to do anything special for this exam.            Feb 01, 2018 11:00 AM CST   (Arrive by 10:45 AM)   NM LUNG SCAN VENTILATION AND PERFUSION with NM1   [facility] Nuclear Medicine ([facility])    [address] [id number] [phone]           Please bring a list of your medicines to the exam. (Include vitamins, minerals and over-the-counter drugs.) You should wear comfortable clothes. Leave your valuables at home. Please bring related prior results and films.  Tell your doctor:   If you are breastfeeding or may be pregnant.   If you have had a barium test within the past few days. Barium may change the results of certain exams.   If you think you may need sedation (medicine to help you relax).  You may eat and drink as normal.  Please call your Imaging Department at your exam site with any questions.            Feb 06, 2018 12:00 PM CST   Pulmonary Treatment with  Pulmonary Rehab 2   [facility] Cardiac Re[facility]  ([facility])    [address]  [place] [address]   [phone]            Feb 08, 2018 12:00 PM CST   Pulmonary Treatment with Sh Pulmonary Rehab 2   [facility] Cardiac Rehab ([facility])    [address]  [place] [address]   [phone]            Feb 13, 2018 12:00 PM CST   Pulmonary Treatment with Sh Pulmonary Rehab 2   [facility] Cardiac Rehab ([facility])    [address]  [place] [address]   [phone]            Feb 15, 2018 12:00 PM CST   Pulmonary Treatment with Sh Pulmonary Rehab 2   [facility] Cardiac Rehab ([facility])    [address]  [place] [address]   [phone]              Future tests that were ordered for you today     Open Future Orders        Priority Expected Expires Ordered    UA with Microscopic reflex to Culture Routine 1/24/2018 2/24/2018 1/24/2018            Who to contact     If you have questions or need follow up information about today's clinic visit or your schedule please contact [facility] directly at [phone].  Normal or non-critical lab and imaging results will be communicated to you by [employer], letter or phone within 4 business days after the clinic has received the results. If you do not hear from us within 7 days, please contact the clinic through [employer] or phone. If you have a critical or abnormal lab result, we will notify you by phone as soon as possible.  Submit refill requests through [facility] or call your pharmacy and they will forward the refill request to us. Please allow 3 business days for your refill to be completed.          Additional Information About Your Visit        [facility] Information     [facility] lets you send messages to your doctor, view your test results, renew your prescriptions, schedule appointments and more. To sign up, go to [email or web address] .  "Click on \"Log in\" on the left side of the screen, which will take you to the Welcome page. Then click on \"Sign up Now\" on the right side of the page.     You will be asked to enter the access code listed below, as well as some personal information. Please follow the directions to create your username and password.     Your access code is: [email or web address]  Expires: 3/14/2018 12:47 PM     Your access code will  in 90 days. If you need help or a new code, please call your [place] clinic or [phone].        Care EveryWhere ID     This is your Care EveryWhere ID. This could be used by other organizations to access your [place] medical records  [id number]        Your Vitals Were     Last Period                   1990            Blood Pressure from Last 3 Encounters:   01/15/18 104/52   17 121/83   17 108/52    Weight from Last 3 Encounters:   01/15/18 153 lb 12.8 oz (69.8 kg)   17 152 lb 3.2 oz (69 kg)   17 159 lb 6.4 oz (72.3 kg)               Primary Care Provider Office Phone # Fax #    Abran Pasquale Mott [phone]       [address] [place] [address]        Equal Access to Services     LINUS ASCENCIO AH: Hadii aad ku hadasho Soomaali, waaxda luqadaha, qaybta kaalmada adeegyada, ethan alan. So [facility] [place] [facility] [phone].    ATENCIÓN: Si habla español, tiene a lugo disposición servicios gratuitos de asistencia lingüística. Llame al [phone].    We comply with applicable federal civil rights laws and [place] laws. We do not discriminate on the basis of race, color, national origin, age, disability, sex, sexual orientation, or gender identity.            Thank you!     Thank you for choosing [facility]  for your care. Our goal is always to provide you with excellent care. Hearing back from our patients is one way we can continue to improve our services. Please take a few minutes to complete the written survey " that you may receive in the mail after your visit with us. Thank you!             Your Updated Medication List - Protect others around you: Learn how to safely use, store and throw away your medicines at www.disposemymeds.org.          This list is accurate as of 1/24/18  4:32 PM.  Always use your most recent med list.                   Brand Name Dispense Instructions for use Diagnosis    albuterol 108 (90 BASE) MCG/ACT Inhaler    PROAIR HFA    1 Inhaler    Inhale 2 puffs into the lungs every 4 hours as needed    Mild intermittent asthma       bumetanide 0.5 MG tablet    BUMEX    30 tablet    Take 1 tablet (0.5 mg) by mouth daily (with breakfast)    Pulmonary HTN, Localized edema       CALCIUM + D 500-1000-40 MG-UNT-MCG Chew   Generic drug:  Calcium-Vitamin D-Vitamin K      Take 2 tablets by mouth daily        DOXYCYCLINE HYCLATE PO      Take 50 mg by mouth 3 times per week Monday,wednesday, friday        fluticasone-salmeterol 250-50 MCG/DOSE diskus inhaler    ADVAIR DISKUS    60 Inhaler    Inhale 1 puff into the lungs 2 times daily    Mild persistent asthma without complication       hyaluronate in balanced salt ophthalmic solution solution    HEALON IN BSS     1 drop every 4 hours (while awake)        lactobacillus rhamnosus (GG) capsule      Take 1 capsule by mouth daily        MARGAUX 128 5 % ophthalmic solution   Generic drug:  sodium chloride      1 drop At Bedtime        OCUSOFT EYE WASH OP           olmesartan 40 MG tablet    BENICAR    90 tablet    Take 1 tablet (40 mg) by mouth daily    Essential hypertension with goal blood pressure less than 130/80       omeprazole 20 MG CR capsule    priLOSEC    180 capsule    TAKE 1 CAPSULE (20 MG) BY MOUTH 2 TIMES DAILY    Gastroesophageal reflux disease without esophagitis       order for DME      2L of O2 at night        prednisoLONE acetate 1 % ophthalmic susp    PRED FORTE     Place 1 drop Into the left eye 4 times daily        REQUIP 0.25 MG tablet   Generic drug:   rOPINIRole      Take 2 nightly, and one in the afternoon as needed    Restless legs syndrome (RLS)       RESTASIS 0.05 % ophthalmic emulsion   Generic drug:  cycloSPORINE      1 drop to left eye twice daily        SYSTANE 0.4-0.3 % Soln ophthalmic solution   Generic drug:  polyethylene glycol 0.4%- propylene glycol 0.3%      1 drop to left eye 4 times daily        traMADol 50 MG tablet    ULTRAM    50 tablet    Take 1 tablet (50 mg) by mouth every 6 hours as needed for moderate pain    Chronic pain syndrome       vitamin D 2000 UNITS tablet      Take 2,000 Units by mouth 2 times daily    Vitamin D deficiency

## 2018-01-25 DIAGNOSIS — K21.9 GASTROESOPHAGEAL REFLUX DISEASE WITHOUT ESOPHAGITIS: ICD-10-CM

## 2018-01-25 NOTE — PROGRESS NOTES
Called to patient ie O2 - patient states she has been in contact with ordering physician Dr Mott and that she was retested with Oxygen saturations remaining above 90; patient states she is working with Primary MD for oxygen orders and recommendations.  Rupinder Riddle RN 01/25/18 11:35 AM

## 2018-01-29 DIAGNOSIS — R30.0 DYSURIA: ICD-10-CM

## 2018-01-29 DIAGNOSIS — I27.20 PULMONARY HYPERTENSION (H): ICD-10-CM

## 2018-01-29 LAB
ALBUMIN UR-MCNC: NEGATIVE MG/DL
ANION GAP SERPL CALCULATED.3IONS-SCNC: 8 MMOL/L (ref 3–14)
APPEARANCE UR: CLEAR
BACTERIA #/AREA URNS HPF: ABNORMAL /HPF
BILIRUB UR QL STRIP: NEGATIVE
BUN SERPL-MCNC: 48 MG/DL (ref 7–30)
CALCIUM SERPL-MCNC: 9.2 MG/DL (ref 8.5–10.1)
CHLORIDE SERPL-SCNC: 111 MMOL/L (ref 94–109)
CO2 SERPL-SCNC: 24 MMOL/L (ref 20–32)
COLOR UR AUTO: YELLOW
CREAT SERPL-MCNC: 1.27 MG/DL (ref 0.52–1.04)
GFR SERPL CREATININE-BSD FRML MDRD: 41 ML/MIN/1.7M2
GLUCOSE SERPL-MCNC: 79 MG/DL (ref 70–99)
GLUCOSE UR STRIP-MCNC: NEGATIVE MG/DL
HGB UR QL STRIP: NEGATIVE
KETONES UR STRIP-MCNC: NEGATIVE MG/DL
LEUKOCYTE ESTERASE UR QL STRIP: ABNORMAL
NITRATE UR QL: NEGATIVE
NT-PROBNP SERPL-MCNC: 5310 PG/ML (ref 0–450)
PH UR STRIP: 5 PH (ref 5–7)
POTASSIUM SERPL-SCNC: 5 MMOL/L (ref 3.4–5.3)
RBC #/AREA URNS AUTO: ABNORMAL /HPF
SODIUM SERPL-SCNC: 143 MMOL/L (ref 133–144)
SOURCE: ABNORMAL
SP GR UR STRIP: 1.01 (ref 1–1.03)
UROBILINOGEN UR STRIP-ACNC: 0.2 EU/DL (ref 0.2–1)
WBC #/AREA URNS AUTO: ABNORMAL /HPF

## 2018-01-29 PROCEDURE — 80048 BASIC METABOLIC PNL TOTAL CA: CPT | Performed by: INTERNAL MEDICINE

## 2018-01-29 PROCEDURE — 36415 COLL VENOUS BLD VENIPUNCTURE: CPT | Performed by: INTERNAL MEDICINE

## 2018-01-29 PROCEDURE — 81001 URINALYSIS AUTO W/SCOPE: CPT | Performed by: INTERNAL MEDICINE

## 2018-01-29 PROCEDURE — 83880 ASSAY OF NATRIURETIC PEPTIDE: CPT | Performed by: INTERNAL MEDICINE

## 2018-01-30 ENCOUNTER — HOSPITAL ENCOUNTER (OUTPATIENT)
Dept: CARDIAC REHAB | Facility: CLINIC | Age: 78
End: 2018-01-30
Attending: INTERNAL MEDICINE
Payer: MEDICARE

## 2018-01-30 PROCEDURE — G0238 OTH RESP PROC, INDIV: HCPCS

## 2018-01-30 PROCEDURE — 40000244 ZZH STATISTIC VISIT PULM REHAB

## 2018-01-31 ENCOUNTER — CARE COORDINATION (OUTPATIENT)
Dept: CARDIOLOGY | Facility: CLINIC | Age: 78
End: 2018-01-31

## 2018-01-31 NOTE — PROGRESS NOTES
Call from patient stating she had a long discussion with her family and they have all decided to cancel testing ordered by DR Silva and scheduled 2/1/2018 and follow up visit 2/26/2016; patient states she will be staying with her care management with DR Butler and Markell HERNANDEZ/. Patient states at this time she is feeling well without complaints. Scheduling informed and update sent to Dr Silva, DR Butler, and Markell her providers.  Rupinder Riddle RN 01/31/18 11:03 AM

## 2018-02-01 ENCOUNTER — HOSPITAL ENCOUNTER (OUTPATIENT)
Dept: CARDIAC REHAB | Facility: CLINIC | Age: 78
End: 2018-02-01
Attending: INTERNAL MEDICINE
Payer: MEDICARE

## 2018-02-01 PROCEDURE — G0239 OTH RESP PROC, GROUP: HCPCS

## 2018-02-01 PROCEDURE — 40000244 ZZH STATISTIC VISIT PULM REHAB

## 2018-02-02 DIAGNOSIS — R30.0 DYSURIA: ICD-10-CM

## 2018-02-02 PROCEDURE — 87086 URINE CULTURE/COLONY COUNT: CPT | Performed by: INTERNAL MEDICINE

## 2018-02-02 PROCEDURE — 87186 SC STD MICRODIL/AGAR DIL: CPT | Performed by: INTERNAL MEDICINE

## 2018-02-02 PROCEDURE — 87088 URINE BACTERIA CULTURE: CPT | Performed by: INTERNAL MEDICINE

## 2018-02-04 LAB
BACTERIA SPEC CULT: ABNORMAL
BACTERIA SPEC CULT: ABNORMAL
SPECIMEN SOURCE: ABNORMAL

## 2018-02-04 RX ORDER — CIPROFLOXACIN 250 MG/1
250 TABLET, FILM COATED ORAL 2 TIMES DAILY
Qty: 14 TABLET | Refills: 0 | Status: SHIPPED | OUTPATIENT
Start: 2018-02-04 | End: 2018-02-11

## 2018-02-05 ENCOUNTER — TELEPHONE (OUTPATIENT)
Dept: INTERNAL MEDICINE | Facility: CLINIC | Age: 78
End: 2018-02-05

## 2018-02-05 NOTE — TELEPHONE ENCOUNTER
Pt is calling back, she says she was told to call today regarding the urine results. She says she will start on the ciprofloxacin (CIPRO) 250 MG tablet today. She says she has been drinking lots of water, and the urinary burning has decreased and there is no blood in the urine.

## 2018-02-06 ENCOUNTER — HOSPITAL ENCOUNTER (OUTPATIENT)
Dept: CARDIAC REHAB | Facility: CLINIC | Age: 78
End: 2018-02-06
Attending: INTERNAL MEDICINE
Payer: MEDICARE

## 2018-02-06 PROCEDURE — G0239 OTH RESP PROC, GROUP: HCPCS | Performed by: CLINICAL EXERCISE PHYSIOLOGIST

## 2018-02-06 PROCEDURE — 40000244 ZZH STATISTIC VISIT PULM REHAB: Performed by: CLINICAL EXERCISE PHYSIOLOGIST

## 2018-02-08 ENCOUNTER — HOSPITAL ENCOUNTER (OUTPATIENT)
Dept: CARDIAC REHAB | Facility: CLINIC | Age: 78
End: 2018-02-08
Attending: INTERNAL MEDICINE
Payer: MEDICARE

## 2018-02-08 PROCEDURE — G0239 OTH RESP PROC, GROUP: HCPCS | Performed by: CLINICAL EXERCISE PHYSIOLOGIST

## 2018-02-08 PROCEDURE — 40000244 ZZH STATISTIC VISIT PULM REHAB: Performed by: CLINICAL EXERCISE PHYSIOLOGIST

## 2018-02-12 ENCOUNTER — TELEPHONE (OUTPATIENT)
Dept: NURSING | Facility: CLINIC | Age: 78
End: 2018-02-12

## 2018-02-12 NOTE — TELEPHONE ENCOUNTER
Patient states she missed a call from Dr. Mott/nurse last Monday. Cannot find anything charted. She does note she finished the Cipro and is doing fine. Symptoms seem to have resolved. She will call back if any new symptoms.

## 2018-02-12 NOTE — TELEPHONE ENCOUNTER
APPT INFO    Date /Time: 2/19/18, 2pm   Reason for Appt: COPD   Ref Provider/Clinic: [facility]EVIN YOUSSEF   Are there internal records? Yes/No?  IF YES, list clinic names: [facility]   Are there outside records? Yes/No? No    Patient Contact (Y/N) & Call Details: No, referred    Action:

## 2018-02-13 ENCOUNTER — DOCUMENTATION ONLY (OUTPATIENT)
Dept: CARDIOLOGY | Facility: CLINIC | Age: 78
End: 2018-02-13

## 2018-02-13 NOTE — PROGRESS NOTES
The patient informed us that she has decided not to proceed with additional testing to evaluate pulmonary hypertension.  She feels that her dyspnea is stable.  She will continue follow-up with her established providers, Dr. Butler and Markell Dyson.    If she changes her mind, we will be happy to see her back in the pulmonary hypertension clinic and facilitate additional testing/treatment.    Dr. Ricardo WALLACE [facility]  Cardiology

## 2018-02-15 ENCOUNTER — HOSPITAL ENCOUNTER (OUTPATIENT)
Dept: CARDIAC REHAB | Facility: CLINIC | Age: 78
End: 2018-02-15
Attending: INTERNAL MEDICINE
Payer: MEDICARE

## 2018-02-15 PROCEDURE — 40000244 ZZH STATISTIC VISIT PULM REHAB: Performed by: CLINICAL EXERCISE PHYSIOLOGIST

## 2018-02-15 PROCEDURE — G0239 OTH RESP PROC, GROUP: HCPCS | Performed by: CLINICAL EXERCISE PHYSIOLOGIST

## 2018-02-19 ENCOUNTER — PRE VISIT (OUTPATIENT)
Dept: PULMONOLOGY | Facility: CLINIC | Age: 78
End: 2018-02-19

## 2018-02-20 ENCOUNTER — HOSPITAL ENCOUNTER (OUTPATIENT)
Dept: CARDIAC REHAB | Facility: CLINIC | Age: 78
End: 2018-02-20
Attending: INTERNAL MEDICINE
Payer: MEDICARE

## 2018-02-20 PROCEDURE — 40000244 ZZH STATISTIC VISIT PULM REHAB: Performed by: CLINICAL EXERCISE PHYSIOLOGIST

## 2018-02-20 PROCEDURE — G0239 OTH RESP PROC, GROUP: HCPCS | Performed by: CLINICAL EXERCISE PHYSIOLOGIST

## 2018-02-22 ENCOUNTER — HOSPITAL ENCOUNTER (OUTPATIENT)
Dept: CARDIAC REHAB | Facility: CLINIC | Age: 78
End: 2018-02-22
Attending: INTERNAL MEDICINE
Payer: MEDICARE

## 2018-02-22 ENCOUNTER — DOCUMENTATION ONLY (OUTPATIENT)
Dept: CARDIOLOGY | Facility: CLINIC | Age: 78
End: 2018-02-22

## 2018-02-22 DIAGNOSIS — I10 BENIGN ESSENTIAL HYPERTENSION: Primary | ICD-10-CM

## 2018-02-22 DIAGNOSIS — R06.02 SOB (SHORTNESS OF BREATH): ICD-10-CM

## 2018-02-22 PROCEDURE — G0239 OTH RESP PROC, GROUP: HCPCS | Performed by: CLINICAL EXERCISE PHYSIOLOGIST

## 2018-02-22 PROCEDURE — 40000244 ZZH STATISTIC VISIT PULM REHAB: Performed by: CLINICAL EXERCISE PHYSIOLOGIST

## 2018-02-22 NOTE — PROGRESS NOTES
"Spoke with Gail NP about patient. Per Gail, \"have her take another 1/2 tablet tonight. Take 1 tablet tomorrow, and then back to a 1/2 tablet on Saturday, Sunday, and Monday. Have her get a BMP on Monday and be seen by Aybike or myself next week.\" Orders placed for NENO f/u and BMP on Monday. Spoke with patient about Gail's recommendations. Patient is agreeable to plan. Instructed patient to call us back on Monday to report how her shortness of breath is doing and what her weight is. Patient connected with scheduling to set up lab appointment for Monday and to set up NENO OV with Gail on 3/2 at 8:30am.   "

## 2018-02-22 NOTE — PROGRESS NOTES
VM from patient stating that her weight has been up in the last couple days. Patient states that on Monday she weighed 151lbs and today her weight is 155.2 lbs. Attempted to call patient back, she did not answer. Left message for patient to call back.

## 2018-02-22 NOTE — PROGRESS NOTES
Call back from patient. Patient states that she has been gaining about 1 pound a day since Monday. On Monday, her weight was 151 lbs, Tuesday 153 lbs, Wednesday 154 lbs, and today her weight was 155 lbs. Patient states that she has been noticing increased shortness of breath and also increased ankle swelling. Patient states that she goes to pulmonary rehab on Tuesdays and Thursdays, and her rehab session today was much harder than Tuesday's session due to shortness of breath. Patient was last seen by Dr. Silva, however patient has declined any additional f/u from pulmonary HTN clinic. Will route to [facility] for review.

## 2018-02-26 DIAGNOSIS — I10 BENIGN ESSENTIAL HYPERTENSION: ICD-10-CM

## 2018-02-26 LAB
ANION GAP SERPL CALCULATED.3IONS-SCNC: 11.4 MMOL/L (ref 6–17)
BUN SERPL-MCNC: 29 MG/DL (ref 7–30)
CALCIUM SERPL-MCNC: 10.1 MG/DL (ref 8.5–10.5)
CHLORIDE SERPL-SCNC: 106 MMOL/L (ref 98–107)
CO2 SERPL-SCNC: 27 MMOL/L (ref 23–29)
CREAT SERPL-MCNC: 0.96 MG/DL (ref 0.7–1.3)
GFR SERPL CREATININE-BSD FRML MDRD: 56 ML/MIN/1.7M2
GLUCOSE SERPL-MCNC: 116 MG/DL (ref 70–105)
POTASSIUM SERPL-SCNC: 4.4 MMOL/L (ref 3.5–5.1)
SODIUM SERPL-SCNC: 140 MMOL/L (ref 136–145)

## 2018-02-26 PROCEDURE — 36415 COLL VENOUS BLD VENIPUNCTURE: CPT | Performed by: NURSE PRACTITIONER

## 2018-02-26 PROCEDURE — 80048 BASIC METABOLIC PNL TOTAL CA: CPT | Performed by: NURSE PRACTITIONER

## 2018-03-01 ENCOUNTER — HOSPITAL ENCOUNTER (OUTPATIENT)
Dept: CARDIAC REHAB | Facility: CLINIC | Age: 78
End: 2018-03-01
Attending: INTERNAL MEDICINE
Payer: MEDICARE

## 2018-03-01 PROCEDURE — 40000244 ZZH STATISTIC VISIT PULM REHAB: Performed by: CLINICAL EXERCISE PHYSIOLOGIST

## 2018-03-01 PROCEDURE — G0239 OTH RESP PROC, GROUP: HCPCS | Performed by: CLINICAL EXERCISE PHYSIOLOGIST

## 2018-03-02 ENCOUNTER — OFFICE VISIT (OUTPATIENT)
Dept: CARDIOLOGY | Facility: CLINIC | Age: 78
End: 2018-03-02
Attending: NURSE PRACTITIONER
Payer: COMMERCIAL

## 2018-03-02 VITALS
HEIGHT: 64 IN | WEIGHT: 153 LBS | HEART RATE: 75 BPM | DIASTOLIC BLOOD PRESSURE: 64 MMHG | SYSTOLIC BLOOD PRESSURE: 108 MMHG | BODY MASS INDEX: 26.12 KG/M2

## 2018-03-02 DIAGNOSIS — R06.02 SOB (SHORTNESS OF BREATH): ICD-10-CM

## 2018-03-02 DIAGNOSIS — I10 ESSENTIAL HYPERTENSION WITH GOAL BLOOD PRESSURE LESS THAN 130/85: ICD-10-CM

## 2018-03-02 DIAGNOSIS — R60.0 EDEMA OF BOTH LOWER LEGS DUE TO PERIPHERAL VENOUS INSUFFICIENCY: Primary | ICD-10-CM

## 2018-03-02 DIAGNOSIS — I87.2 EDEMA OF BOTH LOWER LEGS DUE TO PERIPHERAL VENOUS INSUFFICIENCY: Primary | ICD-10-CM

## 2018-03-02 PROCEDURE — 99214 OFFICE O/P EST MOD 30 MIN: CPT | Performed by: NURSE PRACTITIONER

## 2018-03-02 NOTE — LETTER
3/2/2018    Abran Mott MD  [address] [facility] [id number]    RE: Rupinder Tracy       Dear Colleague,    I had the pleasure of seeing Rupinder Tracy in the [facility].    HPI and Plan:   Had the pleasure of seeing Rupinder Tracy today in cardiology clinic, she is accompanied by a friend of hers who drives her today.  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  She is a lifelong non-smoker.  She has a history of pulmonary hypertension which was described as mild in 2015 but which has likely progressed, she saw Dr. Silva for an evaluation in January.  She has been participating in pulmonary rehab since then and feels that it is quite beneficial to her.  She elected not to proceed with further testing including the VQ scan and echo with bubble study and right heart cath, she tells me she discussed extensively with her family and has decided she does not want to pursue that.  She did however try wearing O2 around the clock for 3 days and her oxygen saturations were 98%.  When she did not wear O2 around-the-clock her oxygen saturations were 95-96%.  She felt no benefit and in fact her quality of life went down dragging the O2 tank behind her.  She has a history of COPD secondary to asthma asthma, she has a history of hypertension, she has a history of aortic regurgitation with aortic root and aortic and ascending aortic dilatation.  She has chronic kidney disease.  She has venous insufficiency which we are managing conservatively with compression stockings, she is wearing them today.    Rupinder Cast called our office last week about a weight gain.  She diligently weighs herself daily and had noticed a 3 pound weight gain over short period of time associated worsening dyspnea on exertion.  I gave her an extra half a tablet of Bumex on the day of her call, and the following day she took  1 mg.  Apart from those 2 days she has been on 0.5 mg of Bumex for quite some time.  Her repeat labs look excellent, sodium 140, potassium 4.4, creatinine 0.96.  She said her weight went back down to her normal which at home is 151 and her symptoms resolved.  She has no active concerns today.  She denies any chest pain.  Her breathing is at her baseline.  She is quite bright and thoughtful about her personal health.    Physical Exam  Please see Below     Assessment and Plan  1.  Diastolic heart failure.  She had a 3 pound weight gain which definitely caused her to be symptomatic, her weight resolved with an additional half a tablet of Bumex for 2 days.  She has returned to Bumex 0.5 mg daily and has been doing well, her renal function looks good.  In the past she has been on alternating doses of 1 mg in half a milligram every other day, we talked about that if she is having more frequent weight changes like she did, that we could consider changing her dosing schedule.  For now I think she can continue on half a milligram of Bumex daily, if she notices a 3 pound weight gain, I instructed her to simply take an extra half a tablet for 2 days and see if that helps.  If she needs to do that frequently then we will of course need to see her and have close follow-up with BMPs.  Given her knowledge of nursing, I have no doubt she will be capable of doing all of this.  2.  Aortic root and ascending aortic root dilatation. Dr. Butler is following this with serial echoes.    Thank you for allowing me to see Rupinder Cast today.  She will follow-up with Dr. Butler in April or whenever she can get in to see him.  For now she does not plan to continue in pulmonary hypertension clinic, except to the extent that she feels that she gets benefit out of pulmonary rehab.      Gail Truong APRN, CNP      Orders Placed This Encounter   Procedures     Basic metabolic panel     No orders of the defined types were placed in this  encounter.    Medications Discontinued During This Encounter   Medication Reason     lactobacillus rhamnosus, GG, (CULTURELL) capsule Stopped by Patient         CURRENT MEDICATIONS:  Current Outpatient Prescriptions   Medication Sig Dispense Refill     omeprazole (PRILOSEC) 20 MG CR capsule TAKE 1 CAPSULE (20 MG) BY MOUTH 2 TIMES DAILY 180 capsule 1     hyaluronate in balanced salt ophthalmic solution (HEALON IN BSS) solution 1 drop every 4 hours (while awake)       sodium chloride (MARGAUX 128) 5 % ophthalmic solution 1 drop At Bedtime       Ophthalmic Irrigation Solution (OCUSOFT EYE WASH OP)        bumetanide (BUMEX) 0.5 MG tablet Take 1 tablet (0.5 mg) by mouth daily (with breakfast) 30 tablet 11     rOPINIRole (REQUIP) 0.25 MG tablet Take 2 nightly, and one in the afternoon as needed       traMADol (ULTRAM) 50 MG tablet Take 1 tablet (50 mg) by mouth every 6 hours as needed for moderate pain 50 tablet 0     olmesartan (BENICAR) 40 MG tablet Take 1 tablet (40 mg) by mouth daily 90 tablet 2     Cholecalciferol (VITAMIN D) 2000 UNITS tablet Take 2,000 Units by mouth 2 times daily       fluticasone-salmeterol (ADVAIR DISKUS) 250-50 MCG/DOSE diskus inhaler Inhale 1 puff into the lungs 2 times daily 60 Inhaler 1     order for DME 2L of O2 at night       Calcium-Vitamin D-Vitamin K (CALCIUM + D) 500-1000-40 MG-UNT-MCG CHEW Take 2 tablets by mouth daily        DOXYCYCLINE HYCLATE PO Take 50 mg by mouth 3 times per week Monday,wednesday, friday       prednisoLONE acetate (PRED FORTE) 1 % ophthalmic suspension Place 1 drop Into the left eye 4 times daily        albuterol (ALBUTEROL) 108 (90 BASE) MCG/ACT inhaler Inhale 2 puffs into the lungs every 4 hours as needed 1 Inhaler 5     RESTASIS 0.05 % OP EMUL 1 drop to left eye twice daily       SYSTANE 0.4-0.3 % OP SOLN 1 drop to left eye 4 times daily         ALLERGIES     Allergies   Allergen Reactions     Atorvastatin Calcium      myalgias     Celecoxib Swelling     edema  "    Cholestyramine Diarrhea     Diarrhea       Crestor [Rosuvastatin Calcium]      leg aches, likely from medication     Cyclobenzaprine Hcl      flexeril--gets \"goofy\"     Dulera      tremors     Gabapentin      Nightmares.   Retrial of medication caused tremors.     Lisinopril Cough     Cough/ Dizziness at high dose.     Meperidine Hcl Nausea and Vomiting     demerol-severe nausea     Morphine Nausea and Vomiting     Naprosyn [Naproxen] GI Disturbance     Niaspan [Niacin]      flushing, severe     Percocet [Oxycodone-Acetaminophen] Nausea     Nausea, lightheadedness.   Tolerates med if taken with food.     Pravastatin Swelling     Edema, myalgias     Red Yeast Rice [Cholestin] GI Disturbance     Bloating, etc.     Simvastatin      myalgias     Timolol Maleate Difficulty breathing     timoptic--respiratory problems     Hctz Rash     rash     Sulfa Drugs Itching and Rash     rash on all mucous membranes and palms of hands with intense itching       PAST MEDICAL HISTORY:  Past Medical History:   Diagnosis Date     Arthritis      Asthma      Cellulitis 4/13 in [place]    R ankle     Difficult airway for intubation      Ductal Carcinoma in Situ of Left Breast 9/09     Duodenal ulcer, unspecified as acute or chronic, without mention of hemorrhage or perforation 1980's    felt due to high dose steroids     Female stress incontinence      H/O right and left heart catheterization     1-     HYPERLIPIDEMIA      Hypertension      Ischemic colitis 7/2001    [facility]     Lactose Intolerance     Mild     Legal blindness     Artificial right eye, severe vision loss left (Detached retina's, glaucoma, corneal transplants)     Mild intermittent asthma ~1980's     Nocturnal oxygen desaturation      Osteoporosis, unspecified ~2002     Patent foramen ovale      PERIPHERAL VASCULAR DISEASE 5/05    mesenteric arteries, angioplasty     PRESBYESOPHAGUS 6/04     Pulmonary HTN 11/2014     Serous retinal detachment     False eye " right     Subarachnoid hemorrhage following injury (H) 12/10    due to fall, vascular evaluation negative     Syncope and collapse 12/10     Unspecified glaucoma(365.9)        PAST SURGICAL HISTORY:  Past Surgical History:   Procedure Laterality Date     BIOPSY       C APPENDECTOMY  1982     C NONSPECIFIC PROCEDURE  1945    adenoidectomy     C NONSPECIFIC PROCEDURE      bilat retinal detachment     C NONSPECIFIC PROCEDURE      blephoroplasty bilat     C NONSPECIFIC PROCEDURE  1997    glaucoma procedure L     C NONSPECIFIC PROCEDURE  1997, 2007, 2014    corneal transplant L     C NONSPECIFIC PROCEDURE  1945    strabismus procedure R eye     C NONSPECIFIC PROCEDURE  1960's    R breast bx     C NONSPECIFIC PROCEDURE  5/05    Angioplasty and stenting mesenteric vessels     C NONSPECIFIC PROCEDURE  2008    L corneal transplant     COLONOSCOPY      due 2015     ENDOSCOPIC RETROGRADE CHOLANGIOPANCREATOGRAM N/A 10/13/2017    Procedure: ENDOSCOPIC RETROGRADE CHOLANGIOPANCREATOGRAM;  ENDOSCOPIC RETROGRADE CHOLANGIOPANCREATOGRAM ;  Surgeon: Isabel Yousif MD;  Location: [facility] OR     HC REMOVAL GALLBLADDER  1982    Cholecystectomy     HEART CATH RIGHT AND LEFT HEART CATH  1/19/15     HERNIORRHAPHY VENTRAL N/A 4/19/2016    Procedure: HERNIORRHAPHY VENTRAL;  Surgeon: Hamlet Ness MD;  Location: [facility] OR     MASTECTOMY SIMPLE BILATERAL  10/5/09    bilateral mastectomy     SURGICAL HISTORY OF -   6/2010    breast reconstruction       FAMILY HISTORY:  Family History   Problem Relation Age of Onset     Adopted: Yes     C.A.D. Paternal Uncle      MI 50's     Family History Negative Daughter        SOCIAL HISTORY:  Social History     Social History     Marital status:      Spouse name: N/A     Number of children: 4     Years of education: N/A     Occupational History     Retired nurse Retired     Social History Main Topics     Smoking status: Never Smoker     Smokeless tobacco: Never Used     Alcohol use 4.2 oz/week     7  "Glasses of wine per week     Drug use: No     Sexual activity: No     Other Topics Concern     Caffeine Concern No     1- 2 cups coffee     Sleep Concern No     Stress Concern No     Weight Concern No     Special Diet Yes     low sodium     Exercise No     2 days a week (abigail chi), walking program, stationary bike 10 minutes 3 times per week     Seat Belt Yes     Social History Narrative    , 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  She walks with a walker.  Is a full code.  (last updated 10/11/2017)        Review of Systems:  Skin:  Negative       Eyes:  Positive for glasses    ENT:  Positive for nasal congestion    Respiratory:  Positive for wheezing O2 at night, SOB and CRUZ and coughing are occassionally   Cardiovascular:    Positive for;lower extremity symptoms;edema    Gastroenterology: Positive for heartburn    Genitourinary:  not assessed      Musculoskeletal:  Positive for back pain left leg at night  Neurologic:  Negative      Psychiatric:  Negative      Heme/Lymph/Imm:  Positive for allergies    Endocrine:  Negative        Physical Exam:  Vitals: /64  Pulse 75  Ht 1.626 m (5' 4\")  Wt 69.4 kg (153 lb)  LMP 01/01/1990  BMI 26.26 kg/m2    Constitutional:  cooperative;in no acute distress        Skin:  warm and dry to the touch venous stasis changes        Head:  normocephalic        Eyes:      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    Lymph:      ENT:  no pallor or cyanosis        Neck:  JVP normal;no stiffness        Respiratory:  clear to auscultation;normal symmetry    right basilar crackle    Cardiac: regular rhythm;normal S1 and S2;no murmurs, gallops or rubs detected     no presence of murmur                                                   GI:  abdomen soft;non-tender        Extremities and Muscular Skeletal:    stasis pigmentation bilateral LE edema;trace;1+     wearing compression " stockings    Neurological:  no gross motor deficits   walks with a walker    Psych:  affect appropriate, oriented to time, person and place    Encounter Diagnoses   Name Primary?     SOB (shortness of breath)      Edema of both lower legs due to peripheral venous insufficiency Yes     Essential hypertension with goal blood pressure less than 130/85        Recent Lab Results:  LIPID RESULTS:  Lab Results   Component Value Date    CHOL 208 (A) 01/05/2016    CHOL 208 (A) 01/05/2016    HDL 52 01/05/2016    HDL 52 01/05/2016     01/05/2016     01/05/2016    TRIG 131 01/05/2016    TRIG 131 01/05/2016    CHOLHDLRATIO 2.4 08/25/2014       LIVER ENZYME RESULTS:  Lab Results   Component Value Date    AST 42 10/15/2017    ALT 39 10/15/2017       CBC RESULTS:  Lab Results   Component Value Date    WBC 7.2 10/19/2017    RBC 3.44 (L) 10/19/2017    HGB 13.2 12/14/2017    HCT 33.9 (L) 10/19/2017    MCV 98.5 10/19/2017    MCH 32.4 10/16/2017    MCHC 33.3 10/16/2017    RDW 14.7 10/19/2017     10/19/2017       BMP RESULTS:  Lab Results   Component Value Date     02/26/2018    POTASSIUM 4.4 02/26/2018    CHLORIDE 106 02/26/2018    CO2 27 02/26/2018    ANIONGAP 11.4 02/26/2018     (H) 02/26/2018    BUN 29 02/26/2018    CR 0.96 02/26/2018    GFRESTIMATED 56 (L) 02/26/2018    GFRESTBLACK 68 02/26/2018    AXEL 10.1 02/26/2018        A1C RESULTS:  No results found for: A1C    INR RESULTS:  Lab Results   Component Value Date    INR 1.09 10/12/2017    INR 0.95 01/19/2015         Thank you for allowing me to participate in the care of your patient.    Sincerely,     YUNIEL Johnson [facility]

## 2018-03-02 NOTE — PROGRESS NOTES
HPI and Plan:   Had the pleasure of seeing Rupinder Tracy today in cardiology clinic, she is accompanied by a friend of hers who drives her today.  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  She is a lifelong non-smoker.  She has a history of pulmonary hypertension which was described as mild in 2015 but which has likely progressed, she saw Dr. Silva for an evaluation in January.  She has been participating in pulmonary rehab since then and feels that it is quite beneficial to her.  She elected not to proceed with further testing including the VQ scan and echo with bubble study and right heart cath, she tells me she discussed extensively with her family and has decided she does not want to pursue that.  She did however try wearing O2 around the clock for 3 days and her oxygen saturations were 98%.  When she did not wear O2 around-the-clock her oxygen saturations were 95-96%.  She felt no benefit and in fact her quality of life went down dragging the O2 tank behind her.  She has a history of COPD secondary to asthma asthma, she has a history of hypertension, she has a history of aortic regurgitation with aortic root and aortic and ascending aortic dilatation.  She has chronic kidney disease.  She has venous insufficiency which we are managing conservatively with compression stockings, she is wearing them today.    Rupinder Cast called our office last week about a weight gain.  She diligently weighs herself daily and had noticed a 3 pound weight gain over short period of time associated worsening dyspnea on exertion.  I gave her an extra half a tablet of Bumex on the day of her call, and the following day she took 1 mg.  Apart from those 2 days she has been on 0.5 mg of Bumex for quite some time.  Her repeat labs look excellent, sodium 140, potassium 4.4, creatinine 0.96.  She said her weight went back down to her normal which at  home is 151 and her symptoms resolved.  She has no active concerns today.  She denies any chest pain.  Her breathing is at her baseline.  She is quite bright and thoughtful about her personal health.    Physical Exam  Please see Below     Assessment and Plan  1.  Diastolic heart failure.  She had a 3 pound weight gain which definitely caused her to be symptomatic, her weight resolved with an additional half a tablet of Bumex for 2 days.  She has returned to Bumex 0.5 mg daily and has been doing well, her renal function looks good.  In the past she has been on alternating doses of 1 mg in half a milligram every other day, we talked about that if she is having more frequent weight changes like she did, that we could consider changing her dosing schedule.  For now I think she can continue on half a milligram of Bumex daily, if she notices a 3 pound weight gain, I instructed her to simply take an extra half a tablet for 2 days and see if that helps.  If she needs to do that frequently then we will of course need to see her and have close follow-up with BMPs.  Given her knowledge of nursing, I have no doubt she will be capable of doing all of this.  2.  Aortic root and ascending aortic root dilatation. Dr. Butler is following this with serial echoes.    Thank you for allowing me to see Rupinder Cast today.  She will follow-up with Dr. Butler in April or whenever she can get in to see him.  For now she does not plan to continue in pulmonary hypertension clinic, except to the extent that she feels that she gets benefit out of pulmonary rehab.      YUNIEL Cage, CNP      Orders Placed This Encounter   Procedures     Basic metabolic panel     No orders of the defined types were placed in this encounter.    Medications Discontinued During This Encounter   Medication Reason     lactobacillus rhamnosus, GG, (CULTURELL) capsule Stopped by Patient         CURRENT MEDICATIONS:  Current Outpatient Prescriptions   Medication  "Sig Dispense Refill     omeprazole (PRILOSEC) 20 MG CR capsule TAKE 1 CAPSULE (20 MG) BY MOUTH 2 TIMES DAILY 180 capsule 1     hyaluronate in balanced salt ophthalmic solution (HEALON IN BSS) solution 1 drop every 4 hours (while awake)       sodium chloride (MARGAUX 128) 5 % ophthalmic solution 1 drop At Bedtime       Ophthalmic Irrigation Solution (OCUSOFT EYE WASH OP)        bumetanide (BUMEX) 0.5 MG tablet Take 1 tablet (0.5 mg) by mouth daily (with breakfast) 30 tablet 11     rOPINIRole (REQUIP) 0.25 MG tablet Take 2 nightly, and one in the afternoon as needed       traMADol (ULTRAM) 50 MG tablet Take 1 tablet (50 mg) by mouth every 6 hours as needed for moderate pain 50 tablet 0     olmesartan (BENICAR) 40 MG tablet Take 1 tablet (40 mg) by mouth daily 90 tablet 2     Cholecalciferol (VITAMIN D) 2000 UNITS tablet Take 2,000 Units by mouth 2 times daily       fluticasone-salmeterol (ADVAIR DISKUS) 250-50 MCG/DOSE diskus inhaler Inhale 1 puff into the lungs 2 times daily 60 Inhaler 1     order for DME 2L of O2 at night       Calcium-Vitamin D-Vitamin K (CALCIUM + D) 500-1000-40 MG-UNT-MCG CHEW Take 2 tablets by mouth daily        DOXYCYCLINE HYCLATE PO Take 50 mg by mouth 3 times per week Monday,wednesday, friday       prednisoLONE acetate (PRED FORTE) 1 % ophthalmic suspension Place 1 drop Into the left eye 4 times daily        albuterol (ALBUTEROL) 108 (90 BASE) MCG/ACT inhaler Inhale 2 puffs into the lungs every 4 hours as needed 1 Inhaler 5     RESTASIS 0.05 % OP EMUL 1 drop to left eye twice daily       SYSTANE 0.4-0.3 % OP SOLN 1 drop to left eye 4 times daily         ALLERGIES     Allergies   Allergen Reactions     Atorvastatin Calcium      myalgias     Celecoxib Swelling     edema     Cholestyramine Diarrhea     Diarrhea       Crestor [Rosuvastatin Calcium]      leg aches, likely from medication     Cyclobenzaprine Hcl      flexeril--gets \"goofy\"     Dulera      tremors     Gabapentin      Nightmares.   " Retrial of medication caused tremors.     Lisinopril Cough     Cough/ Dizziness at high dose.     Meperidine Hcl Nausea and Vomiting     demerol-severe nausea     Morphine Nausea and Vomiting     Naprosyn [Naproxen] GI Disturbance     Niaspan [Niacin]      flushing, severe     Percocet [Oxycodone-Acetaminophen] Nausea     Nausea, lightheadedness.   Tolerates med if taken with food.     Pravastatin Swelling     Edema, myalgias     Red Yeast Rice [Cholestin] GI Disturbance     Bloating, etc.     Simvastatin      myalgias     Timolol Maleate Difficulty breathing     timoptic--respiratory problems     Hctz Rash     rash     Sulfa Drugs Itching and Rash     rash on all mucous membranes and palms of hands with intense itching       PAST MEDICAL HISTORY:  Past Medical History:   Diagnosis Date     Arthritis      Asthma      Cellulitis 4/13 in [place]    R ankle     Difficult airway for intubation      Ductal Carcinoma in Situ of Left Breast 9/09     Duodenal ulcer, unspecified as acute or chronic, without mention of hemorrhage or perforation 1980's    felt due to high dose steroids     Female stress incontinence      H/O right and left heart catheterization     1-     HYPERLIPIDEMIA      Hypertension      Ischemic colitis 7/2001    [facility]     Lactose Intolerance     Mild     Legal blindness     Artificial right eye, severe vision loss left (Detached retina's, glaucoma, corneal transplants)     Mild intermittent asthma ~1980's     Nocturnal oxygen desaturation      Osteoporosis, unspecified ~2002     Patent foramen ovale      PERIPHERAL VASCULAR DISEASE 5/05    mesenteric arteries, angioplasty     PRESBYESOPHAGUS 6/04     Pulmonary HTN 11/2014     Serous retinal detachment     False eye right     Subarachnoid hemorrhage following injury (H) 12/10    due to fall, vascular evaluation negative     Syncope and collapse 12/10     Unspecified glaucoma(365.9)        PAST SURGICAL HISTORY:  Past Surgical History:    Procedure Laterality Date     BIOPSY       C APPENDECTOMY  1982     C NONSPECIFIC PROCEDURE  1945    adenoidectomy     C NONSPECIFIC PROCEDURE      bilat retinal detachment     C NONSPECIFIC PROCEDURE      blephoroplasty bilat     C NONSPECIFIC PROCEDURE  1997    glaucoma procedure L     C NONSPECIFIC PROCEDURE  1997, 2007, 2014    corneal transplant L     C NONSPECIFIC PROCEDURE  1945    strabismus procedure R eye     C NONSPECIFIC PROCEDURE  1960's    R breast bx     C NONSPECIFIC PROCEDURE  5/05    Angioplasty and stenting mesenteric vessels     C NONSPECIFIC PROCEDURE  2008    L corneal transplant     COLONOSCOPY      due 2015     ENDOSCOPIC RETROGRADE CHOLANGIOPANCREATOGRAM N/A 10/13/2017    Procedure: ENDOSCOPIC RETROGRADE CHOLANGIOPANCREATOGRAM;  ENDOSCOPIC RETROGRADE CHOLANGIOPANCREATOGRAM ;  Surgeon: Isabel Yousif MD;  Location: [facility] OR     HC REMOVAL GALLBLADDER  1982    Cholecystectomy     HEART CATH RIGHT AND LEFT HEART CATH  1/19/15     HERNIORRHAPHY VENTRAL N/A 4/19/2016    Procedure: HERNIORRHAPHY VENTRAL;  Surgeon: Hamlet Ness MD;  Location: [facility] OR     MASTECTOMY SIMPLE BILATERAL  10/5/09    bilateral mastectomy     SURGICAL HISTORY OF -   6/2010    breast reconstruction       FAMILY HISTORY:  Family History   Problem Relation Age of Onset     Adopted: Yes     C.A.D. Paternal Uncle      MI 50's     Family History Negative Daughter        SOCIAL HISTORY:  Social History     Social History     Marital status:      Spouse name: N/A     Number of children: 4     Years of education: N/A     Occupational History     Retired nurse Retired     Social History Main Topics     Smoking status: Never Smoker     Smokeless tobacco: Never Used     Alcohol use 4.2 oz/week     7 Glasses of wine per week     Drug use: No     Sexual activity: No     Other Topics Concern     Caffeine Concern No     1- 2 cups coffee     Sleep Concern No     Stress Concern No     Weight Concern No     Special Diet Yes      "low sodium     Exercise No     2 days a week (abigail chi), walking program, stationary bike 10 minutes 3 times per week     Seat Belt Yes     Social History Narrative    , 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  She walks with a walker.  Is a full code.  (last updated 10/11/2017)        Review of Systems:  Skin:  Negative       Eyes:  Positive for glasses    ENT:  Positive for nasal congestion    Respiratory:  Positive for wheezing O2 at night, SOB and CRUZ and coughing are occassionally   Cardiovascular:    Positive for;lower extremity symptoms;edema    Gastroenterology: Positive for heartburn    Genitourinary:  not assessed      Musculoskeletal:  Positive for back pain left leg at night  Neurologic:  Negative      Psychiatric:  Negative      Heme/Lymph/Imm:  Positive for allergies    Endocrine:  Negative        Physical Exam:  Vitals: /64  Pulse 75  Ht 1.626 m (5' 4\")  Wt 69.4 kg (153 lb)  LMP 01/01/1990  BMI 26.26 kg/m2    Constitutional:  cooperative;in no acute distress        Skin:  warm and dry to the touch venous stasis changes        Head:  normocephalic        Eyes:      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    Lymph:      ENT:  no pallor or cyanosis        Neck:  JVP normal;no stiffness        Respiratory:  clear to auscultation;normal symmetry    right basilar crackle    Cardiac: regular rhythm;normal S1 and S2;no murmurs, gallops or rubs detected     no presence of murmur                                                   GI:  abdomen soft;non-tender        Extremities and Muscular Skeletal:    stasis pigmentation bilateral LE edema;trace;1+     wearing compression stockings    Neurological:  no gross motor deficits   walks with a walker    Psych:  affect appropriate, oriented to time, person and place    Encounter Diagnoses   Name Primary?     SOB (shortness of breath)      Edema of both lower legs due to " peripheral venous insufficiency Yes     Essential hypertension with goal blood pressure less than 130/85        Recent Lab Results:  LIPID RESULTS:  Lab Results   Component Value Date    CHOL 208 (A) 01/05/2016    CHOL 208 (A) 01/05/2016    HDL 52 01/05/2016    HDL 52 01/05/2016     01/05/2016     01/05/2016    TRIG 131 01/05/2016    TRIG 131 01/05/2016    CHOLHDLRATIO 2.4 08/25/2014       LIVER ENZYME RESULTS:  Lab Results   Component Value Date    AST 42 10/15/2017    ALT 39 10/15/2017       CBC RESULTS:  Lab Results   Component Value Date    WBC 7.2 10/19/2017    RBC 3.44 (L) 10/19/2017    HGB 13.2 12/14/2017    HCT 33.9 (L) 10/19/2017    MCV 98.5 10/19/2017    MCH 32.4 10/16/2017    MCHC 33.3 10/16/2017    RDW 14.7 10/19/2017     10/19/2017       BMP RESULTS:  Lab Results   Component Value Date     02/26/2018    POTASSIUM 4.4 02/26/2018    CHLORIDE 106 02/26/2018    CO2 27 02/26/2018    ANIONGAP 11.4 02/26/2018     (H) 02/26/2018    BUN 29 02/26/2018    CR 0.96 02/26/2018    GFRESTIMATED 56 (L) 02/26/2018    GFRESTBLACK 68 02/26/2018    AXEL 10.1 02/26/2018        A1C RESULTS:  No results found for: A1C    INR RESULTS:  Lab Results   Component Value Date    INR 1.09 10/12/2017    INR 0.95 01/19/2015           CC  Lilliana Truong, APRN CNP  [address]  [place] [address]

## 2018-03-02 NOTE — MR AVS SNAPSHOT
After Visit Summary   3/2/2018    Rupinder Tracy    MRN: [id number]           Patient Information     Date Of Birth          1940        Visit Information        Provider Department      3/2/2018 8:30 AM Lilliana Truong APRN [facility]   [place]        Today's Diagnoses     Edema of both lower legs due to peripheral venous insufficiency    -  1    SOB (shortness of breath)        Essential hypertension with goal blood pressure less than 130/85           Follow-ups after your visit        Your next 10 appointments already scheduled     Mar 06, 2018 12:00 PM CST   Pulmonary Treatment with  Pulmonary Rehab 2   [facility] ([facility])    [address]  [place] [address]   [phone]            Mar 08, 2018 12:00 PM CST   Pulmonary Treatment with  Pulmonary Rehab 2   [facility])    [address]  [facility] [address]   [phone]            Mar 13, 2018 12:00 PM CDT   Pulmonary Treatment with  Pulmonary Rehab 2   [facility] ([facility])    [address]  [facility] [address]   [phone]            Mar 15, 2018 12:00 PM CDT   Pulmonary Treatment with  Pulmonary Rehab 2   [facility] ([facility])    [address]  [facility] [address]   [phone]            Mar 20, 2018 12:00 PM CDT   Pulmonary Treatment with  Pulmonary Rehab 2   [facility] ([facility])    [address]  [facility] [address]   [phone]            Mar 22, 2018 12:00 PM CDT   Pulmonary Treatment with  Pulmonary Rehab 2   [facility] ([facility])    [address]  [facility] [address]   [phone]            Mar 27, 2018 12:00  PM CDT   Pulmonary Treatment with Sh Pulmonary Rehab 2   [facility] ([facility])    [address]  [place] [address]   [phone]            Mar 29, 2018 12:00 PM CDT   Pulmonary Treatment with Sh Pulmonary Rehab 2   [facility] ([facility])    [address]  [place] [address]   [phone]            Apr 03, 2018 12:00 PM CDT   Pulmonary Treatment with Sh Pulmonary Rehab 2   [facility] ([facility])    [address]  [place] [address]   [phone]            Apr 05, 2018 12:00 PM CDT   Pulmonary Treatment with Sh Pulmonary Rehab 2   [facility] ([facility])    [address]  [place] [address]   [phone]              Future tests that were ordered for you today     Open Future Orders        Priority Expected Expires Ordered    Basic metabolic panel Routine 5/31/2018 3/2/2019 3/2/2018            Who to contact     If you have questions or need follow up information about today's clinic visit or your schedule please contact [facility]   [place] directly at [phone].  Normal or non-critical lab and imaging results will be communicated to you by [employer], letter or phone within 4 business days after the clinic has received the results. If you do not hear from us within 7 days, please contact the clinic through [employer] or phone. If you have a critical or abnormal lab result, we will notify you by phone as soon as possible.  Submit refill requests through [employer] or call your pharmacy and they will forward the refill request to us. Please allow 3 business days for your refill to be completed.          Additional Information About Your Visit        [employer] Information     [employer] lets you send messages to your doctor, view your test results, renew your  "prescriptions, schedule appointments and more. To sign up, go to [email or web address] . Click on \"Log in\" on the left side of the screen, which will take you to the Welcome page. Then click on \"Sign up Now\" on the right side of the page.     You will be asked to enter the access code listed below, as well as some personal information. Please follow the directions to create your username and password.     Your access code is: [email or web address]  Expires: 3/14/2018 12:47 PM     Your access code will  in 90 days. If you need help or a new code, please call your [place] clinic or [phone].        Care EveryWhere ID     This is your Care EveryWhere ID. This could be used by other organizations to access your [place] medical records  [id number]        Your Vitals Were     Pulse Height Last Period BMI (Body Mass Index)          75 1.626 m (5' 4\") 1990 26.26 kg/m2         Blood Pressure from Last 3 Encounters:   18 108/64   01/15/18 104/52   17 121/83    Weight from Last 3 Encounters:   18 69.4 kg (153 lb)   01/15/18 69.8 kg (153 lb 12.8 oz)   17 69 kg (152 lb 3.2 oz)              We Performed the Following     Follow-Up with Cardiac Advanced Practice Provider        Primary Care Provider Office Phone # Fax #    Abranjeannie Mott [phone]       [address] [place] [phone]        Equal Access to Services     [facility]: Hadii aad kaya hadasho Soomaali, waaxda luqadaha, qaybta kaalmada benjamin, ethan alan. So [facility] [phone].    ATENCIÓN: Si habla español, tiene a lugo disposición servicios gratuitos de asistencia lingüística. Llame al [phone].    We comply with applicable federal civil rights laws and [place] laws. We do not discriminate on the basis of race, color, national origin, age, disability, sex, sexual orientation, or gender identity.            Thank you!     Thank you for choosing [facility]  for your care. Our goal is always to provide you with excellent care. Hearing back from our patients is one way we can continue to improve our services. Please take a few minutes to complete the written survey that you may receive in the mail after your visit with us. Thank you!             Your Updated Medication List - Protect others around you: Learn how to safely use, store and throw away your medicines at www.disposemymeds.org.          This list is accurate as of 3/2/18  9:14 AM.  Always use your most recent med list.                   Brand Name Dispense Instructions for use Diagnosis    albuterol 108 (90 BASE) MCG/ACT Inhaler    PROAIR HFA    1 Inhaler    Inhale 2 puffs into the lungs every 4 hours as needed    Mild intermittent asthma       bumetanide 0.5 MG tablet    BUMEX    30 tablet    Take 1 tablet (0.5 mg) by mouth daily (with breakfast)    Pulmonary HTN, Localized edema       CALCIUM + D 500-1000-40 MG-UNT-MCG Chew   Generic drug:  Calcium-Vitamin D-Vitamin K      Take 2 tablets by mouth daily        DOXYCYCLINE HYCLATE PO      Take 50 mg by mouth 3 times per week Monday,wednesday, friday        fluticasone-salmeterol 250-50 MCG/DOSE diskus inhaler    ADVAIR DISKUS    60 Inhaler    Inhale 1 puff into the lungs 2 times daily    Mild persistent asthma without complication       hyaluronate in balanced salt ophthalmic solution solution    HEALON IN BSS     1 drop every 4 hours (while awake)        MARGAUX 128 5 % ophthalmic solution   Generic drug:  sodium chloride      1 drop At Bedtime        OCUSOFT EYE WASH OP           olmesartan 40 MG tablet    BENICAR    90 tablet    Take 1 tablet (40 mg) by mouth daily    Essential hypertension with goal blood pressure less than 130/80       omeprazole 20 MG CR capsule    priLOSEC    180 capsule    TAKE 1 CAPSULE (20 MG) BY MOUTH 2 TIMES DAILY    Gastroesophageal reflux disease without esophagitis       order for DME      2L of  O2 at night        prednisoLONE acetate 1 % ophthalmic susp    PRED FORTE     Place 1 drop Into the left eye 4 times daily        REQUIP 0.25 MG tablet   Generic drug:  rOPINIRole      Take 2 nightly, and one in the afternoon as needed    Restless legs syndrome (RLS)       RESTASIS 0.05 % ophthalmic emulsion   Generic drug:  cycloSPORINE      1 drop to left eye twice daily        SYSTANE 0.4-0.3 % Soln ophthalmic solution   Generic drug:  polyethylene glycol 0.4%- propylene glycol 0.3%      1 drop to left eye 4 times daily        traMADol 50 MG tablet    ULTRAM    50 tablet    Take 1 tablet (50 mg) by mouth every 6 hours as needed for moderate pain    Chronic pain syndrome       vitamin D 2000 UNITS tablet      Take 2,000 Units by mouth 2 times daily    Vitamin D deficiency

## 2018-03-06 ENCOUNTER — HOSPITAL ENCOUNTER (OUTPATIENT)
Dept: CARDIAC REHAB | Facility: CLINIC | Age: 78
End: 2018-03-06
Attending: INTERNAL MEDICINE
Payer: MEDICARE

## 2018-03-06 PROCEDURE — 40000244 ZZH STATISTIC VISIT PULM REHAB

## 2018-03-06 PROCEDURE — G0239 OTH RESP PROC, GROUP: HCPCS

## 2018-03-08 ENCOUNTER — HOSPITAL ENCOUNTER (OUTPATIENT)
Dept: CARDIAC REHAB | Facility: CLINIC | Age: 78
End: 2018-03-08
Attending: INTERNAL MEDICINE
Payer: MEDICARE

## 2018-03-08 DIAGNOSIS — G89.4 CHRONIC PAIN SYNDROME: ICD-10-CM

## 2018-03-08 PROCEDURE — 40000244 ZZH STATISTIC VISIT PULM REHAB

## 2018-03-08 PROCEDURE — G0239 OTH RESP PROC, GROUP: HCPCS

## 2018-03-08 RX ORDER — TRAMADOL HYDROCHLORIDE 50 MG/1
TABLET ORAL
Qty: 100 TABLET | Refills: 1 | Status: SHIPPED | OUTPATIENT
Start: 2018-03-08 | End: 2018-10-02

## 2018-03-08 NOTE — TELEPHONE ENCOUNTER
Tramadol       Last Written Prescription Date:  10/16/17  Last Fill Quantity: 50,   # refills: 0  Last Office Visit: 11/7/17  Future Office visit:    Next 5 appointments (look out 90 days)     May 10, 2018  3:45 PM CDT   Return Visit with Jae Butler MD   [facility] ([facility])    [address] [place] [id number] [phone]                   Routing refill request to provider for review/approval because:  Drug not on the [facility] or [facility] refill protocol or controlled substance    [place] reviewed:  reviewed- recommend provider review - last prescribed by different provider     [place] profile:  [email or web address]      Controlled Substance Refill Request for Tramadol   Problem List Complete:  Yes   checked in past 6 months?  Yes 3/8/18    Patient is followed by BETTIE MARIN for ongoing prescription of pain medication.  All refills should be approved by this provider, or covering partner.    Medication(s): Tramadol.   Maximum quantity per month: 100  Clinic visit frequency required: Q 6  months     Controlled substance agreement on file: Yes       Date(s): 3/31/2015    Pain Clinic evaluation in the past: No    DIRE Total Score(s):    3/31/2015   Total Score 18       Last [facility] website verification:  done on 2/16/17   [email or web address]

## 2018-03-13 ENCOUNTER — HOSPITAL ENCOUNTER (OUTPATIENT)
Dept: CARDIAC REHAB | Facility: CLINIC | Age: 78
End: 2018-03-13
Attending: INTERNAL MEDICINE
Payer: MEDICARE

## 2018-03-13 PROCEDURE — G0239 OTH RESP PROC, GROUP: HCPCS | Performed by: CLINICAL EXERCISE PHYSIOLOGIST

## 2018-03-13 PROCEDURE — 40000244 ZZH STATISTIC VISIT PULM REHAB: Performed by: CLINICAL EXERCISE PHYSIOLOGIST

## 2018-03-15 ENCOUNTER — HOSPITAL ENCOUNTER (OUTPATIENT)
Dept: CARDIAC REHAB | Facility: CLINIC | Age: 78
End: 2018-03-15
Attending: INTERNAL MEDICINE
Payer: MEDICARE

## 2018-03-15 PROCEDURE — G0239 OTH RESP PROC, GROUP: HCPCS | Performed by: CLINICAL EXERCISE PHYSIOLOGIST

## 2018-03-15 PROCEDURE — 40000244 ZZH STATISTIC VISIT PULM REHAB: Performed by: CLINICAL EXERCISE PHYSIOLOGIST

## 2018-03-22 ENCOUNTER — HOSPITAL ENCOUNTER (OUTPATIENT)
Dept: CARDIAC REHAB | Facility: CLINIC | Age: 78
End: 2018-03-22
Attending: INTERNAL MEDICINE
Payer: MEDICARE

## 2018-03-22 PROCEDURE — G0239 OTH RESP PROC, GROUP: HCPCS | Performed by: CLINICAL EXERCISE PHYSIOLOGIST

## 2018-03-22 PROCEDURE — 40000244 ZZH STATISTIC VISIT PULM REHAB: Performed by: CLINICAL EXERCISE PHYSIOLOGIST

## 2018-03-27 ENCOUNTER — HOSPITAL ENCOUNTER (OUTPATIENT)
Dept: CARDIAC REHAB | Facility: CLINIC | Age: 78
End: 2018-03-27
Attending: INTERNAL MEDICINE
Payer: MEDICARE

## 2018-03-27 PROCEDURE — G0239 OTH RESP PROC, GROUP: HCPCS

## 2018-03-27 PROCEDURE — 40000244 ZZH STATISTIC VISIT PULM REHAB

## 2018-03-30 ENCOUNTER — OFFICE VISIT (OUTPATIENT)
Dept: INTERNAL MEDICINE | Facility: CLINIC | Age: 78
End: 2018-03-30
Payer: COMMERCIAL

## 2018-03-30 ENCOUNTER — HOSPITAL ENCOUNTER (OUTPATIENT)
Dept: ULTRASOUND IMAGING | Facility: CLINIC | Age: 78
End: 2018-03-30
Attending: PHYSICIAN ASSISTANT
Payer: MEDICARE

## 2018-03-30 ENCOUNTER — HOSPITAL ENCOUNTER (OUTPATIENT)
Dept: GENERAL RADIOLOGY | Facility: CLINIC | Age: 78
End: 2018-03-30
Attending: PHYSICIAN ASSISTANT
Payer: MEDICARE

## 2018-03-30 ENCOUNTER — TELEPHONE (OUTPATIENT)
Dept: NURSING | Facility: CLINIC | Age: 78
End: 2018-03-30

## 2018-03-30 ENCOUNTER — HOSPITAL ENCOUNTER (OUTPATIENT)
Dept: GENERAL RADIOLOGY | Facility: CLINIC | Age: 78
Discharge: HOME | End: 2018-03-30
Attending: PHYSICIAN ASSISTANT | Admitting: PHYSICIAN ASSISTANT
Payer: MEDICARE

## 2018-03-30 VITALS
BODY MASS INDEX: 26.14 KG/M2 | TEMPERATURE: 97.7 F | HEART RATE: 74 BPM | OXYGEN SATURATION: 94 % | DIASTOLIC BLOOD PRESSURE: 52 MMHG | RESPIRATION RATE: 24 BRPM | WEIGHT: 152.3 LBS | SYSTOLIC BLOOD PRESSURE: 132 MMHG

## 2018-03-30 DIAGNOSIS — M79.662 PAIN OF LEFT LOWER LEG: Primary | ICD-10-CM

## 2018-03-30 DIAGNOSIS — M79.89 SWELLING OF LIMB: ICD-10-CM

## 2018-03-30 DIAGNOSIS — M79.662 PAIN OF LEFT LOWER LEG: ICD-10-CM

## 2018-03-30 DIAGNOSIS — R10.32 GROIN PAIN, LEFT: ICD-10-CM

## 2018-03-30 LAB
ALBUMIN SERPL-MCNC: 3.6 G/DL (ref 3.4–5)
ALP SERPL-CCNC: 160 U/L (ref 40–150)
ALT SERPL W P-5'-P-CCNC: 14 U/L (ref 0–50)
ANION GAP SERPL CALCULATED.3IONS-SCNC: 6 MMOL/L (ref 3–14)
AST SERPL W P-5'-P-CCNC: 24 U/L (ref 0–45)
BILIRUB SERPL-MCNC: 0.7 MG/DL (ref 0.2–1.3)
BUN SERPL-MCNC: 27 MG/DL (ref 7–30)
CALCIUM SERPL-MCNC: 9.7 MG/DL (ref 8.5–10.1)
CHLORIDE SERPL-SCNC: 110 MMOL/L (ref 94–109)
CO2 SERPL-SCNC: 26 MMOL/L (ref 20–32)
CREAT SERPL-MCNC: 0.9 MG/DL (ref 0.52–1.04)
ERYTHROCYTE [DISTWIDTH] IN BLOOD BY AUTOMATED COUNT: 14.5 % (ref 10–15)
GFR SERPL CREATININE-BSD FRML MDRD: 61 ML/MIN/1.7M2
GLUCOSE SERPL-MCNC: 102 MG/DL (ref 70–99)
HCT VFR BLD AUTO: 39.7 % (ref 35–47)
HGB BLD-MCNC: 12.4 G/DL (ref 11.7–15.7)
MCH RBC QN AUTO: 32.7 PG (ref 26.5–33)
MCHC RBC AUTO-ENTMCNC: 31.2 G/DL (ref 31.5–36.5)
MCV RBC AUTO: 105 FL (ref 78–100)
PLATELET # BLD AUTO: 268 10E9/L (ref 150–450)
POTASSIUM SERPL-SCNC: 4.1 MMOL/L (ref 3.4–5.3)
PROT SERPL-MCNC: 7.4 G/DL (ref 6.8–8.8)
RBC # BLD AUTO: 3.79 10E12/L (ref 3.8–5.2)
SODIUM SERPL-SCNC: 142 MMOL/L (ref 133–144)
WBC # BLD AUTO: 5.4 10E9/L (ref 4–11)

## 2018-03-30 PROCEDURE — 72100 X-RAY EXAM L-S SPINE 2/3 VWS: CPT

## 2018-03-30 PROCEDURE — 85027 COMPLETE CBC AUTOMATED: CPT | Performed by: PHYSICIAN ASSISTANT

## 2018-03-30 PROCEDURE — 73502 X-RAY EXAM HIP UNI 2-3 VIEWS: CPT

## 2018-03-30 PROCEDURE — 36415 COLL VENOUS BLD VENIPUNCTURE: CPT | Performed by: PHYSICIAN ASSISTANT

## 2018-03-30 PROCEDURE — 93971 EXTREMITY STUDY: CPT | Mod: LT

## 2018-03-30 PROCEDURE — 99214 OFFICE O/P EST MOD 30 MIN: CPT | Performed by: PHYSICIAN ASSISTANT

## 2018-03-30 PROCEDURE — 80053 COMPREHEN METABOLIC PANEL: CPT | Performed by: PHYSICIAN ASSISTANT

## 2018-03-30 RX ORDER — CEPHALEXIN 500 MG/1
500 CAPSULE ORAL 3 TIMES DAILY
Qty: 21 CAPSULE | Refills: 0 | Status: SHIPPED | OUTPATIENT
Start: 2018-03-30 | End: 2018-04-30

## 2018-03-30 ASSESSMENT — PAIN SCALES - GENERAL: PAINLEVEL: MILD PAIN (2)

## 2018-03-30 NOTE — MR AVS SNAPSHOT
After Visit Summary   3/30/2018    Rupinder Tracy    MRN: [id number]           Patient Information     Date Of Birth          1940        Visit Information        Provider Department      3/30/2018 8:00 AM Mary Marvin PA-C [facility]        Today's Diagnoses     Pain of left lower leg    -  1    Swelling of limb        Groin pain, left           Follow-ups after your visit        Your next 10 appointments already scheduled     Mar 30, 2018 10:00 AM CDT   (Arrive by 9:45 AM)   XR HIP LEFT G/E 2 VIEWS with SHXR3   [facility] Radiology ([facility])    [address] [address]   [phone]           Please bring a list of your current medicines to your exam. (Include vitamins, minerals and over-thecounter medicines.) Leave your valuables at home.  Tell your doctor if there is a chance you may be pregnant.  You do not need to do anything special for this exam.            Mar 30, 2018 10:20 AM CDT   (Arrive by 10:05 AM)   XR LUMBAR SPINE 2/3 VIEWS with SHXR3   [facility]iology ([facility])    [address] [address]   [phone]           Please bring a list of your current medicines to your exam. (Include vitamins, minerals and over-thecounter medicines.) Leave your valuables at home.  Tell your doctor if there is a chance you may be pregnant.  You do not need to do anything special for this exam.            Mar 30, 2018 11:30 AM CDT   US LOWER EXTREMITY VENOUS DUPLEX LEFT with SHUS1   [facility] Ultrasound ([facility])    [address] [address] [phone]           Please bring a list of your medicines (including vitamins, minerals and over-the-counter drugs). Also, tell your doctor about any allergies you may have. Wear comfortable clothes and leave your valuables at home.  You do not need to do anything special to  prepare for your exam.  Please call the Imaging Department at your exam site with any questions.            Apr 03, 2018 12:00 PM CDT   Pulmonary Treatment with Sh Pulmonary Rehab 2   [facility] Cardiac Rehab ([facility])    [address]  [place] [address]   [phone]            Apr 05, 2018 12:00 PM CDT   Pulmonary Treatment with Sh Pulmonary Rehab 2   [facility] ([facility])    [address]  [place] [address]   [phone]            May 10, 2018  2:50 PM CDT   LAB with MEJIA LAB   [facility] [place] ([facility])    [address]  [place]  [place] [address]   [phone]           Please do not eat 10-12 hours before your appointment if you are coming in fasting for labs on lipids, cholesterol, or glucose (sugar). This does not apply to pregnant women. Water, hot tea and black coffee (with nothing added) are okay. Do not drink other fluids, diet soda or chew gum.            May 10, 2018  3:45 PM CDT   Return Visit with Jae Butler MD   [facility] ([facility])    [address]  [facility] [address]   [phone] OPT 2              Future tests that were ordered for you today     Open Future Orders        Priority Expected Expires Ordered    US Lower Extremity Venous Duplex Left STAT  3/30/2019 3/30/2018            Who to contact     If you have questions or need follow up information about today's clinic visit or your schedule please contact [facility] directly at [phone].  Normal or non-critical lab and imaging results will be communicated to you by MyChart, letter or phone within 4 business days after the clinic has received the results. If you do not hear from us within 7 days, please contact the clinic through MyChart or phone. If you have a critical  "or abnormal lab result, we will notify you by phone as soon as possible.  Submit refill requests through [employer] or call your pharmacy and they will forward the refill request to us. Please allow 3 business days for your refill to be completed.          Additional Information About Your Visit        DailyDealhart Information     [employer] lets you send messages to your doctor, view your test results, renew your prescriptions, schedule appointments and more. To sign up, go to [email or web address] . Click on \"Log in\" on the left side of the screen, which will take you to the Welcome page. Then click on \"Sign up Now\" on the right side of the page.     You will be asked to enter the access code listed below, as well as some personal information. Please follow the directions to create your username and password.     Your access code is: [email or web address]  Expires: 2018  8:51 AM     Your access code will  in 90 days. If you need help or a new code, please call your [place] clinic or [phone].        Care EveryWhere ID     This is your Care EveryWhere ID. This could be used by other organizations to access your [place] medical records  [id number]        Your Vitals Were     Pulse Temperature Respirations Last Period Pulse Oximetry Breastfeeding?    74 97.7  F (36.5  C) (Oral) 24 1990 94% No    BMI (Body Mass Index)                   26.14 kg/m2            Blood Pressure from Last 3 Encounters:   18 132/52   18 108/64   01/15/18 104/52    Weight from Last 3 Encounters:   18 152 lb 4.8 oz (69.1 kg)   18 153 lb (69.4 kg)   01/15/18 153 lb 12.8 oz (69.8 kg)              We Performed the Following     CBC with platelets     Comprehensive metabolic panel     XR Hip Left 2-3 Views     XR Lumbar Spine 2/3 Views        Primary Care Provider Office Phone # Fax #    Abran Mott [phone]       [address] [place] [address]        Equal Access to Services     " LINUS ASCENCIO : Hadii aad ku александр Soetienneali, waaxda luqadaha, qaybta kaalmada ademicaela, ethan dannain hayaatony morinbrenda jean audi . So [facility] [phone].    ATENCIÓN: Si habla espbelgica, tiene a lugo disposición servicios gratuitos de asistencia lingüística. Llame al [phone].    We comply with applicable federal civil rights laws and [place] laws. We do not discriminate on the basis of race, color, national origin, age, disability, sex, sexual orientation, or gender identity.            Thank you!     Thank you for choosing [facility]  for your care. Our goal is always to provide you with excellent care. Hearing back from our patients is one way we can continue to improve our services. Please take a few minutes to complete the written survey that you may receive in the mail after your visit with us. Thank you!             Your Updated Medication List - Protect others around you: Learn how to safely use, store and throw away your medicines at www.disposemymeds.org.          This list is accurate as of 3/30/18  8:51 AM.  Always use your most recent med list.                   Brand Name Dispense Instructions for use Diagnosis    albuterol 108 (90 BASE) MCG/ACT Inhaler    PROAIR HFA    1 Inhaler    Inhale 2 puffs into the lungs every 4 hours as needed    Mild intermittent asthma       bumetanide 0.5 MG tablet    BUMEX    30 tablet    Take 1 tablet (0.5 mg) by mouth daily (with breakfast)    Pulmonary HTN, Localized edema       CALCIUM + D 500-1000-40 MG-UNT-MCG Chew   Generic drug:  Calcium-Vitamin D-Vitamin K      Take 2 tablets by mouth daily        DOXYCYCLINE HYCLATE PO      Take 50 mg by mouth 3 times per week Monday,wednesday, friday        fluticasone-salmeterol 250-50 MCG/DOSE diskus inhaler    ADVAIR DISKUS    60 Inhaler    Inhale 1 puff into the lungs 2 times daily    Mild persistent asthma without complication       hyaluronate in balanced salt ophthalmic solution solution     HEALON IN BSS     1 drop every 4 hours (while awake)        MARGAUX 128 5 % ophthalmic solution   Generic drug:  sodium chloride      1 drop At Bedtime        OCUSOFT EYE WASH OP           olmesartan 40 MG tablet    BENICAR    90 tablet    Take 1 tablet (40 mg) by mouth daily    Essential hypertension with goal blood pressure less than 130/80       omeprazole 20 MG CR capsule    priLOSEC    180 capsule    TAKE 1 CAPSULE (20 MG) BY MOUTH 2 TIMES DAILY    Gastroesophageal reflux disease without esophagitis       order for DME      2L of O2 at night        prednisoLONE acetate 1 % ophthalmic susp    PRED FORTE     Place 1 drop Into the left eye 4 times daily        REQUIP 0.25 MG tablet   Generic drug:  rOPINIRole      Take 2 nightly, and one in the afternoon as needed    Restless legs syndrome (RLS)       RESTASIS 0.05 % ophthalmic emulsion   Generic drug:  cycloSPORINE      1 drop to left eye twice daily        SYSTANE 0.4-0.3 % Soln ophthalmic solution   Generic drug:  polyethylene glycol 0.4%- propylene glycol 0.3%      1 drop to left eye 4 times daily        traMADol 50 MG tablet    ULTRAM    100 tablet    TAKE 1-2 TABLETS BY MOUTH 2 TIMES DAILY MAX 4 TABLETS PER DAY    Chronic pain syndrome       vitamin D 2000 UNITS tablet      Take 2,000 Units by mouth 2 times daily    Vitamin D deficiency
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
## 2018-04-05 ENCOUNTER — HOSPITAL ENCOUNTER (OUTPATIENT)
Dept: CARDIAC REHAB | Facility: CLINIC | Age: 78
End: 2018-04-05
Attending: INTERNAL MEDICINE
Payer: MEDICARE

## 2018-04-05 PROCEDURE — G0239 OTH RESP PROC, GROUP: HCPCS

## 2018-04-05 PROCEDURE — 40000244 ZZH STATISTIC VISIT PULM REHAB

## 2018-04-12 ENCOUNTER — HOSPITAL ENCOUNTER (OUTPATIENT)
Dept: CARDIAC REHAB | Facility: CLINIC | Age: 78
End: 2018-04-12
Attending: INTERNAL MEDICINE
Payer: MEDICARE

## 2018-04-12 PROCEDURE — 40000244 ZZH STATISTIC VISIT PULM REHAB

## 2018-04-12 PROCEDURE — G0239 OTH RESP PROC, GROUP: HCPCS

## 2018-04-17 ENCOUNTER — APPOINTMENT (OUTPATIENT)
Dept: ULTRASOUND IMAGING | Facility: CLINIC | Age: 78
End: 2018-04-17
Attending: EMERGENCY MEDICINE
Payer: MEDICARE

## 2018-04-17 ENCOUNTER — HOSPITAL ENCOUNTER (EMERGENCY)
Facility: CLINIC | Age: 78
Discharge: HOME | End: 2018-04-17
Attending: EMERGENCY MEDICINE | Admitting: EMERGENCY MEDICINE
Payer: MEDICARE

## 2018-04-17 ENCOUNTER — APPOINTMENT (OUTPATIENT)
Dept: GENERAL RADIOLOGY | Facility: CLINIC | Age: 78
End: 2018-04-17
Attending: EMERGENCY MEDICINE
Payer: MEDICARE

## 2018-04-17 VITALS
HEIGHT: 64 IN | SYSTOLIC BLOOD PRESSURE: 126 MMHG | OXYGEN SATURATION: 96 % | RESPIRATION RATE: 20 BRPM | BODY MASS INDEX: 25.95 KG/M2 | WEIGHT: 152 LBS | HEART RATE: 103 BPM | DIASTOLIC BLOOD PRESSURE: 71 MMHG | TEMPERATURE: 98.5 F

## 2018-04-17 DIAGNOSIS — M71.22 BAKER'S CYST OF KNEE, LEFT: ICD-10-CM

## 2018-04-17 DIAGNOSIS — R10.32 GROIN PAIN, LEFT: ICD-10-CM

## 2018-04-17 DIAGNOSIS — R60.0 LEG EDEMA, LEFT: ICD-10-CM

## 2018-04-17 LAB
ANION GAP SERPL CALCULATED.3IONS-SCNC: 7 MMOL/L (ref 3–14)
BASOPHILS # BLD AUTO: 0 10E9/L (ref 0–0.2)
BASOPHILS NFR BLD AUTO: 0.4 %
BUN SERPL-MCNC: 20 MG/DL (ref 7–30)
CALCIUM SERPL-MCNC: 9.4 MG/DL (ref 8.5–10.1)
CHLORIDE SERPL-SCNC: 111 MMOL/L (ref 94–109)
CO2 SERPL-SCNC: 27 MMOL/L (ref 20–32)
CREAT SERPL-MCNC: 0.98 MG/DL (ref 0.52–1.04)
CRP SERPL-MCNC: 9 MG/L (ref 0–8)
DIFFERENTIAL METHOD BLD: NORMAL
EOSINOPHIL # BLD AUTO: 0.2 10E9/L (ref 0–0.7)
EOSINOPHIL NFR BLD AUTO: 2.6 %
ERYTHROCYTE [DISTWIDTH] IN BLOOD BY AUTOMATED COUNT: 14.3 % (ref 10–15)
ERYTHROCYTE [SEDIMENTATION RATE] IN BLOOD BY WESTERGREN METHOD: 50 MM/H (ref 0–30)
GFR SERPL CREATININE-BSD FRML MDRD: 55 ML/MIN/1.7M2
GLUCOSE SERPL-MCNC: 86 MG/DL (ref 70–99)
HCT VFR BLD AUTO: 37.9 % (ref 35–47)
HGB BLD-MCNC: 12.2 G/DL (ref 11.7–15.7)
IMM GRANULOCYTES # BLD: 0 10E9/L (ref 0–0.4)
IMM GRANULOCYTES NFR BLD: 0.1 %
LYMPHOCYTES # BLD AUTO: 2 10E9/L (ref 0.8–5.3)
LYMPHOCYTES NFR BLD AUTO: 29 %
MCH RBC QN AUTO: 32.1 PG (ref 26.5–33)
MCHC RBC AUTO-ENTMCNC: 32.2 G/DL (ref 31.5–36.5)
MCV RBC AUTO: 100 FL (ref 78–100)
MONOCYTES # BLD AUTO: 0.9 10E9/L (ref 0–1.3)
MONOCYTES NFR BLD AUTO: 13.3 %
NEUTROPHILS # BLD AUTO: 3.8 10E9/L (ref 1.6–8.3)
NEUTROPHILS NFR BLD AUTO: 54.6 %
PLATELET # BLD AUTO: 185 10E9/L (ref 150–450)
POTASSIUM SERPL-SCNC: 4 MMOL/L (ref 3.4–5.3)
RBC # BLD AUTO: 3.8 10E12/L (ref 3.8–5.2)
SODIUM SERPL-SCNC: 145 MMOL/L (ref 133–144)
WBC # BLD AUTO: 7 10E9/L (ref 4–11)

## 2018-04-17 PROCEDURE — 93971 EXTREMITY STUDY: CPT | Mod: LT

## 2018-04-17 PROCEDURE — 85652 RBC SED RATE AUTOMATED: CPT | Performed by: EMERGENCY MEDICINE

## 2018-04-17 PROCEDURE — 86140 C-REACTIVE PROTEIN: CPT | Performed by: EMERGENCY MEDICINE

## 2018-04-17 PROCEDURE — 99285 EMERGENCY DEPT VISIT HI MDM: CPT | Mod: 25

## 2018-04-17 PROCEDURE — 73502 X-RAY EXAM HIP UNI 2-3 VIEWS: CPT

## 2018-04-17 PROCEDURE — 85025 COMPLETE CBC W/AUTO DIFF WBC: CPT | Performed by: EMERGENCY MEDICINE

## 2018-04-17 PROCEDURE — 80048 BASIC METABOLIC PNL TOTAL CA: CPT | Performed by: EMERGENCY MEDICINE

## 2018-04-17 ASSESSMENT — ENCOUNTER SYMPTOMS: SHORTNESS OF BREATH: 0

## 2018-04-17 NOTE — ED AVS SNAPSHOT
Emergency Department    [address] [place] [address]    Phone:  [phone]    Fax:  [phone]                                       Rupinder Tracy   MRN: [id number]    Department:   Emergency Department   Date of Visit:  4/17/2018           After Visit Summary Signature Page     I have received my discharge instructions, and my questions have been answered. I have discussed any challenges I see with this plan with the nurse or doctor.    ..........................................................................................................................................  Patient/Patient Representative Signature      ..........................................................................................................................................  Patient Representative Print Name and Relationship to Patient    ..................................................               ................................................  Date                                            Time    ..........................................................................................................................................  Reviewed by Signature/Title    ...................................................              ..............................................  Date                                                            Time

## 2018-04-17 NOTE — ED AVS SNAPSHOT
Emergency Department    [address] [facility] [address]    Phone:  [phone]    Fax:  [phone]                                       Rupinder Tracy   MRN: [id number]    Department:   Emergency Department   Date of Visit:  4/17/2018           Patient Information     Date Of Birth          1940        Your diagnoses for this visit were:     Leg edema, left     Groin pain, left     Baker's cyst of knee, left        You were seen by Bradley Damon MD.      Follow-up Information     Schedule an appointment as soon as possible for a visit with Abran Mott MD.    Specialty:  Internal Medicine    Contact information:    [address] [facility] [address] [phone]          Follow up with  Emergency Department.    Specialty:  EMERGENCY MEDICINE    Why:  If symptoms worsen    Contact information:    [address] [facility] [id number] [phone]      Discharge References/Attachments     BAKER'S CYST (POPLITEAL CYST), UNDERSTANDING (ENGLISH)    LEG SWELLING IN A SINGLE LEG (ENGLISH)    PAIN, ACUTE, UNCERTAIN CAUSE (ENGLISH)    GROIN STRAIN (ENGLISH)    HIP STRAIN (ENGLISH)    MUSCLE STRAIN, EXTREMITY (ENGLISH)      Your next 10 appointments already scheduled     Apr 19, 2018 12:00 PM CDT   Pulmonary Treatment with  Pulmonary Rehab 2   [facility])    [address], [address]  [facility] [address]   [phone]            Apr 24, 2018 12:00 PM CDT   Pulmonary Treatment with  Pulmonary Rehab 2   [facility] ([facility])    [address], [address]  [facility] [address]   [phone]            Apr 26, 2018 12:00 PM CDT   Pulmonary Treatment with  Pulmonary Rehab 2   [facility] ([facility])    [address], [address]  [facility] [address]   [phone]            May 01, 2018 12:00 PM CDT   Pulmonary  Treatment with Sh Pulmonary Rehab 2   [facility] ([facility])    [address]  [place] [address]   [phone]            May 03, 2018 12:00 PM CDT   Pulmonary Treatment with Sh Pulmonary Rehab 2   [facility] ([facility])    [address]  [facility] [address]   [phone]            May 04, 2018 10:00 AM CDT   Office Visit with Abran Mott MD   [facility] ([facility])    [address] [id number] [phone]           Bring a current list of meds and any records pertaining to this visit. For Physicals, please bring immunization records and any forms needing to be filled out. Please arrive 10 minutes early to complete paperwork.            May 08, 2018 12:00 PM CDT   Pulmonary Treatment with  Pulmonary Rehab 2   [facility] ([facility])    [address]  [facility] [address]   [phone]            May 10, 2018  2:50 PM CDT   LAB with [facility]   [facility] [place] ([facility])    [facility]  [facility] [address] [phone]           Please do not eat 10-12 hours before your appointment if you are coming in fasting for labs on lipids, cholesterol, or glucose (sugar). This does not apply to pregnant women. Water, hot tea and black coffee (with nothing added) are okay. Do not drink other fluids, diet soda or chew gum.            May 10, 2018  3:45 PM CDT   Return Visit with Jae Butler MD   [facility]   [place] ([facility])    [address]  [facility] [address] [phone] OPT 2              24 Hour Appointment Hotline       To make an appointment at any [facility] call [phone] ([phone]). If you don't have a family  doctor or clinic, we will help you find one. [facility] are conveniently located to serve the needs of you and your family.             Review of your medicines      Our records show that you are taking the medicines listed below. If these are incorrect, please call your family doctor or clinic.        Dose / Directions Last dose taken    albuterol 108 (90 Base) MCG/ACT Inhaler   Commonly known as:  PROAIR HFA   Dose:  2 puff   Quantity:  1 Inhaler        Inhale 2 puffs into the lungs every 4 hours as needed   Refills:  5        bumetanide 0.5 MG tablet   Commonly known as:  BUMEX   Dose:  0.5 mg   Quantity:  30 tablet        Take 1 tablet (0.5 mg) by mouth daily (with breakfast)   Refills:  11        CALCIUM + D 500-1000-40 MG-UNT-MCG Chew   Dose:  2 tablet   Indication:  Heartburn   Generic drug:  Calcium-Vitamin D-Vitamin K        Take 2 tablets by mouth daily   Refills:  0        cephALEXin 500 MG capsule   Commonly known as:  KEFLEX   Dose:  500 mg   Quantity:  21 capsule        Take 1 capsule (500 mg) by mouth 3 times daily   Refills:  0        DOXYCYCLINE HYCLATE PO   Dose:  50 mg        Take 50 mg by mouth 3 times per week Monday,wednesday, friday   Refills:  0        fluticasone-salmeterol 250-50 MCG/DOSE diskus inhaler   Commonly known as:  ADVAIR DISKUS   Dose:  1 puff   Quantity:  60 Inhaler        Inhale 1 puff into the lungs 2 times daily   Refills:  1        hyaluronate in balanced salt ophthalmic solution solution   Commonly known as:  HEALON IN BSS   Dose:  1 drop        1 drop every 4 hours (while awake)   Refills:  0        MARGAUX 128 5 % ophthalmic solution   Dose:  1 drop   Generic drug:  sodium chloride        1 drop At Bedtime   Refills:  0        OCUSOFT EYE WASH OP        Refills:  0        olmesartan 40 MG tablet   Commonly known as:  BENICAR   Dose:  40 mg   Quantity:  90 tablet        Take 1 tablet (40 mg) by mouth daily   Refills:  2        omeprazole 20 MG CR capsule   Commonly  known as:  priLOSEC   Quantity:  180 capsule        TAKE 1 CAPSULE (20 MG) BY MOUTH 2 TIMES DAILY   Refills:  1        order for DME        2L of O2 at night   Refills:  0        prednisoLONE acetate 1 % ophthalmic susp   Commonly known as:  PRED FORTE   Dose:  1 drop        Place 1 drop Into the left eye 4 times daily   Refills:  0        REQUIP 0.25 MG tablet   Generic drug:  rOPINIRole        Take 2 nightly, and one in the afternoon as needed   Refills:  0        RESTASIS 0.05 % ophthalmic emulsion   Generic drug:  cycloSPORINE        1 drop to left eye twice daily   Refills:  0        SYSTANE 0.4-0.3 % Soln ophthalmic solution   Generic drug:  polyethylene glycol 0.4%- propylene glycol 0.3%        1 drop to left eye 4 times daily   Refills:  0        traMADol 50 MG tablet   Commonly known as:  ULTRAM   Quantity:  100 tablet        TAKE 1-2 TABLETS BY MOUTH 2 TIMES DAILY MAX 4 TABLETS PER DAY   Refills:  1        vitamin D 2000 units tablet   Dose:  2000 Units        Take 2,000 Units by mouth 2 times daily   Refills:  0                Procedures and tests performed during your visit     Basic metabolic panel    CBC with platelets differential    CRP inflammation    Erythrocyte sedimentation rate auto    Peripheral IV catheter    US Lower Extremity Venous Duplex Left    XR Pelvis w Hip Left 1 View      Orders Needing Specimen Collection     None      Pending Results     Date and Time Order Name Status Description    4/17/2018 1358 US Lower Extremity Venous Duplex Left Preliminary             Pending Culture Results     No orders found from 4/15/2018 to 4/18/2018.            Pending Results Instructions     If you had any lab results that were not finalized at the time of your Discharge, you can call the ED Lab Result RN at [phone]. You will be contacted by this team for any positive Lab results or changes in treatment. The nurses are available 7 days a week from 10A to 6:30P.  You can leave a message 24 hours  per day and they will return your call.        Test Results From Your Hospital Stay        4/17/2018  2:26 PM      Component Results     Component Value Ref Range & Units Status    WBC 7.0 4.0 - 11.0 10e9/L Final    RBC Count 3.80 3.8 - 5.2 10e12/L Final    Hemoglobin 12.2 11.7 - 15.7 g/dL Final    Hematocrit 37.9 35.0 - 47.0 % Final     78 - 100 fl Final    MCH 32.1 26.5 - 33.0 pg Final    MCHC 32.2 31.5 - 36.5 g/dL Final    RDW 14.3 10.0 - 15.0 % Final    Platelet Count 185 150 - 450 10e9/L Final    Diff Method Automated Method  Final    % Neutrophils 54.6 % Final    % Lymphocytes 29.0 % Final    % Monocytes 13.3 % Final    % Eosinophils 2.6 % Final    % Basophils 0.4 % Final    % Immature Granulocytes 0.1 % Final    Absolute Neutrophil 3.8 1.6 - 8.3 10e9/L Final    Absolute Lymphocytes 2.0 0.8 - 5.3 10e9/L Final    Absolute Monocytes 0.9 0.0 - 1.3 10e9/L Final    Absolute Eosinophils 0.2 0.0 - 0.7 10e9/L Final    Absolute Basophils 0.0 0.0 - 0.2 10e9/L Final    Abs Immature Granulocytes 0.0 0 - 0.4 10e9/L Final         4/17/2018  2:39 PM      Component Results     Component Value Ref Range & Units Status    Sodium 145 (H) 133 - 144 mmol/L Final    Potassium 4.0 3.4 - 5.3 mmol/L Final    Chloride 111 (H) 94 - 109 mmol/L Final    Carbon Dioxide 27 20 - 32 mmol/L Final    Anion Gap 7 3 - 14 mmol/L Final    Glucose 86 70 - 99 mg/dL Final    Urea Nitrogen 20 7 - 30 mg/dL Final    Creatinine 0.98 0.52 - 1.04 mg/dL Final    GFR Estimate 55 (L) >60 mL/min/1.7m2 Final    Non  GFR Calc    GFR Estimate If Black 66 >60 mL/min/1.7m2 Final    African American GFR Calc    Calcium 9.4 8.5 - 10.1 mg/dL Final         4/17/2018  2:39 PM      Component Results     Component Value Ref Range & Units Status    CRP Inflammation 9.0 (H) 0.0 - 8.0 mg/L Final         4/17/2018  2:35 PM      Component Results     Component Value Ref Range & Units Status    Sed Rate 50 (H) 0 - 30 mm/h Final         4/17/2018  3:24 PM       Narrative     VENOUS ULTRASOUND LEFT LOWER EXTREMITY  4/17/2018 3:19 PM     HISTORY: Pain and swelling, deep venous thrombosis.     COMPARISON: 3/30/2018    TECHNIQUE: Color Doppler and spectral waveform analysis performed  throughout the deep veins of the left lower extremity.    FINDINGS: The left common femoral, proximal greater saphenous,  femoral, and popliteal veins demonstrate normal blood flow,  compression, and augmentation. Posterior tibial and peroneal arteries  are compressible. Small hypoechoic collection is noted in the  popliteal fossa measuring 1.9 x 1.4 x 1.3 cm. Right common femoral  vein is patent.        Impression     IMPRESSION:   1. Negative for deep venous thrombosis in the left lower extremity.  2. Small hypoechoic collection left popliteal fossa likely a Baker's  cyst, noticed on previous exam as well.         4/17/2018  3:27 PM      Narrative     XR PELVIS AND HIP LEFT 1 VIEW  4/17/2018 3:13 PM    HISTORY:  Pain.    COMPARISON:  Left hip 3/30/2018.        Impression     IMPRESSION:  Negative.     JUDY CLARK MD                Clinical Quality Measure: Blood Pressure Screening     Your blood pressure was checked while you were in the emergency department today. The last reading we obtained was  BP: 126/71 . Please read the guidelines below about what these numbers mean and what you should do about them.  If your systolic blood pressure (the top number) is less than 120 and your diastolic blood pressure (the bottom number) is less than 80, then your blood pressure is normal. There is nothing more that you need to do about it.  If your systolic blood pressure (the top number) is 120-139 or your diastolic blood pressure (the bottom number) is 80-89, your blood pressure may be higher than it should be. You should have your blood pressure rechecked within a year by a primary care provider.  If your systolic blood pressure (the top number) is 140 or greater or your diastolic blood  "pressure (the bottom number) is 90 or greater, you may have high blood pressure. High blood pressure is treatable, but if left untreated over time it can put you at risk for heart attack, stroke, or kidney failure. You should have your blood pressure rechecked by a primary care provider within the next 4 weeks.  If your provider in the emergency department today gave you specific instructions to follow-up with your doctor or provider even sooner than that, you should follow that instruction and not wait for up to 4 weeks for your follow-up visit.        Thank you for choosing [facility]       Thank you for choosing [facility] for your care. Our goal is always to provide you with excellent care. Hearing back from our patients is one way we can continue to improve our services. Please take a few minutes to complete the written survey that you may receive in the mail after you visit with us. Thank you!        [employer] Information     [employer] lets you send messages to your doctor, view your test results, renew your prescriptions, schedule appointments and more. To sign up, go to [email or web address] . Click on \"Log in\" on the left side of the screen, which will take you to the Welcome page. Then click on \"Sign up Now\" on the right side of the page.     You will be asked to enter the access code listed below, as well as some personal information. Please follow the directions to create your username and password.     Your access code is: [email or web address]  Expires: 2018  8:51 AM     Your access code will  in 90 days. If you need help or a new code, please call your [facility] clinic or [phone].        Care EveryWhere ID     This is your Care EveryWhere ID. This could be used by other organizations to access your [facility] medical records  [id number]        Equal Access to Services     LINUS ASCENCIO AH: Richard Jama, jerrell newell, ethan aguilar" ah. So [facility] [phone].    ATENCIÓN: Si habla español, tiene a lugo disposición servicios gratuitos de asistencia lingüística. Llame al [phone].    We comply with applicable federal civil rights laws and [place] laws. We do not discriminate on the basis of race, color, national origin, age, disability, sex, sexual orientation, or gender identity.            After Visit Summary       This is your record. Keep this with you and show to your community pharmacist(s) and doctor(s) at your next visit.
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
## 2018-04-20 ENCOUNTER — TRANSFERRED RECORDS (OUTPATIENT)
Dept: HEALTH INFORMATION MANAGEMENT | Facility: CLINIC | Age: 78
End: 2018-04-20

## 2018-04-25 ENCOUNTER — OFFICE VISIT (OUTPATIENT)
Dept: INTERNAL MEDICINE | Facility: CLINIC | Age: 78
End: 2018-04-25
Payer: COMMERCIAL

## 2018-04-25 VITALS
SYSTOLIC BLOOD PRESSURE: 122 MMHG | RESPIRATION RATE: 16 BRPM | TEMPERATURE: 98.6 F | WEIGHT: 152.3 LBS | OXYGEN SATURATION: 97 % | HEART RATE: 76 BPM | BODY MASS INDEX: 26.14 KG/M2 | DIASTOLIC BLOOD PRESSURE: 50 MMHG

## 2018-04-25 DIAGNOSIS — R60.0 LOCALIZED EDEMA: Primary | ICD-10-CM

## 2018-04-25 PROCEDURE — 99214 OFFICE O/P EST MOD 30 MIN: CPT | Performed by: PHYSICIAN ASSISTANT

## 2018-04-25 ASSESSMENT — PAIN SCALES - GENERAL: PAINLEVEL: MODERATE PAIN (5)

## 2018-04-25 NOTE — MR AVS SNAPSHOT
"              After Visit Summary   4/25/2018    Rupinder Tracy    MRN: [id number]           Patient Information     Date Of Birth          1940        Visit Information        Provider Department      4/25/2018 1:40 PM Mary Marvin PA-C [facility]        Today's Diagnoses     Localized edema    -  1      Care Instructions    Follow up on Monday     - sooner if symptoms  Worsen           Follow-ups after your visit        Additional Services     LYMPHEDEMA THERAPY REFERRAL       *This therapy referral will be filtered to a centralized scheduling office at [facility] and the patient will receive a call to schedule an appointment at a [place] location most convenient for them. *   If you have not heard from the scheduling office within 2 business days, please call [phone] for all locations, with the exception of [place], please call [phone].     Treatment: PT or OT Evaluation & Treatment  Special Instructions:   PT/OT Treatment Diagnosis: Lymphedema    Please be aware that coverage of these services is subject to the terms and limitations of your health insurance plan.  Call member services at your health plan with any benefit or coverage questions.      **Note to Provider:  If you are referring outside of [place] for the therapy appointment, please list the name of the location in the \"special instructions\" above, print the referral and give to the patient to schedule the appointment.                  Your next 10 appointments already scheduled     Apr 26, 2018 12:00 PM CDT   Pulmonary Treatment with Sh Pulmonary Rehab 2   [facility] ([facility])    [address]  [facility] [address]   [phone]            May 01, 2018 12:00 PM CDT   Pulmonary Treatment with Sh Pulmonary Rehab 2   [facility] ([facility])    [address]  [facility] " [address]   [phone]            May 03, 2018 12:00 PM CDT   Pulmonary Treatment with Sh Pulmonary Rehab 2   [facility] ([facility])    [address]  [place] [address]   [phone]            May 08, 2018 12:00 PM CDT   Pulmonary Treatment with Sh Pulmonary Rehab 2   [facility] ([facility])    [address]  [place] [address]   [phone]            May 10, 2018  2:50 PM CDT   LAB with MEJIA LAB   [facility] [place] ([facility])    [address]  [place]  [place] [address]   [phone]           Please do not eat 10-12 hours before your appointment if you are coming in fasting for labs on lipids, cholesterol, or glucose (sugar). This does not apply to pregnant women. Water, hot tea and black coffee (with nothing added) are okay. Do not drink other fluids, diet soda or chew gum.            May 10, 2018  3:45 PM CDT   Return Visit with Jae Butler MD   [facility] ([facility])    [address]  [place] [address]   [phone] OPT 2            May 14, 2018  8:30 AM CDT   Office Visit with Abran Mott MD   [facility] ([facility])    [address] [place] [address] [phone]           Bring a current list of meds and any records pertaining to this visit. For Physicals, please bring immunization records and any forms needing to be filled out. Please arrive 10 minutes early to complete paperwork.              Who to contact     If you have questions or need follow up information about today's clinic visit or your schedule please contact [facility] directly at [phone].  Normal or non-critical lab and imaging results will be communicated to you by MyChart, letter or  "phone within 4 business days after the clinic has received the results. If you do not hear from us within 7 days, please contact the clinic through Itaro or phone. If you have a critical or abnormal lab result, we will notify you by phone as soon as possible.  Submit refill requests through Itaro or call your pharmacy and they will forward the refill request to us. Please allow 3 business days for your refill to be completed.          Additional Information About Your Visit        JIT SolaireharMODLOFT Information     Itaro lets you send messages to your doctor, view your test results, renew your prescriptions, schedule appointments and more. To sign up, go to [email or web address] . Click on \"Log in\" on the left side of the screen, which will take you to the Welcome page. Then click on \"Sign up Now\" on the right side of the page.     You will be asked to enter the access code listed below, as well as some personal information. Please follow the directions to create your username and password.     Your access code is: [email or web address]  Expires: 2018  8:51 AM     Your access code will  in 90 days. If you need help or a new code, please call your [place] clinic or [phone].        Care EveryWhere ID     This is your Care EveryWhere ID. This could be used by other organizations to access your [place] medical records  [id number]        Your Vitals Were     Pulse Temperature Respirations Last Period Pulse Oximetry Breastfeeding?    76 98.6  F (37  C) (Oral) 16 1990 97% No    BMI (Body Mass Index)                   26.14 kg/m2            Blood Pressure from Last 3 Encounters:   18 122/50   18 126/71   18 132/52    Weight from Last 3 Encounters:   18 152 lb 4.8 oz (69.1 kg)   18 152 lb (68.9 kg)   18 152 lb 4.8 oz (69.1 kg)              We Performed the Following     LYMPHEDEMA THERAPY REFERRAL        Primary Care Provider Office Phone # Fax #    Abran Mott MD " [phone]       [address] [facility] [address]        Equal Access to Services     LINUS ASCENCIO : Richard magen kirkpatrick александр Jama, waanahyda luqfareed, jorgeta kamarianoda benjamin, ethan alan. So [facility] [phone].    ATENCIÓN: Si habla español, tiene a lugo disposición servicios gratuitos de asistencia lingüística. Llame al [phone].    We comply with applicable federal civil rights laws and [place] laws. We do not discriminate on the basis of race, color, national origin, age, disability, sex, sexual orientation, or gender identity.            Thank you!     Thank you for choosing [facility]  for your care. Our goal is always to provide you with excellent care. Hearing back from our patients is one way we can continue to improve our services. Please take a few minutes to complete the written survey that you may receive in the mail after your visit with us. Thank you!             Your Updated Medication List - Protect others around you: Learn how to safely use, store and throw away your medicines at www.disposemymeds.org.          This list is accurate as of 4/25/18  3:22 PM.  Always use your most recent med list.                   Brand Name Dispense Instructions for use Diagnosis    albuterol 108 (90 Base) MCG/ACT Inhaler    PROAIR HFA    1 Inhaler    Inhale 2 puffs into the lungs every 4 hours as needed    Mild intermittent asthma       bumetanide 0.5 MG tablet    BUMEX    30 tablet    Take 1 tablet (0.5 mg) by mouth daily (with breakfast)    Pulmonary HTN, Localized edema       CALCIUM + D 500-1000-40 MG-UNT-MCG Chew   Generic drug:  Calcium-Vitamin D-Vitamin K      Take 2 tablets by mouth daily        cephALEXin 500 MG capsule    KEFLEX    21 capsule    Take 1 capsule (500 mg) by mouth 3 times daily    Swelling of limb       DOXYCYCLINE HYCLATE PO      Take 50 mg by mouth 3 times per week Monday,wednesday, friday         fluticasone-salmeterol 250-50 MCG/DOSE diskus inhaler    ADVAIR DISKUS    60 Inhaler    Inhale 1 puff into the lungs 2 times daily    Mild persistent asthma without complication       hyaluronate in balanced salt ophthalmic solution solution    HEALON IN BSS     1 drop every 4 hours (while awake)        MARGAUX 128 5 % ophthalmic solution   Generic drug:  sodium chloride      1 drop At Bedtime        OCUSOFT EYE WASH OP           olmesartan 40 MG tablet    BENICAR    90 tablet    Take 1 tablet (40 mg) by mouth daily    Essential hypertension with goal blood pressure less than 130/80       omeprazole 20 MG CR capsule    priLOSEC    180 capsule    TAKE 1 CAPSULE (20 MG) BY MOUTH 2 TIMES DAILY    Gastroesophageal reflux disease without esophagitis       order for DME      2L of O2 at night        prednisoLONE acetate 1 % ophthalmic susp    PRED FORTE     Place 1 drop Into the left eye 4 times daily        REQUIP 0.25 MG tablet   Generic drug:  rOPINIRole      Take 2 nightly, and one in the afternoon as needed    Restless legs syndrome (RLS)       RESTASIS 0.05 % ophthalmic emulsion   Generic drug:  cycloSPORINE      1 drop to left eye twice daily        SYSTANE 0.4-0.3 % Soln ophthalmic solution   Generic drug:  polyethylene glycol 0.4%- propylene glycol 0.3%      1 drop to left eye 4 times daily        traMADol 50 MG tablet    ULTRAM    100 tablet    TAKE 1-2 TABLETS BY MOUTH 2 TIMES DAILY MAX 4 TABLETS PER DAY    Chronic pain syndrome       vitamin D 2000 units tablet      Take 2,000 Units by mouth 2 times daily    Vitamin D deficiency

## 2018-04-25 NOTE — PROGRESS NOTES
SUBJECTIVE:   Rupinder Tracy is a 77 year old female who presents to clinic today for the following health issues:      Chief Complaint   Patient presents with     ED follow up     Left leg pain- starts at lower Left back and goes down into the gluteous into the sciatica and down the left leg and into the calf- down into the foot.  the left knee and calf is still swollen         ED/ Followup:    Facility:  [facility] Emergency Room Department   Date of visit: 4/17/18  Reason for visit: Leg pain  Current Status: -Left leg pain- starts at lower Left back and goes down into the gluteous into the sciatica and down the left leg and into the calf- down into the foot.  the left knee and calf is still swollen   - Rupinder Cast was seen by myself on 3/30/2018 for left leg pain with accompanying groin pain at that time patient had swelling of the left lower leg and ultrasound was ordered to rule out DVT which was negative, CBC and CMP showed no identifying cause, left hip xray and lumbar spine xray showed no acute causes  - at the above visit, pt was then prescribed antibiotics for suspected cellulitis  - pt notes her symptoms improved, but then worsened  - pt was then seen in the ED on 4/17/18 for similar symptoms, summary as below   Medical Decision Making:  Rupinder Tracy is a 77 year old female who came in for further evaluation of worsening left groin pain and ongoing swelling in the left lower extremity.  I considered the possibility of a DVT, given the ongoing edema to the lower leg.  I also considered the possibility of her having cellulitis, however I felt that was not likely based on my exam.  I was also concerned about the possibility of an occult fracture in her pelvis or hip, in addition to the possibility of bursitis or a muscle strain of the groin.  Less likely but also on the differential would be a herniated disc in her back.  I felt it was reasonable to proceed with the above blood work, as well  as a repeat ultrasound of the lower extremity and x-rays of her left hip and pelvis.  She does have a Baker's cyst, which was previously seen.  Her ultrasound is otherwise unremarkable.  The x-rays of her left hip and pelvis are also unremarkable.  In fact, the only abnormalities are with regards to her CRP and sed rate, both of which are slightly elevated.  At this point I think she is safe for discharge and outpatient management.  She is to follow-up with her primary physician as soon as possible and certainly return here with any concerns or worsening symptoms.   - pt reports her symptoms are not improving   - pt reports her back continued pain going down her left leg with continued swelling   - pt notes she even has pain in her ankle and toe   - pt reports she is feeling tired all the time  - pt notes she is needing to make more of her tramadol   - pt has a history of GI ulcer and thus can not take NSAIDS  - pt has made an apt with her pcp, but is not able to get into for a few more weeks   - of note pt started exercising in January for respiratory therapy and does walking and the nustep for this   - pt has reported history pf lymphedema   - pt also reports a history of gout     Problem list and histories reviewed & adjusted, as indicated.  Additional history: as documented    Reviewed and updated as needed this visit by clinical staff  Tobacco  Allergies  Meds  Problems       Reviewed and updated as needed this visit by Provider  Meds  Problems         OBJECTIVE:     /50  Pulse 76  Temp 98.6  F (37  C) (Oral)  Resp 16  Wt 152 lb 4.8 oz (69.1 kg)  LMP 01/01/1990  SpO2 97%  Breastfeeding? No  BMI 26.14 kg/m2  Body mass index is 26.14 kg/(m^2).  GENERAL: healthy, alert and no distress  MS:   Inspection of left lower extremity shows edema below the knee as the edema induces a significant fold in the ankle and a fold off the side of the foot, there is a scab present on anterior shin, her 2nd toe  appears red, but no warmth is noted, there is otherwise no erythema or warmth to the lower extremity   Pain in this portion of the leg does not appear significant   Pt has TTP over greater trochanter and groin muscle along with TTP into glute, minimal - and less significant tenderness to low back     Diagnostic Test Results:  none     ASSESSMENT/PLAN:     1. Localized edema  - with continued pain of left leg   - unsure cause of edema, US has been negative with it being unilateral along with wt being stable I do not suspect a systemic cause such as edema induced by CHF or kidney or liver disease   - recent increase in exercise may be inducing some of the pain in the glute and hip regions which pt may benefit from PT for this, but I do not think this is related to the edema, and when I review this with pt, plan to hold off on this until edema improves   - for the edema referral for lymphedema therapy   - would like pt to follow up on Monday, I do not suspect the toe is infected at this point, but based on redness I want to keep a close eye on this   - pt is educated on this and advised to follow up sooner if symptoms worsened and reviewed what to watch for   - I do not suspect gout at this point, but will consider it in the differential if we continue without improvement   - also advised pt to continue with follow up with pcp as planned   - reviewed pain management with pt and reviewed increasing tramadol as needed with max doses reviewed and also reviewed taking tylenol along with this as needed   - LYMPHEDEMA THERAPY REFERRAL    Pt agreed to above plan and all questions are answered.     Mary Marvin PA-C  [facility]

## 2018-04-30 ENCOUNTER — OFFICE VISIT (OUTPATIENT)
Dept: INTERNAL MEDICINE | Facility: CLINIC | Age: 78
End: 2018-04-30
Payer: COMMERCIAL

## 2018-04-30 VITALS
OXYGEN SATURATION: 93 % | WEIGHT: 152.3 LBS | BODY MASS INDEX: 26.14 KG/M2 | SYSTOLIC BLOOD PRESSURE: 122 MMHG | DIASTOLIC BLOOD PRESSURE: 53 MMHG | HEART RATE: 77 BPM | RESPIRATION RATE: 16 BRPM | TEMPERATURE: 98 F

## 2018-04-30 DIAGNOSIS — R60.0 LOCALIZED EDEMA: Primary | ICD-10-CM

## 2018-04-30 DIAGNOSIS — M25.552 HIP PAIN, LEFT: ICD-10-CM

## 2018-04-30 PROCEDURE — 99213 OFFICE O/P EST LOW 20 MIN: CPT | Performed by: PHYSICIAN ASSISTANT

## 2018-04-30 ASSESSMENT — PAIN SCALES - GENERAL: PAINLEVEL: MODERATE PAIN (5)

## 2018-04-30 NOTE — PROGRESS NOTES
SUBJECTIVE:   Rupinder Tracy is a 77 year old female who presents to clinic today for the following health issues:    Chief Complaint   Patient presents with     Edema     Recheck left leg edema from 4/25/18. Patient states worsening swelling and pain. Holding off on scheduling with the lymphedema clinic due to sore on L leg      - Rupinder cast is following up for ongoing left leg edema   - she was last seen last week and referred to lymphedema clinic   - she was seen by myself on 3/30 where US of LE and xrays of lower spine and hip were negative   - she was treated for cellulitis with some relief   - she was then seen in the ED on 4/17/18 with continued symptoms at this visit negative LE US and xray of pelvis and left hip were negative, ESR was increased otherwise non-significant lab results   - at visit last week noted increase in erythema and edema on 2 nd toe for this reason, Rupinder cast was asked to follow up   - Rupinder Cast notes continued pain and swelling   - she notes pain is worse in the toe, but also continues to have hip pain   - she denies fever, but has noted some chills  - she denies chest pain and shortness of breath     Problem list and histories reviewed & adjusted, as indicated.  Additional history: as documented    Reviewed and updated as needed this visit by clinical staff  Tobacco  Allergies  Meds  Problems       Reviewed and updated as needed this visit by Provider  Meds  Problems         OBJECTIVE:     /53  Pulse 77  Temp 98  F (36.7  C) (Oral)  Resp 16  Wt 152 lb 4.8 oz (69.1 kg)  LMP 01/01/1990  SpO2 93%  BMI 26.14 kg/m2  Body mass index is 26.14 kg/(m^2).  GENERAL: healthy, alert and no distress  MS: Inspection of left lower extremity shows edema below the knee as the edema induces a significant fold in the ankle and a fold off the side of the foot, edema is non-pitting, there is a scab present on anterior shin, her 2nd toe appears red, but no change in warmth is noted, there  is otherwise no erythema or warmth to the lower extremity     Diagnostic Test Results:  none     ASSESSMENT/PLAN:       1. Localized edema  - reviewed case with MD who also examined pt  - plan to continue with lymphedema referral  - as far as for her toe suspicious for venous congestions vs infection or gout   - continue to keep a close eye on the area, follow up if symptoms worsen  - continue with follow up with pcp as scheduled    For left hip, I have discussed with Rupinder Cast in the past concerns for hip flexor strain along with hip bursitis. She has had two normal xrays of the region. I did discuss physical therapy or possible sports medicine referral. Rupinder Cast would like to hold off on this right now as she is already will be going to lymphedema clinic. I reviewed ice massage and at home exercises for her. I offered she could notify me if she changes her mind on referrals.     Pt agreed to above plan and all questions are answered.    Mary Marvin PA-C  [facility]

## 2018-04-30 NOTE — MR AVS SNAPSHOT
After Visit Summary   4/30/2018    Rupinder Tracy    MRN: [id number]           Patient Information     Date Of Birth          1940        Visit Information        Provider Department      4/30/2018 11:20 AM Mary Marvin PA-C [facility]    Follow up with Dr. Mott as scheduled sooner if concerns           Follow-ups after your visit        Your next 10 appointments already scheduled     May 01, 2018 12:00 PM CDT   Pulmonary Treatment with Sh Pulmonary Rehab 2   [facility] ([facility])    [address]  [facility] [address]   [phone]            May 03, 2018 12:00 PM CDT   Pulmonary Treatment with Sh Pulmonary Rehab 2   [facility] ([facility])    [address]  [facility] [address]   [phone]            May 08, 2018 12:00 PM CDT   Pulmonary Treatment with Sh Pulmonary Rehab 2   [facility] ([facility])    [address]  [facility] [address]   [phone]            May 10, 2018  2:50 PM CDT   LAB with [facility]   [facility] ([facility])    [address]  [facility] [address]   [phone]           Please do not eat 10-12 hours before your appointment if you are coming in fasting for labs on lipids, cholesterol, or glucose (sugar). This does not apply to pregnant women. Water, hot tea and black coffee (with nothing added) are okay. Do not drink other fluids, diet soda or chew gum.            May 10, 2018  3:45 PM CDT   Return Visit with Jae Butler MD   [facility] ([facility])    [address]  [facility] [address]   [phone] OPT 2            May 14, 2018  8:30 AM CDT   Office Visit with Abran Mott MD   [facility]  "[facility] ([facility])    [address] [id number] [phone]           Bring a current list of meds and any records pertaining to this visit. For Physicals, please bring immunization records and any forms needing to be filled out. Please arrive 10 minutes early to complete paperwork.              Who to contact     If you have questions or need follow up information about today's clinic visit or your schedule please contact [facility] directly at [phone].  Normal or non-critical lab and imaging results will be communicated to you by [facility], letter or phone within 4 business days after the clinic has received the results. If you do not hear from us within 7 days, please contact the clinic through [facility] or phone. If you have a critical or abnormal lab result, we will notify you by phone as soon as possible.  Submit refill requests through [facility] or call your pharmacy and they will forward the refill request to us. Please allow 3 business days for your refill to be completed.          Additional Information About Your Visit        MyChart Information     [facility] lets you send messages to your doctor, view your test results, renew your prescriptions, schedule appointments and more. To sign up, go to [email or web address] . Click on \"Log in\" on the left side of the screen, which will take you to the Welcome page. Then click on \"Sign up Now\" on the right side of the page.     You will be asked to enter the access code listed below, as well as some personal information. Please follow the directions to create your username and password.     Your access code is: [email or web address]  Expires: 2018  8:51 AM     Your access code will  in 90 days. If you need help or a new code, please call your [facility] or [phone].        Care EveryWhere ID     This is your Care EveryWhere ID. This could be used by other " organizations to access your [place] medical records  [id number]        Your Vitals Were     Last Period                   01/01/1990            Blood Pressure from Last 3 Encounters:   04/25/18 122/50   04/17/18 126/71   03/30/18 132/52    Weight from Last 3 Encounters:   04/25/18 152 lb 4.8 oz (69.1 kg)   04/17/18 152 lb (68.9 kg)   03/30/18 152 lb 4.8 oz (69.1 kg)              Today, you had the following     No orders found for display       Primary Care Provider Office Phone # Fax #    Abran Pasquale Mott [phone]       [address] [facility] [address]        Equal Access to Services     LINUS ASCENCIO : Hadii magen marieo Sorosario, waaxda luqadaha, qaybta kaalmada adeegyada, waxjasson alan. So [facility] [phone].    ATENCIÓN: Si habla español, tiene a lugo disposición servicios gratuitos de asistencia lingüística. [facility] [phone].    We comply with applicable federal civil rights laws and [place] laws. We do not discriminate on the basis of race, color, national origin, age, disability, sex, sexual orientation, or gender identity.            Thank you!     Thank you for choosing [facility]  for your care. Our goal is always to provide you with excellent care. Hearing back from our patients is one way we can continue to improve our services. Please take a few minutes to complete the written survey that you may receive in the mail after your visit with us. Thank you!             Your Updated Medication List - Protect others around you: Learn how to safely use, store and throw away your medicines at www.disposemymeds.org.          This list is accurate as of 4/30/18 12:00 PM.  Always use your most recent med list.                   Brand Name Dispense Instructions for use Diagnosis    albuterol 108 (90 Base) MCG/ACT Inhaler    PROAIR HFA    1 Inhaler    Inhale 2 puffs into the lungs every 4 hours as needed    Mild intermittent  asthma       bumetanide 0.5 MG tablet    BUMEX    30 tablet    Take 1 tablet (0.5 mg) by mouth daily (with breakfast)    Pulmonary HTN, Localized edema       CALCIUM + D 500-1000-40 MG-UNT-MCG Chew   Generic drug:  Calcium-Vitamin D-Vitamin K      Take 2 tablets by mouth daily        DOXYCYCLINE HYCLATE PO      Take 50 mg by mouth 3 times per week Monday,wednesday, friday        fluticasone-salmeterol 250-50 MCG/DOSE diskus inhaler    ADVAIR DISKUS    60 Inhaler    Inhale 1 puff into the lungs 2 times daily    Mild persistent asthma without complication       hyaluronate in balanced salt ophthalmic solution solution    HEALON IN BSS     1 drop every 4 hours (while awake)        MARGAUX 128 5 % ophthalmic solution   Generic drug:  sodium chloride      1 drop At Bedtime        OCUSOFT EYE WASH OP           olmesartan 40 MG tablet    BENICAR    90 tablet    Take 1 tablet (40 mg) by mouth daily    Essential hypertension with goal blood pressure less than 130/80       omeprazole 20 MG CR capsule    priLOSEC    180 capsule    TAKE 1 CAPSULE (20 MG) BY MOUTH 2 TIMES DAILY    Gastroesophageal reflux disease without esophagitis       order for DME      2L of O2 at night        prednisoLONE acetate 1 % ophthalmic susp    PRED FORTE     Place 1 drop Into the left eye 4 times daily        REQUIP 0.25 MG tablet   Generic drug:  rOPINIRole      Take 2 nightly, and one in the afternoon as needed    Restless legs syndrome (RLS)       RESTASIS 0.05 % ophthalmic emulsion   Generic drug:  cycloSPORINE      1 drop to left eye twice daily        SYSTANE 0.4-0.3 % Soln ophthalmic solution   Generic drug:  polyethylene glycol 0.4%- propylene glycol 0.3%      1 drop to left eye 4 times daily        traMADol 50 MG tablet    ULTRAM    100 tablet    TAKE 1-2 TABLETS BY MOUTH 2 TIMES DAILY MAX 4 TABLETS PER DAY    Chronic pain syndrome       vitamin D 2000 units tablet      Take 2,000 Units by mouth 2 times daily    Vitamin D deficiency

## 2018-05-01 ENCOUNTER — HOSPITAL ENCOUNTER (OUTPATIENT)
Dept: PHYSICAL THERAPY | Facility: CLINIC | Age: 78
Setting detail: THERAPIES SERIES
End: 2018-05-01
Attending: PHYSICIAN ASSISTANT
Payer: MEDICARE

## 2018-05-01 ENCOUNTER — TRANSFERRED RECORDS (OUTPATIENT)
Dept: HEALTH INFORMATION MANAGEMENT | Facility: CLINIC | Age: 78
End: 2018-05-01

## 2018-05-01 ENCOUNTER — TELEPHONE (OUTPATIENT)
Dept: INTERNAL MEDICINE | Facility: CLINIC | Age: 78
End: 2018-05-01

## 2018-05-01 PROCEDURE — 97535 SELF CARE MNGMENT TRAINING: CPT | Mod: GP | Performed by: PHYSICAL THERAPIST

## 2018-05-01 PROCEDURE — G8978 MOBILITY CURRENT STATUS: HCPCS | Mod: GP,CL | Performed by: PHYSICAL THERAPIST

## 2018-05-01 PROCEDURE — 97163 PT EVAL HIGH COMPLEX 45 MIN: CPT | Mod: GP | Performed by: PHYSICAL THERAPIST

## 2018-05-01 PROCEDURE — G8979 MOBILITY GOAL STATUS: HCPCS | Mod: GP,CI | Performed by: PHYSICAL THERAPIST

## 2018-05-01 PROCEDURE — 40000449 ZZHC STATISTIC PT VISIT, LYMPHEDEMA: Performed by: PHYSICAL THERAPIST

## 2018-05-01 NOTE — PROGRESS NOTES
[facility]        OUTPATIENT PHYSICAL THERAPY EDEMA EVALUATION  PLAN OF TREATMENT FOR OUTPATIENT REHABILITATION  (COMPLETE FOR INITIAL CLAIMS ONLY)  Patient's Last Name, First Name, Rupinder Horner                           Provider s Name:   [facility] Medical Record No.  [id number]     Start of Care Date:  05/01/18   Onset Date:  04/01/18   Type:  PT   Medical Diagnosis:  BLE lymphedema   Therapy Diagnosis:  L>R LE lymphedema Visits from SOC:  1                                     __________________________________________________________________________________   Plan of Treatment/Functional Goals:    Manual lymph drainage, Gradient compression bandaging, Fit for compression garment, Exercises, Precautions to prevent infection / exacerbation, Education, Skin care / precautions        GOALS  1. Goal description: Pt to have swell reduced L leg 350 to 500 ml or greater to imprive wound healing and foot reducetion 3-5cm for better fit of socks       Target date: 06/29/18  2. Goal description: pt to be independent w/ use of bandage 7/7days and daily use of compresion socks w/ night GCB to control Sx; pt will be able to return to regualr exercise program w/ reduced c/o pain L leg/foot        Target date: 06/29/18  3.            4.            5.            6.               7.             8.              Treatment frequency: 3 times / week   Treatment duration: 60days     Yana Nagy, PT                                    I CERTIFY THE NEED FOR THESE SERVICES FURNISHED UNDER        THIS PLAN OF TREATMENT AND WHILE UNDER MY CARE     (Physician co-signature of this document indicates review and certification of the therapy plan).                   Certification date from: 05/01/18       Certification date to: 06/29/18           Referring physician: Mary  Yon BERG   Initial Assessment  See Epic Evaluation- Start of care: 05/01/18

## 2018-05-01 NOTE — TELEPHONE ENCOUNTER
Patient informed. Says she had an appt with lymphedema clinic today. The lady there did assessment and does not believe the red toe is d/t lymphedema. She told patient it is from a different cause, unsure what, she is not a physician. Patient reports symptoms are same as yesterday, not worse. She is asking to further discuss this with Mary tomorrow. Please call her tomorrow.

## 2018-05-01 NOTE — PROGRESS NOTES
05/01/18 0900   Quick Adds   Quick Adds Certification   Rehab Discipline   Discipline PT   Type of Visit   Type of visit Initial Edema Evaluation       present No   General Information   Start of care 05/01/18   Referring physician Mary Marvin PA-C   Orders Evaluate and treat as indicated   Order date 04/25/18   Medical diagnosis Lymphedmea   Onset of illness / date of surgery 04/01/18   Edema onset 04/01/18   Affected body parts LLE   Edema etiology Infection;Chronic Venous Insufficiency;Ulcers   Edema etiology comments Per MLD note, edema eitiology is related to venous insufficiency, pulmonary hypertension.   Pertinent history of current problem (PT: include personal factors and/or comorbidities that impact the POC; OT: include additional occupational profile info) Pt with at least 5+ year hx of lymphedema. Ha recent cellulitis 4/1/2018 due to L leg wound from bumping leg on van March 17,2018; had wwk antibioitcs. Pt had been hospitalized 10/2017 for CHF and 11/2017 nursing home - developed gout L small toe; has prevoius bout of cellulits on RLE requiring hospital admission at that time. Pt has cardiac hx, with enlarged left ventricle, foramen ovale, pulmonary hypertension, COPD,peripheral vascular disease, mitral valve prolapse. restless leg syndrome.. 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 Pt notes much swell of L foot and lower leg w/ redness and much pain L 2nd toe. Pt rates pain, leg heaviness, sin tightness, 3/4 and reduced strength leg 4/ LLIS scale . 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 has van transportation   Current assistive devices Standard cane;4 wheeled walker   Fall Risk Screen   Fall screen completed by PT   Have you fallen 2 or more times in the past year? No   Have you fallen and had an injury in the past year? No   Is patient a fall risk? No   System Outcome Measures   Outcome Measures Lymphedema   Lymphedema Life Impact Scale (score range 0-72). A higher score indicates greater impairment. (pt did not complete survey )   Subjective Report   Patient report of symptoms see above for details    Precautions   Precautions Renal Insufficiency;Other   Precautions comments Pt with extensive cardiac hx, has dyspnea on exertion.pulmonary hypertension, recent cellulitis    Patient / Family Goals   Patient / family goals statement reduce swell L foot /ankle/leg   Pain   Patient currently in pain Yes   Pain location L Leg /foot    Pain rating 3/4 LLIS w/ greater pain l second toe w/ slight pressure and mvm of DIP joint   Pain description Heaviness;Ache;Sharp;Pressure   Pain comments Pt notes concurrent L hip pain w/ possible bursitis   Vitals Signs   Vital Signs Comments BMI  26.14   Cognitive Status   Orientation Orientation to person, place and time   Level of consciousness Alert   Follows commands and answers questions 100% of the time   Personal safety and judgement Intact   Edema Exam / Assessment   Skin condition Pitting;Venous distention;Other  (2cm x 5mm wound  leg w/ dried blood /fragile scab)   Skin condition comments +3  pitting,L foot /lower leg and ankle w/ boggy quality to foot swelling  with columnar shape of BLEs R leg s/ 2+ pitting. Bulging at lateral/medial malleoli.   Pitting 2+;3+   Pitting location Dorsum of feet, lower legs.   Scar No   Capillary refill comments difficult to assess due to mulitple venous varicosities and discoloration of feet/legs   Dorsal pedal pulse comments difficult to assess due to swelling    Stemmer sign Positive   Girth Measurements   Girth Measurements Refer to separate girth measurement flowsheet   Volume LE   Right LE (mL) 2281ml  Girth R MTP 42cm, arch 24cm, around malleolus 26cm   Left LE (mL) 2744ml;  Girth msmt L foot dorsum 26 MTP, 27.5cm arch, 32cm around malleolus. Red 2nd toe w/ swelling greater tan otjer toes   LE volume comparison LLE volume greater than RLE volume   % difference 463ml   Range of Motion   ROM No deficits were identified   Strength   Strength comments L hip flexoi 3-/5 due to lateal hip pain ; all other mm groups  4+/5   Posture   Posture Forward head position;Protracted shoulders   Bed Mobility   Bed mobility independent    Gait / Locomotion   Gait / Locomotion 4 ww walker w/ paced walking    Sensory   Sensory perception Light touch   Light touch Intact   Sensory perception comments B L/E dermatomes   Vascular Assessment   Vascular Assessment Vascular concerns   Planned Edema Interventions   Planned edema interventions Manual lymph drainage;Gradient compression bandaging;Fit for compression garment;Exercises;Precautions to prevent infection / exacerbation;Education;Skin care / precautions   Clinical Impression   Criteria for skilled therapeutic intervention met Yes   Therapy diagnosis L>R LE lymphedema   Influenced by the following impairments / conditions Stage 3;Phlebolymphedema;Wounds / Ulcers   Functional limitations due to impairments / conditions Decreased ability to walk, move, decreased ability to wear shoes.   Clinical Presentation Evolving/Changing    Clinical Presentation Rationale significant swelling w/ co-morbidities that complicate Sx reduction- pt will neeed long term mgmt solutions   Clinical Decision Making (Complexity) High complexity   Treatment frequency 3 times / week   Treatment duration 60days    Patient / family and/or staff in agreement with plan of care Yes   Risks and benefits of therapy have been explained Yes   Education Assessment   Preferred learning style Listening;Reading;Demonstration;Pictures / video;Other  (participation)   Barriers to learning No barriers   Goals   Edema Eval Goals 1;2   Goal 1   Goal identifier Swelling    Goal description Pt to have swell reduced L leg 350 to 500 ml or greater to imprive wound healing and foot reducetion 3-5cm for better fit of socks   Target date 06/29/18   Goal 2   Goal identifier use of compression and hoem program   Goal description pt to be independent w/ use of bandage 7/7days and daily use of compresion socks w/ night GCB to control Sx; pt will be able to return to regualr exercise program w/ reduced c/o pain L leg/foot    Target date 06/29/18   Total Evaluation Time   Total evaluation time 40   Certification   Certification date from 05/01/18   Certification date to 06/29/18   Medical Diagnosis BLE lymphedema

## 2018-05-01 NOTE — TELEPHONE ENCOUNTER
Patient called to earlene zamudio know she got an appointment with lymphedema clinic toe did not lymphedema in it it is something else please call pt with any questions at [phone]

## 2018-05-01 NOTE — PROGRESS NOTES
05/01/18 0900   Quick Adds   Quick Adds Certification   Rehab Discipline   Discipline PT   Type of Visit   Type of visit Initial Edema Evaluation       present No   General Information   Start of care 05/01/18   Orders Evaluate and treat as indicated   Order date 04/25/18   Medical diagnosis Lymphedmea   Onset of illness / date of surgery 04/01/18   Edema onset 04/01/18   Affected body parts LLE   Edema etiology Infection;Chronic Venous Insufficiency;Ulcers   Edema etiology comments Per MLD note, edema eitiology is related to venous insufficiency, pulmonary hypertension.   Pertinent history of current problem (PT: include personal factors and/or comorbidities that impact the POC; OT: include additional occupational profile info) Pt with at least 5+ year hx of lymphedema. Ha recent cellulitis 4/1/2018 due to L leg wound from bumping leg on van March 17,2018; had wwk antibioitcs. Pt had been hospitalized 10/2017 for CHF and 11/2017 nursing home - developed gout L small toe; has prevoius bout of cellulits on RLE requiring hospital admission at that time. Pt has cardiac hx, with enlarged left ventricle, foramen ovale, pulmonary hypertension, COPD,peripheral vascular disease, mitral valve prolapse. restless leg syndrome.. 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 Pt notes much swell of L foot and lower leg w/ redness and much pain L 2nd toe. Pt rates pain, leg heaviness, sin tightness, 3/4 and reduced strength leg 4/ LLIS scale . 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 has van transportation   Current assistive devices Standard cane;4 wheeled walker   Fall Risk Screen   Fall screen completed by PT   Have you fallen 2 or more times in the past year? No   Have you fallen and had an injury in the past year? No   Is patient a fall risk? No   System Outcome Measures   Outcome Measures Lymphedema   Lymphedema Life Impact Scale (score range 0-72). A higher score indicates greater impairment. (pt did not complete survey )   Subjective Report   Patient report of symptoms see above for details    Precautions   Precautions Renal Insufficiency;Other   Precautions comments Pt with extensive cardiac hx, has dyspnea on exertion.pulmonary hypertension, recent cellulitis    Patient / Family Goals   Patient / family goals statement reduce swell L foot /ankle/leg   Pain   Patient currently in pain Yes   Pain location L Leg /foot    Pain rating 3/4 LLIS w/ greater pain l second toe w/ slight pressure and mvm of DIP joint   Pain description Heaviness;Ache;Sharp;Pressure   Pain comments Pt notes concurrent L hip pain w/ possible bursitis   Vitals Signs   Vital Signs Comments BMI  26.14   Cognitive Status   Orientation Orientation to person, place and time   Level of consciousness Alert   Follows commands and answers questions 100% of the time   Personal safety and judgement Intact   Edema Exam / Assessment   Skin condition Pitting;Venous distention;Other  (2cm x 5mm wound  leg w/ dried blood /fragile scab)   Skin condition comments +3 pitting,L foot /lower leg and ankle w/ boggy  quality to foot swelling  with columnar shape of BLEs R leg s/ 2+ pitting. Bulging at lateral/medial malleoli.   Pitting 2+;3+   Pitting location Dorsum of feet, lower legs.   Scar No   Capillary refill comments difficult to assess due to mulitple venous varicosities and discoloration of feet/legs   Dorsal pedal pulse comments difficult to assess due to swelling    Stemmer sign Positive   Girth Measurements   Girth Measurements Refer to separate girth measurement flowsheet   Volume LE   Right LE (mL) 2281ml  Girth R MTP 42cm, arch 24cm, around malleolus 26cm   Left LE (mL) 2744ml;  Girth msmt L foot dorsum 26 MTP, 27.5cm arch, 32cm around malleolus. Red 2nd toe w/ swelling greater tan otjer toes   LE volume comparison LLE volume greater than RLE volume   % difference 463ml   Range of Motion   ROM No deficits were identified   Strength   Strength comments L hip flexoi 3-/5 due to lateal hip pain ; all other mm groups  4+/5   Posture   Posture Forward head position;Protracted shoulders   Bed Mobility   Bed mobility independent    Gait / Locomotion   Gait / Locomotion 4 ww walker w/ paced walking    Sensory   Sensory perception Light touch   Light touch Intact   Sensory perception comments B L/E dermatomes   Vascular Assessment   Vascular Assessment Vascular concerns   Planned Edema Interventions   Planned edema interventions Manual lymph drainage;Gradient compression bandaging;Fit for compression garment;Exercises;Precautions to prevent infection / exacerbation;Education;Skin care / precautions   Clinical Impression   Criteria for skilled therapeutic intervention met Yes   Therapy diagnosis L>R LE lymphedema   Influenced by the following impairments / conditions Stage 3;Phlebolymphedema;Wounds / Ulcers   Functional limitations due to impairments / conditions Decreased ability to walk, move, decreased ability to wear shoes.   Clinical Presentation Evolving/Changing   Clinical Presentation Rationale significant swelling  w/ co-morbidities that complicate Sx reduction- pt will neeed long term mgmt solutions   Clinical Decision Making (Complexity) High complexity   Treatment frequency 3 times / week   Treatment duration 60days    Patient / family and/or staff in agreement with plan of care Yes   Risks and benefits of therapy have been explained Yes   Education Assessment   Preferred learning style Listening;Reading;Demonstration;Pictures / video;Other  (participation)   Barriers to learning No barriers   Goals   Edema Eval Goals 1;2   Goal 1   Goal identifier Swelling    Goal description Pt to have swell reduced L leg 350 to 500 ml or greater to imprive wound healing and foot reducetion 3-5cm for better fit of socks   Target date 06/29/18   Goal 2   Goal identifier use of compression and hoem program   Goal description pt to be independent w/ use of bandage 7/7days and daily use of compresion socks w/ night GCB to control Sx; pt will be able to return to regualr exercise program w/ reduced c/o pain L leg/foot    Target date 06/29/18   Total Evaluation Time   Total evaluation time 40   Certification   Certification date from 05/01/18   Certification date to 06/29/18   Medical Diagnosis BLE lymphedema

## 2018-05-01 NOTE — TELEPHONE ENCOUNTER
I am out of the office today and can not return patient's call. I can certainly call pt back tomorrow. I am not sure what is causing pt's toe symptoms. I can refer to podiatry for expertise advise or could consider ordering xray and lab work for further evaluation-but this may not give a definitive answer. If patient has a preference, I can go ahead and with orders. She should be seen in clinic if symptoms are worsening otherwise no appointment with me is needed at this time. I certainly can call to discuss symptoms with her tomorrow and give further recommendations based on that. Please contact pt to inform of above.

## 2018-05-02 NOTE — TELEPHONE ENCOUNTER
Pt is called notes improvement in her toe since yesterday. She will continue to monitor and notify clinic if symptoms worsen.

## 2018-05-03 NOTE — ADDENDUM NOTE
After Visit Summary   5/3/2018    Nikki Hobbs    MRN: [id number]           Patient Information     Date Of Birth          1961        Visit Information        Provider Department      5/3/2018 2:00 PM Clark Vigil Jr., MD [facility]        Today's Diagnoses     Chronic kidney disease, stage III (moderate)    -  1    Hyperlipidemia with target LDL less than 100        Hypertension goal BP (blood pressure) < 140/90        Need for TD vaccine        Encounter for screening mammogram for breast cancer        Other pulmonary embolism without acute cor pulmonale, unspecified chronicity (H)           Follow-ups after your visit        Follow-up notes from your care team     Return in about 1 year (around 5/3/2019) for Preventative Visit.      Your next 10 appointments already scheduled     May 11, 2018  8:00 AM CDT   (Arrive by 7:45 AM)   PHYSICAL with Kimberli Duran MD   [facility] ([facility] [place])    [address] [place] [id number] [phone]              Who to contact     If you have questions or need follow up information about today's clinic visit or your schedule please contact [facility] directly at [phone].  Normal or non-critical lab and imaging results will be communicated to you by MyChart, letter or phone within 4 business days after the clinic has received the results. If you do not hear from us within 7 days, please contact the clinic through MyChart or phone. If you have a critical or abnormal lab result, we will notify you by phone as soon as possible.  Submit refill requests through [employer] or call your pharmacy and they will forward the refill request to us. Please allow 3 business days for your refill to be completed.          Additional Information About Your Visit        MyChart Information     [employer] gives you secure access to your electronic health record. If you see  Addended by: MARGARITA AMEZQUITA on: 1/15/2018 11:44 AM     Modules accepted: Orders     "a primary care provider, you can also send messages to your care team and make appointments. If you have questions, please call your primary care clinic.  If you do not have a primary care provider, please call [phone] and they will assist you.        Care EveryWhere ID     This is your Care EveryWhere ID. This could be used by other organizations to access your [place] medical records  [id number]        Your Vitals Were     Pulse Temperature Height Pulse Oximetry BMI (Body Mass Index)       113 98.4  F (36.9  C) (Oral) 5' 3\" (1.6 m) 100% 18.42 kg/m2        Blood Pressure from Last 3 Encounters:   05/03/18 130/56   12/07/17 114/72   10/06/16 123/78    Weight from Last 3 Encounters:   05/03/18 104 lb (47.2 kg)   12/07/17 107 lb (48.5 kg)   10/06/16 109 lb (49.4 kg)              We Performed the Following     CBC with platelets differential     Comprehensive metabolic panel (BMP + Alb, Alk Phos, ALT, AST, Total. Bili, TP)     Lipid panel reflex to direct LDL Non-fasting          Today's Medication Changes          These changes are accurate as of 5/3/18  2:41 PM.  If you have any questions, ask your nurse or doctor.               These medicines have changed or have updated prescriptions.        Dose/Directions    losartan 25 MG tablet   Commonly known as:  COZAAR   This may have changed:  See the new instructions.   Used for:  Hypertension goal BP (blood pressure) < 140/90   Changed by:  Clark Vigil Jr., MD        Dose:  25 mg   Take 1 tablet (25 mg) by mouth daily   Quantity:  90 tablet   Refills:  3            Where to get your medicines      These medications were sent to [facility] - [address], [facility] [address]     Phone:  [phone]     losartan 25 MG tablet    rivaroxaban ANTICOAGULANT 15 MG Tabs tablet                Primary Care Provider Office Phone # Fax #    Kimberli Duran [phone] " [phone]       [address] [place] [phone]  [facility] [id number]        Equal Access to Services     GUILLERMO ROBERSON : Hadii aad ku hadcatyalanna Alexandre, wamanjuda izaiahluceroha, qaybta kaalmada twilapaulinaelmira, paulina moran bobbisrinivas loraveroniquefarrah cagle. So [facility] [phone].    ATENCIÓN: Si habla español, tiene a chang disposición servicios gratuitos de asistencia lingüística. Llame al [phone].    We comply with applicable federal civil rights laws and [place] laws. We do not discriminate on the basis of race, color, national origin, age, disability, sex, sexual orientation, or gender identity.            Thank you!     Thank you for choosing [facility]  for your care. Our goal is always to provide you with excellent care. Hearing back from our patients is one way we can continue to improve our services. Please take a few minutes to complete the written survey that you may receive in the mail after your visit with us. Thank you!             Your Updated Medication List - Protect others around you: Learn how to safely use, store and throw away your medicines at www.disposemymeds.org.          This list is accurate as of 5/3/18  2:41 PM.  Always use your most recent med list.                   Brand Name Dispense Instructions for use Diagnosis    ascorbic acid 500 MG tablet    VITAMIN C    30 tablet    Take 1 tablet (500 mg) by mouth daily    Anemia in other chronic diseases classified elsewhere       azaTHIOprine 50 MG tablet    IMURAN    30 tablet    Take 1 tablet (50 mg) by mouth daily *LABS DUE EVERY 8-12 WKS    Long-term use of immunosuppressant medication       cholecalciferol 1000 UNIT tablet    vitamin D3    30 tablet    Take 1 tablet (1,000 Units) by mouth daily    Vitamin D deficiency       ferrous gluconate 324 (38 Fe) MG tablet    FERGON    90 tablet    TAKE ONE TABLET BY MOUTH THREE TIMES PER WEEK WITH FOOD    Iron deficiency anemia, unspecified       losartan 25 MG tablet    COZAAR    90 tablet    Take 1  tablet (25 mg) by mouth daily    Hypertension goal BP (blood pressure) < 140/90       predniSONE 2.5 MG tablet    DELTASONE    5 tablet    Take 1 tablet (2.5 mg) by mouth daily    Long-term use of immunosuppressant medication, Systemic lupus erythematosus with other organ involvement, unspecified SLE type (H)       rivaroxaban ANTICOAGULANT 15 MG Tabs tablet    XARELTO    90 tablet    Take 1 tablet (15 mg) by mouth daily (with dinner) *MD APPT. DUE SEPT. 2017    Other pulmonary embolism without acute cor pulmonale, unspecified chronicity (H)

## 2018-05-04 ENCOUNTER — HOSPITAL ENCOUNTER (OUTPATIENT)
Dept: PHYSICAL THERAPY | Facility: CLINIC | Age: 78
Setting detail: THERAPIES SERIES
End: 2018-05-04
Attending: PHYSICIAN ASSISTANT
Payer: MEDICARE

## 2018-05-04 PROCEDURE — 97140 MANUAL THERAPY 1/> REGIONS: CPT | Mod: GP | Performed by: PHYSICAL THERAPIST

## 2018-05-04 PROCEDURE — 40000449 ZZHC STATISTIC PT VISIT, LYMPHEDEMA: Performed by: PHYSICAL THERAPIST

## 2018-05-10 ENCOUNTER — OFFICE VISIT (OUTPATIENT)
Dept: CARDIOLOGY | Facility: CLINIC | Age: 78
End: 2018-05-10
Payer: COMMERCIAL

## 2018-05-10 VITALS
HEART RATE: 74 BPM | HEIGHT: 64 IN | WEIGHT: 151.3 LBS | BODY MASS INDEX: 25.83 KG/M2 | DIASTOLIC BLOOD PRESSURE: 62 MMHG | SYSTOLIC BLOOD PRESSURE: 138 MMHG

## 2018-05-10 DIAGNOSIS — R60.0 EDEMA OF BOTH LOWER LEGS DUE TO PERIPHERAL VENOUS INSUFFICIENCY: ICD-10-CM

## 2018-05-10 DIAGNOSIS — I77.89 AORTIC ROOT ENLARGEMENT (H): ICD-10-CM

## 2018-05-10 DIAGNOSIS — I10 ESSENTIAL HYPERTENSION WITH GOAL BLOOD PRESSURE LESS THAN 130/85: ICD-10-CM

## 2018-05-10 DIAGNOSIS — R60.0 LOCALIZED EDEMA: ICD-10-CM

## 2018-05-10 DIAGNOSIS — I87.2 EDEMA OF BOTH LOWER LEGS DUE TO PERIPHERAL VENOUS INSUFFICIENCY: ICD-10-CM

## 2018-05-10 DIAGNOSIS — I27.20 PULMONARY HTN (H): Primary | ICD-10-CM

## 2018-05-10 DIAGNOSIS — E78.5 HYPERLIPIDEMIA LDL GOAL <100: ICD-10-CM

## 2018-05-10 DIAGNOSIS — R06.02 SOB (SHORTNESS OF BREATH): ICD-10-CM

## 2018-05-10 DIAGNOSIS — I51.7 LVH (LEFT VENTRICULAR HYPERTROPHY): ICD-10-CM

## 2018-05-10 DIAGNOSIS — Q21.12 PATENT FORAMEN OVALE: ICD-10-CM

## 2018-05-10 DIAGNOSIS — I73.9 PERIPHERAL VASCULAR DISEASE (H): ICD-10-CM

## 2018-05-10 DIAGNOSIS — R06.02 SHORTNESS OF BREATH: ICD-10-CM

## 2018-05-10 LAB
ANION GAP SERPL CALCULATED.3IONS-SCNC: 12.1 MMOL/L (ref 6–17)
BUN SERPL-MCNC: 15 MG/DL (ref 7–30)
CALCIUM SERPL-MCNC: 9.5 MG/DL (ref 8.5–10.5)
CHLORIDE SERPL-SCNC: 105 MMOL/L (ref 98–107)
CO2 SERPL-SCNC: 26 MMOL/L (ref 23–29)
CREAT SERPL-MCNC: 1.02 MG/DL (ref 0.7–1.3)
GFR SERPL CREATININE-BSD FRML MDRD: 52 ML/MIN/1.7M2
GLUCOSE SERPL-MCNC: 117 MG/DL (ref 70–105)
POTASSIUM SERPL-SCNC: 4.1 MMOL/L (ref 3.5–5.1)
SODIUM SERPL-SCNC: 139 MMOL/L (ref 136–145)

## 2018-05-10 PROCEDURE — 80048 BASIC METABOLIC PNL TOTAL CA: CPT | Performed by: NURSE PRACTITIONER

## 2018-05-10 PROCEDURE — 36415 COLL VENOUS BLD VENIPUNCTURE: CPT | Performed by: NURSE PRACTITIONER

## 2018-05-10 PROCEDURE — 99215 OFFICE O/P EST HI 40 MIN: CPT | Performed by: INTERNAL MEDICINE

## 2018-05-10 RX ORDER — BUMETANIDE 0.5 MG/1
0.5 TABLET ORAL
Qty: 90 TABLET | Refills: 3 | Status: SHIPPED | OUTPATIENT
Start: 2018-05-10 | End: 2018-10-02

## 2018-05-10 NOTE — PROGRESS NOTES
HPI and Plan:   See dictation:[id number]    Orders Placed This Encounter   Procedures     Basic metabolic panel     Follow-Up with Cardiologist       Orders Placed This Encounter   Medications     bumetanide (BUMEX) 0.5 MG tablet     Sig: Take 1 tablet (0.5 mg) by mouth daily (with breakfast)     Dispense:  90 tablet     Refill:  3       Medications Discontinued During This Encounter   Medication Reason     bumetanide (BUMEX) 0.5 MG tablet Reorder         Encounter Diagnoses   Name Primary?     Pulmonary HTN Yes     Shortness of breath      Essential hypertension with goal blood pressure less than 130/85      Patent foramen ovale      Aortic root enlargement (H)      Edema of both lower legs due to peripheral venous insufficiency      Peripheral vascular disease (H)      Left Ventricular Hypertrophy      Hyperlipidemia LDL goal <100      Localized edema        CURRENT MEDICATIONS:  Current Outpatient Prescriptions   Medication Sig Dispense Refill     albuterol (ALBUTEROL) 108 (90 BASE) MCG/ACT inhaler Inhale 2 puffs into the lungs every 4 hours as needed 1 Inhaler 5     bumetanide (BUMEX) 0.5 MG tablet Take 1 tablet (0.5 mg) by mouth daily (with breakfast) 90 tablet 3     Calcium-Vitamin D-Vitamin K (CALCIUM + D) 500-1000-40 MG-UNT-MCG CHEW Take 2 tablets by mouth daily        Cholecalciferol (VITAMIN D) 2000 UNITS tablet Take 2,000 Units by mouth 2 times daily       DOXYCYCLINE HYCLATE PO Take 50 mg by mouth 3 times per week Monday,wednesday, friday       fluticasone-salmeterol (ADVAIR DISKUS) 250-50 MCG/DOSE diskus inhaler Inhale 1 puff into the lungs 2 times daily 60 Inhaler 1     hyaluronate in balanced salt ophthalmic solution (HEALON IN BSS) solution 1 drop every 4 hours (while awake)       olmesartan (BENICAR) 40 MG tablet Take 1 tablet (40 mg) by mouth daily 90 tablet 2     omeprazole (PRILOSEC) 20 MG CR capsule TAKE 1 CAPSULE (20 MG) BY MOUTH 2 TIMES DAILY 180 capsule 1     Ophthalmic Irrigation Solution  "(OCUSOFT EYE WASH OP)        order for DME 2L of O2 at night       prednisoLONE acetate (PRED FORTE) 1 % ophthalmic suspension Place 1 drop Into the left eye 4 times daily        RESTASIS 0.05 % OP EMUL 1 drop to left eye twice daily       rOPINIRole (REQUIP) 0.25 MG tablet Take 2 nightly, and one in the afternoon as needed       sodium chloride (MARGAUX 128) 5 % ophthalmic solution 1 drop At Bedtime       SYSTANE 0.4-0.3 % OP SOLN 1 drop to left eye 4 times daily       traMADol (ULTRAM) 50 MG tablet TAKE 1-2 TABLETS BY MOUTH 2 TIMES DAILY MAX 4 TABLETS PER  tablet 1     [DISCONTINUED] bumetanide (BUMEX) 0.5 MG tablet Take 1 tablet (0.5 mg) by mouth daily (with breakfast) 30 tablet 11       ALLERGIES     Allergies   Allergen Reactions     Atorvastatin Calcium      myalgias     Celecoxib Swelling     edema     Cholestyramine Diarrhea     Diarrhea       Crestor [Rosuvastatin Calcium]      leg aches, likely from medication     Cyclobenzaprine Hcl      flexeril--gets \"goofy\"     Dulera      tremors     Gabapentin      Nightmares.   Retrial of medication caused tremors.     Lisinopril Cough     Cough/ Dizziness at high dose.     Meperidine Hcl Nausea and Vomiting     demerol-severe nausea     Morphine Nausea and Vomiting     Naprosyn [Naproxen] GI Disturbance     Niaspan [Niacin]      flushing, severe     Percocet [Oxycodone-Acetaminophen] Nausea     Nausea, lightheadedness.   Tolerates med if taken with food.     Pravastatin Swelling     Edema, myalgias     Red Yeast Rice [Cholestin] GI Disturbance     Bloating, etc.     Simvastatin      myalgias     Timolol Maleate Difficulty breathing     timoptic--respiratory problems     Hctz Rash     rash     Sulfa Drugs Itching and Rash     rash on all mucous membranes and palms of hands with intense itching       PAST MEDICAL HISTORY:  Past Medical History:   Diagnosis Date     Arthritis      Asthma      Cellulitis 4/13 in [place]    R ankle     Difficult airway for intubation  "     Ductal Carcinoma in Situ of Left Breast 9/09     Duodenal ulcer, unspecified as acute or chronic, without mention of hemorrhage or perforation 1980's    felt due to high dose steroids     Female stress incontinence      H/O right and left heart catheterization     1-     HYPERLIPIDEMIA      Hypertension      Ischemic colitis 7/2001    [facility]     Lactose Intolerance     Mild     Legal blindness     Artificial right eye, severe vision loss left (Detached retina's, glaucoma, corneal transplants)     Mild intermittent asthma ~1980's     Nocturnal oxygen desaturation      Osteoporosis, unspecified ~2002     Patent foramen ovale      PERIPHERAL VASCULAR DISEASE 5/05    mesenteric arteries, angioplasty     PRESBYESOPHAGUS 6/04     Pulmonary HTN 11/2014     Serous retinal detachment     False eye right     Subarachnoid hemorrhage following injury (H) 12/10    due to fall, vascular evaluation negative     Syncope and collapse 12/10     Unspecified glaucoma(365.9)        PAST SURGICAL HISTORY:  Past Surgical History:   Procedure Laterality Date     BIOPSY       C APPENDECTOMY  1982     C NONSPECIFIC PROCEDURE  1945    adenoidectomy     C NONSPECIFIC PROCEDURE      bilat retinal detachment     C NONSPECIFIC PROCEDURE      blephoroplasty bilat     C NONSPECIFIC PROCEDURE  1997    glaucoma procedure L     C NONSPECIFIC PROCEDURE  1997, 2007, 2014    corneal transplant L     C NONSPECIFIC PROCEDURE  1945    strabismus procedure R eye     C NONSPECIFIC PROCEDURE  1960's    R breast bx     C NONSPECIFIC PROCEDURE  5/05    Angioplasty and stenting mesenteric vessels     C NONSPECIFIC PROCEDURE  2008    L corneal transplant     COLONOSCOPY      due 2015     ENDOSCOPIC RETROGRADE CHOLANGIOPANCREATOGRAM N/A 10/13/2017    Procedure: ENDOSCOPIC RETROGRADE CHOLANGIOPANCREATOGRAM;  ENDOSCOPIC RETROGRADE CHOLANGIOPANCREATOGRAM ;  Surgeon: Isabel Yousif MD;  Location: [facility] OR     HC REMOVAL GALLBLADDER  1982     Cholecystectomy     HEART CATH RIGHT AND LEFT HEART CATH  1/19/15     HERNIORRHAPHY VENTRAL N/A 4/19/2016    Procedure: HERNIORRHAPHY VENTRAL;  Surgeon: Hamlet Ness MD;  Location: [facility] OR     MASTECTOMY SIMPLE BILATERAL  10/5/09    bilateral mastectomy     SURGICAL HISTORY OF -   6/2010    breast reconstruction       FAMILY HISTORY:  Family History   Problem Relation Age of Onset     Adopted: Yes     C.A.D. Paternal Uncle      MI 50's     Family History Negative Daughter        SOCIAL HISTORY:  Social History     Social History     Marital status:      Spouse name: N/A     Number of children: 4     Years of education: N/A     Occupational History     Retired nurse Retired     Social History Main Topics     Smoking status: Never Smoker     Smokeless tobacco: Never Used     Alcohol use 4.2 oz/week     7 Glasses of wine per week      Comment: rarely     Drug use: No     Sexual activity: No     Other Topics Concern     Caffeine Concern No     1- 2 cups coffee     Sleep Concern No     Stress Concern No     Weight Concern No     Special Diet Yes     low sodium     Exercise No     2 days a week (abigail chi), walking program, stationary bike 10 minutes 3 times per week     Seat Belt Yes     Social History Narrative    , 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  She walks with a walker.  Is a full code.  (last updated 10/11/2017)        Review of Systems:  Skin:  Positive for bruising bruise back in March and was eventually seen in ER   Eyes:  Positive for glasses    ENT:  Negative      Respiratory:  Positive for wheezing     Cardiovascular:    Positive for;lower extremity symptoms;edema lympedema in left leg since March/April  Gastroenterology: Negative      Genitourinary:  not assessed      Musculoskeletal:  Positive for joint pain hip pain at night  Neurologic:  Positive for numbness or tingling of feet left leg  Psychiatric:  Negative      Heme/Lymph/Imm:  " Positive for allergies    Endocrine:  Negative        Physical Exam:  Vitals: /62  Pulse 74  Ht 1.626 m (5' 4\")  Wt 68.6 kg (151 lb 4.8 oz)  LMP 01/01/1990  BMI 25.97 kg/m2    Constitutional:  cooperative, alert and oriented, well developed, well nourished, in no acute distress        Skin:  warm and dry to the touch, no apparent skin lesions or masses noted          Head:  normocephalic, no masses or lesions        Eyes:  pupils equal and round   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    Lymph:      ENT:  no pallor or cyanosis, dentition good        Neck:  carotid pulses are full and equal bilaterally, JVP normal, no carotid bruit        Respiratory:  normal breath sounds, clear to auscultation, normal A-P diameter, normal symmetry, normal respiratory excursion, no use of accessory muscles         Cardiac: regular rhythm;normal S1 and S2;no S3 or S4;apical impulse not displaced frequent premature beats   no presence of murmur          pulses full and equal, no bruits auscultated                                        GI:  abdomen soft, non-tender, BS normoactive, no mass, no HSM, no bruits        Extremities and Muscular Skeletal:  no deformities, clubbing, cyanosis, erythema observed     RLE edema;pitting;1+   wearing support stocking on right LE, left LE ace wrapped    Neurological:  no gross motor deficits;affect appropriate   walks with a walker    Psych:  Alert and Oriented x 3        CC  Jae Butler MD  [address]  [place] [id number]              "

## 2018-05-10 NOTE — LETTER
5/10/2018      Abran Mott MD  [address] [facility] [address]      RE: Rupinder Tracy       Dear Colleague,    I had the pleasure of seeing Rupinder Tracy in the [facility].    Service Date: 05/10/2018      PRIMARY CARE PHYSICIAN:  Dr. Abran Mott.      HISTORY OF PRESENT ILLNESS:  I again had the pleasure of seeing your patient, Rupinder Tracy, at [facility] for evaluation of pulmonary hypertension, systemic hypertension and patent foramen ovale.  She also has a history of mild aortic root enlargement.  Left and right heart catheterization were performed 01/2015 for pulmonary hypertension and aortic insufficiency.  Coronary angiography demonstrated no significant coronary artery disease.  Aortogram showed moderate aortic insufficiency and moderate annuloaortic ectasia.  There was moderate pulmonary hypertension with a mean PA pressure of 32 mmHg and a wide pulse pressure in the PA tracing consistent with at least moderate pulmonary valve insufficiency.  Pulmonary vascular resistance was normal and there was no significant gradient between the end-diastolic pressure and the pulmonary capillary wedge pressure and the left ventricular end-diastolic pressure.  It was felt that a pulmonary pressure was likely secondary to impaired relaxation both from the aortic insufficiency, hypertension and perhaps pulmonary causes such as obstructive sleep apnea or asthma.  We tested for obstructive sleep apnea and showed desaturations but she never reached REM sleep and did not snore.  Her blood pressure has been under better control.  The patient's weight is down and we have been using Bumex to control her fluids.  She was sent to Dr. Regla Silva in the Pulmonary Hypertension Department who suggested multiple tests including repeat right heart catheterization.  She has significant COPD and asthma since the 1980s.  A V/Q scan was ordered but not performed.  A  transthoracic echo with bubble study was suggested and again not performed.  The patient was to go to the [facility] for further evaluation and possible treatment.  That was not performed.  Instead the patient participated in pulmonary rehab and felt considerably better because of it.  She tried 2 weeks of nightly oxygen but her saturations on or off remained above 95%.  On 04/14 the patient struck her left foot on a van causing lymphedema and soreness of the left toes.  The toes have now improved and she is being seen in the Lymphedema Clinic for the edema.  This is also improving slowly.  The patient was seen by Dr. Jhony Ruiz on 05/01 and her pulmonary function tests had improved with FVC at 88%, FEV1 at 59%.      PHYSICAL EXAMINATION:  As listed below.      ASSESSMENT:   1.  Rupinder Tracy is a delightful 77, almost 78-year-old female with a small patent foramen ovale that is inconsequential and without hemodynamic effects.  She has pulmonary hypertension on a secondary basis likely due to diastolic dysfunction of the left ventricle, aortic insufficiency as well as COPD and asthma and possibly obstructive sleep apnea.  She is tolerating her low dose of Bumex and her blood pressure is reasonably well controlled.  Her weight is stable.  She continues with mild dyspnea on exertion.  Because of her stability we will not plan on any further testing per the patient's wishes.   2.  Normal coronary arteries.   3.  Mild aortic insufficiency with mild aortic root enlargement.  We continue to monitor this at least annually.   4.  The patient's peripheral edema has settled down on her current dose of Bumex.  The Lymphedema Clinic is working on the left lower extremity after a recent accident.   5.  The patient notes that she would like to get off her omeprazole.  I suggested she talk to Dr. Abran Mott about using Tagamet for that purpose.      It is my pleasure to assist in the care of Rupinder Tracy.   I will plan on seeing her again in 6 months or earlier on a p.r.n. basis.  We will obtain a BMP at that time as well.  All her questions were answered to her satisfaction.      Jae Butler MD       cc:   Abran Mott MD    [facility]    [address]  [address]         JAE BUTLER MD, [facility]             D: 05/10/2018   T: 2018   MT: LJ      Name:     JUDY RUSS   MRN:      [id number]        Account:      [id number]   :      1940           Service Date: 05/10/2018      Document: [id number]         Outpatient Encounter Prescriptions as of 5/10/2018   Medication Sig Dispense Refill     albuterol (ALBUTEROL) 108 (90 BASE) MCG/ACT inhaler Inhale 2 puffs into the lungs every 4 hours as needed 1 Inhaler 5     bumetanide (BUMEX) 0.5 MG tablet Take 1 tablet (0.5 mg) by mouth daily (with breakfast) 90 tablet 3     Calcium-Vitamin D-Vitamin K (CALCIUM + D) 500-1000-40 MG-UNT-MCG CHEW Take 2 tablets by mouth daily        Cholecalciferol (VITAMIN D) 2000 UNITS tablet Take 2,000 Units by mouth 2 times daily       DOXYCYCLINE HYCLATE PO Take 50 mg by mouth 3 times per week Monday,wednesday, friday       fluticasone-salmeterol (ADVAIR DISKUS) 250-50 MCG/DOSE diskus inhaler Inhale 1 puff into the lungs 2 times daily 60 Inhaler 1     hyaluronate in balanced salt ophthalmic solution (HEALON IN BSS) solution 1 drop every 4 hours (while awake)       olmesartan (BENICAR) 40 MG tablet Take 1 tablet (40 mg) by mouth daily 90 tablet 2     omeprazole (PRILOSEC) 20 MG CR capsule TAKE 1 CAPSULE (20 MG) BY MOUTH 2 TIMES DAILY 180 capsule 1     Ophthalmic Irrigation Solution (OCUSOFT EYE WASH OP)        order for DME 2L of O2 at night       prednisoLONE acetate (PRED FORTE) 1 % ophthalmic suspension Place 1 drop Into the left eye 4 times daily        RESTASIS 0.05 % OP EMUL 1 drop to left eye twice daily       rOPINIRole (REQUIP) 0.25 MG tablet Take 2 nightly, and one in the  afternoon as needed       sodium chloride (MARGAUX 128) 5 % ophthalmic solution 1 drop At Bedtime       SYSTANE 0.4-0.3 % OP SOLN 1 drop to left eye 4 times daily       traMADol (ULTRAM) 50 MG tablet TAKE 1-2 TABLETS BY MOUTH 2 TIMES DAILY MAX 4 TABLETS PER  tablet 1     [DISCONTINUED] bumetanide (BUMEX) 0.5 MG tablet Take 1 tablet (0.5 mg) by mouth daily (with breakfast) 30 tablet 11     No facility-administered encounter medications on file as of 5/10/2018.        Again, thank you for allowing me to participate in the care of your patient.      Sincerely,    Jae Butler MD     [facility]

## 2018-05-10 NOTE — LETTER
5/10/2018    Abran Mott MD  [address] [facility] [address]    RE: Rupinder Cast Tracy       Dear Colleague,    I had the pleasure of seeing Rupinder Tracy in the [facility].    HPI and Plan:   See dictation:[id number]    Orders Placed This Encounter   Procedures     Basic metabolic panel     Follow-Up with Cardiologist       Orders Placed This Encounter   Medications     bumetanide (BUMEX) 0.5 MG tablet     Sig: Take 1 tablet (0.5 mg) by mouth daily (with breakfast)     Dispense:  90 tablet     Refill:  3       Medications Discontinued During This Encounter   Medication Reason     bumetanide (BUMEX) 0.5 MG tablet Reorder         Encounter Diagnoses   Name Primary?     Pulmonary HTN Yes     Shortness of breath      Essential hypertension with goal blood pressure less than 130/85      Patent foramen ovale      Aortic root enlargement (H)      Edema of both lower legs due to peripheral venous insufficiency      Peripheral vascular disease (H)      Left Ventricular Hypertrophy      Hyperlipidemia LDL goal <100      Localized edema        CURRENT MEDICATIONS:  Current Outpatient Prescriptions   Medication Sig Dispense Refill     albuterol (ALBUTEROL) 108 (90 BASE) MCG/ACT inhaler Inhale 2 puffs into the lungs every 4 hours as needed 1 Inhaler 5     bumetanide (BUMEX) 0.5 MG tablet Take 1 tablet (0.5 mg) by mouth daily (with breakfast) 90 tablet 3     Calcium-Vitamin D-Vitamin K (CALCIUM + D) 500-1000-40 MG-UNT-MCG CHEW Take 2 tablets by mouth daily        Cholecalciferol (VITAMIN D) 2000 UNITS tablet Take 2,000 Units by mouth 2 times daily       DOXYCYCLINE HYCLATE PO Take 50 mg by mouth 3 times per week Monday,wednesday, friday       fluticasone-salmeterol (ADVAIR DISKUS) 250-50 MCG/DOSE diskus inhaler Inhale 1 puff into the lungs 2 times daily 60 Inhaler 1     hyaluronate in balanced salt ophthalmic solution (HEALON IN BSS) solution 1 drop every 4 hours (while awake)        "olmesartan (BENICAR) 40 MG tablet Take 1 tablet (40 mg) by mouth daily 90 tablet 2     omeprazole (PRILOSEC) 20 MG CR capsule TAKE 1 CAPSULE (20 MG) BY MOUTH 2 TIMES DAILY 180 capsule 1     Ophthalmic Irrigation Solution (OCUSOFT EYE WASH OP)        order for DME 2L of O2 at night       prednisoLONE acetate (PRED FORTE) 1 % ophthalmic suspension Place 1 drop Into the left eye 4 times daily        RESTASIS 0.05 % OP EMUL 1 drop to left eye twice daily       rOPINIRole (REQUIP) 0.25 MG tablet Take 2 nightly, and one in the afternoon as needed       sodium chloride (MARGAUX 128) 5 % ophthalmic solution 1 drop At Bedtime       SYSTANE 0.4-0.3 % OP SOLN 1 drop to left eye 4 times daily       traMADol (ULTRAM) 50 MG tablet TAKE 1-2 TABLETS BY MOUTH 2 TIMES DAILY MAX 4 TABLETS PER  tablet 1     [DISCONTINUED] bumetanide (BUMEX) 0.5 MG tablet Take 1 tablet (0.5 mg) by mouth daily (with breakfast) 30 tablet 11       ALLERGIES     Allergies   Allergen Reactions     Atorvastatin Calcium      myalgias     Celecoxib Swelling     edema     Cholestyramine Diarrhea     Diarrhea       Crestor [Rosuvastatin Calcium]      leg aches, likely from medication     Cyclobenzaprine Hcl      flexeril--gets \"goofy\"     Dulera      tremors     Gabapentin      Nightmares.   Retrial of medication caused tremors.     Lisinopril Cough     Cough/ Dizziness at high dose.     Meperidine Hcl Nausea and Vomiting     demerol-severe nausea     Morphine Nausea and Vomiting     Naprosyn [Naproxen] GI Disturbance     Niaspan [Niacin]      flushing, severe     Percocet [Oxycodone-Acetaminophen] Nausea     Nausea, lightheadedness.   Tolerates med if taken with food.     Pravastatin Swelling     Edema, myalgias     Red Yeast Rice [Cholestin] GI Disturbance     Bloating, etc.     Simvastatin      myalgias     Timolol Maleate Difficulty breathing     timoptic--respiratory problems     Hctz Rash     rash     Sulfa Drugs Itching and Rash     rash on all mucous " membranes and palms of hands with intense itching       PAST MEDICAL HISTORY:  Past Medical History:   Diagnosis Date     Arthritis      Asthma      Cellulitis 4/13 in [place]    R ankle     Difficult airway for intubation      Ductal Carcinoma in Situ of Left Breast 9/09     Duodenal ulcer, unspecified as acute or chronic, without mention of hemorrhage or perforation 1980's    felt due to high dose steroids     Female stress incontinence      H/O right and left heart catheterization     1-     HYPERLIPIDEMIA      Hypertension      Ischemic colitis 7/2001    [facility]     Lactose Intolerance     Mild     Legal blindness     Artificial right eye, severe vision loss left (Detached retina's, glaucoma, corneal transplants)     Mild intermittent asthma ~1980's     Nocturnal oxygen desaturation      Osteoporosis, unspecified ~2002     Patent foramen ovale      PERIPHERAL VASCULAR DISEASE 5/05    mesenteric arteries, angioplasty     PRESBYESOPHAGUS 6/04     Pulmonary HTN 11/2014     Serous retinal detachment     False eye right     Subarachnoid hemorrhage following injury (H) 12/10    due to fall, vascular evaluation negative     Syncope and collapse 12/10     Unspecified glaucoma(365.9)        PAST SURGICAL HISTORY:  Past Surgical History:   Procedure Laterality Date     BIOPSY       C APPENDECTOMY  1982     C NONSPECIFIC PROCEDURE  1945    adenoidectomy     C NONSPECIFIC PROCEDURE      bilat retinal detachment     C NONSPECIFIC PROCEDURE      blephoroplasty bilat     C NONSPECIFIC PROCEDURE  1997    glaucoma procedure L     C NONSPECIFIC PROCEDURE  1997, 2007, 2014    corneal transplant L     C NONSPECIFIC PROCEDURE  1945    strabismus procedure R eye     C NONSPECIFIC PROCEDURE  1960's    R breast bx     C NONSPECIFIC PROCEDURE  5/05    Angioplasty and stenting mesenteric vessels     C NONSPECIFIC PROCEDURE  2008    L corneal transplant     COLONOSCOPY      due 2015     ENDOSCOPIC RETROGRADE  CHOLANGIOPANCREATOGRAM N/A 10/13/2017    Procedure: ENDOSCOPIC RETROGRADE CHOLANGIOPANCREATOGRAM;  ENDOSCOPIC RETROGRADE CHOLANGIOPANCREATOGRAM ;  Surgeon: Isabel Yousif MD;  Location: [facility] OR     HC REMOVAL GALLBLADDER  1982    Cholecystectomy     HEART CATH RIGHT AND LEFT HEART CATH  1/19/15     HERNIORRHAPHY VENTRAL N/A 4/19/2016    Procedure: HERNIORRHAPHY VENTRAL;  Surgeon: Hamlet Ness MD;  Location: [facility] OR     MASTECTOMY SIMPLE BILATERAL  10/5/09    bilateral mastectomy     SURGICAL HISTORY OF -   6/2010    breast reconstruction       FAMILY HISTORY:  Family History   Problem Relation Age of Onset     Adopted: Yes     C.A.D. Paternal Uncle      MI 50's     Family History Negative Daughter        SOCIAL HISTORY:  Social History     Social History     Marital status:      Spouse name: N/A     Number of children: 4     Years of education: N/A     Occupational History     Retired nurse Retired     Social History Main Topics     Smoking status: Never Smoker     Smokeless tobacco: Never Used     Alcohol use 4.2 oz/week     7 Glasses of wine per week      Comment: rarely     Drug use: No     Sexual activity: No     Other Topics Concern     Caffeine Concern No     1- 2 cups coffee     Sleep Concern No     Stress Concern No     Weight Concern No     Special Diet Yes     low sodium     Exercise No     2 days a week (abigail chi), walking program, stationary bike 10 minutes 3 times per week     Seat Belt Yes     Social History Narrative    , 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  She walks with a walker.  Is a full code.  (last updated 10/11/2017)        Review of Systems:  Skin:  Positive for bruising bruise back in March and was eventually seen in ER   Eyes:  Positive for glasses    ENT:  Negative      Respiratory:  Positive for wheezing     Cardiovascular:    Positive for;lower extremity symptoms;edema lympedema in left leg since  "March/April  Gastroenterology: Negative      Genitourinary:  not assessed      Musculoskeletal:  Positive for joint pain hip pain at night  Neurologic:  Positive for numbness or tingling of feet left leg  Psychiatric:  Negative      Heme/Lymph/Imm:  Positive for allergies    Endocrine:  Negative        Physical Exam:  Vitals: /62  Pulse 74  Ht 1.626 m (5' 4\")  Wt 68.6 kg (151 lb 4.8 oz)  LMP 01/01/1990  BMI 25.97 kg/m2    Constitutional:  cooperative, alert and oriented, well developed, well nourished, in no acute distress        Skin:  warm and dry to the touch, no apparent skin lesions or masses noted          Head:  normocephalic, no masses or lesions        Eyes:  pupils equal and round   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    Lymph:      ENT:  no pallor or cyanosis, dentition good        Neck:  carotid pulses are full and equal bilaterally, JVP normal, no carotid bruit        Respiratory:  normal breath sounds, clear to auscultation, normal A-P diameter, normal symmetry, normal respiratory excursion, no use of accessory muscles         Cardiac: regular rhythm;normal S1 and S2;no S3 or S4;apical impulse not displaced frequent premature beats   no presence of murmur          pulses full and equal, no bruits auscultated                                        GI:  abdomen soft, non-tender, BS normoactive, no mass, no HSM, no bruits        Extremities and Muscular Skeletal:  no deformities, clubbing, cyanosis, erythema observed     RLE edema;pitting;1+   wearing support stocking on right LE, left LE ace wrapped    Neurological:  no gross motor deficits;affect appropriate   walks with a walker    Psych:  Alert and Oriented x 3        CC  Jae Butler MD  [address]  [place] [address]                Thank you for allowing me to participate in the care of your patient.      Sincerely,     Jae Butler MD     [facility]    cc:   Jae Butler MD  [address]  [place] [id number]

## 2018-05-10 NOTE — MR AVS SNAPSHOT
After Visit Summary   5/10/2018    Rupinder Tracy    MRN: [id number]           Patient Information     Date Of Birth          1940        Visit Information        Provider Department      5/10/2018 3:45 PM Jae Butler MD [facility]        Today's Diagnoses     Pulmonary HTN    -  1    Shortness of breath        Essential hypertension with goal blood pressure less than 130/85        Patent foramen ovale        Aortic root enlargement (H)        Edema of both lower legs due to peripheral venous insufficiency        Peripheral vascular disease (H)        Left Ventricular Hypertrophy        Hyperlipidemia LDL goal <100        Localized edema           Follow-ups after your visit        Additional Services     Follow-Up with Cardiologist                 Your next 10 appointments already scheduled     May 11, 2018  9:00 AM CDT   Lymphedema Treatment with Yana Nagy PT   [facility] ([facility])    [address] [place] [address]   [phone]            May 14, 2018  8:30 AM CDT   Office Visit with Abran Mott MD   [facility]    [address] [address]   [phone]           Bring a current list of meds and any records pertaining to this visit. For Physicals, please bring immunization records and any forms needing to be filled out. Please arrive 10 minutes early to complete paperwork.            May 15, 2018 10:15 AM CDT   Lymphedema Treatment with Yana Nagy PT   [facility] ([facility])    [address] [place] [address] [address]  [facility] [address]   [phone]            May 16, 2018  2:15 PM CDT   Lymphedema Treatment with Yana Nagy PT   [facility] ([facility])    [address] [place] [address]  [facility] [address]   [phone]            May 18,  2018 10:15 AM CDT   Lymphedema Treatment with Yana B Monno, PT   [facility] ([facility])    [address]  [address]  [place] [address]   [phone]            May 22, 2018 10:15 AM CDT   Lymphedema Treatment with Yana B Monno, PT   [facility] ([facility])    [address]  [address]  [place] [address]   [phone]            May 24, 2018  1:00 PM CDT   Lymphedema Treatment with Yana B Monno, PT   [facility] ([facility])    [address]  [address]  [place] [address]   [phone]            May 25, 2018 10:15 AM CDT   Lymphedema Treatment with Yana B Monno, PT   [facility] ([facility])    [address]  [address]  [place] [address]   [phone]            May 29, 2018 10:15 AM CDT   Lymphedema Treatment with Yana B Monno, PT   [facility] ([facility])    [address]  [address]  [place] [address]   [phone]            May 30, 2018  2:15 PM CDT   Lymphedema Treatment with Yana B Monno, PT   [facility] ([facility])    [address]  [address]  [place] [address]   [phone]              Future tests that were ordered for you today     Open Future Orders        Priority Expected Expires Ordered    Basic metabolic panel Routine 11/6/2018 5/10/2019 5/10/2018    Follow-Up with Cardiologist Routine 11/6/2018 5/10/2019 5/10/2018            Who to contact     If you have questions or need follow up information about today's clinic visit or your schedule please contact [facility]   [place] directly at [phone].  Normal or non-critical lab and imaging results will be communicated to you by MyChart, letter or phone within 4 business days after the clinic has received the results. If you do not hear from us within 7 days, please contact the clinic through [employer]  "or phone. If you have a critical or abnormal lab result, we will notify you by phone as soon as possible.  Submit refill requests through [employer] or call your pharmacy and they will forward the refill request to us. Please allow 3 business days for your refill to be completed.          Additional Information About Your Visit        [employer] Information     [employer] lets you send messages to your doctor, view your test results, renew your prescriptions, schedule appointments and more. To sign up, go to [email or web address] . Click on \"Log in\" on the left side of the screen, which will take you to the Welcome page. Then click on \"Sign up Now\" on the right side of the page.     You will be asked to enter the access code listed below, as well as some personal information. Please follow the directions to create your username and password.     Your access code is: [email or web address]  Expires: 2018  8:51 AM     Your access code will  in 90 days. If you need help or a new code, please call your [place] clinic or [phone].        Care EveryWhere ID     This is your Care EveryWhere ID. This could be used by other organizations to access your [place] medical records  [id number]        Your Vitals Were     Pulse Height Last Period BMI (Body Mass Index)          74 1.626 m (5' 4\") 1990 25.97 kg/m2         Blood Pressure from Last 3 Encounters:   05/10/18 138/62   18 122/53   18 122/50    Weight from Last 3 Encounters:   05/10/18 68.6 kg (151 lb 4.8 oz)   18 69.1 kg (152 lb 4.8 oz)   18 69.1 kg (152 lb 4.8 oz)              We Performed the Following     Follow-Up with Cardiologist          Where to get your medicines      These medications were sent to [facility]/pharmacy #[id number] - [place], [place] - [address]  [address] [place] [address]     Phone:  [phone]     bumetanide 0.5 MG tablet          Primary Care Provider Office Phone # Fax #    Abran Mott MD " [phone]       [address] [facility] [address]        Equal Access to Services     LINUS ASCENCIO : Richard magen kirkpatrick александр Jama, waanahyda luchristal, gerson kamarianoda benjamin, ethan alan. So [facility] [phone].    ATENCIÓN: Si habla español, tiene a lugo disposición servicios gratuitos de asistencia lingüística. Llame al [phone].    We comply with applicable federal civil rights laws and [place] laws. We do not discriminate on the basis of race, color, national origin, age, disability, sex, sexual orientation, or gender identity.            Thank you!     Thank you for choosing [facility]  for your care. Our goal is always to provide you with excellent care. Hearing back from our patients is one way we can continue to improve our services. Please take a few minutes to complete the written survey that you may receive in the mail after your visit with us. Thank you!             Your Updated Medication List - Protect others around you: Learn how to safely use, store and throw away your medicines at www.disposemymeds.org.          This list is accurate as of 5/10/18  4:22 PM.  Always use your most recent med list.                   Brand Name Dispense Instructions for use Diagnosis    albuterol 108 (90 Base) MCG/ACT Inhaler    PROAIR HFA    1 Inhaler    Inhale 2 puffs into the lungs every 4 hours as needed    Mild intermittent asthma       bumetanide 0.5 MG tablet    BUMEX    90 tablet    Take 1 tablet (0.5 mg) by mouth daily (with breakfast)    Pulmonary HTN, Localized edema       CALCIUM + D 500-1000-40 MG-UNT-MCG Chew   Generic drug:  Calcium-Vitamin D-Vitamin K      Take 2 tablets by mouth daily        DOXYCYCLINE HYCLATE PO      Take 50 mg by mouth 3 times per week Monday,wednesday, friday        fluticasone-salmeterol 250-50 MCG/DOSE diskus inhaler    ADVAIR DISKUS    60 Inhaler    Inhale 1 puff into the lungs 2 times  daily    Mild persistent asthma without complication       hyaluronate in balanced salt ophthalmic solution solution    HEALON IN BSS     1 drop every 4 hours (while awake)        MARGAUX 128 5 % ophthalmic solution   Generic drug:  sodium chloride      1 drop At Bedtime        OCUSOFT EYE WASH OP           olmesartan 40 MG tablet    BENICAR    90 tablet    Take 1 tablet (40 mg) by mouth daily    Essential hypertension with goal blood pressure less than 130/80       omeprazole 20 MG CR capsule    priLOSEC    180 capsule    TAKE 1 CAPSULE (20 MG) BY MOUTH 2 TIMES DAILY    Gastroesophageal reflux disease without esophagitis       order for DME      2L of O2 at night        prednisoLONE acetate 1 % ophthalmic susp    PRED FORTE     Place 1 drop Into the left eye 4 times daily        REQUIP 0.25 MG tablet   Generic drug:  rOPINIRole      Take 2 nightly, and one in the afternoon as needed    Restless legs syndrome (RLS)       RESTASIS 0.05 % ophthalmic emulsion   Generic drug:  cycloSPORINE      1 drop to left eye twice daily        SYSTANE 0.4-0.3 % Soln ophthalmic solution   Generic drug:  polyethylene glycol 0.4%- propylene glycol 0.3%      1 drop to left eye 4 times daily        traMADol 50 MG tablet    ULTRAM    100 tablet    TAKE 1-2 TABLETS BY MOUTH 2 TIMES DAILY MAX 4 TABLETS PER DAY    Chronic pain syndrome       vitamin D 2000 units tablet      Take 2,000 Units by mouth 2 times daily    Vitamin D deficiency

## 2018-05-11 ENCOUNTER — HOSPITAL ENCOUNTER (OUTPATIENT)
Dept: PHYSICAL THERAPY | Facility: CLINIC | Age: 78
Setting detail: THERAPIES SERIES
End: 2018-05-11
Attending: PHYSICIAN ASSISTANT
Payer: MEDICARE

## 2018-05-11 PROCEDURE — 97140 MANUAL THERAPY 1/> REGIONS: CPT | Mod: GP | Performed by: PHYSICAL THERAPIST

## 2018-05-11 PROCEDURE — 40000449 ZZHC STATISTIC PT VISIT, LYMPHEDEMA: Performed by: PHYSICAL THERAPIST

## 2018-05-11 NOTE — PROGRESS NOTES
Service Date: 05/10/2018      PRIMARY CARE PHYSICIAN:  Dr. Abran Mott.      HISTORY OF PRESENT ILLNESS:  I again had the pleasure of seeing your patient, Rupinder Tracy, at [facility] for evaluation of pulmonary hypertension, systemic hypertension and patent foramen ovale.  She also has a history of mild aortic root enlargement.  Left and right heart catheterization were performed 01/2015 for pulmonary hypertension and aortic insufficiency.  Coronary angiography demonstrated no significant coronary artery disease.  Aortogram showed moderate aortic insufficiency and moderate annuloaortic ectasia.  There was moderate pulmonary hypertension with a mean PA pressure of 32 mmHg and a wide pulse pressure in the PA tracing consistent with at least moderate pulmonary valve insufficiency.  Pulmonary vascular resistance was normal and there was no significant gradient between the end-diastolic pressure and the pulmonary capillary wedge pressure and the left ventricular end-diastolic pressure.  It was felt that a pulmonary pressure was likely secondary to impaired relaxation both from the aortic insufficiency, hypertension and perhaps pulmonary causes such as obstructive sleep apnea or asthma.  We tested for obstructive sleep apnea and showed desaturations but she never reached REM sleep and did not snore.  Her blood pressure has been under better control.  The patient's weight is down and we have been using Bumex to control her fluids.  She was sent to Dr. Regla Silva in the Pulmonary Hypertension Department who suggested multiple tests including repeat right heart catheterization.  She has significant COPD and asthma since the 1980s.  A V/Q scan was ordered but not performed.  A transthoracic echo with bubble study was suggested and again not performed.  The patient was to go to the [facility] for further evaluation and possible treatment.  That was not performed.  Instead the patient  participated in pulmonary rehab and felt considerably better because of it.  She tried 2 weeks of nightly oxygen but her saturations on or off remained above 95%.  On 04/14 the patient struck her left foot on a van causing lymphedema and soreness of the left toes.  The toes have now improved and she is being seen in the Lymphedema Clinic for the edema.  This is also improving slowly.  The patient was seen by Dr. Jhony Ruiz on 05/01 and her pulmonary function tests had improved with FVC at 88%, FEV1 at 59%.      PHYSICAL EXAMINATION:  As listed below.      ASSESSMENT:   1.  Rupinder Tracy is a delightful 77, almost 78-year-old female with a small patent foramen ovale that is inconsequential and without hemodynamic effects.  She has pulmonary hypertension on a secondary basis likely due to diastolic dysfunction of the left ventricle, aortic insufficiency as well as COPD and asthma and possibly obstructive sleep apnea.  She is tolerating her low dose of Bumex and her blood pressure is reasonably well controlled.  Her weight is stable.  She continues with mild dyspnea on exertion.  Because of her stability we will not plan on any further testing per the patient's wishes.   2.  Normal coronary arteries.   3.  Mild aortic insufficiency with mild aortic root enlargement.  We continue to monitor this at least annually.   4.  The patient's peripheral edema has settled down on her current dose of Bumex.  The Lymphedema Clinic is working on the left lower extremity after a recent accident.   5.  The patient notes that she would like to get off her omeprazole.  I suggested she talk to Dr. Abran Mott about using Tagamet for that purpose.      It is my pleasure to assist in the care of Rupinder Tracy.  I will plan on seeing her again in 6 months or earlier on a p.r.n. basis.  We will obtain a BMP at that time as well.  All her questions were answered to her satisfaction.      Jae Butler MD       cc:   Abran Mott,  MD    [address]  [address]         HENRIETTA COLLINS MD, FACC             D: 05/10/2018   T: 2018   MT: STACEY      Name:     JUDY RUSS   MRN:      [id number]        Account:      [id number]   :      1940           Service Date: 05/10/2018      Document: [id number]

## 2018-05-14 ENCOUNTER — OFFICE VISIT (OUTPATIENT)
Dept: INTERNAL MEDICINE | Facility: CLINIC | Age: 78
End: 2018-05-14
Payer: COMMERCIAL

## 2018-05-14 VITALS
HEART RATE: 78 BPM | DIASTOLIC BLOOD PRESSURE: 60 MMHG | WEIGHT: 151 LBS | SYSTOLIC BLOOD PRESSURE: 134 MMHG | BODY MASS INDEX: 25.92 KG/M2 | TEMPERATURE: 98.2 F | OXYGEN SATURATION: 98 %

## 2018-05-14 DIAGNOSIS — M25.552 HIP PAIN, LEFT: Primary | ICD-10-CM

## 2018-05-14 DIAGNOSIS — M79.89 SWELLING OF TOE OF LEFT FOOT: ICD-10-CM

## 2018-05-14 DIAGNOSIS — M10.072 ACUTE IDIOPATHIC GOUT OF LEFT FOOT: ICD-10-CM

## 2018-05-14 PROCEDURE — 99214 OFFICE O/P EST MOD 30 MIN: CPT | Performed by: INTERNAL MEDICINE

## 2018-05-14 NOTE — PATIENT INSTRUCTIONS
PLAN:                                                      1.  MEDICATIONS:        - Discontinue omeprazole, and notify MD of results in 1-2 weeks.   If symptoms come back, would try ranitidine (zantac) at a dose of 150 mg daily to twice daily        - Stop tramadol       - OK occasional use of advil 400 mg daily       - Continue other medications without change       - OK to take an extra 0.5 of bumex on days where edema is worse  2.  Orthopedic consultation today at [facility].      - Question how much of left hip pain may be joint related, or if this may possibly be an insufficiency fracture once again.   Is CT needed?  3.  Continue lymphedema treatments, use of support hose.

## 2018-05-14 NOTE — MR AVS SNAPSHOT
After Visit Summary   5/14/2018    Rupinder Tracy    MRN: [id number]           Patient Information     Date Of Birth          1940        Visit Information        Provider Department      5/14/2018 8:30 AM Abran Mott MD [facility]        Today's Diagnoses     Gout of left foot          Care Instructions    PLAN:                                                      1.  MEDICATIONS:        - Discontinue omeprazole, and notify MD of results in 1-2 weeks.   If symptoms come back, would try ranitidine (zantac) at a dose of 150 mg daily to twice daily        - Stop tramadol       - OK occasional use of advil 400 mg daily       - Continue other medications without change       - OK to take an extra 0.5 of bumex on days where edema is worse  2.  Orthopedic consultation today at [facility].      - Question how much of left hip pain may be joint related, or if this may possibly be an insufficiency fracture once again.   Is CT needed?  3.  Continue lymphedema treatments, use of support hose.          Follow-ups after your visit        Your next 10 appointments already scheduled     May 15, 2018 10:15 AM CDT   Lymphedema Treatment with Yana Nagy, PT   [facility] ([facility])    [address]  [address]  [facility] [address]   [phone]            May 16, 2018  2:15 PM CDT   Lymphedema Treatment with Yana Nagy, PT   [facility]    [address]  [address]  [facility] [address]   [phone]            May 18, 2018 10:15 AM CDT   Lymphedema Treatment with Yana Nagy, PT   [facility] ([facility])    [address] [place] [address]  [address]  [facility] [address]   [phone]            May 22, 2018 10:15 AM CDT   Lymphedema Treatment with Yana Nagy, PT   [facility]    [address] [place] [address]  [address]  [facility]  [address]   [phone]            May 24, 2018  1:00 PM CDT   Lymphedema Treatment with Yana BEATRIZ Nagy, PT   [facility] ([facility])    [address]  [address]  [place] [address]   [phone]            May 25, 2018 10:15 AM CDT   Lymphedema Treatment with Yana BEATRIZ Scottno, PT   [facility] ([facility])    [address]  [address]  [place] [address]   [phone]            May 29, 2018 10:15 AM CDT   Lymphedema Treatment with Yana B Monno, PT   [facility] ([facility])    [address] [place] [address]  [address]  [place] [address]   [phone]            May 30, 2018  2:15 PM CDT   Lymphedema Treatment with Yana B Monno, PT   [facility] ([facility])    [address]  [address]  [place] [address]   [phone]            Jun 05, 2018 10:15 AM CDT   Lymphedema Treatment with Yana B Monno, PT   [facility] ([facility])    [address] [place] [address]  [address]  [place] [address]   [phone]            Jun 07, 2018  1:00 PM CDT   Lymphedema Treatment with Yana B Monno, PT   [facility] ([facility])    [address] [place] [address]  [address]  [place] [address]   [phone]              Who to contact     If you have questions or need follow up information about today's clinic visit or your schedule please contact [facility] directly at [phone].  Normal or non-critical lab and imaging results will be communicated to you by MyChart, letter or phone within 4 business days after the clinic has received the results. If you do not hear from us within 7 days, please contact the clinic through MyChart or phone. If you have a critical or abnormal lab result, we will notify you by phone as soon as possible.  Submit refill requests through MyChart or call your pharmacy and they will forward the refill request to us. Please  "allow 3 business days for your refill to be completed.          Additional Information About Your Visit        MyChart Information     [employer] lets you send messages to your doctor, view your test results, renew your prescriptions, schedule appointments and more. To sign up, go to [email or web address] . Click on \"Log in\" on the left side of the screen, which will take you to the Welcome page. Then click on \"Sign up Now\" on the right side of the page.     You will be asked to enter the access code listed below, as well as some personal information. Please follow the directions to create your username and password.     Your access code is: [email or web address]  Expires: 2018  8:51 AM     Your access code will  in 90 days. If you need help or a new code, please call your [place] clinic or [phone].        Care EveryWhere ID     This is your Care EveryWhere ID. This could be used by other organizations to access your [place] medical records  [id number]        Your Vitals Were     Pulse Temperature Last Period Pulse Oximetry BMI (Body Mass Index)       78 98.2  F (36.8  C) (Oral) 1990 98% 25.92 kg/m2        Blood Pressure from Last 3 Encounters:   18 134/60   05/10/18 138/62   18 122/53    Weight from Last 3 Encounters:   18 151 lb (68.5 kg)   05/10/18 151 lb 4.8 oz (68.6 kg)   18 152 lb 4.8 oz (69.1 kg)              Today, you had the following     No orders found for display       Primary Care Provider Office Phone # Fax #    Abran Mott [phone]       [address] [place] [address]        Equal Access to Services     LINUS ASCENCIO : Richard Jama, wajamee newell, qaybta kaaljenny alexander, ethan alan. So [facility] [phone].    ATENCIÓN: Si habla español, tiene a lugo disposición servicios gratuitos de asistencia lingüística. [place] al [phone].    We comply with applicable federal civil rights " laws and [place] laws. We do not discriminate on the basis of race, color, national origin, age, disability, sex, sexual orientation, or gender identity.            Thank you!     Thank you for choosing [facility]  for your care. Our goal is always to provide you with excellent care. Hearing back from our patients is one way we can continue to improve our services. Please take a few minutes to complete the written survey that you may receive in the mail after your visit with us. Thank you!             Your Updated Medication List - Protect others around you: Learn how to safely use, store and throw away your medicines at www.disposemymeds.org.          This list is accurate as of 5/14/18 10:00 AM.  Always use your most recent med list.                   Brand Name Dispense Instructions for use Diagnosis    albuterol 108 (90 Base) MCG/ACT Inhaler    PROAIR HFA    1 Inhaler    Inhale 2 puffs into the lungs every 4 hours as needed    Mild intermittent asthma       bumetanide 0.5 MG tablet    BUMEX    90 tablet    Take 1 tablet (0.5 mg) by mouth daily (with breakfast)    Pulmonary HTN, Localized edema       CALCIUM + D 500-1000-40 MG-UNT-MCG Chew   Generic drug:  Calcium-Vitamin D-Vitamin K      Take 2 tablets by mouth daily        DOXYCYCLINE HYCLATE PO      Take 50 mg by mouth 3 times per week Monday,wednesday, friday        fluticasone-salmeterol 250-50 MCG/DOSE diskus inhaler    ADVAIR DISKUS    60 Inhaler    Inhale 1 puff into the lungs 2 times daily    Mild persistent asthma without complication       hyaluronate in balanced salt ophthalmic solution solution    HEALON IN BSS     1 drop every 4 hours (while awake)        MARGAUX 128 5 % ophthalmic solution   Generic drug:  sodium chloride      1 drop At Bedtime        OCUSOFT EYE WASH OP           olmesartan 40 MG tablet    BENICAR    90 tablet    Take 1 tablet (40 mg) by mouth daily    Essential hypertension with goal blood pressure less  than 130/80       omeprazole 20 MG CR capsule    priLOSEC    180 capsule    TAKE 1 CAPSULE (20 MG) BY MOUTH 2 TIMES DAILY    Gastroesophageal reflux disease without esophagitis       order for DME      2L of O2 at night        prednisoLONE acetate 1 % ophthalmic susp    PRED FORTE     Place 1 drop Into the left eye 4 times daily        REQUIP 0.25 MG tablet   Generic drug:  rOPINIRole      Take 2 nightly, and one in the afternoon as needed    Restless legs syndrome (RLS)       RESTASIS 0.05 % ophthalmic emulsion   Generic drug:  cycloSPORINE      1 drop to left eye twice daily        SYSTANE 0.4-0.3 % Soln ophthalmic solution   Generic drug:  polyethylene glycol 0.4%- propylene glycol 0.3%      1 drop to left eye 4 times daily        traMADol 50 MG tablet    ULTRAM    100 tablet    TAKE 1-2 TABLETS BY MOUTH 2 TIMES DAILY MAX 4 TABLETS PER DAY    Chronic pain syndrome       vitamin D 2000 units tablet      Take 2,000 Units by mouth 2 times daily    Vitamin D deficiency

## 2018-05-14 NOTE — PROGRESS NOTES
SUBJECTIVE:   Rupinder Tracy is a 77 year old female who presents to clinic today for the following health issues:      Musculoskeletal problem/pain      Duration: 2 months    Description  Location: L leg and L hip     Intensity:  moderate    Accompanying signs and symptoms: swelling    History  Previous similar problem: YES  Previous evaluation:  x-ray and ultrasound    Precipitating or alleviating factors:  Trauma or overuse: YES, injured leg getting into a van  Aggravating factors include: walking and overuse    Therapies tried and outcome: heat, exercises and tramadol     Bumped left lower leg on 3/17, with marked hematoma.   Seen by Mary Dietz.   Lumbar xray suggested osteopenia, spondylolisthesis.   Treated for presumed cellulitis with keflex, which seemed to help.  Patient seen in ED on 4/17, duplex ultrasound showed Baker's cyst.   Xray pelvis negative.  L 2nd toe was bright red (after antibiotics)  Gout was considered.  Decision make to start lymphedema clinic.   Previous problems with lymphedema.      Tramadol not helping.    Reviewed and updated as needed this visit by clinical staff:  Medications  Allergies  Soc Hx   Additional history: as documented    Additional issues to address:  1.  Question if still needs prilosec.  Not heartburn on med.   Worried about bone loss.    2.  Saw cardiology last week, things stable.   Going to respiratory rehab.       ROS:    Constitutional, HEENT, cardiovascular, pulmonary, gi and gu systems are negative, except as otherwise noted.    OBJECTIVE:                                                      /60  Pulse 78  Temp 98.2  F (36.8  C) (Oral)  Wt 151 lb (68.5 kg)  LMP 01/01/1990  SpO2 98%  BMI 25.92 kg/m2  Body mass index is 25.92 kg/(m^2).  No apparent distress  Basilar rales  Reg heart tones  Mild pain with int/external rotation of L hip  No focal tenderness of hip area  L 2nd toe with mild swelling, no erythema  Mild edema    ASSESSMENT:                                                       1.  Left hip area pain, most likely musculoskeletal.   Must also consider an insufficiency fracture.  2.  L foot/ankle edema.   Agree that lymphedema seems most likely cause.   Patient has slightly decreased RV function, but not having much edema RLE.     3.  Swollen red 2nd toe on L, much improved.   Most likely related to lymphedema, but must rule out cellulitis, gout, etc..  No need for treatment at this time.    - patient states that she did have treatment for presumed gout at nursing home when recovering, was put on colchicine, labs done.    PLAN:                                                      1.  MEDICATIONS:        - Discontinue omeprazole, and notify MD of results in 1-2 weeks.   If symptoms come back, would try ranitidine (zantac) at a dose of 150 mg daily to twice daily        - stop tramadol       - OK occasional use of advil 400 mg daily       - Continue other medications without change       - OK to take an extra 0.5 of bumex on days where edema is worse  2.  Orthopedic consultation today at [facility].   Question how much of this may be hip related, or if this may be insufficiency fracture once again.  3.  Continue lymphedema treatments, use of support hose.    Abran Mott MD  [facility]

## 2018-05-15 ENCOUNTER — HOSPITAL ENCOUNTER (OUTPATIENT)
Dept: PHYSICAL THERAPY | Facility: CLINIC | Age: 78
Setting detail: THERAPIES SERIES
End: 2018-05-15
Attending: PHYSICIAN ASSISTANT
Payer: MEDICARE

## 2018-05-15 PROCEDURE — 40000449 ZZHC STATISTIC PT VISIT, LYMPHEDEMA: Performed by: PHYSICAL THERAPIST

## 2018-05-15 PROCEDURE — 97140 MANUAL THERAPY 1/> REGIONS: CPT | Mod: GP | Performed by: PHYSICAL THERAPIST

## 2018-05-15 ASSESSMENT — PATIENT HEALTH QUESTIONNAIRE - PHQ9: SUM OF ALL RESPONSES TO PHQ QUESTIONS 1-9: 14

## 2018-05-15 ASSESSMENT — ASTHMA QUESTIONNAIRES: ACT_TOTALSCORE: 25

## 2018-05-18 ENCOUNTER — HOSPITAL ENCOUNTER (OUTPATIENT)
Dept: PHYSICAL THERAPY | Facility: CLINIC | Age: 78
Setting detail: THERAPIES SERIES
End: 2018-05-18
Attending: PHYSICIAN ASSISTANT
Payer: MEDICARE

## 2018-05-18 PROCEDURE — 97140 MANUAL THERAPY 1/> REGIONS: CPT | Mod: GP | Performed by: PHYSICAL THERAPIST

## 2018-05-18 PROCEDURE — 40000449 ZZHC STATISTIC PT VISIT, LYMPHEDEMA: Performed by: PHYSICAL THERAPIST

## 2018-05-22 ENCOUNTER — HOSPITAL ENCOUNTER (OUTPATIENT)
Dept: PHYSICAL THERAPY | Facility: CLINIC | Age: 78
Setting detail: THERAPIES SERIES
End: 2018-05-22
Attending: PHYSICIAN ASSISTANT
Payer: MEDICARE

## 2018-05-22 PROCEDURE — 97140 MANUAL THERAPY 1/> REGIONS: CPT | Mod: GP | Performed by: PHYSICAL THERAPIST

## 2018-05-22 PROCEDURE — 40000449 ZZHC STATISTIC PT VISIT, LYMPHEDEMA: Performed by: PHYSICAL THERAPIST

## 2018-05-23 ENCOUNTER — TELEPHONE (OUTPATIENT)
Dept: CARDIOLOGY | Facility: CLINIC | Age: 78
End: 2018-05-23

## 2018-05-23 NOTE — TELEPHONE ENCOUNTER
RN attempted second contact to discuss patient's symptoms, but left second VM advising patient to call clinic to discuss her symptoms.

## 2018-05-23 NOTE — TELEPHONE ENCOUNTER
Patient called and left VM to report that she has noticed in increase in her SOB. Patient also reports her weight is up today from yesterday. RN attempted to contact patient to discuss weight increase and symptoms, but left VM advising patient to call clinic to discuss. .

## 2018-05-29 ENCOUNTER — HOSPITAL ENCOUNTER (OUTPATIENT)
Dept: PHYSICAL THERAPY | Facility: CLINIC | Age: 78
Setting detail: THERAPIES SERIES
End: 2018-05-29
Attending: PHYSICIAN ASSISTANT
Payer: MEDICARE

## 2018-05-29 PROCEDURE — 40000449 ZZHC STATISTIC PT VISIT, LYMPHEDEMA: Performed by: PHYSICAL THERAPIST

## 2018-05-29 PROCEDURE — 97140 MANUAL THERAPY 1/> REGIONS: CPT | Mod: GP | Performed by: PHYSICAL THERAPIST

## 2018-05-30 ENCOUNTER — HOSPITAL ENCOUNTER (OUTPATIENT)
Dept: PHYSICAL THERAPY | Facility: CLINIC | Age: 78
Setting detail: THERAPIES SERIES
End: 2018-05-30
Attending: PHYSICIAN ASSISTANT
Payer: MEDICARE

## 2018-05-30 PROCEDURE — 40000449 ZZHC STATISTIC PT VISIT, LYMPHEDEMA: Performed by: PHYSICAL THERAPIST

## 2018-05-30 PROCEDURE — 97140 MANUAL THERAPY 1/> REGIONS: CPT | Mod: GP | Performed by: PHYSICAL THERAPIST

## 2018-06-05 ENCOUNTER — HOSPITAL ENCOUNTER (OUTPATIENT)
Dept: PHYSICAL THERAPY | Facility: CLINIC | Age: 78
Setting detail: THERAPIES SERIES
End: 2018-06-05
Attending: PHYSICIAN ASSISTANT
Payer: MEDICARE

## 2018-06-05 PROCEDURE — 97140 MANUAL THERAPY 1/> REGIONS: CPT | Mod: GP | Performed by: PHYSICAL THERAPIST

## 2018-06-05 PROCEDURE — 40000449 ZZHC STATISTIC PT VISIT, LYMPHEDEMA: Performed by: PHYSICAL THERAPIST

## 2018-06-26 ENCOUNTER — HOSPITAL ENCOUNTER (OUTPATIENT)
Dept: PHYSICAL THERAPY | Facility: CLINIC | Age: 78
Setting detail: THERAPIES SERIES
End: 2018-06-26
Attending: PHYSICIAN ASSISTANT
Payer: MEDICARE

## 2018-06-26 PROCEDURE — G8980 MOBILITY D/C STATUS: HCPCS | Mod: GP,CI | Performed by: PHYSICAL THERAPIST

## 2018-06-26 PROCEDURE — 97140 MANUAL THERAPY 1/> REGIONS: CPT | Mod: GP | Performed by: PHYSICAL THERAPIST

## 2018-06-26 PROCEDURE — 40000449 ZZHC STATISTIC PT VISIT, LYMPHEDEMA: Performed by: PHYSICAL THERAPIST

## 2018-06-26 PROCEDURE — G8979 MOBILITY GOAL STATUS: HCPCS | Mod: GP,CI | Performed by: PHYSICAL THERAPIST

## 2018-07-02 ENCOUNTER — APPOINTMENT (OUTPATIENT)
Dept: CT IMAGING | Facility: CLINIC | Age: 78
End: 2018-07-02
Attending: EMERGENCY MEDICINE
Payer: MEDICARE

## 2018-07-02 ENCOUNTER — APPOINTMENT (OUTPATIENT)
Dept: GENERAL RADIOLOGY | Facility: CLINIC | Age: 78
End: 2018-07-02
Attending: EMERGENCY MEDICINE
Payer: MEDICARE

## 2018-07-02 ENCOUNTER — HOSPITAL ENCOUNTER (EMERGENCY)
Facility: CLINIC | Age: 78
Discharge: HOME | End: 2018-07-02
Attending: EMERGENCY MEDICINE | Admitting: EMERGENCY MEDICINE
Payer: MEDICARE

## 2018-07-02 ENCOUNTER — TELEPHONE (OUTPATIENT)
Dept: CARDIOLOGY | Facility: CLINIC | Age: 78
End: 2018-07-02

## 2018-07-02 VITALS
DIASTOLIC BLOOD PRESSURE: 57 MMHG | RESPIRATION RATE: 20 BRPM | BODY MASS INDEX: 25.84 KG/M2 | HEIGHT: 64 IN | SYSTOLIC BLOOD PRESSURE: 134 MMHG | OXYGEN SATURATION: 96 % | WEIGHT: 151.38 LBS

## 2018-07-02 DIAGNOSIS — R60.9 DEPENDENT EDEMA: ICD-10-CM

## 2018-07-02 DIAGNOSIS — R06.02 SOB (SHORTNESS OF BREATH): ICD-10-CM

## 2018-07-02 LAB
ANION GAP SERPL CALCULATED.3IONS-SCNC: 11 MMOL/L (ref 3–14)
BASOPHILS # BLD AUTO: 0 10E9/L (ref 0–0.2)
BASOPHILS NFR BLD AUTO: 0.4 %
BUN SERPL-MCNC: 21 MG/DL (ref 7–30)
CALCIUM SERPL-MCNC: 8.4 MG/DL (ref 8.5–10.1)
CHLORIDE SERPL-SCNC: 110 MMOL/L (ref 94–109)
CO2 SERPL-SCNC: 23 MMOL/L (ref 20–32)
CREAT SERPL-MCNC: 0.81 MG/DL (ref 0.52–1.04)
D DIMER PPP FEU-MCNC: 2.7 UG/ML FEU (ref 0–0.5)
DIFFERENTIAL METHOD BLD: NORMAL
EOSINOPHIL # BLD AUTO: 0.2 10E9/L (ref 0–0.7)
EOSINOPHIL NFR BLD AUTO: 2.5 %
ERYTHROCYTE [DISTWIDTH] IN BLOOD BY AUTOMATED COUNT: 14.2 % (ref 10–15)
GFR SERPL CREATININE-BSD FRML MDRD: 68 ML/MIN/1.7M2
GLUCOSE SERPL-MCNC: 89 MG/DL (ref 70–99)
HCT VFR BLD AUTO: 37.2 % (ref 35–47)
HGB BLD-MCNC: 12.2 G/DL (ref 11.7–15.7)
IMM GRANULOCYTES # BLD: 0 10E9/L (ref 0–0.4)
IMM GRANULOCYTES NFR BLD: 0 %
INTERPRETATION ECG - MUSE: NORMAL
LYMPHOCYTES # BLD AUTO: 2.1 10E9/L (ref 0.8–5.3)
LYMPHOCYTES NFR BLD AUTO: 30.6 %
MCH RBC QN AUTO: 31.9 PG (ref 26.5–33)
MCHC RBC AUTO-ENTMCNC: 32.8 G/DL (ref 31.5–36.5)
MCV RBC AUTO: 97 FL (ref 78–100)
MONOCYTES # BLD AUTO: 0.8 10E9/L (ref 0–1.3)
MONOCYTES NFR BLD AUTO: 12.1 %
NEUTROPHILS # BLD AUTO: 3.6 10E9/L (ref 1.6–8.3)
NEUTROPHILS NFR BLD AUTO: 54.4 %
NRBC # BLD AUTO: 0 10*3/UL
NRBC BLD AUTO-RTO: 0 /100
NT-PROBNP SERPL-MCNC: 609 PG/ML (ref 0–1800)
PLATELET # BLD AUTO: 202 10E9/L (ref 150–450)
POTASSIUM SERPL-SCNC: 3.8 MMOL/L (ref 3.4–5.3)
RBC # BLD AUTO: 3.83 10E12/L (ref 3.8–5.2)
SODIUM SERPL-SCNC: 144 MMOL/L (ref 133–144)
TROPONIN I SERPL-MCNC: <0.015 UG/L (ref 0–0.04)
WBC # BLD AUTO: 6.7 10E9/L (ref 4–11)

## 2018-07-02 PROCEDURE — 83880 ASSAY OF NATRIURETIC PEPTIDE: CPT | Performed by: EMERGENCY MEDICINE

## 2018-07-02 PROCEDURE — 71260 CT THORAX DX C+: CPT

## 2018-07-02 PROCEDURE — 71046 X-RAY EXAM CHEST 2 VIEWS: CPT

## 2018-07-02 PROCEDURE — 85025 COMPLETE CBC W/AUTO DIFF WBC: CPT | Performed by: EMERGENCY MEDICINE

## 2018-07-02 PROCEDURE — 85379 FIBRIN DEGRADATION QUANT: CPT | Performed by: EMERGENCY MEDICINE

## 2018-07-02 PROCEDURE — 80048 BASIC METABOLIC PNL TOTAL CA: CPT | Performed by: EMERGENCY MEDICINE

## 2018-07-02 PROCEDURE — 93005 ELECTROCARDIOGRAM TRACING: CPT

## 2018-07-02 PROCEDURE — 25000125 ZZHC RX 250: Performed by: EMERGENCY MEDICINE

## 2018-07-02 PROCEDURE — 25000128 H RX IP 250 OP 636: Performed by: EMERGENCY MEDICINE

## 2018-07-02 PROCEDURE — 99285 EMERGENCY DEPT VISIT HI MDM: CPT | Mod: 25

## 2018-07-02 PROCEDURE — 84484 ASSAY OF TROPONIN QUANT: CPT | Performed by: EMERGENCY MEDICINE

## 2018-07-02 RX ORDER — IOPAMIDOL 755 MG/ML
59 INJECTION, SOLUTION INTRAVASCULAR ONCE
Status: COMPLETED | OUTPATIENT
Start: 2018-07-02 | End: 2018-07-02

## 2018-07-02 RX ADMIN — SODIUM CHLORIDE 86 ML: 9 INJECTION, SOLUTION INTRAVENOUS at 13:14

## 2018-07-02 RX ADMIN — IOPAMIDOL 59 ML: 755 INJECTION, SOLUTION INTRAVENOUS at 13:14

## 2018-07-02 ASSESSMENT — ENCOUNTER SYMPTOMS
ABDOMINAL PAIN: 0
COUGH: 1
SHORTNESS OF BREATH: 1
FEVER: 0
CHEST TIGHTNESS: 0

## 2018-07-02 NOTE — TELEPHONE ENCOUNTER
Pt called stating that she is having issues with weight gain, severe swelling on her legs and SOB. Pts weight on Thursday was 148 lbs and today it is 151.6lbs. Pt stated that on Saturday and Sunday she took an additional 0.5 mg of Bumex and that did not seem to help. Pt reports being SOB at rest. Recommended patient go to ER to be evaluated. Pt agreed to plan.

## 2018-07-02 NOTE — ED NOTES
Pt was discharged from lymphedema last week. Has been gaining weight ever since. She has been purse lip breathing at home along with increased leg edema and takes Bumex along with her rescue inhaler.

## 2018-07-02 NOTE — ED PROVIDER NOTES
History     Chief Complaint:  Shortness of breath    HPI   Rupinder Tracy is a 78 year old female with a history of pulmonary hypertension, COPD, CHF, and CKD who presents to the ED for evaluation of shortness of breath. The patient reports an onset of leg swelling 3 days ago. She was discharged from the lymphedema clinic 6 days ago and used her lymphedema wraps yesterday and the day before. She has taken 1 mg Bumex daily since Saturday and has not been urinating more, which would be expected. Patient has also been keeping track of her weight. On Thursday she was 148 lb, 144 lb Friday, 150 lb Saturday, 154 lb Sunday, and 151 lb today. Patient also reports a dry cough and worsening shortness of breath over the last 3 days. She has been using her rescue inhaler with little relief. Her friend came to put the lymphedema wraps on this morning and was concerned about her breathing. Patient called Dr. Butler at the lymphedema clinic who said they would send someone to evaluate her while in the ED this morning. Here, patient denies any calf pain, one sided leg swelling, chest pain, fever, sputum production, abdominal pain, or any other symptoms. Patient is not on blood thinning medication. Of note, patient has a history of breast cancer but has been in remission since 2009.    CARDIAC RISK FACTORS:  Sex:    Female  Tobacco:   No  Hypertension:   Yes  Hyperlipidemia:  Yes  Diabetes:   No  Family History:  No    PE/DVT RISK FACTORS:  Sex:    Female  Hormones:   No  Tobacco:   No  Cancer:   Yes  Travel:   No  Surgery:   No  Other immobilization: No  Personal history:  No  Family history:  No    Allergies:  Atorvastatin calcium  Celecoxib  Cholestyramine  Crestor  Cyclobenzaprine Hcl  Dulera  Gabapentin  Lisinopril  Meperidine Hcl  Morphine  Naprosyn  Niaspan  Percocet  Pravastatin  Red yeast rice  Simvastatin  Timolol Maleate  Hctz  Sulfa drugs     Medications:    albuterol (ALBUTEROL) 108 (90 BASE) MCG/ACT  inhaler  bumetanide (BUMEX) 0.5 MG tablet  Calcium-Vitamin D-Vitamin K (CALCIUM + D) 500-1000-40 MG-UNT-MCG CHEW  Cholecalciferol (VITAMIN D) 2000 UNITS tablet  DOXYCYCLINE HYCLATE PO  fluticasone-salmeterol (ADVAIR DISKUS) 250-50 MCG/DOSE diskus inhaler  hyaluronate in balanced salt ophthalmic solution (HEALON IN BSS) solution  olmesartan (BENICAR) 40 MG tablet  omeprazole (PRILOSEC) 20 MG CR capsule  Ophthalmic Irrigation Solution (OCUSOFT EYE WASH OP)  prednisoLONE acetate (PRED FORTE) 1 % ophthalmic suspension  RESTASIS 0.05 % OP EMUL  rOPINIRole (REQUIP) 0.25 MG tablet  sodium chloride (MARGAUX 128) 5 % ophthalmic solution  SYSTANE 0.4-0.3 % OP SOLN  traMADol (ULTRAM) 50 MG tablet     Past Medical History:    Arthritis   Asthma   Cellulitis   COPD  Difficult airway for intubation   Ductal Carcinoma in Situ of Left Breast   Duodenal ulcer, unspecified as acute or chronic, without mention of hemorrhage or perforation   Female stress incontinence   Hyperlipidemia   Ischemic colitis   Lactose Intolerance   Legal blindness   Left ventricular hypertrophy  Mild intermittent asthma   Mitral valve disorder  Nocturnal oxygen desaturation   Osteoporosis, unspecified   Patent foramen ovale   Peripheral vascular disease  Presbyesophagus   Pulmonary hypertension  Serous retinal detachment   Subarachnoid hemorrhage following injury   Syncope and collapse   Unspecified glaucoma  Ovarian cysts  Diverticulosis of sigmoid colon  Vitamin D deficiency  Gastric ulcer  Restrictive lung disease  Aortic root enlargement  Dyspnea  Esophageal spasm  Edema of both lower legs due to peripheral venous insufficiency  Gout of left foot  Essential hypertension  Diastolic dysfunction  CKD stage 3  Pneumonia  Chronic pain, back  RLS    Past Surgical History:    Biopsy  C appendectomy  Adenoidectomy  Bilat retinal detachment  Blephoroplasty bilat  Glaucoma procedure, left  Corneal transplant, left  Strabismus procedure, right eye  Right breast  "biopsy  Angioplasty and stenting mesenteric vessels  Left corneal transplant  Endoscopic retrograde cholangiopancreatogram  Cholecystectomy  Heart cath right and left heart cath  Herniorrhaphy ventral  Mastectomy simple bilateral  Breast reconstruction    Family History:    History reviewed. No pertinent family history.     Social History:  Smoking status: Never  Alcohol use: Rarely  Marital Status:       Review of Systems   Constitutional: Negative for fever.   Respiratory: Positive for cough and shortness of breath. Negative for chest tightness.    Cardiovascular: Positive for leg swelling. Negative for chest pain.   Gastrointestinal: Negative for abdominal pain.   All other systems reviewed and are negative.    Physical Exam   Patient Vitals for the past 24 hrs:   BP Temp src Heart Rate SpO2 Height Weight   07/02/18 1033 125/44 Tympanic 99 98 % 1.626 m (5' 4\") 68.7 kg (151 lb 6 oz)     Physical Exam  General: Well-nourished, appears to be resting comfortably when I enter the room  Eyes: PERRL, conjunctivae pink no scleral icterus or conjunctival injection  ENT:  Moist mucus membranes, posterior oropharynx clear without erythema or exudates  Respiratory:  Lungs with bibasilar fine crackles left greater than right, no rubs/wheezes.  Good air movement.  No retractions.  Speaking in complete sentences  CV: Normal rate and rhythm, no murmurs/rubs/gallops  GI:  Abdomen soft and non-distended.  Normoactive BS.  No tenderness, guarding or rebound  Skin: Warm, dry.  No rashes or petechiae  Musculoskeletal: No peripheral edema or calf tenderness  Neuro: Alert and oriented to person/place/time  Psychiatric: Normal affect    Emergency Department Course   ECG (11:03:37):  Rate 77 bpm. NH interval 178. QRS duration 86. QT/QTc 374/423. P-R-T axes 54 -17 53. Normal sinus rhythm. Low voltage QRS. Cannot rule out anteroseptal infarct, age undetermined. Abnormal ECG. Interpreted at 1105 by Carina Valdivia, " MD.    Imaging:  Radiographic findings were communicated with the patient and family who voiced understanding of the findings.    X-ray Chest, 2 views:  IMPRESSION: PA and lateral views of the chest. Lungs are clear. Mild  pulmonary vascular congestion is suggested with slight cephalization  of the pulmonary vasculature. This appears stable. Heart is normal in  size. No effusions are evident. No pneumothorax.  Result per radiology.     CT-scan Chest w/ contrast:  IMPRESSION:  1. No evidence of pulmonary embolism or acute thoracic aortic  abnormality.  2. Minor bronchiectasis with some mucous plugging at the lung bases.  3. Fibrotic abnormalities at the lung bases are new compared to the  prior study.  Result per radiology.     Laboratory:  CBC: WNL (WBC 6.7, HGB 12.2, )  BMP: Chloride 110 (H), Calcium 8.4 (L) o/w WNL (Creatinine 0.81)  1105 - Troponin: <0.015  BNP: 609  D-dimer: 2.7 (H)    Emergency Department Course:  Past medical records, nursing notes, and vitals reviewed.  1055: I performed an exam of the patient and obtained history, as documented above. GCS 15.    IV inserted and blood drawn.    The patient was sent for a CT-scan and x-ray while in the emergency department, findings above.    1231: I rechecked the patient. Explained findings to patient.    1341: I rechecked the patient. Findings and plan explained to the Patient. Patient discharged home with instructions regarding supportive care, medications, and reasons to return. The importance of close follow-up was reviewed.     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  I considered a broad differential.  This does not appear to be acute coronary syndrome.  Troponin is negative despite the duration of symptoms.  EKG shows no significant changes.  She is not hypoxic.  She has some  fine crackles on examination which may be her underlying pulmonary fibrosis.  A BNP is essentially within normal limits and her chest x-ray was not convincing for congestive heart failure exacerbation.  She is not significantly anemic.  Kidney function is normal.  She does have dependent edema edema but she is known to have this and as I suspect it is worsened from that heat right now.  She is not PERC negative and given that I had no significant cause for her symptoms I did obtain a d-dimer.  This was elevated.  A CT scan was obtained and shows no evidence of pneumonia, pneumothorax, pulmonary embolism, congestive heart failure, or other significant abnormality.  It does show some worsening of her pulmonary fibrosis changes which may be accounting for her worsening of her shortness of breath.  She was able to ambulate and maintain her oxygen saturations fairly well.  She feels better than before.  She has oxygen at home for sleeping and she can use it during the day if she needs it.  We discussed admission versus discharge as she feels comfortable with the plan for discharge home.  I asked her to follow-up with her primary care doctor in the next 3 days and I did recommend she make an appointment and follow-up with her pulmonologist given it seems that her pulmonary fibrosis may be worsening.  She is asked to return if she becomes worse in any way and she was in agreement with this plan.    Diagnosis:    ICD-10-CM   1. SOB (shortness of breath) R06.02   2. Dependent edema R60.9       Disposition:  discharged to home      Dorcas Neumann  7/2/2018    EMERGENCY DEPARTMENT  I, Dorcas Nel, am serving as a scribe at 10:55 AM on 7/2/2018 to document services personally performed by Carina Valdivia MD based on my observations and the provider's statements to me.        Carina Valdivia MD  07/02/18 [id number]

## 2018-07-02 NOTE — ED AVS SNAPSHOT
Emergency Department    [address] [place] [id number]    Phone:  [phone]    Fax:  [phone]                                       Rupinder Tracy   MRN: [id number]    Department:   Emergency Department   Date of Visit:  7/2/2018           After Visit Summary Signature Page     I have received my discharge instructions, and my questions have been answered. I have discussed any challenges I see with this plan with the nurse or doctor.    ..........................................................................................................................................  Patient/Patient Representative Signature      ..........................................................................................................................................  Patient Representative Print Name and Relationship to Patient    ..................................................               ................................................  Date                                            Time    ..........................................................................................................................................  Reviewed by Signature/Title    ...................................................              ..............................................  Date                                                            Time

## 2018-07-02 NOTE — ED AVS SNAPSHOT
Emergency Department    [address] [facility] [address]    Phone:  [phone]    Fax:  [phone]                                       Rupinder Tracy   MRN: [id number]    Department:   Emergency Department   Date of Visit:  7/2/2018           Patient Information     Date Of Birth          1940        Your diagnoses for this visit were:     SOB (shortness of breath)     Dependent edema        You were seen by Carina Valdivia MD.      Follow-up Information     Follow up with Abran Mott MD. Schedule an appointment as soon as possible for a visit in 3 days.    Specialty:  Internal Medicine    Contact information:    [address] [facility] [address] [phone]          Discharge Instructions       *You may resume diet and activities as tolerated.  *No new medications. Continue your current medications.  *Follow-up with your doctor for a recheck in 2-3 days and recommend follow-up with pulmonology regarding possible progression of pulmonary fibrosis.  *Return if you become worse in any way.        Shortness of Breath (Dyspnea)  Shortness of breath is the feeling that you can't catch your breath or get enough air. It is also known as dyspnea.  Dyspnea can be caused by many different conditions. They include:    Acute asthma attack    Worsening of chronic lung diseases such as chronic bronchitis and emphysema    Heart failure. This is when weak heart muscle allows extra fluid to collect in the lungs.    Panic attacks or anxiety. Fear can cause rapid breathing (hyperventilation).    Pneumonia, or an infection in the lung tissue    Exposure to toxic substances, fumes, smoke, or certain medicines    Blood clot in the lung (pulmonary embolism). This is often from a piece of blood clot in a deep vein of the leg (deep vein thrombosis) that breaks off and travels to the lungs.    Heart attack or heart-related chest pain (angina)    Anemia    Collapsed lung  (pneumothorax)    Dehydration    Pregnancy  Based on your visit today, the exact cause of your shortness of breath is not certain. Your tests don t show any of the serious causes of dyspnea. You may need other tests to find out if you have a serious problem. It s important to watch for any new symptoms or symptoms that get worse. Follow up with your healthcare provider as directed.  Home care  Follow these tips to take care of yourself at home:    When your symptoms are better, go back to your usual activities.    If you smoke, you should stop. Join a quit-smoking program or ask your healthcare provider for help.    Eat a healthy diet and get plenty of sleep.    Get regular exercise. Talk with your healthcare provider before starting to exercise, especially if you have other medical problems.    Cut down on the amount of caffeine and stimulants you consume.  Follow-up care  Follow up with your healthcare provider, or as advised.  If tests were done, you will be told if your treatment needs to be changed. You can call as directed for the results.  If an X-ray was taken, a specialist will review it. You will be notified of any new findings that may affect your care.  Call 911  Shortness of breath may be a sign of a serious medical problem. For example, it may be a problem with your heart or lungs. Call 911 if you have worsening shortness of breath or trouble breathing, especially with any of the symptoms below:    You are confused or it s difficult to wake you.    You faint or lose consciousness.    You have a fast heartbeat, or your heartbeat is irregular.    You are coughing up blood.    You have pain in your chest, arm, shoulder, neck, or upper back.    You break out in a sweat.  When to seek medical advice  Call your healthcare provider right away if any of these occur:    Slight shortness of breath or wheezing    Redness, pain or swelling in your leg, arm, or other body area    Swelling in both legs or  ankles    Fast weight gain    Dizziness or weakness    Fever of 100.4 F (38 C) or higher, or as directed by your healthcare provider  Date Last Reviewed: 9/13/2015 2000-2017 [employer]. [address] [place] [address], [place] [address]. All rights reserved. This information is not intended as a substitute for professional medical care. Always follow your healthcare professional's instructions.          24 Hour Appointment Hotline       To make an appointment at any [facility], call [phone] ([phone]). If you don't have a family doctor or clinic, we will help you find one. [place] clinics are conveniently located to serve the needs of you and your family.             Review of your medicines      Our records show that you are taking the medicines listed below. If these are incorrect, please call your family doctor or clinic.        Dose / Directions Last dose taken    albuterol 108 (90 Base) MCG/ACT Inhaler   Commonly known as:  PROAIR HFA   Dose:  2 puff   Quantity:  1 Inhaler        Inhale 2 puffs into the lungs every 4 hours as needed   Refills:  5        bumetanide 0.5 MG tablet   Commonly known as:  BUMEX   Dose:  0.5 mg   Quantity:  90 tablet        Take 1 tablet (0.5 mg) by mouth daily (with breakfast)   Refills:  3        CALCIUM + D 500-1000-40 MG-UNT-MCG Chew   Dose:  2 tablet   Indication:  Heartburn   Generic drug:  Calcium-Vitamin D-Vitamin K        Take 2 tablets by mouth daily   Refills:  0        DOXYCYCLINE HYCLATE PO   Dose:  50 mg        Take 50 mg by mouth 3 times per week Monday,wednesday, friday   Refills:  0        fluticasone-salmeterol 250-50 MCG/DOSE diskus inhaler   Commonly known as:  ADVAIR DISKUS   Dose:  1 puff   Quantity:  60 Inhaler        Inhale 1 puff into the lungs 2 times daily   Refills:  1        hyaluronate in balanced salt ophthalmic solution solution   Commonly known as:  HEALON IN BSS   Dose:  1 drop        1 drop every 4 hours (while awake)    Refills:  0        MARGAUX 128 5 % ophthalmic solution   Dose:  1 drop   Generic drug:  sodium chloride        1 drop At Bedtime   Refills:  0        OCUSOFT EYE WASH OP        Refills:  0        olmesartan 40 MG tablet   Commonly known as:  BENICAR   Dose:  40 mg   Quantity:  90 tablet        Take 1 tablet (40 mg) by mouth daily   Refills:  2        omeprazole 20 MG CR capsule   Commonly known as:  priLOSEC   Quantity:  180 capsule        TAKE 1 CAPSULE (20 MG) BY MOUTH 2 TIMES DAILY   Refills:  1        order for DME        2L of O2 at night   Refills:  0        prednisoLONE acetate 1 % ophthalmic susp   Commonly known as:  PRED FORTE   Dose:  1 drop        Place 1 drop Into the left eye 4 times daily   Refills:  0        REQUIP 0.25 MG tablet   Generic drug:  rOPINIRole        Take 2 nightly, and one in the afternoon as needed   Refills:  0        RESTASIS 0.05 % ophthalmic emulsion   Generic drug:  cycloSPORINE        1 drop to left eye twice daily   Refills:  0        SYSTANE 0.4-0.3 % Soln ophthalmic solution   Generic drug:  polyethylene glycol 0.4%- propylene glycol 0.3%        1 drop to left eye 4 times daily   Refills:  0        traMADol 50 MG tablet   Commonly known as:  ULTRAM   Quantity:  100 tablet        TAKE 1-2 TABLETS BY MOUTH 2 TIMES DAILY MAX 4 TABLETS PER DAY   Refills:  1        vitamin D 2000 units tablet   Dose:  2000 Units        Take 2,000 Units by mouth 2 times daily   Refills:  0                Procedures and tests performed during your visit     Basic metabolic panel    CBC with platelets differential    Cardiac Continuous Monitoring    Chest CT, IV contrast only - PE protocol    D dimer quantitative    EKG 12-lead, tracing only    Nt probnp inpatient (BNP)    Peripheral IV catheter    Pulse oximetry nursing    Troponin I    Vital signs    XR Chest 2 Views      Orders Needing Specimen Collection     None      Pending Results     No orders found from 6/30/2018 to 7/3/2018.             Pending Culture Results     No orders found from 6/30/2018 to 7/3/2018.            Pending Results Instructions     If you had any lab results that were not finalized at the time of your Discharge, you can call the ED Lab Result RN at [phone]. You will be contacted by this team for any positive Lab results or changes in treatment. The nurses are available 7 days a week from 10A to 6:30P.  You can leave a message 24 hours per day and they will return your call.        Test Results From Your Hospital Stay        7/2/2018 11:19 AM      Component Results     Component Value Ref Range & Units Status    WBC 6.7 4.0 - 11.0 10e9/L Final    RBC Count 3.83 3.8 - 5.2 10e12/L Final    Hemoglobin 12.2 11.7 - 15.7 g/dL Final    Hematocrit 37.2 35.0 - 47.0 % Final    MCV 97 78 - 100 fl Final    MCH 31.9 26.5 - 33.0 pg Final    MCHC 32.8 31.5 - 36.5 g/dL Final    RDW 14.2 10.0 - 15.0 % Final    Platelet Count 202 150 - 450 10e9/L Final    Diff Method Automated Method  Final    % Neutrophils 54.4 % Final    % Lymphocytes 30.6 % Final    % Monocytes 12.1 % Final    % Eosinophils 2.5 % Final    % Basophils 0.4 % Final    % Immature Granulocytes 0.0 % Final    Nucleated RBCs 0 0 /100 Final    Absolute Neutrophil 3.6 1.6 - 8.3 10e9/L Final    Absolute Lymphocytes 2.1 0.8 - 5.3 10e9/L Final    Absolute Monocytes 0.8 0.0 - 1.3 10e9/L Final    Absolute Eosinophils 0.2 0.0 - 0.7 10e9/L Final    Absolute Basophils 0.0 0.0 - 0.2 10e9/L Final    Abs Immature Granulocytes 0.0 0 - 0.4 10e9/L Final    Absolute Nucleated RBC 0.0  Final         7/2/2018 11:32 AM      Component Results     Component Value Ref Range & Units Status    Sodium 144 133 - 144 mmol/L Final    Potassium 3.8 3.4 - 5.3 mmol/L Final    Chloride 110 (H) 94 - 109 mmol/L Final    Carbon Dioxide 23 20 - 32 mmol/L Final    Anion Gap 11 3 - 14 mmol/L Final    Glucose 89 70 - 99 mg/dL Final    Urea Nitrogen 21 7 - 30 mg/dL Final    Creatinine 0.81 0.52 - 1.04 mg/dL Final     GFR Estimate 68 >60 mL/min/1.7m2 Final    Non  GFR Calc    GFR Estimate If Black 82 >60 mL/min/1.7m2 Final    African American GFR Calc    Calcium 8.4 (L) 8.5 - 10.1 mg/dL Final         7/2/2018 11:36 AM      Component Results     Component Value Ref Range & Units Status    Troponin I ES <0.015 0.000 - 0.045 ug/L Final    The 99th percentile for upper reference range is 0.045 ug/L.  Troponin values   in the range of 0.045 - 0.120 ug/L may be associated with risks of adverse   clinical events.           7/2/2018 11:36 AM      Component Results     Component Value Ref Range & Units Status    N-Terminal Pro BNP Inpatient 609 0 - 1800 pg/mL Final       Reference range shown and results flagged as abnormal are suggested inpatient   cut points for confirming diagnosis if CHF in an acute setting. Establishing a   baseline value for each individual patient is useful for follow-up. An   inpatient or emergency department NT-proPBNP <300 pg/mL effectively rules out   acute CHF, with 99% negative predictive value.  The outpatient non-acute reference range for ruling out CHF is:   0-125 pg/mL (age 18 to less than 75)   0-450 pg/mL (age 75 yrs and older)           7/2/2018 12:04 PM      Narrative     CHEST TWO VIEWS   7/2/2018 11:37 AM     HISTORY: Shortness of breath, weight gain, cough.     COMPARISON: Chest x-ray 12/14/2017.        Impression     IMPRESSION: PA and lateral views of the chest. Lungs are clear. Mild  pulmonary vascular congestion is suggested with slight cephalization  of the pulmonary vasculature. This appears stable. Heart is normal in  size. No effusions are evident. No pneumothorax.    FERMIN TOWNSEND MD         7/2/2018 12:24 PM      Component Results     Component Value Ref Range & Units Status    D Dimer 2.7 (H) 0.0 - 0.50 ug/ml FEU Final    This D-dimer assay is intended for use in conjunction with a clinical pretest   probability assessment model to exclude pulmonary embolism (PE) and deep    venous thrombosis (DVT) in outpatients suspected of PE or DVT. The cut-off   value is 0.5 ug/mL FEU.           7/2/2018  2:01 PM      Narrative     CT CHEST PULMONARY EMBOLISM WITH CONTRAST July 2, 2018 1:19 PM     HISTORY: Shortness of breath, elevated D-dimer.    TECHNIQUE: 59 mL Isovue-370. Radiation dose for this scan was reduced  using automated exposure control, adjustment of the mA and/or kV  according to patient size, or iterative reconstruction technique.    COMPARISON: 5/23/2016.    FINDINGS: No pulmonary embolism. No acute thoracic aortic abnormality.  No pleural or pericardial effusion. No pneumothorax. Fibrotic changes  noted, predominantly at the lung bases. There is some mucous plugging  at the left lung base. A few minor areas of bronchiectasis are  present. No pulmonary nodule or mass. Low-density nodule in the right  lobe of the thyroid appears similar to prior studies. No mediastinal,  hilar, or axillary adenopathy. Bilateral breast implants noted.        Impression     IMPRESSION:  1. No evidence of pulmonary embolism or acute thoracic aortic  abnormality.  2. Minor bronchiectasis with some mucous plugging at the lung bases.  3. Fibrotic abnormalities at the lung bases are new compared to the  prior study.    ROBERTO ALVARADO MD                Clinical Quality Measure: Blood Pressure Screening     Your blood pressure was checked while you were in the emergency department today. The last reading we obtained was  BP: 125/44 . Please read the guidelines below about what these numbers mean and what you should do about them.  If your systolic blood pressure (the top number) is less than 120 and your diastolic blood pressure (the bottom number) is less than 80, then your blood pressure is normal. There is nothing more that you need to do about it.  If your systolic blood pressure (the top number) is 120-139 or your diastolic blood pressure (the bottom number) is 80-89, your blood pressure may be  higher than it should be. You should have your blood pressure rechecked within a year by a primary care provider.  If your systolic blood pressure (the top number) is 140 or greater or your diastolic blood pressure (the bottom number) is 90 or greater, you may have high blood pressure. High blood pressure is treatable, but if left untreated over time it can put you at risk for heart attack, stroke, or kidney failure. You should have your blood pressure rechecked by a primary care provider within the next 4 weeks.  If your provider in the emergency department today gave you specific instructions to follow-up with your doctor or provider even sooner than that, you should follow that instruction and not wait for up to 4 weeks for your follow-up visit.        Thank you for choosing [facility]       Thank you for choosing [facility] for your care. Our goal is always to provide you with excellent care. Hearing back from our patients is one way we can continue to improve our services. Please take a few minutes to complete the written survey that you may receive in the mail after you visit with us. Thank you!        Care EveryWhere ID     This is your Care EveryWhere ID. This could be used by other organizations to access your [facility] medical records  [id number]        Equal Access to Services     LINUS ASCENCIO : Richard Jama, jerrell newell, ethan aguilar . So [facility] [phone].    ATENCIÓN: Si habla español, tiene a lugo disposición servicios gratuitos de asistencia lingüística. Llame al [phone].    We comply with applicable federal civil rights laws and [place] laws. We do not discriminate on the basis of race, color, national origin, age, disability, sex, sexual orientation, or gender identity.            After Visit Summary       This is your record. Keep this with you and show to your community pharmacist(s) and doctor(s) at your next visit.

## 2018-07-02 NOTE — DISCHARGE INSTRUCTIONS
*You may resume diet and activities as tolerated.  *No new medications. Continue your current medications.  *Follow-up with your doctor for a recheck in 2-3 days and recommend follow-up with pulmonology regarding possible progression of pulmonary fibrosis.  *Return if you become worse in any way.        Shortness of Breath (Dyspnea)  Shortness of breath is the feeling that you can't catch your breath or get enough air. It is also known as dyspnea.  Dyspnea can be caused by many different conditions. They include:    Acute asthma attack    Worsening of chronic lung diseases such as chronic bronchitis and emphysema    Heart failure. This is when weak heart muscle allows extra fluid to collect in the lungs.    Panic attacks or anxiety. Fear can cause rapid breathing (hyperventilation).    Pneumonia, or an infection in the lung tissue    Exposure to toxic substances, fumes, smoke, or certain medicines    Blood clot in the lung (pulmonary embolism). This is often from a piece of blood clot in a deep vein of the leg (deep vein thrombosis) that breaks off and travels to the lungs.    Heart attack or heart-related chest pain (angina)    Anemia    Collapsed lung (pneumothorax)    Dehydration    Pregnancy  Based on your visit today, the exact cause of your shortness of breath is not certain. Your tests don t show any of the serious causes of dyspnea. You may need other tests to find out if you have a serious problem. It s important to watch for any new symptoms or symptoms that get worse. Follow up with your healthcare provider as directed.  Home care  Follow these tips to take care of yourself at home:    When your symptoms are better, go back to your usual activities.    If you smoke, you should stop. Join a quit-smoking program or ask your healthcare provider for help.    Eat a healthy diet and get plenty of sleep.    Get regular exercise. Talk with your healthcare provider before starting to exercise, especially if you  have other medical problems.    Cut down on the amount of caffeine and stimulants you consume.  Follow-up care  Follow up with your healthcare provider, or as advised.  If tests were done, you will be told if your treatment needs to be changed. You can call as directed for the results.  If an X-ray was taken, a specialist will review it. You will be notified of any new findings that may affect your care.  Call 911  Shortness of breath may be a sign of a serious medical problem. For example, it may be a problem with your heart or lungs. Call 911 if you have worsening shortness of breath or trouble breathing, especially with any of the symptoms below:    You are confused or it s difficult to wake you.    You faint or lose consciousness.    You have a fast heartbeat, or your heartbeat is irregular.    You are coughing up blood.    You have pain in your chest, arm, shoulder, neck, or upper back.    You break out in a sweat.  When to seek medical advice  Call your healthcare provider right away if any of these occur:    Slight shortness of breath or wheezing    Redness, pain or swelling in your leg, arm, or other body area    Swelling in both legs or ankles    Fast weight gain    Dizziness or weakness    Fever of 100.4 F (38 C) or higher, or as directed by your healthcare provider  Date Last Reviewed: 9/13/2015 2000-2017 [employer]. [address] [place] [address], [place] [address]. All rights reserved. This information is not intended as a substitute for professional medical care. Always follow your healthcare professional's instructions.

## 2018-07-30 DIAGNOSIS — K21.9 GASTROESOPHAGEAL REFLUX DISEASE WITHOUT ESOPHAGITIS: ICD-10-CM

## 2018-07-30 NOTE — TELEPHONE ENCOUNTER
"Requested Prescriptions   Pending Prescriptions Disp Refills     omeprazole (PRILOSEC) 20 MG CR capsule [Pharmacy Med Name: OMEPRAZOLE DR 20 MG CAPSULE] 180 capsule 1     Sig: TAKE 1 CAPSULE (20 MG) BY MOUTH 2 TIMES DAILY    PPI Protocol Failed    7/30/2018  1:32 AM       Failed - No diagnosis of osteoporosis on record       Passed - Not on Clopidogrel (unless Pantoprazole ordered)       Passed - Recent (12 mo) or future (30 days) visit within the authorizing provider's specialty    Patient had office visit in the last 12 months or has a visit in the next 30 days with authorizing provider or within the authorizing provider's specialty.  See \"Patient Info\" tab in inbasket, or \"Choose Columns\" in Meds & Orders section of the refill encounter.           Passed - Patient is age 18 or older       Passed - No active pregnacy on record       Passed - No positive pregnancy test in past 12 months        Last Written Prescription Date: 1/25/18  Last Fill Quantity: 180,  # refills: 1   Last office visit: 5/14/2018 with prescribing provider:  5/14/18   Future Office Visit:      "

## 2018-08-15 ENCOUNTER — TELEPHONE (OUTPATIENT)
Dept: CARDIOLOGY | Facility: CLINIC | Age: 78
End: 2018-08-15

## 2018-08-15 NOTE — TELEPHONE ENCOUNTER
RN received VM from patient advising that she has had a roughly 4lb weight gain since Sunday 8/12/18 and is feeling more SOB. RN returned patient's call and left VM advising patient to return call to discuss further.

## 2018-08-15 NOTE — TELEPHONE ENCOUNTER
Patient returned call and advised that her weight has steadily increased since Sunday (see weights below). Patient advised she is going to take an extra 0.5mg of bumex (as instructed by her PMD for weight gain of >2lb) and call our office tomorrow with an update. Patient advised she felt a little short of breath this morning, but it has improved throughout the day. Patient will call our office at approx 8am tomorrow report weight and current breathing status.     Sun 152.2lb Mon 153.8 Tues 155.3 [id number] (up 4.2 lb since Sunday)

## 2018-08-15 NOTE — TELEPHONE ENCOUNTER
Patient returned call and left VM advising RN to call her back. RN returned patient's call and left VM advising patient to call clinic to discuss her concerns further.

## 2018-08-16 ENCOUNTER — TELEPHONE (OUTPATIENT)
Dept: CARDIOLOGY | Facility: CLINIC | Age: 78
End: 2018-08-16

## 2018-08-16 NOTE — TELEPHONE ENCOUNTER
Patient called and left VM advising that her weight had decreased this morning with the addition of 0.5 of bumex. RN returned patients call and left VM for patient to call back to discuss further.

## 2018-08-16 NOTE — TELEPHONE ENCOUNTER
Patient returned call and reported that she is down from 156.4 lbs to 155lb after extra dose of 0.5mg of bumex. RN reviewed with patient and she is in agreement to take an extra dose of bumex today (per PMD's recommendations) and call our clinic tomorrow update us on her weight and breathing status. Patient denied any increase in SOB today. Patient will call prior to tomorrow morning if her symptoms of SOB worsen.

## 2018-08-17 ENCOUNTER — TELEPHONE (OUTPATIENT)
Dept: CARDIOLOGY | Facility: CLINIC | Age: 78
End: 2018-08-17

## 2018-08-17 NOTE — TELEPHONE ENCOUNTER
"Patient called to report her weight has decreased today to 153.2 and she denies any SOB and states, \"I feel so much better.\" Patient will continue to monitor her weight over the weekend and call clinic next week if she needs to take an addition 0.5 of Bumex for weight gain of >2lb overnight or 5lb in a week. RN confirmed with patient she will not take an extra dose today, but will call our office next week if she has further weight gain and needs to take an additional dose.   "

## 2018-08-24 NOTE — PROGRESS NOTES
Outpatient Physical Therapy Progress Note and Discharge Note     Patient: Rupinder Tracy  : 1940    Beginning/End Dates of Reporting Period:  2018 to 2018   Pt was seen for 10Tx 2/ Lymphedema Therapy consisit of manual lymph drainage, compression bandaging , gentle exercise to improve circulation and reduce swelling , recommendation for velcro wraps or compression socks for daily mgmt.  Referring Provider: Mary Marvin PA-C    Therapy Diagnosis: Lymphedema B L/E     Client Self Report: Pt had recent flare of soem increase swell B legs but was able to reduce w/ use of GCB and elevation for a few days. Pt feels she is better able to care for Sx and would like to try self mgmt. Pt notes better able to walk, w/ less heaviness of legs, better fit of good shoes,     Objective Measurements:  Objective Measure: swelling  Details: Less swell B  leg and foot Less boggy swell L leg and foot/ankles w/ L leg wound healed. hemosiderin stains B legs w/ intact skin. 1+ pitting B upper legs, ;less pitting at ankles and feet; bruising  R post lower 1/3 leg resolved.. Volume msmt L leg 2149ml (ankle to upper leg below knee) (was 2744ml 2018),  L mid foot reduced form 27.5cm to 24cm girth, L malleolus reduced from 32cm to 27.5cm Volume  R leg 2258ml ( was 2281ml -), R mid foot reduced to 23.5cm from m24cm, girth malleolus to 25cm from 26cm.                                    Outcome Measures (most recent score):  Lymphedema Life Impact Scale (score range 0-72). A higher score indicates greater impairment.: 6 (less pain,tighntess, better walking/fit of shoes)    Goals:  Goal Identifier Swelling    Goal Description Pt to have swell reduced L leg 350 to 500 ml or greater to imprive wound healing and foot reducetion 3-5cm for better fit of socks   Target Date 18   Date Met  18   Progress:pt had good reduction of swell of legs-her Sx are impacted by her cardiac Dx- but she is very mindful of trying  to manage Sx     Goal Identifier use of compression and home program   Goal Description pt to be independent w/ use of bandage 7/7days and daily use of compresion socks w/ night GCB to control Sx; pt will be able to return to regualr exercise program w/ reduced c/o pain L leg/foot    Target Date 06/29/18   Date Met  06/26/18   Progress:pt diligent w/ wraps and elevation; walking for social ADL and light exercise      Goal Identifier     Goal Description     Target Date     Date Met      Progress:     Goal Identifier     Goal Description     Target Date     Date Met      Progress:     Goal Identifier     Goal Description     Target Date     Date Met      Progress:     Goal Identifier     Goal Description     Target Date     Date Met      Progress:     Goal Identifier     Goal Description     Target Date     Date Met      Progress:     Goal Identifier     Goal Description     Target Date     Date Met      Progress:     Progress Toward Goals:   Progress this reporting period: se above      Plan:  Discharge from therapy.    Discharge:    Reason for Discharge: Patient has met all goals.    Equipment Issued: bandage supplies    Discharge Plan: Patient to continue home program.

## 2018-08-28 ENCOUNTER — TRANSFERRED RECORDS (OUTPATIENT)
Dept: HEALTH INFORMATION MANAGEMENT | Facility: CLINIC | Age: 78
End: 2018-08-28

## 2018-09-19 ENCOUNTER — TELEPHONE (OUTPATIENT)
Dept: CARDIOLOGY | Facility: CLINIC | Age: 78
End: 2018-09-19

## 2018-09-19 NOTE — TELEPHONE ENCOUNTER
Pt called stating that she saw Dr. Robles 11/2017 for her legs and him and gail recommended patient continue compression stockings. Pt stated the compression stockings have been working great but her pair is starting to get holes in them and the medical supply store is requesting that patient get a prescription for more compression stockings. Prescription given to patient.     12/14/17 OV with Gail GOMEZ                Venous insufficiency.  She does have venous stasis changes.  She had evidence of focal venous insufficiency of the GS V on the right at the level of her ankle at 2.4 seconds and mild focal venous insufficiency of the GS V at the level of the midcalf.  I recommend that she continue to wear her compression stockings and would  pursue conservative management at this time.

## 2018-09-20 ENCOUNTER — HOSPITAL ENCOUNTER (OUTPATIENT)
Dept: CARDIAC REHAB | Facility: CLINIC | Age: 78
End: 2018-09-20
Attending: INTERNAL MEDICINE
Payer: MEDICARE

## 2018-09-20 PROCEDURE — G0424 PULMONARY REHAB W EXER: HCPCS

## 2018-09-20 PROCEDURE — 40000244 ZZH STATISTIC VISIT PULM REHAB

## 2018-09-21 ENCOUNTER — TELEPHONE (OUTPATIENT)
Dept: CARDIOLOGY | Facility: CLINIC | Age: 78
End: 2018-09-21

## 2018-09-21 NOTE — TELEPHONE ENCOUNTER
MARY ELLEN from patient, who states that she did make an appointment at her PCP office this afternoon, but was advised to go to the emergency room instead. However, patient states that she has started diuresing and has already lost over a pound today and states that her shortness of breath is getting much better as the day goes on. Attempted to contact patient back, patient did not answer. Left detailed message instructing patient to go to the ER if her symptoms do not continue to improve.

## 2018-09-21 NOTE — TELEPHONE ENCOUNTER
VM from patient stating that she has been experiencing increased shortness of breath and swelling in her lower extremities. Patient states that she has already tried increasing her Bumex dose on her own for the past 3 days and it has not helped. Spoke with patient. Patient states that her increased shortness of breath and weight gain began on Tuesday. Patient states that she weight 154 lbs on Tuesday morning, went out to eat with friends on Tuesday night, and woke up Wednesday morning and her weight was 157.8 lbs. Patient states that starting Wednesday, she has been taking an additional 0.5 mg of Bumex (which was instructed by her PCP for weight gain >2lbs). Patient states she has been taking 1 mg of Bumex daily since Wednesday and has not had any improvement in her shortness of breath or swelling. Patient states that her weight has remained at 157lbs since Wednesday. Patient sounds short of breath just talking on the phone. Instructed patient that based on her symptoms, it is recommended that she go to the ER. Patient declines to go to the ER, as she was sent there in July for similar symptoms and they sent her home without doing anything. Patient states she does not want to go and spend the money for them not to do anything. Patient states that her BP has been stable, reading 128/60 at rest. Patient states that she was supposed to attend physical therapy for her hip today, but decided not to go due to her shortness of breath. Will route to [facility] in absence of Dr. Butler for review.

## 2018-09-21 NOTE — TELEPHONE ENCOUNTER
MARY ELLEN from patient, confirming that she received the VM regarding Dr. Butler's recommendations and agrees to plan of care. Patient states she will call Dr. Mott's office and if she cannot get in there, she will go to the ER.

## 2018-10-01 ENCOUNTER — TELEPHONE (OUTPATIENT)
Dept: CARDIOLOGY | Facility: CLINIC | Age: 78
End: 2018-10-01

## 2018-10-01 NOTE — TELEPHONE ENCOUNTER
Patient called to report that over the weekend her weight increased to 157lbs so she took 1mg of bumex Friday, Saturday, Sunday (9/28-9/30). Patient reports her weight has decreased a little over 1lb and her SOB has improved. RN advised patient that we would like her to come in for f/u apt with [facility] to review diuretic dosing. Patient verbalized understanding and agreed with plan. RN advised patient that if weight increases >2lb overnight or she becomes more SOB we would like her to call our office to discuss prior to her f/u apt with [facility]. Patient verbalized understanding and was in agreement with plan. RN transferred patient to scheduling to arrange f/u apt with [facility].

## 2018-10-02 ENCOUNTER — OFFICE VISIT (OUTPATIENT)
Dept: CARDIOLOGY | Facility: CLINIC | Age: 78
End: 2018-10-02
Payer: COMMERCIAL

## 2018-10-02 VITALS
DIASTOLIC BLOOD PRESSURE: 70 MMHG | BODY MASS INDEX: 27.2 KG/M2 | HEIGHT: 64 IN | SYSTOLIC BLOOD PRESSURE: 140 MMHG | WEIGHT: 159.3 LBS | HEART RATE: 69 BPM | OXYGEN SATURATION: 96 %

## 2018-10-02 DIAGNOSIS — R60.0 LOCALIZED EDEMA: ICD-10-CM

## 2018-10-02 DIAGNOSIS — I10 BENIGN ESSENTIAL HYPERTENSION: ICD-10-CM

## 2018-10-02 DIAGNOSIS — I10 BENIGN ESSENTIAL HYPERTENSION: Primary | ICD-10-CM

## 2018-10-02 DIAGNOSIS — I27.20 PULMONARY HTN (H): ICD-10-CM

## 2018-10-02 LAB
ANION GAP SERPL CALCULATED.3IONS-SCNC: 12.8 MMOL/L (ref 6–17)
BUN SERPL-MCNC: 30 MG/DL (ref 7–30)
CALCIUM SERPL-MCNC: 10.1 MG/DL (ref 8.5–10.5)
CHLORIDE SERPL-SCNC: 107 MMOL/L (ref 98–107)
CO2 SERPL-SCNC: 26 MMOL/L (ref 23–29)
CREAT SERPL-MCNC: 1.15 MG/DL (ref 0.7–1.3)
GFR SERPL CREATININE-BSD FRML MDRD: 45 ML/MIN/1.7M2
GLUCOSE SERPL-MCNC: 103 MG/DL (ref 70–105)
POTASSIUM SERPL-SCNC: 4.8 MMOL/L (ref 3.5–5.1)
SODIUM SERPL-SCNC: 141 MMOL/L (ref 136–145)

## 2018-10-02 PROCEDURE — 80048 BASIC METABOLIC PNL TOTAL CA: CPT | Performed by: NURSE PRACTITIONER

## 2018-10-02 PROCEDURE — 99214 OFFICE O/P EST MOD 30 MIN: CPT | Performed by: NURSE PRACTITIONER

## 2018-10-02 PROCEDURE — 36415 COLL VENOUS BLD VENIPUNCTURE: CPT | Performed by: NURSE PRACTITIONER

## 2018-10-02 RX ORDER — BUMETANIDE 1 MG/1
1 TABLET ORAL
Qty: 30 TABLET | Refills: 11 | Status: SHIPPED | OUTPATIENT
Start: 2018-10-02 | End: 2018-10-08

## 2018-10-02 NOTE — LETTER
10/2/2018    Abran Mott MD  [address] [place] [address]    RE: Rupinder Tracy       Dear Colleague,    I had the pleasure of seeing Rupinder Tracy in the [facility].            Cardiology Clinic Progress Note  Rupinder Tracy MRN# [id number]   YOB: 1940 Age: 78 year old     Reason for visit: Dyspnea on exertion, lower extremity edema and weight gain           Assessment and Plan:     1. Dyspnea on exertion, lower extremity edema and weight gain    Increase Bumex to 2 mg daily times 1 week then decrease to 1 mg daily     BMP today and in 2 weeks    Follow-up in 1 month with BMP     2. Lymphedema, left greater than right    Referral back to lymphedema clinic in the future if patient wishes         History of Presenting Illness:    Rupinder Tracy is a very pleasant 78 year old patient of Dr. Butler who presents today dyspnea on exertion, increased lower extremity edema and weight gain. She has a past medical history significant for pulmonary hypertension, systemic hypertension, aortic insufficiency, aortic root dilatation, long-standing history of asthma and COPD, dastolic dysfunction, and obstructive sleep apnea,and peripheral edema and a PFO.    Evaluated by Dr. Silva in the pulmonary hypertension clinic who suggested a VQ scan, echocardiogram with bubble study that the patient elected to not complete. Her right and left heart catheterization moderate secondary pulmonary hypertension, moderate AI with moderate annual aortic ectasia with oximetery run indicates no evidence of significant left to right shunt and moderate pulmonary vein insufficiency. There was no significant obstructive CAD noted. She did particpate in pulmonary rehabilation with reported improvement of her symptoms.     Her last echocardiogram showed LVEF 55-60% with Grade III or moderate diastolic dysfunction, mild TR, mild to moderate AR without aortic stenosis.  There is mild  "(1+) tricuspid regurgitation. The right ventricular systolic pressure is approximated at 55.2 mmHg plus the right atrial pressure. Dilated IVC (>2.5cm) with <50% respiratory collapse; right atrial pressure is estimated at 15-20 mmHg. Right ventricular systolic pressure is elevated, consistent with severe pulmonary hypertension.    Today in clinic the patient presents with her daughter.  She states that over the last few weeks she is noted progressive weight gain, dyspnea on exertion and increased bilateral lower extremity edema left greater than right.  She states her baseline weight at home was approximately 149-151 pounds and today on her home scale she was 156 pounds.  She increased her home Bumex 0.5 mg daily to 1 mg daily see times 4 days with only a 1 pound weight reduction.  She denies PND orthopnea as well as chest pain.                Review of Systems:   Review of Systems:  Skin:  Positive for bruising   Eyes:  Positive for glasses  ENT:  Negative nasal congestion  Respiratory:  Positive for wheezing;shortness of breath;dyspnea on exertion;cough  Cardiovascular:  Negative Positive for;lower extremity symptoms;edema  Gastroenterology: Negative heartburn  Genitourinary:  not assessed    Musculoskeletal:  Positive for joint pain;back pain  Neurologic:  Positive for headaches  Psychiatric:  Negative depression  Heme/Lymph/Imm:  Positive for allergies  Endocrine:  Negative                Physical Exam:     Vitals: /70  Pulse 69  Ht 1.626 m (5' 4\")  Wt 72.3 kg (159 lb 4.8 oz)  LMP 01/01/1990  SpO2 96%  BMI 27.34 kg/m2  Constitutional:  cooperative, alert and oriented, well developed, well nourished, in no acute distress        Skin:  warm and dry to the touch, no apparent skin lesions or masses noted        Head:  normocephalic, no masses or lesions        Eyes:  pupils equal and round   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    ENT:  no pallor or cyanosis, dentition " good        Neck:  carotid pulses are full and equal bilaterally, JVP normal, no carotid bruit hepatojugular reflux      Chest:      Bibasilar crackles    Cardiac: regular rhythm;normal S1 and S2;no S3 or S4;apical impulse not displaced frequent premature beats   no presence of murmur            Abdomen:      DIstended    Vascular: pulses full and equal, no bruits auscultated                                      Extremities and Back:    stasis pigmentation wearing support stocking on right LE, left LE ace wrapped    Neurological:  no gross motor deficits;affect appropriate   walks with a walker             Medications:     Current Outpatient Prescriptions   Medication Sig Dispense Refill     albuterol (ALBUTEROL) 108 (90 BASE) MCG/ACT inhaler Inhale 2 puffs into the lungs every 4 hours as needed 1 Inhaler 5     bumetanide (BUMEX) 1 MG tablet Take 1 tablet (1 mg) by mouth daily (with breakfast) 30 tablet 11     Calcium-Vitamin D-Vitamin K (CALCIUM + D) 500-1000-40 MG-UNT-MCG CHEW Take 2 tablets by mouth daily        Cholecalciferol (VITAMIN D) 2000 UNITS tablet Take 1,000 Units by mouth daily        COMPRESSION STOCKINGS 1 each daily 3 each 1     DOXYCYCLINE HYCLATE PO Take 50 mg by mouth 3 times per week Monday,wednesday, friday       fluticasone-salmeterol (ADVAIR DISKUS) 250-50 MCG/DOSE diskus inhaler Inhale 1 puff into the lungs 2 times daily 60 Inhaler 1     hyaluronate in balanced salt ophthalmic solution (HEALON IN BSS) solution 1 drop every 4 hours (while awake)       Ibuprofen (ADVIL PO) Take 200 mg by mouth every 6 hours       olmesartan (BENICAR) 40 MG tablet Take 1 tablet (40 mg) by mouth daily 90 tablet 2     omeprazole (PRILOSEC) 20 MG CR capsule TAKE 1 CAPSULE (20 MG) BY MOUTH 2 TIMES DAILY 180 capsule 3     Ophthalmic Irrigation Solution (OCUSOFT EYE WASH OP)        order for DME 2L of O2 at night       prednisoLONE acetate (PRED FORTE) 1 % ophthalmic suspension Place 1 drop Into the left eye 4 times  daily        RESTASIS 0.05 % OP EMUL 1 drop to left eye twice daily       rOPINIRole (REQUIP) 0.25 MG tablet Take 2 nightly, and one in the afternoon as needed       sodium chloride (MARGAUX 128) 5 % ophthalmic solution 1 drop At Bedtime       SYSTANE 0.4-0.3 % OP SOLN 1 drop to left eye 4 times daily       [DISCONTINUED] bumetanide (BUMEX) 0.5 MG tablet Take 1 tablet (0.5 mg) by mouth daily (with breakfast) 90 tablet 3           Family History   Problem Relation Age of Onset     Adopted: Yes     C.A.D. Paternal Uncle      MI 50's     Family History Negative Daughter        Social History     Social History     Marital status:      Spouse name: N/A     Number of children: 4     Years of education: N/A     Occupational History     Retired nurse Retired     Social History Main Topics     Smoking status: Never Smoker     Smokeless tobacco: Never Used     Alcohol use 4.2 oz/week     7 Glasses of wine per week      Comment: rarely     Drug use: No     Sexual activity: No     Other Topics Concern     Caffeine Concern No     1- 2 cups coffee     Sleep Concern No     Stress Concern No     Weight Concern No     Special Diet Yes     low sodium     Exercise No     2 days a week (abigail chi), walking program, stationary bike 10 minutes 3 times per week     Seat Belt Yes     Social History Narrative    , 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  She walks with a walker.  Is a full code.  (last updated 10/11/2017)             Past Medical History:     Past Medical History:   Diagnosis Date     Arthritis      Asthma      Cellulitis 4/13 in [place]    R ankle     COPD (chronic obstructive pulmonary disease) (H)      Difficult airway for intubation      Ductal Carcinoma in Situ of Left Breast 9/09     Duodenal ulcer, unspecified as acute or chronic, without mention of hemorrhage or perforation 1980's    felt due to high dose steroids     Female stress incontinence      H/O right  and left heart catheterization     1-     HYPERLIPIDEMIA      Hypertension      Ischemic colitis 7/2001    [facility]     Lactose Intolerance     Mild     Legal blindness     Artificial right eye, severe vision loss left (Detached retina's, glaucoma, corneal transplants)     Mild intermittent asthma ~1980's     Nocturnal oxygen desaturation      Osteoporosis, unspecified ~2002     Patent foramen ovale      PERIPHERAL VASCULAR DISEASE 5/05    mesenteric arteries, angioplasty     PRESBYESOPHAGUS 6/04     Pulmonary HTN (H) 11/2014     Serous retinal detachment     False eye right     Subarachnoid hemorrhage following injury (H) 12/10    due to fall, vascular evaluation negative     Syncope and collapse 12/10     Unspecified glaucoma(365.9)               Past Surgical History:     Past Surgical History:   Procedure Laterality Date     BIOPSY       C APPENDECTOMY  1982     C NONSPECIFIC PROCEDURE  1945    adenoidectomy     C NONSPECIFIC PROCEDURE      bilat retinal detachment     C NONSPECIFIC PROCEDURE      blephoroplasty bilat     C NONSPECIFIC PROCEDURE  1997    glaucoma procedure L     C NONSPECIFIC PROCEDURE  1997, 2007, 2014    corneal transplant L     C NONSPECIFIC PROCEDURE  1945    strabismus procedure R eye     C NONSPECIFIC PROCEDURE  1960's    R breast bx     C NONSPECIFIC PROCEDURE  5/05    Angioplasty and stenting mesenteric vessels     C NONSPECIFIC PROCEDURE  2008    L corneal transplant     COLONOSCOPY      due 2015     ENDOSCOPIC RETROGRADE CHOLANGIOPANCREATOGRAM N/A 10/13/2017    Procedure: ENDOSCOPIC RETROGRADE CHOLANGIOPANCREATOGRAM;  ENDOSCOPIC RETROGRADE CHOLANGIOPANCREATOGRAM ;  Surgeon: Isabel Yousif MD;  Location: [facility] OR     HC REMOVAL GALLBLADDER  1982    Cholecystectomy     HEART CATH RIGHT AND LEFT HEART CATH  1/19/15     HERNIORRHAPHY VENTRAL N/A 4/19/2016    Procedure: HERNIORRHAPHY VENTRAL;  Surgeon: Hamlet Ness MD;  Location: [facility] OR     MASTECTOMY SIMPLE BILATERAL   10/5/09    bilateral mastectomy     SURGICAL HISTORY OF -   6/2010    breast reconstruction              Allergies:   Atorvastatin calcium; Celecoxib; Cholestyramine; Crestor [rosuvastatin calcium]; Cyclobenzaprine hcl; Dulera; Gabapentin; Lisinopril; Meperidine hcl; Morphine; Naprosyn [naproxen]; Niaspan [niacin]; Percocet [oxycodone-acetaminophen]; Pravastatin; Red yeast rice [cholestin]; Simvastatin; Timolol maleate; Hctz; and Sulfa drugs       Data:   All laboratory data reviewed:    LAST CHOLESTEROL:  Lab Results   Component Value Date    CHOL 208 01/05/2016    CHOL 208 01/05/2016     Lab Results   Component Value Date    HDL 52 01/05/2016    HDL 52 01/05/2016     Lab Results   Component Value Date     01/05/2016     01/05/2016     Lab Results   Component Value Date    TRIG 131 01/05/2016    TRIG 131 01/05/2016     Lab Results   Component Value Date    CHOLHDLRATIO 2.4 08/25/2014       LAST BMP:  Lab Results   Component Value Date     07/02/2018      Lab Results   Component Value Date    POTASSIUM 3.8 07/02/2018     Lab Results   Component Value Date    CHLORIDE 110 07/02/2018     Lab Results   Component Value Date    AXEL 8.4 07/02/2018     Lab Results   Component Value Date    CO2 23 07/02/2018     Lab Results   Component Value Date    BUN 21 07/02/2018     Lab Results   Component Value Date    CR 0.81 07/02/2018     Lab Results   Component Value Date    GLC 89 07/02/2018       LAST CBC:  Lab Results   Component Value Date    WBC 6.7 07/02/2018     Lab Results   Component Value Date    RBC 3.83 07/02/2018     Lab Results   Component Value Date    HGB 12.2 07/02/2018     Lab Results   Component Value Date    HCT 37.2 07/02/2018     Lab Results   Component Value Date    MCV 97 07/02/2018     Lab Results   Component Value Date    MCH 31.9 07/02/2018     Lab Results   Component Value Date    MCHC 32.8 07/02/2018     Lab Results   Component Value Date    RDW 14.2 07/02/2018     Lab Results    Component Value Date     07/02/2018         YUNIEL SAPP, ROCAEL    Thank you for allowing me to participate in the care of your patient.      Sincerely,     YUNIEL SAPP CNP     [facility]    cc:   No referring provider defined for this encounter.

## 2018-10-02 NOTE — LETTER
10/2/2018    Abran Mott MD  [address] [facility] [address]    RE: Rupinder Tracy       Dear Colleague,    I had the pleasure of seeing Rupinder Tracy in the [facility].            Cardiology Clinic Progress Note  Rupinder Tracy MRN# [id number]   YOB: 1940 Age: 78 year old     Reason for visit: Dyspnea on exertion, lower extremity edema and weight gain           Assessment and Plan:     1. Dyspnea on exertion, lower extremity edema and weight gain    Increase Bumex to 2 mg daily times 1 week then decrease to 1 mg daily     BMP today and in 2 weeks    Follow-up in 1 month with BMP     2. Lymphedema, left greater than right    Referral back to lymphedema clinic in the future if patient wishes         History of Presenting Illness:    Rupinder Tracy is a very pleasant 78 year old patient of Dr. Butler who presents today dyspnea on exertion, increased lower extremity edema and weight gain. She has a past medical history significant for pulmonary hypertension, systemic hypertension, aortic insufficiency, aortic root dilatation, long-standing history of asthma and COPD, dastolic dysfunction, and obstructive sleep apnea,and peripheral edema and a PFO.    Evaluated by Dr. Silva in the pulmonary hypertension clinic who suggested a VQ scan, echocardiogram with bubble study that the patient elected to not complete. Her right and left heart catheterization moderate secondary pulmonary hypertension, moderate AI with moderate annual aortic ectasia with oximetery run indicates no evidence of significant left to right shunt and moderate pulmonary vein insufficiency. There was no significant obstructive CAD noted. She did particpate in pulmonary rehabilation with reported improvement of her symptoms.     Her last echocardiogram showed LVEF 55-60% with Grade III or moderate diastolic dysfunction, mild TR, mild to moderate AR without aortic stenosis.  There is mild  "(1+) tricuspid regurgitation. The right ventricular systolic pressure is approximated at 55.2 mmHg plus the right atrial pressure. Dilated IVC (>2.5cm) with <50% respiratory collapse; right atrial pressure is estimated at 15-20 mmHg. Right ventricular systolic pressure is elevated, consistent with severe pulmonary hypertension.    Today in clinic the patient presents with her daughter.  She states that over the last few weeks she is noted progressive weight gain, dyspnea on exertion and increased bilateral lower extremity edema left greater than right.  She states her baseline weight at home was approximately 149-151 pounds and today on her home scale she was 156 pounds.  She increased her home Bumex 0.5 mg daily to 1 mg daily see times 4 days with only a 1 pound weight reduction.  She denies PND orthopnea as well as chest pain.                Review of Systems:   Review of Systems:  Skin:  Positive for bruising   Eyes:  Positive for glasses  ENT:  Negative nasal congestion  Respiratory:  Positive for wheezing;shortness of breath;dyspnea on exertion;cough  Cardiovascular:  Negative Positive for;lower extremity symptoms;edema  Gastroenterology: Negative heartburn  Genitourinary:  not assessed    Musculoskeletal:  Positive for joint pain;back pain  Neurologic:  Positive for headaches  Psychiatric:  Negative depression  Heme/Lymph/Imm:  Positive for allergies  Endocrine:  Negative                Physical Exam:     Vitals: /70  Pulse 69  Ht 1.626 m (5' 4\")  Wt 72.3 kg (159 lb 4.8 oz)  LMP 01/01/1990  SpO2 96%  BMI 27.34 kg/m2  Constitutional:  cooperative, alert and oriented, well developed, well nourished, in no acute distress        Skin:  warm and dry to the touch, no apparent skin lesions or masses noted        Head:  normocephalic, no masses or lesions        Eyes:  pupils equal and round   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    ENT:  no pallor or cyanosis, dentition " good        Neck:  carotid pulses are full and equal bilaterally, JVP normal, no carotid bruit hepatojugular reflux      Chest:      Bibasilar crackles    Cardiac: regular rhythm;normal S1 and S2;no S3 or S4;apical impulse not displaced frequent premature beats   no presence of murmur            Abdomen:      DIstended    Vascular: pulses full and equal, no bruits auscultated                                      Extremities and Back:    stasis pigmentation wearing support stocking on right LE, left LE ace wrapped    Neurological:  no gross motor deficits;affect appropriate   walks with a walker             Medications:     Current Outpatient Prescriptions   Medication Sig Dispense Refill     albuterol (ALBUTEROL) 108 (90 BASE) MCG/ACT inhaler Inhale 2 puffs into the lungs every 4 hours as needed 1 Inhaler 5     bumetanide (BUMEX) 1 MG tablet Take 1 tablet (1 mg) by mouth daily (with breakfast) 30 tablet 11     Calcium-Vitamin D-Vitamin K (CALCIUM + D) 500-1000-40 MG-UNT-MCG CHEW Take 2 tablets by mouth daily        Cholecalciferol (VITAMIN D) 2000 UNITS tablet Take 1,000 Units by mouth daily        COMPRESSION STOCKINGS 1 each daily 3 each 1     DOXYCYCLINE HYCLATE PO Take 50 mg by mouth 3 times per week Monday,wednesday, friday       fluticasone-salmeterol (ADVAIR DISKUS) 250-50 MCG/DOSE diskus inhaler Inhale 1 puff into the lungs 2 times daily 60 Inhaler 1     hyaluronate in balanced salt ophthalmic solution (HEALON IN BSS) solution 1 drop every 4 hours (while awake)       Ibuprofen (ADVIL PO) Take 200 mg by mouth every 6 hours       olmesartan (BENICAR) 40 MG tablet Take 1 tablet (40 mg) by mouth daily 90 tablet 2     omeprazole (PRILOSEC) 20 MG CR capsule TAKE 1 CAPSULE (20 MG) BY MOUTH 2 TIMES DAILY 180 capsule 3     Ophthalmic Irrigation Solution (OCUSOFT EYE WASH OP)        order for DME 2L of O2 at night       prednisoLONE acetate (PRED FORTE) 1 % ophthalmic suspension Place 1 drop Into the left eye 4 times  daily        RESTASIS 0.05 % OP EMUL 1 drop to left eye twice daily       rOPINIRole (REQUIP) 0.25 MG tablet Take 2 nightly, and one in the afternoon as needed       sodium chloride (MARGAUX 128) 5 % ophthalmic solution 1 drop At Bedtime       SYSTANE 0.4-0.3 % OP SOLN 1 drop to left eye 4 times daily       [DISCONTINUED] bumetanide (BUMEX) 0.5 MG tablet Take 1 tablet (0.5 mg) by mouth daily (with breakfast) 90 tablet 3           Family History   Problem Relation Age of Onset     Adopted: Yes     C.A.D. Paternal Uncle      MI 50's     Family History Negative Daughter        Social History     Social History     Marital status:      Spouse name: N/A     Number of children: 4     Years of education: N/A     Occupational History     Retired nurse Retired     Social History Main Topics     Smoking status: Never Smoker     Smokeless tobacco: Never Used     Alcohol use 4.2 oz/week     7 Glasses of wine per week      Comment: rarely     Drug use: No     Sexual activity: No     Other Topics Concern     Caffeine Concern No     1- 2 cups coffee     Sleep Concern No     Stress Concern No     Weight Concern No     Special Diet Yes     low sodium     Exercise No     2 days a week (abigail chi), walking program, stationary bike 10 minutes 3 times per week     Seat Belt Yes     Social History Narrative    , 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  She walks with a walker.  Is a full code.  (last updated 10/11/2017)             Past Medical History:     Past Medical History:   Diagnosis Date     Arthritis      Asthma      Cellulitis 4/13 in [place]    R ankle     COPD (chronic obstructive pulmonary disease) (H)      Difficult airway for intubation      Ductal Carcinoma in Situ of Left Breast 9/09     Duodenal ulcer, unspecified as acute or chronic, without mention of hemorrhage or perforation 1980's    felt due to high dose steroids     Female stress incontinence      H/O right  and left heart catheterization     1-     HYPERLIPIDEMIA      Hypertension      Ischemic colitis 7/2001    [facility]     Lactose Intolerance     Mild     Legal blindness     Artificial right eye, severe vision loss left (Detached retina's, glaucoma, corneal transplants)     Mild intermittent asthma ~1980's     Nocturnal oxygen desaturation      Osteoporosis, unspecified ~2002     Patent foramen ovale      PERIPHERAL VASCULAR DISEASE 5/05    mesenteric arteries, angioplasty     PRESBYESOPHAGUS 6/04     Pulmonary HTN (H) 11/2014     Serous retinal detachment     False eye right     Subarachnoid hemorrhage following injury (H) 12/10    due to fall, vascular evaluation negative     Syncope and collapse 12/10     Unspecified glaucoma(365.9)               Past Surgical History:     Past Surgical History:   Procedure Laterality Date     BIOPSY       C APPENDECTOMY  1982     C NONSPECIFIC PROCEDURE  1945    adenoidectomy     C NONSPECIFIC PROCEDURE      bilat retinal detachment     C NONSPECIFIC PROCEDURE      blephoroplasty bilat     C NONSPECIFIC PROCEDURE  1997    glaucoma procedure L     C NONSPECIFIC PROCEDURE  1997, 2007, 2014    corneal transplant L     C NONSPECIFIC PROCEDURE  1945    strabismus procedure R eye     C NONSPECIFIC PROCEDURE  1960's    R breast bx     C NONSPECIFIC PROCEDURE  5/05    Angioplasty and stenting mesenteric vessels     C NONSPECIFIC PROCEDURE  2008    L corneal transplant     COLONOSCOPY      due 2015     ENDOSCOPIC RETROGRADE CHOLANGIOPANCREATOGRAM N/A 10/13/2017    Procedure: ENDOSCOPIC RETROGRADE CHOLANGIOPANCREATOGRAM;  ENDOSCOPIC RETROGRADE CHOLANGIOPANCREATOGRAM ;  Surgeon: Isabel Yousif MD;  Location: [facility] OR     HC REMOVAL GALLBLADDER  1982    Cholecystectomy     HEART CATH RIGHT AND LEFT HEART CATH  1/19/15     HERNIORRHAPHY VENTRAL N/A 4/19/2016    Procedure: HERNIORRHAPHY VENTRAL;  Surgeon: Hamlet Ness MD;  Location: [facility] OR     MASTECTOMY SIMPLE BILATERAL   10/5/09    bilateral mastectomy     SURGICAL HISTORY OF -   6/2010    breast reconstruction              Allergies:   Atorvastatin calcium; Celecoxib; Cholestyramine; Crestor [rosuvastatin calcium]; Cyclobenzaprine hcl; Dulera; Gabapentin; Lisinopril; Meperidine hcl; Morphine; Naprosyn [naproxen]; Niaspan [niacin]; Percocet [oxycodone-acetaminophen]; Pravastatin; Red yeast rice [cholestin]; Simvastatin; Timolol maleate; Hctz; and Sulfa drugs       Data:   All laboratory data reviewed:    LAST CHOLESTEROL:  Lab Results   Component Value Date    CHOL 208 01/05/2016    CHOL 208 01/05/2016     Lab Results   Component Value Date    HDL 52 01/05/2016    HDL 52 01/05/2016     Lab Results   Component Value Date     01/05/2016     01/05/2016     Lab Results   Component Value Date    TRIG 131 01/05/2016    TRIG 131 01/05/2016     Lab Results   Component Value Date    CHOLHDLRATIO 2.4 08/25/2014       LAST BMP:  Lab Results   Component Value Date     07/02/2018      Lab Results   Component Value Date    POTASSIUM 3.8 07/02/2018     Lab Results   Component Value Date    CHLORIDE 110 07/02/2018     Lab Results   Component Value Date    AXEL 8.4 07/02/2018     Lab Results   Component Value Date    CO2 23 07/02/2018     Lab Results   Component Value Date    BUN 21 07/02/2018     Lab Results   Component Value Date    CR 0.81 07/02/2018     Lab Results   Component Value Date    GLC 89 07/02/2018       LAST CBC:  Lab Results   Component Value Date    WBC 6.7 07/02/2018     Lab Results   Component Value Date    RBC 3.83 07/02/2018     Lab Results   Component Value Date    HGB 12.2 07/02/2018     Lab Results   Component Value Date    HCT 37.2 07/02/2018     Lab Results   Component Value Date    MCV 97 07/02/2018     Lab Results   Component Value Date    MCH 31.9 07/02/2018     Lab Results   Component Value Date    MCHC 32.8 07/02/2018     Lab Results   Component Value Date    RDW 14.2 07/02/2018     Lab Results    Component Value Date     07/02/2018       Thank you for allowing me to participate in the care of your patient.    Sincerely,     YUNIEL SAPP CNP     [facility]

## 2018-10-02 NOTE — MR AVS SNAPSHOT
After Visit Summary   10/2/2018    Rupinder Tracy    MRN: [id number]           Patient Information     Date Of Birth          1940        Visit Information        Provider Department      10/2/2018 12:30 PM Latricia Arguelles APRN [facility]        Today's Diagnoses     Benign essential hypertension    -  1    Pulmonary HTN (H)        Localized edema          Care Instructions    Today's Recommendations    1. Increase Bumex to 2 mg daily for 1 week  2. Continue daily weights. Our goal is get back to 149-151 lbs  3. Lab work today and in 1-2 weeks  4. Continue all other medications without changes.  5. Please follow up with Latricia  in 3-4 weeks with [facility].    Please send a [employer] message or call [phone] with questions or concerns.     Scheduling number [phone].          Follow-ups after your visit        Additional Services     Follow-Up with Cardiac Advanced Practice Provider                 Your next 10 appointments already scheduled     Oct 09, 2018  2:00 PM CDT   Pulmonary Treatment with [facility]   [facility])    [address]  [facility] [address]   [phone]            Oct 11, 2018  2:00 PM CDT   Pulmonary Treatment with [facility] 2   [facility])    [address]  [facility] [address]   [phone]            Oct 16, 2018  2:00 PM CDT   Pulmonary Treatment with [facility] 2   [facility] ([facility])    [address]  [facility] [address]   [phone]            Oct 19, 2018 11:00 AM CDT   Pulmonary Treatment with [facility] 1   [facility] ([facility])    [address]  [facility] [address]   [phone]            Oct 23, 2018  2:00 PM CDT   Pulmonary  Treatment with [facility] 2   [facility] ([facility])    [address]  [place] [address]   [phone]            Oct 25, 2018  2:00 PM CDT   Pulmonary Treatment with [facility] 2   [facility] ([facility])    [address]  [place] [address]   [phone]            Oct 30, 2018  2:00 PM CDT   Pulmonary Treatment with [facility] 2   [facility] ([facility])    [address]  [place] [address]   [phone]            Nov 01, 2018  2:00 PM CDT   Pulmonary Treatment with [facility] 2   [facility] ([facility])    [address]  [place] [address]   [phone]            Nov 06, 2018  2:00 PM CST   Pulmonary Treatment with [facility] 2   [facility] ([facility])    [address]  [place] [address]   [phone]            Nov 08, 2018  2:00 PM CST   Pulmonary Treatment with [facility] 2   [facility] ([facility])    [address]  [place] [address]   [phone]              Future tests that were ordered for you today     Open Future Orders        Priority Expected Expires Ordered    Basic metabolic panel Routine 10/2/2018 10/2/2019 10/2/2018    Basic metabolic panel Routine 11/1/2018 10/2/2019 10/2/2018    Basic metabolic panel Routine 10/16/2018 10/2/2019 10/2/2018    Follow-Up with Cardiac Advanced Practice Provider Routine 11/1/2018 10/2/2019 10/2/2018            Who to contact     If you have questions or need follow up information about today's clinic visit or your schedule please contact [facility]   [facility] directly at [phone].  Normal or non-critical lab and imaging results will be  "communicated to you by MyChart, letter or phone within 4 business days after the clinic has received the results. If you do not hear from us within 7 days, please contact the clinic through MyChart or phone. If you have a critical or abnormal lab result, we will notify you by phone as soon as possible.  Submit refill requests through [employer] or call your pharmacy and they will forward the refill request to us. Please allow 3 business days for your refill to be completed.          Additional Information About Your Visit        Care EveryWhere ID     This is your Care EveryWhere ID. This could be used by other organizations to access your [facility] medical records  [id number]        Your Vitals Were     Pulse Height Last Period Pulse Oximetry BMI (Body Mass Index)       69 1.626 m (5' 4\") 01/01/1990 96% 27.34 kg/m2        Blood Pressure from Last 3 Encounters:   10/02/18 140/70   07/02/18 134/57   05/14/18 134/60    Weight from Last 3 Encounters:   10/02/18 72.3 kg (159 lb 4.8 oz)   07/02/18 68.7 kg (151 lb 6 oz)   05/14/18 68.5 kg (151 lb)                 Today's Medication Changes          These changes are accurate as of 10/2/18  1:03 PM.  If you have any questions, ask your nurse or doctor.               These medicines have changed or have updated prescriptions.        Dose/Directions    bumetanide 1 MG tablet   Commonly known as:  BUMEX   This may have changed:    - medication strength  - how much to take   Used for:  Pulmonary HTN (H), Localized edema   Changed by:  Latricia Arguelles, APRN CNP        Dose:  1 mg   Take 1 tablet (1 mg) by mouth daily (with breakfast)   Quantity:  30 tablet   Refills:  11            Where to get your medicines      These medications were sent to [facility]/pharmacy #[id number] - [place], [place] - [address] [facility]  [address] [place] [address]     Phone:  [phone]     bumetanide 1 MG tablet                Primary Care Provider Office Phone # Fax #    Abran Mott, " [phone]       [address] [facility] [address]        Equal Access to Services     LINUS ASCENCIO : Hadii aad ku hadliane Tomasrosario, jerrell chaparritaraviha, gerson abbydesmond alexander, ethan arshad mikelbrenda jean lapettytony alan. So [facility] [phone].    ATENCIÓN: Si habla español, tiene a lugo disposición servicios gratuitos de asistencia lingüística. Llame al [phone].    We comply with applicable federal civil rights laws and [place] laws. We do not discriminate on the basis of race, color, national origin, age, disability, sex, sexual orientation, or gender identity.            Thank you!     Thank you for choosing [facility]  for your care. Our goal is always to provide you with excellent care. Hearing back from our patients is one way we can continue to improve our services. Please take a few minutes to complete the written survey that you may receive in the mail after your visit with us. Thank you!             Your Updated Medication List - Protect others around you: Learn how to safely use, store and throw away your medicines at www.disposemymeds.org.          This list is accurate as of 10/2/18  1:03 PM.  Always use your most recent med list.                   Brand Name Dispense Instructions for use Diagnosis    ADVIL PO      Take 200 mg by mouth every 6 hours        albuterol 108 (90 Base) MCG/ACT inhaler    PROAIR HFA    1 Inhaler    Inhale 2 puffs into the lungs every 4 hours as needed    Mild intermittent asthma       bumetanide 1 MG tablet    BUMEX    30 tablet    Take 1 tablet (1 mg) by mouth daily (with breakfast)    Pulmonary HTN (H), Localized edema       CALCIUM + D 500-1000-40 MG-UNT-MCG Chew   Generic drug:  Calcium-Vitamin D-Vitamin K      Take 2 tablets by mouth daily        COMPRESSION STOCKINGS     3 each    1 each daily        DOXYCYCLINE HYCLATE PO      Take 50 mg by mouth 3 times per week Monday,wednesday, friday         fluticasone-salmeterol 250-50 MCG/DOSE diskus inhaler    ADVAIR DISKUS    60 Inhaler    Inhale 1 puff into the lungs 2 times daily    Mild persistent asthma without complication       hyaluronate in balanced salt ophthalmic solution solution    HEALON IN BSS     1 drop every 4 hours (while awake)        MARGAUX 128 5 % ophthalmic solution   Generic drug:  sodium chloride      1 drop At Bedtime        OCUSOFT EYE WASH OP           olmesartan 40 MG tablet    BENICAR    90 tablet    Take 1 tablet (40 mg) by mouth daily    Essential hypertension with goal blood pressure less than 130/80       omeprazole 20 MG CR capsule    priLOSEC    180 capsule    TAKE 1 CAPSULE (20 MG) BY MOUTH 2 TIMES DAILY    Gastroesophageal reflux disease without esophagitis       order for DME      2L of O2 at night        prednisoLONE acetate 1 % ophthalmic susp    PRED FORTE     Place 1 drop Into the left eye 4 times daily        REQUIP 0.25 MG tablet   Generic drug:  rOPINIRole      Take 2 nightly, and one in the afternoon as needed    Restless legs syndrome (RLS)       RESTASIS 0.05 % ophthalmic emulsion   Generic drug:  cycloSPORINE      1 drop to left eye twice daily        SYSTANE 0.4-0.3 % Soln ophthalmic solution   Generic drug:  polyethylene glycol 0.4%- propylene glycol 0.3%      1 drop to left eye 4 times daily        vitamin D 2000 units tablet      Take 1,000 Units by mouth daily    Vitamin D deficiency

## 2018-10-02 NOTE — PROGRESS NOTES
Cardiology Clinic Progress Note  Rupinder Tracy MRN# [id number]   YOB: 1940 Age: 78 year old     Reason for visit: Dyspnea on exertion, lower extremity edema and weight gain           Assessment and Plan:     1. Dyspnea on exertion, lower extremity edema and weight gain    Increase Bumex to 2 mg daily times 1 week then decrease to 1 mg daily     BMP today and in 2 weeks    Follow-up in 1 month with BMP     2. Lymphedema, left greater than right    Referral back to lymphedema clinic in the future if patient wishes         History of Presenting Illness:    Rupinder Tracy is a very pleasant 78 year old patient of Dr. Butler who presents today dyspnea on exertion, increased lower extremity edema and weight gain. She has a past medical history significant for pulmonary hypertension, systemic hypertension, aortic insufficiency, aortic root dilatation, long-standing history of asthma and COPD, dastolic dysfunction, and obstructive sleep apnea,and peripheral edema and a PFO.    Evaluated by Dr. Silva in the pulmonary hypertension clinic who suggested a VQ scan, echocardiogram with bubble study that the patient elected to not complete. Her right and left heart catheterization moderate secondary pulmonary hypertension, moderate AI with moderate annual aortic ectasia with oximetery run indicates no evidence of significant left to right shunt and moderate pulmonary vein insufficiency. There was no significant obstructive CAD noted. She did particpate in pulmonary rehabilation with reported improvement of her symptoms.     Her last echocardiogram showed LVEF 55-60% with Grade III or moderate diastolic dysfunction, mild TR, mild to moderate AR without aortic stenosis.  There is mild (1+) tricuspid regurgitation. The right ventricular systolic pressure is approximated at 55.2 mmHg plus the right atrial pressure. Dilated IVC (>2.5cm) with <50% respiratory collapse; right atrial pressure is estimated at 15-20  "mmHg. Right ventricular systolic pressure is elevated, consistent with severe pulmonary hypertension.    Today in clinic the patient presents with her daughter.  She states that over the last few weeks she is noted progressive weight gain, dyspnea on exertion and increased bilateral lower extremity edema left greater than right.  She states her baseline weight at home was approximately 149-151 pounds and today on her home scale she was 156 pounds.  She increased her home Bumex 0.5 mg daily to 1 mg daily for the last 4 days with only a 1 pound weight reduction.  She denies PND orthopnea as well as chest pain.                Review of Systems:   Review of Systems:  Skin:  Positive for bruising   Eyes:  Positive for glasses  ENT:  Negative nasal congestion  Respiratory:  Positive for wheezing;shortness of breath;dyspnea on exertion;cough  Cardiovascular:  Negative Positive for;lower extremity symptoms;edema  Gastroenterology: Negative heartburn  Genitourinary:  not assessed    Musculoskeletal:  Positive for joint pain;back pain  Neurologic:  Positive for headaches  Psychiatric:  Negative depression  Heme/Lymph/Imm:  Positive for allergies  Endocrine:  Negative                Physical Exam:     Vitals: /70  Pulse 69  Ht 1.626 m (5' 4\")  Wt 72.3 kg (159 lb 4.8 oz)  LMP 01/01/1990  SpO2 96%  BMI 27.34 kg/m2  Constitutional:  cooperative, alert and oriented, well developed, well nourished, in no acute distress        Skin:  warm and dry to the touch, no apparent skin lesions or masses noted        Head:  normocephalic, no masses or lesions        Eyes:  pupils equal and round   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    ENT:  no pallor or cyanosis, dentition good        Neck:  carotid pulses are full and equal bilaterally, JVP normal, no carotid bruit hepatojugular reflux      Chest:      Bibasilar crackles    Cardiac: regular rhythm;normal S1 and S2;no S3 or S4;apical impulse not " displaced frequent premature beats   no presence of murmur            Abdomen:      DIstended    Vascular: pulses full and equal, no bruits auscultated                                      Extremities and Back:    stasis pigmentation wearing support stocking on right LE, left LE ace wrapped    Neurological:  no gross motor deficits;affect appropriate   walks with a walker             Medications:     Current Outpatient Prescriptions   Medication Sig Dispense Refill     albuterol (ALBUTEROL) 108 (90 BASE) MCG/ACT inhaler Inhale 2 puffs into the lungs every 4 hours as needed 1 Inhaler 5     bumetanide (BUMEX) 1 MG tablet Take 1 tablet (1 mg) by mouth daily (with breakfast) 30 tablet 11     Calcium-Vitamin D-Vitamin K (CALCIUM + D) 500-1000-40 MG-UNT-MCG CHEW Take 2 tablets by mouth daily        Cholecalciferol (VITAMIN D) 2000 UNITS tablet Take 1,000 Units by mouth daily        COMPRESSION STOCKINGS 1 each daily 3 each 1     DOXYCYCLINE HYCLATE PO Take 50 mg by mouth 3 times per week Monday,wednesday, friday       fluticasone-salmeterol (ADVAIR DISKUS) 250-50 MCG/DOSE diskus inhaler Inhale 1 puff into the lungs 2 times daily 60 Inhaler 1     hyaluronate in balanced salt ophthalmic solution (HEALON IN BSS) solution 1 drop every 4 hours (while awake)       Ibuprofen (ADVIL PO) Take 200 mg by mouth every 6 hours       olmesartan (BENICAR) 40 MG tablet Take 1 tablet (40 mg) by mouth daily 90 tablet 2     omeprazole (PRILOSEC) 20 MG CR capsule TAKE 1 CAPSULE (20 MG) BY MOUTH 2 TIMES DAILY 180 capsule 3     Ophthalmic Irrigation Solution (OCUSOFT EYE WASH OP)        order for DME 2L of O2 at night       prednisoLONE acetate (PRED FORTE) 1 % ophthalmic suspension Place 1 drop Into the left eye 4 times daily        RESTASIS 0.05 % OP EMUL 1 drop to left eye twice daily       rOPINIRole (REQUIP) 0.25 MG tablet Take 2 nightly, and one in the afternoon as needed       sodium chloride (MARGAUX 128) 5 % ophthalmic solution 1 drop At  Bedtime       SYSTANE 0.4-0.3 % OP SOLN 1 drop to left eye 4 times daily       [DISCONTINUED] bumetanide (BUMEX) 0.5 MG tablet Take 1 tablet (0.5 mg) by mouth daily (with breakfast) 90 tablet 3           Family History   Problem Relation Age of Onset     Adopted: Yes     C.A.D. Paternal Uncle      MI 50's     Family History Negative Daughter        Social History     Social History     Marital status:      Spouse name: N/A     Number of children: 4     Years of education: N/A     Occupational History     Retired nurse Retired     Social History Main Topics     Smoking status: Never Smoker     Smokeless tobacco: Never Used     Alcohol use 4.2 oz/week     7 Glasses of wine per week      Comment: rarely     Drug use: No     Sexual activity: No     Other Topics Concern     Caffeine Concern No     1- 2 cups coffee     Sleep Concern No     Stress Concern No     Weight Concern No     Special Diet Yes     low sodium     Exercise No     2 days a week (abigail chi), walking program, stationary bike 10 minutes 3 times per week     Seat Belt Yes     Social History Narrative    , 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  She walks with a walker.  Is a full code.  (last updated 10/11/2017)             Past Medical History:     Past Medical History:   Diagnosis Date     Arthritis      Asthma      Cellulitis 4/13 in [place]    R ankle     COPD (chronic obstructive pulmonary disease) (H)      Difficult airway for intubation      Ductal Carcinoma in Situ of Left Breast 9/09     Duodenal ulcer, unspecified as acute or chronic, without mention of hemorrhage or perforation 1980's    felt due to high dose steroids     Female stress incontinence      H/O right and left heart catheterization     1-     HYPERLIPIDEMIA      Hypertension      Ischemic colitis 7/2001    [facility]     Lactose Intolerance     Mild     Legal blindness     Artificial right eye, severe vision loss  left (Detached retina's, glaucoma, corneal transplants)     Mild intermittent asthma ~1980's     Nocturnal oxygen desaturation      Osteoporosis, unspecified ~2002     Patent foramen ovale      PERIPHERAL VASCULAR DISEASE 5/05    mesenteric arteries, angioplasty     PRESBYESOPHAGUS 6/04     Pulmonary HTN (H) 11/2014     Serous retinal detachment     False eye right     Subarachnoid hemorrhage following injury (H) 12/10    due to fall, vascular evaluation negative     Syncope and collapse 12/10     Unspecified glaucoma(365.9)               Past Surgical History:     Past Surgical History:   Procedure Laterality Date     BIOPSY       C APPENDECTOMY  1982     C NONSPECIFIC PROCEDURE  1945    adenoidectomy     C NONSPECIFIC PROCEDURE      bilat retinal detachment     C NONSPECIFIC PROCEDURE      blephoroplasty bilat     C NONSPECIFIC PROCEDURE  1997    glaucoma procedure L     C NONSPECIFIC PROCEDURE  1997, 2007, 2014    corneal transplant L     C NONSPECIFIC PROCEDURE  1945    strabismus procedure R eye     C NONSPECIFIC PROCEDURE  1960's    R breast bx     C NONSPECIFIC PROCEDURE  5/05    Angioplasty and stenting mesenteric vessels     C NONSPECIFIC PROCEDURE  2008    L corneal transplant     COLONOSCOPY      due 2015     ENDOSCOPIC RETROGRADE CHOLANGIOPANCREATOGRAM N/A 10/13/2017    Procedure: ENDOSCOPIC RETROGRADE CHOLANGIOPANCREATOGRAM;  ENDOSCOPIC RETROGRADE CHOLANGIOPANCREATOGRAM ;  Surgeon: Isabel Yousif MD;  Location: [facility] OR     HC REMOVAL GALLBLADDER  1982    Cholecystectomy     HEART CATH RIGHT AND LEFT HEART CATH  1/19/15     HERNIORRHAPHY VENTRAL N/A 4/19/2016    Procedure: HERNIORRHAPHY VENTRAL;  Surgeon: Hamlet Ness MD;  Location: [facility] OR     MASTECTOMY SIMPLE BILATERAL  10/5/09    bilateral mastectomy     SURGICAL HISTORY OF -   6/2010    breast reconstruction              Allergies:   Atorvastatin calcium; Celecoxib; Cholestyramine; Crestor [rosuvastatin calcium]; Cyclobenzaprine hcl; Dulera;  Gabapentin; Lisinopril; Meperidine hcl; Morphine; Naprosyn [naproxen]; Niaspan [niacin]; Percocet [oxycodone-acetaminophen]; Pravastatin; Red yeast rice [cholestin]; Simvastatin; Timolol maleate; Hctz; and Sulfa drugs       Data:   All laboratory data reviewed:    LAST CHOLESTEROL:  Lab Results   Component Value Date    CHOL 208 01/05/2016    CHOL 208 01/05/2016     Lab Results   Component Value Date    HDL 52 01/05/2016    HDL 52 01/05/2016     Lab Results   Component Value Date     01/05/2016     01/05/2016     Lab Results   Component Value Date    TRIG 131 01/05/2016    TRIG 131 01/05/2016     Lab Results   Component Value Date    CHOLHDLRATIO 2.4 08/25/2014       LAST BMP:  Lab Results   Component Value Date     07/02/2018      Lab Results   Component Value Date    POTASSIUM 3.8 07/02/2018     Lab Results   Component Value Date    CHLORIDE 110 07/02/2018     Lab Results   Component Value Date    AXEL 8.4 07/02/2018     Lab Results   Component Value Date    CO2 23 07/02/2018     Lab Results   Component Value Date    BUN 21 07/02/2018     Lab Results   Component Value Date    CR 0.81 07/02/2018     Lab Results   Component Value Date    GLC 89 07/02/2018       LAST CBC:  Lab Results   Component Value Date    WBC 6.7 07/02/2018     Lab Results   Component Value Date    RBC 3.83 07/02/2018     Lab Results   Component Value Date    HGB 12.2 07/02/2018     Lab Results   Component Value Date    HCT 37.2 07/02/2018     Lab Results   Component Value Date    MCV 97 07/02/2018     Lab Results   Component Value Date    MCH 31.9 07/02/2018     Lab Results   Component Value Date    MCHC 32.8 07/02/2018     Lab Results   Component Value Date    RDW 14.2 07/02/2018     Lab Results   Component Value Date     07/02/2018         YUNIEL SAPP, CNP

## 2018-10-02 NOTE — PATIENT INSTRUCTIONS
Today's Recommendations    1. Increase Bumex to 2 mg daily for 1 week  2. Continue daily weights. Our goal is get back to 149-151 lbs  3. Lab work today and in 1-2 weeks  4. Continue all other medications without changes.  5. Please follow up with Latricia  in 3-4 weeks with BMP.    Please send a [employer] message or call [phone] with questions or concerns.     Scheduling number [phone].

## 2018-10-05 ENCOUNTER — TELEPHONE (OUTPATIENT)
Dept: CARDIOLOGY | Facility: CLINIC | Age: 78
End: 2018-10-05

## 2018-10-05 DIAGNOSIS — R60.0 LOCALIZED EDEMA: ICD-10-CM

## 2018-10-05 DIAGNOSIS — I27.20 PULMONARY HTN (H): ICD-10-CM

## 2018-10-05 NOTE — TELEPHONE ENCOUNTER
Spoke to patient and she stated that she is feeling fantastic with more energy. Pt stated that the morning she saw Latricia she was 157.8 lbs and now she is 150.5 lbs. Recommended patient weight herself tomorrow morning and if she is below 151 lb to only take 1 mg of bumex. Pt will do the same on Sunday. Reminder set to call patient Monday morning to see how her weight and symptoms are over the weekend.     Will route to Latricia to update.

## 2018-10-05 NOTE — TELEPHONE ENCOUNTER
Received VM from patient stating that she has been taking Bumex 2 mg since her OV 10/2/18 with Latricia and it seems to be working very well. Pts weight this morning was 150.5 lbs. Pt reports baseline weight to be 149-151. Pt wondering if she should back down to 1 mg of bumex a little earlier than 1 week.     Tried to call patient back to discuss. No answer. Left VM to call team 4 back with direct number.     BMP scheduled for 10/16/18   BMP and OV with Latricia NP scheduled 11/2/18     10/2/18 OV with Latricia NP   1. Dyspnea on exertion, lower extremity edema and weight gain    Increase Bumex to 2 mg daily times 1 week then decrease to 1 mg daily     BMP today and in 2 weeks    Follow-up in 1 month with BMP

## 2018-10-08 RX ORDER — BUMETANIDE 0.5 MG/1
0.5 TABLET ORAL
Qty: 1 TABLET | Refills: 0 | COMMUNITY
Start: 2018-10-08 | End: 2019-01-01

## 2018-10-08 NOTE — TELEPHONE ENCOUNTER
Pt called this morning stating that over the weekend she took 1 mg of Bumex (Sat & Sun). Have not taken anything this morning.     Friday 150.5 lbs  Saturday 148.8 lbs  Sunday 148.2 lbs  Monday (Today) 146.6 lbs     Pt stated that she is feeling some fatigue but denies lightheaded, dizziness. Pt has not checked her BP. Pt wondering what dose she should take this morning.     Will route to Latricia to review and advise.

## 2018-10-08 NOTE — TELEPHONE ENCOUNTER
Reviewed with Latricia GOMEZ and she would like patient to take her maintenance dose of 0.5 mg bumex daily. Med list updated. Pt agreed to plan. Pt will contact team 4 if she notices that she is continuing to lose significant weight or gains more than 2 lbs over night or 5 lbs in a week. No further questions or concerns at this time.

## 2018-10-09 ENCOUNTER — HOSPITAL ENCOUNTER (OUTPATIENT)
Dept: CARDIAC REHAB | Facility: CLINIC | Age: 78
End: 2018-10-09
Attending: INTERNAL MEDICINE
Payer: MEDICARE

## 2018-10-09 PROCEDURE — 40000244 ZZH STATISTIC VISIT PULM REHAB: Performed by: CLINICAL EXERCISE PHYSIOLOGIST

## 2018-10-09 PROCEDURE — G0424 PULMONARY REHAB W EXER: HCPCS | Performed by: CLINICAL EXERCISE PHYSIOLOGIST

## 2018-10-11 ENCOUNTER — HOSPITAL ENCOUNTER (OUTPATIENT)
Dept: CARDIAC REHAB | Facility: CLINIC | Age: 78
End: 2018-10-11
Attending: INTERNAL MEDICINE
Payer: MEDICARE

## 2018-10-11 PROCEDURE — 40000244 ZZH STATISTIC VISIT PULM REHAB

## 2018-10-11 PROCEDURE — G0239 OTH RESP PROC, GROUP: HCPCS

## 2018-10-12 ENCOUNTER — TELEPHONE (OUTPATIENT)
Dept: CARDIOLOGY | Facility: CLINIC | Age: 78
End: 2018-10-12

## 2018-10-12 NOTE — TELEPHONE ENCOUNTER
RN called patient with Latricia's recommendations below. Patient verbalized understanding and is in agreement with plan. Patient will call clinic if below plan is not working for water weight reduction or if she has an acute change in SOB.       Please advise that I would like to given her a sliding scale for her diuretic therapy, and confirm that her baseline weight is 149-151 lbs.      Weight is 149 to 151 lbs: 0.5 mg of Bumex daily  If weight is up greater than 2 lbs in 24 hours 1 mg Bumex daily until back to baseline weight then back to 0.5 mg daily  If weight is up greater than 5 lbs in 24 hours 2 mg Bumex daily until back to baseline weight then back to 0.5 mg daily     Thanks-     Latricia Arguelles, APRN, CNP

## 2018-10-12 NOTE — TELEPHONE ENCOUNTER
Please advise that I would like to given her a sliding scale for her diuretic therapy, and confirm that her baseline weight is 149-151 lbs.     Weight is 149 to 151 lbs: 0.5 mg of Bumex daily  If weight is up greater than 2 lbs in 24 hours 1 mg Bumex daily until back to baseline weight then back to 0.5 mg daily  If weight is up greater than 5 lbs in 24 hours 2 mg Bumex daily until back to baseline weight then back to 0.5 mg daily    Thanks-    YUNIEL Alex, CNP

## 2018-10-12 NOTE — TELEPHONE ENCOUNTER
"Patient called to report that her weight has increased over 2lbs in the past 48 hours. Patient wondering if she should increase her bumex over the weekend. Patient states SOB has increased slightly, but is not \"worrisome.\" RN advised patient that RN would review with Latricia and call her back with recommendation. Patient verbalized understanding and agreed with plan.       10/8/18 telephone encounter  Reviewed with Latricia NP and she would like patient to take her maintenance dose of 0.5 mg bumex daily. Med list updated. Pt agreed to plan. Pt will contact team 4 if she notices that she is continuing to lose significant weight or gains more than 2 lbs over night or 5 lbs in a week. No further questions or concerns at this time    10/2/18 OV note NENO Latricia  1. Dyspnea on exertion, lower extremity edema and weight gain    Increase Bumex to 2 mg daily times 1 week then decrease to 1 mg daily     BMP today and in 2 weeks    Follow-up in 1 month with BMP  "

## 2018-10-16 ENCOUNTER — TELEPHONE (OUTPATIENT)
Dept: CARDIOLOGY | Facility: CLINIC | Age: 78
End: 2018-10-16

## 2018-10-16 ENCOUNTER — HOSPITAL ENCOUNTER (OUTPATIENT)
Dept: CARDIAC REHAB | Facility: CLINIC | Age: 78
End: 2018-10-16
Attending: INTERNAL MEDICINE
Payer: MEDICARE

## 2018-10-16 DIAGNOSIS — I10 BENIGN ESSENTIAL HYPERTENSION: ICD-10-CM

## 2018-10-16 DIAGNOSIS — I27.20 PULMONARY HTN (H): ICD-10-CM

## 2018-10-16 DIAGNOSIS — R60.0 LOCALIZED EDEMA: ICD-10-CM

## 2018-10-16 LAB
ANION GAP SERPL CALCULATED.3IONS-SCNC: 16 MMOL/L (ref 6–17)
BUN SERPL-MCNC: 48 MG/DL (ref 7–30)
CALCIUM SERPL-MCNC: 9.5 MG/DL (ref 8.5–10.5)
CHLORIDE SERPL-SCNC: 107 MMOL/L (ref 98–107)
CO2 SERPL-SCNC: 25 MMOL/L (ref 23–29)
CREAT SERPL-MCNC: 1.33 MG/DL (ref 0.7–1.3)
GFR SERPL CREATININE-BSD FRML MDRD: 38 ML/MIN/1.7M2
GLUCOSE SERPL-MCNC: 105 MG/DL (ref 70–105)
POTASSIUM SERPL-SCNC: 4 MMOL/L (ref 3.5–5.1)
SODIUM SERPL-SCNC: 144 MMOL/L (ref 136–145)

## 2018-10-16 PROCEDURE — 40000244 ZZH STATISTIC VISIT PULM REHAB: Performed by: CLINICAL EXERCISE PHYSIOLOGIST

## 2018-10-16 PROCEDURE — 36415 COLL VENOUS BLD VENIPUNCTURE: CPT | Performed by: NURSE PRACTITIONER

## 2018-10-16 PROCEDURE — 80048 BASIC METABOLIC PNL TOTAL CA: CPT | Performed by: NURSE PRACTITIONER

## 2018-10-16 PROCEDURE — G0424 PULMONARY REHAB W EXER: HCPCS | Performed by: CLINICAL EXERCISE PHYSIOLOGIST

## 2018-10-16 NOTE — TELEPHONE ENCOUNTER
RN called patient and left VM with BMP results and advised her that we would recheck this prior to her OV with Dr. Butler on 11/20/18. Patient verbalized understanding and agreed with plan.       Please continue bumex sliding scale. Recommend BMP prior OV with Dr. Butler in November.       Thanks-     Latricia Arguelles, APRN, CNP

## 2018-10-19 ENCOUNTER — HOSPITAL ENCOUNTER (OUTPATIENT)
Dept: CARDIAC REHAB | Facility: CLINIC | Age: 78
End: 2018-10-19
Attending: INTERNAL MEDICINE
Payer: MEDICARE

## 2018-10-19 PROCEDURE — 40000244 ZZH STATISTIC VISIT PULM REHAB

## 2018-10-19 PROCEDURE — G0424 PULMONARY REHAB W EXER: HCPCS

## 2018-10-30 ENCOUNTER — HOSPITAL ENCOUNTER (OUTPATIENT)
Dept: CARDIAC REHAB | Facility: CLINIC | Age: 78
End: 2018-10-30
Attending: INTERNAL MEDICINE
Payer: MEDICARE

## 2018-10-30 PROCEDURE — G0424 PULMONARY REHAB W EXER: HCPCS | Performed by: CLINICAL EXERCISE PHYSIOLOGIST

## 2018-10-30 PROCEDURE — 40000244 ZZH STATISTIC VISIT PULM REHAB: Performed by: CLINICAL EXERCISE PHYSIOLOGIST

## 2018-11-01 ENCOUNTER — HOSPITAL ENCOUNTER (OUTPATIENT)
Dept: CARDIAC REHAB | Facility: CLINIC | Age: 78
End: 2018-11-01
Attending: INTERNAL MEDICINE
Payer: MEDICARE

## 2018-11-01 ENCOUNTER — TELEPHONE (OUTPATIENT)
Dept: INTERNAL MEDICINE | Facility: CLINIC | Age: 78
End: 2018-11-01

## 2018-11-01 DIAGNOSIS — R39.9 URINARY SYMPTOM OR SIGN: Primary | ICD-10-CM

## 2018-11-01 PROCEDURE — 40000244 ZZH STATISTIC VISIT PULM REHAB: Performed by: CLINICAL EXERCISE PHYSIOLOGIST

## 2018-11-01 PROCEDURE — G0424 PULMONARY REHAB W EXER: HCPCS | Performed by: CLINICAL EXERCISE PHYSIOLOGIST

## 2018-11-01 NOTE — TELEPHONE ENCOUNTER
MD please assist if you are willing to order these tests.Casandra Vargas RN  Since pt has appt at [facility] tomorrow .Casandra Vargas RN

## 2018-11-02 ENCOUNTER — HOSPITAL ENCOUNTER (OUTPATIENT)
Dept: LAB | Facility: CLINIC | Age: 78
Discharge: HOME | End: 2018-11-02
Attending: INTERNAL MEDICINE | Admitting: INTERNAL MEDICINE
Payer: MEDICARE

## 2018-11-02 ENCOUNTER — OFFICE VISIT (OUTPATIENT)
Dept: CARDIOLOGY | Facility: CLINIC | Age: 78
End: 2018-11-02
Attending: NURSE PRACTITIONER
Payer: COMMERCIAL

## 2018-11-02 ENCOUNTER — NURSE TRIAGE (OUTPATIENT)
Dept: NURSING | Facility: CLINIC | Age: 78
End: 2018-11-02

## 2018-11-02 VITALS
SYSTOLIC BLOOD PRESSURE: 124 MMHG | BODY MASS INDEX: 25.1 KG/M2 | HEIGHT: 64 IN | DIASTOLIC BLOOD PRESSURE: 66 MMHG | HEART RATE: 68 BPM | WEIGHT: 147 LBS

## 2018-11-02 DIAGNOSIS — R60.0 LOCALIZED EDEMA: ICD-10-CM

## 2018-11-02 DIAGNOSIS — R39.9 URINARY SYMPTOM OR SIGN: ICD-10-CM

## 2018-11-02 DIAGNOSIS — I10 BENIGN ESSENTIAL HYPERTENSION: ICD-10-CM

## 2018-11-02 DIAGNOSIS — I27.20 PULMONARY HTN (H): ICD-10-CM

## 2018-11-02 LAB
ALBUMIN UR-MCNC: 10 MG/DL
ANION GAP SERPL CALCULATED.3IONS-SCNC: 13.7 MMOL/L (ref 6–17)
APPEARANCE UR: ABNORMAL
BACTERIA #/AREA URNS HPF: ABNORMAL /HPF
BILIRUB UR QL STRIP: NEGATIVE
BUN SERPL-MCNC: 47 MG/DL (ref 7–30)
CALCIUM SERPL-MCNC: 10 MG/DL (ref 8.5–10.5)
CHLORIDE SERPL-SCNC: 109 MMOL/L (ref 98–107)
CO2 SERPL-SCNC: 25 MMOL/L (ref 23–29)
COLOR UR AUTO: ABNORMAL
CREAT SERPL-MCNC: 1.38 MG/DL (ref 0.7–1.3)
GFR SERPL CREATININE-BSD FRML MDRD: 37 ML/MIN/1.7M2
GLUCOSE SERPL-MCNC: 95 MG/DL (ref 70–105)
GLUCOSE UR STRIP-MCNC: NEGATIVE MG/DL
HGB UR QL STRIP: ABNORMAL
KETONES UR STRIP-MCNC: NEGATIVE MG/DL
LEUKOCYTE ESTERASE UR QL STRIP: ABNORMAL
NITRATE UR QL: NEGATIVE
PH UR STRIP: 5 PH (ref 5–7)
POTASSIUM SERPL-SCNC: 4.7 MMOL/L (ref 3.5–5.1)
RBC #/AREA URNS AUTO: 2 /HPF (ref 0–2)
SODIUM SERPL-SCNC: 143 MMOL/L (ref 136–145)
SOURCE: ABNORMAL
SP GR UR STRIP: 1.01 (ref 1–1.03)
UROBILINOGEN UR STRIP-MCNC: NORMAL MG/DL (ref 0–2)
WBC #/AREA URNS AUTO: >182 /HPF (ref 0–5)
WBC CLUMPS #/AREA URNS HPF: PRESENT /HPF

## 2018-11-02 PROCEDURE — 87186 SC STD MICRODIL/AGAR DIL: CPT | Performed by: INTERNAL MEDICINE

## 2018-11-02 PROCEDURE — 87086 URINE CULTURE/COLONY COUNT: CPT | Performed by: INTERNAL MEDICINE

## 2018-11-02 PROCEDURE — 81001 URINALYSIS AUTO W/SCOPE: CPT | Performed by: INTERNAL MEDICINE

## 2018-11-02 PROCEDURE — 87088 URINE BACTERIA CULTURE: CPT | Performed by: INTERNAL MEDICINE

## 2018-11-02 PROCEDURE — 36415 COLL VENOUS BLD VENIPUNCTURE: CPT | Performed by: NURSE PRACTITIONER

## 2018-11-02 PROCEDURE — 80048 BASIC METABOLIC PNL TOTAL CA: CPT | Performed by: NURSE PRACTITIONER

## 2018-11-02 PROCEDURE — 99214 OFFICE O/P EST MOD 30 MIN: CPT | Performed by: NURSE PRACTITIONER

## 2018-11-02 NOTE — LETTER
11/2/2018    Abran Mott MD  [address] [facility] [address]    RE: Rupinder Tracy       Dear Colleague,    I had the pleasure of seeing Rupinder Tracy in the [facility].    Cardiology Clinic Progress Note  Rupinder Tracy MRN# [id number]   YOB: 1940 Age: 78 year old     Reason for visit: Dyspnea on exertion, lower extremity edema and weight gain           Assessment and Plan:     1. Dyspnea on exertion, lower extremity edema and weight gain    Symptomatic improvement with bump in creatine from 1.15 to 1.38    Continue Bumex sliding scale:    Weight is 149 to 151 lbs: 0.5 mg of Bumex daily    If weight is up greater than 2 lbs in 24 hours 1 mg Bumex daily until back to baseline weight then back to 0.5 mg daily    If weight is up greater than 5 lbs in 24 hours 2 mg Bumex daily until back to baseline weight then back to 0.5 mg daily    Follow up with Dr. Butler as previously scheduled on 11/20/18 with BMP prior.         History of Presenting Illness:    Rupinder Tracy is a very pleasant 78 year old patient of Dr. Butler who presents today for a follow up post diuretic adjustments with prior symptoms of dyspnea on exertion, increased lower extremity edema and weight gain. She has a past medical history significant for pulmonary hypertension, systemic hypertension, aortic insufficiency, aortic root dilatation, long-standing history of asthma and COPD, dastolic dysfunction, and obstructive sleep apnea,and peripheral edema and a PFO.    Evaluated by Dr. Silva in the pulmonary hypertension clinic who suggested a VQ scan, echocardiogram with bubble study that the patient elected to not complete. Her right and left heart catheterization moderate secondary pulmonary hypertension, moderate AI with moderate annual aortic ectasia with oximetery run indicates no evidence of significant left to right shunt and moderate pulmonary vein insufficiency. There was no  significant obstructive CAD noted. She did particpate in pulmonary rehabilation with reported improvement of her symptoms.     Her last echocardiogram showed LVEF 55-60% with Grade III or moderate diastolic dysfunction, mild TR, mild to moderate AR without aortic stenosis.  There is mild (1+) tricuspid regurgitation. The right ventricular systolic pressure is approximated at 55.2 mmHg plus the right atrial pressure. Dilated IVC (>2.5cm) with <50% respiratory collapse; right atrial pressure is estimated at 15-20 mmHg. Right ventricular systolic pressure is elevated, consistent with severe pulmonary hypertension.    A few week prior to our last visit she is noted progressive weight gain, dyspnea on exertion and increased bilateral lower extremity edema left greater than right. Her baseline weight at home was approximately 149-151 pounds and her weight on her home scale darling to 156 pounds.  She increased her home Bumex 0.5 mg daily to 1 mg daily for4 days with only a 1 pound weight reduction.  She denied PND orthopnea as well as chest pain. It was recommended taht she increase Bumex to 2 mg x 1 week. Then back to 1 mg daily with a BMP 2 weeks post diuretic changes noting creatine 1.33. Then a sliding scale was ordered.  She states that approximately 48 hours ago she had a food obstruction in her esophagus and was unable to even drink water.  She states that she is only utilized 1 mg of Lasix x1 dose in the last 2 weeks and feels that she is dehydrated due to the esophageal obstruction.  She has noticed a considerable improvement in her dyspnea on exertion as well as lower extremity edema.  Her weight on her home scale was 147 pounds.               Review of Systems:   Review of Systems:  Skin:  Positive for bruising   Eyes:  Positive for glasses  ENT:  Negative    Respiratory:  Positive for dyspnea on exertion;shortness of breath (improved.)  Cardiovascular:  Negative  (wears support socks)  Gastroenterology: Negative   "  Genitourinary:  not assessed    Musculoskeletal:  Positive for joint pain;back pain  Neurologic:  Negative    Psychiatric:  Negative    Heme/Lymph/Imm:  Positive for allergies  Endocrine:  Negative                Physical Exam:     Vitals: /66  Pulse 68  Ht 1.626 m (5' 4\")  Wt 66.7 kg (147 lb)  LMP 01/01/1990  BMI 25.23 kg/m2  Constitutional:  cooperative, alert and oriented, well developed, well nourished, in no acute distress        Skin:  warm and dry to the touch, no apparent skin lesions or masses noted venous stasis changes      Head:  normocephalic, no masses or lesions        Eyes:  pupils equal and round   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    ENT:  no pallor or cyanosis, dentition good        Chest:  normal breath sounds, clear to auscultation, normal A-P diameter, normal symmetry, normal respiratory excursion, no use of accessory muscles        Cardiac: regular rhythm;normal S1 and S2;no S3 or S4;apical impulse not displaced frequent premature beats   no presence of murmur            Abdomen:  abdomen soft, non-tender, BS normoactive, no mass, no HSM, no bruits   DIstended    Vascular: pulses full and equal, no bruits auscultated                                      Extremities and Back:  no deformities, clubbing, cyanosis, erythema observed stasis pigmentation compression stockings    Neurological:  no gross motor deficits;affect appropriate   walks with a walker           Medications:     Current Outpatient Prescriptions   Medication Sig Dispense Refill     albuterol (ALBUTEROL) 108 (90 BASE) MCG/ACT inhaler Inhale 2 puffs into the lungs every 4 hours as needed 1 Inhaler 5     bumetanide (BUMEX) 0.5 MG tablet Take 1 tablet (0.5 mg) by mouth daily (with breakfast) 1 tablet 0     Calcium-Vitamin D-Vitamin K (CALCIUM + D) 500-1000-40 MG-UNT-MCG CHEW Take 2 tablets by mouth daily        Cholecalciferol (VITAMIN D) 2000 UNITS tablet Take 1,000 Units by mouth " daily        COMPRESSION STOCKINGS 1 each daily 3 each 1     DOXYCYCLINE HYCLATE PO Take 50 mg by mouth 3 times per week Monday,wednesday, friday       fluticasone-salmeterol (ADVAIR DISKUS) 250-50 MCG/DOSE diskus inhaler Inhale 1 puff into the lungs 2 times daily 60 Inhaler 1     hyaluronate in balanced salt ophthalmic solution (HEALON IN BSS) solution 1 drop every 4 hours (while awake)       Ibuprofen (ADVIL PO) Take 200 mg by mouth every 6 hours       olmesartan (BENICAR) 40 MG tablet Take 1 tablet (40 mg) by mouth daily 90 tablet 2     omeprazole (PRILOSEC) 20 MG CR capsule TAKE 1 CAPSULE (20 MG) BY MOUTH 2 TIMES DAILY 180 capsule 3     Ophthalmic Irrigation Solution (OCUSOFT EYE WASH OP)        RESTASIS 0.05 % OP EMUL 1 drop to left eye twice daily       rOPINIRole (REQUIP) 0.25 MG tablet Take 2 nightly, and one in the afternoon as needed       sodium chloride (MARGAUX 128) 5 % ophthalmic solution 1 drop At Bedtime OINTMENT       Sodium Hyaluronate (HEALON IO) Combination with Pred       SYSTANE 0.4-0.3 % OP SOLN 1 drop to left eye 4 times daily       order for DME 2L of O2 at night             Family History   Problem Relation Age of Onset     Adopted: Yes     C.A.D. Paternal Uncle      MI 50's     Family History Negative Daughter        Social History     Social History     Marital status:      Spouse name: N/A     Number of children: 4     Years of education: N/A     Occupational History     Retired nurse Retired     Social History Main Topics     Smoking status: Never Smoker     Smokeless tobacco: Never Used     Alcohol use 4.2 oz/week     7 Glasses of wine per week      Comment: rarely     Drug use: No     Sexual activity: No     Other Topics Concern     Caffeine Concern No     1- 2 cups coffee     Sleep Concern No     Stress Concern No     Weight Concern No     Special Diet Yes     low sodium     Exercise No     2 days a week (abigail chi), walking program, stationary bike 10 minutes 3 times per week      Seat Belt Yes     Social History Narrative    , 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  She walks with a walker.  Is a full code.  (last updated 10/11/2017)             Past Medical History:     Past Medical History:   Diagnosis Date     Arthritis      Asthma      Cellulitis 4/13 in [place]    R ankle     COPD (chronic obstructive pulmonary disease) (H)      Difficult airway for intubation      Ductal Carcinoma in Situ of Left Breast 9/09     Duodenal ulcer, unspecified as acute or chronic, without mention of hemorrhage or perforation 1980's    felt due to high dose steroids     Female stress incontinence      H/O right and left heart catheterization     1-     HYPERLIPIDEMIA      Hypertension      Ischemic colitis 7/2001    [facility]     Lactose Intolerance     Mild     Legal blindness     Artificial right eye, severe vision loss left (Detached retina's, glaucoma, corneal transplants)     Mild intermittent asthma ~1980's     Nocturnal oxygen desaturation      Osteoporosis, unspecified ~2002     Patent foramen ovale      PERIPHERAL VASCULAR DISEASE 5/05    mesenteric arteries, angioplasty     PRESBYESOPHAGUS 6/04     Pulmonary HTN (H) 11/2014     Serous retinal detachment     False eye right     Subarachnoid hemorrhage following injury (H) 12/10    due to fall, vascular evaluation negative     Syncope and collapse 12/10     Unspecified glaucoma(365.9)               Past Surgical History:     Past Surgical History:   Procedure Laterality Date     BIOPSY       C APPENDECTOMY  1982     C NONSPECIFIC PROCEDURE  1945    adenoidectomy     C NONSPECIFIC PROCEDURE      bilat retinal detachment     C NONSPECIFIC PROCEDURE      blephoroplasty bilat     C NONSPECIFIC PROCEDURE  1997    glaucoma procedure L     C NONSPECIFIC PROCEDURE  1997, 2007, 2014    corneal transplant L     C NONSPECIFIC PROCEDURE  1945    strabismus procedure R eye     C NONSPECIFIC  PROCEDURE  1960's    R breast bx     C NONSPECIFIC PROCEDURE  5/05    Angioplasty and stenting mesenteric vessels     C NONSPECIFIC PROCEDURE  2008    L corneal transplant     COLONOSCOPY      due 2015     ENDOSCOPIC RETROGRADE CHOLANGIOPANCREATOGRAM N/A 10/13/2017    Procedure: ENDOSCOPIC RETROGRADE CHOLANGIOPANCREATOGRAM;  ENDOSCOPIC RETROGRADE CHOLANGIOPANCREATOGRAM ;  Surgeon: Isabel Yousif MD;  Location: [facility] OR     HC REMOVAL GALLBLADDER  1982    Cholecystectomy     HEART CATH RIGHT AND LEFT HEART CATH  1/19/15     HERNIORRHAPHY VENTRAL N/A 4/19/2016    Procedure: HERNIORRHAPHY VENTRAL;  Surgeon: Hamlet Ness MD;  Location: [facility] OR     MASTECTOMY SIMPLE BILATERAL  10/5/09    bilateral mastectomy     SURGICAL HISTORY OF -   6/2010    breast reconstruction              Allergies:   Atorvastatin calcium; Celecoxib; Cholestyramine; Crestor [rosuvastatin calcium]; Cyclobenzaprine hcl; Dulera; Gabapentin; Lisinopril; Meperidine hcl; Morphine; Naprosyn [naproxen]; Niaspan [niacin]; Percocet [oxycodone-acetaminophen]; Pravastatin; Red yeast rice [cholestin]; Simvastatin; Timolol maleate; Hctz; and Sulfa drugs       Data:   All laboratory data reviewed:    Last Comprehensive Metabolic Panel:  Sodium   Date Value Ref Range Status   11/02/2018 143 136 - 145 mmol/L Final     Potassium   Date Value Ref Range Status   11/02/2018 4.7 3.5 - 5.1 mmol/L Final     Chloride   Date Value Ref Range Status   11/02/2018 109 (H) 98 - 107 mmol/L Final     Carbon Dioxide   Date Value Ref Range Status   11/02/2018 25 23 - 29 mmol/L Final     Anion Gap   Date Value Ref Range Status   11/02/2018 13.7 6 - 17 mmol/L Final     Glucose   Date Value Ref Range Status   11/02/2018 95 70 - 105 mg/dL Final     Urea Nitrogen   Date Value Ref Range Status   11/02/2018 47 (H) 7 - 30 mg/dL Final     Creatinine   Date Value Ref Range Status   11/02/2018 1.38 (H) 0.70 - 1.30 mg/dL Final     GFR Estimate   Date Value Ref Range Status   11/02/2018 37  (L) >60 mL/min/1.7m2 Final     Calcium   Date Value Ref Range Status   11/02/2018 10.0 8.5 - 10.5 mg/dL Final       YUNIEL SAPP, CNP    Thank you for allowing me to participate in the care of your patient.      Sincerely,     YUNIEL SAPP CNP     [facility]    cc:   YUNIEL Winston CNP  [facility]  [address]  [place] [id number]

## 2018-11-02 NOTE — PROGRESS NOTES
Cardiology Clinic Progress Note  Rupinder Tracy MRN# [id number]   YOB: 1940 Age: 78 year old     Reason for visit: Dyspnea on exertion, lower extremity edema and weight gain           Assessment and Plan:     1. Dyspnea on exertion, lower extremity edema and weight gain    Symptomatic improvement with bump in creatine from 1.15 to 1.38    Continue Bumex sliding scale:    Weight is 149 to 151 lbs: 0.5 mg of Bumex daily    If weight is up greater than 2 lbs in 24 hours 1 mg Bumex daily until back to baseline weight then back to 0.5 mg daily    If weight is up greater than 5 lbs in 24 hours 2 mg Bumex daily until back to baseline weight then back to 0.5 mg daily    Follow up with Dr. Butler as previously scheduled on 11/20/18 with BMP prior.         History of Presenting Illness:    Rupinder Tracy is a very pleasant 78 year old patient of Dr. Butler who presents today for a follow up post diuretic adjustments with prior symptoms of dyspnea on exertion, increased lower extremity edema and weight gain. She has a past medical history significant for pulmonary hypertension, systemic hypertension, aortic insufficiency, aortic root dilatation, long-standing history of asthma and COPD, dastolic dysfunction, and obstructive sleep apnea,and peripheral edema and a PFO.    Evaluated by Dr. Silva in the pulmonary hypertension clinic who suggested a VQ scan, echocardiogram with bubble study that the patient elected to not complete. Her right and left heart catheterization moderate secondary pulmonary hypertension, moderate AI with moderate annual aortic ectasia with oximetery run indicates no evidence of significant left to right shunt and moderate pulmonary vein insufficiency. There was no significant obstructive CAD noted. She did particpate in pulmonary rehabilation with reported improvement of her symptoms.     Her last echocardiogram showed LVEF 55-60% with Grade III or moderate diastolic dysfunction,  mild TR, mild to moderate AR without aortic stenosis.  There is mild (1+) tricuspid regurgitation. The right ventricular systolic pressure is approximated at 55.2 mmHg plus the right atrial pressure. Dilated IVC (>2.5cm) with <50% respiratory collapse; right atrial pressure is estimated at 15-20 mmHg. Right ventricular systolic pressure is elevated, consistent with severe pulmonary hypertension.    A few week prior to our last visit she is noted progressive weight gain, dyspnea on exertion and increased bilateral lower extremity edema left greater than right. Her baseline weight at home was approximately 149-151 pounds and her weight on her home scale darling to 156 pounds.  She increased her home Bumex 0.5 mg daily to 1 mg daily for4 days with only a 1 pound weight reduction.  She denied PND orthopnea as well as chest pain. It was recommended taht she increase Bumex to 2 mg x 1 week. Then back to 1 mg daily with a BMP 2 weeks post diuretic changes noting creatine 1.33. Then a sliding scale was ordered.  She states that approximately 48 hours ago she had a food obstruction in her esophagus and was unable to even drink water.  She states that she is only utilized 1 mg of Lasix x1 dose in the last 2 weeks and feels that she is dehydrated due to the esophageal obstruction.  She has noticed a considerable improvement in her dyspnea on exertion as well as lower extremity edema.  Her weight on her home scale was 147 pounds.               Review of Systems:   Review of Systems:  Skin:  Positive for bruising   Eyes:  Positive for glasses  ENT:  Negative    Respiratory:  Positive for dyspnea on exertion;shortness of breath (improved.)  Cardiovascular:  Negative  (wears support socks)  Gastroenterology: Negative    Genitourinary:  not assessed    Musculoskeletal:  Positive for joint pain;back pain  Neurologic:  Negative    Psychiatric:  Negative    Heme/Lymph/Imm:  Positive for allergies  Endocrine:  Negative                 "Physical Exam:     Vitals: /66  Pulse 68  Ht 1.626 m (5' 4\")  Wt 66.7 kg (147 lb)  LMP 01/01/1990  BMI 25.23 kg/m2  Constitutional:  cooperative, alert and oriented, well developed, well nourished, in no acute distress        Skin:  warm and dry to the touch, no apparent skin lesions or masses noted venous stasis changes      Head:  normocephalic, no masses or lesions        Eyes:  pupils equal and round   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    ENT:  no pallor or cyanosis, dentition good        Chest:  normal breath sounds, clear to auscultation, normal A-P diameter, normal symmetry, normal respiratory excursion, no use of accessory muscles        Cardiac: regular rhythm;normal S1 and S2;no S3 or S4;apical impulse not displaced frequent premature beats   no presence of murmur            Abdomen:  abdomen soft, non-tender, BS normoactive, no mass, no HSM, no bruits   DIstended    Vascular: pulses full and equal, no bruits auscultated                                      Extremities and Back:  no deformities, clubbing, cyanosis, erythema observed stasis pigmentation compression stockings    Neurological:  no gross motor deficits;affect appropriate   walks with a walker           Medications:     Current Outpatient Prescriptions   Medication Sig Dispense Refill     albuterol (ALBUTEROL) 108 (90 BASE) MCG/ACT inhaler Inhale 2 puffs into the lungs every 4 hours as needed 1 Inhaler 5     bumetanide (BUMEX) 0.5 MG tablet Take 1 tablet (0.5 mg) by mouth daily (with breakfast) 1 tablet 0     Calcium-Vitamin D-Vitamin K (CALCIUM + D) 500-1000-40 MG-UNT-MCG CHEW Take 2 tablets by mouth daily        Cholecalciferol (VITAMIN D) 2000 UNITS tablet Take 1,000 Units by mouth daily        COMPRESSION STOCKINGS 1 each daily 3 each 1     DOXYCYCLINE HYCLATE PO Take 50 mg by mouth 3 times per week Monday,wednesday, friday       fluticasone-salmeterol (ADVAIR DISKUS) 250-50 MCG/DOSE diskus " inhaler Inhale 1 puff into the lungs 2 times daily 60 Inhaler 1     hyaluronate in balanced salt ophthalmic solution (HEALON IN BSS) solution 1 drop every 4 hours (while awake)       Ibuprofen (ADVIL PO) Take 200 mg by mouth every 6 hours       olmesartan (BENICAR) 40 MG tablet Take 1 tablet (40 mg) by mouth daily 90 tablet 2     omeprazole (PRILOSEC) 20 MG CR capsule TAKE 1 CAPSULE (20 MG) BY MOUTH 2 TIMES DAILY 180 capsule 3     Ophthalmic Irrigation Solution (OCUSOFT EYE WASH OP)        RESTASIS 0.05 % OP EMUL 1 drop to left eye twice daily       rOPINIRole (REQUIP) 0.25 MG tablet Take 2 nightly, and one in the afternoon as needed       sodium chloride (MARGAUX 128) 5 % ophthalmic solution 1 drop At Bedtime OINTMENT       Sodium Hyaluronate (HEALON IO) Combination with Pred       SYSTANE 0.4-0.3 % OP SOLN 1 drop to left eye 4 times daily       order for DME 2L of O2 at night             Family History   Problem Relation Age of Onset     Adopted: Yes     C.A.D. Paternal Uncle      MI 50's     Family History Negative Daughter        Social History     Social History     Marital status:      Spouse name: N/A     Number of children: 4     Years of education: N/A     Occupational History     Retired nurse Retired     Social History Main Topics     Smoking status: Never Smoker     Smokeless tobacco: Never Used     Alcohol use 4.2 oz/week     7 Glasses of wine per week      Comment: rarely     Drug use: No     Sexual activity: No     Other Topics Concern     Caffeine Concern No     1- 2 cups coffee     Sleep Concern No     Stress Concern No     Weight Concern No     Special Diet Yes     low sodium     Exercise No     2 days a week (abigail chi), walking program, stationary bike 10 minutes 3 times per week     Seat Belt Yes     Social History Narrative    , 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  She walks with a walker.  Is a full code.  (last updated  10/11/2017)             Past Medical History:     Past Medical History:   Diagnosis Date     Arthritis      Asthma      Cellulitis 4/13 in [place]    R ankle     COPD (chronic obstructive pulmonary disease) (H)      Difficult airway for intubation      Ductal Carcinoma in Situ of Left Breast 9/09     Duodenal ulcer, unspecified as acute or chronic, without mention of hemorrhage or perforation 1980's    felt due to high dose steroids     Female stress incontinence      H/O right and left heart catheterization     1-     HYPERLIPIDEMIA      Hypertension      Ischemic colitis 7/2001    [facility]     Lactose Intolerance     Mild     Legal blindness     Artificial right eye, severe vision loss left (Detached retina's, glaucoma, corneal transplants)     Mild intermittent asthma ~1980's     Nocturnal oxygen desaturation      Osteoporosis, unspecified ~2002     Patent foramen ovale      PERIPHERAL VASCULAR DISEASE 5/05    mesenteric arteries, angioplasty     PRESBYESOPHAGUS 6/04     Pulmonary HTN (H) 11/2014     Serous retinal detachment     False eye right     Subarachnoid hemorrhage following injury (H) 12/10    due to fall, vascular evaluation negative     Syncope and collapse 12/10     Unspecified glaucoma(365.9)               Past Surgical History:     Past Surgical History:   Procedure Laterality Date     BIOPSY       C APPENDECTOMY  1982     C NONSPECIFIC PROCEDURE  1945    adenoidectomy     C NONSPECIFIC PROCEDURE      bilat retinal detachment     C NONSPECIFIC PROCEDURE      blephoroplasty bilat     C NONSPECIFIC PROCEDURE  1997    glaucoma procedure L     C NONSPECIFIC PROCEDURE  1997, 2007, 2014    corneal transplant L     C NONSPECIFIC PROCEDURE  1945    strabismus procedure R eye     C NONSPECIFIC PROCEDURE  1960's    R breast bx     C NONSPECIFIC PROCEDURE  5/05    Angioplasty and stenting mesenteric vessels     C NONSPECIFIC PROCEDURE  2008    L corneal transplant     COLONOSCOPY      due 2015      ENDOSCOPIC RETROGRADE CHOLANGIOPANCREATOGRAM N/A 10/13/2017    Procedure: ENDOSCOPIC RETROGRADE CHOLANGIOPANCREATOGRAM;  ENDOSCOPIC RETROGRADE CHOLANGIOPANCREATOGRAM ;  Surgeon: Isabel Yousif MD;  Location: [facility] OR     HC REMOVAL GALLBLADDER  1982    Cholecystectomy     HEART CATH RIGHT AND LEFT HEART CATH  1/19/15     HERNIORRHAPHY VENTRAL N/A 4/19/2016    Procedure: HERNIORRHAPHY VENTRAL;  Surgeon: Hamlet Ness MD;  Location: [facility] OR     MASTECTOMY SIMPLE BILATERAL  10/5/09    bilateral mastectomy     SURGICAL HISTORY OF -   6/2010    breast reconstruction              Allergies:   Atorvastatin calcium; Celecoxib; Cholestyramine; Crestor [rosuvastatin calcium]; Cyclobenzaprine hcl; Dulera; Gabapentin; Lisinopril; Meperidine hcl; Morphine; Naprosyn [naproxen]; Niaspan [niacin]; Percocet [oxycodone-acetaminophen]; Pravastatin; Red yeast rice [cholestin]; Simvastatin; Timolol maleate; Hctz; and Sulfa drugs       Data:   All laboratory data reviewed:    Last Comprehensive Metabolic Panel:  Sodium   Date Value Ref Range Status   11/02/2018 143 136 - 145 mmol/L Final     Potassium   Date Value Ref Range Status   11/02/2018 4.7 3.5 - 5.1 mmol/L Final     Chloride   Date Value Ref Range Status   11/02/2018 109 (H) 98 - 107 mmol/L Final     Carbon Dioxide   Date Value Ref Range Status   11/02/2018 25 23 - 29 mmol/L Final     Anion Gap   Date Value Ref Range Status   11/02/2018 13.7 6 - 17 mmol/L Final     Glucose   Date Value Ref Range Status   11/02/2018 95 70 - 105 mg/dL Final     Urea Nitrogen   Date Value Ref Range Status   11/02/2018 47 (H) 7 - 30 mg/dL Final     Creatinine   Date Value Ref Range Status   11/02/2018 1.38 (H) 0.70 - 1.30 mg/dL Final     GFR Estimate   Date Value Ref Range Status   11/02/2018 37 (L) >60 mL/min/1.7m2 Final     Calcium   Date Value Ref Range Status   11/02/2018 10.0 8.5 - 10.5 mg/dL Final       OLGA NG, APRN, CNP

## 2018-11-02 NOTE — PATIENT INSTRUCTIONS
Today's Recommendations    1. Continue all medications without changes.  2. Please follow up with Dr. Butler at the end of November with labs.    Please send a [employer] message or call [phone] with questions or concerns.     Scheduling number [phone].

## 2018-11-02 NOTE — TELEPHONE ENCOUNTER
Patient has had glucose tested twice in October, both a bit high.   Note that creat was up 2 weeks ago.  BMP already ordered, will add UA.

## 2018-11-02 NOTE — TELEPHONE ENCOUNTER
Patient will check back tomorrow for culture results, ? Antibiotic order.  Tammy Hicks RN  [facility]

## 2018-11-02 NOTE — LETTER
11/2/2018    Abran Mott MD  [address] [facility] [address]    RE: Rupinder Tracy       Dear Colleague,    I had the pleasure of seeing Rupinder Tracy in the [facility].    Cardiology Clinic Progress Note  Rupinder Tracy MRN# [id number]   YOB: 1940 Age: 78 year old     Reason for visit: Dyspnea on exertion, lower extremity edema and weight gain           Assessment and Plan:     1. Dyspnea on exertion, lower extremity edema and weight gain    Symptomatic improvement with bump in creatine from 1.15 to 1.38    Continue Bumex sliding scale:    Weight is 149 to 151 lbs: 0.5 mg of Bumex daily    If weight is up greater than 2 lbs in 24 hours 1 mg Bumex daily until back to baseline weight then back to 0.5 mg daily    If weight is up greater than 5 lbs in 24 hours 2 mg Bumex daily until back to baseline weight then back to 0.5 mg daily    Follow up with Dr. Butler as previously scheduled on 11/20/18 with BMP prior.         History of Presenting Illness:    Rupinder Tracy is a very pleasant 78 year old patient of Dr. Butler who presents today for a follow up post diuretic adjustments with prior symptoms of dyspnea on exertion, increased lower extremity edema and weight gain. She has a past medical history significant for pulmonary hypertension, systemic hypertension, aortic insufficiency, aortic root dilatation, long-standing history of asthma and COPD, dastolic dysfunction, and obstructive sleep apnea,and peripheral edema and a PFO.    Evaluated by Dr. Silva in the pulmonary hypertension clinic who suggested a VQ scan, echocardiogram with bubble study that the patient elected to not complete. Her right and left heart catheterization moderate secondary pulmonary hypertension, moderate AI with moderate annual aortic ectasia with oximetery run indicates no evidence of significant left to right shunt and moderate pulmonary vein insufficiency. There was no  significant obstructive CAD noted. She did particpate in pulmonary rehabilation with reported improvement of her symptoms.     Her last echocardiogram showed LVEF 55-60% with Grade III or moderate diastolic dysfunction, mild TR, mild to moderate AR without aortic stenosis.  There is mild (1+) tricuspid regurgitation. The right ventricular systolic pressure is approximated at 55.2 mmHg plus the right atrial pressure. Dilated IVC (>2.5cm) with <50% respiratory collapse; right atrial pressure is estimated at 15-20 mmHg. Right ventricular systolic pressure is elevated, consistent with severe pulmonary hypertension.    A few week prior to our last visit she is noted progressive weight gain, dyspnea on exertion and increased bilateral lower extremity edema left greater than right. Her baseline weight at home was approximately 149-151 pounds and her weight on her home scale darling to 156 pounds.  She increased her home Bumex 0.5 mg daily to 1 mg daily for4 days with only a 1 pound weight reduction.  She denied PND orthopnea as well as chest pain. It was recommended taht she increase Bumex to 2 mg x 1 week. Then back to 1 mg daily with a BMP 2 weeks post diuretic changes noting creatine 1.33. Then a sliding scale was ordered.  She states that approximately 48 hours ago she had a food obstruction in her esophagus and was unable to even drink water.  She states that she is only utilized 1 mg of Lasix x1 dose in the last 2 weeks and feels that she is dehydrated due to the esophageal obstruction.  She has noticed a considerable improvement in her dyspnea on exertion as well as lower extremity edema.  Her weight on her home scale was 147 pounds.               Review of Systems:   Review of Systems:  Skin:  Positive for bruising   Eyes:  Positive for glasses  ENT:  Negative    Respiratory:  Positive for dyspnea on exertion;shortness of breath (improved.)  Cardiovascular:  Negative  (wears support socks)  Gastroenterology: Negative   "  Genitourinary:  not assessed    Musculoskeletal:  Positive for joint pain;back pain  Neurologic:  Negative    Psychiatric:  Negative    Heme/Lymph/Imm:  Positive for allergies  Endocrine:  Negative                Physical Exam:     Vitals: /66  Pulse 68  Ht 1.626 m (5' 4\")  Wt 66.7 kg (147 lb)  LMP 01/01/1990  BMI 25.23 kg/m2  Constitutional:  cooperative, alert and oriented, well developed, well nourished, in no acute distress        Skin:  warm and dry to the touch, no apparent skin lesions or masses noted venous stasis changes      Head:  normocephalic, no masses or lesions        Eyes:  pupils equal and round   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    ENT:  no pallor or cyanosis, dentition good        Chest:  normal breath sounds, clear to auscultation, normal A-P diameter, normal symmetry, normal respiratory excursion, no use of accessory muscles        Cardiac: regular rhythm;normal S1 and S2;no S3 or S4;apical impulse not displaced frequent premature beats   no presence of murmur            Abdomen:  abdomen soft, non-tender, BS normoactive, no mass, no HSM, no bruits   DIstended    Vascular: pulses full and equal, no bruits auscultated                                      Extremities and Back:  no deformities, clubbing, cyanosis, erythema observed stasis pigmentation compression stockings    Neurological:  no gross motor deficits;affect appropriate   walks with a walker           Medications:     Current Outpatient Prescriptions   Medication Sig Dispense Refill     albuterol (ALBUTEROL) 108 (90 BASE) MCG/ACT inhaler Inhale 2 puffs into the lungs every 4 hours as needed 1 Inhaler 5     bumetanide (BUMEX) 0.5 MG tablet Take 1 tablet (0.5 mg) by mouth daily (with breakfast) 1 tablet 0     Calcium-Vitamin D-Vitamin K (CALCIUM + D) 500-1000-40 MG-UNT-MCG CHEW Take 2 tablets by mouth daily        Cholecalciferol (VITAMIN D) 2000 UNITS tablet Take 1,000 Units by mouth " daily        COMPRESSION STOCKINGS 1 each daily 3 each 1     DOXYCYCLINE HYCLATE PO Take 50 mg by mouth 3 times per week Monday,wednesday, friday       fluticasone-salmeterol (ADVAIR DISKUS) 250-50 MCG/DOSE diskus inhaler Inhale 1 puff into the lungs 2 times daily 60 Inhaler 1     hyaluronate in balanced salt ophthalmic solution (HEALON IN BSS) solution 1 drop every 4 hours (while awake)       Ibuprofen (ADVIL PO) Take 200 mg by mouth every 6 hours       olmesartan (BENICAR) 40 MG tablet Take 1 tablet (40 mg) by mouth daily 90 tablet 2     omeprazole (PRILOSEC) 20 MG CR capsule TAKE 1 CAPSULE (20 MG) BY MOUTH 2 TIMES DAILY 180 capsule 3     Ophthalmic Irrigation Solution (OCUSOFT EYE WASH OP)        RESTASIS 0.05 % OP EMUL 1 drop to left eye twice daily       rOPINIRole (REQUIP) 0.25 MG tablet Take 2 nightly, and one in the afternoon as needed       sodium chloride (MARGAUX 128) 5 % ophthalmic solution 1 drop At Bedtime OINTMENT       Sodium Hyaluronate (HEALON IO) Combination with Pred       SYSTANE 0.4-0.3 % OP SOLN 1 drop to left eye 4 times daily       order for DME 2L of O2 at night             Family History   Problem Relation Age of Onset     Adopted: Yes     C.A.D. Paternal Uncle      MI 50's     Family History Negative Daughter        Social History     Social History     Marital status:      Spouse name: N/A     Number of children: 4     Years of education: N/A     Occupational History     Retired nurse Retired     Social History Main Topics     Smoking status: Never Smoker     Smokeless tobacco: Never Used     Alcohol use 4.2 oz/week     7 Glasses of wine per week      Comment: rarely     Drug use: No     Sexual activity: No     Other Topics Concern     Caffeine Concern No     1- 2 cups coffee     Sleep Concern No     Stress Concern No     Weight Concern No     Special Diet Yes     low sodium     Exercise No     2 days a week (abigail chi), walking program, stationary bike 10 minutes 3 times per week      Seat Belt Yes     Social History Narrative    , 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  She walks with a walker.  Is a full code.  (last updated 10/11/2017)             Past Medical History:     Past Medical History:   Diagnosis Date     Arthritis      Asthma      Cellulitis 4/13 in [place]    R ankle     COPD (chronic obstructive pulmonary disease) (H)      Difficult airway for intubation      Ductal Carcinoma in Situ of Left Breast 9/09     Duodenal ulcer, unspecified as acute or chronic, without mention of hemorrhage or perforation 1980's    felt due to high dose steroids     Female stress incontinence      H/O right and left heart catheterization     1-     HYPERLIPIDEMIA      Hypertension      Ischemic colitis 7/2001    [facility]     Lactose Intolerance     Mild     Legal blindness     Artificial right eye, severe vision loss left (Detached retina's, glaucoma, corneal transplants)     Mild intermittent asthma ~1980's     Nocturnal oxygen desaturation      Osteoporosis, unspecified ~2002     Patent foramen ovale      PERIPHERAL VASCULAR DISEASE 5/05    mesenteric arteries, angioplasty     PRESBYESOPHAGUS 6/04     Pulmonary HTN (H) 11/2014     Serous retinal detachment     False eye right     Subarachnoid hemorrhage following injury (H) 12/10    due to fall, vascular evaluation negative     Syncope and collapse 12/10     Unspecified glaucoma(365.9)               Past Surgical History:     Past Surgical History:   Procedure Laterality Date     BIOPSY       C APPENDECTOMY  1982     C NONSPECIFIC PROCEDURE  1945    adenoidectomy     C NONSPECIFIC PROCEDURE      bilat retinal detachment     C NONSPECIFIC PROCEDURE      blephoroplasty bilat     C NONSPECIFIC PROCEDURE  1997    glaucoma procedure L     C NONSPECIFIC PROCEDURE  1997, 2007, 2014    corneal transplant L     C NONSPECIFIC PROCEDURE  1945    strabismus procedure R eye     C NONSPECIFIC  PROCEDURE  1960's    R breast bx     C NONSPECIFIC PROCEDURE  5/05    Angioplasty and stenting mesenteric vessels     C NONSPECIFIC PROCEDURE  2008    L corneal transplant     COLONOSCOPY      due 2015     ENDOSCOPIC RETROGRADE CHOLANGIOPANCREATOGRAM N/A 10/13/2017    Procedure: ENDOSCOPIC RETROGRADE CHOLANGIOPANCREATOGRAM;  ENDOSCOPIC RETROGRADE CHOLANGIOPANCREATOGRAM ;  Surgeon: Isabel Yousif MD;  Location: [facility] OR     HC REMOVAL GALLBLADDER  1982    Cholecystectomy     HEART CATH RIGHT AND LEFT HEART CATH  1/19/15     HERNIORRHAPHY VENTRAL N/A 4/19/2016    Procedure: HERNIORRHAPHY VENTRAL;  Surgeon: Hamlet Ness MD;  Location: [facility] OR     MASTECTOMY SIMPLE BILATERAL  10/5/09    bilateral mastectomy     SURGICAL HISTORY OF -   6/2010    breast reconstruction              Allergies:   Atorvastatin calcium; Celecoxib; Cholestyramine; Crestor [rosuvastatin calcium]; Cyclobenzaprine hcl; Dulera; Gabapentin; Lisinopril; Meperidine hcl; Morphine; Naprosyn [naproxen]; Niaspan [niacin]; Percocet [oxycodone-acetaminophen]; Pravastatin; Red yeast rice [cholestin]; Simvastatin; Timolol maleate; Hctz; and Sulfa drugs       Data:   All laboratory data reviewed:    Last Comprehensive Metabolic Panel:  Sodium   Date Value Ref Range Status   11/02/2018 143 136 - 145 mmol/L Final     Potassium   Date Value Ref Range Status   11/02/2018 4.7 3.5 - 5.1 mmol/L Final     Chloride   Date Value Ref Range Status   11/02/2018 109 (H) 98 - 107 mmol/L Final     Carbon Dioxide   Date Value Ref Range Status   11/02/2018 25 23 - 29 mmol/L Final     Anion Gap   Date Value Ref Range Status   11/02/2018 13.7 6 - 17 mmol/L Final     Glucose   Date Value Ref Range Status   11/02/2018 95 70 - 105 mg/dL Final     Urea Nitrogen   Date Value Ref Range Status   11/02/2018 47 (H) 7 - 30 mg/dL Final     Creatinine   Date Value Ref Range Status   11/02/2018 1.38 (H) 0.70 - 1.30 mg/dL Final     GFR Estimate   Date Value Ref Range Status   11/02/2018 37  (L) >60 mL/min/1.7m2 Final     Calcium   Date Value Ref Range Status   11/02/2018 10.0 8.5 - 10.5 mg/dL Final         Thank you for allowing me to participate in the care of your patient.    Sincerely,     YUNIEL SAPP [facility]

## 2018-11-02 NOTE — MR AVS SNAPSHOT
After Visit Summary   11/2/2018    Rupinder Tracy    MRN: [id number]           Patient Information     Date Of Birth          1940        Visit Information        Provider Department      11/2/2018 10:30 AM Latricia Arguelles APRN [facility]        Today's Diagnoses     Pulmonary HTN (H)        Localized edema        Benign essential hypertension          Care Instructions    Today's Recommendations    1. Continue all medications without changes.  2. Please follow up with Dr. Butler at the end of November with labs.    Please send a [employer] message or call [phone] with questions or concerns.     Scheduling number [phone].            Follow-ups after your visit        Your next 10 appointments already scheduled     Nov 06, 2018  2:00 PM CST   Pulmonary Treatment with Sh Pulmonary Rehab 2   [facility])    [address]  [facility] [address]   [phone]            Nov 08, 2018  2:00 PM CST   Pulmonary Treatment with Sh Pulmonary Rehab 2   [facility])    [address]  [facility] [address]   [phone]            Nov 13, 2018  2:00 PM CST   Pulmonary Treatment with Sh Pulmonary Rehab 2   [facility])    [address]  [facility] [address]   [phone]            Nov 16, 2018  2:00 PM CST   Pulmonary Treatment with Sh Pulmonary Rehab 1   [facility] ([facility])    [address]  [facility] [address]   [phone]            Nov 20, 2018 12:00 PM CST   Pulmonary Treatment with Sh Pulmonary Rehab 2   [facility] ([facility])    [address]  [facility] [address]   [phone]            Nov 20, 2018  2:50 PM CST   LAB with [facility]  "  [facility] [place] ([facility])    [address]  [place]  [place] [id number] [phone]           Please do not eat 10-12 hours before your appointment if you are coming in fasting for labs on lipids, cholesterol, or glucose (sugar). This does not apply to pregnant women. Water, hot tea and black coffee (with nothing added) are okay. Do not drink other fluids, diet soda or chew gum.            Nov 20, 2018  3:45 PM CST   Return Visit with Jae Butler MD   [facility] ([facility])    [address]  [place] [id number] [phone] OPT 2              Future tests that were ordered for you today     Open Future Orders        Priority Expected Expires Ordered    UA with Microscopic reflex to Culture Routine  11/1/2019 11/1/2018            Who to contact     If you have questions or need follow up information about today's clinic visit or your schedule please contact [facility] directly at [phone].  Normal or non-critical lab and imaging results will be communicated to you by MyChart, letter or phone within 4 business days after the clinic has received the results. If you do not hear from us within 7 days, please contact the clinic through MyChart or phone. If you have a critical or abnormal lab result, we will notify you by phone as soon as possible.  Submit refill requests through [employer] or call your pharmacy and they will forward the refill request to us. Please allow 3 business days for your refill to be completed.          Additional Information About Your Visit        Care EveryWhere ID     This is your Care EveryWhere ID. This could be used by other organizations to access your [place] medical records  [id number]        Your Vitals Were     Pulse Height Last Period BMI (Body Mass Index)          68 1.626 m (5' 4\") 01/01/1990 25.23 " kg/m2         Blood Pressure from Last 3 Encounters:   11/02/18 124/66   10/02/18 140/70   07/02/18 134/57    Weight from Last 3 Encounters:   11/02/18 66.7 kg (147 lb)   10/02/18 72.3 kg (159 lb 4.8 oz)   07/02/18 68.7 kg (151 lb 6 oz)              We Performed the Following     Follow-Up with Cardiac Advanced Practice Provider        Primary Care Provider Office Phone # Fax #    Abran Pasquale Mott [phone]       [address] [place] [address]        Equal Access to Services     [facility]: Hadii aad ku hadasho Sorosario, waaxda luqadaha, qaybta kaalmada adebrendayada, ethan huntley . So [facility] [phone].    ATENCIÓN: Si habla español, tiene a lugo disposición servicios gratuitos de asistencia lingüística. [facility] [phone].    We comply with applicable federal civil rights laws and [place] laws. We do not discriminate on the basis of race, color, national origin, age, disability, sex, sexual orientation, or gender identity.            Thank you!     Thank you for choosing [facility]  for your care. Our goal is always to provide you with excellent care. Hearing back from our patients is one way we can continue to improve our services. Please take a few minutes to complete the written survey that you may receive in the mail after your visit with us. Thank you!             Your Updated Medication List - Protect others around you: Learn how to safely use, store and throw away your medicines at www.disposemymeds.org.          This list is accurate as of 11/2/18 10:50 AM.  Always use your most recent med list.                   Brand Name Dispense Instructions for use Diagnosis    ADVIL PO      Take 200 mg by mouth every 6 hours        albuterol 108 (90 Base) MCG/ACT inhaler    PROAIR HFA    1 Inhaler    Inhale 2 puffs into the lungs every 4 hours as needed    Mild intermittent asthma       bumetanide 0.5 MG tablet    BUMEX     1 tablet    Take 1 tablet (0.5 mg) by mouth daily (with breakfast)    Pulmonary HTN (H), Localized edema       CALCIUM + D 500-1000-40 MG-UNT-MCG Chew   Generic drug:  Calcium-Vitamin D-Vitamin K      Take 2 tablets by mouth daily        COMPRESSION STOCKINGS     3 each    1 each daily        DOXYCYCLINE HYCLATE PO      Take 50 mg by mouth 3 times per week Monday,wednesday, friday        fluticasone-salmeterol 250-50 MCG/DOSE diskus inhaler    ADVAIR DISKUS    60 Inhaler    Inhale 1 puff into the lungs 2 times daily    Mild persistent asthma without complication       HEALON IO      Combination with Pred        hyaluronate in balanced salt ophthalmic solution solution    HEALON IN BSS     1 drop every 4 hours (while awake)        MARGAUX 128 5 % ophthalmic solution   Generic drug:  sodium chloride      1 drop At Bedtime OINTMENT        OCUSOFT EYE WASH OP           olmesartan 40 MG tablet    BENICAR    90 tablet    Take 1 tablet (40 mg) by mouth daily    Essential hypertension with goal blood pressure less than 130/80       omeprazole 20 MG CR capsule    priLOSEC    180 capsule    TAKE 1 CAPSULE (20 MG) BY MOUTH 2 TIMES DAILY    Gastroesophageal reflux disease without esophagitis       order for DME      2L of O2 at night        REQUIP 0.25 MG tablet   Generic drug:  rOPINIRole      Take 2 nightly, and one in the afternoon as needed    Restless legs syndrome (RLS)       RESTASIS 0.05 % ophthalmic emulsion   Generic drug:  cycloSPORINE      1 drop to left eye twice daily        SYSTANE 0.4-0.3 % Soln ophthalmic solution   Generic drug:  polyethylene glycol 0.4%- propylene glycol 0.3%      1 drop to left eye 4 times daily        vitamin D 2000 units tablet      Take 1,000 Units by mouth daily    Vitamin D deficiency

## 2018-11-03 ENCOUNTER — TELEPHONE (OUTPATIENT)
Dept: INTERNAL MEDICINE | Facility: CLINIC | Age: 78
End: 2018-11-03

## 2018-11-03 NOTE — TELEPHONE ENCOUNTER
"Clinic Action Needed:YES, patient is requesting an MD from the clinic look at her UA/UC(pending) from yesterday 11/2 and give her an antibiotic.   Reason for Call:\"I was seen by cardiology yesterday, but I was also having UTI sx, so Dr. Mott ordered a UA/UC. I am still having the frequency, burning,nausea and chills. Temp is 99.0. Denies flank pain or other sx at this time.   Patient Recommendations/Teaching:I will route request to  for someone to call you at [phone]. If you do not hear back, advised to call back.She prefers RX to go to [facility] on file on [address]  Routed to: UC/PCP  Mar Marcelino RN [facility]              "

## 2018-11-04 ENCOUNTER — NURSE TRIAGE (OUTPATIENT)
Dept: NURSING | Facility: CLINIC | Age: 78
End: 2018-11-04

## 2018-11-04 DIAGNOSIS — N30.00 ACUTE CYSTITIS WITHOUT HEMATURIA: Primary | ICD-10-CM

## 2018-11-04 LAB
BACTERIA SPEC CULT: ABNORMAL
BACTERIA SPEC CULT: ABNORMAL
Lab: ABNORMAL
SPECIMEN SOURCE: ABNORMAL

## 2018-11-04 RX ORDER — CIPROFLOXACIN 500 MG/1
500 TABLET, FILM COATED ORAL DAILY
Qty: 6 TABLET | Refills: 0 | Status: SHIPPED | OUTPATIENT
Start: 2018-11-04 | End: 2018-11-09

## 2018-11-04 NOTE — TELEPHONE ENCOUNTER
"Patient calls requesting a urine culture result done on 11/02/18.  Currently is not on an antibiotic.  States called [facility] yesterday and message was sent to clinic to follow up with Patient.  Currently Patient complains of frequent urination \"every 15 minutes.\" States \"Last night I had the sweats and shaking chills. Does not have a thermometer.   Also states has Left side pain which is \"worse than my usual chronic back pain.\"  States one of her allergies is Sulfa.    Patient's primary provider is Dr. Mott at the [facility]. Dr. Jackson is on call for this clinic and was paged at 1:01pm  via Page  to call Patient at [phone].   Patient was advised to call back if they have not heard from the provider within 20 minutes.     Protocol and care advice reviewed.   Caller states understanding of the recommended disposition.   This RN called Patient at 1:09pm and left a message to   call back if further questions or concerns.     Juliann Pena RN  [facility]     Reason for Disposition    [1] Can't control passage of urine (i.e., urinary incontinence) AND [2] new onset (< 2 weeks) or worsening    Additional Information    Negative: Shock suspected (e.g., cold/pale/clammy skin, too weak to stand, low BP, rapid pulse)    Negative: Sounds like a life-threatening emergency to the triager    Negative: [1] Unable to urinate (or only a few drops) > 4 hours AND     [2] bladder feels very full (e.g., palpable bladder or strong urge to urinate)    Negative: [1] Decreased urination and [2] drinking very little AND [2] dehydration suspected (e.g., dark urine, no urine > 12 hours, very dry mouth, very lightheaded)    Negative: Patient sounds very sick or weak to the triager    Negative: Fever > 100.5 F (38.1 C)    Negative: Side (flank) or lower back pain present    Protocols used: URINARY SYMPTOMS-ADULT-AH      "

## 2018-11-05 NOTE — PROGRESS NOTES
Please contact patient and advise UC positive for Klebsiella.   She should have already started cipro.

## 2018-11-06 ENCOUNTER — HOSPITAL ENCOUNTER (OUTPATIENT)
Dept: CARDIAC REHAB | Facility: CLINIC | Age: 78
End: 2018-11-06
Attending: INTERNAL MEDICINE
Payer: MEDICARE

## 2018-11-06 ENCOUNTER — TRANSFERRED RECORDS (OUTPATIENT)
Dept: HEALTH INFORMATION MANAGEMENT | Facility: CLINIC | Age: 78
End: 2018-11-06

## 2018-11-06 PROCEDURE — G0424 PULMONARY REHAB W EXER: HCPCS | Performed by: CLINICAL EXERCISE PHYSIOLOGIST

## 2018-11-06 PROCEDURE — 40000244 ZZH STATISTIC VISIT PULM REHAB: Performed by: CLINICAL EXERCISE PHYSIOLOGIST

## 2018-11-06 NOTE — TELEPHONE ENCOUNTER
Please uriel patient and let them know their urine culture was positive for sensitive to Cipro.    Thank you  AMOR Sterling PAAdrianoC

## 2018-11-06 NOTE — TELEPHONE ENCOUNTER
Abran,    Is this appropriate for nurse line to be sending us telephone encounters for IM patient visits?  Looks like this may have been a telephone encounter or lab only viist?      Thank you  AMOR Sterling PA-C

## 2018-11-08 ENCOUNTER — HOSPITAL ENCOUNTER (OUTPATIENT)
Dept: CARDIAC REHAB | Facility: CLINIC | Age: 78
End: 2018-11-08
Attending: INTERNAL MEDICINE
Payer: MEDICARE

## 2018-11-08 PROCEDURE — G0424 PULMONARY REHAB W EXER: HCPCS

## 2018-11-08 PROCEDURE — 40000244 ZZH STATISTIC VISIT PULM REHAB

## 2018-11-09 ENCOUNTER — OFFICE VISIT (OUTPATIENT)
Dept: FAMILY MEDICINE | Facility: CLINIC | Age: 78
End: 2018-11-09
Payer: COMMERCIAL

## 2018-11-09 VITALS
BODY MASS INDEX: 25.4 KG/M2 | SYSTOLIC BLOOD PRESSURE: 106 MMHG | HEART RATE: 72 BPM | WEIGHT: 148 LBS | TEMPERATURE: 97 F | OXYGEN SATURATION: 98 % | RESPIRATION RATE: 16 BRPM | DIASTOLIC BLOOD PRESSURE: 58 MMHG

## 2018-11-09 DIAGNOSIS — R32 URINARY INCONTINENCE, UNSPECIFIED TYPE: ICD-10-CM

## 2018-11-09 DIAGNOSIS — M48.02 CERVICAL STENOSIS OF SPINAL CANAL: ICD-10-CM

## 2018-11-09 DIAGNOSIS — M25.552 HIP PAIN, LEFT: Primary | ICD-10-CM

## 2018-11-09 PROCEDURE — 99214 OFFICE O/P EST MOD 30 MIN: CPT | Performed by: PHYSICIAN ASSISTANT

## 2018-11-09 ASSESSMENT — ENCOUNTER SYMPTOMS
EYES NEGATIVE: 1
PSYCHIATRIC NEGATIVE: 1
RESPIRATORY NEGATIVE: 1
GASTROINTESTINAL NEGATIVE: 1
CARDIOVASCULAR NEGATIVE: 1
CONSTITUTIONAL NEGATIVE: 1

## 2018-11-09 NOTE — MR AVS SNAPSHOT
After Visit Summary   11/9/2018    Rupinder Tracy    MRN: [id number]           Patient Information     Date Of Birth          1940        Visit Information        Provider Department      11/9/2018 10:30 AM Noni Bruce PA-C [facility]        Today's Diagnoses     Hip pain, left    -  1    Urinary incontinence, unspecified type        Cervical stenosis of spinal canal           Follow-ups after your visit        Follow-up notes from your care team     Return in about 1 week (around 11/16/2018) for Imaging (MRI low back).      Your next 10 appointments already scheduled     Nov 13, 2018  2:00 PM CST   Pulmonary Treatment with Sh Pulmonary Rehab 2   [facility] ([facility])    [address]  [facility] [address]   [phone]            Nov 16, 2018  2:00 PM CST   Pulmonary Treatment with Sh Pulmonary Rehab 1   [facility] ([facility])    [address]  [facility] [address]   [phone]            Nov 20, 2018 12:00 PM CST   Pulmonary Treatment with Sh Pulmonary Rehab 2   [facility] ([facility])    [address]  [facility] [address]   [phone]            Nov 20, 2018  2:50 PM CST   LAB with [facility]   [facility] ([facility])    [address]  [facility] [address]   [phone]           Please do not eat 10-12 hours before your appointment if you are coming in fasting for labs on lipids, cholesterol, or glucose (sugar). This does not apply to pregnant women. Water, hot tea and black coffee (with nothing added) are okay. Do not drink other fluids, diet soda or chew gum.            Nov 20, 2018  3:45 PM CST   Return Visit with Jae Butler MD   [facility] ([facility])     [address]  [place] [address] [phone] OPT 2              Future tests that were ordered for you today     Open Future Orders        Priority Expected Expires Ordered    MR Lumbar Spine w/o Contrast Routine  11/9/2019 11/9/2018            Who to contact     If you have questions or need follow up information about today's clinic visit or your schedule please [facility] at [phone].  Normal or non-critical lab and imaging results will be communicated to you by MyChart, letter or phone within 4 business days after the clinic has received the results. If you do not hear from us within 7 days, please contact the clinic through MyChart or phone. If you have a critical or abnormal lab result, we will notify you by phone as soon as possible.  Submit refill requests through [employer] or call your pharmacy and they will forward the refill request to us. Please allow 3 business days for your refill to be completed.          Additional Information About Your Visit        Care EveryWhere ID     This is your Care EveryWhere ID. This could be used by other organizations to access your [place] medical records  [id number]        Your Vitals Were     Pulse Temperature Respirations Last Period Pulse Oximetry BMI (Body Mass Index)    72 97  F (36.1  C) (Tympanic) 16 01/01/1990 98% 25.4 kg/m2       Blood Pressure from Last 3 Encounters:   11/09/18 106/58   11/02/18 124/66   10/02/18 140/70    Weight from Last 3 Encounters:   11/09/18 148 lb (67.1 kg)   11/02/18 147 lb (66.7 kg)   10/02/18 159 lb 4.8 oz (72.3 kg)               Primary Care Provider Office Phone # Fax #    Abran Mott [phone]       [address] [facility] [address]        Equal Access to Services     LINUS ASCENCIO AH: Richard Jama, waaxda luqadaha, qaybta kaalmada benjamin, ethan alan. So [facility]  [phone].    ATENCIÓN: Si suzanne del angel, tiene a lugo disposición servicios gratuitos de asistencia lingüística. Lakeshia stewart [phone].    We comply with applicable federal civil rights laws and [place] laws. We do not discriminate on the basis of race, color, national origin, age, disability, sex, sexual orientation, or gender identity.            Thank you!     Thank you for choosing [facility]  for your care. Our goal is always to provide you with excellent care. Hearing back from our patients is one way we can continue to improve our services. Please take a few minutes to complete the written survey that you may receive in the mail after your visit with us. Thank you!             Your Updated Medication List - Protect others around you: Learn how to safely use, store and throw away your medicines at www.disposemymeds.org.          This list is accurate as of 11/9/18 10:49 AM.  Always use your most recent med list.                   Brand Name Dispense Instructions for use Diagnosis    ADVIL PO      Take 200 mg by mouth every 6 hours        albuterol 108 (90 Base) MCG/ACT inhaler    PROAIR HFA    1 Inhaler    Inhale 2 puffs into the lungs every 4 hours as needed    Mild intermittent asthma       bumetanide 0.5 MG tablet    BUMEX    1 tablet    Take 1 tablet (0.5 mg) by mouth daily (with breakfast)    Pulmonary HTN (H), Localized edema       CALCIUM + D 500-1000-40 MG-UNT-MCG Chew   Generic drug:  Calcium-Vitamin D-Vitamin K      Take 2 tablets by mouth daily        COMPRESSION STOCKINGS     3 each    1 each daily        DOXYCYCLINE HYCLATE PO      Take 50 mg by mouth 3 times per week Monday,wednesday, friday        fluticasone-salmeterol 250-50 MCG/DOSE diskus inhaler    ADVAIR DISKUS    60 Inhaler    Inhale 1 puff into the lungs 2 times daily    Mild persistent asthma without complication       * HEALON IO      Combination with Pred        * HEALON IO      WITH PREDNISOLONE  MIXTURE 0.001- 0.25%        hyaluronate in balanced salt ophthalmic solution solution    HEALON IN BSS     1 drop every 4 hours (while awake)        MARGAUX 128 5 % ophthalmic solution   Generic drug:  sodium chloride      1 drop At Bedtime OINTMENT        OCUSOFT EYE WASH OP           olmesartan 40 MG tablet    BENICAR    90 tablet    Take 1 tablet (40 mg) by mouth daily    Essential hypertension with goal blood pressure less than 130/80       omeprazole 20 MG CR capsule    priLOSEC    180 capsule    TAKE 1 CAPSULE (20 MG) BY MOUTH 2 TIMES DAILY    Gastroesophageal reflux disease without esophagitis       order for DME      2L of O2 at night        REQUIP 0.25 MG tablet   Generic drug:  rOPINIRole      Take 2 nightly, and one in the afternoon as needed    Restless legs syndrome (RLS)       RESTASIS 0.05 % ophthalmic emulsion   Generic drug:  cycloSPORINE      1 drop to left eye twice daily        SYSTANE 0.4-0.3 % Soln ophthalmic solution   Generic drug:  polyethylene glycol 0.4%- propylene glycol 0.3%      1 drop to left eye 4 times daily        vitamin D 2000 units tablet      Take 1,000 Units by mouth daily    Vitamin D deficiency       * Notice:  This list has 2 medication(s) that are the same as other medications prescribed for you. Read the directions carefully, and ask your doctor or other care provider to review them with you.

## 2018-11-09 NOTE — Clinical Note
Dr. More,   Would you consider an intraarticular hip injection for this patient?  Today her pain seems to be coming from the hip joint, however she is also complicated by her lumber DDD and sciatic pain.    Thanks! Henrique Bruce PA-C

## 2018-11-09 NOTE — PROGRESS NOTES
"  SUBJECTIVE:   Rupinder Tracy is a 78 year old female who presents to clinic today for the following health issues:      Musculoskeletal problem/pain      Duration: since June    Description  Location: starts in low back, lt leg    Intensity:  moderate    Accompanying signs and symptoms: radiation of pain to inside of lt knee    History  Previous similar problem: no   Previous evaluation:  x-ray, ultrasound and orthopedic evaluation    Precipitating or alleviating factors:  Trauma or overuse: no   Aggravating factors include: standing, walking and lying down on it    Therapies tried and outcome: physical therapy and steroid injection, advil- not much relief      Heat helps the most  She made the appointment intially because she still had burning with urination (5 days into bladder infection) - now her bladder is much better  Her leg pain is bad today, worst in the left groin.   She went to cardiac rehab yesterday, and on the recumbent bike for 20 minutes and walked for 8 minutes.   Also with the snow (bariatric pressure change) her pain is worse  Her pain concern is a question of metastatic breast cancer to her hip  She had a greater trochanteric bursa injection this summer which did not help   She describes a \"shopping cart\" position with walking is more comfortable than standing up straight.   She saw Dr. More at [facility]     Red flag symptoms:  Denies saddle anesthesia.  Worsening urinary incontinence.  Occasional bowel incontinence.   Denies fever and chills.    Denies recent IV drug use.    Denies recent weight loss.   She has a history of breast cancer.   Denies prolonged steroid use.  Known history of osteoporosis.    Problem list and histories reviewed & adjusted, as indicated.  Additional history: as documented    Patient Active Problem List   Diagnosis     Glaucoma     Mitral valve disorder     Osteoporosis, severe     Female stress incontinence     Lactose Intolerance     " Hyperlipidemia LDL goal <100     Mild persistent asthma     Peripheral vascular disease (H)     Presbyesophagus     Left Ventricular Hypertrophy     Patent foramen ovale     History of Ductal Carcinoma in Situ of Left Breast     Nocturnal oxygen desaturation     Advance Care Planning     Diverticulosis of sigmoid colon     Ovarian cysts     Vitamin D deficiency     Gastric ulcer     Restrictive lung disease     Aortic root enlargement (H)     Pulmonary HTN (H)     Dyspnea     Esophageal spasm     Restless legs syndrome (RLS)     Chronic pain, back     Pneumonia     Difficult airway for intubation     CKD (chronic kidney disease) stage 3, GFR 30-59 ml/min (H)     Essential hypertension with goal blood pressure less than 130/85     Diastolic dysfunction     RUQ abdominal pain     S/P cholecystectomy -- 1982     Abdominal pain     Edema of both lower legs due to peripheral venous insufficiency     Gout of left foot     Past Surgical History:   Procedure Laterality Date     BIOPSY       C APPENDECTOMY  1982     C NONSPECIFIC PROCEDURE  1945    adenoidectomy     C NONSPECIFIC PROCEDURE      bilat retinal detachment     C NONSPECIFIC PROCEDURE      blephoroplasty bilat     C NONSPECIFIC PROCEDURE  1997    glaucoma procedure L     C NONSPECIFIC PROCEDURE  1997, 2007, 2014    corneal transplant L     C NONSPECIFIC PROCEDURE  1945    strabismus procedure R eye     C NONSPECIFIC PROCEDURE  1960's    R breast bx     C NONSPECIFIC PROCEDURE  5/05    Angioplasty and stenting mesenteric vessels     C NONSPECIFIC PROCEDURE  2008    L corneal transplant     COLONOSCOPY      due 2015     ENDOSCOPIC RETROGRADE CHOLANGIOPANCREATOGRAM N/A 10/13/2017    Procedure: ENDOSCOPIC RETROGRADE CHOLANGIOPANCREATOGRAM;  ENDOSCOPIC RETROGRADE CHOLANGIOPANCREATOGRAM ;  Surgeon: Isabel Yousif MD;  Location: [facility] OR     HC REMOVAL GALLBLADDER  1982    Cholecystectomy     HEART CATH RIGHT AND LEFT HEART CATH  1/19/15     HERNIORRHAPHY VENTRAL N/A  4/19/2016    Procedure: HERNIORRHAPHY VENTRAL;  Surgeon: Hamlet Ness MD;  Location: [facility] OR     MASTECTOMY SIMPLE BILATERAL  10/5/09    bilateral mastectomy     SURGICAL HISTORY OF -   6/2010    breast reconstruction       Social History   Substance Use Topics     Smoking status: Never Smoker     Smokeless tobacco: Never Used     Alcohol use 4.2 oz/week     7 Glasses of wine per week      Comment: rarely     Family History   Problem Relation Age of Onset     Adopted: Yes     C.A.D. Paternal Uncle      MI 50's     Family History Negative Daughter          Current Outpatient Prescriptions   Medication Sig Dispense Refill     albuterol (ALBUTEROL) 108 (90 BASE) MCG/ACT inhaler Inhale 2 puffs into the lungs every 4 hours as needed 1 Inhaler 5     bumetanide (BUMEX) 0.5 MG tablet Take 1 tablet (0.5 mg) by mouth daily (with breakfast) 1 tablet 0     Calcium-Vitamin D-Vitamin K (CALCIUM + D) 500-1000-40 MG-UNT-MCG CHEW Take 2 tablets by mouth daily        Cholecalciferol (VITAMIN D) 2000 UNITS tablet Take 1,000 Units by mouth daily        COMPRESSION STOCKINGS 1 each daily 3 each 1     DOXYCYCLINE HYCLATE PO Take 50 mg by mouth 3 times per week Monday,wednesday, friday       fluticasone-salmeterol (ADVAIR DISKUS) 250-50 MCG/DOSE diskus inhaler Inhale 1 puff into the lungs 2 times daily 60 Inhaler 1     hyaluronate in balanced salt ophthalmic solution (HEALON IN BSS) solution 1 drop every 4 hours (while awake)       Ibuprofen (ADVIL PO) Take 200 mg by mouth every 6 hours       olmesartan (BENICAR) 40 MG tablet Take 1 tablet (40 mg) by mouth daily 90 tablet 2     omeprazole (PRILOSEC) 20 MG CR capsule TAKE 1 CAPSULE (20 MG) BY MOUTH 2 TIMES DAILY 180 capsule 3     Ophthalmic Irrigation Solution (OCUSOFT EYE WASH OP)        order for DME 2L of O2 at night       RESTASIS 0.05 % OP EMUL 1 drop to left eye twice daily       rOPINIRole (REQUIP) 0.25 MG tablet Take 2 nightly, and one in the afternoon as needed       sodium  "chloride (MARGAUX 128) 5 % ophthalmic solution 1 drop At Bedtime OINTMENT       Sodium Hyaluronate (HEALON IO) Combination with Pred       Sodium Hyaluronate (HEALON IO) WITH PREDNISOLONE MIXTURE 0.001- 0.25%       SYSTANE 0.4-0.3 % OP SOLN 1 drop to left eye 4 times daily       Allergies   Allergen Reactions     Atorvastatin Calcium      myalgias     Celecoxib Swelling     edema     Cholestyramine Diarrhea     Diarrhea       Crestor [Rosuvastatin Calcium]      leg aches, likely from medication     Cyclobenzaprine Hcl      flexeril--gets \"goofy\"     Dulera      tremors     Gabapentin      Nightmares.   Retrial of medication caused tremors.     Lisinopril Cough     Cough/ Dizziness at high dose.     Meperidine Hcl Nausea and Vomiting     demerol-severe nausea     Morphine Nausea and Vomiting     Naprosyn [Naproxen] GI Disturbance     Niaspan [Niacin]      flushing, severe     Percocet [Oxycodone-Acetaminophen] Nausea     Nausea, lightheadedness.   Tolerates med if taken with food.     Pravastatin Swelling     Edema, myalgias     Red Yeast Rice [Cholestin] GI Disturbance     Bloating, etc.     Simvastatin      myalgias     Timolol Maleate Difficulty breathing     timoptic--respiratory problems     Hctz Rash     rash     Sulfa Drugs Itching and Rash     rash on all mucous membranes and palms of hands with intense itching       Reviewed and updated as needed this visit by clinical staff  Tobacco  Allergies  Meds  Med Hx  Surg Hx  Fam Hx  Soc Hx      Reviewed and updated as needed this visit by Provider         Review of Systems   Constitutional: Negative.    HENT: Negative.    Eyes: Negative.    Respiratory: Negative.    Cardiovascular: Negative.    Gastrointestinal: Negative.    Genitourinary: Negative.    Musculoskeletal:        As in HPI   Neurological:        As in HPI   Psychiatric/Behavioral: Negative.        OBJECTIVE:     /58 (Cuff Size: Adult Regular)  Pulse 72  Temp 97  F (36.1  C) (Tympanic)  Resp " 16  Wt 148 lb (67.1 kg)  [facility] 01/01/1990  SpO2 98%  BMI 25.4 kg/m2  Body mass index is 25.4 kg/(m^2).    Physical Exam   Constitutional: She is oriented to person, place, and time. She appears well-developed and well-nourished.   HENT:   Head: Normocephalic and atraumatic.   Nose: Nose normal.   Eyes: Conjunctivae and EOM are normal.   Neck: Normal range of motion.   Pulmonary/Chest: Effort normal.   Musculoskeletal:        Right hip: She exhibits normal range of motion, normal strength and no bony tenderness.        Left hip: She exhibits decreased range of motion (significantly limited motion with pain on IR/ER left hip). She exhibits normal strength and no bony tenderness.        Lumbar back: She exhibits decreased range of motion.   Neurological: She is alert and oriented to person, place, and time. No sensory deficit.   Skin: Skin is warm and dry.   Psychiatric: She has a normal mood and affect. Judgment normal.       XR of the left hip from 3/30/2018 was reviewed.  My findings - joint space narrowing at the lower acetabulum with irregular joint surface - consistent with osteoarthritis.     No results found for this or any previous visit (from the past 24 hour(s)).    ASSESSMENT/PLAN:       ICD-10-CM    1. Hip pain, left M25.552 MR Lumbar Spine w/o Contrast   2. Urinary incontinence, unspecified type R32 MR Lumbar Spine w/o Contrast   3. Cervical stenosis of spinal canal M48.02 MR Lumbar Spine w/o Contrast      MDM  Number of Diagnoses or Management Options  Cervical stenosis of spinal canal:   Hip pain, left:   Urinary incontinence, unspecified type:   Diagnosis management comments: 1. For the left hip pain, I feel it is most likely coming from the hip joint.  She may benefit from an intraarticular hip injection for diagnostic and therapeutic treatment.    2. For the low back - she has known central stenosis - also new red flags symptoms including urinary and bowel incontinence.  MRI ordered.   Her hip pain  may also be coming from her low back.    Suggest follow up at [facility], I will send today's visit to Dr. Subhash More as well.       There are no Patient Instructions on file for this visit.    Henrique Bruce PA-C  [facility]

## 2018-11-13 ENCOUNTER — HOSPITAL ENCOUNTER (OUTPATIENT)
Dept: CARDIAC REHAB | Facility: CLINIC | Age: 78
End: 2018-11-13
Attending: INTERNAL MEDICINE
Payer: MEDICARE

## 2018-11-13 PROCEDURE — G0424 PULMONARY REHAB W EXER: HCPCS

## 2018-11-13 PROCEDURE — 40000244 ZZH STATISTIC VISIT PULM REHAB

## 2018-11-15 ENCOUNTER — TRANSFERRED RECORDS (OUTPATIENT)
Dept: HEALTH INFORMATION MANAGEMENT | Facility: CLINIC | Age: 78
End: 2018-11-15

## 2018-11-16 ENCOUNTER — TELEPHONE (OUTPATIENT)
Dept: FAMILY MEDICINE | Facility: CLINIC | Age: 78
End: 2018-11-16

## 2018-11-16 NOTE — TELEPHONE ENCOUNTER
Call made to [facility] imaging to retrieve report.    Report placed at the desk of the provider, Henrique Bruce for review/advice.    Triage to call patient with results once the provider reviews report

## 2018-11-16 NOTE — TELEPHONE ENCOUNTER
Reason for Call:  Request for results:    Name of test or procedure: MRI lumbar spine    Date of test of procedure: 11-15    [facility] [place]    OK to leave the result message on voice mail or with a family member? YES    Phone number Patient can be reached at:  Home number on file [phone] (home)    Additional comments: Pt states she is still in pain and Advil not helping.    Call taken on 11/16/2018 at 2:30 PM by EVELIO HENRY

## 2018-11-19 NOTE — TELEPHONE ENCOUNTER
The next step is to schedule a left hip injection.  She will call [facility] to schedule this, and we will go from there.      The lumbar MRI is not significantly changes from before, so although the pain may be coming from her back, it has not changed from before.      Henrique Bruce PA-C

## 2018-11-20 ENCOUNTER — OFFICE VISIT (OUTPATIENT)
Dept: CARDIOLOGY | Facility: CLINIC | Age: 78
End: 2018-11-20
Attending: INTERNAL MEDICINE
Payer: COMMERCIAL

## 2018-11-20 VITALS
HEIGHT: 64 IN | WEIGHT: 145.3 LBS | OXYGEN SATURATION: 100 % | DIASTOLIC BLOOD PRESSURE: 54 MMHG | SYSTOLIC BLOOD PRESSURE: 115 MMHG | HEART RATE: 77 BPM | BODY MASS INDEX: 24.81 KG/M2

## 2018-11-20 DIAGNOSIS — I10 ESSENTIAL HYPERTENSION WITH GOAL BLOOD PRESSURE LESS THAN 130/85: ICD-10-CM

## 2018-11-20 DIAGNOSIS — E78.5 HYPERLIPIDEMIA LDL GOAL <100: ICD-10-CM

## 2018-11-20 DIAGNOSIS — I87.2 EDEMA OF BOTH LOWER LEGS DUE TO PERIPHERAL VENOUS INSUFFICIENCY: ICD-10-CM

## 2018-11-20 DIAGNOSIS — I51.7 LVH (LEFT VENTRICULAR HYPERTROPHY): ICD-10-CM

## 2018-11-20 DIAGNOSIS — R06.02 SHORTNESS OF BREATH: ICD-10-CM

## 2018-11-20 DIAGNOSIS — I27.20 PULMONARY HTN (H): ICD-10-CM

## 2018-11-20 DIAGNOSIS — I77.89 AORTIC ROOT ENLARGEMENT (H): ICD-10-CM

## 2018-11-20 DIAGNOSIS — R60.0 EDEMA OF BOTH LOWER LEGS DUE TO PERIPHERAL VENOUS INSUFFICIENCY: ICD-10-CM

## 2018-11-20 DIAGNOSIS — Q21.12 PATENT FORAMEN OVALE: ICD-10-CM

## 2018-11-20 LAB
ANION GAP SERPL CALCULATED.3IONS-SCNC: 7 MMOL/L (ref 3–14)
BUN SERPL-MCNC: 49 MG/DL (ref 7–30)
CALCIUM SERPL-MCNC: 9.4 MG/DL (ref 8.5–10.1)
CHLORIDE SERPL-SCNC: 111 MMOL/L (ref 94–109)
CO2 SERPL-SCNC: 25 MMOL/L (ref 20–32)
CREAT SERPL-MCNC: 1.01 MG/DL (ref 0.52–1.04)
GFR SERPL CREATININE-BSD FRML MDRD: 53 ML/MIN/1.7M2
GLUCOSE SERPL-MCNC: 103 MG/DL (ref 70–99)
POTASSIUM SERPL-SCNC: 4.9 MMOL/L (ref 3.4–5.3)
SODIUM SERPL-SCNC: 143 MMOL/L (ref 133–144)

## 2018-11-20 PROCEDURE — 80048 BASIC METABOLIC PNL TOTAL CA: CPT | Performed by: INTERNAL MEDICINE

## 2018-11-20 PROCEDURE — 99214 OFFICE O/P EST MOD 30 MIN: CPT | Performed by: INTERNAL MEDICINE

## 2018-11-20 PROCEDURE — 36415 COLL VENOUS BLD VENIPUNCTURE: CPT | Performed by: INTERNAL MEDICINE

## 2018-11-20 RX ORDER — PREDNISOLONE ACETATE 10 MG/ML
SUSPENSION/ DROPS OPHTHALMIC
Refills: 3 | COMMUNITY
Start: 2018-09-02 | End: 2020-01-01

## 2018-11-20 NOTE — LETTER
11/20/2018      Abran Mott MD  [address] [facility] [address]      RE: Rupinder Tracy       Dear Colleague,    I had the pleasure of seeing Rupinder Tracy in the [facility].    Service Date: 11/20/2018      PRIMARY CARE PHYSICIAN:  Dr. Abran Mott.      HISTORY OF PRESENT ILLNESS:  I again had the pleasure of seeing your patient, Rupinder Tracy, at [facility] for evaluation of pulmonary hypertension, systemic hypertension and patent foramen ovale.  She also has a history of mild aortic root enlargement.  Left and right heart catheterization performed in 01/2015 for pulmonary hypertension and aortic insufficiency demonstrated no significant coronary artery disease.  Aortogram showed moderate aortic insufficiency and moderate annuloaortic ectasia.  There was moderate pulmonary hypertension with a mean PA pressure of 32 mmHg and a wide pulse pressure in the PA tracing consistent with at least moderate pulmonary valve insufficiency.  Pulmonary vascular resistance was normal and there was no significant gradient between the end-diastolic pressure in the pulmonary capillary wedge pressure and the left ventricular end-diastolic pressure.  It was felt that the elevated pulmonary pressure was likely secondary to impaired relaxation both from the aortic insufficiency and systemic hypertension and perhaps pulmonary causes such as obstructive sleep apnea or asthma.  We tested for sleep apnea and showed desaturations but she never reached REM sleep and did not snore.  Her blood pressure has been under excellent control.  Her weight has been under control as well.  We are using Bumex in a sliding scale.  Her creatinine bumps up every time we use the Bumex at higher doses.  She was seen by Dr. Regla Silva in the Pulmonary Hypertension Department who suggested multiple tests including repeat right heart cath.  The patient has significant COPD and asthma  since the 1980s.  The patient has been participating in outpatient pulmonary rehab and is feeling considerably better because of it.  On the other hand, she is having ongoing left leg pain which is felt to emanate from her back.  An MRI was performed and she has followup at [facility] next week.  She has a left greater than right lymphedema.  Apparently her pulmonary function tests have improved since she has been in pulmonary rehab.      PHYSICAL EXAMINATION:  As listed below.      ASSESSMENT:   1.  Rupinder Tracy is a delightful 78-year-old female with a small patent foramen ovale that is inconsequential and without hemodynamic effects.  She has pulmonary hypertension on a secondary basis likely due to diastolic dysfunction of the left ventricle, aortic insufficiency as well as COPD and asthma and possibly obstructive sleep apnea.  She is tolerating low-dose Bumex and her weight and blood pressure are stable.  She continues with mild dyspnea on exertion.  Because of her stability we have decided not to plan any further testing such as a ventilation/perfusion scan.   2.  Normal coronary arteries.   3.  Mild aortic insufficiency and mild aortic root enlargement.  We continued to monitor this annually in the past.  We will make a decision in 2019 when to perform another echocardiogram.   4.  The patient's peripheral edema is minimal at this time.  The Lymphedema Clinic continues to treat her left lower extremity.  She wears compression stockings.      It is my pleasure to assist in the care of Rupinder Tracy.  I will plan on seeing her again in 6 months or earlier on a p.r.n. basis.  I reviewed her BMP with her today including BUN of 49 and creatinine of 1.01 with potassium 4.9.  These are all stable.  All the patient's questions were answered to her satisfaction.      Jae Butler MD      cc:   Abran Mott MD    [address]  [address]         JAE BARNETT  MD [place], [facility]             D: 2018   T: 2018   MT: STACEY      Name:     JUDY RUSS   MRN:      [id number]        Account:      [id number]   :      1940           Service Date: 2018      Document: [id number]           Outpatient Encounter Prescriptions as of 2018   Medication Sig Dispense Refill     albuterol (ALBUTEROL) 108 (90 BASE) MCG/ACT inhaler Inhale 2 puffs into the lungs every 4 hours as needed 1 Inhaler 5     bumetanide (BUMEX) 0.5 MG tablet Take 1 tablet (0.5 mg) by mouth daily (with breakfast) 1 tablet 0     Calcium-Vitamin D-Vitamin K (CALCIUM + D) 500-1000-40 MG-UNT-MCG CHEW Take 2 tablets by mouth daily        Cholecalciferol (VITAMIN D) 2000 UNITS tablet Take 1,000 Units by mouth daily        DOXYCYCLINE HYCLATE PO Take 50 mg by mouth 3 times per week Monday,wednesday, friday       fluticasone-salmeterol (ADVAIR DISKUS) 250-50 MCG/DOSE diskus inhaler Inhale 1 puff into the lungs 2 times daily 60 Inhaler 1     Ibuprofen (ADVIL PO) Take 200 mg by mouth every 6 hours       olmesartan (BENICAR) 40 MG tablet Take 1 tablet (40 mg) by mouth daily 90 tablet 2     omeprazole (PRILOSEC) 20 MG CR capsule TAKE 1 CAPSULE (20 MG) BY MOUTH 2 TIMES DAILY 180 capsule 3     Ophthalmic Irrigation Solution (OCUSOFT EYE WASH OP)        prednisoLONE acetate (PRED FORTE) 1 % ophthalmic susp INSTILL 1 DROP INTO INTO LEFT EYE TWICE A DAY  3     RESTASIS 0.05 % OP EMUL 1 drop to left eye twice daily       rOPINIRole (REQUIP) 0.25 MG tablet Take 2 nightly, and one in the afternoon as needed       sodium chloride (MARGAUX 128) 5 % ophthalmic solution 1 drop At Bedtime OINTMENT       Sodium Hyaluronate (HEALON IO) Combination with Pred       SYSTANE 0.4-0.3 % OP SOLN 1 drop to left eye 4 times daily       COMPRESSION STOCKINGS 1 each daily 3 each 1     hyaluronate in balanced salt ophthalmic solution (HEALON IN BSS) solution 1 drop every 4 hours (while awake)       order for DME 2L of O2 at  night       Sodium Hyaluronate (HEALON IO) WITH PREDNISOLONE MIXTURE 0.001- 0.25%       No facility-administered encounter medications on file as of 11/20/2018.      Again, thank you for allowing me to participate in the care of your patient.      Sincerely,    Jae uBtler MD     [facility]

## 2018-11-20 NOTE — LETTER
11/20/2018    Abran Mott MD  [address] [facility] [address]    RE: Rupinder Cast Rossana       Dear Colleague,    I had the pleasure of seeing Rupinder Tracy in the [facility].    HPI and Plan:   See dictation:[id number]    Orders Placed This Encounter   Procedures     Follow-Up with Cardiologist       Orders Placed This Encounter   Medications     prednisoLONE acetate (PRED FORTE) 1 % ophthalmic susp     Sig: INSTILL 1 DROP INTO INTO LEFT EYE TWICE A DAY     Refill:  3       There are no discontinued medications.      Encounter Diagnoses   Name Primary?     Pulmonary HTN (H)      Shortness of breath      Essential hypertension with goal blood pressure less than 130/85      Patent foramen ovale      Aortic root enlargement (H)      Edema of both lower legs due to peripheral venous insufficiency      Left Ventricular Hypertrophy      Hyperlipidemia LDL goal <100        CURRENT MEDICATIONS:  Current Outpatient Prescriptions   Medication Sig Dispense Refill     albuterol (ALBUTEROL) 108 (90 BASE) MCG/ACT inhaler Inhale 2 puffs into the lungs every 4 hours as needed 1 Inhaler 5     bumetanide (BUMEX) 0.5 MG tablet Take 1 tablet (0.5 mg) by mouth daily (with breakfast) 1 tablet 0     Calcium-Vitamin D-Vitamin K (CALCIUM + D) 500-1000-40 MG-UNT-MCG CHEW Take 2 tablets by mouth daily        Cholecalciferol (VITAMIN D) 2000 UNITS tablet Take 1,000 Units by mouth daily        DOXYCYCLINE HYCLATE PO Take 50 mg by mouth 3 times per week Monday,wednesday, friday       fluticasone-salmeterol (ADVAIR DISKUS) 250-50 MCG/DOSE diskus inhaler Inhale 1 puff into the lungs 2 times daily 60 Inhaler 1     Ibuprofen (ADVIL PO) Take 200 mg by mouth every 6 hours       olmesartan (BENICAR) 40 MG tablet Take 1 tablet (40 mg) by mouth daily 90 tablet 2     omeprazole (PRILOSEC) 20 MG CR capsule TAKE 1 CAPSULE (20 MG) BY MOUTH 2 TIMES DAILY 180 capsule 3     Ophthalmic Irrigation Solution (OCUSOFT EYE WASH  "OP)        prednisoLONE acetate (PRED FORTE) 1 % ophthalmic susp INSTILL 1 DROP INTO INTO LEFT EYE TWICE A DAY  3     RESTASIS 0.05 % OP EMUL 1 drop to left eye twice daily       rOPINIRole (REQUIP) 0.25 MG tablet Take 2 nightly, and one in the afternoon as needed       sodium chloride (MARGAUX 128) 5 % ophthalmic solution 1 drop At Bedtime OINTMENT       Sodium Hyaluronate (HEALON IO) Combination with Pred       SYSTANE 0.4-0.3 % OP SOLN 1 drop to left eye 4 times daily       COMPRESSION STOCKINGS 1 each daily 3 each 1     hyaluronate in balanced salt ophthalmic solution (HEALON IN BSS) solution 1 drop every 4 hours (while awake)       order for DME 2L of O2 at night       Sodium Hyaluronate (HEALON IO) WITH PREDNISOLONE MIXTURE 0.001- 0.25%         ALLERGIES     Allergies   Allergen Reactions     Atorvastatin Calcium      myalgias     Celecoxib Swelling     edema     Cholestyramine Diarrhea     Diarrhea       Crestor [Rosuvastatin Calcium]      leg aches, likely from medication     Cyclobenzaprine Hcl      flexeril--gets \"goofy\"     Dulera      tremors     Gabapentin      Nightmares.   Retrial of medication caused tremors.     Lisinopril Cough     Cough/ Dizziness at high dose.     Meperidine Hcl Nausea and Vomiting     demerol-severe nausea     Morphine Nausea and Vomiting     Naprosyn [Naproxen] GI Disturbance     Niaspan [Niacin]      flushing, severe     Percocet [Oxycodone-Acetaminophen] Nausea     Nausea, lightheadedness.   Tolerates med if taken with food.     Pravastatin Swelling     Edema, myalgias     Red Yeast Rice [Cholestin] GI Disturbance     Bloating, etc.     Simvastatin      myalgias     Timolol Maleate Difficulty breathing     timoptic--respiratory problems     Hctz Rash     rash     Sulfa Drugs Itching and Rash     rash on all mucous membranes and palms of hands with intense itching       PAST MEDICAL HISTORY:  Past Medical History:   Diagnosis Date     Arthritis      Asthma      Cellulitis 4/13 in " [place]    R ankle     COPD (chronic obstructive pulmonary disease) (H)      Difficult airway for intubation      Ductal Carcinoma in Situ of Left Breast 9/09     Duodenal ulcer, unspecified as acute or chronic, without mention of hemorrhage or perforation 1980's    felt due to high dose steroids     Female stress incontinence      H/O right and left heart catheterization     1-     HYPERLIPIDEMIA      Hypertension      Ischemic colitis 7/2001    [facility]     Lactose Intolerance     Mild     Legal blindness     Artificial right eye, severe vision loss left (Detached retina's, glaucoma, corneal transplants)     Mild intermittent asthma ~1980's     Nocturnal oxygen desaturation      Osteoporosis, unspecified ~2002     Patent foramen ovale      PERIPHERAL VASCULAR DISEASE 5/05    mesenteric arteries, angioplasty     PRESBYESOPHAGUS 6/04     Pulmonary HTN (H) 11/2014     Serous retinal detachment     False eye right     Subarachnoid hemorrhage following injury (H) 12/10    due to fall, vascular evaluation negative     Syncope and collapse 12/10     Unspecified glaucoma(365.9)        PAST SURGICAL HISTORY:  Past Surgical History:   Procedure Laterality Date     BIOPSY       C APPENDECTOMY  1982     C NONSPECIFIC PROCEDURE  1945    adenoidectomy     C NONSPECIFIC PROCEDURE      bilat retinal detachment     C NONSPECIFIC PROCEDURE      blephoroplasty bilat     C NONSPECIFIC PROCEDURE  1997    glaucoma procedure L     C NONSPECIFIC PROCEDURE  1997, 2007, 2014    corneal transplant L     C NONSPECIFIC PROCEDURE  1945    strabismus procedure R eye     C NONSPECIFIC PROCEDURE  1960's    R breast bx     C NONSPECIFIC PROCEDURE  5/05    Angioplasty and stenting mesenteric vessels     C NONSPECIFIC PROCEDURE  2008    L corneal transplant     COLONOSCOPY      due 2015     ENDOSCOPIC RETROGRADE CHOLANGIOPANCREATOGRAM N/A 10/13/2017    Procedure: ENDOSCOPIC RETROGRADE CHOLANGIOPANCREATOGRAM;  ENDOSCOPIC RETROGRADE  CHOLANGIOPANCREATOGRAM ;  Surgeon: Isabel Yousif MD;  Location: [facility] OR     HC REMOVAL GALLBLADDER  1982    Cholecystectomy     HEART CATH RIGHT AND LEFT HEART CATH  1/19/15     HERNIORRHAPHY VENTRAL N/A 4/19/2016    Procedure: HERNIORRHAPHY VENTRAL;  Surgeon: Hamlet Ness MD;  Location: [facility] OR     MASTECTOMY SIMPLE BILATERAL  10/5/09    bilateral mastectomy     SURGICAL HISTORY OF -   6/2010    breast reconstruction       FAMILY HISTORY:  Family History   Problem Relation Age of Onset     Adopted: Yes     C.A.D. Paternal Uncle      MI 50's     Family History Negative Daughter        SOCIAL HISTORY:  Social History     Social History     Marital status:      Spouse name: N/A     Number of children: 4     Years of education: N/A     Occupational History     Retired nurse Retired     Social History Main Topics     Smoking status: Never Smoker     Smokeless tobacco: Never Used     Alcohol use 4.2 oz/week     7 Glasses of wine per week      Comment: rarely     Drug use: No     Sexual activity: No     Other Topics Concern     Caffeine Concern No     1- 2 cups coffee     Sleep Concern No     Stress Concern No     Weight Concern No     Special Diet Yes     low sodium     Exercise No     2 days a week (abigail chi), walking program, stationary bike 10 minutes 3 times per week     Seat Belt Yes     Social History Narrative    , 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  She walks with a walker.  Is a full code.  (last updated 10/11/2017)        Review of Systems:  Skin:  Positive for bruising bruise back in March and was eventually seen in ER   Eyes:  Positive for glasses Foggy vision  ENT:  Positive for sinus trouble Dry nose  Respiratory:  Positive for dyspnea on exertion;shortness of breath;cough (improved.) O2 at night, SOB and CRUZ and coughing are occassionally   Cardiovascular:  Negative edema;fatigue;Positive for (wears support socks) lymphedema in  "left leg since March/April  Gastroenterology: Negative heartburn;vomiting;nausea;diarrhea    Genitourinary:  Positive for nocturia    Musculoskeletal:  Positive for joint pain;back pain hip pain at night  Neurologic:  Positive for local weakness left leg  Psychiatric:  Positive for sleep disturbances;anxiety When she can't leave her house  Heme/Lymph/Imm:  Positive for allergies;easy bruising    Endocrine:  Negative        Physical Exam:  Vitals: /54  Pulse 77  Ht 1.626 m (5' 4\")  Wt 65.9 kg (145 lb 4.8 oz)  LMP 01/01/1990  SpO2 100%  BMI 24.94 kg/m2    Constitutional:  cooperative, alert and oriented, well developed, well nourished, in no acute distress        Skin:  warm and dry to the touch, no apparent skin lesions or masses noted          Head:  normocephalic, no masses or lesions        Eyes:  pupils equal and round   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    Lymph:      ENT:  no pallor or cyanosis, dentition good        Neck:  carotid pulses are full and equal bilaterally, JVP normal, no carotid bruit        Respiratory:  normal breath sounds, clear to auscultation, normal A-P diameter, normal symmetry, normal respiratory excursion, no use of accessory muscles         Cardiac: regular rhythm;normal S1 and S2;no S3 or S4;apical impulse not displaced frequent premature beats   no presence of murmur          pulses full and equal, no bruits auscultated                                        GI:  abdomen soft, non-tender, BS normoactive, no mass, no HSM, no bruits        Extremities and Muscular Skeletal:  no deformities, clubbing, cyanosis, erythema observed       LLE edema;1+ wearing support stocking on right LE, left LE ace wrapped    Neurological:  no gross motor deficits;affect appropriate   walks with a walker    Psych:  Alert and Oriented x 3        CC  Jae Butler MD  [address]  [place] [address]                Thank you for allowing me to " participate in the care of your patient.      Sincerely,     Jae Butler MD     [facility]    cc:   Jae Butler MD  [address] [place] [id number]

## 2018-11-20 NOTE — MR AVS SNAPSHOT
After Visit Summary   11/20/2018    Rupinder Tracy    MRN: [id number]           Patient Information     Date Of Birth          1940        Visit Information        Provider Department      11/20/2018 3:45 PM Jae Butler MD [facility]        Today's Diagnoses     Pulmonary HTN (H)        Shortness of breath        Essential hypertension with goal blood pressure less than 130/85        Patent foramen ovale        Aortic root enlargement (H)        Edema of both lower legs due to peripheral venous insufficiency        Left Ventricular Hypertrophy        Hyperlipidemia LDL goal <100           Follow-ups after your visit        Additional Services     Follow-Up with Cardiologist                 Future tests that were ordered for you today     Open Future Orders        Priority Expected Expires Ordered    Follow-Up with Cardiologist Routine 5/19/2019 11/20/2019 11/20/2018            Who to contact     If you have questions or need follow up information about today's clinic visit or your schedule please contact [facility] directly at [phone].  Normal or non-critical lab and imaging results will be communicated to you by MyChart, letter or phone within 4 business days after the clinic has received the results. If you do not hear from us within 7 days, please contact the clinic through MyChart or phone. If you have a critical or abnormal lab result, we will notify you by phone as soon as possible.  Submit refill requests through [employer] or call your pharmacy and they will forward the refill request to us. Please allow 3 business days for your refill to be completed.          Additional Information About Your Visit        Care EveryWhere ID     This is your Care EveryWhere ID. This could be used by other organizations to access your [place] medical records  [id number]        Your Vitals Were     Pulse Height  "Last Period Pulse Oximetry BMI (Body Mass Index)       77 1.626 m (5' 4\") 01/01/1990 100% 24.94 kg/m2        Blood Pressure from Last 3 Encounters:   11/20/18 115/54   11/09/18 106/58   11/02/18 124/66    Weight from Last 3 Encounters:   11/20/18 65.9 kg (145 lb 4.8 oz)   11/09/18 67.1 kg (148 lb)   11/02/18 66.7 kg (147 lb)              We Performed the Following     Follow-Up with Cardiologist        Primary Care Provider Office Phone # Fax #    Abran Mott [phone]       [address] [place] [id number]        Equal Access to Services     LINUS ASCENCIO : Richard marieo Sorosario, waaxda luqadaha, qaybta kaalmada adeegyada, ethan huntley . So [facility] [phone].    ATENCIÓN: Si habla español, tiene a lugo disposición servicios gratuitos de asistencia lingüística. Llame al [phone].    We comply with applicable federal civil rights laws and [place] laws. We do not discriminate on the basis of race, color, national origin, age, disability, sex, sexual orientation, or gender identity.            Thank you!     Thank you for choosing [facility]  for your care. Our goal is always to provide you with excellent care. Hearing back from our patients is one way we can continue to improve our services. Please take a few minutes to complete the written survey that you may receive in the mail after your visit with us. Thank you!             Your Updated Medication List - Protect others around you: Learn how to safely use, store and throw away your medicines at www.disposemymeds.org.          This list is accurate as of 11/20/18  4:22 PM.  Always use your most recent med list.                   Brand Name Dispense Instructions for use Diagnosis    ADVIL PO      Take 200 mg by mouth every 6 hours        albuterol 108 (90 Base) MCG/ACT inhaler    PROAIR HFA    1 Inhaler    Inhale 2 puffs into the lungs every 4 hours as " needed    Mild intermittent asthma       bumetanide 0.5 MG tablet    BUMEX    1 tablet    Take 1 tablet (0.5 mg) by mouth daily (with breakfast)    Pulmonary HTN (H), Localized edema       CALCIUM + D 500-1000-40 MG-UNT-MCG Chew   Generic drug:  Calcium-Vitamin D-Vitamin K      Take 2 tablets by mouth daily        COMPRESSION STOCKINGS     3 each    1 each daily        DOXYCYCLINE HYCLATE PO      Take 50 mg by mouth 3 times per week Monday,wednesday, friday        fluticasone-salmeterol 250-50 MCG/DOSE diskus inhaler    ADVAIR DISKUS    60 Inhaler    Inhale 1 puff into the lungs 2 times daily    Mild persistent asthma without complication       * HEALON IO      Combination with Pred        * HEALON IO      WITH PREDNISOLONE MIXTURE 0.001- 0.25%        hyaluronate in balanced salt ophthalmic solution solution    HEALON IN BSS     1 drop every 4 hours (while awake)        MARGAUX 128 5 % ophthalmic solution   Generic drug:  sodium chloride      1 drop At Bedtime OINTMENT        OCUSOFT EYE WASH OP           olmesartan 40 MG tablet    BENICAR    90 tablet    Take 1 tablet (40 mg) by mouth daily    Essential hypertension with goal blood pressure less than 130/80       omeprazole 20 MG CR capsule    priLOSEC    180 capsule    TAKE 1 CAPSULE (20 MG) BY MOUTH 2 TIMES DAILY    Gastroesophageal reflux disease without esophagitis       order for DME      2L of O2 at night        prednisoLONE acetate 1 % ophthalmic susp    PRED FORTE     INSTILL 1 DROP INTO INTO LEFT EYE TWICE A DAY        REQUIP 0.25 MG tablet   Generic drug:  rOPINIRole      Take 2 nightly, and one in the afternoon as needed    Restless legs syndrome (RLS)       RESTASIS 0.05 % ophthalmic emulsion   Generic drug:  cycloSPORINE      1 drop to left eye twice daily        SYSTANE 0.4-0.3 % Soln ophthalmic solution   Generic drug:  polyethylene glycol 0.4%- propylene glycol 0.3%      1 drop to left eye 4 times daily        vitamin D3 2000 units tablet     CHOLECALCIFEROL     Take 1,000 Units by mouth daily    Vitamin D deficiency       * Notice:  This list has 2 medication(s) that are the same as other medications prescribed for you. Read the directions carefully, and ask your doctor or other care provider to review them with you.

## 2018-11-20 NOTE — PROGRESS NOTES
HPI and Plan:   See dictation:[id number]    Orders Placed This Encounter   Procedures     Follow-Up with Cardiologist       Orders Placed This Encounter   Medications     prednisoLONE acetate (PRED FORTE) 1 % ophthalmic susp     Sig: INSTILL 1 DROP INTO INTO LEFT EYE TWICE A DAY     Refill:  3       There are no discontinued medications.      Encounter Diagnoses   Name Primary?     Pulmonary HTN (H)      Shortness of breath      Essential hypertension with goal blood pressure less than 130/85      Patent foramen ovale      Aortic root enlargement (H)      Edema of both lower legs due to peripheral venous insufficiency      Left Ventricular Hypertrophy      Hyperlipidemia LDL goal <100        CURRENT MEDICATIONS:  Current Outpatient Prescriptions   Medication Sig Dispense Refill     albuterol (ALBUTEROL) 108 (90 BASE) MCG/ACT inhaler Inhale 2 puffs into the lungs every 4 hours as needed 1 Inhaler 5     bumetanide (BUMEX) 0.5 MG tablet Take 1 tablet (0.5 mg) by mouth daily (with breakfast) 1 tablet 0     Calcium-Vitamin D-Vitamin K (CALCIUM + D) 500-1000-40 MG-UNT-MCG CHEW Take 2 tablets by mouth daily        Cholecalciferol (VITAMIN D) 2000 UNITS tablet Take 1,000 Units by mouth daily        DOXYCYCLINE HYCLATE PO Take 50 mg by mouth 3 times per week Monday,wednesday, friday       fluticasone-salmeterol (ADVAIR DISKUS) 250-50 MCG/DOSE diskus inhaler Inhale 1 puff into the lungs 2 times daily 60 Inhaler 1     Ibuprofen (ADVIL PO) Take 200 mg by mouth every 6 hours       olmesartan (BENICAR) 40 MG tablet Take 1 tablet (40 mg) by mouth daily 90 tablet 2     omeprazole (PRILOSEC) 20 MG CR capsule TAKE 1 CAPSULE (20 MG) BY MOUTH 2 TIMES DAILY 180 capsule 3     Ophthalmic Irrigation Solution (OCUSOFT EYE WASH OP)        prednisoLONE acetate (PRED FORTE) 1 % ophthalmic susp INSTILL 1 DROP INTO INTO LEFT EYE TWICE A DAY  3     RESTASIS 0.05 % OP EMUL 1 drop to left eye twice daily       rOPINIRole (REQUIP) 0.25 MG tablet Take 2  "nightly, and one in the afternoon as needed       sodium chloride (MARGAUX 128) 5 % ophthalmic solution 1 drop At Bedtime OINTMENT       Sodium Hyaluronate (HEALON IO) Combination with Pred       SYSTANE 0.4-0.3 % OP SOLN 1 drop to left eye 4 times daily       COMPRESSION STOCKINGS 1 each daily 3 each 1     hyaluronate in balanced salt ophthalmic solution (HEALON IN BSS) solution 1 drop every 4 hours (while awake)       order for DME 2L of O2 at night       Sodium Hyaluronate (HEALON IO) WITH PREDNISOLONE MIXTURE 0.001- 0.25%         ALLERGIES     Allergies   Allergen Reactions     Atorvastatin Calcium      myalgias     Celecoxib Swelling     edema     Cholestyramine Diarrhea     Diarrhea       Crestor [Rosuvastatin Calcium]      leg aches, likely from medication     Cyclobenzaprine Hcl      flexeril--gets \"goofy\"     Dulera      tremors     Gabapentin      Nightmares.   Retrial of medication caused tremors.     Lisinopril Cough     Cough/ Dizziness at high dose.     Meperidine Hcl Nausea and Vomiting     demerol-severe nausea     Morphine Nausea and Vomiting     Naprosyn [Naproxen] GI Disturbance     Niaspan [Niacin]      flushing, severe     Percocet [Oxycodone-Acetaminophen] Nausea     Nausea, lightheadedness.   Tolerates med if taken with food.     Pravastatin Swelling     Edema, myalgias     Red Yeast Rice [Cholestin] GI Disturbance     Bloating, etc.     Simvastatin      myalgias     Timolol Maleate Difficulty breathing     timoptic--respiratory problems     Hctz Rash     rash     Sulfa Drugs Itching and Rash     rash on all mucous membranes and palms of hands with intense itching       PAST MEDICAL HISTORY:  Past Medical History:   Diagnosis Date     Arthritis      Asthma      Cellulitis 4/13 in [place]    R ankle     COPD (chronic obstructive pulmonary disease) (H)      Difficult airway for intubation      Ductal Carcinoma in Situ of Left Breast 9/09     Duodenal ulcer, unspecified as acute or chronic, without " mention of hemorrhage or perforation 1980's    felt due to high dose steroids     Female stress incontinence      H/O right and left heart catheterization     1-     HYPERLIPIDEMIA      Hypertension      Ischemic colitis 7/2001    [facility]     Lactose Intolerance     Mild     Legal blindness     Artificial right eye, severe vision loss left (Detached retina's, glaucoma, corneal transplants)     Mild intermittent asthma ~1980's     Nocturnal oxygen desaturation      Osteoporosis, unspecified ~2002     Patent foramen ovale      PERIPHERAL VASCULAR DISEASE 5/05    mesenteric arteries, angioplasty     PRESBYESOPHAGUS 6/04     Pulmonary HTN (H) 11/2014     Serous retinal detachment     False eye right     Subarachnoid hemorrhage following injury (H) 12/10    due to fall, vascular evaluation negative     Syncope and collapse 12/10     Unspecified glaucoma(365.9)        PAST SURGICAL HISTORY:  Past Surgical History:   Procedure Laterality Date     BIOPSY       C APPENDECTOMY  1982     C NONSPECIFIC PROCEDURE  1945    adenoidectomy     C NONSPECIFIC PROCEDURE      bilat retinal detachment     C NONSPECIFIC PROCEDURE      blephoroplasty bilat     C NONSPECIFIC PROCEDURE  1997    glaucoma procedure L     C NONSPECIFIC PROCEDURE  1997, 2007, 2014    corneal transplant L     C NONSPECIFIC PROCEDURE  1945    strabismus procedure R eye     C NONSPECIFIC PROCEDURE  1960's    R breast bx     C NONSPECIFIC PROCEDURE  5/05    Angioplasty and stenting mesenteric vessels     C NONSPECIFIC PROCEDURE  2008    L corneal transplant     COLONOSCOPY      due 2015     ENDOSCOPIC RETROGRADE CHOLANGIOPANCREATOGRAM N/A 10/13/2017    Procedure: ENDOSCOPIC RETROGRADE CHOLANGIOPANCREATOGRAM;  ENDOSCOPIC RETROGRADE CHOLANGIOPANCREATOGRAM ;  Surgeon: Isabel Yousif MD;  Location: [facility] OR     HC REMOVAL GALLBLADDER  1982    Cholecystectomy     HEART CATH RIGHT AND LEFT HEART CATH  1/19/15     HERNIORRHAPHY VENTRAL N/A 4/19/2016     Procedure: HERNIORRHAPHY VENTRAL;  Surgeon: Hamlet Ness MD;  Location: [facility] OR     MASTECTOMY SIMPLE BILATERAL  10/5/09    bilateral mastectomy     SURGICAL HISTORY OF -   6/2010    breast reconstruction       FAMILY HISTORY:  Family History   Problem Relation Age of Onset     Adopted: Yes     C.A.D. Paternal Uncle      MI 50's     Family History Negative Daughter        SOCIAL HISTORY:  Social History     Social History     Marital status:      Spouse name: N/A     Number of children: 4     Years of education: N/A     Occupational History     Retired nurse Retired     Social History Main Topics     Smoking status: Never Smoker     Smokeless tobacco: Never Used     Alcohol use 4.2 oz/week     7 Glasses of wine per week      Comment: rarely     Drug use: No     Sexual activity: No     Other Topics Concern     Caffeine Concern No     1- 2 cups coffee     Sleep Concern No     Stress Concern No     Weight Concern No     Special Diet Yes     low sodium     Exercise No     2 days a week (abigail chi), walking program, stationary bike 10 minutes 3 times per week     Seat Belt Yes     Social History Narrative    , 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  She walks with a walker.  Is a full code.  (last updated 10/11/2017)        Review of Systems:  Skin:  Positive for bruising bruise back in March and was eventually seen in ER   Eyes:  Positive for glasses Foggy vision  ENT:  Positive for sinus trouble Dry nose  Respiratory:  Positive for dyspnea on exertion;shortness of breath;cough (improved.) O2 at night, SOB and CRUZ and coughing are occassionally   Cardiovascular:  Negative edema;fatigue;Positive for (wears support socks) lymphedema in left leg since March/April  Gastroenterology: Negative heartburn;vomiting;nausea;diarrhea    Genitourinary:  Positive for nocturia    Musculoskeletal:  Positive for joint pain;back pain hip pain at night  Neurologic:   "Positive for local weakness left leg  Psychiatric:  Positive for sleep disturbances;anxiety When she can't leave her house  Heme/Lymph/Imm:  Positive for allergies;easy bruising    Endocrine:  Negative        Physical Exam:  Vitals: /54  Pulse 77  Ht 1.626 m (5' 4\")  Wt 65.9 kg (145 lb 4.8 oz)  LMP 01/01/1990  SpO2 100%  BMI 24.94 kg/m2    Constitutional:  cooperative, alert and oriented, well developed, well nourished, in no acute distress        Skin:  warm and dry to the touch, no apparent skin lesions or masses noted          Head:  normocephalic, no masses or lesions        Eyes:  pupils equal and round   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    Lymph:      ENT:  no pallor or cyanosis, dentition good        Neck:  carotid pulses are full and equal bilaterally, JVP normal, no carotid bruit        Respiratory:  normal breath sounds, clear to auscultation, normal A-P diameter, normal symmetry, normal respiratory excursion, no use of accessory muscles         Cardiac: regular rhythm;normal S1 and S2;no S3 or S4;apical impulse not displaced frequent premature beats   no presence of murmur          pulses full and equal, no bruits auscultated                                        GI:  abdomen soft, non-tender, BS normoactive, no mass, no HSM, no bruits        Extremities and Muscular Skeletal:  no deformities, clubbing, cyanosis, erythema observed       LLE edema;1+ wearing support stocking on right LE, left LE ace wrapped    Neurological:  no gross motor deficits;affect appropriate   walks with a walker    Psych:  Alert and Oriented x 3        CC  Jae Butler MD  [address]  [place] [id number]              "

## 2018-11-21 NOTE — PROGRESS NOTES
Service Date: 11/20/2018      PRIMARY CARE PHYSICIAN:  Dr. Abran Mott.      HISTORY OF PRESENT ILLNESS:  I again had the pleasure of seeing your patient, Rupinder Tracy, at [facility] for evaluation of pulmonary hypertension, systemic hypertension and patent foramen ovale.  She also has a history of mild aortic root enlargement.  Left and right heart catheterization performed in 01/2015 for pulmonary hypertension and aortic insufficiency demonstrated no significant coronary artery disease.  Aortogram showed moderate aortic insufficiency and moderate annuloaortic ectasia.  There was moderate pulmonary hypertension with a mean PA pressure of 32 mmHg and a wide pulse pressure in the PA tracing consistent with at least moderate pulmonary valve insufficiency.  Pulmonary vascular resistance was normal and there was no significant gradient between the end-diastolic pressure in the pulmonary capillary wedge pressure and the left ventricular end-diastolic pressure.  It was felt that the elevated pulmonary pressure was likely secondary to impaired relaxation both from the aortic insufficiency and systemic hypertension and perhaps pulmonary causes such as obstructive sleep apnea or asthma.  We tested for sleep apnea and showed desaturations but she never reached REM sleep and did not snore.  Her blood pressure has been under excellent control.  Her weight has been under control as well.  We are using Bumex in a sliding scale.  Her creatinine bumps up every time we use the Bumex at higher doses.  She was seen by Dr. Regla Silva in the Pulmonary Hypertension Department who suggested multiple tests including repeat right heart cath.  The patient has significant COPD and asthma since the 1980s.  The patient has been participating in outpatient pulmonary rehab and is feeling considerably better because of it.  On the other hand, she is having ongoing left leg pain which is felt to emanate from her back.   An MRI was performed and she has followup at [facility] next week.  She has a left greater than right lymphedema.  Apparently her pulmonary function tests have improved since she has been in pulmonary rehab.      PHYSICAL EXAMINATION:  As listed below.      ASSESSMENT:   1.  Judy Tracy is a delightful 78-year-old female with a small patent foramen ovale that is inconsequential and without hemodynamic effects.  She has pulmonary hypertension on a secondary basis likely due to diastolic dysfunction of the left ventricle, aortic insufficiency as well as COPD and asthma and possibly obstructive sleep apnea.  She is tolerating low-dose Bumex and her weight and blood pressure are stable.  She continues with mild dyspnea on exertion.  Because of her stability we have decided not to plan any further testing such as a ventilation/perfusion scan.   2.  Normal coronary arteries.   3.  Mild aortic insufficiency and mild aortic root enlargement.  We continued to monitor this annually in the past.  We will make a decision in 2019 when to perform another echocardiogram.   4.  The patient's peripheral edema is minimal at this time.  The [facility]ema [facility] continues to treat her left lower extremity.  She wears compression stockings.      It is my pleasure to assist in the care of Judy Tracy.  I will plan on seeing her again in 6 months or earlier on a p.r.n. basis.  I reviewed her BMP with her today including BUN of 49 and creatinine of 1.01 with potassium 4.9.  These are all stable.  All the patient's questions were answered to her satisfaction.      Henrietta [place] MD      cc:   Abran Mtot MD    [address]  [id number]         HENRIETTA [place] MD, FACC             D: 2018   T: 2018   MT: STACEY      Name:     JUDY TRACY   MRN:      [id number]        Account:      [id number]   :      1940           Service Date: 2018      Document:  [id number]

## 2018-11-26 ENCOUNTER — TRANSFERRED RECORDS (OUTPATIENT)
Dept: HEALTH INFORMATION MANAGEMENT | Facility: CLINIC | Age: 78
End: 2018-11-26

## 2018-12-10 ENCOUNTER — TRANSFERRED RECORDS (OUTPATIENT)
Dept: HEALTH INFORMATION MANAGEMENT | Facility: CLINIC | Age: 78
End: 2018-12-10

## 2018-12-20 DIAGNOSIS — I10 ESSENTIAL HYPERTENSION WITH GOAL BLOOD PRESSURE LESS THAN 130/80: ICD-10-CM

## 2018-12-20 RX ORDER — OLMESARTAN MEDOXOMIL 40 MG/1
40 TABLET ORAL DAILY
Qty: 90 TABLET | Refills: 1 | Status: SHIPPED | OUTPATIENT
Start: 2018-12-20 | End: 2019-01-01

## 2018-12-23 NOTE — NURSING NOTE
"Chief Complaint   Patient presents with     Medication Request     pain management       Initial /50 mmHg  Pulse 79  Temp(Src) 97.4  F (36.3  C) (Oral)  Ht 5' 4\" (1.626 m)  Wt 155 lb (70.308 kg)  BMI 26.59 kg/m2  SpO2 95%  LMP 01/01/1990 Estimated body mass index is 26.59 kg/(m^2) as calculated from the following:    Height as of this encounter: 5' 4\" (1.626 m).    Weight as of this encounter: 155 lb (70.308 kg).  BP completed using cuff size: vannesa Hedrick CMA      " 1.7

## 2019-01-01 ENCOUNTER — OFFICE VISIT (OUTPATIENT)
Dept: CARDIOLOGY | Facility: CLINIC | Age: 79
End: 2019-01-01
Attending: INTERNAL MEDICINE
Payer: MEDICARE

## 2019-01-01 ENCOUNTER — TELEPHONE (OUTPATIENT)
Dept: CARDIOLOGY | Facility: CLINIC | Age: 79
End: 2019-01-01

## 2019-01-01 ENCOUNTER — OFFICE VISIT (OUTPATIENT)
Dept: CARDIOLOGY | Facility: CLINIC | Age: 79
End: 2019-01-01
Payer: MEDICARE

## 2019-01-01 ENCOUNTER — HOSPITAL ENCOUNTER (OUTPATIENT)
Dept: GENERAL RADIOLOGY | Facility: CLINIC | Age: 79
Discharge: HOME | End: 2019-07-30
Attending: NURSE PRACTITIONER | Admitting: NURSE PRACTITIONER
Payer: MEDICARE

## 2019-01-01 ENCOUNTER — TRANSFERRED RECORDS (OUTPATIENT)
Dept: HEALTH INFORMATION MANAGEMENT | Facility: CLINIC | Age: 79
End: 2019-01-01

## 2019-01-01 ENCOUNTER — HOSPITAL ENCOUNTER (OUTPATIENT)
Dept: CARDIOLOGY | Facility: CLINIC | Age: 79
Discharge: HOME | End: 2019-08-08
Attending: INTERNAL MEDICINE | Admitting: INTERNAL MEDICINE
Payer: MEDICARE

## 2019-01-01 ENCOUNTER — CARE COORDINATION (OUTPATIENT)
Dept: CARDIOLOGY | Facility: CLINIC | Age: 79
End: 2019-01-01

## 2019-01-01 ENCOUNTER — MEDICAL CORRESPONDENCE (OUTPATIENT)
Dept: HEALTH INFORMATION MANAGEMENT | Facility: CLINIC | Age: 79
End: 2019-01-01

## 2019-01-01 ENCOUNTER — ANCILLARY PROCEDURE (OUTPATIENT)
Dept: BONE DENSITY | Facility: CLINIC | Age: 79
End: 2019-01-01
Attending: INTERNAL MEDICINE
Payer: MEDICARE

## 2019-01-01 ENCOUNTER — DOCUMENTATION ONLY (OUTPATIENT)
Dept: CARDIOLOGY | Facility: CLINIC | Age: 79
End: 2019-01-01

## 2019-01-01 VITALS
DIASTOLIC BLOOD PRESSURE: 67 MMHG | HEART RATE: 77 BPM | BODY MASS INDEX: 26.29 KG/M2 | OXYGEN SATURATION: 98 % | WEIGHT: 154 LBS | HEIGHT: 64 IN | SYSTOLIC BLOOD PRESSURE: 132 MMHG

## 2019-01-01 VITALS
SYSTOLIC BLOOD PRESSURE: 109 MMHG | BODY MASS INDEX: 24.98 KG/M2 | HEIGHT: 64 IN | HEART RATE: 73 BPM | DIASTOLIC BLOOD PRESSURE: 62 MMHG | WEIGHT: 146.3 LBS

## 2019-01-01 VITALS
BODY MASS INDEX: 24.41 KG/M2 | DIASTOLIC BLOOD PRESSURE: 45 MMHG | WEIGHT: 143 LBS | HEIGHT: 64 IN | SYSTOLIC BLOOD PRESSURE: 126 MMHG | HEART RATE: 80 BPM

## 2019-01-01 VITALS
HEIGHT: 64 IN | SYSTOLIC BLOOD PRESSURE: 112 MMHG | WEIGHT: 143.4 LBS | BODY MASS INDEX: 24.48 KG/M2 | DIASTOLIC BLOOD PRESSURE: 63 MMHG | HEART RATE: 78 BPM

## 2019-01-01 VITALS
BODY MASS INDEX: 26.43 KG/M2 | SYSTOLIC BLOOD PRESSURE: 120 MMHG | DIASTOLIC BLOOD PRESSURE: 62 MMHG | OXYGEN SATURATION: 98 % | HEART RATE: 73 BPM | WEIGHT: 154 LBS

## 2019-01-01 DIAGNOSIS — I10 ESSENTIAL HYPERTENSION WITH GOAL BLOOD PRESSURE LESS THAN 130/85: ICD-10-CM

## 2019-01-01 DIAGNOSIS — R06.02 SHORTNESS OF BREATH: ICD-10-CM

## 2019-01-01 DIAGNOSIS — I27.20 PULMONARY HTN (H): Primary | ICD-10-CM

## 2019-01-01 DIAGNOSIS — I35.1 NONRHEUMATIC AORTIC VALVE INSUFFICIENCY: ICD-10-CM

## 2019-01-01 DIAGNOSIS — I51.89 DIASTOLIC DYSFUNCTION: ICD-10-CM

## 2019-01-01 DIAGNOSIS — I87.2 EDEMA OF BOTH LOWER LEGS DUE TO PERIPHERAL VENOUS INSUFFICIENCY: ICD-10-CM

## 2019-01-01 DIAGNOSIS — R60.0 LOCALIZED EDEMA: ICD-10-CM

## 2019-01-01 DIAGNOSIS — R60.0 EDEMA OF BOTH LOWER LEGS DUE TO PERIPHERAL VENOUS INSUFFICIENCY: ICD-10-CM

## 2019-01-01 DIAGNOSIS — I27.20 PULMONARY HTN (H): ICD-10-CM

## 2019-01-01 DIAGNOSIS — Q21.12 PATENT FORAMEN OVALE: ICD-10-CM

## 2019-01-01 DIAGNOSIS — I10 ESSENTIAL HYPERTENSION WITH GOAL BLOOD PRESSURE LESS THAN 130/80: ICD-10-CM

## 2019-01-01 DIAGNOSIS — Z78.0 MENOPAUSE: ICD-10-CM

## 2019-01-01 DIAGNOSIS — R06.02 SHORTNESS OF BREATH: Primary | ICD-10-CM

## 2019-01-01 DIAGNOSIS — I77.89 AORTIC ROOT ENLARGEMENT (H): ICD-10-CM

## 2019-01-01 DIAGNOSIS — I51.7 LVH (LEFT VENTRICULAR HYPERTROPHY): ICD-10-CM

## 2019-01-01 DIAGNOSIS — N18.30 CKD (CHRONIC KIDNEY DISEASE) STAGE 3, GFR 30-59 ML/MIN (H): ICD-10-CM

## 2019-01-01 DIAGNOSIS — I73.9 PERIPHERAL VASCULAR DISEASE (H): ICD-10-CM

## 2019-01-01 DIAGNOSIS — I51.89 DIASTOLIC DYSFUNCTION: Primary | ICD-10-CM

## 2019-01-01 LAB
ANION GAP SERPL CALCULATED.3IONS-SCNC: 15.8 MMOL/L (ref 6–17)
ANION GAP SERPL CALCULATED.3IONS-SCNC: 6 MMOL/L (ref 3–14)
BUN SERPL-MCNC: 24 MG/DL (ref 7–30)
BUN SERPL-MCNC: 34 MG/DL (ref 7–30)
CALCIUM SERPL-MCNC: 8.9 MG/DL (ref 8.5–10.1)
CALCIUM SERPL-MCNC: 9.2 MG/DL (ref 8.5–10.5)
CHLORIDE SERPL-SCNC: 106 MMOL/L (ref 98–107)
CHLORIDE SERPL-SCNC: 109 MMOL/L (ref 94–109)
CO2 SERPL-SCNC: 26 MMOL/L (ref 23–29)
CO2 SERPL-SCNC: 27 MMOL/L (ref 20–32)
CREAT SERPL-MCNC: 0.9 MG/DL (ref 0.52–1.04)
CREAT SERPL-MCNC: 0.99 MG/DL (ref 0.7–1.3)
GFR SERPL CREATININE-BSD FRML MDRD: 54 ML/MIN/{1.73_M2}
GFR SERPL CREATININE-BSD FRML MDRD: 60 ML/MIN/{1.73_M2}
GLUCOSE SERPL-MCNC: 108 MG/DL (ref 70–105)
GLUCOSE SERPL-MCNC: 125 MG/DL (ref 70–99)
NT-PROBNP SERPL-MCNC: 804 PG/ML (ref 0–450)
POTASSIUM SERPL-SCNC: 3.8 MMOL/L (ref 3.5–5.1)
POTASSIUM SERPL-SCNC: 4.1 MMOL/L (ref 3.4–5.3)
SODIUM SERPL-SCNC: 142 MMOL/L (ref 133–144)
SODIUM SERPL-SCNC: 144 MMOL/L (ref 136–145)

## 2019-01-01 PROCEDURE — 99214 OFFICE O/P EST MOD 30 MIN: CPT | Performed by: INTERNAL MEDICINE

## 2019-01-01 PROCEDURE — 99214 OFFICE O/P EST MOD 30 MIN: CPT | Performed by: NURSE PRACTITIONER

## 2019-01-01 PROCEDURE — 71046 X-RAY EXAM CHEST 2 VIEWS: CPT

## 2019-01-01 PROCEDURE — 36415 COLL VENOUS BLD VENIPUNCTURE: CPT | Performed by: NURSE PRACTITIONER

## 2019-01-01 PROCEDURE — 77081 DXA BONE DENSITY APPENDICULR: CPT | Mod: 59 | Performed by: INTERNAL MEDICINE

## 2019-01-01 PROCEDURE — 80048 BASIC METABOLIC PNL TOTAL CA: CPT | Performed by: NURSE PRACTITIONER

## 2019-01-01 PROCEDURE — 93306 TTE W/DOPPLER COMPLETE: CPT

## 2019-01-01 PROCEDURE — 83880 ASSAY OF NATRIURETIC PEPTIDE: CPT | Performed by: NURSE PRACTITIONER

## 2019-01-01 PROCEDURE — 77085 DXA BONE DENSITY AXL VRT FX: CPT | Performed by: INTERNAL MEDICINE

## 2019-01-01 PROCEDURE — 93306 TTE W/DOPPLER COMPLETE: CPT | Mod: 26 | Performed by: INTERNAL MEDICINE

## 2019-01-01 RX ORDER — BUMETANIDE 0.5 MG/1
0.5 TABLET ORAL
Qty: 30 TABLET | Refills: 0 | Status: SHIPPED | OUTPATIENT
Start: 2019-01-01 | End: 2019-01-01

## 2019-01-01 RX ORDER — TORSEMIDE 10 MG/1
20 TABLET ORAL DAILY
Qty: 180 TABLET | Refills: 3 | Status: SHIPPED | OUTPATIENT
Start: 2019-01-01 | End: 2020-01-01 | Stop reason: DRUGHIGH

## 2019-01-01 RX ORDER — OLMESARTAN MEDOXOMIL 5 MG/1
5 TABLET ORAL DAILY
COMMUNITY
End: 2020-01-01

## 2019-01-01 RX ORDER — BUMETANIDE 0.5 MG/1
1 TABLET ORAL 2 TIMES DAILY
Qty: 30 TABLET | Refills: 0 | Status: SHIPPED | OUTPATIENT
Start: 2019-01-01 | End: 2019-01-01

## 2019-01-01 RX ORDER — ACETAMINOPHEN 500 MG
1000 TABLET ORAL 3 TIMES DAILY
COMMUNITY

## 2019-01-01 RX ORDER — OMEPRAZOLE 40 MG/1
40 CAPSULE, DELAYED RELEASE ORAL 2 TIMES DAILY
COMMUNITY
End: 2020-01-01

## 2019-01-01 RX ORDER — POTASSIUM CHLORIDE 1500 MG/1
20 TABLET, EXTENDED RELEASE ORAL DAILY
Qty: 90 TABLET | Refills: 1 | Status: SHIPPED | OUTPATIENT
Start: 2019-01-01 | End: 2020-01-01

## 2019-01-01 RX ORDER — MULTIPLE VITAMINS W/ MINERALS TAB 9MG-400MCG
1 TAB ORAL AT BEDTIME
COMMUNITY

## 2019-01-01 RX ORDER — TORSEMIDE 10 MG/1
20 TABLET ORAL DAILY
COMMUNITY
End: 2019-01-01

## 2019-01-01 RX ORDER — AZELASTINE 1 MG/ML
2 SPRAY, METERED NASAL 2 TIMES DAILY
COMMUNITY
End: 2019-01-01

## 2019-01-01 ASSESSMENT — MIFFLIN-ST. JEOR
SCORE: 1113.61
SCORE: 1148.54
SCORE: 1098.64
SCORE: 1100.46

## 2019-01-02 ENCOUNTER — OFFICE VISIT (OUTPATIENT)
Dept: URGENT CARE | Facility: URGENT CARE | Age: 79
End: 2019-01-02
Payer: MEDICARE

## 2019-01-02 VITALS
HEART RATE: 88 BPM | DIASTOLIC BLOOD PRESSURE: 50 MMHG | WEIGHT: 146 LBS | OXYGEN SATURATION: 95 % | SYSTOLIC BLOOD PRESSURE: 122 MMHG | TEMPERATURE: 98.8 F | BODY MASS INDEX: 25.06 KG/M2

## 2019-01-02 DIAGNOSIS — J45.901 EXACERBATION OF ASTHMA, UNSPECIFIED ASTHMA SEVERITY, UNSPECIFIED WHETHER PERSISTENT: Primary | ICD-10-CM

## 2019-01-02 DIAGNOSIS — R05.8 PRODUCTIVE COUGH: ICD-10-CM

## 2019-01-02 PROCEDURE — 99214 OFFICE O/P EST MOD 30 MIN: CPT | Performed by: FAMILY MEDICINE

## 2019-01-02 RX ORDER — PREDNISONE 20 MG/1
20 TABLET ORAL 2 TIMES DAILY
Qty: 10 TABLET | Refills: 0 | Status: SHIPPED | OUTPATIENT
Start: 2019-01-02 | End: 2019-01-01

## 2019-01-02 RX ORDER — AZITHROMYCIN 250 MG/1
TABLET, FILM COATED ORAL
Qty: 6 TABLET | Refills: 0 | Status: SHIPPED | OUTPATIENT
Start: 2019-01-02 | End: 2019-01-01

## 2019-01-02 NOTE — PROGRESS NOTES
"SUBJECTIVE: Rupinder Tracy is a 78 year old female presenting with a chief complaint of nasal congestion and cough .  Onset of symptoms was 4 day(s) ago.  Course of illness is same.    Severity moderate  Current and Associated symptoms: runny nose, stuffy nose and cough - productive  Treatment measures tried include Inhaler (name: alb).  Predisposing factors include HX of asthma.    Past Medical History:   Diagnosis Date     Arthritis      Asthma      Cellulitis 4/13 in [place]    R ankle     COPD (chronic obstructive pulmonary disease) (H)      Difficult airway for intubation      Ductal Carcinoma in Situ of Left Breast 9/09     Duodenal ulcer, unspecified as acute or chronic, without mention of hemorrhage or perforation 1980's    felt due to high dose steroids     Female stress incontinence      H/O right and left heart catheterization     1-     HYPERLIPIDEMIA      Hypertension      Ischemic colitis 7/2001    [facility]     Lactose Intolerance     Mild     Legal blindness     Artificial right eye, severe vision loss left (Detached retina's, glaucoma, corneal transplants)     Mild intermittent asthma ~1980's     Nocturnal oxygen desaturation      Osteoporosis, unspecified ~2002     Patent foramen ovale      PERIPHERAL VASCULAR DISEASE 5/05    mesenteric arteries, angioplasty     PRESBYESOPHAGUS 6/04     Pulmonary HTN (H) 11/2014     Serous retinal detachment     False eye right     Subarachnoid hemorrhage following injury (H) 12/10    due to fall, vascular evaluation negative     Syncope and collapse 12/10     Unspecified glaucoma(365.9)      Allergies   Allergen Reactions     Atorvastatin Calcium      myalgias     Celecoxib Swelling     edema     Cholestyramine Diarrhea     Diarrhea       Crestor [Rosuvastatin Calcium]      leg aches, likely from medication     Cyclobenzaprine Hcl      flexeril--gets \"goofy\"     Dulera      tremors     Gabapentin      Nightmares.   Retrial of medication caused tremors. "     Lisinopril Cough     Cough/ Dizziness at high dose.     Meperidine Hcl Nausea and Vomiting     demerol-severe nausea     Morphine Nausea and Vomiting     Naprosyn [Naproxen] GI Disturbance     Niaspan [Niacin]      flushing, severe     Percocet [Oxycodone-Acetaminophen] Nausea     Nausea, lightheadedness.   Tolerates med if taken with food.     Pravastatin Swelling     Edema, myalgias     Red Yeast Rice [Cholestin] GI Disturbance     Bloating, etc.     Simvastatin      myalgias     Timolol Maleate Difficulty breathing     timoptic--respiratory problems     Hctz Rash     rash     Sulfa Drugs Itching and Rash     rash on all mucous membranes and palms of hands with intense itching     Social History     Tobacco Use     Smoking status: Never Smoker     Smokeless tobacco: Never Used   Substance Use Topics     Alcohol use: Yes     Alcohol/week: 4.2 oz     Types: 7 Glasses of wine per week     Comment: rarely       ROS:  SKIN: no rash  GI: no vomiting    OBJECTIVE:  /50   Pulse 88   Temp 98.8  F (37.1  C) (Oral)   Wt 66.2 kg (146 lb)   LMP 01/01/1990   SpO2 95%   BMI 25.06 kg/m     GENERAL APPEARANCE: healthy, alert and no distress  EYES: EOMI,  PERRL, conjunctiva clear  HENT: ear canals and TM's normal.  Nose and mouth without ulcers, erythema or lesions  NECK: supple, nontender, no lymphadenopathy  RESP: lungs clear to auscultation - no rales, rhonchi or wheezes  CV: regular rates and rhythm, normal S1 S2, no murmur noted  SKIN: no suspicious lesions or rashes      ICD-10-CM    1. Exacerbation of asthma, unspecified asthma severity, unspecified whether persistent J45.901 predniSONE (DELTASONE) 20 MG tablet   2. Productive cough R05 azithromycin (ZITHROMAX) 250 MG tablet     Cont advair and alb  Will fill z pack and prednisone if worse.  Fluids/Rest, f/u if worse/not any better

## 2019-01-14 ENCOUNTER — TRANSFERRED RECORDS (OUTPATIENT)
Dept: HEALTH INFORMATION MANAGEMENT | Facility: CLINIC | Age: 79
End: 2019-01-14

## 2019-02-07 ENCOUNTER — TRANSFERRED RECORDS (OUTPATIENT)
Dept: HEALTH INFORMATION MANAGEMENT | Facility: CLINIC | Age: 79
End: 2019-02-07

## 2019-03-05 NOTE — PROGRESS NOTES
Pulmonary Rehab Discharge Summary    Reason for discharge:    Patient/family request discontinuation of services.    Progress towards goals:  Goals partially met.  Barriers to achieving goals:   Early discharge.    Recommendation(s):    Continue home exercise program.

## 2019-05-14 NOTE — ADDENDUM NOTE
Encounter addended by: Clive North on: 5/14/2019 3:29 PM   Actions taken: Episode resolved, Sign clinical note

## 2019-05-14 NOTE — PROGRESS NOTES
Respiratory/Pulmonary Therapy Services Discharge Summary    Reason for discharge:    Patient/family request discontinuation of services.    Progress towards goals:  Goals partially met.  Barriers to achieving goals:   discharge from facility.    Recommendation(s):    Continue home exercise program.

## 2019-05-30 PROBLEM — I35.1 NONRHEUMATIC AORTIC VALVE INSUFFICIENCY: Status: ACTIVE | Noted: 2019-01-01

## 2019-05-30 NOTE — PROGRESS NOTES
Service Date: 05/30/2019      PRIMARY CARE PHYSICIAN:  Dr. Lotus Grant.      HISTORY OF PRESENT ILLNESS:  I again had the pleasure of seeing your patient, Rupinder Tracy, at [facility] for evaluation of pulmonary hypertension, systemic hypertension and patent foramen ovale.  She also has a history of mild aortic root enlargement and mild to moderate aortic insufficiency.  Left and right heart catheterization performed 01/2015 for pulmonary hypertension and aortic insufficiency demonstrated no significant coronary artery disease.  Aortogram showed moderate aortic insufficiency and moderate annuloaortic ectasia.  There was moderate pulmonary hypertension with a mean PA pressure of 32 mmHg and a wide pulse pressure in the PA tracing consistent with at least moderate pulmonary valve insufficiency.  Pulmonary vascular resistance was normal and there was no significant gradient between the end-diastolic pressure in the pulmonary capillary wedge pressure and the left ventricular end-diastolic pressure.  It was felt that the elevated pulmonary pressure was likely secondary to impaired relaxation both from aortic insufficiency and systemic hypertension and perhaps pulmonary causes such as obstructive sleep apnea or asthma.  We tested for sleep apnea and showed desaturations but she never reached REM sleep and did not snore.  Her blood pressure has been under excellent control.  Her weight has also been stable.  We are using Bumex at a very low dose.  At higher doses of Bumex her creatinine seems to bump up.  She was seen by Dr. Regla Silva in the Pulmonary Hypertension Department who suggested multiple tests including repeat right heart cath.  The patient has significant COPD and asthma since the 1980s.  The patient has been unable to exercise due to left hip pain.  She has left greater than right lymphedema as well.  Ultrasounds of the lower extremities have not shown any deep vein thrombosis.  The  patient is due to left total hip arthroplasty in 1 week.  Her breathing is somewhat limited but did improve after pulmonary rehab.  She was hospitalized in April after using ibuprofen and Aleve for her hip pain causing a gastric ulcer.  This has stabilized.  She also had some left eye surgery on 05/06.  She has occasional lightheadedness and there is some thought that she should stop her olmesartan before her impending left hip arthroplasty.  I would suggest that she not do so unless she is having more symptoms of lightheadedness and orthostasis.  We can always decrease this medication in time.      PHYSICAL EXAMINATION:  As listed below.      ASSESSMENT:   1.  Rupinder Tracy is a delightful 79-year-old female with a small patent foramen ovale that is inconsequential and without hemodynamic effect.  She has pulmonary hypertension on a secondary basis likely due to diastolic dysfunction of the left ventricle as well as aortic insufficiency, COPD and asthma.  We do not know if she has obstructive sleep apnea.  She is tolerating low-dose Bumex and her weight and blood pressure have been stable.  She continues with mild dyspnea on exertion.  No further testing is planned.   2.  Normal coronary arteries.   3.  Mild to moderate aortic insufficiency and mild aortic root enlargement.  We will obtain an echocardiogram in 6 months with her followup.   4.  The patient's peripheral edema left greater than right remains mild.  The [facility] continues to treat her left lower extremity edema.  She wears compression stockings.      It is my pleasure to assist in the care of Rupinder Tarcy.  I will see her again in 6 months.  I have asked her to continue with her olmesartan through her surgery unless she is lightheaded or having other symptoms.  We will reassess this in 6 months when I see her again.  I will review her echocardiogram at that time as well.  All the patient's questions were answered to her satisfaction.       Henrietta Butler MD        cc:   Lotus Grant MD    [facility] [address] [place]  [address]         HENRIETTA BUTLER MD, [facility]             D: 2019   T: 2019   MT: STACEY      Name:     JUDY RUSS   MRN:      [id number]        Account:      [id number]   :      1940           Service Date: 2019      Document: [id number]       yes

## 2019-05-30 NOTE — LETTER
5/30/2019    MD [facility] [address] [place] [address]    RE: Rupinder Walkeron       Dear Colleague,    I had the pleasure of seeing Rupinder Tracy in the [facility].    HPI and Plan:   See dictation:[id number]    Orders Placed This Encounter   Procedures     Follow-Up with Cardiologist     Echocardiogram Complete       Orders Placed This Encounter   Medications     azelastine (ASTELIN) 0.1 % nasal spray     Sig: Spray 2 sprays into both nostrils 2 times daily     omeprazole (PRILOSEC) 40 MG DR capsule     Sig: Take 40 mg by mouth daily     acetaminophen (TYLENOL) 500 MG tablet     Sig: Take 1,000 mg by mouth 3 times daily       Medications Discontinued During This Encounter   Medication Reason     azithromycin (ZITHROMAX) 250 MG tablet Therapy completed     omeprazole (PRILOSEC) 20 MG CR capsule Discontinued by another Health Care Provider     predniSONE (DELTASONE) 20 MG tablet Therapy completed     sodium chloride (MARGAUX 128) 5 % ophthalmic solution Discontinued by another Health Care Provider     Ibuprofen (ADVIL PO) Stopped by Patient         Encounter Diagnoses   Name Primary?     Pulmonary HTN (H) Yes     Shortness of breath      Essential hypertension with goal blood pressure less than 130/85      Patent foramen ovale      Aortic root enlargement (H)      Nonrheumatic aortic valve insufficiency      Edema of both lower legs due to peripheral venous insufficiency      Left Ventricular Hypertrophy        CURRENT MEDICATIONS:  Current Outpatient Medications   Medication Sig Dispense Refill     acetaminophen (TYLENOL) 500 MG tablet Take 1,000 mg by mouth 3 times daily       albuterol (ALBUTEROL) 108 (90 BASE) MCG/ACT inhaler Inhale 2 puffs into the lungs every 4 hours as needed 1 Inhaler 5     azelastine (ASTELIN) 0.1 % nasal spray Spray 2 sprays into both nostrils 2 times daily       bumetanide (BUMEX) 0.5 MG tablet Take 1 tablet (0.5 mg) by mouth daily  "(with breakfast) 30 tablet 0     Calcium-Vitamin D-Vitamin K (CALCIUM + D) 500-1000-40 MG-UNT-MCG CHEW Take 2 tablets by mouth daily        Cholecalciferol (VITAMIN D) 2000 UNITS tablet Take 1,000 Units by mouth daily        DOXYCYCLINE HYCLATE PO Take 50 mg by mouth 3 times per week Monday,wednesday, friday       fluticasone-salmeterol (ADVAIR DISKUS) 250-50 MCG/DOSE diskus inhaler Inhale 1 puff into the lungs 2 times daily 60 Inhaler 1     hyaluronate in balanced salt ophthalmic solution (HEALON IN BSS) solution 1 drop every 4 hours (while awake)       olmesartan (BENICAR) 40 MG tablet Take 1 tablet (40 mg) by mouth daily 90 tablet 1     omeprazole (PRILOSEC) 40 MG DR capsule Take 40 mg by mouth daily       Ophthalmic Irrigation Solution (OCUSOFT EYE WASH OP)        prednisoLONE acetate (PRED FORTE) 1 % ophthalmic susp INSTILL 1 DROP INTO INTO LEFT EYE TWICE A DAY  3     RESTASIS 0.05 % OP EMUL 1 drop to left eye twice daily       rOPINIRole (REQUIP) 0.25 MG tablet Take 0.5 mg by mouth every evening. Take 2 tablets by mouth at bedtime.  Take 1 tablet by mouth in afternoon as needed.       Sodium Hyaluronate (HEALON IO) Combination with Pred       Sodium Hyaluronate (HEALON IO) WITH PREDNISOLONE MIXTURE 0.001- 0.25%       SYSTANE 0.4-0.3 % OP SOLN 1 drop to left eye 4 times daily       COMPRESSION STOCKINGS 1 each daily 3 each 1     order for DME 2L of O2 at night         ALLERGIES     Allergies   Allergen Reactions     Atorvastatin Calcium      myalgias     Celecoxib Swelling     edema     Cholestyramine Diarrhea     Diarrhea       Crestor [Rosuvastatin Calcium]      leg aches, likely from medication     Cyclobenzaprine Hcl      flexeril--gets \"goofy\"     Dulera      tremors     Gabapentin      Nightmares.   Retrial of medication caused tremors.     Lisinopril Cough     Cough/ Dizziness at high dose.     Meperidine Hcl Nausea and Vomiting     demerol-severe nausea     Morphine Nausea and Vomiting     Naprosyn " [Naproxen] GI Disturbance     Niaspan [Niacin]      flushing, severe     Percocet [Oxycodone-Acetaminophen] Nausea     Nausea, lightheadedness.   Tolerates med if taken with food.     Pravastatin Swelling     Edema, myalgias     Red Yeast Rice [Cholestin] GI Disturbance     Bloating, etc.     Simvastatin      myalgias     Timolol Maleate Difficulty breathing     timoptic--respiratory problems     Hctz Rash     rash     Sulfa Drugs Itching and Rash     rash on all mucous membranes and palms of hands with intense itching       PAST MEDICAL HISTORY:  Past Medical History:   Diagnosis Date     Aortic insufficiency      Aortic root enlargement (H)      Arthritis      Asthma      Cellulitis 4/13 in [place]    R ankle     CKD (chronic kidney disease) stage 3, GFR 30-59 ml/min (H)      COPD (chronic obstructive pulmonary disease) (H)      Diastolic dysfunction      Difficult airway for intubation      CRUZ (dyspnea on exertion)      Ductal Carcinoma in Situ of Left Breast 9/09     Duodenal ulcer, unspecified as acute or chronic, without mention of hemorrhage or perforation 1980's    felt due to high dose steroids     Female stress incontinence      H/O right and left heart catheterization     1-     HYPERLIPIDEMIA      Hypertension      Ischemic colitis 7/2001    [facility]     Lactose Intolerance     Mild     Legal blindness     Artificial right eye, severe vision loss left (Detached retina's, glaucoma, corneal transplants)     Lymphedema      Mild intermittent asthma ~1980's     Mitral valve disorder      Nocturnal oxygen desaturation      EVARISTO (obstructive sleep apnea)      Osteoporosis, unspecified ~2002     Patent foramen ovale     small     PERIPHERAL VASCULAR DISEASE 5/05    mesenteric arteries, angioplasty     PRESBYESOPHAGUS 6/04     Pulmonary HTN (H) 11/2014     PVD (peripheral vascular disease) (H)      Restrictive lung disease      Serous retinal detachment     False eye right     Subarachnoid hemorrhage  following injury (H) 12/10    due to fall, vascular evaluation negative     Syncope and collapse 12/10     Unspecified glaucoma(365.9)        PAST SURGICAL HISTORY:  Past Surgical History:   Procedure Laterality Date     BIOPSY       C APPENDECTOMY  1982     C NONSPECIFIC PROCEDURE  1945    adenoidectomy     C NONSPECIFIC PROCEDURE      bilat retinal detachment     C NONSPECIFIC PROCEDURE      blephoroplasty bilat     C NONSPECIFIC PROCEDURE  1997    glaucoma procedure L     C NONSPECIFIC PROCEDURE  1997, 2007, 2014    corneal transplant L     C NONSPECIFIC PROCEDURE  1945    strabismus procedure R eye     C NONSPECIFIC PROCEDURE  1960's    R breast bx     C NONSPECIFIC PROCEDURE  5/05    Angioplasty and stenting mesenteric vessels     C NONSPECIFIC PROCEDURE  2008    L corneal transplant     COLONOSCOPY      due 2015     ENDOSCOPIC RETROGRADE CHOLANGIOPANCREATOGRAM N/A 10/13/2017    Procedure: ENDOSCOPIC RETROGRADE CHOLANGIOPANCREATOGRAM;  ENDOSCOPIC RETROGRADE CHOLANGIOPANCREATOGRAM ;  Surgeon: Isabel Yousif MD;  Location: [facility] OR     HC REMOVAL GALLBLADDER  1982    Cholecystectomy     HEART CATH RIGHT AND LEFT HEART CATH  1/19/15     HERNIORRHAPHY VENTRAL N/A 4/19/2016    Procedure: HERNIORRHAPHY VENTRAL;  Surgeon: Hamlet Ness MD;  Location: [facility] OR     MASTECTOMY SIMPLE BILATERAL  10/5/09    bilateral mastectomy     SURGICAL HISTORY OF -   6/2010    breast reconstruction       FAMILY HISTORY:  Family History   Adopted: Yes   Problem Relation Age of Onset     C.A.D. Paternal Uncle         MI 50's     Family History Negative Daughter        SOCIAL HISTORY:  Social History     Socioeconomic History     Marital status:      Spouse name: None     Number of children: 4     Years of education: None     Highest education level: None   Occupational History     Occupation: Retired nurse     Employer: RETIRED   Social Needs     Financial resource strain: None     Food insecurity:     Worry: None      Inability: None     Transportation needs:     Medical: None     Non-medical: None   Tobacco Use     Smoking status: Never Smoker     Smokeless tobacco: Never Used   Substance and Sexual Activity     Alcohol use: Yes     Alcohol/week: 4.2 oz     Types: 7 Glasses of wine per week     Comment: rarely     Drug use: No     Sexual activity: Never     Partners: Male   Lifestyle     Physical activity:     Days per week: None     Minutes per session: None     Stress: None   Relationships     Social connections:     Talks on phone: None     Gets together: None     Attends Congregational service: None     Active member of club or organization: None     Attends meetings of clubs or organizations: None     Relationship status: None     Intimate partner violence:     Fear of current or ex partner: None     Emotionally abused: None     Physically abused: None     Forced sexual activity: None   Other Topics Concern      Service Not Asked     Blood Transfusions Not Asked     Caffeine Concern No     Comment: 1- 2 cups coffee     Occupational Exposure Not Asked     Hobby Hazards Not Asked     Sleep Concern No     Stress Concern No     Weight Concern No     Special Diet Yes     Comment: low sodium     Back Care Not Asked     Exercise No     Comment: 2 days a week (abigail chi), walking program, stationary bike 10 minutes 3 times per week     Bike Helmet Not Asked     Seat Belt Yes     Self-Exams Not Asked     Parent/sibling w/ CABG, MI or angioplasty before 65F 55M? 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  She walks with a walker.  Is a full code.  (last updated 10/11/2017)        Review of Systems:  Skin:  Negative       Eyes:  Positive for glasses Foggy vision  ENT:  Negative      Respiratory:  Positive for dyspnea on exertion;cough uses O2 @ night- 2L   Cardiovascular:    edema;Positive for    Gastroenterology: Negative     "  Genitourinary:  Negative      Musculoskeletal:  Positive for joint pain    Neurologic:  Negative      Psychiatric:  Positive for sleep disturbances;anxiety    Heme/Lymph/Imm:  Positive for allergies    Endocrine:  Negative        Physical Exam:  Vitals: /62   Pulse 73   Ht 1.626 m (5' 4\")   Wt 66.4 kg (146 lb 4.8 oz)   LMP 01/01/1990   BMI 25.11 kg/m       Constitutional:  cooperative, alert and oriented, well developed, well nourished, in no acute distress        Skin:  warm and dry to the touch, no apparent skin lesions or masses noted          Head:  normocephalic, no masses or lesions        Eyes:  pupils equal and round   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    Lymph:      ENT:  no pallor or cyanosis, dentition good        Neck:  carotid pulses are full and equal bilaterally, JVP normal, no carotid bruit        Respiratory:  normal breath sounds, clear to auscultation, normal A-P diameter, normal symmetry, normal respiratory excursion, no use of accessory muscles         Cardiac: regular rhythm;normal S1 and S2;no S3 or S4;apical impulse not displaced occasional premature beats         diastolic murmur;grade 1    pulses full and equal, no bruits auscultated                                        GI:  abdomen soft, non-tender, BS normoactive, no mass, no HSM, no bruits        Extremities and Muscular Skeletal:  no deformities, clubbing, cyanosis, erythema observed       LLE edema;1+ wearing support stocking on right LE, left LE ace wrapped    Neurological:  no gross motor deficits;affect appropriate   walks with a walker    Psych:  Alert and Oriented x 3        CC  Jae Butler MD  [address]  [place] [id number]                Thank you for allowing me to participate in the care of your patient.      Sincerely,     Jae Butler MD     [facility]    cc:   Jae Butler MD  [address]  [place] " [id number]

## 2019-05-30 NOTE — LETTER
5/30/2019      MD [facility] [address] [place] [address]      RE: Rupinder Tracy       Dear Colleague,    I had the pleasure of seeing Rupinder Tracy in the [facility].    Service Date: 05/30/2019      PRIMARY CARE PHYSICIAN:  Dr. Lotus Grant.      HISTORY OF PRESENT ILLNESS:  I again had the pleasure of seeing your patient, Rupinder Tracy, at [facility] for evaluation of pulmonary hypertension, systemic hypertension and patent foramen ovale.  She also has a history of mild aortic root enlargement and mild to moderate aortic insufficiency.  Left and right heart catheterization performed 01/2015 for pulmonary hypertension and aortic insufficiency demonstrated no significant coronary artery disease.  Aortogram showed moderate aortic insufficiency and moderate annuloaortic ectasia.  There was moderate pulmonary hypertension with a mean PA pressure of 32 mmHg and a wide pulse pressure in the PA tracing consistent with at least moderate pulmonary valve insufficiency.  Pulmonary vascular resistance was normal and there was no significant gradient between the end-diastolic pressure in the pulmonary capillary wedge pressure and the left ventricular end-diastolic pressure.  It was felt that the elevated pulmonary pressure was likely secondary to impaired relaxation both from aortic insufficiency and systemic hypertension and perhaps pulmonary causes such as obstructive sleep apnea or asthma.  We tested for sleep apnea and showed desaturations but she never reached REM sleep and did not snore.  Her blood pressure has been under excellent control.  Her weight has also been stable.  We are using Bumex at a very low dose.  At higher doses of Bumex her creatinine seems to bump up.  She was seen by Dr. Regla Silva in the Pulmonary Hypertension Department who suggested multiple tests including repeat right heart cath.  The patient has  significant COPD and asthma since the 1980s.  The patient has been unable to exercise due to left hip pain.  She has left greater than right lymphedema as well.  Ultrasounds of the lower extremities have not shown any deep vein thrombosis.  The patient is due to left total hip arthroplasty in 1 week.  Her breathing is somewhat limited but did improve after pulmonary rehab.  She was hospitalized in April after using ibuprofen and Aleve for her hip pain causing a gastric ulcer.  This has stabilized.  She also had some left eye surgery on 05/06.  She has occasional lightheadedness and there is some thought that she should stop her olmesartan before her impending left hip arthroplasty.  I would suggest that she not do so unless she is having more symptoms of lightheadedness and orthostasis.  We can always decrease this medication in time.      PHYSICAL EXAMINATION:  As listed below.      ASSESSMENT:   1.  Rupinder Tracy is a delightful 79-year-old female with a small patent foramen ovale that is inconsequential and without hemodynamic effect.  She has pulmonary hypertension on a secondary basis likely due to diastolic dysfunction of the left ventricle as well as aortic insufficiency, COPD and asthma.  We do not know if she has obstructive sleep apnea.  She is tolerating low-dose Bumex and her weight and blood pressure have been stable.  She continues with mild dyspnea on exertion.  No further testing is planned.   2.  Normal coronary arteries.   3.  Mild to moderate aortic insufficiency and mild aortic root enlargement.  We will obtain an echocardiogram in 6 months with her followup.   4.  The patient's peripheral edema left greater than right remains mild.  The L[facility]hedema Clinic continues to treat her left lower extremity edema.  She wears compression stockings.      It is my pleasure to assist in the care of Rupinder Tracy.  I will see her again in 6 months.  I have asked her to continue with her olmesartan  through her surgery unless she is lightheaded or having other symptoms.  We will reassess this in 6 months when I see her again.  I will review her echocardiogram at that time as well.  All the patient's questions were answered to her satisfaction.      Henrietta Butler MD        cc:   Lotus Grant MD    [facility] [place] [facility] [address] [place]  [address]         HENRIETTA BUTLER MD, [facility]             D: 2019   T: 2019   MT: STACEY      Name:     JUDY RUSS   MRN:      [id number]        Account:      [id number]   :      1940           Service Date: 2019      Document: [id number]           Outpatient Encounter Medications as of 2019   Medication Sig Dispense Refill     acetaminophen (TYLENOL) 500 MG tablet Take 1,000 mg by mouth 3 times daily       albuterol (ALBUTEROL) 108 (90 BASE) MCG/ACT inhaler Inhale 2 puffs into the lungs every 4 hours as needed 1 Inhaler 5     azelastine (ASTELIN) 0.1 % nasal spray Spray 2 sprays into both nostrils 2 times daily       bumetanide (BUMEX) 0.5 MG tablet Take 1 tablet (0.5 mg) by mouth daily (with breakfast) 30 tablet 0     Calcium-Vitamin D-Vitamin K (CALCIUM + D) 500-1000-40 MG-UNT-MCG CHEW Take 2 tablets by mouth daily        Cholecalciferol (VITAMIN D) 2000 UNITS tablet Take 1,000 Units by mouth daily        DOXYCYCLINE HYCLATE PO Take 50 mg by mouth 3 times per week Monday,wednesday, friday       fluticasone-salmeterol (ADVAIR DISKUS) 250-50 MCG/DOSE diskus inhaler Inhale 1 puff into the lungs 2 times daily 60 Inhaler 1     hyaluronate in balanced salt ophthalmic solution (HEALON IN BSS) solution 1 drop every 4 hours (while awake)       olmesartan (BENICAR) 40 MG tablet Take 1 tablet (40 mg) by mouth daily 90 tablet 1     omeprazole (PRILOSEC) 40 MG DR capsule Take 40 mg by mouth daily       Ophthalmic Irrigation Solution (OCUSOFT EYE WASH OP)        prednisoLONE acetate (PRED FORTE) 1 % ophthalmic susp INSTILL 1 DROP INTO  INTO LEFT EYE TWICE A DAY  3     RESTASIS 0.05 % OP EMUL 1 drop to left eye twice daily       rOPINIRole (REQUIP) 0.25 MG tablet Take 0.5 mg by mouth every evening. Take 2 tablets by mouth at bedtime.  Take 1 tablet by mouth in afternoon as needed.       Sodium Hyaluronate (HEALON IO) Combination with Pred       Sodium Hyaluronate (HEALON IO) WITH PREDNISOLONE MIXTURE 0.001- 0.25%       SYSTANE 0.4-0.3 % OP SOLN 1 drop to left eye 4 times daily       COMPRESSION STOCKINGS 1 each daily 3 each 1     order for DME 2L of O2 at night       [DISCONTINUED] azithromycin (ZITHROMAX) 250 MG tablet Take 2 tablets (500 mg) by mouth daily for 1 day, THEN 1 tablet (250 mg) daily for 4 days. 6 tablet 0     [DISCONTINUED] Ibuprofen (ADVIL PO) Take 200 mg by mouth every 6 hours       [DISCONTINUED] omeprazole (PRILOSEC) 20 MG CR capsule TAKE 1 CAPSULE (20 MG) BY MOUTH 2 TIMES DAILY 180 capsule 3     [DISCONTINUED] predniSONE (DELTASONE) 20 MG tablet Take 20 mg by mouth 2 times daily for 5 days. 10 tablet 0     [DISCONTINUED] sodium chloride (MARGAUX 128) 5 % ophthalmic solution 1 drop At Bedtime OINTMENT       No facility-administered encounter medications on file as of 5/30/2019.        Again, thank you for allowing me to participate in the care of your patient.      Sincerely,    Jae Butler MD     [facility]

## 2019-05-30 NOTE — PROGRESS NOTES
HPI and Plan:   See dictation:[id number]    Orders Placed This Encounter   Procedures     Follow-Up with Cardiologist     Echocardiogram Complete       Orders Placed This Encounter   Medications     azelastine (ASTELIN) 0.1 % nasal spray     Sig: Spray 2 sprays into both nostrils 2 times daily     omeprazole (PRILOSEC) 40 MG DR capsule     Sig: Take 40 mg by mouth daily     acetaminophen (TYLENOL) 500 MG tablet     Sig: Take 1,000 mg by mouth 3 times daily       Medications Discontinued During This Encounter   Medication Reason     azithromycin (ZITHROMAX) 250 MG tablet Therapy completed     omeprazole (PRILOSEC) 20 MG CR capsule Discontinued by another Health Care Provider     predniSONE (DELTASONE) 20 MG tablet Therapy completed     sodium chloride (MARGAUX 128) 5 % ophthalmic solution Discontinued by another Health Care Provider     Ibuprofen (ADVIL PO) Stopped by Patient         Encounter Diagnoses   Name Primary?     Pulmonary HTN (H) Yes     Shortness of breath      Essential hypertension with goal blood pressure less than 130/85      Patent foramen ovale      Aortic root enlargement (H)      Nonrheumatic aortic valve insufficiency      Edema of both lower legs due to peripheral venous insufficiency      Left Ventricular Hypertrophy        CURRENT MEDICATIONS:  Current Outpatient Medications   Medication Sig Dispense Refill     acetaminophen (TYLENOL) 500 MG tablet Take 1,000 mg by mouth 3 times daily       albuterol (ALBUTEROL) 108 (90 BASE) MCG/ACT inhaler Inhale 2 puffs into the lungs every 4 hours as needed 1 Inhaler 5     azelastine (ASTELIN) 0.1 % nasal spray Spray 2 sprays into both nostrils 2 times daily       bumetanide (BUMEX) 0.5 MG tablet Take 1 tablet (0.5 mg) by mouth daily (with breakfast) 30 tablet 0     Calcium-Vitamin D-Vitamin K (CALCIUM + D) 500-1000-40 MG-UNT-MCG CHEW Take 2 tablets by mouth daily        Cholecalciferol (VITAMIN D) 2000 UNITS tablet Take 1,000 Units by mouth daily         "DOXYCYCLINE HYCLATE PO Take 50 mg by mouth 3 times per week Monday,wednesday, friday       fluticasone-salmeterol (ADVAIR DISKUS) 250-50 MCG/DOSE diskus inhaler Inhale 1 puff into the lungs 2 times daily 60 Inhaler 1     hyaluronate in balanced salt ophthalmic solution (HEALON IN BSS) solution 1 drop every 4 hours (while awake)       olmesartan (BENICAR) 40 MG tablet Take 1 tablet (40 mg) by mouth daily 90 tablet 1     omeprazole (PRILOSEC) 40 MG DR capsule Take 40 mg by mouth daily       Ophthalmic Irrigation Solution (OCUSOFT EYE WASH OP)        prednisoLONE acetate (PRED FORTE) 1 % ophthalmic susp INSTILL 1 DROP INTO INTO LEFT EYE TWICE A DAY  3     RESTASIS 0.05 % OP EMUL 1 drop to left eye twice daily       rOPINIRole (REQUIP) 0.25 MG tablet Take 0.5 mg by mouth every evening. Take 2 tablets by mouth at bedtime.  Take 1 tablet by mouth in afternoon as needed.       Sodium Hyaluronate (HEALON IO) Combination with Pred       Sodium Hyaluronate (HEALON IO) WITH PREDNISOLONE MIXTURE 0.001- 0.25%       SYSTANE 0.4-0.3 % OP SOLN 1 drop to left eye 4 times daily       COMPRESSION STOCKINGS 1 each daily 3 each 1     order for DME 2L of O2 at night         ALLERGIES     Allergies   Allergen Reactions     Atorvastatin Calcium      myalgias     Celecoxib Swelling     edema     Cholestyramine Diarrhea     Diarrhea       Crestor [Rosuvastatin Calcium]      leg aches, likely from medication     Cyclobenzaprine Hcl      flexeril--gets \"goofy\"     Dulera      tremors     Gabapentin      Nightmares.   Retrial of medication caused tremors.     Lisinopril Cough     Cough/ Dizziness at high dose.     Meperidine Hcl Nausea and Vomiting     demerol-severe nausea     Morphine Nausea and Vomiting     Naprosyn [Naproxen] GI Disturbance     Niaspan [Niacin]      flushing, severe     Percocet [Oxycodone-Acetaminophen] Nausea     Nausea, lightheadedness.   Tolerates med if taken with food.     Pravastatin Swelling     Edema, myalgias     " Red Yeast Rice [Cholestin] GI Disturbance     Bloating, etc.     Simvastatin      myalgias     Timolol Maleate Difficulty breathing     timoptic--respiratory problems     Hctz Rash     rash     Sulfa Drugs Itching and Rash     rash on all mucous membranes and palms of hands with intense itching       PAST MEDICAL HISTORY:  Past Medical History:   Diagnosis Date     Aortic insufficiency      Aortic root enlargement (H)      Arthritis      Asthma      Cellulitis 4/13 in [place]    R ankle     CKD (chronic kidney disease) stage 3, GFR 30-59 ml/min (H)      COPD (chronic obstructive pulmonary disease) (H)      Diastolic dysfunction      Difficult airway for intubation      CRUZ (dyspnea on exertion)      Ductal Carcinoma in Situ of Left Breast 9/09     Duodenal ulcer, unspecified as acute or chronic, without mention of hemorrhage or perforation 1980's    felt due to high dose steroids     Female stress incontinence      H/O right and left heart catheterization     1-     HYPERLIPIDEMIA      Hypertension      Ischemic colitis 7/2001    [facility]     Lactose Intolerance     Mild     Legal blindness     Artificial right eye, severe vision loss left (Detached retina's, glaucoma, corneal transplants)     Lymphedema      Mild intermittent asthma ~1980's     Mitral valve disorder      Nocturnal oxygen desaturation      EVARISTO (obstructive sleep apnea)      Osteoporosis, unspecified ~2002     Patent foramen ovale     small     PERIPHERAL VASCULAR DISEASE 5/05    mesenteric arteries, angioplasty     PRESBYESOPHAGUS 6/04     Pulmonary HTN (H) 11/2014     PVD (peripheral vascular disease) (H)      Restrictive lung disease      Serous retinal detachment     False eye right     Subarachnoid hemorrhage following injury (H) 12/10    due to fall, vascular evaluation negative     Syncope and collapse 12/10     Unspecified glaucoma(365.9)        PAST SURGICAL HISTORY:  Past Surgical History:   Procedure Laterality Date     BIOPSY        C APPENDECTOMY  1982     C NONSPECIFIC PROCEDURE  1945    adenoidectomy     C NONSPECIFIC PROCEDURE      bilat retinal detachment     C NONSPECIFIC PROCEDURE      blephoroplasty bilat     C NONSPECIFIC PROCEDURE  1997    glaucoma procedure L     C NONSPECIFIC PROCEDURE  1997, 2007, 2014    corneal transplant L     C NONSPECIFIC PROCEDURE  1945    strabismus procedure R eye     C NONSPECIFIC PROCEDURE  1960's    R breast bx     C NONSPECIFIC PROCEDURE  5/05    Angioplasty and stenting mesenteric vessels     C NONSPECIFIC PROCEDURE  2008    L corneal transplant     COLONOSCOPY      due 2015     ENDOSCOPIC RETROGRADE CHOLANGIOPANCREATOGRAM N/A 10/13/2017    Procedure: ENDOSCOPIC RETROGRADE CHOLANGIOPANCREATOGRAM;  ENDOSCOPIC RETROGRADE CHOLANGIOPANCREATOGRAM ;  Surgeon: Isabel Yousif MD;  Location: [facility] OR     HC REMOVAL GALLBLADDER  1982    Cholecystectomy     HEART CATH RIGHT AND LEFT HEART CATH  1/19/15     HERNIORRHAPHY VENTRAL N/A 4/19/2016    Procedure: HERNIORRHAPHY VENTRAL;  Surgeon: Hamlet Ness MD;  Location: [facility] OR     MASTECTOMY SIMPLE BILATERAL  10/5/09    bilateral mastectomy     SURGICAL HISTORY OF -   6/2010    breast reconstruction       FAMILY HISTORY:  Family History   Adopted: Yes   Problem Relation Age of Onset     C.A.D. Paternal Uncle         MI 50's     Family History Negative Daughter        SOCIAL HISTORY:  Social History     Socioeconomic History     Marital status:      Spouse name: None     Number of children: 4     Years of education: None     Highest education level: None   Occupational History     Occupation: Retired nurse     Employer: RETIRED   Social Needs     Financial resource strain: None     Food insecurity:     Worry: None     Inability: None     Transportation needs:     Medical: None     Non-medical: None   Tobacco Use     Smoking status: Never Smoker     Smokeless tobacco: Never Used   Substance and Sexual Activity     Alcohol use: Yes     Alcohol/week: 4.2  oz     Types: 7 Glasses of wine per week     Comment: rarely     Drug use: No     Sexual activity: Never     Partners: Male   Lifestyle     Physical activity:     Days per week: None     Minutes per session: None     Stress: None   Relationships     Social connections:     Talks on phone: None     Gets together: None     Attends Sabianist service: None     Active member of club or organization: None     Attends meetings of clubs or organizations: None     Relationship status: None     Intimate partner violence:     Fear of current or ex partner: None     Emotionally abused: None     Physically abused: None     Forced sexual activity: None   Other Topics Concern      Service Not Asked     Blood Transfusions Not Asked     Caffeine Concern No     Comment: 1- 2 cups coffee     Occupational Exposure Not Asked     Hobby Hazards Not Asked     Sleep Concern No     Stress Concern No     Weight Concern No     Special Diet Yes     Comment: low sodium     Back Care Not Asked     Exercise No     Comment: 2 days a week (abigail chi), walking program, stationary bike 10 minutes 3 times per week     Bike Helmet Not Asked     Seat Belt Yes     Self-Exams Not Asked     Parent/sibling w/ CABG, MI or angioplasty before 65F 55M? 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  She walks with a walker.  Is a full code.  (last updated 10/11/2017)        Review of Systems:  Skin:  Negative       Eyes:  Positive for glasses Foggy vision  ENT:  Negative      Respiratory:  Positive for dyspnea on exertion;cough uses O2 @ night- 2L   Cardiovascular:    edema;Positive for    Gastroenterology: Negative      Genitourinary:  Negative      Musculoskeletal:  Positive for joint pain    Neurologic:  Negative      Psychiatric:  Positive for sleep disturbances;anxiety    Heme/Lymph/Imm:  Positive for allergies    Endocrine:  Negative        Physical  "Exam:  Vitals: /62   Pulse 73   Ht 1.626 m (5' 4\")   Wt 66.4 kg (146 lb 4.8 oz)   LMP 01/01/1990   BMI 25.11 kg/m      Constitutional:  cooperative, alert and oriented, well developed, well nourished, in no acute distress        Skin:  warm and dry to the touch, no apparent skin lesions or masses noted          Head:  normocephalic, no masses or lesions        Eyes:  pupils equal and round   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    Lymph:      ENT:  no pallor or cyanosis, dentition good        Neck:  carotid pulses are full and equal bilaterally, JVP normal, no carotid bruit        Respiratory:  normal breath sounds, clear to auscultation, normal A-P diameter, normal symmetry, normal respiratory excursion, no use of accessory muscles         Cardiac: regular rhythm;normal S1 and S2;no S3 or S4;apical impulse not displaced occasional premature beats         diastolic murmur;grade 1    pulses full and equal, no bruits auscultated                                        GI:  abdomen soft, non-tender, BS normoactive, no mass, no HSM, no bruits        Extremities and Muscular Skeletal:  no deformities, clubbing, cyanosis, erythema observed       LLE edema;1+ wearing support stocking on right LE, left LE ace wrapped    Neurological:  no gross motor deficits;affect appropriate   walks with a walker    Psych:  Alert and Oriented x 3        CC  Jae Butler MD  [id number]  [place] [id number]              "

## 2019-07-08 NOTE — TELEPHONE ENCOUNTER
MARY ELLEN from patient, requesting a call back, as she has experienced some increased edema since her total hip replacement on 6/6/19. Patient states that her benicar was decreased while she was in the hospital for the hip replacement and now her BP is a little high. Patient is concerned about her swelling. Spoke with patient. Patient states that she has been referred to the lymphedema clinic by both her PCP and the orthopedic surgeon and nothing have helped with her swelling. Patient states that she would like to be evaluated for the swelling and also her mildly elevated BP. Patient states that while she was in the hospital, her olmesartan was decreased to 5 mg for low BP in the hospital. Medication list updated. Patient states that her BP has been running in the 130s-140s/60s now. OV made for 7/12/19 with Gail at 8:30AM for further evaluation.

## 2019-07-12 NOTE — LETTER
7/12/2019    MD [facility] [address] [place] [address]    RE: Rupinder Tracy       Dear Colleague,    I had the pleasure of seeing Rupinder Tracy in the [facility].    HPI and Plan:   I had the pleasure of seeing Rupinder Tracy today in cardiology clinic follow up. She is a pleasant 79 year old patient of Dr. Butler.    Ms. Tracy has a history of pulmonary hypertension, systemic hypertension and patent foramen ovale.  She also has a history of mild aortic root enlargement and mild to moderate aortic insufficiency.  Left and right heart catheterization performed 01/2015 for pulmonary hypertension and aortic insufficiency demonstrated no significant coronary artery disease.  Aortogram showed moderate aortic insufficiency and moderate annuloaortic ectasia.  There was moderate pulmonary hypertension with a mean PA pressure of 32 mmHg and a wide pulse pressure in the PA tracing consistent with at least moderate pulmonary valve insufficiency.  Pulmonary vascular resistance was normal and there was no significant gradient between the end-diastolic pressure in the pulmonary capillary wedge pressure and the left ventricular end-diastolic pressure.  It was felt that the elevated pulmonary pressure was likely secondary to impaired relaxation both from aortic insufficiency and systemic hypertension and perhaps pulmonary causes such as obstructive sleep apnea or asthma.  We tested for sleep apnea and showed desaturations but she never reached REM sleep and did not snore.      Historically her blood pressure and weight have been under excellent control with Bumex at a very low dose.  At higher doses of Bumex her creatinine seems to bump up. The patient has significant COPD and asthma since the 1980s.  The patient has been unable to exercise due to left hip pain, she just had this hip replaced.  She has left greater than right lymphedema as well.   Ultrasounds of the lower extremities have not shown any deep vein thrombosis. Her breathing is somewhat limited but did improve after pulmonary rehab.  She was hospitalized in April after using ibuprofen and Aleve for her hip pain causing a gastric ulcer and had recent eye surgery.      She was recently discharged from [facility] after a left hip replacement. Her Benicar was decreased to 5 mg for hypotension during her hospitalization.  She was discharged on aspirin, 2 days after stopping her aspirin she was evaluated and found to have DVT in her right leg, she is currently on Eliquis.  She is also had a bout of pneumonia while in the hospital.  Prior to surgery she had lymphedema treatment done and she still has appointments and her lymphedema is under control today, she has it in both legs is worse in the left.  Her weight is up since her surgery, before surgery her dry weight was 145 pounds at home.  More recently she is been up to 154, for that reason she increased her  Bumex to 1 mg daily, she is done this for a couple of days and brought her weight down 2 pounds, she was 152 at home today and 154 in clinic.  On exam she has  Labs Reviewed: from care everwhere: June 9, 2019 sodium 135, potassium 5.2, creatinine 1.36, BUN 71    Physical Exam  Please see Below   Bilateral lymphedema starting in the upper mid calf, her legs are soft and nontender.  Her JVP is elevated, this is not new.  Her lungs are clear though she has a cough.    Assessment and Plan  1.  Rupinder Tracy is a delightful 79-year-old female with a small patent foramen ovale that is inconsequential and without hemodynamic effect.  She  has some dyspnea on exertion, this is multifactorial.  She has pulmonary hypertension on a secondary basis likely due to diastolic dysfunction of the left ventricle as well as aortic insufficiency, COPD and asthma.  We do not know if she has obstructive sleep apnea.  She  will continue on a little bit extra Bumex,  alternating doses of 1 mg and half milligram between now and when she sees her primary on Monday.  Think she is about as euvolemic as she can be, but she is having a cough, likely from her pneumonia and COPD.  2.  Normal coronary arteries.   3.  Mild to moderate aortic insufficiency and mild aortic root enlargement.  The plan was to repeat echocardiogram this fall, we will move it up to a month from now because she is worried about her breathing and her heart.  I like to wait a about 4 weeks so we know her pneumonia is resolved and her volume status is improved.  4.  The patient's peripheral edema left greater than right remains mild.  The [facility] continues to treat her left lower extremity edema.  She wears compression stockings.   5.  Right lower extremity DVT.  This is followed by her primary, she is currently taking Eliquis 10 mg twice daily and will decrease the dose Monday to 5 mg twice daily.  6.  Hypertension.  Her Benicar was cut down from 40 to 5 mg during her recent hospitalization.  Her blood pressure today is controlled at 120/62 so we will keep her on this dose.    Thank you for allowing me to care for Rupinder Tracy today.  I will see her back in a month with a echo and a BMP prior.    YUNIEL Cage, CNP  Cardiology    Voice recognition software was used for this note, I have reviewed this note, but errors may have been missed.    Orders Placed This Encounter   Procedures     Basic metabolic panel     Orders Placed This Encounter   Medications     Cyanocobalamin (VITAMIN B-12 PO)     multivitamin w/minerals (MULTI-VITAMIN) tablet     Sig: Take 1 tablet by mouth daily     apixaban ANTICOAGULANT (ELIQUIS) 5 MG tablet     Sig: Take 5 mg by mouth 2 times daily     There are no discontinued medications.      CURRENT MEDICATIONS:  Current Outpatient Medications   Medication Sig Dispense Refill     albuterol (ALBUTEROL) 108 (90 BASE) MCG/ACT inhaler Inhale 2 puffs into the lungs every 4  hours as needed 1 Inhaler 5     apixaban ANTICOAGULANT (ELIQUIS) 5 MG tablet Take 5 mg by mouth 2 times daily       bumetanide (BUMEX) 0.5 MG tablet Take 1 tablet (0.5 mg) by mouth daily (with breakfast) (Patient taking differently: Take 1 mg by mouth daily (with breakfast) ) 30 tablet 0     Calcium-Vitamin D-Vitamin K (CALCIUM + D) 500-1000-40 MG-UNT-MCG CHEW Take 2 tablets by mouth daily        Cholecalciferol (VITAMIN D) 2000 UNITS tablet Take 1,000 Units by mouth daily        Cyanocobalamin (VITAMIN B-12 PO)        DOXYCYCLINE HYCLATE PO Take 50 mg by mouth 3 times per week Monday,wednesday, friday       fluticasone-salmeterol (ADVAIR DISKUS) 250-50 MCG/DOSE diskus inhaler Inhale 1 puff into the lungs 2 times daily 60 Inhaler 1     hyaluronate in balanced salt ophthalmic solution (HEALON IN BSS) solution 1 drop every 4 hours (while awake)       multivitamin w/minerals (MULTI-VITAMIN) tablet Take 1 tablet by mouth daily       olmesartan (BENICAR) 20 MG tablet Take 5 mg by mouth daily       omeprazole (PRILOSEC) 40 MG DR capsule Take 40 mg by mouth daily       Ophthalmic Irrigation Solution (OCUSOFT EYE WASH OP)        order for DME 2L of O2 at night       RESTASIS 0.05 % OP EMUL 1 drop to left eye twice daily       rOPINIRole (REQUIP) 0.25 MG tablet Take 0.5 mg by mouth every evening. Take 2 tablets by mouth at bedtime.  Take 1 tablet by mouth in afternoon as needed.       Sodium Hyaluronate (HEALON IO) Combination with Pred       Sodium Hyaluronate (HEALON IO) WITH PREDNISOLONE MIXTURE 0.001- 0.25%       SYSTANE 0.4-0.3 % OP SOLN 1 drop to left eye 4 times daily       acetaminophen (TYLENOL) 500 MG tablet Take 1,000 mg by mouth 3 times daily       azelastine (ASTELIN) 0.1 % nasal spray Spray 2 sprays into both nostrils 2 times daily       COMPRESSION STOCKINGS 1 each daily (Patient not taking: Reported on 7/12/2019) 3 each 1     prednisoLONE acetate (PRED FORTE) 1 % ophthalmic susp INSTILL 1 DROP INTO INTO LEFT  "EYE TWICE A DAY  3       ALLERGIES     Allergies   Allergen Reactions     Atorvastatin Calcium      myalgias     Celecoxib Swelling     edema     Cholestyramine Diarrhea     Diarrhea       Crestor [Rosuvastatin Calcium]      leg aches, likely from medication     Cyclobenzaprine Hcl      flexeril--gets \"goofy\"     Dulera      tremors     Gabapentin      Nightmares.   Retrial of medication caused tremors.     Lisinopril Cough     Cough/ Dizziness at high dose.     Meperidine Hcl Nausea and Vomiting     demerol-severe nausea     Morphine Nausea and Vomiting     Naprosyn [Naproxen] GI Disturbance     Niaspan [Niacin]      flushing, severe     Percocet [Oxycodone-Acetaminophen] Nausea     Nausea, lightheadedness.   Tolerates med if taken with food.     Pravastatin Swelling     Edema, myalgias     Red Yeast Rice [Cholestin] GI Disturbance     Bloating, etc.     Simvastatin      myalgias     Timolol Maleate Difficulty breathing     timoptic--respiratory problems     Hctz Rash     rash     Sulfa Drugs Itching and Rash     rash on all mucous membranes and palms of hands with intense itching       PAST MEDICAL HISTORY:  Past Medical History:   Diagnosis Date     Aortic insufficiency      Aortic root enlargement (H)      Arthritis      Asthma      Cellulitis 4/13 in [place]    R ankle     CKD (chronic kidney disease) stage 3, GFR 30-59 ml/min (H)      COPD (chronic obstructive pulmonary disease) (H)      Diastolic dysfunction      Difficult airway for intubation      CRUZ (dyspnea on exertion)      Ductal Carcinoma in Situ of Left Breast 9/09     Duodenal ulcer, unspecified as acute or chronic, without mention of hemorrhage or perforation 1980's    felt due to high dose steroids     Female stress incontinence      H/O right and left heart catheterization     1-     HYPERLIPIDEMIA      Hypertension      Ischemic colitis 7/2001    [facility]     Lactose Intolerance     Mild     Legal blindness     Artificial right eye, " severe vision loss left (Detached retina's, glaucoma, corneal transplants)     Lymphedema      Mild intermittent asthma ~1980's     Mitral valve disorder      Nocturnal oxygen desaturation      EVARISTO (obstructive sleep apnea)      Osteoporosis, unspecified ~2002     Patent foramen ovale     small     PERIPHERAL VASCULAR DISEASE 5/05    mesenteric arteries, angioplasty     PRESBYESOPHAGUS 6/04     Pulmonary HTN (H) 11/2014     PVD (peripheral vascular disease) (H)      Restrictive lung disease      Serous retinal detachment     False eye right     Subarachnoid hemorrhage following injury (H) 12/10    due to fall, vascular evaluation negative     Syncope and collapse 12/10     Unspecified glaucoma(365.9)        PAST SURGICAL HISTORY:  Past Surgical History:   Procedure Laterality Date     BIOPSY       C APPENDECTOMY  1982     C NONSPECIFIC PROCEDURE  1945    adenoidectomy     C NONSPECIFIC PROCEDURE      bilat retinal detachment     C NONSPECIFIC PROCEDURE      blephoroplasty bilat     C NONSPECIFIC PROCEDURE  1997    glaucoma procedure L     C NONSPECIFIC PROCEDURE  1997, 2007, 2014    corneal transplant L     C NONSPECIFIC PROCEDURE  1945    strabismus procedure R eye     C NONSPECIFIC PROCEDURE  1960's    R breast bx     C NONSPECIFIC PROCEDURE  5/05    Angioplasty and stenting mesenteric vessels     C NONSPECIFIC PROCEDURE  2008    L corneal transplant     C TOTAL HIP ARTHROPLASTY Left 06/06/2019     COLONOSCOPY      due 2015     ENDOSCOPIC RETROGRADE CHOLANGIOPANCREATOGRAM N/A 10/13/2017    Procedure: ENDOSCOPIC RETROGRADE CHOLANGIOPANCREATOGRAM;  ENDOSCOPIC RETROGRADE CHOLANGIOPANCREATOGRAM ;  Surgeon: Isabel Yousif MD;  Location: [facility] OR     HC REMOVAL GALLBLADDER  1982    Cholecystectomy     HEART CATH RIGHT AND LEFT HEART CATH  1/19/15     HERNIORRHAPHY VENTRAL N/A 4/19/2016    Procedure: HERNIORRHAPHY VENTRAL;  Surgeon: Hamlet Ness MD;  Location: [facility] OR     MASTECTOMY SIMPLE BILATERAL  10/5/09     bilateral mastectomy     SURGICAL HISTORY OF -   6/2010    breast reconstruction       FAMILY HISTORY:  Family History   Adopted: Yes   Problem Relation Age of Onset     C.A.D. Paternal Uncle         MI 50's     Family History Negative Daughter        SOCIAL HISTORY:  Social History     Socioeconomic History     Marital status:      Spouse name: None     Number of children: 4     Years of education: None     Highest education level: None   Occupational History     Occupation: Retired nurse     Employer: RETIRED   Social Needs     Financial resource strain: None     Food insecurity:     Worry: None     Inability: None     Transportation needs:     Medical: None     Non-medical: None   Tobacco Use     Smoking status: Never Smoker     Smokeless tobacco: Never Used   Substance and Sexual Activity     Alcohol use: Yes     Alcohol/week: 4.2 oz     Types: 7 Glasses of wine per week     Comment: rarely     Drug use: No     Sexual activity: Never     Partners: Male   Lifestyle     Physical activity:     Days per week: None     Minutes per session: None     Stress: None   Relationships     Social connections:     Talks on phone: None     Gets together: None     Attends [employer] service: None     Active member of club or organization: None     Attends meetings of clubs or organizations: None     Relationship status: None     Intimate partner violence:     Fear of current or ex partner: None     Emotionally abused: None     Physically abused: None     Forced sexual activity: None   Other Topics Concern      Service Not Asked     Blood Transfusions Not Asked     Caffeine Concern No     Comment: 1- 2 cups coffee     Occupational Exposure Not Asked     Hobby Hazards Not Asked     Sleep Concern No     Stress Concern No     Weight Concern No     Special Diet Yes     Comment: low sodium     Back Care Not Asked     Exercise No     Comment: 2 days a week (abigail chi), walking program, stationary bike 10 minutes 3 times per  week     Bike Helmet Not Asked     Seat Belt Yes     Self-Exams Not Asked     Parent/sibling w/ CABG, MI or angioplasty before 65F 55M? 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  She walks with a walker.  Is a full code.  (last updated 10/11/2017)        Review of Systems:  Skin:  Positive for bruising     Eyes:  Positive for glasses Foggy vision  ENT:  Negative sinus trouble Dry nose  Respiratory:  Positive for dyspnea on exertion;cough;shortness of breath uses O2 @ night- 2L   Cardiovascular:  Negative edema;Positive for;lower extremity symptoms;heaviness;fatigue lymphedema in left leg since March/April;  Gastroenterology: Negative nausea    Genitourinary:  Negative nocturia    Musculoskeletal:  Positive for joint pain hip pain at night  Neurologic:  Negative local weakness left leg  Psychiatric:  Negative anxiety    Heme/Lymph/Imm:  Positive for allergies    Endocrine:  Negative        Physical Exam:  Vitals: /62   Pulse 73   Wt 69.9 kg (154 lb)   LMP 01/01/1990   SpO2 98%   BMI 26.43 kg/m       Constitutional:  cooperative;in no acute distress        Skin:  warm and dry to the touch venous stasis changes        Head:  normocephalic, no masses or lesions   suborbital edema    Eyes:      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     Pulmonary HTN (H) Yes        Recent Lab Results:  LIPID RESULTS:  Lab Results   Component Value Date    CHOL 208 (A) 01/05/2016    CHOL 208 (A) 01/05/2016    HDL 52 01/05/2016    HDL 52 01/05/2016     01/05/2016     01/05/2016    TRIG 131 01/05/2016    TRIG 131 01/05/2016    CHOLHDLRATIO 2.4 08/25/2014       LIVER ENZYME RESULTS:  Lab Results   Component Value Date    AST 24 03/30/2018    ALT 14 03/30/2018       CBC RESULTS:  Lab Results   Component Value Date    WBC 6.7 07/02/2018    RBC 3.83 07/02/2018    HGB 12.2 07/02/2018    HCT 37.2 07/02/2018    MCV 97 07/02/2018    MCH 31.9 07/02/2018    MCHC 32.8 07/02/2018    RDW 14.2 07/02/2018     07/02/2018       BMP RESULTS:  Lab Results   Component Value Date     11/20/2018    POTASSIUM 4.9 11/20/2018    CHLORIDE 111 (H) 11/20/2018    CO2 25 11/20/2018    ANIONGAP 7 11/20/2018     (H) 11/20/2018    BUN 49 (H) 11/20/2018    CR 1.01 11/20/2018    GFRESTIMATED 53 (L) 11/20/2018    GFRESTBLACK 64 11/20/2018    AXEL 9.4 11/20/2018        A1C RESULTS:  No results found for: A1C    INR RESULTS:  Lab Results   Component Value Date    INR 1.09 10/12/2017    INR 0.95 01/19/2015           CC  No referring provider defined for this encounter.                          Thank you for allowing me to participate in the care of your patient.    Sincerely,     YUNIEL Johnson [facility]

## 2019-07-29 NOTE — PROGRESS NOTES
Cardiology Clinic Progress Note  Rupinder Tracy MRN# [id number]   YOB: 1940 Age: 79 year old   Primary Cardiologist: Dr. Butler  Reason for visit: urgent visit for SOB            Assessment and Plan:   Rupinder Tracy is a very pleasant 79 year old female with a history of hypertension, pulmonary hypertension, mild aortic root enlargement and mild to moderate aortic insufficiency, COPD, asthma and patent foramen ovale. Patient here for urgent follow up related to shortness of breath.     1. Shortness of breath - unclear etiology limited data available for visit today, on exam her breath sounds are diminished in right base, complaints of cough, bilateral lower extremity edema (hard to assess given lymphedema wraps) - appearing volume up on exam. Today recommended further testing to help determine etiology of dyspnea and labs to help guide diuresis. Patient has multiple chronic conditions that could be contributing HFpEF, COPD/asthma (no PFTs unclear on diagnosis), recent PNA, aortic insufficiency, obesity, deconditioning after hip surgery.    - Labs today - BMP/NTproBNP - > will consider diuretic adjustments based on results   - CXR today given diminished lung sounds on right, recent pneumonia. Patient has PCP visit in the morning, if infiltrates can have PCP address tomorrow.   - Echocardiogram - move up to next available appointment   - Consider PFTs   - Counseled patient on low sodium diet    - Continue daily weights    - Keep cardiology follow up for 8/14/19 with Lilliana Truong, will consider moving up appointment based on test results.     2. Mild to moderate aortic insufficiency   3. Pulmonary hypertension   4. Chronic diastolic heart failure  5. Hypertension   6. COPD/asthma - consider PFTs  7. Mild aortic root enlargement  8. Chronic kidney disease   9. Patent foramen ovale - small, without hemodynamic effect        Changes today: none, will consider diuretic adjustment after  reviewing lab results    Follow up plan:     CXR     Labs today - BMP and NTproBNP    Move echocardiogram up to first available appt.     Has appt with primary care provider tomorrow - consider PFTs     Follow up with Lilliana Truong on 8/14/19, may move up if needed based on above results.         History of Presenting Illness:    Rupinder Tracy is a very pleasant 79 year old female with a history of hypertension, pulmonary hypertension, mild aortic root enlargement and mild to moderate aortic insufficiency, COPD, asthma and patent foramen ovale.     Left and right heart catheterization completed 1/2015 for pulmonary hypertension and aortic insufficiency, demonstrated no significant coronary artery disease. Aortogram showed moderate aortic insufficiency and moderate annuloaortic ectasia. Moderate pulmonary hypertension with mean PA pressures of 32mmHg. Pulmonary vascular resistance was normal and there was no significant gradient between the end-diastolic pressure in the pulmonary capillary wedge pressure and the left ventricular end-diastolic pressure. It was felt that the elevated pulmonary pressure was likely secondary to impaired relaxation both from aortic insufficiency and systemic hypertension and perhaps pulmonary causes such as obstructive sleep apnea or asthma.  Patient was tested for sleep apnea and showed desaturations but she never reached REM sleep.     Patient had PCP visit 7/15/19 bumetanide to 1mg qam and 0.5mg qpm x 3 days due to weight gain (up 4#). Bilateral venous duplexes ordered, demonstrated no evidence of DVT. Previous DVT noted in posterior tibial veins has resolved. Phone follow up by PCP on 7/23, recommended continuing bumetanide 1mg qam and 0.5mg qpm.     Patient was seen by Lilliana Truong 7/12/19 at which point she was continued on bumetanide 1mg qam and 0.5mg qpm until PCP visit. Plan for follow up in 1 month with echocardiogram.     Patient is here today for urgent visit  "related to shortness of breath.     Patient reports feeling good. Monitoring weights daily a home, 7/25 155#, 7/28 151#, today 151#. Patient states she \"feels best at 145#\", which was prior to hip surgery. Notes that lower extremity edema started after hip surgery. Wearing lymphedema wraps. Feels lower extremity edema has been improving with lymphedema warps. Also noted abdominal distention, which she states has improved but feeling better. Complaints of exertional dyspnea, only able to walk 50 steps. Notes difficult to make bed due to dyspnea. Denies shortness of breath at rest. States exertional dyspnea hs been worsening. Sleeping on 1 pillow, using 2L oxygen at night, which has been for several years. Complaints of cough which she states worsens when she lays down, especially when laying on her right side. Denies fever/chills. Patient denies chest pain or chest tightness. Denies dizziness, lightheadedness or other presyncopal symptoms. Denies tachycardia or palpitations. Patient notes this isn't her baseline and she is frustrated.     No labs today, most recent labs from June 2019, which show creatinine 1.3-1.6. Blood pressure 132/67 and HR 77 in clinic today.    Appetite okay, eating most meals at home. Trying to limit sodium intake. No set exercise routine. No alcohol use. Denies tobacco use. Using walker for support.         Recent Hospitalizations   ED 7/18/19 - acute DVT         Social History    Living at home independently, has 1 daughter in [place].   Social History     Socioeconomic History     Marital status:      Spouse name: Not on file     Number of children: 4     Years of education: Not on file     Highest education level: Not on file   Occupational History     Occupation: Retired nurse     Employer: RETIRED   Social Needs     Financial resource strain: Not on file     Food insecurity:     Worry: Not on file     Inability: Not on file     Transportation needs:     Medical: Not on file "     Non-medical: Not on file   Tobacco Use     Smoking status: Never Smoker     Smokeless tobacco: Never Used   Substance and Sexual Activity     Alcohol use: Yes     Alcohol/week: 4.2 oz     Types: 7 Glasses of wine per week     Comment: rarely     Drug use: No     Sexual activity: Never     Partners: Male   Lifestyle     Physical activity:     Days per week: Not on file     Minutes per session: Not on file     Stress: Not on file   Relationships     Social connections:     Talks on phone: Not on file     Gets together: Not on file     Attends Christian service: Not on file     Active member of club or organization: Not on file     Attends meetings of clubs or organizations: Not on file     Relationship status: Not on file     Intimate partner violence:     Fear of current or ex partner: Not on file     Emotionally abused: Not on file     Physically abused: Not on file     Forced sexual activity: Not on file   Other Topics Concern      Service Not Asked     Blood Transfusions Not Asked     Caffeine Concern No     Comment: 1- 2 cups coffee     Occupational Exposure Not Asked     Hobby Hazards Not Asked     Sleep Concern No     Stress Concern No     Weight Concern No     Special Diet Yes     Comment: low sodium     Back Care Not Asked     Exercise No     Comment: 2 days a week (abigail chi), walking program, stationary bike 10 minutes 3 times per week     Bike Helmet Not Asked     Seat Belt Yes     Self-Exams Not Asked     Parent/sibling w/ CABG, MI or angioplasty before 65F 55M? 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  She walks with a walker.  Is a full code.  (last updated 10/11/2017)             Review of Systems:   Skin:  Positive for bruising;itching   Eyes:  Positive for glasses  ENT:  Negative sinus trouble  Respiratory:  Positive for dyspnea on exertion;cough;shortness of breath  Cardiovascular:  Negative  "edema;Positive for;lower extremity symptoms;heaviness;fatigue  Gastroenterology: Negative nausea  Genitourinary:  Negative nocturia  Musculoskeletal:  Positive for joint pain  Neurologic:  Negative local weakness  Psychiatric:  Negative anxiety  Heme/Lymph/Imm:  Positive for allergies  Endocrine:  Negative           Physical Exam:   Vitals: /67   Pulse 77   Ht 1.626 m (5' 4\")   Wt 69.9 kg (154 lb)   LMP 01/01/1990   SpO2 98%   BMI 26.43 kg/m     Wt Readings from Last 4 Encounters:   07/30/19 69.9 kg (154 lb)   07/12/19 69.9 kg (154 lb)   05/30/19 66.4 kg (146 lb 4.8 oz)   01/02/19 66.2 kg (146 lb)     GEN: well nourished, in no acute distress.  HEENT:  Pupils equal, round. Sclerae nonicteric.   NECK: Supple, no masses appreciated. JVD appears normal at 90 degrees  C/V:  Regular rate and rhythm, no murmur, rub or gallop appreciated on exam   RESP: Respirations are unlabored. Clear to auscultation bilaterally without wheezing, rales, or rhonchi.  GI: Abdomen soft, nontender.  EXTREM: Bilateral lymphedema wraps in place makes assessing edema challenging. L > R, edema noted up into left thigh.   NEURO: Alert and oriented, cooperative.  SKIN: Warm and dry.        Data:   ECHO 10/27/17  The left ventricle is normal in size. There is moderate concentric left  ventricular hypertrophy. Left ventricular systolic function is normal. The  visual ejection fraction is estimated at 55-60%. Grade II or moderate  diastolic dysfunction. E by E prime ratio is greater than 15, that likely  suggests increased left ventricular filling pressures. No regional wall motion  abnormalities noted.  The right ventricle is normal size. Mildly decreased right ventricular  systolic function.  The left atrium is mildly dilated. Right atrial size is normal. A patent  foramen ovale is suspected.  There is mild (1+) tricuspid regurgitation. The right ventricular systolic  pressure is approximated at 55.2 mmHg plus the right atrial pressure. " Dilated  IVC (>2.5cm) with <50% respiratory collapse; right atrial pressure is  estimated at 15-20mmHg. Right ventricular systolic pressure is elevated,  consistent with severe pulmonary hypertension.  There is mild to moderate (1-2+) aortic regurgitation. No aortic stenosis is  present  Mild aortic root dilatation. The ascending aorta is Mildly dilated.  In direct comparison to the previous study dated 11/03/2016, there has been a  mild interval increase in the estimated pulmonary artery systolic pressures  and a mild deterioration in right ventricular systolic function.    LIPID RESULTS:  Lab Results   Component Value Date    CHOL 208 (A) 01/05/2016    CHOL 208 (A) 01/05/2016    HDL 52 01/05/2016    HDL 52 01/05/2016     01/05/2016     01/05/2016    TRIG 131 01/05/2016    TRIG 131 01/05/2016    CHOLHDLRATIO 2.4 08/25/2014     LIVER ENZYME RESULTS:  Lab Results   Component Value Date    AST 24 03/30/2018    ALT 14 03/30/2018     CBC RESULTS:  Lab Results   Component Value Date    WBC 6.7 07/02/2018    RBC 3.83 07/02/2018    HGB 12.2 07/02/2018    HCT 37.2 07/02/2018    MCV 97 07/02/2018    MCH 31.9 07/02/2018    MCHC 32.8 07/02/2018    RDW 14.2 07/02/2018     07/02/2018     BMP RESULTS:  Lab Results   Component Value Date     11/20/2018    POTASSIUM 4.9 11/20/2018    CHLORIDE 111 (H) 11/20/2018    CO2 25 11/20/2018    ANIONGAP 7 11/20/2018     (H) 11/20/2018    BUN 49 (H) 11/20/2018    CR 1.01 11/20/2018    GFRESTIMATED 53 (L) 11/20/2018    GFRESTBLACK 64 11/20/2018    AXEL 9.4 11/20/2018     INR RESULTS:  Lab Results   Component Value Date    INR 1.09 10/12/2017    INR 0.95 01/19/2015            Medications     Current Outpatient Medications   Medication Sig Dispense Refill     acetaminophen (TYLENOL) 500 MG tablet Take 1,000 mg by mouth 3 times daily       albuterol (ALBUTEROL) 108 (90 BASE) MCG/ACT inhaler Inhale 2 puffs into the lungs every 4 hours as needed 1 Inhaler 5      apixaban ANTICOAGULANT (ELIQUIS) 5 MG tablet Take 5 mg by mouth 2 times daily        bumetanide (BUMEX) 0.5 MG tablet Take 1 tablet (0.5 mg) by mouth daily (with breakfast) (Patient taking differently: Take 1.5 mg by mouth 1mg in the am, 0.5mg in the afternoon) 30 tablet 0     Calcium-Vitamin D-Vitamin K (CALCIUM + D) 500-1000-40 MG-UNT-MCG CHEW Take 2 tablets by mouth daily        Cholecalciferol (VITAMIN D) 2000 UNITS tablet Take 1,000 Units by mouth daily        Cyanocobalamin (VITAMIN B-12 PO)        DOXYCYCLINE HYCLATE PO Take 50 mg by mouth 3 times per week Monday,wednesday, friday       fluticasone-salmeterol (ADVAIR DISKUS) 250-50 MCG/DOSE diskus inhaler Inhale 1 puff into the lungs 2 times daily 60 Inhaler 1     multivitamin w/minerals (MULTI-VITAMIN) tablet Take 1 tablet by mouth daily       olmesartan (BENICAR) 5 MG tablet Take 5 mg by mouth daily        omeprazole (PRILOSEC) 40 MG DR capsule Take 40 mg by mouth daily       Ophthalmic Irrigation Solution (OCUSOFT EYE WASH OP)        prednisoLONE acetate (PRED FORTE) 1 % ophthalmic susp INSTILL 1 DROP INTO INTO LEFT EYE TWICE A DAY  3     RESTASIS 0.05 % OP EMUL 1 drop to left eye twice daily       rOPINIRole (REQUIP) 0.25 MG tablet Take 0.5 mg by mouth every evening. Take 2 tablets by mouth at bedtime.  Take 1 tablet by mouth in afternoon as needed.       Sodium Hyaluronate (HEALON IO) Combination with Pred       Sodium Hyaluronate (HEALON IO) WITH PREDNISOLONE MIXTURE 0.001- 0.25%       SYSTANE 0.4-0.3 % OP SOLN 1 drop to left eye 4 times daily       COMPRESSION STOCKINGS 1 each daily (Patient not taking: Reported on 7/12/2019) 3 each 1     order for DME 2L of O2 at night            Past Medical History     Past Medical History:   Diagnosis Date     Aortic insufficiency      Aortic root enlargement (H)      Arthritis      Asthma      Cellulitis 4/13 in [place]    R ankle     CKD (chronic kidney disease) stage 3, GFR 30-59 ml/min (H)      COPD (chronic  obstructive pulmonary disease) (H)      Diastolic dysfunction      Difficult airway for intubation      CRUZ (dyspnea on exertion)      Ductal Carcinoma in Situ of Left Breast 9/09     Duodenal ulcer, unspecified as acute or chronic, without mention of hemorrhage or perforation 1980's    felt due to high dose steroids     Female stress incontinence      H/O right and left heart catheterization     1-     HYPERLIPIDEMIA      Hypertension      Ischemic colitis 7/2001    [facility]     Lactose Intolerance     Mild     Legal blindness     Artificial right eye, severe vision loss left (Detached retina's, glaucoma, corneal transplants)     Lymphedema      Mild intermittent asthma ~1980's     Mitral valve disorder      Nocturnal oxygen desaturation      EVARISTO (obstructive sleep apnea)      Osteoporosis, unspecified ~2002     Patent foramen ovale     small     PERIPHERAL VASCULAR DISEASE 5/05    mesenteric arteries, angioplasty     PRESBYESOPHAGUS 6/04     Pulmonary HTN (H) 11/2014     PVD (peripheral vascular disease) (H)      Restrictive lung disease      Serous retinal detachment     False eye right     Subarachnoid hemorrhage following injury (H) 12/10    due to fall, vascular evaluation negative     Syncope and collapse 12/10     Unspecified glaucoma(365.9)      Past Surgical History:   Procedure Laterality Date     BIOPSY       C APPENDECTOMY  1982     C NONSPECIFIC PROCEDURE  1945    adenoidectomy     C NONSPECIFIC PROCEDURE      bilat retinal detachment     C NONSPECIFIC PROCEDURE      blephoroplasty bilat     C NONSPECIFIC PROCEDURE  1997    glaucoma procedure L     C NONSPECIFIC PROCEDURE  1997, 2007, 2014    corneal transplant L     C NONSPECIFIC PROCEDURE  1945    strabismus procedure R eye     C NONSPECIFIC PROCEDURE  1960's    R breast bx     C NONSPECIFIC PROCEDURE  5/05    Angioplasty and stenting mesenteric vessels     C NONSPECIFIC PROCEDURE  2008    L corneal transplant     C TOTAL HIP  ARTHROPLASTY Left 06/06/2019     COLONOSCOPY      due 2015     ENDOSCOPIC RETROGRADE CHOLANGIOPANCREATOGRAM N/A 10/13/2017    Procedure: ENDOSCOPIC RETROGRADE CHOLANGIOPANCREATOGRAM;  ENDOSCOPIC RETROGRADE CHOLANGIOPANCREATOGRAM ;  Surgeon: Isabel Yousif MD;  Location: [facility] OR     HC REMOVAL GALLBLADDER  1982    Cholecystectomy     HEART CATH RIGHT AND LEFT HEART CATH  1/19/15     HERNIORRHAPHY VENTRAL N/A 4/19/2016    Procedure: HERNIORRHAPHY VENTRAL;  Surgeon: Hamlet Ness MD;  Location: [facility] OR     MASTECTOMY SIMPLE BILATERAL  10/5/09    bilateral mastectomy     SURGICAL HISTORY OF -   6/2010    breast reconstruction     Family History   Adopted: Yes   Problem Relation Age of Onset     C.A.D. Paternal Uncle         MI 50's     Family History Negative Daughter             Allergies   Atorvastatin calcium; Celecoxib; Cholestyramine; Crestor [rosuvastatin calcium]; Cyclobenzaprine hcl; Dulera; Gabapentin; Lisinopril; Meperidine hcl; Morphine; Naprosyn [naproxen]; Niaspan [niacin]; Percocet [oxycodone-acetaminophen]; Pravastatin; Red yeast rice [cholestin]; Simvastatin; Timolol maleate; Hctz; and Sulfa drugs        YUNIEL Orozco [facility]  Pager: [phone]

## 2019-07-29 NOTE — TELEPHONE ENCOUNTER
Patient called stating that her weight was up to 155 lbs and her PMD increased her Bumex to 1.5 mg daily and now her weight is down to 151 lbs. Pt stated that lately she has been feeling SOB with exertion and it does not seem to be improving even after the weight loss.  Pt stated her dry weight before her total hip surgery was 145 lb but when she last saw Gail she weighed 152 lb and was feeling ok. Pt denied SOB at rest but notices it even when walking short distances. Pt would like to be seen to be evaluated by cardiology. Gail unavailable. Pt scheduled to see Joanna 7/30/19. Pt aware that between now and then to go ED if she develops SOB at rest, SOB worsens or she has any chest pain .

## 2019-07-30 NOTE — PATIENT INSTRUCTIONS
1. Labs today - will call tomorrow with results  2. CXR today   3. Move echocardiogram up to this week if possible  4. Follow up with primary care provider tomorrow  5. Follow up with Lilliana Truong in cardiology as scheduled for 8/14/19  6. Please call with any additional questions/concerns [phone]

## 2019-07-30 NOTE — LETTER
7/30/2019    MD [facility] [address] [place] [address]    RE: Rupinder Tracy       Dear Colleague,    I had the pleasure of seeing Rupinder Tracy in the [facility].    Cardiology Clinic Progress Note  Rupinder Tracy MRN# [id number]   YOB: 1940 Age: 79 year old   Primary Cardiologist: Dr. Butler  Reason for visit: urgent visit for SOB            Assessment and Plan:   Rupinder Tracy is a very pleasant 79 year old female with a history of hypertension, pulmonary hypertension, mild aortic root enlargement and mild to moderate aortic insufficiency, COPD, asthma and patent foramen ovale. Patient here for urgent follow up related to shortness of breath.     1. Shortness of breath - unclear etiology limited data available for visit today, on exam her breath sounds are diminished in right base, complaints of cough, bilateral lower extremity edema (hard to assess given lymphedema wraps) - appearing volume up on exam. Today recommended further testing to help determine etiology of dyspnea and labs to help guide diuresis. Patient has multiple chronic conditions that could be contributing HFpEF, COPD/asthma (no PFTs unclear on diagnosis), recent PNA, aortic insufficiency, obesity, deconditioning after hip surgery.    - Labs today - BMP/NTproBNP - > will consider diuretic adjustments based on results   - CXR today given diminished lung sounds on right, recent pneumonia. Patient has PCP visit in the morning, if infiltrates can have PCP address tomorrow.   - Echocardiogram - move up to next available appointment   - Consider PFTs   - Counseled patient on low sodium diet    - Continue daily weights    - Keep cardiology follow up for 8/14/19 with Lilliana Truong, will consider moving up appointment based on test results.     2. Mild to moderate aortic insufficiency   3. Pulmonary hypertension   4. Chronic diastolic heart failure  5.  Hypertension   6. COPD/asthma - consider PFTs  7. Mild aortic root enlargement  8. Chronic kidney disease   9. Patent foramen ovale - small, without hemodynamic effect        Changes today: none, will consider diuretic adjustment after reviewing lab results    Follow up plan:     CXR     Labs today - BMP and NTproBNP    Move echocardiogram up to first available appt.     Has appt with primary care provider tomorrow - consider PFTs     Follow up with Lilliana Truong on 8/14/19, may move up if needed based on above results.         History of Presenting Illness:    Rupinder Tracy is a very pleasant 79 year old female with a history of hypertension, pulmonary hypertension, mild aortic root enlargement and mild to moderate aortic insufficiency, COPD, asthma and patent foramen ovale.     Left and right heart catheterization completed 1/2015 for pulmonary hypertension and aortic insufficiency, demonstrated no significant coronary artery disease. Aortogram showed moderate aortic insufficiency and moderate annuloaortic ectasia. Moderate pulmonary hypertension with mean PA pressures of 32mmHg. Pulmonary vascular resistance was normal and there was no significant gradient between the end-diastolic pressure in the pulmonary capillary wedge pressure and the left ventricular end-diastolic pressure. It was felt that the elevated pulmonary pressure was likely secondary to impaired relaxation both from aortic insufficiency and systemic hypertension and perhaps pulmonary causes such as obstructive sleep apnea or asthma.  Patient was tested for sleep apnea and showed desaturations but she never reached REM sleep.     Patient had PCP visit 7/15/19 bumetanide to 1mg qam and 0.5mg qpm x 3 days due to weight gain (up 4#). Bilateral venous duplexes ordered, demonstrated no evidence of DVT. Previous DVT noted in posterior tibial veins has resolved. Phone follow up by PCP on 7/23, recommended continuing bumetanide 1mg qam and 0.5mg  "qpm.     Patient was seen by Lilliana Truong 7/12/19 at which point she was continued on bumetanide 1mg qam and 0.5mg qpm until PCP visit. Plan for follow up in 1 month with echocardiogram.     Patient is here today for urgent visit related to shortness of breath.     Patient reports feeling good. Monitoring weights daily a home, 7/25 155#, 7/28 151#, today 151#. Patient states she \"feels best at 145#\", which was prior to hip surgery. Notes that lower extremity edema started after hip surgery. Wearing lymphedema wraps. Feels lower extremity edema has been improving with lymphedema warps. Also noted abdominal distention, which she states has improved but feeling better. Complaints of exertional dyspnea, only able to walk 50 steps. Notes difficult to make bed due to dyspnea. Denies shortness of breath at rest. States exertional dyspnea hs been worsening. Sleeping on 1 pillow, using 2L oxygen at night, which has been for several years. Complaints of cough which she states worsens when she lays down, especially when laying on her right side. Denies fever/chills. Patient denies chest pain or chest tightness. Denies dizziness, lightheadedness or other presyncopal symptoms. Denies tachycardia or palpitations. Patient notes this isn't her baseline and she is frustrated.     No labs today, most recent labs from June 2019, which show creatinine 1.3-1.6. Blood pressure 132/67 and HR 77 in clinic today.    Appetite okay, eating most meals at home. Trying to limit sodium intake. No set exercise routine. No alcohol use. Denies tobacco use. Using walker for support.         Recent Hospitalizations   ED 7/18/19 - acute DVT         Social History    Living at home independently, has 1 daughter in [place].   Social History     Socioeconomic History     Marital status:      Spouse name: Not on file     Number of children: 4     Years of education: Not on file     Highest education level: Not on file   Occupational " History     Occupation: Retired nurse     Employer: RETIRED   Social Needs     Financial resource strain: Not on file     Food insecurity:     Worry: Not on file     Inability: Not on file     Transportation needs:     Medical: Not on file     Non-medical: Not on file   Tobacco Use     Smoking status: Never Smoker     Smokeless tobacco: Never Used   Substance and Sexual Activity     Alcohol use: Yes     Alcohol/week: 4.2 oz     Types: 7 Glasses of wine per week     Comment: rarely     Drug use: No     Sexual activity: Never     Partners: Male   Lifestyle     Physical activity:     Days per week: Not on file     Minutes per session: Not on file     Stress: Not on file   Relationships     Social connections:     Talks on phone: Not on file     Gets together: Not on file     Attends Alevism service: Not on file     Active member of club or organization: Not on file     Attends meetings of clubs or organizations: Not on file     Relationship status: Not on file     Intimate partner violence:     Fear of current or ex partner: Not on file     Emotionally abused: Not on file     Physically abused: Not on file     Forced sexual activity: Not on file   Other Topics Concern      Service Not Asked     Blood Transfusions Not Asked     Caffeine Concern No     Comment: 1- 2 cups coffee     Occupational Exposure Not Asked     Hobby Hazards Not Asked     Sleep Concern No     Stress Concern No     Weight Concern No     Special Diet Yes     Comment: low sodium     Back Care Not Asked     Exercise No     Comment: 2 days a week (abigail chi), walking program, stationary bike 10 minutes 3 times per week     Bike Helmet Not Asked     Seat Belt Yes     Self-Exams Not Asked     Parent/sibling w/ CABG, MI or angioplasty before 65F 55M? 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  She walks with a walker.  Is a full code.  (last updated  "10/11/2017)             Review of Systems:   Skin:  Positive for bruising;itching   Eyes:  Positive for glasses  ENT:  Negative sinus trouble  Respiratory:  Positive for dyspnea on exertion;cough;shortness of breath  Cardiovascular:  Negative edema;Positive for;lower extremity symptoms;heaviness;fatigue  Gastroenterology: Negative nausea  Genitourinary:  Negative nocturia  Musculoskeletal:  Positive for joint pain  Neurologic:  Negative local weakness  Psychiatric:  Negative anxiety  Heme/Lymph/Imm:  Positive for allergies  Endocrine:  Negative           Physical Exam:   Vitals: /67   Pulse 77   Ht 1.626 m (5' 4\")   Wt 69.9 kg (154 lb)   LMP 01/01/1990   SpO2 98%   BMI 26.43 kg/m      Wt Readings from Last 4 Encounters:   07/30/19 69.9 kg (154 lb)   07/12/19 69.9 kg (154 lb)   05/30/19 66.4 kg (146 lb 4.8 oz)   01/02/19 66.2 kg (146 lb)     GEN: well nourished, in no acute distress.  HEENT:  Pupils equal, round. Sclerae nonicteric.   NECK: Supple, no masses appreciated. JVD appears normal at 90 degrees  C/V:  Regular rate and rhythm, no murmur, rub or gallop appreciated on exam   RESP: Respirations are unlabored. Clear to auscultation bilaterally without wheezing, rales, or rhonchi.  GI: Abdomen soft, nontender.  EXTREM: Bilateral lymphedema wraps in place makes assessing edema challenging. L > R, edema noted up into left thigh.   NEURO: Alert and oriented, cooperative.  SKIN: Warm and dry.        Data:   ECHO 10/27/17  The left ventricle is normal in size. There is moderate concentric left  ventricular hypertrophy. Left ventricular systolic function is normal. The  visual ejection fraction is estimated at 55-60%. Grade II or moderate  diastolic dysfunction. E by E prime ratio is greater than 15, that likely  suggests increased left ventricular filling pressures. No regional wall motion  abnormalities noted.  The right ventricle is normal size. Mildly decreased right ventricular  systolic function.  The " left atrium is mildly dilated. Right atrial size is normal. A patent  foramen ovale is suspected.  There is mild (1+) tricuspid regurgitation. The right ventricular systolic  pressure is approximated at 55.2 mmHg plus the right atrial pressure. Dilated  IVC (>2.5cm) with <50% respiratory collapse; right atrial pressure is  estimated at 15-20mmHg. Right ventricular systolic pressure is elevated,  consistent with severe pulmonary hypertension.  There is mild to moderate (1-2+) aortic regurgitation. No aortic stenosis is  present  Mild aortic root dilatation. The ascending aorta is Mildly dilated.  In direct comparison to the previous study dated 11/03/2016, there has been a  mild interval increase in the estimated pulmonary artery systolic pressures  and a mild deterioration in right ventricular systolic function.    LIPID RESULTS:  Lab Results   Component Value Date    CHOL 208 (A) 01/05/2016    CHOL 208 (A) 01/05/2016    HDL 52 01/05/2016    HDL 52 01/05/2016     01/05/2016     01/05/2016    TRIG 131 01/05/2016    TRIG 131 01/05/2016    CHOLHDLRATIO 2.4 08/25/2014     LIVER ENZYME RESULTS:  Lab Results   Component Value Date    AST 24 03/30/2018    ALT 14 03/30/2018     CBC RESULTS:  Lab Results   Component Value Date    WBC 6.7 07/02/2018    RBC 3.83 07/02/2018    HGB 12.2 07/02/2018    HCT 37.2 07/02/2018    MCV 97 07/02/2018    MCH 31.9 07/02/2018    MCHC 32.8 07/02/2018    RDW 14.2 07/02/2018     07/02/2018     BMP RESULTS:  Lab Results   Component Value Date     11/20/2018    POTASSIUM 4.9 11/20/2018    CHLORIDE 111 (H) 11/20/2018    CO2 25 11/20/2018    ANIONGAP 7 11/20/2018     (H) 11/20/2018    BUN 49 (H) 11/20/2018    CR 1.01 11/20/2018    GFRESTIMATED 53 (L) 11/20/2018    GFRESTBLACK 64 11/20/2018    AXEL 9.4 11/20/2018     INR RESULTS:  Lab Results   Component Value Date    INR 1.09 10/12/2017    INR 0.95 01/19/2015            Medications     Current Outpatient Medications    Medication Sig Dispense Refill     acetaminophen (TYLENOL) 500 MG tablet Take 1,000 mg by mouth 3 times daily       albuterol (ALBUTEROL) 108 (90 BASE) MCG/ACT inhaler Inhale 2 puffs into the lungs every 4 hours as needed 1 Inhaler 5     apixaban ANTICOAGULANT (ELIQUIS) 5 MG tablet Take 5 mg by mouth 2 times daily        bumetanide (BUMEX) 0.5 MG tablet Take 1 tablet (0.5 mg) by mouth daily (with breakfast) (Patient taking differently: Take 1.5 mg by mouth 1mg in the am, 0.5mg in the afternoon) 30 tablet 0     Calcium-Vitamin D-Vitamin K (CALCIUM + D) 500-1000-40 MG-UNT-MCG CHEW Take 2 tablets by mouth daily        Cholecalciferol (VITAMIN D) 2000 UNITS tablet Take 1,000 Units by mouth daily        Cyanocobalamin (VITAMIN B-12 PO)        DOXYCYCLINE HYCLATE PO Take 50 mg by mouth 3 times per week Monday,wednesday, friday       fluticasone-salmeterol (ADVAIR DISKUS) 250-50 MCG/DOSE diskus inhaler Inhale 1 puff into the lungs 2 times daily 60 Inhaler 1     multivitamin w/minerals (MULTI-VITAMIN) tablet Take 1 tablet by mouth daily       olmesartan (BENICAR) 5 MG tablet Take 5 mg by mouth daily        omeprazole (PRILOSEC) 40 MG DR capsule Take 40 mg by mouth daily       Ophthalmic Irrigation Solution (OCUSOFT EYE WASH OP)        prednisoLONE acetate (PRED FORTE) 1 % ophthalmic susp INSTILL 1 DROP INTO INTO LEFT EYE TWICE A DAY  3     RESTASIS 0.05 % OP EMUL 1 drop to left eye twice daily       rOPINIRole (REQUIP) 0.25 MG tablet Take 0.5 mg by mouth every evening. Take 2 tablets by mouth at bedtime.  Take 1 tablet by mouth in afternoon as needed.       Sodium Hyaluronate (HEALON IO) Combination with Pred       Sodium Hyaluronate (HEALON IO) WITH PREDNISOLONE MIXTURE 0.001- 0.25%       SYSTANE 0.4-0.3 % OP SOLN 1 drop to left eye 4 times daily       COMPRESSION STOCKINGS 1 each daily (Patient not taking: Reported on 7/12/2019) 3 each 1     order for DME 2L of O2 at night            Past Medical History     Past  Medical History:   Diagnosis Date     Aortic insufficiency      Aortic root enlargement (H)      Arthritis      Asthma      Cellulitis 4/13 in [place]    R ankle     CKD (chronic kidney disease) stage 3, GFR 30-59 ml/min (H)      COPD (chronic obstructive pulmonary disease) (H)      Diastolic dysfunction      Difficult airway for intubation      CRUZ (dyspnea on exertion)      Ductal Carcinoma in Situ of Left Breast 9/09     Duodenal ulcer, unspecified as acute or chronic, without mention of hemorrhage or perforation 1980's    felt due to high dose steroids     Female stress incontinence      H/O right and left heart catheterization     1-     HYPERLIPIDEMIA      Hypertension      Ischemic colitis 7/2001    [facility]     Lactose Intolerance     Mild     Legal blindness     Artificial right eye, severe vision loss left (Detached retina's, glaucoma, corneal transplants)     Lymphedema      Mild intermittent asthma ~1980's     Mitral valve disorder      Nocturnal oxygen desaturation      EVARISTO (obstructive sleep apnea)      Osteoporosis, unspecified ~2002     Patent foramen ovale     small     PERIPHERAL VASCULAR DISEASE 5/05    mesenteric arteries, angioplasty     PRESBYESOPHAGUS 6/04     Pulmonary HTN (H) 11/2014     PVD (peripheral vascular disease) (H)      Restrictive lung disease      Serous retinal detachment     False eye right     Subarachnoid hemorrhage following injury (H) 12/10    due to fall, vascular evaluation negative     Syncope and collapse 12/10     Unspecified glaucoma(365.9)      Past Surgical History:   Procedure Laterality Date     BIOPSY       C APPENDECTOMY  1982     C NONSPECIFIC PROCEDURE  1945    adenoidectomy     C NONSPECIFIC PROCEDURE      bilat retinal detachment     C NONSPECIFIC PROCEDURE      blephoroplasty bilat     C NONSPECIFIC PROCEDURE  1997    glaucoma procedure L     C NONSPECIFIC PROCEDURE  1997, 2007, 2014    corneal transplant L     C NONSPECIFIC PROCEDURE  1945     strabismus procedure R eye     C NONSPECIFIC PROCEDURE  1960's    R breast bx     C NONSPECIFIC PROCEDURE  5/05    Angioplasty and stenting mesenteric vessels     C NONSPECIFIC PROCEDURE  2008    L corneal transplant     C TOTAL HIP ARTHROPLASTY Left 06/06/2019     COLONOSCOPY      due 2015     ENDOSCOPIC RETROGRADE CHOLANGIOPANCREATOGRAM N/A 10/13/2017    Procedure: ENDOSCOPIC RETROGRADE CHOLANGIOPANCREATOGRAM;  ENDOSCOPIC RETROGRADE CHOLANGIOPANCREATOGRAM ;  Surgeon: Isabel Yousif MD;  Location: [facility] OR     HC REMOVAL GALLBLADDER  1982    Cholecystectomy     HEART CATH RIGHT AND LEFT HEART CATH  1/19/15     HERNIORRHAPHY VENTRAL N/A 4/19/2016    Procedure: HERNIORRHAPHY VENTRAL;  Surgeon: Hamlet Ness MD;  Location: [facility] OR     MASTECTOMY SIMPLE BILATERAL  10/5/09    bilateral mastectomy     SURGICAL HISTORY OF -   6/2010    breast reconstruction     Family History   Adopted: Yes   Problem Relation Age of Onset     C.A.D. Paternal Uncle         MI 50's     Family History Negative Daughter             Allergies   Atorvastatin calcium; Celecoxib; Cholestyramine; Crestor [rosuvastatin calcium]; Cyclobenzaprine hcl; Dulera; Gabapentin; Lisinopril; Meperidine hcl; Morphine; Naprosyn [naproxen]; Niaspan [niacin]; Percocet [oxycodone-acetaminophen]; Pravastatin; Red yeast rice [cholestin]; Simvastatin; Timolol maleate; Hctz; and Sulfa drugs        YUNIEL Orozco CNP  [facility]  Pager: [phone]      Thank you for allowing me to participate in the care of your patient.    Sincerely,     YUNIEL Orozco CNP     [facility]

## 2019-07-31 NOTE — PROGRESS NOTES
Pt called and said she is at PMD office, and needs her chest xray and labs to be faxed. Records faxed to PMD office at [phone]. Lola AVALOS July 31, 2019, 9:40 AM

## 2019-07-31 NOTE — TELEPHONE ENCOUNTER
Spoke with pt about recommendations from Joanna. Spoke with Joanna about not being able to move up appt with Gail, she recommended to keep appt on 8/14 and then we will see the echo results on Tuesday 8/6. Informed pt about med changes and to update us if she has any increased SOB or swelling. Pt gave verbal understanding.

## 2019-07-31 NOTE — TELEPHONE ENCOUNTER
Patient seen in clinic yesterday. Limited information during clinic visit. Labs and CXR completed after clinic visit available for review this morning. Labs so show stable kidney function (creatinine 0.99), stable electrolytes (potassium 3.8) and NTproBNP 804.     CXR 7/30/19  FINDINGS: Minimal interstitial changes bilaterally. There are no acute infiltrates. The cardiac silhouette is enlarged. Pulmonary vasculature appears congested.                                                                   IMPRESSION: Suspected congestive heart failure.       PLAN  1. INCREASE bumetanide to 2mg qam and 1mg qpm x 3 days, then reduce to bumetanide 1mg BID  2. Start potassium 20meq daily  3. Advise patient to monitor weight daily at home.  4. RN phone follow up next week to check on weight/symptoms.   5. Please try to move follow up appt with Lilliana Truong to next week with BMP prior.   6. Echocardiogram moved up to 8/6/2019  7. Cardiology RN to call an review above recommendations.

## 2019-07-31 NOTE — TELEPHONE ENCOUNTER
Left voicemail for pt to call back to review Joanna's recommendations. Left team 1's call back number.

## 2019-08-01 NOTE — TELEPHONE ENCOUNTER
Received call from pt who wanted to let us know that she was given different diuretic instructions from her PCP yesterday before we called her. Pt saw her PCP yesterday and was told to stop the Bumex and start torsemide 40mg in the AM and 20mg in the afternoon and if her weight does not decrease then she should increase the torsemide to 40mg BID.     Explained to pt that since our provider did not start the torsemide and she has already stopped the Bumex then she should update her PCP just to make sure we are all on the same page. Pt is going to inform her PCP about the increase in Bumex and see if she should stay off of it. Pt is scheduled for an echo on 8/6 and we can give more guidelines after those results. Pt gave verbal understanding.

## 2019-08-14 NOTE — PROGRESS NOTES
HPI and Plan:   I had the pleasure of seeing Rupinder Tracy today in cardiology clinic follow up. She is a pleasant 79 year old patient of Dr. Butler.    Ms. Tracy has a history of pulmonary hypertension, systemic hypertension and patent foramen ovale.  She also has a history of mild aortic root enlargement and mild to moderate aortic insufficiency.  Left and right heart catheterization performed 01/2015 for pulmonary hypertension and aortic insufficiency demonstrated no significant coronary artery disease.  Aortogram showed moderate aortic insufficiency and moderate annuloaortic ectasia.  There was moderate pulmonary hypertension with a mean PA pressure of 32 mmHg and a wide pulse pressure in the PA tracing consistent with at least moderate pulmonary valve insufficiency.  Pulmonary vascular resistance was normal and there was no significant gradient between the end-diastolic pressure in the pulmonary capillary wedge pressure and the left ventricular end-diastolic pressure.  It was felt that the elevated pulmonary pressure was likely secondary to impaired relaxation both from aortic insufficiency and systemic hypertension and perhaps pulmonary causes such as obstructive sleep apnea or asthma.  We tested for sleep apnea and showed desaturations but she never reached REM sleep and did not snore.       She has significant COPD and asthma since the 1980s.  The patient has been unable to exercise due to left hip pain, she just had this hip replaced.  She has left greater than right lymphedema as well.  Ultrasounds of the lower extremities have not shown any deep vein thrombosis. Her breathing is somewhat limited but did improve after pulmonary rehab.  She was hospitalized in April after using ibuprofen and Aleve for her hip pain causing a gastric ulcer and had recent eye surgery.       She was recently discharged from [facility] after a left hip replacement. Her Benicar was decreased to 5 mg for hypotension during her  hospitalization.  She is currently on Eliquis for a R LE DVT.  She is also had a bout of pneumonia while in the hospital.  Prior to surgery she had lymphedema treatment done and she still has appointments and her lymphedema is under control today, she has it in both legs is worse in the left.      She has had some trouble since her hip replacement including pneumonia and diastolic heart failure.  She was recently switched off of her Bumex to torsemide for increased shortness of breath and weight gain.  With this change to torsemide 40, she has lost 11 pounds and is feeling remarkably better.  Her renal function has been stable with a creatinine 0.9, her BUN is starting to go up a little bit.  Her primary who switch her to the torsemide 40 has recommended she decrease it to 20.  Her blood pressure remains well controlled.  Her recent echocardiogram is stable with a PFO and stable pulmonary hypertension.    Physical Exam  Please see Below     Assessment and Plan  1.  Diastolic heart failure with shortness of breath.  She is symptomatically improved.  Her echocardiogram is stable.  She is lost 11 pounds since switching to torsemide 40 mg daily.  Her BUN is starting to creep up, I agree with her primary to decrease the dose to torsemide 20.  She continues on potassium supplements.  We will have her continue to weigh herself at home, today she was 140 pounds on her home scale.  As long she stays within 101 145 pounds in her home scale, she can continue the torsemide 20.  If she has weight gain above 145 then she should use extra torsemide to diurese, she could keep us informed if this is the case.  2.  Lymphedema.  Today her legs are not wrapped, she is wearing nylon stockings and her legs are soft and minimally swollen.  3.  Recent DVT on Eliquis.    Thank you for allowing me to care for Rupinder Tracy today.  She is on the wait list to see Dr. Butler for December.    Gail Truong, YUNIEL, CNP  Cardiology    Voice  recognition software was used for this note, I have reviewed this note, but errors may have been missed.    No orders of the defined types were placed in this encounter.    Orders Placed This Encounter   Medications     torsemide (DEMADEX) 10 MG tablet     Sig: Take 40 mg by mouth daily     autologous serum compounded ophthalmic solution     Si drop every 2 hours (while awake) 1 drop left eye 4-6 times daily. Keep frozen until use. Discard 2 days after opened. Refrigerate open bottle.     Medications Discontinued During This Encounter   Medication Reason     bumetanide (BUMEX) 0.5 MG tablet          CURRENT MEDICATIONS:  Current Outpatient Medications   Medication Sig Dispense Refill     acetaminophen (TYLENOL) 500 MG tablet Take 1,000 mg by mouth 3 times daily       albuterol (ALBUTEROL) 108 (90 BASE) MCG/ACT inhaler Inhale 2 puffs into the lungs every 4 hours as needed 1 Inhaler 5     apixaban ANTICOAGULANT (ELIQUIS) 5 MG tablet Take 5 mg by mouth 2 times daily        autologous serum compounded ophthalmic solution 1 drop every 2 hours (while awake) 1 drop left eye 4-6 times daily. Keep frozen until use. Discard 2 days after opened. Refrigerate open bottle.       Calcium-Vitamin D-Vitamin K (CALCIUM + D) 500-1000-40 MG-UNT-MCG CHEW Take 2 tablets by mouth daily        Cholecalciferol (VITAMIN D) 2000 UNITS tablet Take 1,000 Units by mouth daily        COMPRESSION STOCKINGS 1 each daily 3 each 1     Cyanocobalamin (VITAMIN B-12 PO)        DOXYCYCLINE HYCLATE PO Take 50 mg by mouth 3 times per week Monday,wednesday, friday       fluticasone-salmeterol (ADVAIR DISKUS) 250-50 MCG/DOSE diskus inhaler Inhale 1 puff into the lungs 2 times daily 60 Inhaler 1     multivitamin w/minerals (MULTI-VITAMIN) tablet Take 1 tablet by mouth daily       olmesartan (BENICAR) 5 MG tablet Take 5 mg by mouth daily        omeprazole (PRILOSEC) 40 MG DR capsule Take 40 mg by mouth daily       Ophthalmic Irrigation Solution (OCUSOFT  "EYE WASH OP)        order for DME 2L of O2 at night       potassium chloride ER (K-DUR/KLOR-CON M) 20 MEQ CR tablet Take 1 tablet (20 mEq) by mouth daily 90 tablet 1     prednisoLONE acetate (PRED FORTE) 1 % ophthalmic susp INSTILL 1 DROP INTO INTO LEFT EYE TWICE A DAY  3     RESTASIS 0.05 % OP EMUL 1 drop to left eye twice daily       rOPINIRole (REQUIP) 0.25 MG tablet Take 0.5 mg by mouth every evening. Take 2 tablets by mouth at bedtime.  Take 1 tablet by mouth in afternoon as needed.       Sodium Hyaluronate (HEALON IO) WITH PREDNISOLONE MIXTURE 0.001- 0.25%       SYSTANE 0.4-0.3 % OP SOLN 1 drop to left eye 4 times daily       torsemide (DEMADEX) 10 MG tablet Take 40 mg by mouth daily       Sodium Hyaluronate (HEALON IO) Combination with Pred         ALLERGIES     Allergies   Allergen Reactions     Atorvastatin Calcium      myalgias     Celecoxib Swelling     edema     Cholestyramine Diarrhea     Diarrhea       Crestor [Rosuvastatin Calcium]      leg aches, likely from medication     Cyclobenzaprine Hcl      flexeril--gets \"goofy\"     Dulera      tremors     Gabapentin      Nightmares.   Retrial of medication caused tremors.     Lisinopril Cough     Cough/ Dizziness at high dose.     Meperidine Hcl Nausea and Vomiting     demerol-severe nausea     Morphine Nausea and Vomiting     Naprosyn [Naproxen] GI Disturbance     Niaspan [Niacin]      flushing, severe     Percocet [Oxycodone-Acetaminophen] Nausea     Nausea, lightheadedness.   Tolerates med if taken with food.     Pravastatin Swelling     Edema, myalgias     Red Yeast Rice [Cholestin] GI Disturbance     Bloating, etc.     Simvastatin      myalgias     Timolol Maleate Difficulty breathing     timoptic--respiratory problems     Hctz Rash     rash     Sulfa Drugs Itching and Rash     rash on all mucous membranes and palms of hands with intense itching       PAST MEDICAL HISTORY:  Past Medical History:   Diagnosis Date     Aortic insufficiency      Aortic root " enlargement (H)      Arthritis      Asthma      Cellulitis 4/13 in [place]    R ankle     CKD (chronic kidney disease) stage 3, GFR 30-59 ml/min (H)      COPD (chronic obstructive pulmonary disease) (H)      Diastolic dysfunction      Difficult airway for intubation      CRUZ (dyspnea on exertion)      Ductal Carcinoma in Situ of Left Breast 9/09     Duodenal ulcer, unspecified as acute or chronic, without mention of hemorrhage or perforation 1980's    felt due to high dose steroids     Female stress incontinence      H/O right and left heart catheterization     1-     HYPERLIPIDEMIA      Hypertension      Ischemic colitis 7/2001    [facility]     Lactose Intolerance     Mild     Legal blindness     Artificial right eye, severe vision loss left (Detached retina's, glaucoma, corneal transplants)     Lymphedema      Mild intermittent asthma ~1980's     Mitral valve disorder      Nocturnal oxygen desaturation      EVARISTO (obstructive sleep apnea)      Osteoporosis, unspecified ~2002     Patent foramen ovale     small     PERIPHERAL VASCULAR DISEASE 5/05    mesenteric arteries, angioplasty     PRESBYESOPHAGUS 6/04     Pulmonary HTN (H) 11/2014     PVD (peripheral vascular disease) (H)      Restrictive lung disease      Serous retinal detachment     False eye right     Subarachnoid hemorrhage following injury (H) 12/10    due to fall, vascular evaluation negative     Syncope and collapse 12/10     Unspecified glaucoma(365.9)        PAST SURGICAL HISTORY:  Past Surgical History:   Procedure Laterality Date     BIOPSY       C APPENDECTOMY  1982     C NONSPECIFIC PROCEDURE  1945    adenoidectomy     C NONSPECIFIC PROCEDURE      bilat retinal detachment     C NONSPECIFIC PROCEDURE      blephoroplasty bilat     C NONSPECIFIC PROCEDURE  1997    glaucoma procedure L     C NONSPECIFIC PROCEDURE  1997, 2007, 2014    corneal transplant L     C NONSPECIFIC PROCEDURE  1945    strabismus procedure R eye     C NONSPECIFIC PROCEDURE   1960's    R breast bx     C NONSPECIFIC PROCEDURE  5/05    Angioplasty and stenting mesenteric vessels     C NONSPECIFIC PROCEDURE  2008    L corneal transplant     C TOTAL HIP ARTHROPLASTY Left 06/06/2019     COLONOSCOPY      due 2015     ENDOSCOPIC RETROGRADE CHOLANGIOPANCREATOGRAM N/A 10/13/2017    Procedure: ENDOSCOPIC RETROGRADE CHOLANGIOPANCREATOGRAM;  ENDOSCOPIC RETROGRADE CHOLANGIOPANCREATOGRAM ;  Surgeon: Isabel Yousif MD;  Location: [facility] OR     HC REMOVAL GALLBLADDER  1982    Cholecystectomy     HEART CATH RIGHT AND LEFT HEART CATH  1/19/15     HERNIORRHAPHY VENTRAL N/A 4/19/2016    Procedure: HERNIORRHAPHY VENTRAL;  Surgeon: Hamlet Ness MD;  Location: [facility] OR     MASTECTOMY SIMPLE BILATERAL  10/5/09    bilateral mastectomy     SURGICAL HISTORY OF -   6/2010    breast reconstruction       FAMILY HISTORY:  Family History   Adopted: Yes   Problem Relation Age of Onset     C.A.D. Paternal Uncle         MI 50's     Family History Negative Daughter        SOCIAL HISTORY:  Social History     Socioeconomic History     Marital status:      Spouse name: None     Number of children: 4     Years of education: None     Highest education level: None   Occupational History     Occupation: Retired nurse     Employer: RETIRED   Social Needs     Financial resource strain: None     Food insecurity:     Worry: None     Inability: None     Transportation needs:     Medical: None     Non-medical: None   Tobacco Use     Smoking status: Never Smoker     Smokeless tobacco: Never Used   Substance and Sexual Activity     Alcohol use: Yes     Alcohol/week: 4.2 oz     Types: 7 Glasses of wine per week     Comment: rarely     Drug use: No     Sexual activity: Never     Partners: Male   Lifestyle     Physical activity:     Days per week: None     Minutes per session: None     Stress: None   Relationships     Social connections:     Talks on phone: None     Gets together: None     Attends Hoahaoism service: None      "Active member of club or organization: None     Attends meetings of clubs or organizations: None     Relationship status: None     Intimate partner violence:     Fear of current or ex partner: None     Emotionally abused: None     Physically abused: None     Forced sexual activity: None   Other Topics Concern      Service Not Asked     Blood Transfusions Not Asked     Caffeine Concern No     Comment: 1- 2 cups coffee     Occupational Exposure Not Asked     Hobby Hazards Not Asked     Sleep Concern No     Stress Concern No     Weight Concern No     Special Diet Yes     Comment: low sodium     Back Care Not Asked     Exercise No     Comment: 2 days a week (abigail chi), walking program, stationary bike 10 minutes 3 times per week     Bike Helmet Not Asked     Seat Belt Yes     Self-Exams Not Asked     Parent/sibling w/ CABG, MI or angioplasty before 65F 55M? 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  She walks with a walker.  Is a full code.  (last updated 10/11/2017)        Review of Systems:  Skin:  Negative       Eyes:  Positive for glasses    ENT:  Negative      Respiratory:  Positive for dyspnea on exertion O2 at night @ 2 liters with nasal canula   Cardiovascular:    edema;Positive for;fatigue    Gastroenterology: Negative      Genitourinary:  Negative      Musculoskeletal:  Positive for arthritis    Neurologic:  Negative      Psychiatric:  Negative      Heme/Lymph/Imm:  Negative      Endocrine:  Negative        Physical Exam:  Vitals: /63   Pulse 78   Ht 1.626 m (5' 4\")   Wt 65 kg (143 lb 6.4 oz)   LMP 01/01/1990   BMI 24.61 kg/m      Constitutional:  cooperative;in no acute distress        Skin:  warm and dry to the touch venous stasis changes        Head:  normocephalic, no masses or lesions   suborbital edema    Eyes:      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 partially " closed    Lymph:No Cervical lymphadenopathy present     ENT:  no pallor or cyanosis        Neck:  JVP normal        Respiratory:  clear to auscultation         Cardiac: regular rhythm;normal S1 and S2                                                    Bilateral soft lymphadema, L>R    GI:  abdomen soft        Extremities and Muscular Skeletal:      bilateral LE edema;1+          Neurological:  affect appropriate   walks with a walker    Psych:  Alert and Oriented x 3    Encounter Diagnoses   Name Primary?     Pulmonary HTN (H) Yes     Edema of both lower legs due to peripheral venous insufficiency      Essential hypertension with goal blood pressure less than 130/85        Recent Lab Results:  LIPID RESULTS:  Lab Results   Component Value Date    CHOL 208 (A) 01/05/2016    CHOL 208 (A) 01/05/2016    HDL 52 01/05/2016    HDL 52 01/05/2016     01/05/2016     01/05/2016    TRIG 131 01/05/2016    TRIG 131 01/05/2016    CHOLHDLRATIO 2.4 08/25/2014       LIVER ENZYME RESULTS:  Lab Results   Component Value Date    AST 24 03/30/2018    ALT 14 03/30/2018       CBC RESULTS:  Lab Results   Component Value Date    WBC 6.7 07/02/2018    RBC 3.83 07/02/2018    HGB 12.2 07/02/2018    HCT 37.2 07/02/2018    MCV 97 07/02/2018    MCH 31.9 07/02/2018    MCHC 32.8 07/02/2018    RDW 14.2 07/02/2018     07/02/2018       BMP RESULTS:  Lab Results   Component Value Date     08/14/2019    POTASSIUM 4.1 08/14/2019    CHLORIDE 109 08/14/2019    CO2 27 08/14/2019    ANIONGAP 6 08/14/2019     (H) 08/14/2019    BUN 34 (H) 08/14/2019    CR 0.90 08/14/2019    GFRESTIMATED 60 (L) 08/14/2019    GFRESTBLACK 70 08/14/2019    AXEL 8.9 08/14/2019        A1C RESULTS:  No results found for: A1C    INR RESULTS:  Lab Results   Component Value Date    INR 1.09 10/12/2017    INR 0.95 01/19/2015           CC  Jae Butler MD  [address]  [place] [address]

## 2019-08-14 NOTE — PATIENT INSTRUCTIONS
I agree with Dr. Grant, go down to torsemide 20 mg daily.    Monitor you weight daily.    If home wts go above 145, you should take additional torsemide to get you back into range. Keep us posted if you need more frequent higher doses.    [place] [phone]

## 2019-08-14 NOTE — LETTER
8/14/2019    MD [facility] [address] [place] [address]    RE: Rupinder Tracy       Dear Colleague,    I had the pleasure of seeing Rupinder Tracy in the [facility].    HPI and Plan:   I had the pleasure of seeing Rupinder Tracy today in cardiology clinic follow up. She is a pleasant 79 year old patient of Dr. Butler.    Ms. Tracy has a history of pulmonary hypertension, systemic hypertension and patent foramen ovale.  She also has a history of mild aortic root enlargement and mild to moderate aortic insufficiency.  Left and right heart catheterization performed 01/2015 for pulmonary hypertension and aortic insufficiency demonstrated no significant coronary artery disease.  Aortogram showed moderate aortic insufficiency and moderate annuloaortic ectasia.  There was moderate pulmonary hypertension with a mean PA pressure of 32 mmHg and a wide pulse pressure in the PA tracing consistent with at least moderate pulmonary valve insufficiency.  Pulmonary vascular resistance was normal and there was no significant gradient between the end-diastolic pressure in the pulmonary capillary wedge pressure and the left ventricular end-diastolic pressure.  It was felt that the elevated pulmonary pressure was likely secondary to impaired relaxation both from aortic insufficiency and systemic hypertension and perhaps pulmonary causes such as obstructive sleep apnea or asthma.  We tested for sleep apnea and showed desaturations but she never reached REM sleep and did not snore.       She has significant COPD and asthma since the 1980s.  The patient has been unable to exercise due to left hip pain, she just had this hip replaced.  She has left greater than right lymphedema as well.  Ultrasounds of the lower extremities have not shown any deep vein thrombosis. Her breathing is somewhat limited but did improve after pulmonary rehab.  She was hospitalized in April  after using ibuprofen and Aleve for her hip pain causing a gastric ulcer and had recent eye surgery.       She was recently discharged from [facility] after a left hip replacement. Her Benicar was decreased to 5 mg for hypotension during her hospitalization.   She is currently on Eliquis for a R LE DVT.  She is also had a bout of pneumonia while in the hospital.  Prior to surgery she had lymphedema treatment done and she still has appointments and her lymphedema is under control today, she has it in both legs is worse in the left.      She has had some trouble since her hip replacement including pneumonia and diastolic heart failure.  She was recently switched off of her Bumex to torsemide for increased shortness of breath and weight gain.  With this change to torsemide 40, she has lost 11 pounds and is feeling remarkably better.  Her renal function has been stable with a creatinine 0.9, her BUN is starting to go up a little bit.  Her primary who switch her to the torsemide 40 has recommended she decrease it to 20.  Her blood pressure remains well controlled.  Her recent echocardiogram is stable with a PFO and stable pulmonary hypertension.    Physical Exam  Please see Below     Assessment and Plan  1.  Diastolic heart failure with shortness of breath.  She is symptomatically improved.  Her echocardiogram is stable.  She is lost 11 pounds since switching to torsemide 40 mg daily.  Her BUN is starting to creep up, I agree with her primary to decrease the dose to torsemide 20.  She continues on potassium supplements.  We will have her continue to weigh herself at home, today she was 140 pounds on her home scale.  As long she stays within 101 145 pounds in her home scale, she can continue the torsemide 20.  If she has weight gain above 145 then she should use extra torsemide to diurese, she could keep us informed if this is the case.  2.  Lymphedema.  Today her legs are not wrapped, she is wearing nylon stockings and her  legs are soft and minimally swollen.  3.  Recent DVT on Eliquis.    Thank you for allowing me to care for Rupinder Tracy today.  She is on the wait list to see Dr. Butler for December.    YUNIEL Cage, CNP  Cardiology    Voice recognition software was used for this note, I have reviewed this note, but errors may have been missed.    No orders of the defined types were placed in this encounter.    Orders Placed This Encounter   Medications     torsemide (DEMADEX) 10 MG tablet     Sig: Take 40 mg by mouth daily     autologous serum compounded ophthalmic solution     Si drop every 2 hours (while awake) 1 drop left eye 4-6 times daily. Keep frozen until use. Discard 2 days after opened. Refrigerate open bottle.     Medications Discontinued During This Encounter   Medication Reason     bumetanide (BUMEX) 0.5 MG tablet          CURRENT MEDICATIONS:  Current Outpatient Medications   Medication Sig Dispense Refill     acetaminophen (TYLENOL) 500 MG tablet Take 1,000 mg by mouth 3 times daily       albuterol (ALBUTEROL) 108 (90 BASE) MCG/ACT inhaler Inhale 2 puffs into the lungs every 4 hours as needed 1 Inhaler 5     apixaban ANTICOAGULANT (ELIQUIS) 5 MG tablet Take 5 mg by mouth 2 times daily        autologous serum compounded ophthalmic solution 1 drop every 2 hours (while awake) 1 drop left eye 4-6 times daily. Keep frozen until use. Discard 2 days after opened. Refrigerate open bottle.       Calcium-Vitamin D-Vitamin K (CALCIUM + D) 500-1000-40 MG-UNT-MCG CHEW Take 2 tablets by mouth daily        Cholecalciferol (VITAMIN D) 2000 UNITS tablet Take 1,000 Units by mouth daily        COMPRESSION STOCKINGS 1 each daily 3 each 1     Cyanocobalamin (VITAMIN B-12 PO)        DOXYCYCLINE HYCLATE PO Take 50 mg by mouth 3 times per week Monday,wednesday, friday       fluticasone-salmeterol (ADVAIR DISKUS) 250-50 MCG/DOSE diskus inhaler Inhale 1 puff into the lungs 2 times daily 60 Inhaler 1     multivitamin  "w/minerals (MULTI-VITAMIN) tablet Take 1 tablet by mouth daily       olmesartan (BENICAR) 5 MG tablet Take 5 mg by mouth daily        omeprazole (PRILOSEC) 40 MG DR capsule Take 40 mg by mouth daily       Ophthalmic Irrigation Solution (OCUSOFT EYE WASH OP)        order for DME 2L of O2 at night       potassium chloride ER (K-DUR/KLOR-CON M) 20 MEQ CR tablet Take 1 tablet (20 mEq) by mouth daily 90 tablet 1     prednisoLONE acetate (PRED FORTE) 1 % ophthalmic susp INSTILL 1 DROP INTO INTO LEFT EYE TWICE A DAY  3     RESTASIS 0.05 % OP EMUL 1 drop to left eye twice daily       rOPINIRole (REQUIP) 0.25 MG tablet Take 0.5 mg by mouth every evening. Take 2 tablets by mouth at bedtime.  Take 1 tablet by mouth in afternoon as needed.       Sodium Hyaluronate (HEALON IO) WITH PREDNISOLONE MIXTURE 0.001- 0.25%       SYSTANE 0.4-0.3 % OP SOLN 1 drop to left eye 4 times daily       torsemide (DEMADEX) 10 MG tablet Take 40 mg by mouth daily       Sodium Hyaluronate (HEALON IO) Combination with Pred         ALLERGIES     Allergies   Allergen Reactions     Atorvastatin Calcium      myalgias     Celecoxib Swelling     edema     Cholestyramine Diarrhea     Diarrhea       Crestor [Rosuvastatin Calcium]      leg aches, likely from medication     Cyclobenzaprine Hcl      flexeril--gets \"goofy\"     Dulera      tremors     Gabapentin      Nightmares.   Retrial of medication caused tremors.     Lisinopril Cough     Cough/ Dizziness at high dose.     Meperidine Hcl Nausea and Vomiting     demerol-severe nausea     Morphine Nausea and Vomiting     Naprosyn [Naproxen] GI Disturbance     Niaspan [Niacin]      flushing, severe     Percocet [Oxycodone-Acetaminophen] Nausea     Nausea, lightheadedness.   Tolerates med if taken with food.     Pravastatin Swelling     Edema, myalgias     Red Yeast Rice [Cholestin] GI Disturbance     Bloating, etc.     Simvastatin      myalgias     Timolol Maleate Difficulty breathing     timoptic--respiratory " problems     Hctz Rash     rash     Sulfa Drugs Itching and Rash     rash on all mucous membranes and palms of hands with intense itching       PAST MEDICAL HISTORY:  Past Medical History:   Diagnosis Date     Aortic insufficiency      Aortic root enlargement (H)      Arthritis      Asthma      Cellulitis 4/13 in [place]    R ankle     CKD (chronic kidney disease) stage 3, GFR 30-59 ml/min (H)      COPD (chronic obstructive pulmonary disease) (H)      Diastolic dysfunction      Difficult airway for intubation      CRUZ (dyspnea on exertion)      Ductal Carcinoma in Situ of Left Breast 9/09     Duodenal ulcer, unspecified as acute or chronic, without mention of hemorrhage or perforation 1980's    felt due to high dose steroids     Female stress incontinence      H/O right and left heart catheterization     1-     HYPERLIPIDEMIA      Hypertension      Ischemic colitis 7/2001    [facility]     Lactose Intolerance     Mild     Legal blindness     Artificial right eye, severe vision loss left (Detached retina's, glaucoma, corneal transplants)     Lymphedema      Mild intermittent asthma ~1980's     Mitral valve disorder      Nocturnal oxygen desaturation      EVARISTO (obstructive sleep apnea)      Osteoporosis, unspecified ~2002     Patent foramen ovale     small     PERIPHERAL VASCULAR DISEASE 5/05    mesenteric arteries, angioplasty     PRESBYESOPHAGUS 6/04     Pulmonary HTN (H) 11/2014     PVD (peripheral vascular disease) (H)      Restrictive lung disease      Serous retinal detachment     False eye right     Subarachnoid hemorrhage following injury (H) 12/10    due to fall, vascular evaluation negative     Syncope and collapse 12/10     Unspecified glaucoma(365.9)        PAST SURGICAL HISTORY:  Past Surgical History:   Procedure Laterality Date     BIOPSY       C APPENDECTOMY  1982     C NONSPECIFIC PROCEDURE  1945    adenoidectomy     C NONSPECIFIC PROCEDURE      bilat retinal detachment     C NONSPECIFIC  PROCEDURE      blephoroplasty bilat     C NONSPECIFIC PROCEDURE  1997    glaucoma procedure L     C NONSPECIFIC PROCEDURE  1997, 2007, 2014    corneal transplant L     C NONSPECIFIC PROCEDURE  1945    strabismus procedure R eye     C NONSPECIFIC PROCEDURE  1960's    R breast bx     C NONSPECIFIC PROCEDURE  5/05    Angioplasty and stenting mesenteric vessels     C NONSPECIFIC PROCEDURE  2008    L corneal transplant     C TOTAL HIP ARTHROPLASTY Left 06/06/2019     COLONOSCOPY      due 2015     ENDOSCOPIC RETROGRADE CHOLANGIOPANCREATOGRAM N/A 10/13/2017    Procedure: ENDOSCOPIC RETROGRADE CHOLANGIOPANCREATOGRAM;  ENDOSCOPIC RETROGRADE CHOLANGIOPANCREATOGRAM ;  Surgeon: Isabel Yousif MD;  Location: [facility] OR     HC REMOVAL GALLBLADDER  1982    Cholecystectomy     HEART CATH RIGHT AND LEFT HEART CATH  1/19/15     HERNIORRHAPHY VENTRAL N/A 4/19/2016    Procedure: HERNIORRHAPHY VENTRAL;  Surgeon: Hamlet Ness MD;  Location: [facility] OR     MASTECTOMY SIMPLE BILATERAL  10/5/09    bilateral mastectomy     SURGICAL HISTORY OF -   6/2010    breast reconstruction       FAMILY HISTORY:  Family History   Adopted: Yes   Problem Relation Age of Onset     C.A.D. Paternal Uncle         MI 50's     Family History Negative Daughter        SOCIAL HISTORY:  Social History     Socioeconomic History     Marital status:      Spouse name: None     Number of children: 4     Years of education: None     Highest education level: None   Occupational History     Occupation: Retired nurse     Employer: RETIRED   Social Needs     Financial resource strain: None     Food insecurity:     Worry: None     Inability: None     Transportation needs:     Medical: None     Non-medical: None   Tobacco Use     Smoking status: Never Smoker     Smokeless tobacco: Never Used   Substance and Sexual Activity     Alcohol use: Yes     Alcohol/week: 4.2 oz     Types: 7 Glasses of wine per week     Comment: rarely     Drug use: No     Sexual activity: Never      "Partners: Male   Lifestyle     Physical activity:     Days per week: None     Minutes per session: None     Stress: None   Relationships     Social connections:     Talks on phone: None     Gets together: None     Attends Baptism service: None     Active member of club or organization: None     Attends meetings of clubs or organizations: None     Relationship status: None     Intimate partner violence:     Fear of current or ex partner: None     Emotionally abused: None     Physically abused: None     Forced sexual activity: None   Other Topics Concern      Service Not Asked     Blood Transfusions Not Asked     Caffeine Concern No     Comment: 1- 2 cups coffee     Occupational Exposure Not Asked     Hobby Hazards Not Asked     Sleep Concern No     Stress Concern No     Weight Concern No     Special Diet Yes     Comment: low sodium     Back Care Not Asked     Exercise No     Comment: 2 days a week (abigail chi), walking program, stationary bike 10 minutes 3 times per week     Bike Helmet Not Asked     Seat Belt Yes     Self-Exams Not Asked     Parent/sibling w/ CABG, MI or angioplasty before 65F 55M? 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  She walks with a walker.  Is a full code.  (last updated 10/11/2017)        Review of Systems:  Skin:  Negative       Eyes:  Positive for glasses    ENT:  Negative      Respiratory:  Positive for dyspnea on exertion O2 at night @ 2 liters with nasal canula   Cardiovascular:    edema;Positive for;fatigue    Gastroenterology: Negative      Genitourinary:  Negative      Musculoskeletal:  Positive for arthritis    Neurologic:  Negative      Psychiatric:  Negative      Heme/Lymph/Imm:  Negative      Endocrine:  Negative        Physical Exam:  Vitals: /63   Pulse 78   Ht 1.626 m (5' 4\")   Wt 65 kg (143 lb 6.4 oz)   LMP 01/01/1990   BMI 24.61 kg/m       Constitutional:  " cooperative;in no acute distress        Skin:  warm and dry to the touch venous stasis changes        Head:  normocephalic, no masses or lesions   suborbital edema    Eyes:      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    Lymph:No Cervical lymphadenopathy present     ENT:  no pallor or cyanosis        Neck:  JVP normal        Respiratory:  clear to auscultation         Cardiac: regular rhythm;normal S1 and S2                                                    Bilateral soft lymphadema, L>R    GI:  abdomen soft        Extremities and Muscular Skeletal:      bilateral LE edema;1+          Neurological:  affect appropriate   walks with a walker    Psych:  Alert and Oriented x 3    Encounter Diagnoses   Name Primary?     Pulmonary HTN (H) Yes     Edema of both lower legs due to peripheral venous insufficiency      Essential hypertension with goal blood pressure less than 130/85        Recent Lab Results:  LIPID RESULTS:  Lab Results   Component Value Date    CHOL 208 (A) 01/05/2016    CHOL 208 (A) 01/05/2016    HDL 52 01/05/2016    HDL 52 01/05/2016     01/05/2016     01/05/2016    TRIG 131 01/05/2016    TRIG 131 01/05/2016    CHOLHDLRATIO 2.4 08/25/2014       LIVER ENZYME RESULTS:  Lab Results   Component Value Date    AST 24 03/30/2018    ALT 14 03/30/2018       CBC RESULTS:  Lab Results   Component Value Date    WBC 6.7 07/02/2018    RBC 3.83 07/02/2018    HGB 12.2 07/02/2018    HCT 37.2 07/02/2018    MCV 97 07/02/2018    MCH 31.9 07/02/2018    MCHC 32.8 07/02/2018    RDW 14.2 07/02/2018     07/02/2018       BMP RESULTS:  Lab Results   Component Value Date     08/14/2019    POTASSIUM 4.1 08/14/2019    CHLORIDE 109 08/14/2019    CO2 27 08/14/2019    ANIONGAP 6 08/14/2019     (H) 08/14/2019    BUN 34 (H) 08/14/2019    CR 0.90 08/14/2019    GFRESTIMATED 60 (L) 08/14/2019    GFRESTBLACK 70 08/14/2019    AXEL 8.9 08/14/2019        A1C RESULTS:  No results found  for: A1C    INR RESULTS:  Lab Results   Component Value Date    INR 1.09 10/12/2017    INR 0.95 01/19/2015         Thank you for allowing me to participate in the care of your patient.    Sincerely,     YUNIEL Johnson [facility]

## 2019-10-18 NOTE — PROGRESS NOTES
Service Date: 10/18/2019      PRIMARY CARE PHYSICIAN:  Dr. Lotus Grant.      HISTORY OF PRESENT ILLNESS:  I again had the pleasure of seeing your patient, Rupinder Tracy, at [facility] in [place] for evaluation of pulmonary hypertension, systemic hypertension and patent foramen ovale.  The patient has a history of mild aortic root enlargement and mild to moderate aortic insufficiency.  Left and right heart catheterization performed in 01/2015 for pulmonary hypertension and aortic insufficiency demonstrated no significant coronary artery disease.  Aortogram showed moderate aortic insufficiency and moderate annuloaortic ectasia.  There was moderate pulmonary hypertension with a mean PA pressure of 32 mmHg and a wide pulse pressure in the PA tracing consistent with at least moderate pulmonary valve insufficiency.  Pulmonary vascular resistance was normal and there was no significant gradient between the end-diastolic pressure in the pulmonary capillary wedge pressure and left ventricular end-diastolic pressure.  It was felt that the elevated pulmonary pressure was likely secondary to impaired relaxation both from aortic insufficiency and systemic hypertension and perhaps pulmonary causes such as obstructive sleep apnea or asthma.  We tested for sleep apnea and she showed desaturations but she never reached REM sleep and did not snore.  She notes that she coughs and is short of breath when she lies on her right side in bed.  She wonders if this is GERD or her lungs.  Her blood pressure has been under excellent control.  Her weight has also been stable.  We are currently using 20 mg of torsemide per day with potassium.  Her last BMP on 08/14/2019 showed sodium 142, potassium 4.1, BUN 34, creatinine 0.9.  The patient has significant COPD and asthma since the 1980s.  She had been unable to exercise due to left hip pain and is now status post left total hip arthroplasty at [facility] around August.   Ultrasound of the lower extremities did not show any deep vein thromboses.  She was hospitalized in April of this year after using ibuprofen and Aleve for hip pain causing a gastric ulcer.  She has also had recent eye surgery.  The patient's Benicar (olmesartan) was decreased to 5 mg for hypotension during her hospitalization in August.  Her lymphedema treatment has now been completed.  She recently bumped her left lower extremity while putting away patio furniture resulting in mild to moderate edema of the left lower extremity.      PHYSICAL EXAMINATION:  As listed below.      ASSESSMENT:   1.  Rupinder Tracy is a delightful 79-year-old female with a small patent foramen ovale that is inconsequential and without hemodynamic effect.  An echocardiogram performed on 08/08/2019 showed mild concentric left ventricular hypertrophy and well-maintained left ventricular ejection fraction of 55%-60%.  Mildly decreased right ventricular systolic function.  There was moderate trileaflet aortic sclerosis and moderate aortic regurgitation.  Left atrium was severely dilated.  There was a small right-to-left atrial shunt.  A small PFO was seen.  Right ventricular systolic pressure was elevated consistent with moderate to severe pulmonary hypertension with RVSP 46 mmHg.  Based on her left and right heart catheterization data we continue to treat with preload reduction with the torsemide.  She is otherwise stable and no further testing is planned.   2.  Normal coronary arteries.   3.  Mild to moderate aortic insufficiency and mild aortic root enlargement.  This is unchanged and stable.   4.  The patient's peripheral edema left greater than right remains mild.  She is wearing her compression stockings.      It is my pleasure to assist in the care of Rupinder Tracy.  I will see her again in 1 year.  She will continue with her low-dose olmesartan.  She will call for any increasing weight, peripheral edema or shortness of breath.   All her questions were answered to her satisfaction.      Henrietta Butler MD        cc:   Lotus Grant MD    [facility] [place] [facility] [address] [place]  [address]         HENRIETTA BUTLER MD, [facility]             D: 10/18/2019   T: 10/18/2019   MT: [email or web address]      Name:     JUDY RUSS   MRN:      [id number]        Account:      [id number]   :      1940           Service Date: 10/18/2019      Document: [id number]

## 2019-10-18 NOTE — PROGRESS NOTES
HPI and Plan:   See dictation:[id number]    Orders Placed This Encounter   Procedures     Follow-Up with Cardiologist       Orders Placed This Encounter   Medications     torsemide (DEMADEX) 10 MG tablet     Sig: Take 2 tablets (20 mg) by mouth daily     Dispense:  180 tablet     Refill:  3       Medications Discontinued During This Encounter   Medication Reason     apixaban ANTICOAGULANT (ELIQUIS) 5 MG tablet Therapy completed     torsemide (DEMADEX) 10 MG tablet Reorder         Encounter Diagnoses   Name Primary?     Pulmonary HTN (H) Yes     Nonrheumatic aortic valve insufficiency      Patent foramen ovale      Edema of both lower legs due to peripheral venous insufficiency      Essential hypertension with goal blood pressure less than 130/85      Diastolic dysfunction      Aortic root enlargement (H)      Left Ventricular Hypertrophy      Peripheral vascular disease (H)        CURRENT MEDICATIONS:  Current Outpatient Medications   Medication Sig Dispense Refill     acetaminophen (TYLENOL) 500 MG tablet Take 1,000 mg by mouth 3 times daily       albuterol (ALBUTEROL) 108 (90 BASE) MCG/ACT inhaler Inhale 2 puffs into the lungs every 4 hours as needed 1 Inhaler 5     autologous serum compounded ophthalmic solution 1 drop every 2 hours (while awake) 1 drop left eye 4-6 times daily. Keep frozen until use. Discard 2 days after opened. Refrigerate open bottle.       Calcium-Vitamin D-Vitamin K (CALCIUM + D) 500-1000-40 MG-UNT-MCG CHEW Take 2 tablets by mouth daily        Cholecalciferol (VITAMIN D) 2000 UNITS tablet Take 2,000 Units by mouth daily        COMPRESSION STOCKINGS 1 each daily 3 each 1     Cyanocobalamin (VITAMIN B-12 PO) Take 1,000 mcg by mouth daily        DOXYCYCLINE HYCLATE PO Take 50 mg by mouth 3 times per week Monday,wednesday, friday       fluticasone-salmeterol (ADVAIR DISKUS) 250-50 MCG/DOSE diskus inhaler Inhale 1 puff into the lungs 2 times daily 60 Inhaler 1     multivitamin w/minerals  "(MULTI-VITAMIN) tablet Take 1 tablet by mouth daily       olmesartan (BENICAR) 5 MG tablet Take 5 mg by mouth daily        omeprazole (PRILOSEC) 40 MG DR capsule Take 40 mg by mouth daily       Ophthalmic Irrigation Solution (OCUSOFT EYE WASH OP)        order for DME 2L of O2 at night       potassium chloride ER (K-DUR/KLOR-CON M) 20 MEQ CR tablet Take 1 tablet (20 mEq) by mouth daily 90 tablet 1     prednisoLONE acetate (PRED FORTE) 1 % ophthalmic susp INSTILL 1 DROP INTO INTO LEFT EYE TWICE A DAY  3     RESTASIS 0.05 % OP EMUL 1 drop to left eye twice daily       rOPINIRole (REQUIP) 0.25 MG tablet Take 0.5 mg by mouth every evening. Take 2 tablets by mouth at bedtime.  Take 1 tablet by mouth in afternoon as needed.       Sodium Hyaluronate (HEALON IO) Combination with Pred       Sodium Hyaluronate (HEALON IO) WITH PREDNISOLONE MIXTURE 0.001- 0.25%       SYSTANE 0.4-0.3 % OP SOLN 1 drop to left eye 4 times daily       torsemide (DEMADEX) 10 MG tablet Take 2 tablets (20 mg) by mouth daily 180 tablet 3       ALLERGIES     Allergies   Allergen Reactions     Atorvastatin Calcium      myalgias     Celecoxib Swelling     edema     Cholestyramine Diarrhea     Diarrhea       Crestor [Rosuvastatin Calcium]      leg aches, likely from medication     Cyclobenzaprine Hcl      flexeril--gets \"goofy\"     Dulera      tremors     Gabapentin      Nightmares.   Retrial of medication caused tremors.     Lisinopril Cough     Cough/ Dizziness at high dose.     Meperidine Hcl Nausea and Vomiting     demerol-severe nausea     Morphine Nausea and Vomiting     Naprosyn [Naproxen] GI Disturbance     Niaspan [Niacin]      flushing, severe     Percocet [Oxycodone-Acetaminophen] Nausea     Nausea, lightheadedness.   Tolerates med if taken with food.     Pravastatin Swelling     Edema, myalgias     Red Yeast Rice [Cholestin] GI Disturbance     Bloating, etc.     Simvastatin      myalgias     Timolol Maleate Difficulty breathing     " timoptic--respiratory problems     Hctz Rash     rash     Sulfa Drugs Itching and Rash     rash on all mucous membranes and palms of hands with intense itching       PAST MEDICAL HISTORY:  Past Medical History:   Diagnosis Date     Aortic insufficiency      Aortic root enlargement (H)      Arthritis      Asthma      Cellulitis 4/13 in [place]    R ankle     CKD (chronic kidney disease) stage 3, GFR 30-59 ml/min (H)      COPD (chronic obstructive pulmonary disease) (H)      Diastolic dysfunction      Difficult airway for intubation      CRUZ (dyspnea on exertion)      Ductal Carcinoma in Situ of Left Breast 9/09     Duodenal ulcer, unspecified as acute or chronic, without mention of hemorrhage or perforation 1980's    felt due to high dose steroids     Female stress incontinence      GIB (gastrointestinal bleeding)     duodenal ulcer     Gout      H/O right and left heart catheterization     1-     HYPERLIPIDEMIA      Hypertension      Ischemic colitis 7/2001    [facility]     Lactose Intolerance     Mild     Legal blindness     Artificial right eye, severe vision loss left (Detached retina's, glaucoma, corneal transplants)     Lymphedema      Mild intermittent asthma ~1980's     Mitral valve disorder      Nocturnal oxygen desaturation      EVARISTO (obstructive sleep apnea)      Osteoporosis, unspecified ~2002     Patent foramen ovale     small     PERIPHERAL VASCULAR DISEASE 5/05    mesenteric arteries, angioplasty     PRESBYESOPHAGUS 6/04     Pulmonary HTN (H) 11/2014     PVD (peripheral vascular disease) (H)      Restrictive lung disease      Right leg DVT (H) 07/15/2019    Post op left ANGELA 6/2019     Serous retinal detachment     False eye right     Subarachnoid hemorrhage following injury (H) 12/10    due to fall, vascular evaluation negative     Syncope and collapse 12/10     Unspecified glaucoma(365.9)        PAST SURGICAL HISTORY:  Past Surgical History:   Procedure Laterality Date     BIOPSY       C  APPENDECTOMY  1982     C NONSPECIFIC PROCEDURE  1945    adenoidectomy     C NONSPECIFIC PROCEDURE      bilat retinal detachment     C NONSPECIFIC PROCEDURE      blephoroplasty bilat     C NONSPECIFIC PROCEDURE  1997    glaucoma procedure L     C NONSPECIFIC PROCEDURE  1997, 2007, 2014    corneal transplant L     C NONSPECIFIC PROCEDURE  1945    strabismus procedure R eye     C NONSPECIFIC PROCEDURE  1960's    R breast bx     C NONSPECIFIC PROCEDURE  5/05    Angioplasty and stenting mesenteric vessels     C NONSPECIFIC PROCEDURE  2008    L corneal transplant     C TOTAL HIP ARTHROPLASTY Left 06/06/2019     COLONOSCOPY      due 2015     ENDOSCOPIC RETROGRADE CHOLANGIOPANCREATOGRAM N/A 10/13/2017    Procedure: ENDOSCOPIC RETROGRADE CHOLANGIOPANCREATOGRAM;  ENDOSCOPIC RETROGRADE CHOLANGIOPANCREATOGRAM ;  Surgeon: Isabel Yousif MD;  Location: [facility] OR     HC REMOVAL GALLBLADDER  1982    Cholecystectomy     HEART CATH RIGHT AND LEFT HEART CATH  1/19/15     HERNIORRHAPHY VENTRAL N/A 4/19/2016    Procedure: HERNIORRHAPHY VENTRAL;  Surgeon: Hamlet Ness MD;  Location: [facility] OR     MASTECTOMY SIMPLE BILATERAL  10/5/09    bilateral mastectomy     SURGICAL HISTORY OF -   6/2010    breast reconstruction       FAMILY HISTORY:  Family History   Adopted: Yes   Problem Relation Age of Onset     C.A.D. Paternal Uncle         MI 50's     Family History Negative Daughter        SOCIAL HISTORY:  Social History     Socioeconomic History     Marital status:      Spouse name: None     Number of children: 4     Years of education: None     Highest education level: None   Occupational History     Occupation: Retired nurse     Employer: RETIRED   Social Needs     Financial resource strain: None     Food insecurity:     Worry: None     Inability: None     Transportation needs:     Medical: None     Non-medical: None   Tobacco Use     Smoking status: Never Smoker     Smokeless tobacco: Never Used   Substance and Sexual Activity      Alcohol use: Yes     Alcohol/week: 7.0 standard drinks     Types: 7 Glasses of wine per week     Comment: rarely     Drug use: No     Sexual activity: Never     Partners: Male   Lifestyle     Physical activity:     Days per week: None     Minutes per session: None     Stress: None   Relationships     Social connections:     Talks on phone: None     Gets together: None     Attends Yarsanism service: None     Active member of club or organization: None     Attends meetings of clubs or organizations: None     Relationship status: None     Intimate partner violence:     Fear of current or ex partner: None     Emotionally abused: None     Physically abused: None     Forced sexual activity: None   Other Topics Concern      Service Not Asked     Blood Transfusions Not Asked     Caffeine Concern No     Comment: 1- 2 cups coffee     Occupational Exposure Not Asked     Hobby Hazards Not Asked     Sleep Concern No     Stress Concern No     Weight Concern No     Special Diet Yes     Comment: low sodium     Back Care Not Asked     Exercise No     Comment: 2 days a week (abigail chi), walking program, stationary bike 10 minutes 3 times per week     Bike Helmet Not Asked     Seat Belt Yes     Self-Exams Not Asked     Parent/sibling w/ CABG, MI or angioplasty before 65F 55M? 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  She walks with a walker.  Is a full code.  (last updated 10/11/2017)        Review of Systems:  Skin:  Negative       Eyes:  Positive for glasses    ENT:  Negative      Respiratory:  Positive for dyspnea on exertion;cough(increased SOB ) O2 at night @ 2 liters with nasal canula   Cardiovascular:    Positive for;lightheadedness;edema d/c from lymphdema clinic.  Gastroenterology: Positive for heartburn(when lying on her right side with a cough)    Genitourinary:  not assessed      Musculoskeletal:  Positive for  "arthritis;joint pain    Neurologic:  Positive for numbness or tingling of hands;numbness or tingling of feet    Psychiatric:  Negative      Heme/Lymph/Imm:  Negative      Endocrine:  Negative        Physical Exam:  Vitals: /45 (BP Location: Left arm, Patient Position: Sitting, Cuff Size: Adult Regular)   Pulse 80   Ht 1.626 m (5' 4\")   Wt 64.9 kg (143 lb)   LMP 01/01/1990   BMI 24.55 kg/m      Constitutional:  cooperative, alert and oriented, well developed, well nourished, in no acute distress        Skin:  warm and dry to the touch, no apparent skin lesions or masses noted          Head:  normocephalic, no masses or lesions        Eyes:  pupils equal and round   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    Lymph:      ENT:  no pallor or cyanosis, dentition good        Neck:  carotid pulses are full and equal bilaterally, JVP normal, no carotid bruit        Respiratory:  normal breath sounds, clear to auscultation, normal A-P diameter, normal symmetry, normal respiratory excursion, no use of accessory muscles         Cardiac: regular rhythm;normal S1 and S2;no S3 or S4;apical impulse not displaced occasional premature beats         diastolic murmur;grade 1    pulses full and equal, no bruits auscultated                                        GI:  abdomen soft, non-tender, BS normoactive, no mass, no HSM, no bruits        Extremities and Muscular Skeletal:  no deformities, clubbing, cyanosis, erythema observed       LLE edema;2+ wearing bilateral compression stockings     Neurological:  no gross motor deficits;affect appropriate   walks with a walker    Psych:  Alert and Oriented x 3        CC  No referring provider defined for this encounter.              "

## 2019-10-18 NOTE — LETTER
10/18/2019      [facility] [address] [place] [address]      RE: Rupinder Tracy       Dear Colleague,    I had the pleasure of seeing Rupinder Tracy in the [facility].    Service Date: 10/18/2019      PRIMARY CARE PHYSICIAN:  Dr. Lotus Grant.      HISTORY OF PRESENT ILLNESS:  I again had the pleasure of seeing your patient, Rupinder Tracy, at [facility] in [place] for evaluation of pulmonary hypertension, systemic hypertension and patent foramen ovale.  The patient has a history of mild aortic root enlargement and mild to moderate aortic insufficiency.  Left and right heart catheterization performed in 01/2015 for pulmonary hypertension and aortic insufficiency demonstrated no significant coronary artery disease.  Aortogram showed moderate aortic insufficiency and moderate annuloaortic ectasia.  There was moderate pulmonary hypertension with a mean PA pressure of 32 mmHg and a wide pulse pressure in the PA tracing consistent with at least moderate pulmonary valve insufficiency.  Pulmonary vascular resistance was normal and there was no significant gradient between the end-diastolic pressure in the pulmonary capillary wedge pressure and left ventricular end-diastolic pressure.  It was felt that the elevated pulmonary pressure was likely secondary to impaired relaxation both from aortic insufficiency and systemic hypertension and perhaps pulmonary causes such as obstructive sleep apnea or asthma.  We tested for sleep apnea and she showed desaturations but she never reached REM sleep and did not snore.  She notes that she coughs and is short of breath when she lies on her right side in bed.  She wonders if this is GERD or her lungs.  Her blood pressure has been under excellent control.  Her weight has also been stable.  We are currently using 20 mg of torsemide per day with potassium.  Her last BMP on 08/14/2019 showed sodium 142,  potassium 4.1, BUN 34, creatinine 0.9.  The patient has significant COPD and asthma since the 1980s.  She had been unable to exercise due to left hip pain and is now status post left total hip arthroplasty at [facility] around August.  Ultrasound of the lower extremities did not show any deep vein thromboses.  She was hospitalized in April of this year after using ibuprofen and Aleve for hip pain causing a gastric ulcer.  She has also had recent eye surgery.  The patient's Benicar (olmesartan) was decreased to 5 mg for hypotension during her hospitalization in August.  Her lymphedema treatment has now been completed.  She recently bumped her left lower extremity while putting away patio furniture resulting in mild to moderate edema of the left lower extremity.      PHYSICAL EXAMINATION:  As listed below.      ASSESSMENT:   1.  Rupinder Tracy is a delightful 79-year-old female with a small patent foramen ovale that is inconsequential and without hemodynamic effect.  An echocardiogram performed on 08/08/2019 showed mild concentric left ventricular hypertrophy and well-maintained left ventricular ejection fraction of 55%-60%.  Mildly decreased right ventricular systolic function.  There was moderate trileaflet aortic sclerosis and moderate aortic regurgitation.  Left atrium was severely dilated.  There was a small right-to-left atrial shunt.  A small PFO was seen.  Right ventricular systolic pressure was elevated consistent with moderate to severe pulmonary hypertension with RVSP 46 mmHg.  Based on her left and right heart catheterization data we continue to treat with preload reduction with the torsemide.  She is otherwise stable and no further testing is planned.   2.  Normal coronary arteries.   3.  Mild to moderate aortic insufficiency and mild aortic root enlargement.  This is unchanged and stable.   4.  The patient's peripheral edema left greater than right remains mild.  She is wearing her compression  stockings.      It is my pleasure to assist in the care of Judy Tracy.  I will see her again in 1 year.  She will continue with her low-dose olmesartan.  She will call for any increasing weight, peripheral edema or shortness of breath.  All her questions were answered to her satisfaction.      Henrietta Collins MD        cc:   Lotus Grant MD    [facility] [address] [place]  [address]         HENRIETTA COLLINS MD, [facility]             D: 10/18/2019   T: 10/18/2019   MT: STACEY      Name:     JUDY TRACY   MRN:      [id number]        Account:      [id number]   :      1940           Service Date: 10/18/2019      Document: [id number]         Outpatient Encounter Medications as of 10/18/2019   Medication Sig Dispense Refill     acetaminophen (TYLENOL) 500 MG tablet Take 1,000 mg by mouth 3 times daily       albuterol (ALBUTEROL) 108 (90 BASE) MCG/ACT inhaler Inhale 2 puffs into the lungs every 4 hours as needed 1 Inhaler 5     autologous serum compounded ophthalmic solution 1 drop every 2 hours (while awake) 1 drop left eye 4-6 times daily. Keep frozen until use. Discard 2 days after opened. Refrigerate open bottle.       Calcium-Vitamin D-Vitamin K (CALCIUM + D) 500-1000-40 MG-UNT-MCG CHEW Take 2 tablets by mouth daily        Cholecalciferol (VITAMIN D) 2000 UNITS tablet Take 2,000 Units by mouth daily        COMPRESSION STOCKINGS 1 each daily 3 each 1     Cyanocobalamin (VITAMIN B-12 PO) Take 1,000 mcg by mouth daily        DOXYCYCLINE HYCLATE PO Take 50 mg by mouth 3 times per week Monday,wednesday, friday       fluticasone-salmeterol (ADVAIR DISKUS) 250-50 MCG/DOSE diskus inhaler Inhale 1 puff into the lungs 2 times daily 60 Inhaler 1     multivitamin w/minerals (MULTI-VITAMIN) tablet Take 1 tablet by mouth daily       olmesartan (BENICAR) 5 MG tablet Take 5 mg by mouth daily        omeprazole (PRILOSEC) 40 MG DR capsule Take 40 mg by mouth daily       Ophthalmic Irrigation  Solution (OCUSOFT EYE WASH OP)        order for DME 2L of O2 at night       potassium chloride ER (K-DUR/KLOR-CON M) 20 MEQ CR tablet Take 1 tablet (20 mEq) by mouth daily 90 tablet 1     prednisoLONE acetate (PRED FORTE) 1 % ophthalmic susp INSTILL 1 DROP INTO INTO LEFT EYE TWICE A DAY  3     RESTASIS 0.05 % OP EMUL 1 drop to left eye twice daily       rOPINIRole (REQUIP) 0.25 MG tablet Take 0.5 mg by mouth every evening. Take 2 tablets by mouth at bedtime.  Take 1 tablet by mouth in afternoon as needed.       Sodium Hyaluronate (HEALON IO) Combination with Pred       Sodium Hyaluronate (HEALON IO) WITH PREDNISOLONE MIXTURE 0.001- 0.25%       SYSTANE 0.4-0.3 % OP SOLN 1 drop to left eye 4 times daily       torsemide (DEMADEX) 10 MG tablet Take 2 tablets (20 mg) by mouth daily 180 tablet 3     [DISCONTINUED] apixaban ANTICOAGULANT (ELIQUIS) 5 MG tablet Take 5 mg by mouth 2 times daily        [DISCONTINUED] torsemide (DEMADEX) 10 MG tablet Take 20 mg by mouth daily        No facility-administered encounter medications on file as of 10/18/2019.        Again, thank you for allowing me to participate in the care of your patient.      Sincerely,    Jae Butler MD     [facility]

## 2019-10-18 NOTE — LETTER
10/18/2019    MD [facility] [address] [place] [address]    RE: Rupinder Walkeron       Dear Colleague,    I had the pleasure of seeing Rupinder Tracy in the [facility].    HPI and Plan:   See dictation:[id number]    Orders Placed This Encounter   Procedures     Follow-Up with Cardiologist       Orders Placed This Encounter   Medications     torsemide (DEMADEX) 10 MG tablet     Sig: Take 2 tablets (20 mg) by mouth daily     Dispense:  180 tablet     Refill:  3       Medications Discontinued During This Encounter   Medication Reason     apixaban ANTICOAGULANT (ELIQUIS) 5 MG tablet Therapy completed     torsemide (DEMADEX) 10 MG tablet Reorder         Encounter Diagnoses   Name Primary?     Pulmonary HTN (H) Yes     Nonrheumatic aortic valve insufficiency      Patent foramen ovale      Edema of both lower legs due to peripheral venous insufficiency      Essential hypertension with goal blood pressure less than 130/85      Diastolic dysfunction      Aortic root enlargement (H)      Left Ventricular Hypertrophy      Peripheral vascular disease (H)        CURRENT MEDICATIONS:  Current Outpatient Medications   Medication Sig Dispense Refill     acetaminophen (TYLENOL) 500 MG tablet Take 1,000 mg by mouth 3 times daily       albuterol (ALBUTEROL) 108 (90 BASE) MCG/ACT inhaler Inhale 2 puffs into the lungs every 4 hours as needed 1 Inhaler 5     autologous serum compounded ophthalmic solution 1 drop every 2 hours (while awake) 1 drop left eye 4-6 times daily. Keep frozen until use. Discard 2 days after opened. Refrigerate open bottle.       Calcium-Vitamin D-Vitamin K (CALCIUM + D) 500-1000-40 MG-UNT-MCG CHEW Take 2 tablets by mouth daily        Cholecalciferol (VITAMIN D) 2000 UNITS tablet Take 2,000 Units by mouth daily        COMPRESSION STOCKINGS 1 each daily 3 each 1     Cyanocobalamin (VITAMIN B-12 PO) Take 1,000 mcg by mouth daily        DOXYCYCLINE HYCLATE PO  "Take 50 mg by mouth 3 times per week Monday,wednesday, friday       fluticasone-salmeterol (ADVAIR DISKUS) 250-50 MCG/DOSE diskus inhaler Inhale 1 puff into the lungs 2 times daily 60 Inhaler 1     multivitamin w/minerals (MULTI-VITAMIN) tablet Take 1 tablet by mouth daily       olmesartan (BENICAR) 5 MG tablet Take 5 mg by mouth daily        omeprazole (PRILOSEC) 40 MG DR capsule Take 40 mg by mouth daily       Ophthalmic Irrigation Solution (OCUSOFT EYE WASH OP)        order for DME 2L of O2 at night       potassium chloride ER (K-DUR/KLOR-CON M) 20 MEQ CR tablet Take 1 tablet (20 mEq) by mouth daily 90 tablet 1     prednisoLONE acetate (PRED FORTE) 1 % ophthalmic susp INSTILL 1 DROP INTO INTO LEFT EYE TWICE A DAY  3     RESTASIS 0.05 % OP EMUL 1 drop to left eye twice daily       rOPINIRole (REQUIP) 0.25 MG tablet Take 0.5 mg by mouth every evening. Take 2 tablets by mouth at bedtime.  Take 1 tablet by mouth in afternoon as needed.       Sodium Hyaluronate (HEALON IO) Combination with Pred       Sodium Hyaluronate (HEALON IO) WITH PREDNISOLONE MIXTURE 0.001- 0.25%       SYSTANE 0.4-0.3 % OP SOLN 1 drop to left eye 4 times daily       torsemide (DEMADEX) 10 MG tablet Take 2 tablets (20 mg) by mouth daily 180 tablet 3       ALLERGIES     Allergies   Allergen Reactions     Atorvastatin Calcium      myalgias     Celecoxib Swelling     edema     Cholestyramine Diarrhea     Diarrhea       Crestor [Rosuvastatin Calcium]      leg aches, likely from medication     Cyclobenzaprine Hcl      flexeril--gets \"goofy\"     Dulera      tremors     Gabapentin      Nightmares.   Retrial of medication caused tremors.     Lisinopril Cough     Cough/ Dizziness at high dose.     Meperidine Hcl Nausea and Vomiting     demerol-severe nausea     Morphine Nausea and Vomiting     Naprosyn [Naproxen] GI Disturbance     Niaspan [Niacin]      flushing, severe     Percocet [Oxycodone-Acetaminophen] Nausea     Nausea, lightheadedness.   Tolerates " med if taken with food.     Pravastatin Swelling     Edema, myalgias     Red Yeast Rice [Cholestin] GI Disturbance     Bloating, etc.     Simvastatin      myalgias     Timolol Maleate Difficulty breathing     timoptic--respiratory problems     Hctz Rash     rash     Sulfa Drugs Itching and Rash     rash on all mucous membranes and palms of hands with intense itching       PAST MEDICAL HISTORY:  Past Medical History:   Diagnosis Date     Aortic insufficiency      Aortic root enlargement (H)      Arthritis      Asthma      Cellulitis 4/13 in [place]    R ankle     CKD (chronic kidney disease) stage 3, GFR 30-59 ml/min (H)      COPD (chronic obstructive pulmonary disease) (H)      Diastolic dysfunction      Difficult airway for intubation      CRUZ (dyspnea on exertion)      Ductal Carcinoma in Situ of Left Breast 9/09     Duodenal ulcer, unspecified as acute or chronic, without mention of hemorrhage or perforation 1980's    felt due to high dose steroids     Female stress incontinence      GIB (gastrointestinal bleeding)     duodenal ulcer     Gout      H/O right and left heart catheterization     1-     HYPERLIPIDEMIA      Hypertension      Ischemic colitis 7/2001    [facility]     Lactose Intolerance     Mild     Legal blindness     Artificial right eye, severe vision loss left (Detached retina's, glaucoma, corneal transplants)     Lymphedema      Mild intermittent asthma ~1980's     Mitral valve disorder      Nocturnal oxygen desaturation      EVARISTO (obstructive sleep apnea)      Osteoporosis, unspecified ~2002     Patent foramen ovale     small     PERIPHERAL VASCULAR DISEASE 5/05    mesenteric arteries, angioplasty     PRESBYESOPHAGUS 6/04     Pulmonary HTN (H) 11/2014     PVD (peripheral vascular disease) (H)      Restrictive lung disease      Right leg DVT (H) 07/15/2019    Post op left ANGELA 6/2019     Serous retinal detachment     False eye right     Subarachnoid hemorrhage following injury (H) 12/10    due  to fall, vascular evaluation negative     Syncope and collapse 12/10     Unspecified glaucoma(365.9)        PAST SURGICAL HISTORY:  Past Surgical History:   Procedure Laterality Date     BIOPSY       C APPENDECTOMY  1982     C NONSPECIFIC PROCEDURE  1945    adenoidectomy     C NONSPECIFIC PROCEDURE      bilat retinal detachment     C NONSPECIFIC PROCEDURE      blephoroplasty bilat     C NONSPECIFIC PROCEDURE  1997    glaucoma procedure L     C NONSPECIFIC PROCEDURE  1997, 2007, 2014    corneal transplant L     C NONSPECIFIC PROCEDURE  1945    strabismus procedure R eye     C NONSPECIFIC PROCEDURE  1960's    R breast bx     C NONSPECIFIC PROCEDURE  5/05    Angioplasty and stenting mesenteric vessels     C NONSPECIFIC PROCEDURE  2008    L corneal transplant     C TOTAL HIP ARTHROPLASTY Left 06/06/2019     COLONOSCOPY      due 2015     ENDOSCOPIC RETROGRADE CHOLANGIOPANCREATOGRAM N/A 10/13/2017    Procedure: ENDOSCOPIC RETROGRADE CHOLANGIOPANCREATOGRAM;  ENDOSCOPIC RETROGRADE CHOLANGIOPANCREATOGRAM ;  Surgeon: Isabel Yousif MD;  Location: [facility] OR     HC REMOVAL GALLBLADDER  1982    Cholecystectomy     HEART CATH RIGHT AND LEFT HEART CATH  1/19/15     HERNIORRHAPHY VENTRAL N/A 4/19/2016    Procedure: HERNIORRHAPHY VENTRAL;  Surgeon: Hamlet Ness MD;  Location: [facility] OR     MASTECTOMY SIMPLE BILATERAL  10/5/09    bilateral mastectomy     SURGICAL HISTORY OF -   6/2010    breast reconstruction       FAMILY HISTORY:  Family History   Adopted: Yes   Problem Relation Age of Onset     C.A.D. Paternal Uncle         MI 50's     Family History Negative Daughter        SOCIAL HISTORY:  Social History     Socioeconomic History     Marital status:      Spouse name: None     Number of children: 4     Years of education: None     Highest education level: None   Occupational History     Occupation: Retired nurse     Employer: RETIRED   Social Needs     Financial resource strain: None     Food insecurity:     Worry: None      Inability: None     Transportation needs:     Medical: None     Non-medical: None   Tobacco Use     Smoking status: Never Smoker     Smokeless tobacco: Never Used   Substance and Sexual Activity     Alcohol use: Yes     Alcohol/week: 7.0 standard drinks     Types: 7 Glasses of wine per week     Comment: rarely     Drug use: No     Sexual activity: Never     Partners: Male   Lifestyle     Physical activity:     Days per week: None     Minutes per session: None     Stress: None   Relationships     Social connections:     Talks on phone: None     Gets together: None     Attends [employer] service: None     Active member of club or organization: None     Attends meetings of clubs or organizations: None     Relationship status: None     Intimate partner violence:     Fear of current or ex partner: None     Emotionally abused: None     Physically abused: None     Forced sexual activity: None   Other Topics Concern      Service Not Asked     Blood Transfusions Not Asked     Caffeine Concern No     Comment: 1- 2 cups coffee     Occupational Exposure Not Asked     Hobby Hazards Not Asked     Sleep Concern No     Stress Concern No     Weight Concern No     Special Diet Yes     Comment: low sodium     Back Care Not Asked     Exercise No     Comment: 2 days a week (abigail chi), walking program, stationary bike 10 minutes 3 times per week     Bike Helmet Not Asked     Seat Belt Yes     Self-Exams Not Asked     Parent/sibling w/ CABG, MI or angioplasty before 65F 55M? 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  She walks with a walker.  Is a full code.  (last updated 10/11/2017)        Review of Systems:  Skin:  Negative       Eyes:  Positive for glasses    ENT:  Negative      Respiratory:  Positive for dyspnea on exertion;cough(increased SOB ) O2 at night @ 2 liters with nasal canula   Cardiovascular:    Positive  "for;lightheadedness;edema d/c from lymphdema clinic.  Gastroenterology: Positive for heartburn(when lying on her right side with a cough)    Genitourinary:  not assessed      Musculoskeletal:  Positive for arthritis;joint pain    Neurologic:  Positive for numbness or tingling of hands;numbness or tingling of feet    Psychiatric:  Negative      Heme/Lymph/Imm:  Negative      Endocrine:  Negative        Physical Exam:  Vitals: /45 (BP Location: Left arm, Patient Position: Sitting, Cuff Size: Adult Regular)   Pulse 80   Ht 1.626 m (5' 4\")   Wt 64.9 kg (143 lb)   LMP 01/01/1990   BMI 24.55 kg/m       Constitutional:  cooperative, alert and oriented, well developed, well nourished, in no acute distress        Skin:  warm and dry to the touch, no apparent skin lesions or masses noted          Head:  normocephalic, no masses or lesions        Eyes:  pupils equal and round   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    Lymph:      ENT:  no pallor or cyanosis, dentition good        Neck:  carotid pulses are full and equal bilaterally, JVP normal, no carotid bruit        Respiratory:  normal breath sounds, clear to auscultation, normal A-P diameter, normal symmetry, normal respiratory excursion, no use of accessory muscles         Cardiac: regular rhythm;normal S1 and S2;no S3 or S4;apical impulse not displaced occasional premature beats         diastolic murmur;grade 1    pulses full and equal, no bruits auscultated                                        GI:  abdomen soft, non-tender, BS normoactive, no mass, no HSM, no bruits        Extremities and Muscular Skeletal:  no deformities, clubbing, cyanosis, erythema observed       LLE edema;2+ wearing bilateral compression stockings     Neurological:  no gross motor deficits;affect appropriate   walks with a walker    Psych:  Alert and Oriented x 3        CC  No referring provider defined for this encounter.                Thank you for " allowing me to participate in the care of your patient.      Sincerely,     Jae Butler MD     [facility]    cc:   No referring provider defined for this encounter.

## 2020-01-01 ENCOUNTER — NURSING HOME VISIT (OUTPATIENT)
Dept: GERIATRICS | Facility: CLINIC | Age: 80
End: 2020-01-01
Payer: MEDICARE

## 2020-01-01 ENCOUNTER — RECORDS - HEALTHEAST (OUTPATIENT)
Dept: LAB | Facility: CLINIC | Age: 80
End: 2020-01-01

## 2020-01-01 ENCOUNTER — DISCHARGE SUMMARY NURSING HOME (OUTPATIENT)
Dept: GERIATRICS | Facility: CLINIC | Age: 80
End: 2020-01-01
Payer: MEDICARE

## 2020-01-01 ENCOUNTER — HOSPITAL LABORATORY (OUTPATIENT)
Dept: OTHER | Facility: CLINIC | Age: 80
End: 2020-01-01

## 2020-01-01 ENCOUNTER — TRANSFERRED RECORDS (OUTPATIENT)
Dept: HEALTH INFORMATION MANAGEMENT | Facility: CLINIC | Age: 80
End: 2020-01-01

## 2020-01-01 ENCOUNTER — TELEPHONE (OUTPATIENT)
Dept: GERIATRICS | Facility: CLINIC | Age: 80
End: 2020-01-01

## 2020-01-01 VITALS
SYSTOLIC BLOOD PRESSURE: 155 MMHG | TEMPERATURE: 96.7 F | WEIGHT: 140.8 LBS | RESPIRATION RATE: 18 BRPM | BODY MASS INDEX: 24.04 KG/M2 | HEART RATE: 102 BPM | HEIGHT: 64 IN | OXYGEN SATURATION: 98 % | DIASTOLIC BLOOD PRESSURE: 70 MMHG

## 2020-01-01 VITALS
HEIGHT: 64 IN | SYSTOLIC BLOOD PRESSURE: 135 MMHG | OXYGEN SATURATION: 96 % | DIASTOLIC BLOOD PRESSURE: 89 MMHG | BODY MASS INDEX: 23.29 KG/M2 | RESPIRATION RATE: 18 BRPM | OXYGEN SATURATION: 97 % | TEMPERATURE: 97.2 F | TEMPERATURE: 97.6 F | HEART RATE: 95 BPM | WEIGHT: 142.8 LBS | WEIGHT: 136.4 LBS | HEART RATE: 96 BPM | SYSTOLIC BLOOD PRESSURE: 117 MMHG | HEIGHT: 64 IN | BODY MASS INDEX: 24.38 KG/M2 | DIASTOLIC BLOOD PRESSURE: 69 MMHG | RESPIRATION RATE: 18 BRPM

## 2020-01-01 VITALS
RESPIRATION RATE: 18 BRPM | SYSTOLIC BLOOD PRESSURE: 123 MMHG | OXYGEN SATURATION: 98 % | RESPIRATION RATE: 18 BRPM | HEART RATE: 79 BPM | TEMPERATURE: 97.2 F | HEIGHT: 64 IN | TEMPERATURE: 96.9 F | BODY MASS INDEX: 24.45 KG/M2 | WEIGHT: 143.2 LBS | SYSTOLIC BLOOD PRESSURE: 140 MMHG | HEIGHT: 64 IN | DIASTOLIC BLOOD PRESSURE: 59 MMHG | BODY MASS INDEX: 24.45 KG/M2 | HEART RATE: 79 BPM | WEIGHT: 143.2 LBS | OXYGEN SATURATION: 99 % | DIASTOLIC BLOOD PRESSURE: 74 MMHG

## 2020-01-01 VITALS
HEIGHT: 64 IN | WEIGHT: 138.6 LBS | OXYGEN SATURATION: 93 % | RESPIRATION RATE: 20 BRPM | TEMPERATURE: 97.3 F | SYSTOLIC BLOOD PRESSURE: 135 MMHG | DIASTOLIC BLOOD PRESSURE: 79 MMHG | HEART RATE: 101 BPM | BODY MASS INDEX: 23.66 KG/M2

## 2020-01-01 VITALS
RESPIRATION RATE: 18 BRPM | OXYGEN SATURATION: 96 % | DIASTOLIC BLOOD PRESSURE: 55 MMHG | BODY MASS INDEX: 24.45 KG/M2 | WEIGHT: 143.2 LBS | TEMPERATURE: 97.6 F | HEART RATE: 91 BPM | SYSTOLIC BLOOD PRESSURE: 125 MMHG | HEIGHT: 64 IN

## 2020-01-01 VITALS
BODY MASS INDEX: 24.28 KG/M2 | SYSTOLIC BLOOD PRESSURE: 142 MMHG | HEIGHT: 64 IN | DIASTOLIC BLOOD PRESSURE: 59 MMHG | WEIGHT: 142.2 LBS | HEART RATE: 90 BPM | OXYGEN SATURATION: 95 % | RESPIRATION RATE: 18 BRPM | TEMPERATURE: 98 F

## 2020-01-01 VITALS
SYSTOLIC BLOOD PRESSURE: 140 MMHG | BODY MASS INDEX: 25.03 KG/M2 | HEIGHT: 64 IN | TEMPERATURE: 97.1 F | WEIGHT: 146.6 LBS | OXYGEN SATURATION: 95 % | DIASTOLIC BLOOD PRESSURE: 67 MMHG | RESPIRATION RATE: 18 BRPM | HEART RATE: 99 BPM

## 2020-01-01 VITALS
HEART RATE: 97 BPM | WEIGHT: 138.6 LBS | HEIGHT: 64 IN | BODY MASS INDEX: 23.66 KG/M2 | OXYGEN SATURATION: 98 % | TEMPERATURE: 97.1 F | SYSTOLIC BLOOD PRESSURE: 137 MMHG | RESPIRATION RATE: 22 BRPM | DIASTOLIC BLOOD PRESSURE: 73 MMHG

## 2020-01-01 DIAGNOSIS — K22.2 ESOPHAGEAL STENOSIS: ICD-10-CM

## 2020-01-01 DIAGNOSIS — R53.81 PHYSICAL DECONDITIONING: ICD-10-CM

## 2020-01-01 DIAGNOSIS — H40.002 GLAUCOMA SUSPECT, LEFT: ICD-10-CM

## 2020-01-01 DIAGNOSIS — I50.33 ACUTE ON CHRONIC HEART FAILURE WITH PRESERVED EJECTION FRACTION (H): Primary | ICD-10-CM

## 2020-01-01 DIAGNOSIS — J96.11 CHRONIC RESPIRATORY FAILURE WITH HYPOXIA (H): ICD-10-CM

## 2020-01-01 DIAGNOSIS — I35.1 NONRHEUMATIC AORTIC VALVE INSUFFICIENCY: ICD-10-CM

## 2020-01-01 DIAGNOSIS — Q39.6 ESOPHAGEAL DIVERTICULUM: ICD-10-CM

## 2020-01-01 DIAGNOSIS — G25.81 RESTLESS LEG SYNDROME: ICD-10-CM

## 2020-01-01 DIAGNOSIS — J45.30 MILD PERSISTENT ASTHMA WITHOUT COMPLICATION: ICD-10-CM

## 2020-01-01 DIAGNOSIS — I51.89 DIASTOLIC DYSFUNCTION: Primary | ICD-10-CM

## 2020-01-01 DIAGNOSIS — S22.43XA CLOSED FRACTURE OF MULTIPLE RIBS OF BOTH SIDES, INITIAL ENCOUNTER: ICD-10-CM

## 2020-01-01 DIAGNOSIS — R13.19 ESOPHAGEAL DYSPHAGIA: ICD-10-CM

## 2020-01-01 DIAGNOSIS — H54.3 BLINDNESS OF BOTH EYES: ICD-10-CM

## 2020-01-01 DIAGNOSIS — N18.30 CKD (CHRONIC KIDNEY DISEASE) STAGE 3, GFR 30-59 ML/MIN (H): ICD-10-CM

## 2020-01-01 DIAGNOSIS — G25.81 RESTLESS LEGS SYNDROME (RLS): ICD-10-CM

## 2020-01-01 DIAGNOSIS — J45.20 MILD INTERMITTENT ASTHMA WITHOUT COMPLICATION: ICD-10-CM

## 2020-01-01 DIAGNOSIS — I27.20 PULMONARY HYPERTENSION (H): ICD-10-CM

## 2020-01-01 DIAGNOSIS — I10 ESSENTIAL HYPERTENSION: ICD-10-CM

## 2020-01-01 DIAGNOSIS — R10.84 ABDOMINAL PAIN, GENERALIZED: ICD-10-CM

## 2020-01-01 DIAGNOSIS — I51.89 DIASTOLIC DYSFUNCTION: ICD-10-CM

## 2020-01-01 DIAGNOSIS — R40.4 ALTERED LEVEL OF CONSCIOUSNESS: ICD-10-CM

## 2020-01-01 DIAGNOSIS — E53.8 VITAMIN B12 DEFICIENCY (NON ANEMIC): ICD-10-CM

## 2020-01-01 DIAGNOSIS — G47.33 OSA (OBSTRUCTIVE SLEEP APNEA): ICD-10-CM

## 2020-01-01 DIAGNOSIS — I31.39 PERICARDIAL EFFUSION: ICD-10-CM

## 2020-01-01 DIAGNOSIS — D64.9 CHRONIC ANEMIA: ICD-10-CM

## 2020-01-01 LAB
ANION GAP SERPL CALCULATED.3IONS-SCNC: 11 MMOL/L (ref 5–18)
ANION GAP SERPL CALCULATED.3IONS-SCNC: 2 MMOL/L (ref 3–14)
ANION GAP SERPL CALCULATED.3IONS-SCNC: 5 MMOL/L (ref 3–14)
ANION GAP SERPL CALCULATED.3IONS-SCNC: 7 MMOL/L (ref 3–14)
BASOPHILS # BLD AUTO: 0 THOU/UL (ref 0–0.2)
BASOPHILS NFR BLD AUTO: 0 % (ref 0–2)
BNP SERPL-MCNC: 1798 PG/ML (ref 0–159)
BUN SERPL-MCNC: 31 MG/DL (ref 7–30)
BUN SERPL-MCNC: 39 MG/DL (ref 7–30)
BUN SERPL-MCNC: 43 MG/DL (ref 7–30)
BUN SERPL-MCNC: 57 MG/DL (ref 8–28)
CALCIUM SERPL-MCNC: 8.3 MG/DL (ref 8.5–10.1)
CALCIUM SERPL-MCNC: 8.8 MG/DL (ref 8.5–10.5)
CALCIUM SERPL-MCNC: 9 MG/DL (ref 8.5–10.1)
CALCIUM SERPL-MCNC: 9.3 MG/DL (ref 8.5–10.1)
CHLORIDE BLD-SCNC: 100 MMOL/L (ref 98–107)
CHLORIDE SERPL-SCNC: 101 MMOL/L (ref 94–109)
CHLORIDE SERPL-SCNC: 103 MMOL/L (ref 94–109)
CHLORIDE SERPL-SCNC: 99 MMOL/L (ref 94–109)
CO2 SERPL-SCNC: 29 MMOL/L (ref 20–32)
CO2 SERPL-SCNC: 31 MMOL/L (ref 20–32)
CO2 SERPL-SCNC: 31 MMOL/L (ref 22–31)
CO2 SERPL-SCNC: 32 MMOL/L (ref 20–32)
CREAT SERPL-MCNC: 0.99 MG/DL (ref 0.52–1.04)
CREAT SERPL-MCNC: 1.04 MG/DL (ref 0.52–1.04)
CREAT SERPL-MCNC: 1.06 MG/DL (ref 0.52–1.04)
CREAT SERPL-MCNC: 1.09 MG/DL (ref 0.52–1.04)
CREAT SERPL-MCNC: 1.21 MG/DL (ref 0.6–1.1)
EOSINOPHIL # BLD AUTO: 0 THOU/UL (ref 0–0.4)
EOSINOPHIL NFR BLD AUTO: 0 % (ref 0–6)
ERYTHROCYTE [DISTWIDTH] IN BLOOD BY AUTOMATED COUNT: 19.1 % (ref 10–15)
ERYTHROCYTE [DISTWIDTH] IN BLOOD BY AUTOMATED COUNT: 20.2 % (ref 11–14.5)
GFR SERPL CREATININE-BSD FRML MDRD: 43 ML/MIN/1.73M2
GFR SERPL CREATININE-BSD FRML MDRD: 47 ML/MIN/{1.73_M2}
GFR SERPL CREATININE-BSD FRML MDRD: 49 ML/MIN/{1.73_M2}
GFR SERPL CREATININE-BSD FRML MDRD: 50 ML/MIN/{1.73_M2}
GFR SERPL CREATININE-BSD FRML MDRD: 53 ML/MIN/{1.73_M2}
GLUCOSE BLD-MCNC: 117 MG/DL (ref 70–125)
GLUCOSE SERPL-MCNC: 115 MG/DL (ref 70–99)
GLUCOSE SERPL-MCNC: 81 MG/DL (ref 70–99)
GLUCOSE SERPL-MCNC: 94 MG/DL (ref 70–99)
HCT VFR BLD AUTO: 30.8 % (ref 35–47)
HCT VFR BLD AUTO: 32.5 % (ref 35–47)
HGB BLD-MCNC: 9.4 G/DL (ref 11.7–15.7)
HGB BLD-MCNC: 9.7 G/DL (ref 12–16)
LYMPHOCYTES # BLD AUTO: 0.9 THOU/UL (ref 0.8–4.4)
LYMPHOCYTES NFR BLD AUTO: 7 % (ref 20–40)
MCH RBC QN AUTO: 27.6 PG (ref 26.5–33)
MCH RBC QN AUTO: 27.9 PG (ref 27–34)
MCHC RBC AUTO-ENTMCNC: 29.8 G/DL (ref 32–36)
MCHC RBC AUTO-ENTMCNC: 30.5 G/DL (ref 31.5–36.5)
MCV RBC AUTO: 90 FL (ref 78–100)
MCV RBC AUTO: 93 FL (ref 80–100)
MONOCYTES # BLD AUTO: 1.2 THOU/UL (ref 0–0.9)
MONOCYTES NFR BLD AUTO: 9 % (ref 2–10)
NEUTROPHILS # BLD AUTO: 10.2 THOU/UL (ref 2–7.7)
NEUTROPHILS NFR BLD AUTO: 83 % (ref 50–70)
NT-PROBNP SERPL-MCNC: 1863 PG/ML (ref 0–450)
OVALOCYTES: ABNORMAL
PLAT MORPH BLD: NORMAL
PLATELET # BLD AUTO: 206 10E9/L (ref 150–450)
PLATELET # BLD AUTO: 221 THOU/UL (ref 140–440)
PMV BLD AUTO: 11.4 FL (ref 8.5–12.5)
POLYCHROMASIA BLD QL SMEAR: ABNORMAL
POTASSIUM BLD-SCNC: 4.6 MMOL/L (ref 3.5–5)
POTASSIUM SERPL-SCNC: 3.8 MMOL/L (ref 3.4–5.3)
POTASSIUM SERPL-SCNC: 4.2 MMOL/L (ref 3.4–5.3)
POTASSIUM SERPL-SCNC: 4.4 MMOL/L (ref 3.4–5.3)
POTASSIUM SERPL-SCNC: 4.6 MMOL/L (ref 3.4–5.3)
RBC # BLD AUTO: 3.41 10E12/L (ref 3.8–5.2)
RBC # BLD AUTO: 3.48 MILL/UL (ref 3.8–5.4)
SODIUM SERPL-SCNC: 135 MMOL/L (ref 133–144)
SODIUM SERPL-SCNC: 136 MMOL/L (ref 133–144)
SODIUM SERPL-SCNC: 137 MMOL/L (ref 133–144)
SODIUM SERPL-SCNC: 138 MMOL/L (ref 133–144)
SODIUM SERPL-SCNC: 142 MMOL/L (ref 136–145)
TARGETS BLD QL SMEAR: ABNORMAL
WBC # BLD AUTO: 4.4 10E9/L (ref 4–11)
WBC: 12.3 THOU/UL (ref 4–11)

## 2020-01-01 PROCEDURE — 99309 SBSQ NF CARE MODERATE MDM 30: CPT | Performed by: NURSE PRACTITIONER

## 2020-01-01 PROCEDURE — 99316 NF DSCHRG MGMT 30 MIN+: CPT | Performed by: NURSE PRACTITIONER

## 2020-01-01 PROCEDURE — 99306 1ST NF CARE HIGH MDM 50: CPT | Performed by: INTERNAL MEDICINE

## 2020-01-01 PROCEDURE — 99310 SBSQ NF CARE HIGH MDM 45: CPT | Performed by: NURSE PRACTITIONER

## 2020-01-01 RX ORDER — TORSEMIDE 20 MG/1
40 TABLET ORAL 2 TIMES DAILY
Qty: 60 TABLET | Refills: 0 | Status: SHIPPED | OUTPATIENT
Start: 2020-01-01

## 2020-01-01 RX ORDER — ONDANSETRON 4 MG/1
4 TABLET, FILM COATED ORAL 3 TIMES DAILY
COMMUNITY
End: 2020-01-01

## 2020-01-01 RX ORDER — SODIUM CHLORIDE 5 %
1 OINTMENT (GRAM) OPHTHALMIC (EYE) DAILY
COMMUNITY

## 2020-01-01 RX ORDER — POTASSIUM CHLORIDE 1500 MG/1
20 TABLET, EXTENDED RELEASE ORAL DAILY
Qty: 90 TABLET | Refills: 2 | Status: SHIPPED | OUTPATIENT
Start: 2020-01-01 | End: 2020-01-01

## 2020-01-01 RX ORDER — POTASSIUM CHLORIDE 1500 MG/1
20 TABLET, EXTENDED RELEASE ORAL DAILY
Qty: 30 TABLET | Refills: 0 | Status: SHIPPED | OUTPATIENT
Start: 2020-01-01

## 2020-01-01 RX ORDER — SPIRONOLACTONE 25 MG/1
25 TABLET ORAL DAILY
Qty: 30 TABLET | Refills: 0 | Status: SHIPPED | OUTPATIENT
Start: 2020-01-01

## 2020-01-01 RX ORDER — CALCIUM POLYCARBOPHIL 625 MG 625 MG/1
2 TABLET ORAL AT BEDTIME
COMMUNITY

## 2020-01-01 RX ORDER — TORSEMIDE 20 MG/1
40 TABLET ORAL 2 TIMES DAILY
COMMUNITY
End: 2020-01-01

## 2020-01-01 RX ORDER — OMEPRAZOLE 40 MG/1
40 CAPSULE, DELAYED RELEASE ORAL 2 TIMES DAILY
Qty: 60 CAPSULE | Refills: 0 | Status: SHIPPED | OUTPATIENT
Start: 2020-01-01

## 2020-01-01 RX ORDER — CARBOXYMETHYLCELLULOSE SODIUM 5 MG/ML
1 SOLUTION/ DROPS OPHTHALMIC 2 TIMES DAILY
COMMUNITY

## 2020-01-01 RX ORDER — SPIRONOLACTONE 25 MG/1
25 TABLET ORAL DAILY
COMMUNITY
End: 2020-01-01

## 2020-01-01 RX ORDER — ROPINIROLE 0.25 MG/1
0.25 TABLET, FILM COATED ORAL 2 TIMES DAILY
Qty: 60 TABLET | Refills: 0 | Status: SHIPPED | OUTPATIENT
Start: 2020-01-01

## 2020-01-01 ASSESSMENT — MIFFLIN-ST. JEOR
SCORE: 1078.69
SCORE: 1095.01
SCORE: 1088.66
SCORE: 1114.97
SCORE: 1078.69
SCORE: 1099.55
SCORE: 1068.71
SCORE: 1097.74
SCORE: 1099.55
SCORE: 1099.55

## 2020-02-20 PROBLEM — H54.3 BLINDNESS OF BOTH EYES: Status: ACTIVE | Noted: 2020-01-01

## 2020-02-20 PROBLEM — R53.81 PHYSICAL DECONDITIONING: Status: ACTIVE | Noted: 2020-01-01

## 2020-02-20 PROBLEM — K22.2 ESOPHAGEAL STENOSIS: Status: ACTIVE | Noted: 2020-01-01

## 2020-02-20 PROBLEM — R13.19 ESOPHAGEAL DYSPHAGIA: Status: ACTIVE | Noted: 2020-01-01

## 2020-02-20 NOTE — PROGRESS NOTES
[facility]  PRIMARY CARE PROVIDER AND CLINIC:  Lotus Grant MD, [facility] [address]  / [place] [address]  Chief Complaint   Patient presents with     Hospital F/U     [place] Medical Record Number:  [id number]  Place of Service where encounter took place:  [facility] [[id number]]    Rupinder Tracy  is a 79 year old  (1940), admitted to the above facility from  [facility] . Hospital stay 02/11/2020 through 02/19/2020..  Admitted to this facility for  rehab, medical management and nursing care.    HPI:    HPI information obtained from: facility chart records, facility staff, patient report and Care Everywhere Epic chart review.   Brief Summary of Hospital Course: recent hospitalization due to shortness of breath. PMH includes CKD 3, asthma, hyperlipidemia, hypertension, diastolic dysfunction, reflux esophagitis. She does use oxygen during night and has chronic LE edema. She was sent to ED from GI clinic where she was seeking treatment for reflux.    Work up in ED included CXR that showed pulmonary edema. O2 sats were 87%. She was evaluated by cardiology who felt she had acute on chronic diastolic HF, moderate PH, mod to severe AR. Diuretics were increased to lasix 60mg BID with metolazone 2.5mg. She was diuresed about 10#, below usual weight of 140#. She was told her new goal wt is 138#.   GI was consulted due to chronic regurgitation. EGD on 2/14 showed benign esophageal stenosis with diverticulum. The stenosis was dilated. She was transferred to TCU for ongoing strengthening.    Updates on Status Since Skilled nursing Admission: today she denies shortness of breath. She is visiting with a group of friends. She did have a hard time swallowing her lunch of steak salad.     CODE STATUS/ADVANCE DIRECTIVES DISCUSSION:   DNR / DNI  Patient's living condition: lives alone  ALLERGIES: Atorvastatin calcium; Celecoxib; Cholestyramine; Crestor [rosuvastatin  calcium]; Cyclobenzaprine hcl; Dulera; Gabapentin; Lisinopril; Meperidine hcl; Morphine; Naprosyn [naproxen]; Niaspan [niacin]; Percocet [oxycodone-acetaminophen]; Pravastatin; Red yeast rice [cholestin]; Simvastatin; Timolol maleate; Hctz; and Sulfa drugs  PAST MEDICAL HISTORY:  has a past medical history of Aortic insufficiency, Aortic root enlargement (H), Arthritis, Asthma, Cellulitis (4/13 in [place]), CKD (chronic kidney disease) stage 3, GFR 30-59 ml/min (H), COPD (chronic obstructive pulmonary disease) (H), Diastolic dysfunction, Difficult airway for intubation, CRUZ (dyspnea on exertion), 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), Duodenal ulcer, unspecified as acute or chronic, without mention of hemorrhage or perforation (1980's), Female stress incontinence, GIB (gastrointestinal bleeding), Gout, H/O right and left heart catheterization, HYPERLIPIDEMIA, Hypertension, Ischemic colitis (7/2001), Lactose Intolerance, Legal blindness, Lymphedema, Mild intermittent asthma (~1980's), Mitral valve disorder, Nocturnal oxygen desaturation, EVARISTO (obstructive sleep apnea), Osteoporosis, unspecified (~2002), Patent foramen ovale, PERIPHERAL VASCULAR DISEASE (5/05), PRESBYESOPHAGUS (6/04), Pulmonary HTN (H) (11/2014), PVD (peripheral vascular disease) (H), Restrictive lung disease, Right leg DVT (H) (07/15/2019), Serous retinal detachment, Subarachnoid hemorrhage following injury (H) (12/10), Syncope and collapse (12/10), and Unspecified glaucoma(365.9). She also has no past medical history of Basal cell carcinoma, Blood transfusion, Chronic infection, Congestive heart failure, unspecified, Diabetes mellitus (H), Malignant hyperthermia, Malignant melanoma (H), Skin cancer, Squamous cell carcinoma, Thyroid disease, or Unspecified cerebral artery occlusion with cerebral infarction.  PAST SURGICAL HISTORY:   has a past surgical history that includes NONSPECIFIC PROCEDURE (1945); APPENDECTOMY (1982); REMOVAL GALLBLADDER  (1982); NONSPECIFIC PROCEDURE; NONSPECIFIC PROCEDURE; NONSPECIFIC PROCEDURE (1997); NONSPECIFIC PROCEDURE (1997, 2007, 2014); NONSPECIFIC PROCEDURE (1945); NONSPECIFIC PROCEDURE (1960's); NONSPECIFIC PROCEDURE (5/05); NONSPECIFIC PROCEDURE (2008); surgical history of -  (6/2010); biopsy; colonoscopy; Heart Cath Right and left heart cath (1/19/15); Herniorrhaphy ventral (N/A, 4/19/2016); Endoscopic retrograde cholangiopancreatogram (N/A, 10/13/2017); Mastectomy simple bilateral (10/5/09); and TOTAL HIP ARTHROPLASTY (Left, 06/06/2019).  FAMILY HISTORY: family history includes C.A.D. in her paternal uncle; Family History Negative in her daughter. She was adopted.  SOCIAL HISTORY:   reports that she has never smoked. She has never used smokeless tobacco. She reports current alcohol use of about 7.0 standard drinks of alcohol per week. She reports that she does not use drugs.    Post Discharge Medication Reconciliation Status: discharge medications reconciled, continue medications without change    Current Outpatient Medications   Medication Sig Dispense Refill     acetaminophen (TYLENOL) 500 MG tablet Take 1,000 mg by mouth 3 times daily       albuterol (ALBUTEROL) 108 (90 BASE) MCG/ACT inhaler Inhale 2 puffs into the lungs every 4 hours as needed 1 Inhaler 5     ascorbic acid 1000 MG PO TABS tablet Take 2,000 mg by mouth daily       calcium carbonate-vitamin D 600-400 MG-UNIT PO chewable tablet Take 2 chew tab by mouth daily       carboxymethylcellulose PF 0.5 % OP ophthalmic solution Place 1 drop Into the left eye 2 times daily 20% Autologous serum eye drops (Compounded - Keep Refrigerated)  Vital Tears (1 drop into the left eye 2 times daily)       COMPRESSION STOCKINGS 1 each daily 3 each 1     Cyanocobalamin (VITAMIN B-12 PO) Take 1,000 mcg by mouth daily        DOXYCYCLINE HYCLATE PO Take 50 mg by mouth 3 times per week Monday,wednesday, friday       edetate 3% ophthalmic irrigation soln 3% OP SOLN Place Into the  left eye 4 times daily as needed Place 1 Dose into eye(s) 4 times a day. Edetate disodium 0.5% ophthalmic solution. 1 drop in the left eye 4 times a day.       fluticasone-salmeterol (ADVAIR DISKUS) 250-50 MCG/DOSE diskus inhaler Inhale 1 puff into the lungs 2 times daily 60 Inhaler 1     multivitamin w/minerals (MULTI-VITAMIN) tablet Take 1 tablet by mouth daily       omeprazole (PRILOSEC) 40 MG DR capsule Take 40 mg by mouth 2 times daily        potassium chloride ER (K-DUR/KLOR-CON M) 20 MEQ CR tablet Take 1 tablet (20 mEq) by mouth daily 90 tablet 2     RESTASIS 0.05 % OP EMUL 1 drop to left eye twice daily       rOPINIRole (REQUIP) 0.25 MG tablet Take 0.5 mg by mouth every evening. Take 2 tablets by mouth at bedtime.  Take 1 tablet by mouth in afternoon as needed.       sodium chloride 5 % OP ophthalmic ointment Place 1 Application Into the left eye daily Place 0.5 inches into left eye daily at bedtime.       Sodium Hyaluronate (HEALON IO) Place 1 drop Into the left eye daily compounded sodium hyaluronate 0.001%-BSS (AKA HEALON) eye drops .   Place 1 Drop into left eye daily. HEALON 10 mg/mL injection diluted with BSS. (compounded to be combination with prednisolone) Refrigerated       spironolactone 25 MG PO tablet Take 12.5 mg by mouth daily       torsemide 20 MG PO tablet Take 40 mg by mouth daily       Vitamin D3 25 mcg (1000 units) PO tablet Take 1 tablet by mouth daily        autologous serum compounded ophthalmic solution 1 drop every 2 hours (while awake) 1 drop left eye 4-6 times daily. Keep frozen until use. Discard 2 days after opened. Refrigerate open bottle.       Calcium-Vitamin D-Vitamin K (CALCIUM + D) 500-1000-40 MG-UNT-MCG PO CHEW Take 2 tablets by mouth daily        olmesartan (BENICAR) 5 MG tablet Take 5 mg by mouth daily        Ophthalmic Irrigation Solution (OCUSOFT EYE WASH OP)        order for DME 2L of O2 at night       prednisoLONE acetate (PRED FORTE) 1 % ophthalmic susp INSTILL 1  "DROP INTO INTO LEFT EYE TWICE A DAY  3     Sodium Hyaluronate (HEALON IO) WITH PREDNISOLONE MIXTURE 0.001- 0.25%       SYSTANE 0.4-0.3 % OP SOLN 1 drop to left eye 4 times daily       torsemide (DEMADEX) 10 MG tablet Take 2 tablets (20 mg) by mouth daily 180 tablet 3         ROS:  4 point ROS including Respiratory, CV, GI and , other than that noted in the HPI,  is negative    Vitals:  /69   Pulse 96   Temp 97.2  F (36.2  C)   Resp 18   Ht 1.626 m (5' 4\")   Wt 61.9 kg (136 lb 6.4 oz)   LMP 01/01/1990   SpO2 97%   BMI 23.41 kg/m    Exam:  GENERAL APPEARANCE:  Alert, in no distress  ENT:  Mouth and posterior oropharynx normal, moist mucous membranes, hearing acuity [place]  EYES:  Chronic sclerotic changes in eyes. Lid abnormalities  RESP:  respiratory effort and palpation of chest normal, no respiratory distress, Lung sounds faint rales in bases  CV:  Palpation and auscultation of heart done , rate and rhythm reg,  Edema wearing compression socks. Left with more swelling than right  ABDOMEN:  normal bowel sounds, soft, nontender  M/S:   Gait and station not observed, Digits and nails normal   SKIN:  Inspection/Palpation of skin and subcutaneous tissue no rash  NEURO: 2-12 in normal limits and at patient's baseline  PSYCH:  insight and judgement, memory intact , affect and mood normal    Lab/Diagnostic data:  2/18/2020 05:28   Glucose 103 (H)   Sodium 142   Potassium 3.8   Chloride 98   CO2 36 (H)   Anion Gap (calc.) 8   Calcium 8.9   BUN 47 (H)   Creatinine 1.07 (H)   GFR, Estimated 49 (L)   Echocardiogram 2/12  1. Left ventricular chamber size is normal. Mild concentric wall  thickening consistent with left ventricular hypertrophy is present.  Global and regional left ventricular function is normal. Left  ventricular ejection fraction is visually estimated at 52%. Left  ventricular Doppler filling pattern consistent with Grade II  (moderate) diastolic dysfunction.  2. Normal right ventricle size and " normal global function.  3. The aortic valve is tricuspid. There is moderate aortic sclerosis  without evidence of aortic stenosis. Focal nodular calcification is  seen. Moderate to severe aortic regurgitation. The vena contracta is  0.5 cm. The pressure half-time is 457 msec.  4. Mild dilation of the aorta is present involving the sinuses of  Valsalva. (Maximal dimension 4.3 cm).  5. Moderate to severe pulmonary hypertension.  6. Elevated central venous pressure.  7. Moderate circumferential pericardial effusion without tamponade  physiology.      ASSESSMENT/PLAN:  Diastolic dysfunction  Pulmonary hypertension   Recent hospitalization due to dyspnea. She required diuresis with IV lasix with dopamine. She was diuresed about 10#. echo repeated in hospital and showed EF 52%, moderate to severe aortic regurgitation, mod to severe PH, elevated CVP and moderate circumferential pericardial effusion.  Lasix changed to oral and spironolactone added. Today she is not short of breath at rest. She is wearing compression socks  - daily wts with goal wt of 138#.  -continue torsemide 40mg q day  -continue spironolactone 12.5mg q day  -continue kcl 20 meq po q day  -[facility] 2/24  -OCCUPATIONAL THERAPY evaluate and treat LE edema  -follow up with cardiology at [facility] 3/2 at 2:30pm  Chronic respiratory failure with hypoxia (H)  Mild persistent asthma without complication  During hospitalization she did have episode of increased wheezing. She used her rescue inhaler. She was just using oxygen at HS PTA but now needs supplemental oxygen all day.  02 sats on supplemental oxygen 97%  -monitor respiratory status  -continue prn albuterol inh      CKD (chronic kidney disease) stage 3, GFR 30-59 ml/min (H)  Creat at baseline  -[facility] 2/24  -avoid nephrotoxins      Esophageal dysphagia  Esophageal stenosis  Esophageal diverticulum  While in hospital patient had EGD that showed stenosis and diverticulum proximal to stenosis that contained  pills and food. She did have dilatation of stenotic area.   Today she had trouble swallowing steak salad today. SPEECH THERAPY did evaluate her today and downgraded diet to pureed for now.   -return to GI in 4 weeks for repeat EGD- 4/7/20 at [facility]  -pureed diet, no straws  -SPEECH THERAPY to follow closely  -continue omeprazole 40mg BIF    Restless leg syndrome  Recent increase in ropinirole in hospital but now wants dose reduced  -decrease ropinirole 0.5mg BID    Blindness of both eyes-   Glaucoma suspect, left  S/p retinal detachments  Eye surgery to remove calcium deposits pending. She needs to be able to lie flat. We did review eye drops today  -continue edetate disodium 0.5% ophth solution to left eye QID  -continue cyclosporine (restasis) 0.05% one drop to left eye BID  -continue compounded sodium hyaluronate 0.001%-BSS (Healon) one drop to left eye BID  -continue carboxymethylcellulose sodium 20% autologous serum one drop to left eye BID  - patient is blind    Physical deconditioning  L[facility] at [facility]. She has been independent but she did work with physical therapy in hospital and found to have reduced activity tolerance.   -physical therapy and OCCUPATIONAL THERAPY   -SPEECH THERAPY   -SW to assist with discharge planning    POLST reviewed-DNR DNI        Electronically signed by:  YUNIEL Rick CNP

## 2020-02-20 NOTE — LETTER
2/20/2020        RE: Rupinder Tracy  [address]  [address]  [facility] [id number]        [facility]  PRIMARY CARE PROVIDER AND CLINIC:  Lotus Grant MD, [facility] [address] / [place] [address]  Chief Complaint   Patient presents with     Hospital F/U     [facility] Medical Record Number:  [id number]  Place of Service where encounter took place:  [facility] [[id number]]    Rupinder Tracy  is a 79 year old  (1940), admitted to the above facility from  [facility] . Hospital stay 02/11/2020 through 02/19/2020..  Admitted to this facility for  rehab, medical management and nursing care.    HPI:    HPI information obtained from: facility chart records, facility staff, patient report and Care Everywhere Epic chart review.   Brief Summary of Hospital Course: recent hospitalization due to shortness of breath. PMH includes CKD 3, asthma, hyperlipidemia, hypertension, diastolic dysfunction, reflux esophagitis. She does use oxygen during night and has chronic LE edema. She was sent to ED from GI clinic where she was seeking treatment for reflux.    Work up in ED included CXR that showed pulmonary edema. O2 sats were 87%. She was evaluated by cardiology who felt she had acute on chronic diastolic HF, moderate PH, mod to severe AR. Diuretics were increased to lasix 60mg BID with metolazone 2.5mg. She was diuresed about 10#, below usual weight of 140#. She was told her new goal wt is 138#.   GI was consulted due to chronic regurgitation. EGD on 2/14 showed benign esophageal stenosis with diverticulum. The stenosis was dilated. She was transferred to TCU for ongoing strengthening.    Updates on Status Since Skilled nursing Admission: today she denies shortness of breath. She is visiting with a group of friends. She did have a hard time swallowing her lunch of steak salad.     CODE STATUS/ADVANCE DIRECTIVES DISCUSSION:   DNR / DNI  Patient's living  condition: lives alone  ALLERGIES: Atorvastatin calcium; Celecoxib; Cholestyramine; Crestor [rosuvastatin calcium]; Cyclobenzaprine hcl; Dulera; Gabapentin; Lisinopril; Meperidine hcl; Morphine; Naprosyn [naproxen]; Niaspan [niacin]; Percocet [oxycodone-acetaminophen]; Pravastatin; Red yeast rice [cholestin]; Simvastatin; Timolol maleate; Hctz; and Sulfa drugs  PAST MEDICAL HISTORY:  has a past medical history of Aortic insufficiency, Aortic root enlargement (H), Arthritis, Asthma, Cellulitis (4/13 in [place]), CKD (chronic kidney disease) stage 3, GFR 30-59 ml/min (H), COPD (chronic obstructive pulmonary disease) (H), Diastolic dysfunction, Difficult airway for intubation, CRUZ (dyspnea on exertion), 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), Duodenal ulcer, unspecified as acute or chronic, without mention of hemorrhage or perforation (1980's), Female stress incontinence, GIB (gastrointestinal bleeding), Gout, H/O right and left heart catheterization, HYPERLIPIDEMIA, Hypertension, Ischemic colitis (7/2001), Lactose Intolerance, Legal blindness, Lymphedema, Mild intermittent asthma (~1980's), Mitral valve disorder, Nocturnal oxygen desaturation, EVARISTO (obstructive sleep apnea), Osteoporosis, unspecified (~2002), Patent foramen ovale, PERIPHERAL VASCULAR DISEASE (5/05), PRESBYESOPHAGUS (6/04), Pulmonary HTN (H) (11/2014), PVD (peripheral vascular disease) (H), Restrictive lung disease, Right leg DVT (H) (07/15/2019), Serous retinal detachment, Subarachnoid hemorrhage following injury (H) (12/10), Syncope and collapse (12/10), and Unspecified glaucoma(365.9). She also has no past medical history of Basal cell carcinoma, Blood transfusion, Chronic infection, Congestive heart failure, unspecified, Diabetes mellitus (H), Malignant hyperthermia, Malignant melanoma (H), Skin cancer, Squamous cell carcinoma, Thyroid disease, or Unspecified cerebral artery occlusion with cerebral infarction.  PAST SURGICAL HISTORY:   has a past  surgical history that includes NONSPECIFIC PROCEDURE (1945); APPENDECTOMY (1982); REMOVAL GALLBLADDER (1982); NONSPECIFIC PROCEDURE; NONSPECIFIC PROCEDURE; NONSPECIFIC PROCEDURE (1997); NONSPECIFIC PROCEDURE (1997, 2007, 2014); NONSPECIFIC PROCEDURE (1945); NONSPECIFIC PROCEDURE (1960's); NONSPECIFIC PROCEDURE (5/05); NONSPECIFIC PROCEDURE (2008); surgical history of -  (6/2010); biopsy; colonoscopy; Heart Cath Right and left heart cath (1/19/15); Herniorrhaphy ventral (N/A, 4/19/2016); Endoscopic retrograde cholangiopancreatogram (N/A, 10/13/2017); Mastectomy simple bilateral (10/5/09); and TOTAL HIP ARTHROPLASTY (Left, 06/06/2019).  FAMILY HISTORY: family history includes C.A.D. in her paternal uncle; Family History Negative in her daughter. She was adopted.  SOCIAL HISTORY:   reports that she has never smoked. She has never used smokeless tobacco. She reports current alcohol use of about 7.0 standard drinks of alcohol per week. She reports that she does not use drugs.    Post Discharge Medication Reconciliation Status: discharge medications reconciled, continue medications without change    Current Outpatient Medications   Medication Sig Dispense Refill     acetaminophen (TYLENOL) 500 MG tablet Take 1,000 mg by mouth 3 times daily       albuterol (ALBUTEROL) 108 (90 BASE) MCG/ACT inhaler Inhale 2 puffs into the lungs every 4 hours as needed 1 Inhaler 5     ascorbic acid 1000 MG PO TABS tablet Take 2,000 mg by mouth daily       calcium carbonate-vitamin D 600-400 MG-UNIT PO chewable tablet Take 2 chew tab by mouth daily       carboxymethylcellulose PF 0.5 % OP ophthalmic solution Place 1 drop Into the left eye 2 times daily 20% Autologous serum eye drops (Compounded - Keep Refrigerated)  Vital Tears (1 drop into the left eye 2 times daily)       COMPRESSION STOCKINGS 1 each daily 3 each 1     Cyanocobalamin (VITAMIN B-12 PO) Take 1,000 mcg by mouth daily        DOXYCYCLINE HYCLATE PO Take 50 mg by mouth 3 times  per week Monday,wednesday, friday       edetate 3% ophthalmic irrigation soln 3% OP SOLN Place Into the left eye 4 times daily as needed Place 1 Dose into eye(s) 4 times a day. Edetate disodium 0.5% ophthalmic solution. 1 drop in the left eye 4 times a day.       fluticasone-salmeterol (ADVAIR DISKUS) 250-50 MCG/DOSE diskus inhaler Inhale 1 puff into the lungs 2 times daily 60 Inhaler 1     multivitamin w/minerals (MULTI-VITAMIN) tablet Take 1 tablet by mouth daily       omeprazole (PRILOSEC) 40 MG DR capsule Take 40 mg by mouth 2 times daily        potassium chloride ER (K-DUR/KLOR-CON M) 20 MEQ CR tablet Take 1 tablet (20 mEq) by mouth daily 90 tablet 2     RESTASIS 0.05 % OP EMUL 1 drop to left eye twice daily       rOPINIRole (REQUIP) 0.25 MG tablet Take 0.5 mg by mouth every evening. Take 2 tablets by mouth at bedtime.  Take 1 tablet by mouth in afternoon as needed.       sodium chloride 5 % OP ophthalmic ointment Place 1 Application Into the left eye daily Place 0.5 inches into left eye daily at bedtime.       Sodium Hyaluronate (HEALON IO) Place 1 drop Into the left eye daily compounded sodium hyaluronate 0.001%-BSS (AKA HEALON) eye drops .   Place 1 Drop into left eye daily. HEALON 10 mg/mL injection diluted with BSS. (compounded to be combination with prednisolone) Refrigerated       spironolactone 25 MG PO tablet Take 12.5 mg by mouth daily       torsemide 20 MG PO tablet Take 40 mg by mouth daily       Vitamin D3 25 mcg (1000 units) PO tablet Take 1 tablet by mouth daily        autologous serum compounded ophthalmic solution 1 drop every 2 hours (while awake) 1 drop left eye 4-6 times daily. Keep frozen until use. Discard 2 days after opened. Refrigerate open bottle.       Calcium-Vitamin D-Vitamin K (CALCIUM + D) 500-1000-40 MG-UNT-MCG PO CHEW Take 2 tablets by mouth daily        olmesartan (BENICAR) 5 MG tablet Take 5 mg by mouth daily        Ophthalmic Irrigation Solution (OCUSOFT EYE WASH OP)         "order for DME 2L of O2 at night       prednisoLONE acetate (PRED FORTE) 1 % ophthalmic susp INSTILL 1 DROP INTO INTO LEFT EYE TWICE A DAY  3     Sodium Hyaluronate (HEALON IO) WITH PREDNISOLONE MIXTURE 0.001- 0.25%       SYSTANE 0.4-0.3 % OP SOLN 1 drop to left eye 4 times daily       torsemide (DEMADEX) 10 MG tablet Take 2 tablets (20 mg) by mouth daily 180 tablet 3         ROS:  4 point ROS including Respiratory, CV, GI and , other than that noted in the HPI,  is negative    Vitals:  /69   Pulse 96   Temp 97.2  F (36.2  C)   Resp 18   Ht 1.626 m (5' 4\")   Wt 61.9 kg (136 lb 6.4 oz)   LMP 01/01/1990   SpO2 97%   BMI 23.41 kg/m     Exam:  GENERAL APPEARANCE:  Alert, in no distress  ENT:  Mouth and posterior oropharynx normal, moist mucous membranes, hearing acuity [place]  EYES:  Chronic sclerotic changes in eyes. Lid abnormalities  RESP:  respiratory effort and palpation of chest normal, no respiratory distress, Lung sounds faint rales in bases  CV:  Palpation and auscultation of heart done , rate and rhythm reg,  Edema wearing compression socks. Left with more swelling than right  ABDOMEN:  normal bowel sounds, soft, nontender  M/S:   Gait and station not observed, Digits and nails normal   SKIN:  Inspection/Palpation of skin and subcutaneous tissue no rash  NEURO: 2-12 in normal limits and at patient's baseline  PSYCH:  insight and judgement, memory intact , affect and mood normal    Lab/Diagnostic data:  2/18/2020 05:28   Glucose 103 (H)   Sodium 142   Potassium 3.8   Chloride 98   CO2 36 (H)   Anion Gap (calc.) 8   Calcium 8.9   BUN 47 (H)   Creatinine 1.07 (H)   GFR, Estimated 49 (L)   Echocardiogram 2/12  1. Left ventricular chamber size is normal. Mild concentric wall  thickening consistent with left ventricular hypertrophy is present.  Global and regional left ventricular function is normal. Left  ventricular ejection fraction is visually estimated at 52%. Left  ventricular Doppler filling " pattern consistent with Grade II  (moderate) diastolic dysfunction.  2. Normal right ventricle size and normal global function.  3. The aortic valve is tricuspid. There is moderate aortic sclerosis  without evidence of aortic stenosis. Focal nodular calcification is  seen. Moderate to severe aortic regurgitation. The vena contracta is  0.5 cm. The pressure half-time is 457 msec.  4. Mild dilation of the aorta is present involving the sinuses of  Valsalva. (Maximal dimension 4.3 cm).  5. Moderate to severe pulmonary hypertension.  6. Elevated central venous pressure.  7. Moderate circumferential pericardial effusion without tamponade  physiology.      ASSESSMENT/PLAN:  Diastolic dysfunction  Pulmonary hypertension   Recent hospitalization due to dyspnea. She required diuresis with IV lasix with dopamine. She was diuresed about 10#. echo repeated in hospital and showed EF 52%, moderate to severe aortic regurgitation, mod to severe PH, elevated CVP and moderate circumferential pericardial effusion.  Lasix changed to oral and spironolactone added. Today she is not short of breath at rest. She is wearing compression socks  - daily wts with goal wt of 138#.  -continue torsemide 40mg q day  -continue spironolactone 12.5mg q day  -continue kcl 20 meq po q day  -[facility] 2/24  -OCCUPATIONAL THERAPY evaluate and treat LE edema  -follow up with cardiology at [facility] 3/2 at 2:30pm  Chronic respiratory failure with hypoxia (H)  Mild persistent asthma without complication  During hospitalization she did have episode of increased wheezing. She used her rescue inhaler. She was just using oxygen at HS PTA but now needs supplemental oxygen all day.  02 sats on supplemental oxygen 97%  -monitor respiratory status  -continue prn albuterol inh      CKD (chronic kidney disease) stage 3, GFR 30-59 ml/min (H)  Creat at baseline  -[facility] 2/24  -avoid nephrotoxins      Esophageal dysphagia  Esophageal stenosis  Esophageal diverticulum  While  in hospital patient had EGD that showed stenosis and diverticulum proximal to stenosis that contained pills and food. She did have dilatation of stenotic area.   Today she had trouble swallowing steak salad today. SPEECH THERAPY did evaluate her today and downgraded diet to pureed for now.   -return to GI in 4 weeks for repeat EGD- 4/7/20 at [facility]  -pureed diet, no straws  -SPEECH THERAPY to follow closely  -continue omeprazole 40mg BIF    Restless leg syndrome  Recent increase in ropinirole in hospital but now wants dose reduced  -decrease ropinirole 0.5mg BID    Blindness of both eyes-   Glaucoma suspect, left  S/p retinal detachments  Eye surgery to remove calcium deposits pending. She needs to be able to lie flat. We did review eye drops today  -continue edetate disodium 0.5% ophth solution to left eye QID  -continue cyclosporine (restasis) 0.05% one drop to left eye BID  -continue compounded sodium hyaluronate 0.001%-BSS (Healon) one drop to left eye BID  -continue carboxymethylcellulose sodium 20% autologous serum one drop to left eye BID  - patient is blind    Physical deconditioning  Lives at [facility]. She has been independent but she did work with physical therapy in hospital and found to have reduced activity tolerance.   -physical therapy and OCCUPATIONAL THERAPY   -SPEECH THERAPY   -SW to assist with discharge planning    POLST reviewed-DNR DNI        Electronically signed by:  YUNIEL Rick CNP                         Sincerely,        YUNIEL Rick CNP

## 2020-02-22 NOTE — PROGRESS NOTES
[facility]  INITIAL VISIT NOTE  February 24, 2020    PRIMARY CARE PROVIDER AND CLINIC:  [facility] [address] / [place] [address]    CHIEF COMPLAINT:  Hospital follow-up/Initial visit    HPI:    Rupinder Tracy is a 79 year old  (1940) female who [facility], 2020 for an initial visit. Medical history is notable for chronic heart failure with preserved ejection fraction, aortic regurgitation, hypertension, dyslipidemia, chronic kidney disease stage III, asthma, restrictive lung disease restless leg syndrome, GERD, peptic ulcer disease, obstructive sleep apnea, chronic leg edema, and ischemic colitis.    She was hospitalized at [facility] from February 11 through February 19, 2020 for worsening shortness of breath and acute heart failure.  Troponin I was 0.05 and BNP was 886.  CBC showed hemoglobin of 9.7 and MCV of 92.4.  Creatinine was 1.13.  Chest x-ray revealed prominent pulmonary vasculature, enlarged heart size, and increased interstitial markings and patchy airspace opacity at both lung bases suggestive of pulmonary edema.  Electrocardiogram revealed normal sinus rhythm with a rate of 77 bpm and low voltage QRS.  Echocardiogram, February 12, showed mild concentric LV hypertrophy, LV ejection fraction of 52%, grade 2 or moderate diastolic dysfunction, moderate to severe aortic regurgitation, moderate to severe pulmonary hypertension, moderate circumferential pericardial effusion without tamponade physiology, and mild dilation of aortic root.  She was diuresed with continuous IV furosemide infusion and treated with dopamine drip.  Spironolactone was added to her regimen.  Her weight was down by 10 pounds and to 139 pounds by the time of discharge.    Patient had known history of dysphagia due to esophageal stenosis.  She underwent upper GI endoscopy on February 14 which showed esophageal diverticulum as well as benign  esophageal stenosis which was dilated.  GI recommended repeat endoscopy in 4 weeks.      Patient is admitted to this facility for medical management, nursing care, and rehab.     Patient was seen today in her room, while sitting in the chair.  She has just come back from physical therapy.  She continues to require oxygen during the daytime.  She denies dyspnea at rest.  She reports no productive cough, wheezing, fever, chills, nausea, vomiting, abdominal pain, diarrhea, or constipation.    CODE STATUS:   DNR / DNI    PAST MEDICAL HISTORY:   Hypertension  Dyslipidemia  Chronic heart failure with preserved ejection fraction (moderate diastolic dysfunction by echocardiogram on February 12, 2020)  Chronic kidney disease, stage III, baseline creatinine around 0.9-1.2  Mild intermittent asthma  Restrictive lung disease  Pulmonary hypertension  Mild mitral regurgitation by echocardiogram on February 12, 2020  Moderate to severe aortic regurgitation by echocardiogram on February 12, 2020  Mild aortic root dilation by echocardiogram by echocardiogram on February 12, 2020  Moderate to severe pulmonary hypertension by echocardiogram on February 12, 2020  Chronic anemia, baseline hemoglobin recently in the range of 9-10  Restless leg syndrome  Chronic back pain  GERD  Esophageal stenosis and diverticulum by EGD on February 14, 2020, status post dilation  Small PFO  Bilateral thyroid nodules  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 and left eye glaucoma and retinal detachment  Ischemic colitis  Peripheral vascular disease  Ductal carcinoma in situ of breast, status post bilateral mastectomy in October 2009  Obstructive sleep apnea/nocturnal O2 desaturation, using 2 L supplemental oxygen at night  Osteoporosis  Peptic ulcer  Community-acquired pneumonia  Traumatic right temporal subarachnoid hemorrhage in December 2010  Vitamin D deficiency  Right ovarian cyst  Lactose intolerance  B12 deficiency  Bilateral lower extremity  lymphedema  Avascular necrosis of left hip  Ampulla of Vater mass    PAST SURGICAL HISTORY:   Past Surgical History:   Procedure Laterality Date     BIOPSY       C APPENDECTOMY  1982     C NONSPECIFIC PROCEDURE  1945    adenoidectomy     C NONSPECIFIC PROCEDURE      bilat retinal detachment     C NONSPECIFIC PROCEDURE      blephoroplasty bilat     C NONSPECIFIC PROCEDURE  1997    glaucoma procedure L     C NONSPECIFIC PROCEDURE  1997, 2007, 2014    corneal transplant L     C NONSPECIFIC PROCEDURE  1945    strabismus procedure R eye     C NONSPECIFIC PROCEDURE  1960's    R breast bx     C NONSPECIFIC PROCEDURE  5/05    Angioplasty and stenting mesenteric vessels     C NONSPECIFIC PROCEDURE  2008    L corneal transplant     C TOTAL HIP ARTHROPLASTY Left 06/06/2019     COLONOSCOPY      due 2015     ENDOSCOPIC RETROGRADE CHOLANGIOPANCREATOGRAM N/A 10/13/2017    Procedure: ENDOSCOPIC RETROGRADE CHOLANGIOPANCREATOGRAM;  ENDOSCOPIC RETROGRADE CHOLANGIOPANCREATOGRAM ;  Surgeon: Isabel Yousif MD;  Location: [facility] OR     HC REMOVAL GALLBLADDER  1982    Cholecystectomy     HEART CATH RIGHT AND LEFT HEART CATH  1/19/15     HERNIORRHAPHY VENTRAL N/A 4/19/2016    Procedure: HERNIORRHAPHY VENTRAL;  Surgeon: Hamlet Ness MD;  Location: [facility] OR     MASTECTOMY SIMPLE BILATERAL  10/5/09    bilateral mastectomy     SURGICAL HISTORY OF -   6/2010    breast reconstruction       FAMILY HISTORY:   Family History   Adopted: Yes   Problem Relation Age of Onset     C.A.D. Paternal Uncle         MI 50's     Family History Negative Daughter        SOCIAL HISTORY:  Patient is .  She resides in a senior apartment.  She does use a walker for ambulation.    Social History     Tobacco Use     Smoking status: Never Smoker     Smokeless tobacco: Never Used   Substance Use Topics     Alcohol use: Yes     Alcohol/week: 7.0 standard drinks     Types: 7 Glasses of wine per week     Comment: rarely       MEDICATIONS:  Current Outpatient  Medications   Medication Sig Dispense Refill     acetaminophen (TYLENOL) 500 MG tablet Take 1,000 mg by mouth 3 times daily       albuterol (ALBUTEROL) 108 (90 BASE) MCG/ACT inhaler Inhale 2 puffs into the lungs every 4 hours as needed 1 Inhaler 5     ascorbic acid 1000 MG PO TABS tablet Take 2,000 mg by mouth daily       calcium carbonate-vitamin D 600-400 MG-UNIT PO chewable tablet Take 2 chew tab by mouth daily       carboxymethylcellulose PF 0.5 % OP ophthalmic solution Place 1 drop Into the left eye 2 times daily 20% Autologous serum eye drops (Compounded - Keep Refrigerated)  Vital Tears (1 drop into the left eye 2 times daily)       COMPRESSION STOCKINGS 1 each daily 3 each 1     Cyanocobalamin (VITAMIN B-12 PO) Take 1,000 mcg by mouth daily        DOXYCYCLINE HYCLATE PO Take 50 mg by mouth 3 times per week Monday,wednesday, friday       edetate 3% ophthalmic irrigation soln 3% OP SOLN Place Into the left eye 4 times daily as needed Place 1 Dose into eye(s) 4 times a day. Edetate disodium 0.5% ophthalmic solution. 1 drop in the left eye 4 times a day.       fluticasone-salmeterol (ADVAIR DISKUS) 250-50 MCG/DOSE diskus inhaler Inhale 1 puff into the lungs 2 times daily 60 Inhaler 1     multivitamin w/minerals (MULTI-VITAMIN) tablet Take 1 tablet by mouth daily       omeprazole (PRILOSEC) 40 MG DR capsule Take 40 mg by mouth 2 times daily        potassium chloride ER (K-DUR/KLOR-CON M) 20 MEQ CR tablet Take 1 tablet (20 mEq) by mouth daily 90 tablet 2     RESTASIS 0.05 % OP EMUL 1 drop to left eye twice daily       rOPINIRole (REQUIP) 0.25 MG tablet Take 0.5 mg by mouth every evening. Take 2 tablets by mouth at bedtime.  Take 1 tablet by mouth in afternoon as needed.       sodium chloride 5 % OP ophthalmic ointment Place 1 Application Into the left eye daily Place 0.5 inches into left eye daily at bedtime.       Sodium Hyaluronate (HEALON IO) Place 1 drop Into the left eye daily compounded sodium hyaluronate  "0.001%-BSS (AKA HEALON) eye drops .   Place 1 Drop into left eye daily. HEALON 10 mg/mL injection diluted with BSS. (compounded to be combination with prednisolone) Refrigerated       Sodium Hyaluronate (HEALON IO) WITH PREDNISOLONE MIXTURE 0.001- 0.25%       spironolactone 25 MG PO tablet Take 12.5 mg by mouth daily       SYSTANE 0.4-0.3 % OP SOLN 1 drop to left eye 4 times daily       torsemide 20 MG PO tablet Take 40 mg by mouth daily       Vitamin D3 25 mcg (1000 units) PO tablet Take 1 tablet by mouth daily          ALLERGIES:  Allergies   Allergen Reactions     Atorvastatin Calcium      myalgias     Celecoxib Swelling     edema     Cholestyramine Diarrhea     Diarrhea       Crestor [Rosuvastatin Calcium]      leg aches, likely from medication     Cyclobenzaprine Hcl      flexeril--gets \"goofy\"     Dulera      tremors     Gabapentin      Nightmares.   Retrial of medication caused tremors.     Lisinopril Cough     Cough/ Dizziness at high dose.     Meperidine Hcl Nausea and Vomiting     demerol-severe nausea     Morphine Nausea and Vomiting     Naprosyn [Naproxen] GI Disturbance     Niaspan [Niacin]      flushing, severe     Percocet [Oxycodone-Acetaminophen] Nausea     Nausea, lightheadedness.   Tolerates med if taken with food.     Pravastatin Swelling     Edema, myalgias     Red Yeast Rice [Cholestin] GI Disturbance     Bloating, etc.     Simvastatin      myalgias     Timolol Maleate Difficulty breathing     timoptic--respiratory problems     Hctz Rash     rash     Sulfa Drugs Itching and Rash     rash on all mucous membranes and palms of hands with intense itching       ROS:  10 point ROS neg other than the symptoms noted above in the HPI.    PHYSICAL EXAM:  Vital signs: Blood pressure 126/70, heart rate 99, respiratory rate 18, temperature 97.9  F, oxygen saturation 96%, weight 141 LBS  Gen: Cooperative and in no acute distress; frail appearing  HEENT: Normocephalic; oropharynx clear; conjunctival " pallor+  Card: Normal S1, S2, RRR  Resp: Diminished breath sounds bilaterally, no wheezing, rhonchi, or crackles  GI: Abdomen soft, non-tender, non-distended, +BS  Ext: 3+ bilateral LE edema  Neuro: CX II-XII grossly intact; ROM in all four extremities grossly intact  Psych: Alert and oriented x3; normal affect  Skin: No acute rash    LABORATORY/IMAGING DATA:  Hemoglobin 9.6 on February 15, 2020  Chemistry profile (February 18, 2020): Sodium 142, potassium 3.8, creatinine 1.07, BUN 47, calcium 8.9    ASSESSMENT/PLAN:  Acute on chronic heart failure with preserved ejection fraction, LVEF 52% on February 12, 2020,  Moderate pericardial effusion without tamponade,  Moderate to severe pulmonary hypertension,  Moderate severe aortic regurgitation,  Essential hypertension.  In the hospital patient was treated with intravenous furosemide infusion as well as dopamine gtt.  Her weight was down by 10 pounds and to 139 pounds by time of discharge from hospital.  Spironolactone was added to her cardiac regimen.  She currently weighs around 141 pounds.  She continues to require daytime O2 supplement at 2 L/min.  Plan:  Wean daytime O2 supplement as able  Continue nasal cannula O2 at 2 L/min at night for obstructive sleep apnea  Continue torsemide 40 mg p.o. daily and spironolactone 12.5 mg p.o. daily  Continue supplemental potassium chloride 20 mEq p.o. daily  Continue compression stockings  Monitor blood pressure, volume status, and weight  Follow-up on BMP from today, February 24    Dysphagia due to esophageal stenosis and esophageal diverticulum.  Patient underwent EGD and esophageal dilation on February 14.  Plan:  Continue PTA omeprazole 40 mg p.o. twice daily  Continue dysphagia diet level 1 with regular consistency  SLP evaluation and therapy  Repeat EGD in 4 weeks    Chronic kidney disease, stage III.  Baseline creatinine is around 0.9-1.2.  Last creatinine level was 1.07 and stable on February 10  Plan:  Avoid NSAIDs  and nephrotoxins  Monitor renal function periodically  Follow-up on BMP from today, February 24    Chronic anemia.  Baseline hemoglobin has been recently in the range of 9-10.  Last hemoglobin was stable at 9.6 on February 15.  Plan:  Follow-up on CBC from today, February 24    Obstructive sleep apnea,  Nocturnal O2 desaturation.  Plan:  Continue supple oxygen at 2 L/min at night    Mild intermittent asthma.  Stable.  Plan:  Continue PTA Advair discus 250-50 mcg, 1 inhalation twice daily  Continue PRN albuterol inhaler 2 puffs every 4 hours  Monitor respiratory status    B12 deficiency.  Plan:  Continue cyanocobalamin 1000 mcg p.o. daily    Restless leg syndrome.  Plan:  Continue ropinirole 0.5 mg p.o. twice daily    Blindness of both eyes,  Left eye glaucoma,  Status post retinal detachment.  Plan:  Continue eyedrops (cyclosporine, Healon, and carboxy methylcellulose) as instructed  Patient is scheduled for left eye surgery on March 24    Physical deconditioning.  Plan:  Continue PT/OT evaluation and therapy      Electronically signed by:  Debbie Medrano MD

## 2020-02-24 NOTE — LETTER
2/24/2020        RE: Rupinder Tracy  [address]  [address]  [facility] [address]        [facility]  INITIAL VISIT NOTE  February 24, 2020    PRIMARY CARE PROVIDER AND CLINIC:  [facility] [address] [place] [address]    CHIEF COMPLAINT:  Hospital follow-up/Initial visit    HPI:    Rupinder Tracy is a 79 year old  (1940) female who was seen at [facility] on February 24, 2020 for an initial visit. Medical history is notable for chronic heart failure with preserved ejection fraction, aortic regurgitation, hypertension, dyslipidemia, chronic kidney disease stage III, asthma, restrictive lung disease restless leg syndrome, GERD, peptic ulcer disease, obstructive sleep apnea, chronic leg edema, and ischemic colitis.    She was hospitalized at [facility] from February 11 through February 19, 2020 for worsening shortness of breath and acute heart failure.  Troponin I was 0.05 and BNP was 886.  CBC showed hemoglobin of 9.7 and MCV of 92.4.  Creatinine was 1.13.  Chest x-ray revealed prominent pulmonary vasculature, enlarged heart size, and increased interstitial markings and patchy airspace opacity at both lung bases suggestive of pulmonary edema.  Electrocardiogram revealed normal sinus rhythm with a rate of 77 bpm and low voltage QRS.  Echocardiogram, February 12, showed mild concentric LV hypertrophy, LV ejection fraction of 52%, grade 2 or moderate diastolic dysfunction, moderate to severe aortic regurgitation, moderate to severe pulmonary hypertension, moderate circumferential pericardial effusion without tamponade physiology, and mild dilation of aortic root.  She was diuresed with continuous IV furosemide infusion and treated with dopamine drip.  Spironolactone was added to her regimen.  Her weight was down by 10 pounds and to 139 pounds by the time of discharge.    Patient had known history of dysphagia due to esophageal stenosis.   She underwent upper GI endoscopy on February 14 which showed esophageal diverticulum as well as benign esophageal stenosis which was dilated.  GI recommended repeat endoscopy in 4 weeks.      Patient is admitted to this facility for medical management, nursing care, and rehab.     Patient was seen today in her room, while sitting in the chair.  She has just come back from physical therapy.  She continues to require oxygen during the daytime.  She denies dyspnea at rest.  She reports no productive cough, wheezing, fever, chills, nausea, vomiting, abdominal pain, diarrhea, or constipation.    CODE STATUS:   DNR / DNI    PAST MEDICAL HISTORY:   Hypertension  Dyslipidemia  Chronic heart failure with preserved ejection fraction (moderate diastolic dysfunction by echocardiogram on February 12, 2020)  Chronic kidney disease, stage III, baseline creatinine around 0.9-1.2  Mild intermittent asthma  Restrictive lung disease  Pulmonary hypertension  Mild mitral regurgitation by echocardiogram on February 12, 2020  Moderate to severe aortic regurgitation by echocardiogram on February 12, 2020  Mild aortic root dilation by echocardiogram by echocardiogram on February 12, 2020  Moderate to severe pulmonary hypertension by echocardiogram on February 12, 2020  Chronic anemia, baseline hemoglobin recently in the range of 9-10  Restless leg syndrome  Chronic back pain  GERD  Esophageal stenosis and diverticulum by EGD on February 14, 2020, status post dilation  Small PFO  Bilateral thyroid nodules  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 and left eye glaucoma and retinal detachment  Ischemic colitis  Peripheral vascular disease  Ductal carcinoma in situ of breast, status post bilateral mastectomy in October 2009  Obstructive sleep apnea/nocturnal O2 desaturation, using 2 L supplemental oxygen at night  Osteoporosis  Peptic ulcer  Community-acquired pneumonia  Traumatic right temporal subarachnoid hemorrhage in December  2010  Vitamin D deficiency  Right ovarian cyst  Lactose intolerance  B12 deficiency  Bilateral lower extremity lymphedema  Avascular necrosis of left hip  Ampulla of Vater mass    PAST SURGICAL HISTORY:   Past Surgical History:   Procedure Laterality Date     BIOPSY       C APPENDECTOMY  1982     C NONSPECIFIC PROCEDURE  1945    adenoidectomy     C NONSPECIFIC PROCEDURE      bilat retinal detachment     C NONSPECIFIC PROCEDURE      blephoroplasty bilat     C NONSPECIFIC PROCEDURE  1997    glaucoma procedure L     C NONSPECIFIC PROCEDURE  1997, 2007, 2014    corneal transplant L     C NONSPECIFIC PROCEDURE  1945    strabismus procedure R eye     C NONSPECIFIC PROCEDURE  1960's    R breast bx     C NONSPECIFIC PROCEDURE  5/05    Angioplasty and stenting mesenteric vessels     C NONSPECIFIC PROCEDURE  2008    L corneal transplant     C TOTAL HIP ARTHROPLASTY Left 06/06/2019     COLONOSCOPY      due 2015     ENDOSCOPIC RETROGRADE CHOLANGIOPANCREATOGRAM N/A 10/13/2017    Procedure: ENDOSCOPIC RETROGRADE CHOLANGIOPANCREATOGRAM;  ENDOSCOPIC RETROGRADE CHOLANGIOPANCREATOGRAM ;  Surgeon: Isabel Yousif MD;  Location: [facility] OR     HC REMOVAL GALLBLADDER  1982    Cholecystectomy     HEART CATH RIGHT AND LEFT HEART CATH  1/19/15     HERNIORRHAPHY VENTRAL N/A 4/19/2016    Procedure: HERNIORRHAPHY VENTRAL;  Surgeon: Hamlet Ness MD;  Location: [facility] OR     MASTECTOMY SIMPLE BILATERAL  10/5/09    bilateral mastectomy     SURGICAL HISTORY OF -   6/2010    breast reconstruction       FAMILY HISTORY:   Family History   Adopted: Yes   Problem Relation Age of Onset     C.A.D. Paternal Uncle         MI 50's     Family History Negative Daughter        SOCIAL HISTORY:  Patient is .  She resides in a senior apartment.  She does use a walker for ambulation.    Social History     Tobacco Use     Smoking status: Never Smoker     Smokeless tobacco: Never Used   Substance Use Topics     Alcohol use: Yes     Alcohol/week: 7.0 standard  drinks     Types: 7 Glasses of wine per week     Comment: rarely       MEDICATIONS:  Current Outpatient Medications   Medication Sig Dispense Refill     acetaminophen (TYLENOL) 500 MG tablet Take 1,000 mg by mouth 3 times daily       albuterol (ALBUTEROL) 108 (90 BASE) MCG/ACT inhaler Inhale 2 puffs into the lungs every 4 hours as needed 1 Inhaler 5     ascorbic acid 1000 MG PO TABS tablet Take 2,000 mg by mouth daily       calcium carbonate-vitamin D 600-400 MG-UNIT PO chewable tablet Take 2 chew tab by mouth daily       carboxymethylcellulose PF 0.5 % OP ophthalmic solution Place 1 drop Into the left eye 2 times daily 20% Autologous serum eye drops (Compounded - Keep Refrigerated)  Vital Tears (1 drop into the left eye 2 times daily)       COMPRESSION STOCKINGS 1 each daily 3 each 1     Cyanocobalamin (VITAMIN B-12 PO) Take 1,000 mcg by mouth daily        DOXYCYCLINE HYCLATE PO Take 50 mg by mouth 3 times per week Monday,wednesday, friday       edetate 3% ophthalmic irrigation soln 3% OP SOLN Place Into the left eye 4 times daily as needed Place 1 Dose into eye(s) 4 times a day. Edetate disodium 0.5% ophthalmic solution. 1 drop in the left eye 4 times a day.       fluticasone-salmeterol (ADVAIR DISKUS) 250-50 MCG/DOSE diskus inhaler Inhale 1 puff into the lungs 2 times daily 60 Inhaler 1     multivitamin w/minerals (MULTI-VITAMIN) tablet Take 1 tablet by mouth daily       omeprazole (PRILOSEC) 40 MG DR capsule Take 40 mg by mouth 2 times daily        potassium chloride ER (K-DUR/KLOR-CON M) 20 MEQ CR tablet Take 1 tablet (20 mEq) by mouth daily 90 tablet 2     RESTASIS 0.05 % OP EMUL 1 drop to left eye twice daily       rOPINIRole (REQUIP) 0.25 MG tablet Take 0.5 mg by mouth every evening. Take 2 tablets by mouth at bedtime.  Take 1 tablet by mouth in afternoon as needed.       sodium chloride 5 % OP ophthalmic ointment Place 1 Application Into the left eye daily Place 0.5 inches into left eye daily at bedtime.    "    Sodium Hyaluronate (HEALON IO) Place 1 drop Into the left eye daily compounded sodium hyaluronate 0.001%-BSS (AKA HEALON) eye drops .   Place 1 Drop into left eye daily. HEALON 10 mg/mL injection diluted with BSS. (compounded to be combination with prednisolone) Refrigerated       Sodium Hyaluronate (HEALON IO) WITH PREDNISOLONE MIXTURE 0.001- 0.25%       spironolactone 25 MG PO tablet Take 12.5 mg by mouth daily       SYSTANE 0.4-0.3 % OP SOLN 1 drop to left eye 4 times daily       torsemide 20 MG PO tablet Take 40 mg by mouth daily       Vitamin D3 25 mcg (1000 units) PO tablet Take 1 tablet by mouth daily          ALLERGIES:  Allergies   Allergen Reactions     Atorvastatin Calcium      myalgias     Celecoxib Swelling     edema     Cholestyramine Diarrhea     Diarrhea       Crestor [Rosuvastatin Calcium]      leg aches, likely from medication     Cyclobenzaprine Hcl      flexeril--gets \"goofy\"     Dulera      tremors     Gabapentin      Nightmares.   Retrial of medication caused tremors.     Lisinopril Cough     Cough/ Dizziness at high dose.     Meperidine Hcl Nausea and Vomiting     demerol-severe nausea     Morphine Nausea and Vomiting     Naprosyn [Naproxen] GI Disturbance     Niaspan [Niacin]      flushing, severe     Percocet [Oxycodone-Acetaminophen] Nausea     Nausea, lightheadedness.   Tolerates med if taken with food.     Pravastatin Swelling     Edema, myalgias     Red Yeast Rice [Cholestin] GI Disturbance     Bloating, etc.     Simvastatin      myalgias     Timolol Maleate Difficulty breathing     timoptic--respiratory problems     Hctz Rash     rash     Sulfa Drugs Itching and Rash     rash on all mucous membranes and palms of hands with intense itching       ROS:  10 point ROS neg other than the symptoms noted above in the HPI.    PHYSICAL EXAM:  Vital signs: Blood pressure 126/70, heart rate 99, respiratory rate 18, temperature 97.9  F, oxygen saturation 96%, weight 141 LBS  Gen: Cooperative and " in no acute distress  HEENT: Normocephalic; oropharynx clear; conjunctival pallor+  Card: Normal S1, S2, RRR  Resp: Diminished breath sounds bilaterally, no wheezing, rhonchi, or crackles  GI: Abdomen soft, non-tender, non-distended, +BS  Ext: 3+ bilateral LE edema  Neuro: CX II-XII grossly intact; ROM in all four extremities grossly intact  Psych: Alert and oriented x3; normal affect  Skin: No acute rash    LABORATORY/IMAGING DATA:  Hemoglobin 9.6 on February 15, 2020  Chemistry profile (February 18, 2020): Sodium 142, potassium 3.8, creatinine 1.07, BUN 47, calcium 8.9    ASSESSMENT/PLAN:  Acute on chronic heart failure with preserved ejection fraction, LVEF 52% on February 12, 2020,  Moderate pericardial effusion without tamponade,  Moderate to severe pulmonary hypertension,  Moderate severe aortic regurgitation,  Essential hypertension.  In the hospital patient was treated with intravenous furosemide infusion as well as dopamine gtt.  Her weight was down by 10 pounds and to 139 pounds by time of discharge from hospital.  Spironolactone was added to her cardiac regimen.  She currently weighs around 141 pounds.  She continues to require daytime O2 supplement at 2 L/min.  Plan:  Wean daytime O2 supplement as able  Continue nasal cannula O2 at 2 L/min at night for obstructive sleep apnea  Continue torsemide 40 mg p.o. daily and spironolactone 12.5 mg p.o. daily  Continue supplemental potassium chloride 20 mEq p.o. daily  Continue compression stockings  Monitor blood pressure, volume status, and weight  Follow-up on BMP from today, February 24    Dysphagia due to esophageal stenosis and esophageal diverticulum.  Patient underwent EGD and esophageal dilation on February 14.  Plan:  Continue PTA omeprazole 40 mg p.o. twice daily  Continue dysphagia diet level 1 with regular consistency  SLP evaluation and therapy  Repeat EGD in 4 weeks    Chronic kidney disease, stage III.  Baseline creatinine is around 0.9-1.2.  Last  creatinine level was 1.07 and stable on February 10  Plan:  Avoid NSAIDs and nephrotoxins  Monitor renal function periodically  Follow-up on BMP from today, February 24    Chronic anemia.  Baseline hemoglobin has been recently in the range of 9-10.  Last hemoglobin was stable at 9.6 on February 15.  Plan:  Follow-up on CBC from today, February 24    Obstructive sleep apnea,  Nocturnal O2 desaturation.  Plan:  Continue supple oxygen at 2 L/min at night    Mild intermittent asthma.  Stable.  Plan:  Continue PTA Advair discus 250-50 mcg, 1 inhalation twice daily  Continue PRN albuterol inhaler 2 puffs every 4 hours  Monitor respiratory status    B12 deficiency.  Plan:  Continue cyanocobalamin 1000 mcg p.o. daily    Restless leg syndrome.  Plan:  Continue ropinirole 0.5 mg p.o. twice daily    Blindness of both eyes,  Left eye glaucoma,  Status post retinal detachment.  Plan:  Continue eyedrops (cyclosporine, Healon, and carboxy methylcellulose) as instructed  Patient is scheduled for left eye surgery on March 24    Physical deconditioning.  Plan:  Continue PT/OT evaluation and therapy      Electronically signed by:  Debbie Medrano MD                      Sincerely,        Debbie Medrano MD

## 2020-02-26 NOTE — PROGRESS NOTES
[facility]  [facility] Record Number:  [id number]  Place of Service where encounter took place:  [facility] [[id number]]  Chief Complaint   Patient presents with     RECHECK       HPI:    Rupinder Tracy  is a 79 year old (1940), who is being seen today for an episodic care visit.  HPI information obtained from: facility chart records, facility staff, patient report and [facility] chart review. Today's concern is:  Brief Summary of Hospital Course: recent hospitalization due to shortness of breath. PMH includes CKD 3, asthma, hyperlipidemia, hypertension, diastolic dysfunction, reflux esophagitis. She does use oxygen during night and has chronic LE edema. She was sent to ED from GI clinic where she was seeking treatment for reflux.    Work up in ED included CXR that showed pulmonary edema. O2 sats were 87%. She was evaluated by cardiology who felt she had acute on chronic diastolic HF, moderate PH, mod to severe AR. Diuretics were increased to lasix 60mg BID with metolazone 2.5mg. She was diuresed about 10#, below usual weight of 140#. She was told her new goal wt is 138#.   GI was consulted due to chronic regurgitation. EGD on 2/14 showed benign esophageal stenosis with diverticulum. The stenosis was dilated. She was transferred to TCU for ongoing strengthening.    Updates on Status Since Skilled nursing Admission: she is more short of breath. She has more LE edema and sense of fullness in abdomen.     Past Medical and Surgical History reviewed in Epic today.    MEDICATIONS:    Current Outpatient Medications   Medication Sig Dispense Refill     acetaminophen (TYLENOL) 500 MG tablet Take 1,000 mg by mouth 3 times daily       albuterol (ALBUTEROL) 108 (90 BASE) MCG/ACT inhaler Inhale 2 puffs into the lungs every 4 hours as needed 1 Inhaler 5     calcium carbonate-vitamin D 600-400 MG-UNIT PO chewable tablet Take 2 chew tab by mouth daily       calcium  polycarbophil (FIBERCON) 625 MG tablet Take 2 tablets by mouth daily       carboxymethylcellulose PF 0.5 % OP ophthalmic solution Place 1 drop Into the left eye 2 times daily 20% Autologous serum eye drops (Compounded - Keep Refrigerated)  Vital Tears (1 drop into the left eye 2 times daily)       COMPRESSION STOCKINGS 1 each daily 3 each 1     Cyanocobalamin (VITAMIN B-12 PO) Take 1,000 mcg by mouth daily        DOXYCYCLINE HYCLATE PO Take 50 mg by mouth 3 times per week Monday,wednesday, friday       edetate 3% ophthalmic irrigation soln 3% OP SOLN Place Into the left eye 4 times daily as needed Place 1 Dose into eye(s) 4 times a day. Edetate disodium 0.5% ophthalmic solution. 1 drop in the left eye 4 times a day.       fluticasone-salmeterol (ADVAIR DISKUS) 250-50 MCG/DOSE diskus inhaler Inhale 1 puff into the lungs 2 times daily 60 Inhaler 1     multivitamin w/minerals (MULTI-VITAMIN) tablet Take 1 tablet by mouth daily       omeprazole (PRILOSEC) 40 MG DR capsule Take 40 mg by mouth 2 times daily        potassium chloride ER (K-DUR/KLOR-CON M) 20 MEQ CR tablet Take 1 tablet (20 mEq) by mouth daily 90 tablet 2     RESTASIS 0.05 % OP EMUL 1 drop to left eye twice daily       rOPINIRole (REQUIP) 0.25 MG tablet Take 0.5 mg by mouth every evening. Take 2 tablets by mouth at bedtime.  Take 1 tablet by mouth in afternoon as needed.       sodium chloride 5 % OP ophthalmic ointment Place 1 Application Into the left eye daily Place 0.5 inches into left eye daily at bedtime.       Sodium Hyaluronate (HEALON IO) Place 1 drop Into the left eye daily compounded sodium hyaluronate 0.001%-BSS (AKA HEALON) eye drops .   Place 1 Drop into left eye daily. HEALON 10 mg/mL injection diluted with BSS. (compounded to be combination with prednisolone) Refrigerated       Sodium Hyaluronate (HEALON IO) WITH PREDNISOLONE MIXTURE 0.001- 0.25%       spironolactone 25 MG PO tablet Take 12.5 mg by mouth daily       SYSTANE 0.4-0.3 % OP SOLN  "1 drop to left eye 4 times daily       torsemide 20 MG PO tablet Take 40 mg by mouth daily       Vitamin D3 25 mcg (1000 units) PO tablet Take 1 tablet by mouth daily        ascorbic acid 1000 MG PO TABS tablet Take 2,000 mg by mouth daily           REVIEW OF SYSTEMS:  4 point ROS including Respiratory, CV, GI and , other than that noted in the HPI,  is negative    Objective:  BP (!) 142/59   Pulse 90   Temp 98  F (36.7  C)   Resp 18   Ht 1.626 m (5' 4\")   Wt 64.5 kg (142 lb 3.2 oz)   LMP 01/01/1990   SpO2 95%   BMI 24.41 kg/m    Exam:  GENERAL APPEARANCE:  Alert, in no distress  ENT:  Mouth and posterior oropharynx normal, moist mucous membranes, hearing acuity adequate   EYES:  Chronic sclerotic changes in eyes. Lid abnormalities  RESP:  respiratory effort and palpation of chest normal, no respiratory distress, Lung sounds faint rales on inspiration  CV:  Palpation and auscultation of heart done , rate and rhythm reg, no murmur, no rub or gallop, Edema 4+ to knees  ABDOMEN:  normal bowel sounds, soft, nontender, no hepatosplenomegaly or other masses  M/S:   Gait and station steady, Digits and nails normal   SKIN:  Inspection/Palpation of skin and subcutaneous tissue venous stasis changes  NEURO: 2-12 in normal limits and at patient's baseline  PSYCH:  insight and judgement, memory intact , affect and mood normal    Labs:   CBC RESULTS:   Recent Labs   Lab Test 02/24/20  0645 07/02/18  1105   WBC 4.4 6.7   RBC 3.41* 3.83   HGB 9.4* 12.2   HCT 30.8* 37.2   MCV 90 97   MCH 27.6 31.9   MCHC 30.5* 32.8   RDW 19.1* 14.2    202       Last Basic Metabolic Panel:  Recent Labs   Lab Test 02/24/20  0645 08/14/19  1404    142   POTASSIUM 4.6 4.1   CHLORIDE 101 109   AXEL 8.3* 8.9   CO2 32 27   BUN 31* 34*   CR 0.99 0.90   GLC 81 125*       Liver Function Studies -   Recent Labs   Lab Test 03/30/18  0854 10/15/17  0845   PROTTOTAL 7.4 5.8*   ALBUMIN 3.6 2.3*   BILITOTAL 0.7 0.8   ALKPHOS 160* 224*   AST " 24 42   ALT 14 39       TSH   Date Value Ref Range Status   01/15/2018 4.13 (H) 0.40 - 4.00 mU/L Final   01/20/2016 1.94 0.40 - 4.00 mU/L Final   ]          ASSESSMENT/PLAN:  Diastolic dysfunction  Pulmonary hypertension (H)  Recent hospitalization due to dyspnea. She required diuresis with IV lasix with dopamine. She was diuresed about 10#. echo repeated in hospital and showed EF 52%, moderate to severe aortic regurgitation, mod to severe PH, elevated CVP and moderate circumferential pericardial effusion.  Lasix changed to oral and spironolactone added. Today she is  short of breath at rest and with exercise. Her legs are much more swollen. Lymphedema wraps are being replaced today. Wt is up 142. She has sense of abdominal fullness  - daily wts with goal wt of 138#.  -increase torsemide 60mg q day. Give extra torsemide 20mg now  -continue spironolactone 12.5mg q day  -continue kcl 20 meq po q day  -[facility] 2/28  -OCCUPATIONAL THERAPY evaluate and treat LE edema  -follow up with cardiology at [facility] 3/2 at 2:30pm    Chronic respiratory failure with hypoxia (H)  Mild persistent asthma without complication  During hospitalization she did have episode of increased wheezing. She used her rescue inhaler. She was just using oxygen at HS PTA but now needs supplemental oxygen all day.  02 sats on supplemental oxygen 100%  -monitor respiratory status  -continue prn albuterol inh    CKD (chronic kidney disease) stage 3, GFR 30-59 ml/min (H)  Creat at baseline  -[facility] 2/28  -avoid nephrotoxins    Esophageal dysphagia  Esophageal stenosis  Esophageal diverticulum  While in hospital patient had EGD that showed stenosis and diverticulum proximal to stenosis that contained pills and food. She did have dilatation of stenotic area.   Today she had trouble swallowing steak salad today. SPEECH THERAPY did evaluate her today and downgraded diet to pureed for now.   -return to GI in 4 weeks for repeat EGD- 4/7/20 at [facility]  -pureed diet, no straws  -SPEECH THERAPY to follow closely  -continue omeprazole 40mg BID    Restless leg syndrome  Recent increase in ropinirole in hospital but now wants dose reduced  -continue ropinirole 0.5mg BID    Blindness of both eyes  Glaucoma suspect, left  S/p retinal detachments  Eye surgery to remove calcium deposits pending. She needs to be able to lie flat. We did review eye drops today  -continue edetate disodium 0.5% ophth solution to left eye QID  -continue cyclosporine (restasis) 0.05% one drop to left eye BID  -continue compounded sodium hyaluronate 0.001%-BSS (Healon) one drop to left eye BID  -continue carboxymethylcellulose sodium 20% autologous serum one drop to left eye BID  - patient is blind    Physical deconditioning  [facility]. She has been independent but she did work with physical therapy in hospital and found to have reduced activity tolerance. Since in TCU she is walking length of the hallway with one rest break  -physical therapy and OCCUPATIONAL THERAPY   -SPEECH THERAPY   -SW to assist with discharge planning      Electronically signed by:  YUNIEL Rick CNP

## 2020-02-26 NOTE — LETTER
2/26/2020        RE: Rupinder Tracy  [address]  [facility] [id number]        [facility]  [place] [facility] Record Number:  [id number]  Place of Service where encounter took place:  [facility] [[id number]]  Chief Complaint   Patient presents with     RECHECK       HPI:    Rupinder Tracy  is a 79 year old (1940), who is being seen today for an episodic care visit.  HPI information obtained from: facility chart records, facility staff, patient report and [facility] chart review. Today's concern is:  Brief Summary of Hospital Course: recent hospitalization due to shortness of breath. PMH includes CKD 3, asthma, hyperlipidemia, hypertension, diastolic dysfunction, reflux esophagitis. She does use oxygen during night and has chronic LE edema. She was sent to ED from GI clinic where she was seeking treatment for reflux.    Work up in ED included CXR that showed pulmonary edema. O2 sats were 87%. She was evaluated by cardiology who felt she had acute on chronic diastolic HF, moderate PH, mod to severe AR. Diuretics were increased to lasix 60mg BID with metolazone 2.5mg. She was diuresed about 10#, below usual weight of 140#. She was told her new goal wt is 138#.   GI was consulted due to chronic regurgitation. EGD on 2/14 showed benign esophageal stenosis with diverticulum. The stenosis was dilated. She was transferred to TCU for ongoing strengthening.    Updates on Status Since Skilled nursing Admission:  she is more short of breath. She has more LE edema and sense of fullness in abdomen.     Past Medical and Surgical History reviewed in Epic today.    MEDICATIONS:    Current Outpatient Medications   Medication Sig Dispense Refill     acetaminophen (TYLENOL) 500 MG tablet Take 1,000 mg by mouth 3 times daily       albuterol (ALBUTEROL) 108 (90 BASE) MCG/ACT inhaler Inhale 2 puffs into the lungs every 4 hours as needed 1 Inhaler 5     calcium  carbonate-vitamin D 600-400 MG-UNIT PO chewable tablet Take 2 chew tab by mouth daily       calcium polycarbophil (FIBERCON) 625 MG tablet Take 2 tablets by mouth daily       carboxymethylcellulose PF 0.5 % OP ophthalmic solution Place 1 drop Into the left eye 2 times daily 20% Autologous serum eye drops (Compounded - Keep Refrigerated)  Vital Tears (1 drop into the left eye 2 times daily)       COMPRESSION STOCKINGS 1 each daily 3 each 1     Cyanocobalamin (VITAMIN B-12 PO) Take 1,000 mcg by mouth daily        DOXYCYCLINE HYCLATE PO Take 50 mg by mouth 3 times per week Monday,wednesday, friday       edetate 3% ophthalmic irrigation soln 3% OP SOLN Place Into the left eye 4 times daily as needed Place 1 Dose into eye(s) 4 times a day. Edetate disodium 0.5% ophthalmic solution. 1 drop in the left eye 4 times a day.       fluticasone-salmeterol (ADVAIR DISKUS) 250-50 MCG/DOSE diskus inhaler Inhale 1 puff into the lungs 2 times daily 60 Inhaler 1     multivitamin w/minerals (MULTI-VITAMIN) tablet Take 1 tablet by mouth daily       omeprazole (PRILOSEC) 40 MG DR capsule Take 40 mg by mouth 2 times daily        potassium chloride ER (K-DUR/KLOR-CON M) 20 MEQ CR tablet Take 1 tablet (20 mEq) by mouth daily 90 tablet 2     RESTASIS 0.05 % OP EMUL 1 drop to left eye twice daily       rOPINIRole (REQUIP) 0.25 MG tablet Take 0.5 mg by mouth every evening. Take 2 tablets by mouth at bedtime.  Take 1 tablet by mouth in afternoon as needed.       sodium chloride 5 % OP ophthalmic ointment Place 1 Application Into the left eye daily Place 0.5 inches into left eye daily at bedtime.       Sodium Hyaluronate (HEALON IO) Place 1 drop Into the left eye daily compounded sodium hyaluronate 0.001%-BSS (AKA HEALON) eye drops .   Place 1 Drop into left eye daily. HEALON 10 mg/mL injection diluted with BSS. (compounded to be combination with prednisolone) Refrigerated       Sodium Hyaluronate (HEALON IO) WITH PREDNISOLONE MIXTURE 0.001-  "0.25%       spironolactone 25 MG PO tablet Take 12.5 mg by mouth daily       SYSTANE 0.4-0.3 % OP SOLN 1 drop to left eye 4 times daily       torsemide 20 MG PO tablet Take 40 mg by mouth daily       Vitamin D3 25 mcg (1000 units) PO tablet Take 1 tablet by mouth daily        ascorbic acid 1000 MG PO TABS tablet Take 2,000 mg by mouth daily           REVIEW OF SYSTEMS:  4 point ROS including Respiratory, CV, GI and , other than that noted in the HPI,  is negative    Objective:  BP (!) 142/59   Pulse 90   Temp 98  F (36.7  C)   Resp 18   Ht 1.626 m (5' 4\")   Wt 64.5 kg (142 lb 3.2 oz)   LMP 01/01/1990   SpO2 95%   BMI 24.41 kg/m     Exam:  GENERAL APPEARANCE:  Alert, in no distress  ENT:  Mouth and posterior oropharynx normal, moist mucous membranes, hearing acuity adequate   EYES:  Chronic sclerotic changes in eyes. Lid abnormalities  RESP:  respiratory effort and palpation of chest normal, no respiratory distress, Lung sounds faint rales on inspiration  CV:  Palpation and auscultation of heart done , rate and rhythm reg, no murmur, no rub or gallop, Edema 4+ to knees  ABDOMEN:  normal bowel sounds, soft, nontender, no hepatosplenomegaly or other masses  M/S:   Gait and station steady, Digits and nails normal   SKIN:  Inspection/Palpation of skin and subcutaneous tissue venous stasis changes  NEURO: 2-12 in normal limits and at patient's baseline  PSYCH:  insight and judgement, memory intact , affect and mood normal    Labs:   CBC RESULTS:   Recent Labs   Lab Test 02/24/20  0645 07/02/18  1105   WBC 4.4 6.7   RBC 3.41* 3.83   HGB 9.4* 12.2   HCT 30.8* 37.2   MCV 90 97   MCH 27.6 31.9   MCHC 30.5* 32.8   RDW 19.1* 14.2    202       Last Basic Metabolic Panel:  Recent Labs   Lab Test 02/24/20  0645 08/14/19  1404    142   POTASSIUM 4.6 4.1   CHLORIDE 101 109   AXEL 8.3* 8.9   CO2 32 27   BUN 31* 34*   CR 0.99 0.90   GLC 81 125*       Liver Function Studies -   Recent Labs   Lab Test " 03/30/18  0854 10/15/17  0845   PROTTOTAL 7.4 5.8*   ALBUMIN 3.6 2.3*   BILITOTAL 0.7 0.8   ALKPHOS 160* 224*   AST 24 42   ALT 14 39       TSH   Date Value Ref Range Status   01/15/2018 4.13 (H) 0.40 - 4.00 mU/L Final   01/20/2016 1.94 0.40 - 4.00 mU/L Final   ]          ASSESSMENT/PLAN:  Diastolic dysfunction  Pulmonary hypertension (H)  Recent hospitalization due to dyspnea. She required diuresis with IV lasix with dopamine. She was diuresed about 10#. echo repeated in hospital and showed EF 52%, moderate to severe aortic regurgitation, mod to severe PH, elevated CVP and moderate circumferential pericardial effusion.  Lasix changed to oral and spironolactone added. Today she is  short of breath at rest and with exercise. Her legs are much more swollen. Lymphedema wraps are being replaced today. Wt is up 142. She has sense of abdominal fullness  - daily wts with goal wt of 138#.  -increase torsemide 60mg q day. Give extra torsemide 20mg now  -continue spironolactone 12.5mg q day  -continue kcl 20 meq po q day  -[facility] 2/28  -OCCUPATIONAL THERAPY evaluate and treat LE edema  -follow up with cardiology at [facility] 3/2 at 2:30pm    Chronic respiratory failure with hypoxia (H)  Mild persistent asthma without complication  During hospitalization she did have episode of increased wheezing. She used her rescue inhaler. She was just using oxygen at HS PTA but now needs supplemental oxygen all day.  02 sats on supplemental oxygen 100%  -monitor respiratory status  -continue prn albuterol inh    CKD (chronic kidney disease) stage 3, GFR 30-59 ml/min (H)  Creat at baseline  -BMP 2/28  -avoid nephrotoxins    Esophageal dysphagia  Esophageal stenosis  Esophageal diverticulum  While in hospital patient had EGD that showed stenosis and diverticulum proximal to stenosis that contained pills and food. She did have dilatation of stenotic area.   Today she had trouble swallowing steak salad today. SPEECH THERAPY did evaluate her  today and downgraded diet to pureed for now.   -return to GI in 4 weeks for repeat EGD- 4/7/20 at [facility]  -pureed diet, no straws  -SPEECH THERAPY to follow closely  -continue omeprazole 40mg BID    Restless leg syndrome  Recent increase in ropinirole in hospital but now wants dose reduced  -continue ropinirole 0.5mg BID    Blindness of both eyes  Glaucoma suspect, left  S/p retinal detachments  Eye surgery to remove calcium deposits pending. She needs to be able to lie flat. We did review eye drops today  -continue edetate disodium 0.5% ophth solution to left eye QID  -continue cyclosporine (restasis) 0.05% one drop to left eye BID  -continue compounded sodium hyaluronate 0.001%-BSS (Healon) one drop to left eye BID  -continue carboxymethylcellulose sodium 20% autologous serum one drop to left eye BID  - patient is blind    Physical deconditioning  Lives at [facility]. She has been independent but she did work with physical therapy in hospital and found to have reduced activity tolerance. Since in TCU she is walking length of the hallway with one rest break  -physical therapy and OCCUPATIONAL THERAPY   -SPEECH THERAPY   -SW to assist with discharge planning      Electronically signed by:  YUNIEL Rick CNP               Sincerely,        YUNIEL Rick CNP

## 2020-02-27 NOTE — PROGRESS NOTES
[facility]  [place] [facility] Record Number:  [id number]  Place of Service where encounter took place:  [facility] [[id number]]  Chief Complaint   Patient presents with     RECHECK       HPI:    Rupinder Tracy  is a 79 year old (1940), who is being seen today for an episodic care visit.  HPI information obtained from: facility chart records, facility staff, patient report and [facility] chart review.     Per recent TCU provider progress notes:  79 year old female with PMH includes CKD 3, asthma, HLD, HTN, diastolic dysfunction, GERD, chronic edema and nocturnal O2 use. She does use oxygen during night and has chronic LE edema. She was sent to ED from GI clinic where she was seeking treatment for reflux. Hospitalized due to acute on chronic diastolic HF, moderate PH, mod to severe AR. Diuretics were increased to lasix 60 mg BID with metolazone 2.5 mg. Her new goal wt is 138#. GI: EGD on 2/14 showed benign esophageal stenosis with diverticulum, s/p dilation - f/u 4 weeks, on PPI. Cards f/u 3/2. Due to wt gain torsemide dose increased earlier this week. RLS on ropinirole.     Today's concern is:  Patient seen for episodic TCU follow up. Weight gain persists - up two lbs one day on last check, total of 8 lbs up from admission. Denies dyspnea but notes slightly more edema. No headaches, dizziness, chest pain, dyspnea. Bowels are working well. No bladder complaints. Note SBP range 129-142 and sats 95% on oxygen per NC. Walks 105 ft with 4WW.     Past Medical and Surgical History reviewed in Epic today.    MEDICATIONS:  Current Outpatient Medications   Medication Sig Dispense Refill     acetaminophen (TYLENOL) 500 MG tablet Take 1,000 mg by mouth 3 times daily       albuterol (ALBUTEROL) 108 (90 BASE) MCG/ACT inhaler Inhale 2 puffs into the lungs every 4 hours as needed 1 Inhaler 5     ascorbic acid 1000 MG PO TABS tablet Take 2,000 mg by mouth At Bedtime        calcium  carbonate-vitamin D 600-400 MG-UNIT PO chewable tablet Take 2 chew tab by mouth daily       calcium polycarbophil (FIBERCON) 625 MG tablet Take 2 tablets by mouth daily       carboxymethylcellulose PF 0.5 % OP ophthalmic solution Place 1 drop Into the left eye 2 times daily 20% Autologous serum eye drops (Compounded - Keep Refrigerated)  Vital Tears (1 drop into the left eye 2 times daily)       COMPRESSION STOCKINGS 1 each daily 3 each 1     Cyanocobalamin (VITAMIN B-12 PO) Take 1,000 mcg by mouth daily        DOXYCYCLINE HYCLATE PO Take 50 mg by mouth 3 times per week Monday,wednesday, friday       fluticasone-salmeterol (ADVAIR DISKUS) 250-50 MCG/DOSE diskus inhaler Inhale 1 puff into the lungs 2 times daily 60 Inhaler 1     multivitamin w/minerals (MULTI-VITAMIN) tablet Take 1 tablet by mouth At Bedtime        omeprazole (PRILOSEC) 40 MG DR capsule Take 40 mg by mouth 2 times daily        potassium chloride ER (K-DUR/KLOR-CON M) 20 MEQ CR tablet Take 1 tablet (20 mEq) by mouth daily 90 tablet 2     RESTASIS 0.05 % OP EMUL 1 drop to left eye twice daily       rOPINIRole (REQUIP) 0.25 MG tablet Take 0.5 mg by mouth every evening. Take 2 tablets by mouth at bedtime.  Take 1 tablet by mouth in afternoon as needed.       sodium chloride 5 % OP ophthalmic ointment Place 1 Application Into the left eye daily Place 0.5 inches into left eye daily at bedtime.       Sodium Hyaluronate (HEALON IO) Place 1 drop Into the left eye daily compounded sodium hyaluronate 0.001%-BSS (AKA HEALON) eye drops .   Place 1 Drop into left eye daily. HEALON 10 mg/mL injection diluted with BSS. (compounded to be combination with prednisolone) Refrigerated       Sodium Hyaluronate (HEALON IO) WITH PREDNISOLONE MIXTURE 0.001- 0.25%       spironolactone 25 MG PO tablet Take 12.5 mg by mouth daily       torsemide 20 MG PO tablet Take 60 mg by mouth daily       Vitamin D3 25 mcg (1000 units) PO tablet Take 1 tablet by mouth At Bedtime     "      REVIEW OF SYSTEMS:  4 point ROS including Respiratory, CV, GI and , other than that noted in the HPI,  is negative    Objective:  /89   Pulse 95   Temp 97.6  F (36.4  C)   Resp 18   Ht 1.626 m (5' 4\")   Wt 64.8 kg (142 lb 12.8 oz)   LMP 01/01/1990   SpO2 96%   BMI 24.51 kg/m    Exam:  GENERAL APPEARANCE:  Alert, in no distress, pleasant, cooperative, oriented x 4  EYES:  Legally blind, no discharge or mattering on lids or lashes noted  ENT:  Mouth normal, moist mucous membranes, nose normal without drainage or crusting, external ears without lesions, hearing acuity intact  RESP:  respiratory effort normal, no chest wall tenderness, no respiratory distress, Lung sounds diminished throughout, patient is on O2  CV:  Auscultation of heart done, rate and rhythm controlled and regular, no murmur, no rub or gallop. Edema +4 pitting bilateral lower extremities.  ABDOMEN:  normal bowel sounds, soft, nontender, no palpable masses.  M/S:   Gait and station walks with assist , no tenderness or swelling of the joints; able to move all extremities, digits normal upper extremities  NEURO: cranial nerves grossly intact, no facial asymmetry, no speech deficits and able to follow directions, moves all extremities symmetrically  PSYCH:  insight and judgement and memory intact, affect and mood normal     Labs:   Most Recent 3 CBC's:  Recent Labs   Lab Test 02/24/20  0645 07/02/18  1105 04/17/18  1412   WBC 4.4 6.7 7.0   HGB 9.4* 12.2 12.2   MCV 90 97 100    202 185     Most Recent 3 BMP's:  Recent Labs   Lab Test 02/28/20  0645 02/24/20  0645 08/14/19  1404    138 142   POTASSIUM 4.2 4.6 4.1   CHLORIDE 103 101 109   CO2 31 32 27   BUN 43* 31* 34*   CR 1.09* 0.99 0.90   ANIONGAP 2* 5 6   AXEL 9.0 8.3* 8.9   GLC 94 81 125*     ASSESSMENT/PLAN:  Diastolic dysfunction  Pulmonary hypertension (H)  Chronic respiratory failure with hypoxia (H)  Mild persistent asthma without complication  CKD (chronic kidney " disease) stage 3, GFR 30-59 ml/min (H)  Acute on chronic. RESP status stable but continues to gain weight, up 8 lbs since admission. Add extra torsemide today, zaroxolyn tomorrow. WT, vs, meds as ordered. BMP on 3/2 and f/u cardiology [facility] as ordered 3/2    Esophageal dysphagia  Esophageal stenosis  Esophageal diverticulum  Chronic appears stable at this time. Monitor, f/u GI as recommended.     Restless leg syndrome  Chronic, no symptoms. Meds as PTA.     Blindness of both eyes  Glaucoma suspect, left  Chronic, f/u per ophthalmology routine.     Physical deconditioning  Acute on chronic - therapies and f/u with progress next visit.     Orders written by provider at facility  1. Fibercon clarification: 625 mg PO take 2 tabs daily diagnosis constipation  2. Torsemide 20 mg PO extra dose now diagnosis edema  3. On 2/29 give Zaroxolyn 2.5 mg PO one hour prior to torsemide dose diagnosis edema  4. BMP on 3/2  5. Change vit C, vit d, MVI to HS admin time starting 2/29  6. Staff to send one week's worth of wt and vs to cardiology f/u 3/2.     Total unit/floor time of 38 minutes consisted of the following: Examination of patient, reviewing the record including pertinent labs and imaging, placing orders, and completing documentation.  More than 50% of this time (20 minutes) (>50%) was spent in coordination of care with the patient, nursing staff and other healthcare providers.   This time was spent on discussing the care plan including review of current TCU status, hospital course and hospital discharge recommendations, medications, discharge/safe placement, specialty follow up need.       Time with patient/family included: Discussion of diagnostic and imaging test results, diagnosis and prognosis, overall goal and disposition plan.     Medical decision making and discussions included:   Risks/benefits of nauseated with meds at times: give some meds at HS, times changed.   COPD management plan to include oxygen,  inhalers and PRN albuterol, monitoring for effectiveness  CHF management plan to include increase in diuretics today and tomorrow, close monitoring daily of wt and vs, f/u with cards next week and repeat labs   SEBASTIAN management plan to include repeat renal function check next week, minimize nephrotoxic meds.     -Rx management plan discussion included-  Follow up needed with cards on 3/2 as planned  Disposition and discharge plan to include potential for home care services needed  Medication changes to include extra diuretics today and tomorrow    Time on communication of care included:  Updated RN on needed extra diuretics, monitoring.   Updated RN manager on patient status  Discussion with [place] [facility]    Electronically signed by:  YUNIEL Manzano CNP

## 2020-02-28 NOTE — LETTER
2/28/2020        RE: Rupinder Tracy  [address]  [facility] [address]        [place] [facility]  [place] Medical Record Number:  [id number]  [facility] of Service where encounter took place:  [facility] [[id number]]  Chief Complaint   Patient presents with     RECHECK       HPI:    Rupinder Tracy  is a 79 year old (1940), who is being seen today for an episodic care visit.  HPI information obtained from: facility chart records, facility staff, patient report and [facility] chart review.     Per recent TCU provider progress notes:  79 year old female with PMH includes CKD 3, asthma, HLD, HTN, diastolic dysfunction, GERD, chronic edema and nocturnal O2 use. She does use oxygen during night and has chronic LE edema. She was sent to ED from GI clinic where she was seeking treatment for reflux.  Hospitalized due to acute on chronic diastolic HF, moderate PH, mod to severe AR. Diuretics were increased to lasix 60 mg BID with metolazone 2.5 mg. Her new goal wt is 138#.  GI: EGD on 2/14 showed benign esophageal stenosis with diverticulum, s/p dilation - f/u 4 weeks, on PPI. Cards f/u 3/2. Due to wt gain torsemide dose increased earlier this week. RLS on ropinirole.     Today's concern is:  Patient seen for episodic TCU follow up. Weight gain persists - up two lbs one day on last check, total of 8 lbs up from admission. Denies dyspnea but notes slightly more edema. No headaches, dizziness, chest pain, dyspnea. Bowels are working well. No bladder complaints. Note SBP range 129-142 and sats 95% on oxygen per NC. Walks 105 ft with 4WW.     Past Medical and Surgical History reviewed in Epic today.    MEDICATIONS:  Current Outpatient Medications   Medication Sig Dispense Refill     acetaminophen (TYLENOL) 500 MG tablet Take 1,000 mg by mouth 3 times daily       albuterol (ALBUTEROL) 108 (90 BASE) MCG/ACT inhaler Inhale 2 puffs into the lungs every 4 hours as needed  1 Inhaler 5     ascorbic acid 1000 MG PO TABS tablet Take 2,000 mg by mouth At Bedtime        calcium carbonate-vitamin D 600-400 MG-UNIT PO chewable tablet Take 2 chew tab by mouth daily       calcium polycarbophil (FIBERCON) 625 MG tablet Take 2 tablets by mouth daily       carboxymethylcellulose PF 0.5 % OP ophthalmic solution Place 1 drop Into the left eye 2 times daily 20% Autologous serum eye drops (Compounded - Keep Refrigerated)  Vital Tears (1 drop into the left eye 2 times daily)       COMPRESSION STOCKINGS 1 each daily 3 each 1     Cyanocobalamin (VITAMIN B-12 PO) Take 1,000 mcg by mouth daily        DOXYCYCLINE HYCLATE PO Take 50 mg by mouth 3 times per week Monday,wednesday, friday       fluticasone-salmeterol (ADVAIR DISKUS) 250-50 MCG/DOSE diskus inhaler Inhale 1 puff into the lungs 2 times daily 60 Inhaler 1     multivitamin w/minerals (MULTI-VITAMIN) tablet Take 1 tablet by mouth At Bedtime        omeprazole (PRILOSEC) 40 MG DR capsule Take 40 mg by mouth 2 times daily        potassium chloride ER (K-DUR/KLOR-CON M) 20 MEQ CR tablet Take 1 tablet (20 mEq) by mouth daily 90 tablet 2     RESTASIS 0.05 % OP EMUL 1 drop to left eye twice daily       rOPINIRole (REQUIP) 0.25 MG tablet Take 0.5 mg by mouth every evening. Take 2 tablets by mouth at bedtime.  Take 1 tablet by mouth in afternoon as needed.       sodium chloride 5 % OP ophthalmic ointment Place 1 Application Into the left eye daily Place 0.5 inches into left eye daily at bedtime.       Sodium Hyaluronate (HEALON IO) Place 1 drop Into the left eye daily compounded sodium hyaluronate 0.001%-BSS (AKA HEALON) eye drops .   Place 1 Drop into left eye daily. HEALON 10 mg/mL injection diluted with BSS. (compounded to be combination with prednisolone) Refrigerated       Sodium Hyaluronate (HEALON IO) WITH PREDNISOLONE MIXTURE 0.001- 0.25%       spironolactone 25 MG PO tablet Take 12.5 mg by mouth daily       torsemide 20 MG PO tablet Take 60 mg by  "mouth daily       Vitamin D3 25 mcg (1000 units) PO tablet Take 1 tablet by mouth At Bedtime          REVIEW OF SYSTEMS:  4 point ROS including Respiratory, CV, GI and , other than that noted in the HPI,  is negative    Objective:  /89   Pulse 95   Temp 97.6  F (36.4  C)   Resp 18   Ht 1.626 m (5' 4\")   Wt 64.8 kg (142 lb 12.8 oz)   LMP 01/01/1990   SpO2 96%   BMI 24.51 kg/m     Exam:  GENERAL APPEARANCE:  Alert, in no distress, pleasant, cooperative, oriented x 4  EYES:  Legally blind, no discharge or mattering on lids or lashes noted  ENT:  Mouth normal, moist mucous membranes, nose normal without drainage or crusting, external ears without lesions, hearing acuity intact  RESP:  respiratory effort normal, no chest wall tenderness, no respiratory distress, Lung sounds diminished throughout, patient is on O2  CV:  Auscultation of heart done, rate and rhythm controlled and regular, no murmur, no rub or gallop. Edema +4 pitting bilateral lower extremities.  ABDOMEN:  normal bowel sounds, soft, nontender, no palpable masses.  M/S:   Gait and station walks with assist , no tenderness or swelling of the joints; able to move all extremities, digits normal upper extremities  NEURO: cranial nerves grossly intact, no facial asymmetry, no speech deficits and able to follow directions, moves all extremities symmetrically  PSYCH:  insight and judgement and memory intact, affect and mood normal     Labs:   Most Recent 3 CBC's:  Recent Labs   Lab Test 02/24/20  0645 07/02/18  1105 04/17/18  1412   WBC 4.4 6.7 7.0   HGB 9.4* 12.2 12.2   MCV 90 97 100    202 185     Most Recent 3 BMP's:  Recent Labs   Lab Test 02/28/20  0645 02/24/20  0645 08/14/19  1404    138 142   POTASSIUM 4.2 4.6 4.1   CHLORIDE 103 101 109   CO2 31 32 27   BUN 43* 31* 34*   CR 1.09* 0.99 0.90   ANIONGAP 2* 5 6   AXEL 9.0 8.3* 8.9   GLC 94 81 125*     ASSESSMENT/PLAN:  Diastolic dysfunction  Pulmonary hypertension (H)  Chronic " respiratory failure with hypoxia (H)  Mild persistent asthma without complication  CKD (chronic kidney disease) stage 3, GFR 30-59 ml/min (H)  Acute on chronic. RESP status stable but continues to gain weight, up 8 lbs since admission. Add extra torsemide today, zaroxolyn tomorrow. WT, vs, meds as ordered. BMP on 3/2 and f/u cardiology [facility] [place] as ordered 3/2    Esophageal dysphagia  Esophageal stenosis  Esophageal diverticulum  Chronic appears stable at this time. Monitor, f/u GI as recommended.     Restless leg syndrome  Chronic, no symptoms. Meds as PTA.     Blindness of both eyes  Glaucoma suspect, left  Chronic, f/u per ophthalmology routine.     Physical deconditioning  Acute on chronic - therapies and f/u with progress next visit.     Orders written by provider at facility  1. Fibercon clarification: 625 mg PO take 2 tabs daily diagnosis constipation  2. Torsemide 20 mg PO extra dose now diagnosis edema  3. On 2/29 give Zaroxolyn 2.5 mg PO one hour prior to torsemide dose diagnosis edema  4. BMP on 3/2  5. Change vit C, vit d, MVI to HS admin time starting 2/29  6. Staff to send one week's worth of wt and vs to cardiology f/u 3/2.     Total unit/floor time of 38 minutes consisted of the following: Examination of patient, reviewing the record including pertinent labs and imaging, placing orders, and completing documentation.  More than 50% of this time (20 minutes) (>50%) was spent in coordination of care with the patient, nursing staff and other healthcare providers.   This time was spent on discussing the care plan including review of current TCU status, hospital course and hospital discharge recommendations, medications, discharge/safe placement, specialty follow up need.       Time with patient/family included: Discussion of diagnostic and imaging test results, diagnosis and prognosis, overall goal and disposition plan.     Medical decision making and discussions included:   Risks/benefits of nauseated  with meds at times: give some meds at HS, times changed.   COPD management plan to include oxygen, inhalers and PRN albuterol, monitoring for effectiveness  CHF management plan to include increase in diuretics today and tomorrow, close monitoring daily of wt and vs, f/u with cards next week and repeat labs   SEBASTIAN management plan to include repeat renal function check next week, minimize nephrotoxic meds.     -Rx management plan discussion included-  Follow up needed with cards on 3/2 as planned  Disposition and discharge plan to include potential for home care services needed  Medication changes to include extra diuretics today and tomorrow    Time on communication of care included:  Updated RN on needed extra diuretics, monitoring.   Updated RN manager on patient status  Discussion with STAT order with [facility]    Electronically signed by:  YUNIEL Manzano CNP               Sincerely,        YUNIEL Manzano CNP

## 2020-03-01 NOTE — TELEPHONE ENCOUNTER
Patient with abdominal discomfort, dry heaves. Today weak, abdominal pain bilaterally positive bowel sounds. Some bulging areas on abdomen. Weight down 1 lbs in the past two days. VSS.     PLAN  Abdominal xray 2 views   Zofran 4 mg PO TID PRN.   Clear liquids today, then advance as tolerated    Electronically signed by YUNIEL Manzano, GNP

## 2020-03-02 PROBLEM — S22.43XA CLOSED FRACTURE OF MULTIPLE RIBS OF BOTH SIDES, INITIAL ENCOUNTER: Status: ACTIVE | Noted: 2020-01-01

## 2020-03-02 NOTE — LETTER
"    3/2/2020        RE: Rupinder Tracy  [address]  [facility] [address]        [place] [facility]  [place] Medical Record Number:  [id number]  Place of Service where encounter took place:  [facility] [[id number]]  Chief Complaint   Patient presents with     RECHECK       HPI:    Rupinder Tracy  is a 79 year old (1940), who is being seen today for an episodic care visit.  HPI information obtained from: facility chart records, facility staff, patient report and [facility] chart review. Today's concern is:     Diastolic dysfunction  Pulmonary hypertension (H)  Chronic respiratory failure with hypoxia (H)  Mild persistent asthma without complication  CKD (chronic kidney disease) stage 3, GFR 30-59 ml/min (H)  Esophageal dysphagia  Esophageal stenosis  Esophageal diverticulum  Restless leg syndrome  Blindness of both eyes  Glaucoma suspect, left  Abdominal pain, generalized  Closed fracture of multiple ribs of both sides, initial encounter  Physical deconditioning     Patient seen today for follow-up visit in TCU. Patient underwent Abdominal XRay on 3/1 for c/o abdominal pain with associated bluging areas of abdomen; this was positive for bilateral fractures of 10th rib - suspect these are idopathic spontaneous fractures associated with her extensive OP, patient denies trauma. Patient denies pain today. Per notes, she did not sleep last - patient denies this and notes she has been sleeping well for the past 3 days - she does note that she has been waking up at ~0300 every morning \"STARVING\" and she awakens to eat something - she asks if we are able to check her BG level at this time to make sure it is running okay. Appetite good. Continues with nausea, continues on regimen of Zofran - she has used this one time since initial order. Patient notes therapies are going well - Per nursing notes: \"Patient is independent in room with portable o2 for ambulation, " "extensive assist of 1 with transfers, and toileting, set up assist with eating\". Denies CP, palpitations, fatigue, nausea, vomiting, increased SOB/CRUZ, fever, chills, and/or b/b concerns today.    Past Medical and Surgical History reviewed in Epic today.    MEDICATIONS:  Current Outpatient Medications   Medication Sig Dispense Refill     acetaminophen (TYLENOL) 500 MG tablet Take 1,000 mg by mouth 3 times daily       albuterol (ALBUTEROL) 108 (90 BASE) MCG/ACT inhaler Inhale 2 puffs into the lungs every 4 hours as needed 1 Inhaler 5     ascorbic acid 1000 MG PO TABS tablet Take 2,000 mg by mouth At Bedtime        calcium carbonate-vitamin D 600-400 MG-UNIT PO chewable tablet Take 2 chew tab by mouth daily       calcium polycarbophil (FIBERCON) 625 MG tablet Take 2 tablets by mouth daily       carboxymethylcellulose PF 0.5 % OP ophthalmic solution Place 1 drop Into the left eye 2 times daily 20% Autologous serum eye drops (Compounded - Keep Refrigerated)  Vital Tears (1 drop into the left eye 2 times daily)       COMPRESSION STOCKINGS 1 each daily 3 each 1     Cyanocobalamin (VITAMIN B-12 PO) Take 1,000 mcg by mouth daily        DOXYCYCLINE HYCLATE PO Take 50 mg by mouth 3 times per week Monday,wednesday, friday       fluticasone-salmeterol (ADVAIR DISKUS) 250-50 MCG/DOSE diskus inhaler Inhale 1 puff into the lungs 2 times daily 60 Inhaler 1     multivitamin w/minerals (MULTI-VITAMIN) tablet Take 1 tablet by mouth At Bedtime        omeprazole (PRILOSEC) 40 MG DR capsule Take 40 mg by mouth 2 times daily        potassium chloride ER (K-DUR/KLOR-CON M) 20 MEQ CR tablet Take 1 tablet (20 mEq) by mouth daily 90 tablet 2     RESTASIS 0.05 % OP EMUL 1 drop to left eye twice daily       rOPINIRole (REQUIP) 0.25 MG tablet Take 0.5 mg by mouth every evening. Take 2 tablets by mouth at bedtime.  Take 1 tablet by mouth in afternoon as needed.       sodium chloride 5 % OP ophthalmic ointment Place 1 Application Into the left eye " "daily Place 0.5 inches into left eye daily at bedtime.       Sodium Hyaluronate (HEALON IO) Place 1 drop Into the left eye daily compounded sodium hyaluronate 0.001%-BSS (AKA HEALON) eye drops .   Place 1 Drop into left eye daily. HEALON 10 mg/mL injection diluted with BSS. (compounded to be combination with prednisolone) Refrigerated       Sodium Hyaluronate (HEALON IO) WITH PREDNISOLONE MIXTURE 0.001- 0.25%       spironolactone 25 MG PO tablet Take 12.5 mg by mouth daily       torsemide 20 MG PO tablet Take 60 mg by mouth daily       Vitamin D3 25 mcg (1000 units) PO tablet Take 1 tablet by mouth At Bedtime        REVIEW OF SYSTEMS:  10 point ROS of systems including Constitutional, Eyes, Respiratory, Cardiovascular, Gastroenterology, Genitourinary, Integumentary, Musculoskeletal, Psychiatric were all negative except for pertinent positives noted in my HPI.    Objective:  /55   Pulse 91   Temp 97.6  F (36.4  C)   Resp 18   Ht 1.626 m (5' 4\")   Wt 65 kg (143 lb 3.2 oz)   LMP 01/01/1990   SpO2 96%   BMI 24.58 kg/m     Exam:  GENERAL APPEARANCE:  Alert, in no distress, pleasant, cooperative, oriented x 4  EYES:  Legally blind, no discharge or mattering on lids or lashes noted  ENT:  Mouth normal, moist mucous membranes, nose normal without drainage or crusting, external ears without lesions, hearing acuity intact  RESP:  respiratory effort normal, no chest wall tenderness, no respiratory distress, Lung sounds diminished throughout, patient is on O2  CV:  Auscultation of heart done, rate and rhythm controlled and regular, no murmur, no rub or gallop. Edema +4 pitting bilateral lower extremities - lymph wraps in place today.  ABDOMEN:  normal bowel sounds, soft, nontender, no palpable masses.  M/S:   Gait and station walks with assist, no tenderness or swelling of the joints; able to move all extremities, digits normal upper extremities  NEURO: cranial nerves grossly intact, no facial asymmetry, no speech " deficits and able to follow directions, moves all extremities symmetrically  PSYCH:  insight and judgement and memory intact, affect and mood normal    Labs:   Recent labs in [facility] reviewed by me today.     ASSESSMENT/PLAN:  Diastolic dysfunction  Pulmonary hypertension (H)  Recent hospitalization due to dyspnea. She required diuresis with IV lasix with dopamine. She was diuresed about 10#. echo repeated in hospital and showed EF 52%, moderate to severe aortic regurgitation, mod to severe PH, elevated CVP and moderate circumferential pericardial effusion.  Lasix changed to oral and spironolactone added. Today she is denies SOB at rest and with exercise. Her legs remain swollen - Lymphedema wraps replaced today.   Wt Readings from Last 4 Encounters:   03/02/20 65 kg (143 lb 3.2 oz)   02/27/20 64.8 kg (142 lb 12.8 oz)   02/26/20 64.5 kg (142 lb 3.2 oz)   02/24/20 66.5 kg (146 lb 9.6 oz)   - daily wts with goal wt of 138#.  -On torsemide 60mg q day - appt with Cardiology today  -continue spironolactone 12.5mg q day  -continue kcl 20 meq po q day  -BMP stable from 3/3  -OCCUPATIONAL THERAPY evaluate and treat LE edema    Chronic respiratory failure with hypoxia (H)  Mild persistent asthma without complication  During hospitalization she did have episode of increased wheezing. She used her rescue inhaler. She was just using oxygen at HS PTA but now needs supplemental oxygen all day.  02 sats on supplemental oxygen 100%  -monitor respiratory status  -continue prn albuterol inh    CKD (chronic kidney disease) stage 3, GFR 30-59 ml/min (H)  Creat at baseline  -BMP stable as noted above  -avoid nephrotoxins    Esophageal dysphagia  Esophageal stenosis  Esophageal diverticulum  While in hospital patient had EGD that showed stenosis and diverticulum proximal to stenosis that contained pills and food. She did have dilatation of stenotic area.   Previously had trouble swallowing steak salad - SPEECH THERAPY did evaluate her today  and downgraded diet to pureed for now.   -return to GI in 4 weeks for repeat EGD- 4/7/20 at [facility]  -pureed diet, no straws  -SPEECH THERAPY to follow closely  -continue omeprazole 40mg BID    Restless leg syndrome  Recent increase in ropinirole in hospital but now wants dose reduced  -continue ropinirole 0.5mg BID    Blindness of both eyes  Glaucoma suspect, left  S/p retinal detachments  Eye surgery to remove calcium deposits pending. She needs to be able to lie flat. We did review eye drops today  -continue edetate disodium 0.5% ophth solution to left eye QID  -continue cyclosporine (restasis) 0.05% one drop to left eye BID  -continue compounded sodium hyaluronate 0.001%-BSS (Healon) one drop to left eye BID  -continue carboxymethylcellulose sodium 20% autologous serum one drop to left eye BID  - patient is blind  -Patient has surgery planned for 3/24 - Vit C prescribed to start 12d before procedure, does not need it at this time - will hold this until 3/12.    Abdominal pain  Bilateral 10th rib fractures  Abd pain 2/2 above noted GI concerns - incidental findings of fractures on Abd Xray noted - suspect 2/2 OP - does remaining on TUMs + Vit D; no noted pain    Physical deconditioning  Lives at [facility]. She has been independent but she did work with physical therapy in hospital and found to have reduced activity tolerance. Since in TCU she is walking length of the hallway with one rest break  -physical therapy and OCCUPATIONAL THERAPY   -SPEECH THERAPY   -SW to assist with discharge planning      Orders written by provider at facility  -Hold Vit C until 3/12  -BG at 0300 x3d    Electronically signed by:  YUNIEL Howell, DNP, A/GNP-[facility]  [address]   [address]  [place] [address]     Cell: [phone]  Fax: [phone]  Email: [email or web address]               Sincerely,        YUNIEL Daniels CNP

## 2020-03-02 NOTE — PROGRESS NOTES
"[place] [facility]  [place] Medical Record Number:  [id number]  Place of Service where encounter took place:  [facility] [place] ([facility]) [[id number]]  Chief Complaint   Patient presents with     RECHECK       HPI:    Rupinder Tracy  is a 79 year old (1940), who is being seen today for an episodic care visit.  HPI information obtained from: facility chart records, facility staff, patient report and [facility] chart review. Today's concern is:     Diastolic dysfunction  Pulmonary hypertension (H)  Chronic respiratory failure with hypoxia (H)  Mild persistent asthma without complication  CKD (chronic kidney disease) stage 3, GFR 30-59 ml/min (H)  Esophageal dysphagia  Esophageal stenosis  Esophageal diverticulum  Restless leg syndrome  Blindness of both eyes  Glaucoma suspect, left  Abdominal pain, generalized  Closed fracture of multiple ribs of both sides, initial encounter  Physical deconditioning     Patient seen today for follow-up visit in TCU. Patient underwent Abdominal XRay on 3/1 for c/o abdominal pain with associated bluging areas of abdomen; this was positive for bilateral fractures of 10th rib - suspect these are idopathic spontaneous fractures associated with her extensive OP, patient denies trauma. Patient denies pain today. Per notes, she did not sleep last - patient denies this and notes she has been sleeping well for the past 3 days - she does note that she has been waking up at ~0300 every morning \"STARVING\" and she awakens to eat something - she asks if we are able to check her BG level at this time to make sure it is running okay. Appetite good. Continues with nausea, continues on regimen of Zofran - she has used this one time since initial order. Patient notes therapies are going well - Per nursing notes: \"Patient is independent in room with portable o2 for ambulation, extensive assist of 1 with transfers, and toileting, set up assist with eating\". Denies CP, " palpitations, fatigue, nausea, vomiting, increased SOB/CRUZ, fever, chills, and/or b/b concerns today.    Past Medical and Surgical History reviewed in Epic today.    MEDICATIONS:  Current Outpatient Medications   Medication Sig Dispense Refill     acetaminophen (TYLENOL) 500 MG tablet Take 1,000 mg by mouth 3 times daily       albuterol (ALBUTEROL) 108 (90 BASE) MCG/ACT inhaler Inhale 2 puffs into the lungs every 4 hours as needed 1 Inhaler 5     ascorbic acid 1000 MG PO TABS tablet Take 2,000 mg by mouth At Bedtime        calcium carbonate-vitamin D 600-400 MG-UNIT PO chewable tablet Take 2 chew tab by mouth daily       calcium polycarbophil (FIBERCON) 625 MG tablet Take 2 tablets by mouth daily       carboxymethylcellulose PF 0.5 % OP ophthalmic solution Place 1 drop Into the left eye 2 times daily 20% Autologous serum eye drops (Compounded - Keep Refrigerated)  Vital Tears (1 drop into the left eye 2 times daily)       COMPRESSION STOCKINGS 1 each daily 3 each 1     Cyanocobalamin (VITAMIN B-12 PO) Take 1,000 mcg by mouth daily        DOXYCYCLINE HYCLATE PO Take 50 mg by mouth 3 times per week Monday,wednesday, friday       fluticasone-salmeterol (ADVAIR DISKUS) 250-50 MCG/DOSE diskus inhaler Inhale 1 puff into the lungs 2 times daily 60 Inhaler 1     multivitamin w/minerals (MULTI-VITAMIN) tablet Take 1 tablet by mouth At Bedtime        omeprazole (PRILOSEC) 40 MG DR capsule Take 40 mg by mouth 2 times daily        potassium chloride ER (K-DUR/KLOR-CON M) 20 MEQ CR tablet Take 1 tablet (20 mEq) by mouth daily 90 tablet 2     RESTASIS 0.05 % OP EMUL 1 drop to left eye twice daily       rOPINIRole (REQUIP) 0.25 MG tablet Take 0.5 mg by mouth every evening. Take 2 tablets by mouth at bedtime.  Take 1 tablet by mouth in afternoon as needed.       sodium chloride 5 % OP ophthalmic ointment Place 1 Application Into the left eye daily Place 0.5 inches into left eye daily at bedtime.       Sodium Hyaluronate (HEALON IO)  "Place 1 drop Into the left eye daily compounded sodium hyaluronate 0.001%-BSS (AKA HEALON) eye drops .   Place 1 Drop into left eye daily. HEALON 10 mg/mL injection diluted with BSS. (compounded to be combination with prednisolone) Refrigerated       Sodium Hyaluronate (HEALON IO) WITH PREDNISOLONE MIXTURE 0.001- 0.25%       spironolactone 25 MG PO tablet Take 12.5 mg by mouth daily       torsemide 20 MG PO tablet Take 60 mg by mouth daily       Vitamin D3 25 mcg (1000 units) PO tablet Take 1 tablet by mouth At Bedtime        REVIEW OF SYSTEMS:  10 point ROS of systems including Constitutional, Eyes, Respiratory, Cardiovascular, Gastroenterology, Genitourinary, Integumentary, Musculoskeletal, Psychiatric were all negative except for pertinent positives noted in my HPI.    Objective:  /55   Pulse 91   Temp 97.6  F (36.4  C)   Resp 18   Ht 1.626 m (5' 4\")   Wt 65 kg (143 lb 3.2 oz)   LMP 01/01/1990   SpO2 96%   BMI 24.58 kg/m    Exam:  GENERAL APPEARANCE:  Alert, in no distress, pleasant, cooperative, oriented x 4  EYES:  Legally blind, no discharge or mattering on lids or lashes noted  ENT:  Mouth normal, moist mucous membranes, nose normal without drainage or crusting, external ears without lesions, hearing acuity intact  RESP:  respiratory effort normal, no chest wall tenderness, no respiratory distress, Lung sounds diminished throughout, patient is on O2  CV:  Auscultation of heart done, rate and rhythm controlled and regular, no murmur, no rub or gallop. Edema +4 pitting bilateral lower extremities - lymph wraps in place today.  ABDOMEN:  normal bowel sounds, soft, nontender, no palpable masses.  M/S:   Gait and station walks with assist, no tenderness or swelling of the joints; able to move all extremities, digits normal upper extremities  NEURO: cranial nerves grossly intact, no facial asymmetry, no speech deficits and able to follow directions, moves all extremities symmetrically  PSYCH:  insight " and judgement and memory intact, affect and mood normal    Labs:   Recent labs in EPIC reviewed by me today.     ASSESSMENT/PLAN:  Diastolic dysfunction  Pulmonary hypertension (H)  Recent hospitalization due to dyspnea. She required diuresis with IV lasix with dopamine. She was diuresed about 10#. echo repeated in hospital and showed EF 52%, moderate to severe aortic regurgitation, mod to severe PH, elevated CVP and moderate circumferential pericardial effusion.  Lasix changed to oral and spironolactone added. Today she is denies SOB at rest and with exercise. Her legs remain swollen - Lymphedema wraps replaced today.   Wt Readings from Last 4 Encounters:   03/02/20 65 kg (143 lb 3.2 oz)   02/27/20 64.8 kg (142 lb 12.8 oz)   02/26/20 64.5 kg (142 lb 3.2 oz)   02/24/20 66.5 kg (146 lb 9.6 oz)   - daily wts with goal wt of 138#.  -On torsemide 60mg q day - appt with Cardiology today  -continue spironolactone 12.5mg q day  -continue kcl 20 meq po q day  -BMP stable from 3/3  -OCCUPATIONAL THERAPY evaluate and treat LE edema    Chronic respiratory failure with hypoxia (H)  Mild persistent asthma without complication  During hospitalization she did have episode of increased wheezing. She used her rescue inhaler. She was just using oxygen at HS PTA but now needs supplemental oxygen all day.  02 sats on supplemental oxygen 100%  -monitor respiratory status  -continue prn albuterol inh    CKD (chronic kidney disease) stage 3, GFR 30-59 ml/min (H)  Creat at baseline  -BMP stable as noted above  -avoid nephrotoxins    Esophageal dysphagia  Esophageal stenosis  Esophageal diverticulum  While in hospital patient had EGD that showed stenosis and diverticulum proximal to stenosis that contained pills and food. She did have dilatation of stenotic area.   Previously had trouble swallowing steak salad - SPEECH THERAPY did evaluate her today and downgraded diet to pureed for now.   -return to GI in 4 weeks for repeat EGD- 4/7/20 at  [facility]  -pureed diet, no straws  -SPEECH THERAPY to follow closely  -continue omeprazole 40mg BID    Restless leg syndrome  Recent increase in ropinirole in hospital but now wants dose reduced  -continue ropinirole 0.5mg BID    Blindness of both eyes  Glaucoma suspect, left  S/p retinal detachments  Eye surgery to remove calcium deposits pending. She needs to be able to lie flat. We did review eye drops today  -continue edetate disodium 0.5% ophth solution to left eye QID  -continue cyclosporine (restasis) 0.05% one drop to left eye BID  -continue compounded sodium hyaluronate 0.001%-BSS (Healon) one drop to left eye BID  -continue carboxymethylcellulose sodium 20% autologous serum one drop to left eye BID  - patient is blind  -Patient has surgery planned for 3/24 - Vit C prescribed to start 12d before procedure, does not need it at this time - will hold this until 3/12.    Abdominal pain  Bilateral 10th rib fractures  Abd pain 2/2 above noted GI concerns - incidental findings of fractures on Abd Xray noted - suspect 2/2 OP - does remaining on TUMs + Vit D; no noted pain    Physical deconditioning  Lives at [facility]. She has been independent but she did work with physical therapy in hospital and found to have reduced activity tolerance. Since in TCU she is walking length of the hallway with one rest break  -physical therapy and OCCUPATIONAL THERAPY   -SPEECH THERAPY   -SW to assist with discharge planning      Orders written by provider at facility  -Hold Vit C until 3/12  -BG at 0300 x3d    Electronically signed by:  Dr. Carmita Crespo, APRN, DNP, A/GNP-[facility]  [address] [place] [address]   [address]  [place] [address]     Cell: [phone]  Fax: [phone]  Email: [email or web address]

## 2020-03-02 NOTE — LETTER
"    3/2/2020        RE: Rupinder Tracy  [address]  [facility] [address]        [place] [facility]  [place] Medical Record Number:  [id number]  Place of Service where encounter took place:  [facility] [place] ([facility]) [[id number]]  Chief Complaint   Patient presents with     RECHECK       HPI:    Rupinder Tracy  is a 79 year old (1940), who is being seen today for an episodic care visit.  HPI information obtained from: facility chart records, facility staff, patient report and [facility] chart review. Today's concern is:     Diastolic dysfunction  Pulmonary hypertension (H)  Chronic respiratory failure with hypoxia (H)  Mild persistent asthma without complication  CKD (chronic kidney disease) stage 3, GFR 30-59 ml/min (H)  Esophageal dysphagia  Esophageal stenosis  Esophageal diverticulum  Restless leg syndrome  Blindness of both eyes  Glaucoma suspect, left  Abdominal pain, generalized  Closed fracture of multiple ribs of both sides, initial encounter  Physical deconditioning     Patient seen today for follow-up visit in TCU. Patient underwent Abdominal XRay on 3/1 for c/o abdominal pain with associated bluging areas of abdomen; this was positive for bilateral fractures of 10th rib - suspect these are idopathic spontaneous fractures associated with her extensive OP, patient denies trauma. Patient denies pain today. Per notes, she did not sleep last - patient denies this and notes she has been sleeping well for the past 3 days - she does note that she has been waking up at ~0300 every morning \"STARVING\" and she awakens to eat something - she asks if we are able to check her BG level at this time to make sure it is running okay. Appetite good. Continues with nausea, continues on regimen of Zofran - she has used this one time since initial order. Patient notes therapies are going well - Per nursing notes: \"Patient is independent in room with portable o2 for ambulation, " "extensive assist of 1 with transfers, and toileting, set up assist with eating\". Denies CP, palpitations, fatigue, nausea, vomiting, increased SOB/CRUZ, fever, chills, and/or b/b concerns today.    Past Medical and Surgical History reviewed in Epic today.    MEDICATIONS:  Current Outpatient Medications   Medication Sig Dispense Refill     acetaminophen (TYLENOL) 500 MG tablet Take 1,000 mg by mouth 3 times daily       albuterol (ALBUTEROL) 108 (90 BASE) MCG/ACT inhaler Inhale 2 puffs into the lungs every 4 hours as needed 1 Inhaler 5     ascorbic acid 1000 MG PO TABS tablet Take 2,000 mg by mouth At Bedtime        calcium carbonate-vitamin D 600-400 MG-UNIT PO chewable tablet Take 2 chew tab by mouth daily       calcium polycarbophil (FIBERCON) 625 MG tablet Take 2 tablets by mouth daily       carboxymethylcellulose PF 0.5 % OP ophthalmic solution Place 1 drop Into the left eye 2 times daily 20% Autologous serum eye drops (Compounded - Keep Refrigerated)  Vital Tears (1 drop into the left eye 2 times daily)       COMPRESSION STOCKINGS 1 each daily 3 each 1     Cyanocobalamin (VITAMIN B-12 PO) Take 1,000 mcg by mouth daily        DOXYCYCLINE HYCLATE PO Take 50 mg by mouth 3 times per week Monday,wednesday, friday       fluticasone-salmeterol (ADVAIR DISKUS) 250-50 MCG/DOSE diskus inhaler Inhale 1 puff into the lungs 2 times daily 60 Inhaler 1     multivitamin w/minerals (MULTI-VITAMIN) tablet Take 1 tablet by mouth At Bedtime        omeprazole (PRILOSEC) 40 MG DR capsule Take 40 mg by mouth 2 times daily        potassium chloride ER (K-DUR/KLOR-CON M) 20 MEQ CR tablet Take 1 tablet (20 mEq) by mouth daily 90 tablet 2     RESTASIS 0.05 % OP EMUL 1 drop to left eye twice daily       rOPINIRole (REQUIP) 0.25 MG tablet Take 0.5 mg by mouth every evening. Take 2 tablets by mouth at bedtime.  Take 1 tablet by mouth in afternoon as needed.       sodium chloride 5 % OP ophthalmic ointment Place 1 Application Into the left eye " "daily Place 0.5 inches into left eye daily at bedtime.       Sodium Hyaluronate (HEALON IO) Place 1 drop Into the left eye daily compounded sodium hyaluronate 0.001%-BSS (AKA HEALON) eye drops .   Place 1 Drop into left eye daily. HEALON 10 mg/mL injection diluted with BSS. (compounded to be combination with prednisolone) Refrigerated       Sodium Hyaluronate (HEALON IO) WITH PREDNISOLONE MIXTURE 0.001- 0.25%       spironolactone 25 MG PO tablet Take 12.5 mg by mouth daily       torsemide 20 MG PO tablet Take 60 mg by mouth daily       Vitamin D3 25 mcg (1000 units) PO tablet Take 1 tablet by mouth At Bedtime        REVIEW OF SYSTEMS:  10 point ROS of systems including Constitutional, Eyes, Respiratory, Cardiovascular, Gastroenterology, Genitourinary, Integumentary, Musculoskeletal, Psychiatric were all negative except for pertinent positives noted in my HPI.    Objective:  /55   Pulse 91   Temp 97.6  F (36.4  C)   Resp 18   Ht 1.626 m (5' 4\")   Wt 65 kg (143 lb 3.2 oz)   LMP 01/01/1990   SpO2 96%   BMI 24.58 kg/m     Exam:  GENERAL APPEARANCE:  Alert, in no distress, pleasant, cooperative, oriented x 4  EYES:  Legally blind, no discharge or mattering on lids or lashes noted  ENT:  Mouth normal, moist mucous membranes, nose normal without drainage or crusting, external ears without lesions, hearing acuity intact  RESP:  respiratory effort normal, no chest wall tenderness, no respiratory distress, Lung sounds diminished throughout, patient is on O2  CV:  Auscultation of heart done, rate and rhythm controlled and regular, no murmur, no rub or gallop. Edema +4 pitting bilateral lower extremities - lymph wraps in place today.  ABDOMEN:  normal bowel sounds, soft, nontender, no palpable masses.  M/S:   Gait and station walks with assist, no tenderness or swelling of the joints; able to move all extremities, digits normal upper extremities  NEURO: cranial nerves grossly intact, no facial asymmetry, no speech " deficits and able to follow directions, moves all extremities symmetrically  PSYCH:  insight and judgement and memory intact, affect and mood normal    Labs:   Recent labs in [facility] reviewed by me today.     ASSESSMENT/PLAN:  Diastolic dysfunction  Pulmonary hypertension (H)  Recent hospitalization due to dyspnea. She required diuresis with IV lasix with dopamine. She was diuresed about 10#. echo repeated in hospital and showed EF 52%, moderate to severe aortic regurgitation, mod to severe PH, elevated CVP and moderate circumferential pericardial effusion.  Lasix changed to oral and spironolactone added. Today she is denies SOB at rest and with exercise. Her legs remain swollen - Lymphedema wraps replaced today.   Wt Readings from Last 4 Encounters:   03/02/20 65 kg (143 lb 3.2 oz)   02/27/20 64.8 kg (142 lb 12.8 oz)   02/26/20 64.5 kg (142 lb 3.2 oz)   02/24/20 66.5 kg (146 lb 9.6 oz)   - daily wts with goal wt of 138#.  -On torsemide 60mg q day - appt with Cardiology today  -continue spironolactone 12.5mg q day  -continue kcl 20 meq po q day  -BMP stable from 3/3  -OCCUPATIONAL THERAPY evaluate and treat LE edema    Chronic respiratory failure with hypoxia (H)  Mild persistent asthma without complication  During hospitalization she did have episode of increased wheezing. She used her rescue inhaler. She was just using oxygen at HS PTA but now needs supplemental oxygen all day.  02 sats on supplemental oxygen 100%  -monitor respiratory status  -continue prn albuterol inh    CKD (chronic kidney disease) stage 3, GFR 30-59 ml/min (H)  Creat at baseline  -BMP stable as noted above  -avoid nephrotoxins    Esophageal dysphagia  Esophageal stenosis  Esophageal diverticulum  While in hospital patient had EGD that showed stenosis and diverticulum proximal to stenosis that contained pills and food. She did have dilatation of stenotic area.   Previously had trouble swallowing steak salad - SPEECH THERAPY did evaluate her today  and downgraded diet to pureed for now.   -return to GI in 4 weeks for repeat EGD- 4/7/20 at [facility]  -pureed diet, no straws  -SPEECH THERAPY to follow closely  -continue omeprazole 40mg BID    Restless leg syndrome  Recent increase in ropinirole in hospital but now wants dose reduced  -continue ropinirole 0.5mg BID    Blindness of both eyes  Glaucoma suspect, left  S/p retinal detachments  Eye surgery to remove calcium deposits pending. She needs to be able to lie flat. We did review eye drops today  -continue edetate disodium 0.5% ophth solution to left eye QID  -continue cyclosporine (restasis) 0.05% one drop to left eye BID  -continue compounded sodium hyaluronate 0.001%-BSS (Healon) one drop to left eye BID  -continue carboxymethylcellulose sodium 20% autologous serum one drop to left eye BID  - patient is blind  -Patient has surgery planned for 3/24 - Vit C prescribed to start 12d before procedure, does not need it at this time - will hold this until 3/12.    Abdominal pain  Bilateral 10th rib fractures  Abd pain 2/2 above noted GI concerns - incidental findings of fractures on Abd Xray noted - suspect 2/2 OP - does remaining on TUMs + Vit D; no noted pain    Physical deconditioning  Lives at [facility]. She has been independent but she did work with physical therapy in hospital and found to have reduced activity tolerance. Since in TCU she is walking length of the hallway with one rest break  -physical therapy and OCCUPATIONAL THERAPY   -SPEECH THERAPY   -SW to assist with discharge planning      Orders written by provider at facility  -Hold Vit C until 3/12  -BG at 0300 x3d    Electronically signed by:  YUNIEL Howell, DNP, A/GNP-[facility]  [address]   [address]  [place] [address]     Cell: [phone]  Fax: [phone]  Email: [email or web address]               Sincerely,        YUNIEL Daniels CNP

## 2020-03-04 NOTE — PROGRESS NOTES
[facility]  [place] [facility] Record Number:  [id number]  Place of Service where encounter took place:  [facility] ([facility]) [[id number]]  Chief Complaint   Patient presents with     RECHECK       HPI:    Rupinder Tracy  is a 79 year old (1940), who is being seen today for an episodic care visit.  HPI information obtained from: facility chart records, facility staff, patient report, [facility] chart review and Care Everywhere [facility] chart review. Today's concern is:  Brief Summary of Hospital Course: recent hospitalization due to shortness of breath. PMH includes CKD 3, asthma, hyperlipidemia, hypertension, diastolic dysfunction, reflux esophagitis. She does use oxygen during night and has chronic LE edema. She was sent to ED from GI clinic where she was seeking treatment for reflux.    Work up in ED included CXR that showed pulmonary edema. O2 sats were 87%. She was evaluated by cardiology who felt she had acute on chronic diastolic HF, moderate PH, mod to severe AR. Diuretics were increased to lasix 60mg BID with metolazone 2.5mg. She was diuresed about 10#, below usual weight of 140#. She was told her new goal wt is 138#.   GI was consulted due to chronic regurgitation. EGD on 2/14 showed benign esophageal stenosis with diverticulum. The stenosis was dilated. She was transferred to TCU for ongoing strengthening.    Updates on Status Since Skilled nursing Admission: in past week she has had some increased edema. She did go out to heart clinic on 3/2- spironolactone increased to 25mg q day, torsemide increased to 40mg BID.    Past Medical and Surgical History reviewed in Epic today.    MEDICATIONS:    Current Outpatient Medications   Medication Sig Dispense Refill     acetaminophen (TYLENOL) 500 MG tablet Take 1,000 mg by mouth 3 times daily       albuterol (ALBUTEROL) 108 (90 BASE) MCG/ACT inhaler Inhale 2 puffs into the lungs every 4 hours as needed 1 Inhaler 5     calcium  carbonate-vitamin D 600-400 MG-UNIT PO chewable tablet Take 2 chew tab by mouth daily       calcium polycarbophil (FIBERCON) 625 MG tablet Take 2 tablets by mouth At Bedtime And Give 625 mg by mouth one time a day for Constipation       carboxymethylcellulose PF 0.5 % OP ophthalmic solution Place 1 drop Into the left eye 2 times daily 20% Autologous serum eye drops (Compounded - Keep Refrigerated)  Vital Tears (1 drop into the left eye 2 times daily)       COMPRESSION STOCKINGS 1 each daily 3 each 1     Cyanocobalamin (VITAMIN B-12 PO) Take 1,000 mcg by mouth daily        DOXYCYCLINE HYCLATE PO Take 50 mg by mouth 3 times per week Monday,wednesday, friday       fluticasone-salmeterol (ADVAIR DISKUS) 250-50 MCG/DOSE diskus inhaler Inhale 1 puff into the lungs 2 times daily 60 Inhaler 1     multivitamin w/minerals (MULTI-VITAMIN) tablet Take 1 tablet by mouth At Bedtime        omeprazole (PRILOSEC) 40 MG DR capsule Take 40 mg by mouth 2 times daily        ondansetron (ZOFRAN) 4 MG tablet Take 4 mg by mouth 3 times daily       potassium chloride ER (K-DUR/KLOR-CON M) 20 MEQ CR tablet Take 1 tablet (20 mEq) by mouth daily 90 tablet 2     RESTASIS 0.05 % OP EMUL 1 drop to left eye twice daily       rOPINIRole (REQUIP) 0.25 MG tablet Take 0.5 mg by mouth every evening. Take 2 tablets by mouth at bedtime.  Take 1 tablet by mouth in afternoon as needed.       sodium chloride 5 % OP ophthalmic ointment Place 1 Application Into the left eye daily Place 0.5 inches into left eye daily at bedtime.       Sodium Hyaluronate (HEALON IO) Place 1 drop Into the left eye daily compounded sodium hyaluronate 0.001%-BSS (AKA HEALON) eye drops .   Place 1 Drop into left eye daily. HEALON 10 mg/mL injection diluted with BSS. (compounded to be combination with prednisolone) Refrigerated       Sodium Hyaluronate (HEALON IO) WITH PREDNISOLONE MIXTURE 0.001- 0.25%       spironolactone 25 MG PO tablet Take 25 mg by mouth daily        torsemide  "20 MG PO tablet Take 40 mg by mouth 2 times daily       Vitamin D3 25 mcg (1000 units) PO tablet Take 1 tablet by mouth At Bedtime        ascorbic acid 1000 MG PO TABS tablet Take 2,000 mg by mouth At Bedtime        Wt Readings from Last 5 Encounters:   03/04/20 65 kg (143 lb 3.2 oz)   03/02/20 65 kg (143 lb 3.2 oz)   02/27/20 64.8 kg (142 lb 12.8 oz)   02/26/20 64.5 kg (142 lb 3.2 oz)   02/24/20 66.5 kg (146 lb 9.6 oz)       REVIEW OF SYSTEMS:  4 point ROS including Respiratory, CV, GI and , other than that noted in the HPI,  is negative    Objective:  /74   Pulse 79   Temp 97.2  F (36.2  C)   Resp 18   Ht 1.626 m (5' 4\")   Wt 65 kg (143 lb 3.2 oz)   LMP 01/01/1990   SpO2 99%   BMI 24.58 kg/m    Exam:  GENERAL APPEARANCE:  Alert, in no distress  ENT:  Mouth and posterior oropharynx normal, moist mucous membranes, hearing acuity adequate   EYES:  EOM, conjunctivae, lids, pupils irreg  RESP:  respiratory effort and palpation of chest normal, no respiratory distress, Lung sounds muffled in bases  CV:  Palpation and auscultation of heart done , rate and rhythm irreg, Edema lymphedema wraps in place  ABDOMEN:  normal bowel sounds, soft, nontender, no hepatosplenomegaly or other masses  M/S:   Gait and station steady, Digits and nails normal   SKIN:  Inspection/Palpation of skin and subcutaneous tissue no rash  NEURO: 2-12 in normal limits and at patient's baseline  PSYCH:  insight and judgement, memory intact , affect and mood normal    Labs:   CBC RESULTS:   Recent Labs   Lab Test 02/24/20  0645 07/02/18  1105   WBC 4.4 6.7   RBC 3.41* 3.83   HGB 9.4* 12.2   HCT 30.8* 37.2   MCV 90 97   MCH 27.6 31.9   MCHC 30.5* 32.8   RDW 19.1* 14.2    202       Last Basic Metabolic Panel:  Recent Labs   Lab Test 03/02/20  0920 02/28/20  0645    136   POTASSIUM 3.8 4.2   CHLORIDE 99 103   AXEL 9.3 9.0   CO2 29 31   BUN 39* 43*   CR 1.04 1.09*   * 94       Liver Function Studies -   Recent Labs   Lab " Test 03/30/18  0854 10/15/17  0845   PROTTOTAL 7.4 5.8*   ALBUMIN 3.6 2.3*   BILITOTAL 0.7 0.8   ALKPHOS 160* 224*   AST 24 42   ALT 14 39       TSH   Date Value Ref Range Status   01/15/2018 4.13 (H) 0.40 - 4.00 mU/L Final   01/20/2016 1.94 0.40 - 4.00 mU/L Final   ]          ASSESSMENT/PLAN:  Diastolic dysfunction  Pulmonary hypertension (H)  Recent hospitalization due to dyspnea. She required diuresis with IV lasix with dopamine. She was diuresed about 10#. echo repeated in hospital and showed EF 52%, moderate to severe aortic regurgitation, mod to severe PH, elevated CVP and moderate circumferential pericardial effusion.  Lasix changed to oral and spironolactone added. Since in TCU she has had wt gain of 7#. She did follow up with cardiology on 3/2- spironolactone was increased to 25mg q day and torsemide was increased to 40mg BID  - daily wts with goal wt of 138#.  -continue torsemide 40mg BID  -continue spironolactone 25mg q day  -continue kcl 20 meq po q day  -[facility] 3/9  -OCCUPATIONAL THERAPY evaluate and treat LE edema  -follow up with cardiology as planned    Chronic respiratory failure with hypoxia (H)  Mild persistent asthma without complication  During hospitalization she did have episode of increased wheezing. She used her rescue inhaler. She was just using oxygen at HS PTA but now needs supplemental oxygen all day. Oxygen is being weaned. She is now down to 1.5L Per minute  02 sats on supplemental oxygen upper 90s  -monitor respiratory status  -continue prn albuterol inh    CKD (chronic kidney disease) stage 3, GFR 30-59 ml/min (H)  Creat at baseline  -BMP 3/9  -avoid nephrotoxins    Esophageal dysphagia  Esophageal stenosis  Esophageal diverticulum  While in hospital patient had EGD that showed stenosis and diverticulum proximal to stenosis that contained pills and food. She did have dilatation of stenotic area.   . SPEECH THERApy has downgraded diet to pureed for now. Although she is trying out next  level.   -return to GI  for repeat EGD- 4/7/20 at [facility]  -pureed diet, no straws  -SPEECH THERAPY to follow closely  -continue omeprazole 40mg BID    Restless leg syndrome  Recent increase in ropinirole in hospital but now wants dose reduced  -continue ropinirole 0.5mg BID    Blindness of both eyes  Glaucoma suspect, left  S/p retinal detachments  Eye surgery to remove calcium deposits pending. She needs to be able to lie flat. We have reviewed eye drops   -continue edetate disodium 0.5% ophth solution to left eye QID  -continue cyclosporine (restasis) 0.05% one drop to left eye BID  -continue compounded sodium hyaluronate 0.001%-BSS (Healon) one drop to left eye BID  -continue carboxymethylcellulose sodium 20% autologous serum one drop to left eye BID  - patient is blind    Physical deconditioning  [facility] at [facility]. She has been independent but she did work with physical therapy in hospital and found to have reduced activity tolerance. Since in TCU she is walking length of the hallway with one rest break  -physical therapy and OCCUPATIONAL THERAPY   -SPEECH THERAPY   -SW to assist with discharge planning    Electronically signed by:  YUNIEL Rick CNP

## 2020-03-04 NOTE — LETTER
3/4/2020        RE: Rupinder Tracy  [address]  [place] [address]        [facility]  [place] [facility] Record Number:  [id number]  Place of Service where encounter took place:  [facility] ([facility]) [[id number]]  Chief Complaint   Patient presents with     RECHECK       HPI:    Rupinder Tracy  is a 79 year old (1940), who is being seen today for an episodic care visit.  HPI information obtained from: facility chart records, facility staff, patient report, [facility] chart review and Care Everywhere [facility] chart review. Today's concern is:  Brief Summary of Hospital Course: recent hospitalization due to shortness of breath. PMH includes CKD 3, asthma, hyperlipidemia, hypertension, diastolic dysfunction, reflux esophagitis. She does use oxygen during night and has chronic LE edema. She was sent to ED from GI clinic where she was seeking treatment for reflux.    Work up in ED included CXR that showed pulmonary edema. O2 sats were 87%. She was evaluated by cardiology who felt she had acute on chronic diastolic HF, moderate PH, mod to severe AR. Diuretics were increased to lasix 60mg BID with metolazone 2.5mg. She was diuresed about 10#, below usual weight of 140#. She was told her new goal wt is 138#.   GI was consulted due to chronic regurgitation. EGD on 2/14 showed benign esophageal stenosis with diverticulum. The stenosis was dilated. She was transferred to TCU for ongoing strengthening.    Updates on Status Since Skilled nursing Admission: in past week she has had some increased edema. She did go out to heart clinic on 3/2- spironolactone increased to 25mg q day, torsemide increased to 40mg BID.    Past Medical and Surgical History reviewed in Epic today.    MEDICATIONS:    Current Outpatient Medications   Medication Sig Dispense Refill     acetaminophen (TYLENOL) 500 MG tablet Take 1,000 mg by mouth 3 times daily       albuterol (ALBUTEROL) 108 (90  BASE) MCG/ACT inhaler Inhale 2 puffs into the lungs every 4 hours as needed 1 Inhaler 5     calcium carbonate-vitamin D 600-400 MG-UNIT PO chewable tablet Take 2 chew tab by mouth daily       calcium polycarbophil (FIBERCON) 625 MG tablet Take 2 tablets by mouth At Bedtime And Give 625 mg by mouth one time a day for Constipation       carboxymethylcellulose PF 0.5 % OP ophthalmic solution Place 1 drop Into the left eye 2 times daily 20% Autologous serum eye drops (Compounded - Keep Refrigerated)  Vital Tears (1 drop into the left eye 2 times daily)       COMPRESSION STOCKINGS 1 each daily 3 each 1     Cyanocobalamin (VITAMIN B-12 PO) Take 1,000 mcg by mouth daily        DOXYCYCLINE HYCLATE PO Take 50 mg by mouth 3 times per week Monday,wednesday, friday       fluticasone-salmeterol (ADVAIR DISKUS) 250-50 MCG/DOSE diskus inhaler Inhale 1 puff into the lungs 2 times daily 60 Inhaler 1     multivitamin w/minerals (MULTI-VITAMIN) tablet Take 1 tablet by mouth At Bedtime        omeprazole (PRILOSEC) 40 MG DR capsule Take 40 mg by mouth 2 times daily        ondansetron (ZOFRAN) 4 MG tablet Take 4 mg by mouth 3 times daily       potassium chloride ER (K-DUR/KLOR-CON M) 20 MEQ CR tablet Take 1 tablet (20 mEq) by mouth daily 90 tablet 2     RESTASIS 0.05 % OP EMUL 1 drop to left eye twice daily       rOPINIRole (REQUIP) 0.25 MG tablet Take 0.5 mg by mouth every evening. Take 2 tablets by mouth at bedtime.  Take 1 tablet by mouth in afternoon as needed.       sodium chloride 5 % OP ophthalmic ointment Place 1 Application Into the left eye daily Place 0.5 inches into left eye daily at bedtime.       Sodium Hyaluronate (HEALON IO) Place 1 drop Into the left eye daily compounded sodium hyaluronate 0.001%-BSS (AKA HEALON) eye drops .   Place 1 Drop into left eye daily. HEALON 10 mg/mL injection diluted with BSS. (compounded to be combination with prednisolone) Refrigerated       Sodium Hyaluronate (HEALON IO) WITH PREDNISOLONE  "MIXTURE 0.001- 0.25%       spironolactone 25 MG PO tablet Take 25 mg by mouth daily        torsemide 20 MG PO tablet Take 40 mg by mouth 2 times daily       Vitamin D3 25 mcg (1000 units) PO tablet Take 1 tablet by mouth At Bedtime        ascorbic acid 1000 MG PO TABS tablet Take 2,000 mg by mouth At Bedtime        Wt Readings from Last 5 Encounters:   03/04/20 65 kg (143 lb 3.2 oz)   03/02/20 65 kg (143 lb 3.2 oz)   02/27/20 64.8 kg (142 lb 12.8 oz)   02/26/20 64.5 kg (142 lb 3.2 oz)   02/24/20 66.5 kg (146 lb 9.6 oz)       REVIEW OF SYSTEMS:  4 point ROS including Respiratory, CV, GI and , other than that noted in the HPI,  is negative    Objective:  /74   Pulse 79   Temp 97.2  F (36.2  C)   Resp 18   Ht 1.626 m (5' 4\")   Wt 65 kg (143 lb 3.2 oz)   LMP 01/01/1990   SpO2 99%   BMI 24.58 kg/m     Exam:  GENERAL APPEARANCE:  Alert, in no distress  ENT:  Mouth and posterior oropharynx normal, moist mucous membranes, hearing acuity adequate   EYES:  EOM, conjunctivae, lids, pupils irreg  RESP:  respiratory effort and palpation of chest normal, no respiratory distress, Lung sounds muffled in bases  CV:  Palpation and auscultation of heart done , rate and rhythm irreg, Edema lymphedema wraps in place  ABDOMEN:  normal bowel sounds, soft, nontender, no hepatosplenomegaly or other masses  M/S:   Gait and station steady, Digits and nails normal   SKIN:  Inspection/Palpation of skin and subcutaneous tissue no rash  NEURO: 2-12 in normal limits and at patient's baseline  PSYCH:  insight and judgement, memory intact , affect and mood normal    Labs:   CBC RESULTS:   Recent Labs   Lab Test 02/24/20  0645 07/02/18  1105   WBC 4.4 6.7   RBC 3.41* 3.83   HGB 9.4* 12.2   HCT 30.8* 37.2   MCV 90 97   MCH 27.6 31.9   MCHC 30.5* 32.8   RDW 19.1* 14.2    202       Last Basic Metabolic Panel:  Recent Labs   Lab Test 03/02/20  0920 02/28/20  0645    136   POTASSIUM 3.8 4.2   CHLORIDE 99 103   AXEL 9.3 9.0 "   CO2 29 31   BUN 39* 43*   CR 1.04 1.09*   * 94       Liver Function Studies -   Recent Labs   Lab Test 03/30/18  0854 10/15/17  0845   PROTTOTAL 7.4 5.8*   ALBUMIN 3.6 2.3*   BILITOTAL 0.7 0.8   ALKPHOS 160* 224*   AST 24 42   ALT 14 39       TSH   Date Value Ref Range Status   01/15/2018 4.13 (H) 0.40 - 4.00 mU/L Final   01/20/2016 1.94 0.40 - 4.00 mU/L Final   ]          ASSESSMENT/PLAN:  Diastolic dysfunction  Pulmonary hypertension (H)  Recent hospitalization due to dyspnea. She required diuresis with IV lasix with dopamine. She was diuresed about 10#. echo repeated in hospital and showed EF 52%, moderate to severe aortic regurgitation, mod to severe PH, elevated CVP and moderate circumferential pericardial effusion.  Lasix changed to oral and spironolactone added. Since in TCU she has had wt gain of 7#. She did follow up with cardiology on 3/2- spironolactone was increased to 25mg q day and torsemide was increased to 40mg BID  - daily wts with goal wt of 138#.  -continue torsemide 40mg BID  -continue spironolactone 25mg q day  -continue kcl 20 meq po q day  -[facility] 3/9  -OCCUPATIONAL THERAPY evaluate and treat LE edema  -follow up with cardiology as planned    Chronic respiratory failure with hypoxia (H)  Mild persistent asthma without complication  During hospitalization she did have episode of increased wheezing. She used her rescue inhaler. She was just using oxygen at HS PTA but now needs supplemental oxygen all day. Oxygen is being weaned. She is now down to 1.5L Per minute  02 sats on supplemental oxygen  upper 90s  -monitor respiratory status  -continue prn albuterol inh    CKD (chronic kidney disease) stage 3, GFR 30-59 ml/min (H)  Creat at baseline  -[facility] 3/9  -avoid nephrotoxins    Esophageal dysphagia  Esophageal stenosis  Esophageal diverticulum  While in hospital patient had EGD that showed stenosis and diverticulum proximal to stenosis that contained pills and food. She did have dilatation of  stenotic area.   . SPEECH THERApy has downgraded diet to pureed for now. Although she is trying out next level.   -return to   for repeat EGD- 4/7/20 at [facility]  -pureed diet, no straws  -SPEECH THERAPY to follow closely  -continue omeprazole 40mg BID    Restless leg syndrome  Recent increase in ropinirole in hospital but now wants dose reduced  -continue ropinirole 0.5mg BID    Blindness of both eyes  Glaucoma suspect, left  S/p retinal detachments  Eye surgery to remove calcium deposits pending. She needs to be able to lie flat. We have reviewed eye drops   -continue edetate disodium 0.5% ophth solution to left eye QID  -continue cyclosporine (restasis) 0.05% one drop to left eye BID  -continue compounded sodium hyaluronate 0.001%-BSS (Healon) one drop to left eye BID  -continue carboxymethylcellulose sodium 20% autologous serum one drop to left eye BID  - patient is blind    Physical deconditioning  Lives at [facility]. She has been independent but she did work with physical therapy in hospital and found to have reduced activity tolerance. Since in TCU she is walking length of the hallway with one rest break  -physical therapy and OCCUPATIONAL THERAPY   -SPEECH THERAPY   -SW to assist with discharge planning    Electronically signed by:  YUNIEL Rick CNP               Sincerely,        YUNIEL Rick CNP

## 2020-03-05 NOTE — PROGRESS NOTES
[facility]  [place] [facility] Record Number:  [id number]  [facility] of [facility] where encounter took place:  [facility] [[id number]]  Chief Complaint   Patient presents with     RECHECK       HPI:    Rupinder Tracy  is a 79 year old (1940), who is being seen today for an episodic care visit.  HPI information obtained from: facility chart records, facility staff, patient report and [facility] chart review. Today's concern is:  Brief Summary of Hospital Course: recent hospitalization due to shortness of breath. PMH includes CKD 3, asthma, hyperlipidemia, hypertension, diastolic dysfunction, reflux esophagitis. She does use oxygen during night and has chronic LE edema. She was sent to ED from GI clinic where she was seeking treatment for reflux.    Work up in ED included CXR that showed pulmonary edema. O2 sats were 87%. She was evaluated by cardiology who felt she had acute on chronic diastolic HF, moderate PH, mod to severe AR. Diuretics were increased to lasix 60mg BID with metolazone 2.5mg. She was diuresed about 10#, below usual weight of 140#. She was told her new goal wt is 138#.   GI was consulted due to chronic regurgitation. EGD on 2/14 showed benign esophageal stenosis with diverticulum. The stenosis was dilated. She was transferred to TCU for ongoing strengthening.    Updates on Status Since Skilled nursing Admission: in past week she has had some increased edema. OCCUPATIONAL THERAPY is concerned that despite lymphedema wraps the swelling continues.  She did go out to heart clinic on 3/2- spironolactone increased to 25mg q day, torsemide increased to 40mg BID.  Therapy also reports episodes of decreased levels alertness that is accompanied by weakness. This occurred yesterday during therapy session. She required wheelchair ride back to her room.  She denies cough or sore throat. She was awakened during the night due to pain in her legs, which was relieved with  removing lymphedema wraps.     Past Medical and Surgical History reviewed in Epic today.    MEDICATIONS:    Current Outpatient Medications   Medication Sig Dispense Refill     acetaminophen (TYLENOL) 500 MG tablet Take 1,000 mg by mouth 3 times daily       albuterol (ALBUTEROL) 108 (90 BASE) MCG/ACT inhaler Inhale 2 puffs into the lungs every 4 hours as needed 1 Inhaler 5     calcium carbonate-vitamin D 600-400 MG-UNIT PO chewable tablet Take 2 chew tab by mouth daily       calcium polycarbophil (FIBERCON) 625 MG tablet Take 2 tablets by mouth At Bedtime And Give 625 mg by mouth one time a day for Constipation       carboxymethylcellulose PF 0.5 % OP ophthalmic solution Place 1 drop Into the left eye 2 times daily 20% Autologous serum eye drops (Compounded - Keep Refrigerated)  Vital Tears (1 drop into the left eye 2 times daily)       COMPRESSION STOCKINGS 1 each daily 3 each 1     Cyanocobalamin (VITAMIN B-12 PO) Take 1,000 mcg by mouth daily        DOXYCYCLINE HYCLATE PO Take 50 mg by mouth 3 times per week Monday,wednesday, friday       fluticasone-salmeterol (ADVAIR DISKUS) 250-50 MCG/DOSE diskus inhaler Inhale 1 puff into the lungs 2 times daily 60 Inhaler 1     multivitamin w/minerals (MULTI-VITAMIN) tablet Take 1 tablet by mouth At Bedtime        omeprazole (PRILOSEC) 40 MG DR capsule Take 40 mg by mouth 2 times daily        ondansetron (ZOFRAN) 4 MG tablet Take 4 mg by mouth 3 times daily       potassium chloride ER (K-DUR/KLOR-CON M) 20 MEQ CR tablet Take 1 tablet (20 mEq) by mouth daily 90 tablet 2     RESTASIS 0.05 % OP EMUL 1 drop to left eye twice daily       rOPINIRole (REQUIP) 0.25 MG tablet Take 0.5 mg by mouth every evening. Take 2 tablets by mouth at bedtime.  Take 1 tablet by mouth in afternoon as needed.       sodium chloride 5 % OP ophthalmic ointment Place 1 Application Into the left eye daily Place 0.5 inches into left eye daily at bedtime.       Sodium Hyaluronate (HEALON IO) Place 1 drop  "Into the left eye daily compounded sodium hyaluronate 0.001%-BSS (AKA HEALON) eye drops .   Place 1 Drop into left eye daily. HEALON 10 mg/mL injection diluted with BSS. (compounded to be combination with prednisolone) Refrigerated       Sodium Hyaluronate (HEALON IO) WITH PREDNISOLONE MIXTURE 0.001- 0.25%       spironolactone 25 MG PO tablet Take 25 mg by mouth daily        torsemide 20 MG PO tablet Take 40 mg by mouth 2 times daily       Vitamin D3 25 mcg (1000 units) PO tablet Take 1 tablet by mouth At Bedtime        ascorbic acid 1000 MG PO TABS tablet Take 2,000 mg by mouth At Bedtime            REVIEW OF SYSTEMS:  4 point ROS including Respiratory, CV, GI and , other than that noted in the HPI,  is negative    Objective:  BP (!) 140/59   Pulse 79   Temp 96.9  F (36.1  C)   Resp 18   Ht 1.626 m (5' 4\")   Wt 65 kg (143 lb 3.2 oz)   LMP 01/01/1990   SpO2 98%   BMI 24.58 kg/m    Exam:  GENERAL APPEARANCE:  Alert, in no distress  ENT:  Mouth and posterior oropharynx normal, moist mucous membranes, hearing acuity adequate   EYES:  EOM, conjunctivae, lids, pupils and irises with chronic changes  RESP:  respiratory effort and palpation of chest normal, no respiratory distress, Lung sounds clear  CV:  Palpation and auscultation of heart done , rate and rhythm reg Edema non pitting in LEs. Feet to knees are puffy  ABDOMEN:  normal bowel sounds, soft, nontender  M/S:   Gait and station unsteady, Digits and nails normal   SKIN:  Inspection/Palpation of skin and subcutaneous tissue scattered bruises. New area of ecchymosis on right calf  NEURO: 2-12 in normal limits and at patient's baseline  PSYCH:  insight and judgement, memory intact , affect and mood normal    Labs:   CBC RESULTS:   Recent Labs   Lab Test 02/24/20  0645 07/02/18  1105   WBC 4.4 6.7   RBC 3.41* 3.83   HGB 9.4* 12.2   HCT 30.8* 37.2   MCV 90 97   MCH 27.6 31.9   MCHC 30.5* 32.8   RDW 19.1* 14.2    202       Last Basic Metabolic " Panel:  Recent Labs   Lab Test 03/02/20  0920 02/28/20  0645    136   POTASSIUM 3.8 4.2   CHLORIDE 99 103   AXEL 9.3 9.0   CO2 29 31   BUN 39* 43*   CR 1.04 1.09*   * 94       Liver Function Studies -   Recent Labs   Lab Test 03/30/18  0854 10/15/17  0845   PROTTOTAL 7.4 5.8*   ALBUMIN 3.6 2.3*   BILITOTAL 0.7 0.8   ALKPHOS 160* 224*   AST 24 42   ALT 14 39       TSH   Date Value Ref Range Status   01/15/2018 4.13 (H) 0.40 - 4.00 mU/L Final   01/20/2016 1.94 0.40 - 4.00 mU/L Final   ]    Wt Readings from Last 5 Encounters:   03/06/20 65 kg (143 lb 3.2 oz)   03/04/20 65 kg (143 lb 3.2 oz)   03/02/20 65 kg (143 lb 3.2 oz)   02/27/20 64.8 kg (142 lb 12.8 oz)   02/26/20 64.5 kg (142 lb 3.2 oz)           ASSESSMENT/PLAN:  Diastolic dysfunction  Pulmonary hypertension (H)  Recent hospitalization due to dyspnea. She required diuresis with IV lasix with dopamine. She was diuresed about 10#. echo repeated in hospital and showed EF 52%, moderate to severe aortic regurgitation, mod to severe PH, elevated CVP and moderate circumferential pericardial effusion.  Lasix changed to oral and spironolactone added. Since in TCU she has had wt gain of 7#. She did follow up with cardiology on 3/2- spironolactone was increased to 25mg q day and torsemide was increased to 40mg BID. She continues to have large amount of LE swelling despite lymphedema wraps.   - daily wts with goal wt of 138#.  -continue torsemide 40mg BID  -continue spironolactone 25mg q day  -continue kcl 20 meq po q day  -BMP 3/9  -OCCUPATIONAL THERAPY treating LE edema w lymph wraps  -follow up with cardiology as planned    Chronic respiratory failure with hypoxia (H)  Mild persistent asthma without complication  During hospitalization she did have episode of increased wheezing. She used her rescue inhaler. She was just using oxygen at HS PTA but now needs supplemental oxygen all day. Oxygen is being weaned. She is now down to 1.5L Per minute. OCCUPATIONAL  THERAPY reports that sats did drop to mid 80s after exercise then back to 90s when oxygen increased to 2lpm.   -monitor respiratory status  -continue prn albuterol inh  -daily advair discus    Episode of decreased level of consciousness  Therapy also reports episodes of decreased levels alertness that is accompanied by weakness. This occurred yesterday during therapy session. She required wheelchair ride back to her room.  Today she reports she was awake during the night due to LE pain and due to need to urinate.  She recalls the spells. she was not orthostatic  ?SEIZURE vs syncope vs viral illness  -monitor for now.   -check orthostatic blood pressures.   -BMP hgb on 3/9    CKD (chronic kidney disease) stage 3, GFR 30-59 ml/min (H)  Creat at baseline  -BMP 3/9  -avoid nephrotoxins    Esophageal dysphagia  Esophageal stenosis  Esophageal diverticulum  While in hospital patient had EGD that showed stenosis and diverticulum proximal to stenosis that contained pills and food. She did have dilatation of stenotic area.   . SPEECH THERApy has downgraded diet to pureed for now. Although she is trying out next level. she had scrambled eggs and pancakes this morning and seemed to swallow this ok  -return to GI  for repeat EGD- 4/7/20 at [facility]  -slowly advance texture of foods, no straws  -SPEECH THERAPY to follow closely  -continue omeprazole 40mg BID    Restless leg syndrome  Recent increase in ropinirole in hospital but now wants dose reduced  -continue ropinirole 0.5mg BID    Blindness of both eyes  Glaucoma suspect, left  S/p retinal detachments  Eye surgery to remove calcium deposits pending. She needs to be able to lie flat. We have reviewed eye drops   -continue edetate disodium 0.5% ophth solution to left eye QID  -continue cyclosporine (restasis) 0.05% one drop to left eye BID  -continue compounded sodium hyaluronate 0.001%-BSS (Healon) one drop to left eye BID  -continue carboxymethylcellulose sodium 20%  autologous serum one drop to left eye BID  - patient is blind    Physical deconditioning  L[facility] at [facility]. She has been independent but she did work with physical therapy in hospital and found to have reduced activity tolerance. Since in TCU she is walking length of the hallway with one rest break.   -physical therapy and OCCUPATIONAL THERAPY   -SPEECH THERAPY   -SW to assist with discharge planning        Electronically signed by:  YUNIEL Rick CNP

## 2020-03-06 PROBLEM — R40.4 ALTERED LEVEL OF CONSCIOUSNESS: Status: ACTIVE | Noted: 2020-01-01

## 2020-03-06 NOTE — LETTER
3/6/2020        RE: Rupinder Tracy  [address]  [facility] [address]        [place]  [place] Medical Record Number:  [id number]  [facility] where encounter took place[facility] [[id number]]  Chief Complaint   Patient presents with     RECHECK       HPI:    Rupinder Tracy  is a 79 year old (1940), who is being seen today for an episodic care visit.  HPI information obtained from: facility chart records, facility staff, patient report and [facility] chart review. Today's concern is:  Brief Summary of Hospital Course: recent hospitalization due to shortness of breath. PMH includes CKD 3, asthma, hyperlipidemia, hypertension, diastolic dysfunction, reflux esophagitis. She does use oxygen during night and has chronic LE edema. She was sent to ED from GI clinic where she was seeking treatment for reflux.    Work up in ED included CXR that showed pulmonary edema. O2 sats were 87%. She was evaluated by cardiology who felt she had acute on chronic diastolic HF, moderate PH, mod to severe AR. Diuretics were increased to lasix 60mg BID with metolazone 2.5mg. She was diuresed about 10#, below usual weight of 140#. She was told her new goal wt is 138#.   GI was consulted due to chronic regurgitation. EGD on 2/14 showed benign esophageal stenosis with diverticulum. The stenosis was dilated. She was transferred to TCU for ongoing strengthening.    Updates on Status Since Skilled nursing Admission: in past week she has had some increased edema. OCCUPATIONAL THERAPY is concerned that despite lymphedema wraps the swelling continues.  She did go out to heart clinic on 3/2- spironolactone increased to 25mg q day, torsemide increased to 40mg BID.  Therapy also reports episodes of decreased levels alertness that is accompanied by weakness. This occurred yesterday during therapy session. She required wheelchair ride back to her room.  She denies  cough or sore throat. She was awakened during the night due to pain in her legs, which was relieved with removing lymphedema wraps.     Past Medical and Surgical History reviewed in Epic today.    MEDICATIONS:    Current Outpatient Medications   Medication Sig Dispense Refill     acetaminophen (TYLENOL) 500 MG tablet Take 1,000 mg by mouth 3 times daily       albuterol (ALBUTEROL) 108 (90 BASE) MCG/ACT inhaler Inhale 2 puffs into the lungs every 4 hours as needed 1 Inhaler 5     calcium carbonate-vitamin D 600-400 MG-UNIT PO chewable tablet Take 2 chew tab by mouth daily       calcium polycarbophil (FIBERCON) 625 MG tablet Take 2 tablets by mouth At Bedtime And Give 625 mg by mouth one time a day for Constipation       carboxymethylcellulose PF 0.5 % OP ophthalmic solution Place 1 drop Into the left eye 2 times daily 20% Autologous serum eye drops (Compounded - Keep Refrigerated)  Vital Tears (1 drop into the left eye 2 times daily)       COMPRESSION STOCKINGS 1 each daily 3 each 1     Cyanocobalamin (VITAMIN B-12 PO) Take 1,000 mcg by mouth daily        DOXYCYCLINE HYCLATE PO Take 50 mg by mouth 3 times per week Monday,wednesday, friday       fluticasone-salmeterol (ADVAIR DISKUS) 250-50 MCG/DOSE diskus inhaler Inhale 1 puff into the lungs 2 times daily 60 Inhaler 1     multivitamin w/minerals (MULTI-VITAMIN) tablet Take 1 tablet by mouth At Bedtime        omeprazole (PRILOSEC) 40 MG DR capsule Take 40 mg by mouth 2 times daily        ondansetron (ZOFRAN) 4 MG tablet Take 4 mg by mouth 3 times daily       potassium chloride ER (K-DUR/KLOR-CON M) 20 MEQ CR tablet Take 1 tablet (20 mEq) by mouth daily 90 tablet 2     RESTASIS 0.05 % OP EMUL 1 drop to left eye twice daily       rOPINIRole (REQUIP) 0.25 MG tablet Take 0.5 mg by mouth every evening. Take 2 tablets by mouth at bedtime.  Take 1 tablet by mouth in afternoon as needed.       sodium chloride 5 % OP ophthalmic ointment Place 1 Application Into the left eye  "daily Place 0.5 inches into left eye daily at bedtime.       Sodium Hyaluronate (HEALON IO) Place 1 drop Into the left eye daily compounded sodium hyaluronate 0.001%-BSS (AKA HEALON) eye drops .   Place 1 Drop into left eye daily. HEALON 10 mg/mL injection diluted with BSS. (compounded to be combination with prednisolone) Refrigerated       Sodium Hyaluronate (HEALON IO) WITH PREDNISOLONE MIXTURE 0.001- 0.25%       spironolactone 25 MG PO tablet Take 25 mg by mouth daily        torsemide 20 MG PO tablet Take 40 mg by mouth 2 times daily       Vitamin D3 25 mcg (1000 units) PO tablet Take 1 tablet by mouth At Bedtime        ascorbic acid 1000 MG PO TABS tablet Take 2,000 mg by mouth At Bedtime            REVIEW OF SYSTEMS:  4 point ROS including Respiratory, CV, GI and , other than that noted in the HPI,  is negative    Objective:  BP (!) 140/59   Pulse 79   Temp 96.9  F (36.1  C)   Resp 18   Ht 1.626 m (5' 4\")   Wt 65 kg (143 lb 3.2 oz)   LMP 01/01/1990   SpO2 98%   BMI 24.58 kg/m     Exam:  GENERAL APPEARANCE:  Alert, in no distress  ENT:  Mouth and posterior oropharynx normal, moist mucous membranes, hearing acuity adequate   EYES:  EOM, conjunctivae, lids, pupils and irises with chronic changes  RESP:  respiratory effort and palpation of chest normal, no respiratory distress, Lung sounds clear  CV:  Palpation and auscultation of heart done , rate and rhythm reg Edema non pitting in LEs. Feet to knees are puffy  ABDOMEN:  normal bowel sounds, soft, nontender  M/S:   Gait and station unsteady, Digits and nails normal   SKIN:  Inspection/Palpation of skin and subcutaneous tissue scattered bruises. New area of ecchymosis on right calf  NEURO: 2-12 in normal limits and at patient's baseline  PSYCH:  insight and judgement, memory intact , affect and mood normal    Labs:   CBC RESULTS:   Recent Labs   Lab Test 02/24/20  0645 07/02/18  1105   WBC 4.4 6.7   RBC 3.41* 3.83   HGB 9.4* 12.2   HCT 30.8* 37.2   MCV " 90 97   MCH 27.6 31.9   MCHC 30.5* 32.8   RDW 19.1* 14.2    202       Last Basic Metabolic Panel:  Recent Labs   Lab Test 03/02/20  0920 02/28/20  0645    136   POTASSIUM 3.8 4.2   CHLORIDE 99 103   AXEL 9.3 9.0   CO2 29 31   BUN 39* 43*   CR 1.04 1.09*   * 94       Liver Function Studies -   Recent Labs   Lab Test 03/30/18  0854 10/15/17  0845   PROTTOTAL 7.4 5.8*   ALBUMIN 3.6 2.3*   BILITOTAL 0.7 0.8   ALKPHOS 160* 224*   AST 24 42   ALT 14 39       TSH   Date Value Ref Range Status   01/15/2018 4.13 (H) 0.40 - 4.00 mU/L Final   01/20/2016 1.94 0.40 - 4.00 mU/L Final   ]    Wt Readings from Last 5 Encounters:   03/06/20 65 kg (143 lb 3.2 oz)   03/04/20 65 kg (143 lb 3.2 oz)   03/02/20 65 kg (143 lb 3.2 oz)   02/27/20 64.8 kg (142 lb 12.8 oz)   02/26/20 64.5 kg (142 lb 3.2 oz)           ASSESSMENT/PLAN:  Diastolic dysfunction  Pulmonary hypertension (H)  Recent hospitalization due to dyspnea. She required diuresis with IV lasix with dopamine. She was diuresed about 10#. echo repeated in hospital and showed EF 52%, moderate to severe aortic regurgitation, mod to severe PH, elevated CVP and moderate circumferential pericardial effusion.  Lasix changed to oral and spironolactone added. Since in TCU she has had wt gain of 7#. She did follow up with cardiology on 3/2- spironolactone was increased to 25mg q day and torsemide was increased to 40mg BID. She continues to have large amount of LE swelling despite lymphedema wraps.   - daily wts with goal wt of 138#.  -continue torsemide 40mg BID  -continue spironolactone 25mg q day  -continue kcl 20 meq po q day  -BMP 3/9  -OCCUPATIONAL THERAPY treating LE edema w lymph wraps  -follow up with cardiology as planned    Chronic respiratory failure with hypoxia (H)  Mild persistent asthma without complication  During hospitalization she did have episode of increased wheezing. She used her rescue inhaler. She was just using oxygen at HS PTA but now needs  supplemental oxygen all day. Oxygen is being weaned. She is now down to 1.5L Per minute. OCCUPATIONAL THERAPY reports that sats did drop to mid 80s after exercise then back to 90s when oxygen increased to 2lpm.   -monitor respiratory status  -continue prn albuterol inh  -daily advair discus    Episode of decreased level of consciousness  Therapy also reports episodes of decreased levels alertness that is accompanied by weakness. This occurred yesterday during therapy session. She required wheelchair ride back to her room.  Today she reports she was awake during the night due to LE pain and due to need to urinate.  She recalls the spells. she was not orthostatic  ?SEIZURE vs syncope vs viral illness  -monitor for now.   -check orthostatic blood pressures.   -BMP hgb on 3/9    CKD (chronic kidney disease) stage 3, GFR 30-59 ml/min (H)  Creat at baseline  -BMP 3/9  -avoid nephrotoxins    Esophageal dysphagia  Esophageal stenosis  Esophageal diverticulum  While in hospital patient had EGD that showed stenosis and diverticulum proximal to stenosis that contained pills and food. She did have dilatation of stenotic area.   . SPEECH THERApy has downgraded diet to pureed for now. Although she is trying out next level.  she had scrambled eggs and pancakes this morning and seemed to swallow this ok  -return to   for repeat EGD- 4/7/20 at [facility]  -slowly advance texture of foods, no straws  -SPEECH THERAPY to follow closely  -continue omeprazole 40mg BID    Restless leg syndrome  Recent increase in ropinirole in hospital but now wants dose reduced  -continue ropinirole 0.5mg BID    Blindness of both eyes  Glaucoma suspect, left  S/p retinal detachments  Eye surgery to remove calcium deposits pending. She needs to be able to lie flat. We have reviewed eye drops   -continue edetate disodium 0.5% ophth solution to left eye QID  -continue cyclosporine (restasis) 0.05% one drop to left eye BID  -continue compounded sodium  hyaluronate 0.001%-BSS (Healon) one drop to left eye BID  -continue carboxymethylcellulose sodium 20% autologous serum one drop to left eye BID  - patient is blind    Physical deconditioning  L[facility]. She has been independent but she did work with physical therapy in hospital and found to have reduced activity tolerance. Since in TCU she is walking length of the hallway with one rest break.   -physical therapy and OCCUPATIONAL THERAPY   -SPEECH THERAPY   -SW to assist with discharge planning        Electronically signed by:  YUNIEL Rick CNP               Sincerely,        YUNIEL Rick CNP

## 2020-03-09 NOTE — LETTER
3/9/2020        RE: Rupinder Tracy  [address]  [facility] [address]        [place] [facility]  [place] [facility] Record Number:  [id number]  [facility] where encounter took place[facility] [[id number]]  Chief Complaint   Patient presents with     RECHECK       HPI:    Rupinder Tracy  is a 79 year old (1940), who is being seen today for an episodic care visit.  HPI information obtained from: facility chart records, facility staff, patient report and [facility] chart review. Today's concern is:  Brief Summary of Hospital Course: recent hospitalization due to shortness of breath. PMH includes CKD 3, asthma, hyperlipidemia, hypertension, diastolic dysfunction, reflux esophagitis. She does use oxygen during night and has chronic LE edema. She was sent to ED from GI clinic where she was seeking treatment for reflux.    Work up in ED included CXR that showed pulmonary edema. O2 sats were 87%. She was evaluated by cardiology who felt she had acute on chronic diastolic HF, moderate PH, mod to severe AR. Diuretics were increased to lasix 60mg BID with metolazone 2.5mg. She was diuresed about 10#, below usual weight of 140#. She was told her new goal wt is 138#.   GI was consulted due to chronic regurgitation. EGD on 2/14 showed benign esophageal stenosis with diverticulum. The stenosis was dilated. She was transferred to TCU for ongoing strengthening.    Updates on Status Since Skilled nursing Admission: in past week she has had some increased edema. OCCUPATIONAL THERAPY is concerned that despite lymphedema wraps the swelling continues.  She did go out to heart clinic on 3/2- spironolactone increased to 25mg q day, torsemide increased to 40mg BID.  She reports weekend went well.     Past Medical and Surgical History reviewed in Epic today.    MEDICATIONS:    Current Outpatient Medications   Medication Sig Dispense Refill     acetaminophen (TYLENOL)  500 MG tablet Take 1,000 mg by mouth 3 times daily       albuterol (ALBUTEROL) 108 (90 BASE) MCG/ACT inhaler Inhale 2 puffs into the lungs every 4 hours as needed 1 Inhaler 5     calcium carbonate-vitamin D 600-400 MG-UNIT PO chewable tablet Take 2 chew tab by mouth daily       calcium polycarbophil (FIBERCON) 625 MG tablet Take 2 tablets by mouth At Bedtime And Give 625 mg by mouth one time a day for Constipation       carboxymethylcellulose PF 0.5 % OP ophthalmic solution Place 1 drop Into the left eye 2 times daily 20% Autologous serum eye drops (Compounded - Keep Refrigerated)  Vital Tears (1 drop into the left eye 2 times daily)       COMPRESSION STOCKINGS 1 each daily 3 each 1     Cyanocobalamin (VITAMIN B-12 PO) Take 1,000 mcg by mouth daily        DOXYCYCLINE HYCLATE PO Take 50 mg by mouth 3 times per week Monday,wednesday, friday       fluticasone-salmeterol (ADVAIR DISKUS) 250-50 MCG/DOSE diskus inhaler Inhale 1 puff into the lungs 2 times daily 60 Inhaler 1     multivitamin w/minerals (MULTI-VITAMIN) tablet Take 1 tablet by mouth At Bedtime        omeprazole (PRILOSEC) 40 MG DR capsule Take 40 mg by mouth 2 times daily        ondansetron (ZOFRAN) 4 MG tablet Take 4 mg by mouth 3 times daily       potassium chloride ER (K-DUR/KLOR-CON M) 20 MEQ CR tablet Take 1 tablet (20 mEq) by mouth daily 90 tablet 2     RESTASIS 0.05 % OP EMUL 1 drop to left eye twice daily       rOPINIRole (REQUIP) 0.25 MG tablet Take 0.5 mg by mouth every evening. Take 2 tablets by mouth at bedtime.  Take 1 tablet by mouth in afternoon as needed.       sodium chloride 5 % OP ophthalmic ointment Place 1 Application Into the left eye daily Place 0.5 inches into left eye daily at bedtime.       Sodium Hyaluronate (HEALON IO) Place 1 drop Into the left eye daily compounded sodium hyaluronate 0.001%-BSS (AKA HEALON) eye drops .   Place 1 Drop into left eye daily. HEALON 10 mg/mL injection diluted with BSS. (compounded to be combination  "with prednisolone) Refrigerated       Sodium Hyaluronate (HEALON IO) WITH PREDNISOLONE MIXTURE 0.001- 0.25%       spironolactone 25 MG PO tablet Take 25 mg by mouth daily        torsemide 20 MG PO tablet Take 40 mg by mouth 2 times daily       Vitamin D3 25 mcg (1000 units) PO tablet Take 1 tablet by mouth At Bedtime        ascorbic acid 1000 MG PO TABS tablet Take 2,000 mg by mouth At Bedtime            REVIEW OF SYSTEMS:  4 point ROS including Respiratory, CV, GI and , other than that noted in the HPI,  is negative    Objective:  BP (!) 155/70   Pulse 102   Temp 96.7  F (35.9  C)   Resp 18   Ht 1.626 m (5' 4\")   Wt 63.9 kg (140 lb 12.8 oz)   LMP 01/01/1990   SpO2 98%   BMI 24.17 kg/m    Exam:  GENERAL APPEARANCE:  Alert, in no distress  ENT:  Mouth and posterior oropharynx normal, moist mucous membranes, hearing acuity adequate   EYES:  EOM, conjunctivae, lids-chronic changes  RESP:  respiratory effort and palpation of chest normal, no respiratory distress, Lung sounds clear  CV:  Palpation and auscultation of heart done , rate and rhythm reg, no murmur, no rub or gallop, Edema lymph wraps in place. Massive LE edema  ABDOMEN:  normal bowel sounds, soft, nontender  M/S:   Gait and station steady, Digits and nails normal   SKIN:  Inspection/Palpation of skin and subcutaneous tissue no rash  NEURO: 2-12 in normal limits and at patient's baseline  PSYCH:  insight and judgement, memory intact , affect and mood normal    Labs:   CBC RESULTS:   Recent Labs   Lab Test 02/24/20  0645 07/02/18  1105   WBC 4.4 6.7   RBC 3.41* 3.83   HGB 9.4* 12.2   HCT 30.8* 37.2   MCV 90 97   MCH 27.6 31.9   MCHC 30.5* 32.8   RDW 19.1* 14.2    202       Last Basic Metabolic Panel:  Last Comprehensive Metabolic Panel:  Sodium   Date Value Ref Range Status   03/09/2020 137 133 - 144 mmol/L Final     Potassium   Date Value Ref Range Status   03/09/2020 4.4 3.4 - 5.3 mmol/L Final     Chloride   Date Value Ref Range Status "   03/02/2020 99 94 - 109 mmol/L Final     Carbon Dioxide   Date Value Ref Range Status   03/02/2020 29 20 - 32 mmol/L Final     Anion Gap   Date Value Ref Range Status   03/02/2020 7 3 - 14 mmol/L Final     Glucose   Date Value Ref Range Status   03/02/2020 115 (H) 70 - 99 mg/dL Final     Urea Nitrogen   Date Value Ref Range Status   03/02/2020 39 (H) 7 - 30 mg/dL Final     Creatinine   Date Value Ref Range Status   03/09/2020 1.06 (H) 0.52 - 1.04 mg/dL Final     GFR Estimate   Date Value Ref Range Status   03/09/2020 49 (L) >60 mL/min/[1.73_m2] Final     Comment:     Non  GFR Calc  Starting 12/18/2018, serum creatinine based estimated GFR (eGFR) will be   calculated using the Chronic Kidney Disease Epidemiology Collaboration   (CKD-EPI) equation.       Calcium   Date Value Ref Range Status   03/02/2020 9.3 8.5 - 10.1 mg/dL Final     Recent Labs   Lab Test 03/02/20  0920 02/28/20  0645    136   POTASSIUM 3.8 4.2   CHLORIDE 99 103   AXEL 9.3 9.0   CO2 29 31   BUN 39* 43*   CR 1.04 1.09*   * 94       Liver Function Studies -   Recent Labs   Lab Test 03/30/18  0854 10/15/17  0845   PROTTOTAL 7.4 5.8*   ALBUMIN 3.6 2.3*   BILITOTAL 0.7 0.8   ALKPHOS 160* 224*   AST 24 42   ALT 14 39       TSH   Date Value Ref Range Status   01/15/2018 4.13 (H) 0.40 - 4.00 mU/L Final   01/20/2016 1.94 0.40 - 4.00 mU/L Final   ]    Wt Readings from Last 5 Encounters:   03/09/20 63.9 kg (140 lb 12.8 oz)   03/06/20 65 kg (143 lb 3.2 oz)   03/04/20 65 kg (143 lb 3.2 oz)   03/02/20 65 kg (143 lb 3.2 oz)   02/27/20 64.8 kg (142 lb 12.8 oz)         ASSESSMENT/PLAN:  Diastolic dysfunction  Pulmonary hypertension (H)  Recent hospitalization due to dyspnea. She required diuresis with IV lasix with dopamine. She was diuresed about 10#. echo repeated in hospital and showed EF 52%, moderate to severe aortic regurgitation, mod to severe PH, elevated CVP and moderate circumferential pericardial effusion.  Lasix changed to  oral and spironolactone added. Since in TCU she has had wt gain of 7#. She did follow up with cardiology on 3/2- spironolactone was increased to 25mg q day and torsemide was increased to 40mg BID. She continues to have large amount of LE swelling despite lymphedema wraps.   - daily wts with goal wt of 138#.  -continue torsemide 40mg BID  -continue spironolactone 25mg q day  -continue kcl 20 meq po q day  -[facility] 3/9- see above  -OCCUPATIONAL THERAPY treating LE edema w lymph wraps  -follow up with cardiology as planned on 3/31    Chronic respiratory failure with hypoxia (H)  Mild persistent asthma without complication  During hospitalization she did have episode of increased wheezing. She used her rescue inhaler. She was just using oxygen at HS PTA but now needs supplemental oxygen all day. Oxygen is being weaned. She is now at 2L Per minute. She will need oxygen 24/7 upon discharge   -monitor respiratory status  -continue prn albuterol inh  -daily advair discus  -set up home oxygen     Episode of decreased level of consciousness  Therapy also reports episodes of decreased levels alertness that is accompanied by weakness. This occurred last week during therapy session. She required wheelchair ride back to her room.  Today she reports no further episodes  ?SEIZURE vs syncope vs viral illness  -monitor for now. .   -BMP hgb on 3/9-see above  CKD (chronic kidney disease) stage 3, GFR 30-59 ml/min (H)  Creat at baseline  -[facility] 3/9- see above   -avoid nephrotoxins    Esophageal dysphagia  Esophageal stenosis  Esophageal diverticulum  While in hospital patient had EGD that showed stenosis and diverticulum proximal to stenosis that contained pills and food. She did have dilatation of stenotic area.   . SPEECH THERApy has downgraded diet to pureed for now. Although she is trying out next level. she had scrambled eggs and pancakes last week and seemed to swallow this ok  -return to   for repeat EGD- 4/7/20 at [facility]  -slowly advance texture of foods, no straws  -SPEECH THERAPY to follow closely  -continue omeprazole 40mg BID    Restless leg syndrome  Recent increase in ropinirole in hospital but now wants dose reduced  -continue ropinirole 0.5mg BID    Blindness of both eyes  Glaucoma suspect, left  S/p retinal detachments  Eye surgery to remove calcium deposits pending. She needs to be able to lie flat. We have reviewed eye drops   -continue edetate disodium 0.5% ophth solution to left eye QID  -continue cyclosporine (restasis) 0.05% one drop to left eye BID  -continue compounded sodium hyaluronate 0.001%-BSS (Healon) one drop to left eye BID  -continue carboxymethylcellulose sodium 20% autologous serum one drop to left eye BID  - patient is blind    Physical deconditioning  Lives at [facility]. She has been independent but she did work with physical therapy in hospital and found to have reduced activity tolerance. Since in TCU she is walking length of the hallway with one rest break.   -physical therapy and OCCUPATIONAL THERAPY   -SPEECH THERAPY   -SW to assist with discharge planning    She does report pain at left lower back. CXR done last week showed rib fracture.   Will repeat CXR in am to look for pain.         Electronically signed by:  YUNIEL Rick CNP               Sincerely,        YUNIEL Rick CNP

## 2020-03-09 NOTE — PROGRESS NOTES
[facility]  [place] [facility] Record Number:  [id number]  [facility] where encounter took place[facility] [[id number]]  Chief Complaint   Patient presents with     RECHECK       HPI:    Rupinder Tracy  is a 79 year old (1940), who is being seen today for an episodic care visit.  HPI information obtained from: facility chart records, facility staff, patient report and [facility] chart review. Today's concern is:  Brief Summary of Hospital Course: recent hospitalization due to shortness of breath. PMH includes CKD 3, asthma, hyperlipidemia, hypertension, diastolic dysfunction, reflux esophagitis. She does use oxygen during night and has chronic LE edema. She was sent to ED from GI clinic where she was seeking treatment for reflux.    Work up in ED included CXR that showed pulmonary edema. O2 sats were 87%. She was evaluated by cardiology who felt she had acute on chronic diastolic HF, moderate PH, mod to severe AR. Diuretics were increased to lasix 60mg BID with metolazone 2.5mg. She was diuresed about 10#, below usual weight of 140#. She was told her new goal wt is 138#.   GI was consulted due to chronic regurgitation. EGD on 2/14 showed benign esophageal stenosis with diverticulum. The stenosis was dilated. She was transferred to TCU for ongoing strengthening.    Updates on Status Since Skilled nursing Admission: in past week she has had some increased edema. OCCUPATIONAL THERAPY is concerned that despite lymphedema wraps the swelling continues.  She did go out to heart clinic on 3/2- spironolactone increased to 25mg q day, torsemide increased to 40mg BID.  She reports weekend went well.     Past Medical and Surgical History reviewed in Epic today.    MEDICATIONS:    Current Outpatient Medications   Medication Sig Dispense Refill     acetaminophen (TYLENOL) 500 MG tablet Take 1,000 mg by mouth 3 times daily       albuterol (ALBUTEROL) 108 (90 BASE) MCG/ACT  inhaler Inhale 2 puffs into the lungs every 4 hours as needed 1 Inhaler 5     calcium carbonate-vitamin D 600-400 MG-UNIT PO chewable tablet Take 2 chew tab by mouth daily       calcium polycarbophil (FIBERCON) 625 MG tablet Take 2 tablets by mouth At Bedtime And Give 625 mg by mouth one time a day for Constipation       carboxymethylcellulose PF 0.5 % OP ophthalmic solution Place 1 drop Into the left eye 2 times daily 20% Autologous serum eye drops (Compounded - Keep Refrigerated)  Vital Tears (1 drop into the left eye 2 times daily)       COMPRESSION STOCKINGS 1 each daily 3 each 1     Cyanocobalamin (VITAMIN B-12 PO) Take 1,000 mcg by mouth daily        DOXYCYCLINE HYCLATE PO Take 50 mg by mouth 3 times per week Monday,wednesday, friday       fluticasone-salmeterol (ADVAIR DISKUS) 250-50 MCG/DOSE diskus inhaler Inhale 1 puff into the lungs 2 times daily 60 Inhaler 1     multivitamin w/minerals (MULTI-VITAMIN) tablet Take 1 tablet by mouth At Bedtime        omeprazole (PRILOSEC) 40 MG DR capsule Take 40 mg by mouth 2 times daily        ondansetron (ZOFRAN) 4 MG tablet Take 4 mg by mouth 3 times daily       potassium chloride ER (K-DUR/KLOR-CON M) 20 MEQ CR tablet Take 1 tablet (20 mEq) by mouth daily 90 tablet 2     RESTASIS 0.05 % OP EMUL 1 drop to left eye twice daily       rOPINIRole (REQUIP) 0.25 MG tablet Take 0.5 mg by mouth every evening. Take 2 tablets by mouth at bedtime.  Take 1 tablet by mouth in afternoon as needed.       sodium chloride 5 % OP ophthalmic ointment Place 1 Application Into the left eye daily Place 0.5 inches into left eye daily at bedtime.       Sodium Hyaluronate (HEALON IO) Place 1 drop Into the left eye daily compounded sodium hyaluronate 0.001%-BSS (AKA HEALON) eye drops .   Place 1 Drop into left eye daily. HEALON 10 mg/mL injection diluted with BSS. (compounded to be combination with prednisolone) Refrigerated       Sodium Hyaluronate (HEALON IO) WITH PREDNISOLONE MIXTURE 0.001-  "0.25%       spironolactone 25 MG PO tablet Take 25 mg by mouth daily        torsemide 20 MG PO tablet Take 40 mg by mouth 2 times daily       Vitamin D3 25 mcg (1000 units) PO tablet Take 1 tablet by mouth At Bedtime        ascorbic acid 1000 MG PO TABS tablet Take 2,000 mg by mouth At Bedtime            REVIEW OF SYSTEMS:  4 point ROS including Respiratory, CV, GI and , other than that noted in the HPI,  is negative    Objective:  BP (!) 155/70   Pulse 102   Temp 96.7  F (35.9  C)   Resp 18   Ht 1.626 m (5' 4\")   Wt 63.9 kg (140 lb 12.8 oz)   LMP 01/01/1990   SpO2 98%   BMI 24.17 kg/m    Exam:  GENERAL APPEARANCE:  Alert, in no distress  ENT:  Mouth and posterior oropharynx normal, moist mucous membranes, hearing acuity adequate   EYES:  EOM, conjunctivae, lids-chronic changes  RESP:  respiratory effort and palpation of chest normal, no respiratory distress, Lung sounds clear  CV:  Palpation and auscultation of heart done , rate and rhythm reg, no murmur, no rub or gallop, Edema lymph wraps in place. Massive LE edema  ABDOMEN:  normal bowel sounds, soft, nontender  M/S:   Gait and station steady, Digits and nails normal   SKIN:  Inspection/Palpation of skin and subcutaneous tissue no rash  NEURO: 2-12 in normal limits and at patient's baseline  PSYCH:  insight and judgement, memory intact , affect and mood normal    Labs:   CBC RESULTS:   Recent Labs   Lab Test 02/24/20  0645 07/02/18  1105   WBC 4.4 6.7   RBC 3.41* 3.83   HGB 9.4* 12.2   HCT 30.8* 37.2   MCV 90 97   MCH 27.6 31.9   MCHC 30.5* 32.8   RDW 19.1* 14.2    202       Last Basic Metabolic Panel:  Last Comprehensive Metabolic Panel:  Sodium   Date Value Ref Range Status   03/09/2020 137 133 - 144 mmol/L Final     Potassium   Date Value Ref Range Status   03/09/2020 4.4 3.4 - 5.3 mmol/L Final     Chloride   Date Value Ref Range Status   03/02/2020 99 94 - 109 mmol/L Final     Carbon Dioxide   Date Value Ref Range Status   03/02/2020 29 20 - " 32 mmol/L Final     Anion Gap   Date Value Ref Range Status   03/02/2020 7 3 - 14 mmol/L Final     Glucose   Date Value Ref Range Status   03/02/2020 115 (H) 70 - 99 mg/dL Final     Urea Nitrogen   Date Value Ref Range Status   03/02/2020 39 (H) 7 - 30 mg/dL Final     Creatinine   Date Value Ref Range Status   03/09/2020 1.06 (H) 0.52 - 1.04 mg/dL Final     GFR Estimate   Date Value Ref Range Status   03/09/2020 49 (L) >60 mL/min/[1.73_m2] Final     Comment:     Non  GFR Calc  Starting 12/18/2018, serum creatinine based estimated GFR (eGFR) will be   calculated using the Chronic Kidney Disease Epidemiology Collaboration   (CKD-EPI) equation.       Calcium   Date Value Ref Range Status   03/02/2020 9.3 8.5 - 10.1 mg/dL Final     Recent Labs   Lab Test 03/02/20  0920 02/28/20  0645    136   POTASSIUM 3.8 4.2   CHLORIDE 99 103   AXEL 9.3 9.0   CO2 29 31   BUN 39* 43*   CR 1.04 1.09*   * 94       Liver Function Studies -   Recent Labs   Lab Test 03/30/18  0854 10/15/17  0845   PROTTOTAL 7.4 5.8*   ALBUMIN 3.6 2.3*   BILITOTAL 0.7 0.8   ALKPHOS 160* 224*   AST 24 42   ALT 14 39       TSH   Date Value Ref Range Status   01/15/2018 4.13 (H) 0.40 - 4.00 mU/L Final   01/20/2016 1.94 0.40 - 4.00 mU/L Final   ]    Wt Readings from Last 5 Encounters:   03/09/20 63.9 kg (140 lb 12.8 oz)   03/06/20 65 kg (143 lb 3.2 oz)   03/04/20 65 kg (143 lb 3.2 oz)   03/02/20 65 kg (143 lb 3.2 oz)   02/27/20 64.8 kg (142 lb 12.8 oz)         ASSESSMENT/PLAN:  Diastolic dysfunction  Pulmonary hypertension (H)  Recent hospitalization due to dyspnea. She required diuresis with IV lasix with dopamine. She was diuresed about 10#. echo repeated in hospital and showed EF 52%, moderate to severe aortic regurgitation, mod to severe PH, elevated CVP and moderate circumferential pericardial effusion.  Lasix changed to oral and spironolactone added. Since in TCU she has had wt gain of 7#. She did follow up with cardiology on  3/2- spironolactone was increased to 25mg q day and torsemide was increased to 40mg BID. She continues to have large amount of LE swelling despite lymphedema wraps.   - daily wts with goal wt of 138#.  -continue torsemide 40mg BID  -continue spironolactone 25mg q day  -continue kcl 20 meq po q day  -BMP 3/9- see above  -OCCUPATIONAL THERAPY treating LE edema w lymph wraps  -follow up with cardiology as planned on 3/31    Chronic respiratory failure with hypoxia (H)  Mild persistent asthma without complication  During hospitalization she did have episode of increased wheezing. She used her rescue inhaler. She was just using oxygen at HS PTA but now needs supplemental oxygen all day. Oxygen is being weaned. She is now at 2L Per minute. She will need oxygen 24/7 upon discharge   -monitor respiratory status  -continue prn albuterol inh  -daily advair discus  -set up home oxygen     Episode of decreased level of consciousness  Therapy also reports episodes of decreased levels alertness that is accompanied by weakness. This occurred last week during therapy session. She required wheelchair ride back to her room.  Today she reports no further episodes  ?SEIZURE vs syncope vs viral illness  -monitor for now. .   -BMP hgb on 3/9-see above  CKD (chronic kidney disease) stage 3, GFR 30-59 ml/min (H)  Creat at baseline  -BMP 3/9- see above   -avoid nephrotoxins    Esophageal dysphagia  Esophageal stenosis  Esophageal diverticulum  While in hospital patient had EGD that showed stenosis and diverticulum proximal to stenosis that contained pills and food. She did have dilatation of stenotic area.   . SPEECH THERApy has downgraded diet to pureed for now. Although she is trying out next level. she had scrambled eggs and pancakes last week and seemed to swallow this ok  -return to GI  for repeat EGD- 4/7/20 at [facility]  -slowly advance texture of foods, no straws  -SPEECH THERAPY to follow closely  -continue omeprazole 40mg  BID    Restless leg syndrome  Recent increase in ropinirole in hospital but now wants dose reduced  -continue ropinirole 0.5mg BID    Blindness of both eyes  Glaucoma suspect, left  S/p retinal detachments  Eye surgery to remove calcium deposits pending. She needs to be able to lie flat. We have reviewed eye drops   -continue edetate disodium 0.5% ophth solution to left eye QID  -continue cyclosporine (restasis) 0.05% one drop to left eye BID  -continue compounded sodium hyaluronate 0.001%-BSS (Healon) one drop to left eye BID  -continue carboxymethylcellulose sodium 20% autologous serum one drop to left eye BID  - patient is blind    Physical deconditioning  Lives at [facility]. She has been independent but she did work with physical therapy in hospital and found to have reduced activity tolerance. Since in TCU she is walking length of the hallway with one rest break.   -physical therapy and OCCUPATIONAL THERAPY   -SPEECH THERAPY   -SW to assist with discharge planning    She does report pain at left lower back. CXR done last week showed rib fracture.   Will repeat CXR in am to look for pain.         Electronically signed by:  YUNIEL Rick CNP

## 2020-03-11 NOTE — LETTER
3/11/2020        RE: Rupinder Tracy  [address]  [facility] [address]        [place] [facility]  [place] Medical Record Number:  [id number]  [facility] of [facility] where encounter took place[facility] [[id number]]  Chief Complaint   Patient presents with     RECHECK       HPI:    Rupinder Tracy  is a 79 year old (1940), who is being seen today for an episodic care visit.  HPI information obtained from: facility chart records, facility staff, patient report and [facility] chart review. Today's concern is:  Brief Summary of Hospital Course: recent hospitalization due to shortness of breath. PMH includes CKD 3, asthma, hyperlipidemia, hypertension, diastolic dysfunction, reflux esophagitis. She does use oxygen during night and has chronic LE edema. She was sent to ED from GI clinic where she was seeking treatment for reflux.    Work up in ED included CXR that showed pulmonary edema. O2 sats were 87%. She was evaluated by cardiology who felt she had acute on chronic diastolic HF, moderate PH, mod to severe AR. Diuretics were increased to lasix 60mg BID with metolazone 2.5mg. She was diuresed about 10#, below usual weight of 140#. She was told her new goal wt is 138#.   GI was consulted due to chronic regurgitation. EGD on 2/14 showed benign esophageal stenosis with diverticulum. The stenosis was dilated. She was transferred to TCU for ongoing strengthening.    Updates on Status Since Skilled nursing Admission: in past week she has had  increased LE edema despite drop in weight.   She did go out to heart clinic on 3/2- spironolactone increased to 25mg q day, torsemide increased to 40mg BID.  She denies shortness of breath or pain    Past Medical and Surgical History reviewed in Epic today.    MEDICATIONS:    Current Outpatient Medications   Medication Sig Dispense Refill     acetaminophen (TYLENOL) 500 MG tablet Take 1,000 mg by mouth 3 times daily        albuterol (ALBUTEROL) 108 (90 BASE) MCG/ACT inhaler Inhale 2 puffs into the lungs every 4 hours as needed 1 Inhaler 5     calcium carbonate-vitamin D 600-400 MG-UNIT PO chewable tablet Take 2 chew tab by mouth daily       calcium polycarbophil (FIBERCON) 625 MG tablet Take 2 tablets by mouth At Bedtime And Give 625 mg by mouth one time a day for Constipation       carboxymethylcellulose PF 0.5 % OP ophthalmic solution Place 1 drop Into the left eye 2 times daily 20% Autologous serum eye drops (Compounded - Keep Refrigerated)  Vital Tears (1 drop into the left eye 2 times daily)       COMPRESSION STOCKINGS 1 each daily 3 each 1     Cyanocobalamin (VITAMIN B-12 PO) Take 1,000 mcg by mouth daily        DOXYCYCLINE HYCLATE PO Take 50 mg by mouth 3 times per week Monday,wednesday, friday       fluticasone-salmeterol (ADVAIR DISKUS) 250-50 MCG/DOSE diskus inhaler Inhale 1 puff into the lungs 2 times daily 60 Inhaler 1     multivitamin w/minerals (MULTI-VITAMIN) tablet Take 1 tablet by mouth At Bedtime        omeprazole (PRILOSEC) 40 MG DR capsule Take 40 mg by mouth 2 times daily        ondansetron (ZOFRAN) 4 MG tablet Take 4 mg by mouth 3 times daily       potassium chloride ER (K-DUR/KLOR-CON M) 20 MEQ CR tablet Take 1 tablet (20 mEq) by mouth daily 90 tablet 2     rOPINIRole (REQUIP) 0.25 MG tablet Take 0.5 mg by mouth every evening. Take 2 tablets by mouth at bedtime.  Take 1 tablet by mouth in afternoon as needed.       sodium chloride 5 % OP ophthalmic ointment Place 1 Application Into the left eye daily Place 0.5 inches into left eye daily at bedtime.       Sodium Hyaluronate (HEALON IO) Place 1 drop Into the left eye daily compounded sodium hyaluronate 0.001%-BSS (AKA HEALON) eye drops .   Place 1 Drop into left eye daily. HEALON 10 mg/mL injection diluted with BSS. (compounded to be combination with prednisolone) Refrigerated       Sodium Hyaluronate (HEALON IO) WITH PREDNISOLONE MIXTURE 0.001- 0.25%        "spironolactone 25 MG PO tablet Take 25 mg by mouth daily        torsemide 20 MG PO tablet Take 40 mg by mouth 2 times daily       Vitamin D3 25 mcg (1000 units) PO tablet Take 1 tablet by mouth At Bedtime        ascorbic acid 1000 MG PO TABS tablet Take 2,000 mg by mouth At Bedtime        RESTASIS 0.05 % OP EMUL 1 drop to left eye twice daily           REVIEW OF SYSTEMS:  4 point ROS including Respiratory, CV, GI and , other than that noted in the HPI,  is negative    Objective:  /73   Pulse 97   Temp 97.1  F (36.2  C)   Resp 22   Ht 1.626 m (5' 4\")   Wt 62.9 kg (138 lb 9.6 oz)   LMP 01/01/1990   SpO2 98%   BMI 23.79 kg/m    Exam:  GENERAL APPEARANCE:  Alert, in no distress  ENT:  Mouth and posterior oropharynx normal, moist mucous membranes, hearing acuity adequate   EYES:  EOM, conjunctivae, lids, pupils and irises- chronic changes    RESP:  respiratory effort and palpation of chest normal, no respiratory distress,   CV: Edema lymphedema wraps in place  ABDOMEN:  normal bowel sounds, soft, nontender,  M/S:   Gait and station steady with walker, Digits and nails normal   SKIN:  Inspection/Palpation of skin and subcutaneous tissue no rash  NEURO: 2-12 in normal limits and at patient's baseline  PSYCH:  insight and judgement, memory intact , affect and mood normal    Labs:   CBC RESULTS:   Recent Labs   Lab Test 02/24/20  0645 07/02/18  1105   WBC 4.4 6.7   RBC 3.41* 3.83   HGB 9.4* 12.2   HCT 30.8* 37.2   MCV 90 97   MCH 27.6 31.9   MCHC 30.5* 32.8   RDW 19.1* 14.2    202       Last Basic Metabolic Panel:  Recent Labs   Lab Test 03/09/20  1239 03/02/20  0920 02/28/20  0645    135 136   POTASSIUM 4.4 3.8 4.2   CHLORIDE  --  99 103   AXEL  --  9.3 9.0   CO2  --  29 31   BUN  --  39* 43*   CR 1.06* 1.04 1.09*   GLC  --  115* 94       Liver Function Studies -   Recent Labs   Lab Test 03/30/18  0854 10/15/17  0845   PROTTOTAL 7.4 5.8*   ALBUMIN 3.6 2.3*   BILITOTAL 0.7 0.8   ALKPHOS 160* 224* "   AST 24 42   ALT 14 39       TSH   Date Value Ref Range Status   01/15/2018 4.13 (H) 0.40 - 4.00 mU/L Final   01/20/2016 1.94 0.40 - 4.00 mU/L Final   ]    Wt Readings from Last 5 Encounters:   03/11/20 62.9 kg (138 lb 9.6 oz)   03/09/20 63.9 kg (140 lb 12.8 oz)   03/06/20 65 kg (143 lb 3.2 oz)   03/04/20 65 kg (143 lb 3.2 oz)   03/02/20 65 kg (143 lb 3.2 oz)       3/10-CXR negative    ASSESSMENT/PLAN:  Diastolic dysfunction  Pulmonary hypertension (H)  Recent hospitalization due to dyspnea. She required diuresis with IV lasix with dopamine. She was diuresed about 10#. echo repeated in hospital and showed EF 52%, moderate to severe aortic regurgitation, mod to severe PH, elevated CVP and moderate circumferential pericardial effusion.  Lasix changed to oral and spironolactone added. Since in TCU she had wt gain of 7#. She did follow up with cardiology on 3/2- spironolactone was increased to 25mg q day and torsemide was increased to 40mg BID. Weight is starting to drop. She continues to have large amount of LE swelling despite lymphedema wraps.   - daily wts with goal wt of 138#.  -continue torsemide 40mg BID  -continue spironolactone 25mg q day  -continue kcl 20 meq po q day  -OCCUPATIONAL THERAPY treating LE edema w lymph wraps  -follow up with cardiology as planned on 3/31    Chronic respiratory failure with hypoxia (H)  Mild persistent asthma without complication  During hospitalization she did have episode of increased wheezing. She used her rescue inhaler. She was just using oxygen at HS PTA but now needs supplemental oxygen all day. Oxygen is being weaned. She is now at 2L Per minute and O2 sats 97-98%. Will reduce oxygen to 1.5l for walk back from therapy gym. After walking and talking her sats dropped to mid 80s. She quickly recovered to 93% once she sat down.   -monitor respiratory status  -continue prn albuterol inh  -daily advair discus  -set up home oxygen     CKD (chronic kidney disease) stage 3, GFR  30-59 ml/min (H)  Creat at baseline  -[facility] 3/9- see above   -avoid nephrotoxins    Esophageal dysphagia  Esophageal stenosis  Esophageal diverticulum  While in hospital patient had EGD that showed stenosis and diverticulum proximal to stenosis that contained pills and food. She did have dilatation of stenotic area.   . SPEECH THERApy has been working with her. Her diet was upgraded to DD2 with thin liquids  -return to   for repeat EGD- 4/7/20 at [facility]  -slowly advance texture of foods, no straws  -SPEECH THERAPY to follow closely  -continue omeprazole 40mg BID    Restless leg syndrome  Recent increase in ropinirole in hospital but now wants dose reduced  -continue ropinirole 0.5mg BID    Blindness of both eyes  Glaucoma suspect, left  S/p retinal detachments  Eye surgery to remove calcium deposits pending. She needs to be able to lie flat. We have reviewed eye drops   -continue edetate disodium 0.5% ophth solution to left eye QID  -continue cyclosporine (restasis) 0.05% one drop to left eye BID  -continue compounded sodium hyaluronate 0.001%-BSS (Healon) one drop to left eye BID  -continue carboxymethylcellulose sodium 20% autologous serum one drop to left eye BID  - patient is blind    Physical deconditioning  L[facility] at [facility]. She has been independent but she did work with physical therapy in hospital and found to have reduced activity tolerance. Since in TCU she is walking length of the hallway with one rest break.   -physical therapy and OCCUPATIONAL THERAPY   -SPEECH THERAPY   -SW to assist with discharge planning          Electronically signed by:  YUNIEL Rick CNP               Sincerely,        YUNIEL Rick CNP

## 2020-03-11 NOTE — PROGRESS NOTES
[place] [facility]  [place] [facility] Record Number:  [id number]  [facility] of Service where encounter took place[facility] [[id number]]  Chief Complaint   Patient presents with     RECHECK       HPI:    Rupinder Tracy  is a 79 year old (1940), who is being seen today for an episodic care visit.  HPI information obtained from: facility chart records, facility staff, patient report and [facility] chart review. Today's concern is:  Brief Summary of Hospital Course: recent hospitalization due to shortness of breath. PMH includes CKD 3, asthma, hyperlipidemia, hypertension, diastolic dysfunction, reflux esophagitis. She does use oxygen during night and has chronic LE edema. She was sent to ED from GI clinic where she was seeking treatment for reflux.    Work up in ED included CXR that showed pulmonary edema. O2 sats were 87%. She was evaluated by cardiology who felt she had acute on chronic diastolic HF, moderate PH, mod to severe AR. Diuretics were increased to lasix 60mg BID with metolazone 2.5mg. She was diuresed about 10#, below usual weight of 140#. She was told her new goal wt is 138#.   GI was consulted due to chronic regurgitation. EGD on 2/14 showed benign esophageal stenosis with diverticulum. The stenosis was dilated. She was transferred to TCU for ongoing strengthening.    Updates on Status Since Skilled nursing Admission: in past week she has had  increased LE edema despite drop in weight.   She did go out to heart clinic on 3/2- spironolactone increased to 25mg q day, torsemide increased to 40mg BID.  She denies shortness of breath or pain    Past Medical and Surgical History reviewed in Epic today.    MEDICATIONS:    Current Outpatient Medications   Medication Sig Dispense Refill     acetaminophen (TYLENOL) 500 MG tablet Take 1,000 mg by mouth 3 times daily       albuterol (ALBUTEROL) 108 (90 BASE) MCG/ACT inhaler Inhale 2 puffs into the lungs every 4 hours as  needed 1 Inhaler 5     calcium carbonate-vitamin D 600-400 MG-UNIT PO chewable tablet Take 2 chew tab by mouth daily       calcium polycarbophil (FIBERCON) 625 MG tablet Take 2 tablets by mouth At Bedtime And Give 625 mg by mouth one time a day for Constipation       carboxymethylcellulose PF 0.5 % OP ophthalmic solution Place 1 drop Into the left eye 2 times daily 20% Autologous serum eye drops (Compounded - Keep Refrigerated)  Vital Tears (1 drop into the left eye 2 times daily)       COMPRESSION STOCKINGS 1 each daily 3 each 1     Cyanocobalamin (VITAMIN B-12 PO) Take 1,000 mcg by mouth daily        DOXYCYCLINE HYCLATE PO Take 50 mg by mouth 3 times per week Monday,wednesday, friday       fluticasone-salmeterol (ADVAIR DISKUS) 250-50 MCG/DOSE diskus inhaler Inhale 1 puff into the lungs 2 times daily 60 Inhaler 1     multivitamin w/minerals (MULTI-VITAMIN) tablet Take 1 tablet by mouth At Bedtime        omeprazole (PRILOSEC) 40 MG DR capsule Take 40 mg by mouth 2 times daily        ondansetron (ZOFRAN) 4 MG tablet Take 4 mg by mouth 3 times daily       potassium chloride ER (K-DUR/KLOR-CON M) 20 MEQ CR tablet Take 1 tablet (20 mEq) by mouth daily 90 tablet 2     rOPINIRole (REQUIP) 0.25 MG tablet Take 0.5 mg by mouth every evening. Take 2 tablets by mouth at bedtime.  Take 1 tablet by mouth in afternoon as needed.       sodium chloride 5 % OP ophthalmic ointment Place 1 Application Into the left eye daily Place 0.5 inches into left eye daily at bedtime.       Sodium Hyaluronate (HEALON IO) Place 1 drop Into the left eye daily compounded sodium hyaluronate 0.001%-BSS (AKA HEALON) eye drops .   Place 1 Drop into left eye daily. HEALON 10 mg/mL injection diluted with BSS. (compounded to be combination with prednisolone) Refrigerated       Sodium Hyaluronate (HEALON IO) WITH PREDNISOLONE MIXTURE 0.001- 0.25%       spironolactone 25 MG PO tablet Take 25 mg by mouth daily        torsemide 20 MG PO tablet Take 40 mg by  "mouth 2 times daily       Vitamin D3 25 mcg (1000 units) PO tablet Take 1 tablet by mouth At Bedtime        ascorbic acid 1000 MG PO TABS tablet Take 2,000 mg by mouth At Bedtime        RESTASIS 0.05 % OP EMUL 1 drop to left eye twice daily           REVIEW OF SYSTEMS:  4 point ROS including Respiratory, CV, GI and , other than that noted in the HPI,  is negative    Objective:  /73   Pulse 97   Temp 97.1  F (36.2  C)   Resp 22   Ht 1.626 m (5' 4\")   Wt 62.9 kg (138 lb 9.6 oz)   LMP 01/01/1990   SpO2 98%   BMI 23.79 kg/m    Exam:  GENERAL APPEARANCE:  Alert, in no distress  ENT:  Mouth and posterior oropharynx normal, moist mucous membranes, hearing acuity adequate   EYES:  EOM, conjunctivae, lids, pupils and irises- chronic changes    RESP:  respiratory effort and palpation of chest normal, no respiratory distress,   CV: Edema lymphedema wraps in place  ABDOMEN:  normal bowel sounds, soft, nontender,  M/S:   Gait and station steady with walker, Digits and nails normal   SKIN:  Inspection/Palpation of skin and subcutaneous tissue no rash  NEURO: 2-12 in normal limits and at patient's baseline  PSYCH:  insight and judgement, memory intact , affect and mood normal    Labs:   CBC RESULTS:   Recent Labs   Lab Test 02/24/20  0645 07/02/18  1105   WBC 4.4 6.7   RBC 3.41* 3.83   HGB 9.4* 12.2   HCT 30.8* 37.2   MCV 90 97   MCH 27.6 31.9   MCHC 30.5* 32.8   RDW 19.1* 14.2    202       Last Basic Metabolic Panel:  Recent Labs   Lab Test 03/09/20  1239 03/02/20  0920 02/28/20  0645    135 136   POTASSIUM 4.4 3.8 4.2   CHLORIDE  --  99 103   AXEL  --  9.3 9.0   CO2  --  29 31   BUN  --  39* 43*   CR 1.06* 1.04 1.09*   GLC  --  115* 94       Liver Function Studies -   Recent Labs   Lab Test 03/30/18  0854 10/15/17  0845   PROTTOTAL 7.4 5.8*   ALBUMIN 3.6 2.3*   BILITOTAL 0.7 0.8   ALKPHOS 160* 224*   AST 24 42   ALT 14 39       TSH   Date Value Ref Range Status   01/15/2018 4.13 (H) 0.40 - 4.00 mU/L " Final   01/20/2016 1.94 0.40 - 4.00 mU/L Final   ]    Wt Readings from Last 5 Encounters:   03/11/20 62.9 kg (138 lb 9.6 oz)   03/09/20 63.9 kg (140 lb 12.8 oz)   03/06/20 65 kg (143 lb 3.2 oz)   03/04/20 65 kg (143 lb 3.2 oz)   03/02/20 65 kg (143 lb 3.2 oz)       3/10-CXR negative    ASSESSMENT/PLAN:  Diastolic dysfunction  Pulmonary hypertension (H)  Recent hospitalization due to dyspnea. She required diuresis with IV lasix with dopamine. She was diuresed about 10#. echo repeated in hospital and showed EF 52%, moderate to severe aortic regurgitation, mod to severe PH, elevated CVP and moderate circumferential pericardial effusion.  Lasix changed to oral and spironolactone added. Since in TCU she had wt gain of 7#. She did follow up with cardiology on 3/2- spironolactone was increased to 25mg q day and torsemide was increased to 40mg BID. Weight is starting to drop. She continues to have large amount of LE swelling despite lymphedema wraps.   - daily wts with goal wt of 138#.  -continue torsemide 40mg BID  -continue spironolactone 25mg q day  -continue kcl 20 meq po q day  -OCCUPATIONAL THERAPY treating LE edema w lymph wraps  -follow up with cardiology as planned on 3/31    Chronic respiratory failure with hypoxia (H)  Mild persistent asthma without complication  During hospitalization she did have episode of increased wheezing. She used her rescue inhaler. She was just using oxygen at HS PTA but now needs supplemental oxygen all day. Oxygen is being weaned. She is now at 2L Per minute and O2 sats 97-98%. Will reduce oxygen to 1.5l for walk back from therapy gym. After walking and talking her sats dropped to mid 80s. She quickly recovered to 93% once she sat down.   -monitor respiratory status  -continue prn albuterol inh  -daily advair discus  -set up home oxygen     CKD (chronic kidney disease) stage 3, GFR 30-59 ml/min (H)  Creat at baseline  -[facility] 3/9- see above   -avoid nephrotoxins    Esophageal  dysphagia  Esophageal stenosis  Esophageal diverticulum  While in hospital patient had EGD that showed stenosis and diverticulum proximal to stenosis that contained pills and food. She did have dilatation of stenotic area.   . SPEECH THERApy has been working with her. Her diet was upgraded to DD2 with thin liquids  -return to GI  for repeat EGD- 4/7/20 at [facility]  -slowly advance texture of foods, no straws  -SPEECH THERAPY to follow closely  -continue omeprazole 40mg BID    Restless leg syndrome  Recent increase in ropinirole in hospital but now wants dose reduced  -continue ropinirole 0.5mg BID    Blindness of both eyes  Glaucoma suspect, left  S/p retinal detachments  Eye surgery to remove calcium deposits pending. She needs to be able to lie flat. We have reviewed eye drops   -continue edetate disodium 0.5% ophth solution to left eye QID  -continue cyclosporine (restasis) 0.05% one drop to left eye BID  -continue compounded sodium hyaluronate 0.001%-BSS (Healon) one drop to left eye BID  -continue carboxymethylcellulose sodium 20% autologous serum one drop to left eye BID  - patient is blind    Physical deconditioning  [facility]. She has been independent but she did work with physical therapy in hospital and found to have reduced activity tolerance. Since in TCU she is walking length of the hallway with one rest break.   -physical therapy and OCCUPATIONAL THERAPY   -SPEECH THERAPY   -SW to assist with discharge planning          Electronically signed by:  YUNIEL Rick CNP

## 2020-03-13 NOTE — LETTER
3/13/2020        RE: Rupinder Tracy  [address]  [facility] [address]        [facility] DISCHARGE SUMMARY  PATIENT'S NAME: Rupinder Tracy  YOB: 1940  MEDICAL RECORD NUMBER:  [id number]  Place of Service where encounter took place:  [facility] ([facility]) [[id number]]    PRIMARY CARE PROVIDER AND CLINIC RESPONSIBLE AFTER TRANSFER:   Lotus Grant MD, [facility] [address] / [place] [address]    Non-[facility] Provider     Transferring providers: YUNIEL Rick CNP, Debbie Medrano MD  Recent Hospitalization/ED:  [facility]  stay 02/11/2020 to 02/19/2020.  Date of SNF Admission: February / 19 / 2020  Date of SNF (anticipated) Discharge: March / 16 / 2020  Discharged to: previous independent home  Cognitive Scores: SLUMS: 22/30  Physical Function: Ambulating 150 ft with walker  DME: Home Oxygen    CODE STATUS/ADVANCE DIRECTIVES DISCUSSION:  DNR / DNI   ALLERGIES: Atorvastatin calcium; Celecoxib; Cholestyramine; Crestor [rosuvastatin calcium]; Cyclobenzaprine hcl; Dulera; Gabapentin; Lisinopril; Meperidine hcl; Morphine; Naprosyn [naproxen]; Niaspan [niacin]; Percocet [oxycodone-acetaminophen]; Pravastatin; Red yeast rice [cholestin]; Simvastatin; Timolol maleate; Hctz; and Sulfa drugs    DISCHARGE DIAGNOSIS/NURSING FACILITY COURSE:   Brief Summary of Hospital Course: recent hospitalization due to shortness of breath. PMH includes CKD 3, asthma, hyperlipidemia, hypertension, diastolic dysfunction, reflux esophagitis. She does use oxygen during night and has chronic LE edema. She was sent to ED from GI clinic where she was seeking treatment for reflux.    Work up in ED included CXR that showed pulmonary edema. O2 sats were 87%. She was evaluated by cardiology who felt she had acute on chronic diastolic HF, moderate PH, mod to severe AR. Diuretics were increased to lasix 60mg BID with metolazone 2.5mg. She was diuresed  about 10#, below usual weight of 140#. She was told her new goal wt is 138#.   GI was consulted due to chronic regurgitation. EGD on 2/14 showed benign esophageal stenosis with diverticulum. The stenosis was dilated. She was transferred to TCU for ongoing strengthening.  Diastolic dysfunction  Pulmonary hypertension (H)  Recent hospitalization due to dyspnea. She required diuresis with IV lasix with dopamine. She was diuresed about 10#. echo repeated in hospital and showed EF 52%, moderate to severe aortic regurgitation, mod to severe PH, elevated CVP and moderate circumferential pericardial effusion.  Lasix changed to oral and spironolactone added. Since in TCU she has had wt gain of 7#. She did follow up with cardiology on 3/2- spironolactone was increased to 25mg q day and torsemide was increased to 40mg BID. She continues to have large amount of LE swelling despite lymphedema wraps.   - daily wts with goal wt of 138#. Patient has talking scale and she does weight herself daily.   -continue torsemide 40mg BID  -continue spironolactone 25mg q day  -continue kcl 20 meq po q day  -OCCUPATIONAL THERAPY from  home care to continue  lymph wraps  -follow up with cardiology as planned on 3/31. She is a bit concerned about this follow up appointment in light of risk of corona virus. I suggested continuing daily weights and to call the clinic if she does not feel comfortable going in.      Chronic respiratory failure with hypoxia (H)  Mild persistent asthma without complication  During hospitalization she did have episode of increased wheezing. She used her rescue inhaler. She was just using oxygen at HS PTA but now needs supplemental oxygen all day. Oxygen is being weaned. She is now at 2L Per minute. She will need oxygen 24/7 upon discharge   -monitor respiratory status  -continue prn albuterol inh  -daily advair discus  -set up home oxygen      CKD (chronic kidney disease) stage 3, GFR 30-59 ml/min (H)  Creat at  baseline       Esophageal dysphagia  Esophageal stenosis  Esophageal diverticulum  While in hospital patient had EGD that showed stenosis and diverticulum proximal to stenosis that contained pills and food. She did have dilatation of stenotic area.   . SPEECH THERApy has downgraded diet to pureed for now. Although she is trying out next level. she had scrambled eggs and pancakes last week and seemed to swallow this ok  -return to   for repeat EGD- 4/7/20 at [facility]  -slowly advance texture of foods, no straws  -continue omeprazole 40mg BID     Restless leg syndrome  Recent increase in ropinirole in hospital but now wants dose reduced  -continue ropinirole 0.5mg BID     Blindness of both eyes  Glaucoma suspect, left  S/p retinal detachments  Eye surgery to remove calcium deposits pending. She needs to be able to lie flat. We have reviewed eye drops   -continue edetate disodium 0.5% ophth solution to left eye QID  -continue cyclosporine (restasis) 0.05% one drop to left eye BID  -continue compounded sodium hyaluronate 0.001%-BSS (Healon) one drop to left eye BID  -continue carboxymethylcellulose sodium 20% autologous serum one drop to left eye BID  - patient is blind  -she does have surgery scheduled for next week. She may have to postpone this due to her daughter's circumstances.      Physical deconditioning  Lives at [facility]. She has been independent but she did work with physical therapy in hospital and found to have reduced activity tolerance. Since in TCU she is walking length of the hallway with one rest break. 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), Duodenal ulcer, unspecified as acute or chronic, without mention of hemorrhage or perforation (1980's), Female stress incontinence, GIB (gastrointestinal bleeding), Gout, H/O right and left heart catheterization, HYPERLIPIDEMIA, Hypertension, Ischemic colitis (7/2001), Lactose Intolerance, Legal blindness, Lymphedema, Mild intermittent asthma (~1980's), Mitral valve disorder, Nocturnal oxygen desaturation, EVARISTO (obstructive sleep apnea), Osteoporosis, unspecified (~2002), Patent foramen ovale, PERIPHERAL VASCULAR DISEASE (5/05), PRESBYESOPHAGUS (6/04), Pulmonary HTN (H) (11/2014), PVD (peripheral vascular disease) (H), Restrictive lung disease, Right leg DVT (H) (07/15/2019), Serous retinal detachment, Subarachnoid hemorrhage following injury (H) (12/10), Syncope and collapse (12/10), and Unspecified glaucoma(365.9). She also has no past medical history of Basal cell carcinoma, Blood transfusion, Chronic infection, Congestive heart failure, unspecified, Diabetes mellitus (H), Malignant hyperthermia, Malignant melanoma (H), Skin cancer, Squamous cell carcinoma, Thyroid disease, or Unspecified cerebral artery occlusion with cerebral infarction.    Discharge Medications:    Current Outpatient Medications   Medication Sig Dispense Refill     acetaminophen (TYLENOL) 500 MG tablet Take 1,000 mg by mouth 3 times daily       albuterol (ALBUTEROL) 108 (90 BASE) MCG/ACT inhaler Inhale 2 puffs into the lungs every 4 hours as needed 1 Inhaler 5     ascorbic acid 1000 MG PO TABS tablet Take 2,000 mg by mouth At Bedtime        calcium carbonate-vitamin D 600-400 MG-UNIT PO chewable tablet Take 2 chew tab by mouth daily       calcium polycarbophil (FIBERCON) 625 MG tablet Take 2 tablets by mouth At Bedtime And Give 625 mg by mouth one time a day for  Constipation       carboxymethylcellulose PF 0.5 % OP ophthalmic solution Place 1 drop Into the left eye 2 times daily 20% Autologous serum eye drops (Compounded - Keep Refrigerated)  Vital Tears (1 drop into the left eye 2 times daily)       COMPRESSION STOCKINGS 1 each daily 3 each 1     Cyanocobalamin (VITAMIN B-12 PO) Take 1,000 mcg by mouth daily        DOXYCYCLINE HYCLATE PO Take 50 mg by mouth 3 times per week Monday,wednesday, friday       fluticasone-salmeterol (ADVAIR DISKUS) 250-50 MCG/DOSE diskus inhaler Inhale 1 puff into the lungs 2 times daily 60 Inhaler 1     multivitamin w/minerals (MULTI-VITAMIN) tablet Take 1 tablet by mouth At Bedtime        omeprazole (PRILOSEC) 40 MG DR capsule Take 1 capsule (40 mg) by mouth 2 times daily 60 capsule 0     potassium chloride ER (KLOR-CON M) 20 MEQ CR tablet Take 1 tablet (20 mEq) by mouth daily 30 tablet 0     rOPINIRole (REQUIP) 0.25 MG tablet Take 1 tablet (0.25 mg) by mouth 2 times daily Take 0.5 mg by mouth every evening. Take 2 tablets by mouth at bedtime.  Take 1 tablet by mouth in afternoon as needed. 60 tablet 0     sodium chloride 5 % OP ophthalmic ointment Place 1 Application Into the left eye daily Place 0.5 inches into left eye daily at bedtime.       Sodium Hyaluronate (HEALON IO) Place 1 drop Into the left eye daily compounded sodium hyaluronate 0.001%-BSS (AKA HEALON) eye drops .   Place 1 Drop into left eye daily. HEALON 10 mg/mL injection diluted with BSS. (compounded to be combination with prednisolone) Refrigerated       Sodium Hyaluronate (HEALON IO) WITH PREDNISOLONE MIXTURE 0.001- 0.25%       spironolactone (ALDACTONE) 25 MG tablet Take 1 tablet (25 mg) by mouth daily 30 tablet 0     torsemide (DEMADEX) 20 MG tablet Take 2 tablets (40 mg) by mouth 2 times daily 60 tablet 0     Vitamin D3 25 mcg (1000 units) PO tablet Take 1 tablet by mouth At Bedtime          Medication Changes/Rationale:     2/24/20 give extra torsemide 20mg due to weight  "gain    Controlled medications sent with patient:   not applicable/none     ROS:   4 point ROS including Respiratory, CV, GI and , other than that noted in the HPI,  is negative    Physical Exam:   Vitals: /79   Pulse 101   Temp 97.3  F (36.3  C)   Resp 20   Ht 1.626 m (5' 4\")   Wt 62.9 kg (138 lb 9.6 oz)   LMP 01/01/1990   SpO2 93%   BMI 23.79 kg/m    BMI= Body mass index is 23.79 kg/m .  GENERAL APPEARANCE:  Alert, in no distress  ENT:  Mouth and posterior oropharynx normal, moist mucous membranes, hearing acuity adequate   EYES:  EOM, conjunctivae, lids, pupils and irises chronic changes  RESP:  respiratory effort and palpation of chest normal, no respiratory distress, Lung sounds bibasilar rales  CV:  Palpation and auscultation of heart done , rate and rhythm irreg, no murmur, no rub or gallop, Edema lymph edema wraps in plac3  ABDOMEN:  normal bowel sounds, soft, nontender,  M/S:   Gait and station steady with walker, Digits and nails normal   SKIN:  Inspection/Palpation of skin and subcutaneous tissue no rash  NEURO: 2-12 in normal limits and at patient's baseline  PSYCH:  insight and judgement, memory intact , affect and mood normal     SNF labs  CBC RESULTS:   Recent Labs   Lab Test 02/24/20  0645 07/02/18  1105   WBC 4.4 6.7   RBC 3.41* 3.83   HGB 9.4* 12.2   HCT 30.8* 37.2   MCV 90 97   MCH 27.6 31.9   MCHC 30.5* 32.8   RDW 19.1* 14.2    202       Last Basic Metabolic Panel:  Recent Labs   Lab Test 03/09/20  1239 03/02/20  0920 02/28/20  0645    135 136   POTASSIUM 4.4 3.8 4.2   CHLORIDE  --  99 103   AXEL  --  9.3 9.0   CO2  --  29 31   BUN  --  39* 43*   CR 1.06* 1.04 1.09*   GLC  --  115* 94       Liver Function Studies -   Recent Labs   Lab Test 03/30/18  0854 10/15/17  0845   PROTTOTAL 7.4 5.8*   ALBUMIN 3.6 2.3*   BILITOTAL 0.7 0.8   ALKPHOS 160* 224*   AST 24 42   ALT 14 39       TSH   Date Value Ref Range Status   01/15/2018 4.13 (H) 0.40 - 4.00 mU/L Final   01/20/2016 " 1.94 0.40 - 4.00 mU/L Final   ]        DISCHARGE PLAN:    Follow up labs: No labs orders/due    Medical Follow Up:      Follow up with primary care provider in 1 week    Discharge Services: Home Care:  Occupational Therapy, Physical Therapy, Speech Therapy , Registered Nurse, Home Health Aide,  and From:  [facility]    Discharge Instructions Verbalized to Patient at Discharge:     Daily weights.call your cardiology clinic with weight gain of 3# in one day or 5# in one week and or  if you should feel more short of breath       TOTAL DISCHARGE TIME:   Greater than 30 minutes  Electronically signed by:  YUNIEL Rick CNP     Home care Face to Face documentation done in EPIC attached to Home care orders for [facility].             Face to Face and Medical Necessity Statement for DME Provider visit    Demographic Information on Rupinder Tracy:  Gender: female  : 1940  [address]  [facility] [address]  [phone] (home) none (work)    Medical Record: [id number]  Social Security Number: [id number]  Primary Care Provider: Lotus Grant  Insurance: Payor: MEDICARE / Plan: MEDICARE / Product Type: Medicare /     HPI:   Rupinder Tracy is a 79 year old  (1940), who is being seen today for a face to face provider visit at [facility]; medical necessity statement for DME included. This patient requires the following:  DME Ordered and Medical Necessity Statement   Oxygen: 2 L/min via nasal cannula continuous ; Clinical Documentation: :  Method 1: Room air at rest: Qualifing sat level is < 88% or below on room air at rest on 3/12/20     Per nursing note: Patient's 02 saturations at rest on 2L of oxygen were 98%. When 02 was removed, saturations decreased to 88% on room air. Patient appeared increasingly short of breath. Respiratory rate increased from 20 to 24 breaths per minute without oxygen on. Once oxygen was re-applied, 02 saturation returned to 98%  on 2L with less signs of respiratory distress.        Pt needing above DME with expected length of need of 99  months  due to medical necessity associated with following diagnosis:       Diastolic dysfunction  Pulmonary hypertension (H)  Chronic respiratory failure with hypoxia (H)  Mild persistent asthma without complication  CKD (chronic kidney disease) stage 3, GFR 30-59 ml/min (H)  Esophageal dysphagia  Esophageal stenosis  Esophageal diverticulum  Restless leg syndrome  Blindness of both eyes  Glaucoma suspect, left  Physical deconditioning  Restless legs syndrome (RLS)      PMH   has a past medical history of Aortic insufficiency, Aortic root enlargement (H), Arthritis, Asthma, Cellulitis (4/13 in [place]), CKD (chronic kidney disease) stage 3, GFR 30-59 ml/min (H), COPD (chronic obstructive pulmonary disease) (H), Diastolic dysfunction, Difficult airway for intubation, CRUZ (dyspnea on exertion), 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), Duodenal ulcer, unspecified as acute or chronic, without mention of hemorrhage or perforation (1980's), Female stress incontinence, GIB (gastrointestinal bleeding), Gout, H/O right and left heart catheterization, HYPERLIPIDEMIA, Hypertension, Ischemic colitis (7/2001), Lactose Intolerance, Legal blindness, Lymphedema, Mild intermittent asthma (~1980's), Mitral valve disorder, Nocturnal oxygen desaturation, EVARISTO (obstructive sleep apnea), Osteoporosis, unspecified (~2002), Patent foramen ovale, PERIPHERAL VASCULAR DISEASE (5/05), PRESBYESOPHAGUS (6/04), Pulmonary HTN (H) (11/2014), PVD (peripheral vascular disease) (H), Restrictive lung disease, Right leg DVT (H) (07/15/2019), Serous retinal detachment, Subarachnoid hemorrhage following injury (H) (12/10), Syncope and collapse (12/10), and Unspecified glaucoma(365.9). She also has no past medical history of Basal cell carcinoma, Blood transfusion, Chronic infection, Congestive heart failure, unspecified, Diabetes mellitus (H),  "Malignant hyperthermia, Malignant melanoma (H), Skin cancer, Squamous cell carcinoma, Thyroid disease, or Unspecified cerebral artery occlusion with cerebral infarction.    ROS:4 point ROS including Respiratory, CV, GI and , other than that noted in the HPI,  is negative    EXAM  Vitals: /79   Pulse 101   Temp 97.3  F (36.3  C)   Resp 20   Ht 1.626 m (5' 4\")   Wt 62.9 kg (138 lb 9.6 oz)   LMP 01/01/1990   SpO2 93%   BMI 23.79 kg/m  ;BMI= Body mass index is 23.79 kg/m .  GENERAL APPEARANCE:  Alert, in no distress  ENT:  Mouth and posterior oropharynx normal, moist mucous membranes, hearing acuity adequate   EYES:  EOM, conjunctivae, lids, pupils and irises chronic changes    RESP:  respiratory effort and palpation of chest normal, no respiratory distress, Lung sounds BB rales  CV:  Palpation and auscultation of heart done , rate and rhythm irreg, no murmur, no rub or gallop, Edema lymphedema wraps in place  ABDOMEN:  normal bowel sounds, soft, nontender,  M/S:   Gait and station steady, Digits and nails normal   SKIN:  Inspection/Palpation of skin and subcutaneous tissue no rash  NEURO: 2-12 in normal limits and at patient's baseline  PSYCH:  insight and judgement, memory intact , affect and mood normal    ASSESSMENT/PLAN:  1. Diastolic dysfunction    2. Pulmonary hypertension (H)    3. Chronic respiratory failure with hypoxia (H)    4. Mild persistent asthma without complication    5. CKD (chronic kidney disease) stage 3, GFR 30-59 ml/min (H)    6. Esophageal dysphagia    7. Esophageal stenosis    8. Esophageal diverticulum    9. Restless leg syndrome    10. Blindness of both eyes    11. Glaucoma suspect, left    12. Physical deconditioning    13. Restless legs syndrome (RLS)        Orders:  1. Continuous oxygen at 2L per minute per nasal cannula    ELECTRONICALLY SIGNED BY AMARILYSOS CERTIFIED PROVIDER:  YUNIEL Rick CNP   NPI: [id number]  [facility]  [address] [place] [address], " [address]  [place] [address]            Sincerely,        Chandrika Lizama, YUNIEL CNP

## 2020-03-13 NOTE — PROGRESS NOTES
[facility] DISCHARGE SUMMARY  PATIENT'S NAME: Rupinder Tracy  YOB: 1940  MEDICAL RECORD NUMBER:  [id number]  Place of Service where encounter took place:  [facility] [[id number]]    PRIMARY CARE PROVIDER AND CLINIC RESPONSIBLE AFTER TRANSFER:   Lotus Grant MD, [facility] [address] / [place] [address]    Non-[facility] Provider     Transferring providers: YUNIEL Rick CNP, Debbie Medrano MD  Recent Hospitalization/ED:  [facility]  stay 02/11/2020 to 02/19/2020.  Date of SNF Admission: February / 19 / 2020  Date of SNF (anticipated) Discharge: March / 16 / 2020  Discharged to: previous independent home  Cognitive Scores: SLUMS: 22/30  Physical Function: Ambulating 150 ft with walker  DME: Home Oxygen    CODE STATUS/ADVANCE DIRECTIVES DISCUSSION:  DNR / DNI   ALLERGIES: Atorvastatin calcium; Celecoxib; Cholestyramine; Crestor [rosuvastatin calcium]; Cyclobenzaprine hcl; Dulera; Gabapentin; Lisinopril; Meperidine hcl; Morphine; Naprosyn [naproxen]; Niaspan [niacin]; Percocet [oxycodone-acetaminophen]; Pravastatin; Red yeast rice [cholestin]; Simvastatin; Timolol maleate; Hctz; and Sulfa drugs    DISCHARGE DIAGNOSIS/NURSING FACILITY COURSE:   Brief Summary of Hospital Course: recent hospitalization due to shortness of breath. PMH includes CKD 3, asthma, hyperlipidemia, hypertension, diastolic dysfunction, reflux esophagitis. She does use oxygen during night and has chronic LE edema. She was sent to ED from GI clinic where she was seeking treatment for reflux.    Work up in ED included CXR that showed pulmonary edema. O2 sats were 87%. She was evaluated by cardiology who felt she had acute on chronic diastolic HF, moderate PH, mod to severe AR. Diuretics were increased to lasix 60mg BID with metolazone 2.5mg. She was diuresed about 10#, below usual weight of 140#. She was told her new goal wt is 138#.   GI was consulted due to  chronic regurgitation. EGD on 2/14 showed benign esophageal stenosis with diverticulum. The stenosis was dilated. She was transferred to TCU for ongoing strengthening.  Diastolic dysfunction  Pulmonary hypertension (H)  Recent hospitalization due to dyspnea. She required diuresis with IV lasix with dopamine. She was diuresed about 10#. echo repeated in hospital and showed EF 52%, moderate to severe aortic regurgitation, mod to severe PH, elevated CVP and moderate circumferential pericardial effusion.  Lasix changed to oral and spironolactone added. Since in TCU she has had wt gain of 7#. She did follow up with cardiology on 3/2- spironolactone was increased to 25mg q day and torsemide was increased to 40mg BID. She continues to have large amount of LE swelling despite lymphedema wraps.   - daily wts with goal wt of 138#. Patient has talking scale and she does weight herself daily.   -continue torsemide 40mg BID  -continue spironolactone 25mg q day  -continue kcl 20 meq po q day  -OCCUPATIONAL THERAPY from  home care to continue  lymph wraps  -follow up with cardiology as planned on 3/31. She is a bit concerned about this follow up appointment in light of risk of corona virus. I suggested continuing daily weights and to call the clinic if she does not feel comfortable going in.      Chronic respiratory failure with hypoxia (H)  Mild persistent asthma without complication  During hospitalization she did have episode of increased wheezing. She used her rescue inhaler. She was just using oxygen at HS PTA but now needs supplemental oxygen all day. Oxygen is being weaned. She is now at 2L Per minute. She will need oxygen 24/7 upon discharge   -monitor respiratory status  -continue prn albuterol inh  -daily advair discus  -set up home oxygen      CKD (chronic kidney disease) stage 3, GFR 30-59 ml/min (H)  Creat at baseline       Esophageal dysphagia  Esophageal stenosis  Esophageal diverticulum  While in hospital patient  had EGD that showed stenosis and diverticulum proximal to stenosis that contained pills and food. She did have dilatation of stenotic area.   . SPEECH THERApy has downgraded diet to pureed for now. Although she is trying out next level. she had scrambled eggs and pancakes last week and seemed to swallow this ok  -return to GI  for repeat EGD- 4/7/20 at [facility]  -slowly advance texture of foods, no straws  -continue omeprazole 40mg BID     Restless leg syndrome  Recent increase in ropinirole in hospital but now wants dose reduced  -continue ropinirole 0.5mg BID     Blindness of both eyes  Glaucoma suspect, left  S/p retinal detachments  Eye surgery to remove calcium deposits pending. She needs to be able to lie flat. We have reviewed eye drops   -continue edetate disodium 0.5% ophth solution to left eye QID  -continue cyclosporine (restasis) 0.05% one drop to left eye BID  -continue compounded sodium hyaluronate 0.001%-BSS (Healon) one drop to left eye BID  -continue carboxymethylcellulose sodium 20% autologous serum one drop to left eye BID  - patient is blind  -she does have surgery scheduled for next week. She may have to postpone this due to her daughter's circumstances.      Physical deconditioning  Lives at [facility]. She has been independent but she did work with physical therapy in hospital and found to have reduced activity tolerance. Since in TCU she is walking length of the hallway with one rest break. 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), Duodenal ulcer, unspecified as acute or chronic, without mention of hemorrhage or perforation (1980's), Female stress incontinence, GIB (gastrointestinal bleeding), Gout, H/O right and left heart catheterization, HYPERLIPIDEMIA, Hypertension, Ischemic colitis (7/2001), Lactose Intolerance, Legal blindness, Lymphedema, Mild intermittent asthma (~1980's), Mitral valve disorder, Nocturnal oxygen desaturation, EVARISTO (obstructive sleep apnea), Osteoporosis, unspecified (~2002), Patent foramen ovale, PERIPHERAL VASCULAR DISEASE (5/05), PRESBYESOPHAGUS (6/04), Pulmonary HTN (H) (11/2014), PVD (peripheral vascular disease) (H), Restrictive lung disease, Right leg DVT (H) (07/15/2019), Serous retinal detachment, Subarachnoid hemorrhage following injury (H) (12/10), Syncope and collapse (12/10), and Unspecified glaucoma(365.9). She also has no past medical history of Basal cell carcinoma, Blood transfusion, Chronic infection, Congestive heart failure, unspecified, Diabetes mellitus (H), Malignant hyperthermia, Malignant melanoma (H), Skin cancer, Squamous cell carcinoma, Thyroid disease, or Unspecified cerebral artery occlusion with cerebral infarction.    Discharge Medications:    Current Outpatient Medications   Medication Sig Dispense Refill     acetaminophen (TYLENOL) 500 MG tablet Take 1,000 mg by mouth 3 times daily       albuterol (ALBUTEROL) 108 (90 BASE) MCG/ACT inhaler Inhale 2 puffs into the lungs every 4 hours as needed 1 Inhaler 5     ascorbic acid 1000 MG PO TABS tablet Take 2,000 mg by mouth At Bedtime        calcium carbonate-vitamin D 600-400 MG-UNIT PO chewable tablet Take 2 chew tab by mouth daily       calcium polycarbophil (FIBERCON) 625 MG tablet Take 2 tablets by mouth At Bedtime And Give 625 mg by mouth one time a day for Constipation       carboxymethylcellulose PF 0.5 % OP ophthalmic solution Place 1 drop Into the left eye 2  times daily 20% Autologous serum eye drops (Compounded - Keep Refrigerated)  Vital Tears (1 drop into the left eye 2 times daily)       COMPRESSION STOCKINGS 1 each daily 3 each 1     Cyanocobalamin (VITAMIN B-12 PO) Take 1,000 mcg by mouth daily        DOXYCYCLINE HYCLATE PO Take 50 mg by mouth 3 times per week Monday,wednesday, friday       fluticasone-salmeterol (ADVAIR DISKUS) 250-50 MCG/DOSE diskus inhaler Inhale 1 puff into the lungs 2 times daily 60 Inhaler 1     multivitamin w/minerals (MULTI-VITAMIN) tablet Take 1 tablet by mouth At Bedtime        omeprazole (PRILOSEC) 40 MG DR capsule Take 1 capsule (40 mg) by mouth 2 times daily 60 capsule 0     potassium chloride ER (KLOR-CON M) 20 MEQ CR tablet Take 1 tablet (20 mEq) by mouth daily 30 tablet 0     rOPINIRole (REQUIP) 0.25 MG tablet Take 1 tablet (0.25 mg) by mouth 2 times daily Take 0.5 mg by mouth every evening. Take 2 tablets by mouth at bedtime.  Take 1 tablet by mouth in afternoon as needed. 60 tablet 0     sodium chloride 5 % OP ophthalmic ointment Place 1 Application Into the left eye daily Place 0.5 inches into left eye daily at bedtime.       Sodium Hyaluronate (HEALON IO) Place 1 drop Into the left eye daily compounded sodium hyaluronate 0.001%-BSS (AKA HEALON) eye drops .   Place 1 Drop into left eye daily. HEALON 10 mg/mL injection diluted with BSS. (compounded to be combination with prednisolone) Refrigerated       Sodium Hyaluronate (HEALON IO) WITH PREDNISOLONE MIXTURE 0.001- 0.25%       spironolactone (ALDACTONE) 25 MG tablet Take 1 tablet (25 mg) by mouth daily 30 tablet 0     torsemide (DEMADEX) 20 MG tablet Take 2 tablets (40 mg) by mouth 2 times daily 60 tablet 0     Vitamin D3 25 mcg (1000 units) PO tablet Take 1 tablet by mouth At Bedtime          Medication Changes/Rationale:     2/24/20 give extra torsemide 20mg due to weight gain    Controlled medications sent with patient:   not applicable/none     ROS:   4 point ROS including  "Respiratory, CV, GI and , other than that noted in the HPI,  is negative    Physical Exam:   Vitals: /79   Pulse 101   Temp 97.3  F (36.3  C)   Resp 20   Ht 1.626 m (5' 4\")   Wt 62.9 kg (138 lb 9.6 oz)   LMP 01/01/1990   SpO2 93%   BMI 23.79 kg/m    BMI= Body mass index is 23.79 kg/m .  GENERAL APPEARANCE:  Alert, in no distress  ENT:  Mouth and posterior oropharynx normal, moist mucous membranes, hearing acuity adequate   EYES:  EOM, conjunctivae, lids, pupils and irises chronic changes  RESP:  respiratory effort and palpation of chest normal, no respiratory distress, Lung sounds bibasilar rales  CV:  Palpation and auscultation of heart done , rate and rhythm irreg, no murmur, no rub or gallop, Edema lymph edema wraps in plac3  ABDOMEN:  normal bowel sounds, soft, nontender,  M/S:   Gait and station steady with walker, Digits and nails normal   SKIN:  Inspection/Palpation of skin and subcutaneous tissue no rash  NEURO: 2-12 in normal limits and at patient's baseline  PSYCH:  insight and judgement, memory intact , affect and mood normal     SNF labs  CBC RESULTS:   Recent Labs   Lab Test 02/24/20  0645 07/02/18  1105   WBC 4.4 6.7   RBC 3.41* 3.83   HGB 9.4* 12.2   HCT 30.8* 37.2   MCV 90 97   MCH 27.6 31.9   MCHC 30.5* 32.8   RDW 19.1* 14.2    202       Last Basic Metabolic Panel:  Recent Labs   Lab Test 03/09/20  1239 03/02/20  0920 02/28/20  0645    135 136   POTASSIUM 4.4 3.8 4.2   CHLORIDE  --  99 103   AXEL  --  9.3 9.0   CO2  --  29 31   BUN  --  39* 43*   CR 1.06* 1.04 1.09*   GLC  --  115* 94       Liver Function Studies -   Recent Labs   Lab Test 03/30/18  0854 10/15/17  0845   PROTTOTAL 7.4 5.8*   ALBUMIN 3.6 2.3*   BILITOTAL 0.7 0.8   ALKPHOS 160* 224*   AST 24 42   ALT 14 39       TSH   Date Value Ref Range Status   01/15/2018 4.13 (H) 0.40 - 4.00 mU/L Final   01/20/2016 1.94 0.40 - 4.00 mU/L Final   ]        DISCHARGE PLAN:    Follow up labs: No labs orders/due    Medical " Follow Up:      Follow up with primary care provider in 1 week    Discharge Services: Home Care:  Occupational Therapy, Physical Therapy, Speech Therapy , Registered Nurse, Home Health Aide,  and From:  [facility]    Discharge Instructions Verbalized to Patient at Discharge:     Daily weights.call your cardiology clinic with weight gain of 3# in one day or 5# in one week and or  if you should feel more short of breath       TOTAL DISCHARGE TIME:   Greater than 30 minutes  Electronically signed by:  YUNIEL Rick CNP     Home care Face to Face documentation done in EPIC attached to Home care orders for [facility].             Face to Face and Medical Necessity Statement for DME Provider visit    Demographic Information on Rupinder Tracy:  Gender: female  : 1940  [address]  [address]  [facility] [id number] [phone] (home) none (work)    Medical Record: [id number]  Social Security Number: [id number]  Primary Care Provider: Lotus Grant  Insurance: Payor: MEDICARE / Plan: MEDICARE / Product Type: Medicare /     HPI:   Rupinder Tracy is a 79 year old  (1940), who is being seen today for a face to face provider visit at [facility]; medical necessity statement for DME included. This patient requires the following:  DME Ordered and Medical Necessity Statement   Oxygen: 2 L/min via nasal cannula continuous ; Clinical Documentation: :  Method 1: Room air at rest: Qualifing sat level is < 88% or below on room air at rest on 3/12/20     Per nursing note: Patient's 02 saturations at rest on 2L of oxygen were 98%. When 02 was removed, saturations decreased to 88% on room air. Patient appeared increasingly short of breath. Respiratory rate increased from 20 to 24 breaths per minute without oxygen on. Once oxygen was re-applied, 02 saturation returned to 98% on 2L with less signs of respiratory distress.        Pt needing above DME with expected length of need  of 99  months  due to medical necessity associated with following diagnosis:       Diastolic dysfunction  Pulmonary hypertension (H)  Chronic respiratory failure with hypoxia (H)  Mild persistent asthma without complication  CKD (chronic kidney disease) stage 3, GFR 30-59 ml/min (H)  Esophageal dysphagia  Esophageal stenosis  Esophageal diverticulum  Restless leg syndrome  Blindness of both eyes  Glaucoma suspect, left  Physical deconditioning  Restless legs syndrome (RLS)      PMH   has a past medical history of Aortic insufficiency, Aortic root enlargement (H), Arthritis, Asthma, Cellulitis (4/13 in [place]), CKD (chronic kidney disease) stage 3, GFR 30-59 ml/min (H), COPD (chronic obstructive pulmonary disease) (H), Diastolic dysfunction, Difficult airway for intubation, CRUZ (dyspnea on exertion), 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), Duodenal ulcer, unspecified as acute or chronic, without mention of hemorrhage or perforation (1980's), Female stress incontinence, GIB (gastrointestinal bleeding), Gout, H/O right and left heart catheterization, HYPERLIPIDEMIA, Hypertension, Ischemic colitis (7/2001), Lactose Intolerance, Legal blindness, Lymphedema, Mild intermittent asthma (~1980's), Mitral valve disorder, Nocturnal oxygen desaturation, EVARISTO (obstructive sleep apnea), Osteoporosis, unspecified (~2002), Patent foramen ovale, PERIPHERAL VASCULAR DISEASE (5/05), PRESBYESOPHAGUS (6/04), Pulmonary HTN (H) (11/2014), PVD (peripheral vascular disease) (H), Restrictive lung disease, Right leg DVT (H) (07/15/2019), Serous retinal detachment, Subarachnoid hemorrhage following injury (H) (12/10), Syncope and collapse (12/10), and Unspecified glaucoma(365.9). She also has no past medical history of Basal cell carcinoma, Blood transfusion, Chronic infection, Congestive heart failure, unspecified, Diabetes mellitus (H), Malignant hyperthermia, Malignant melanoma (H), Skin cancer, Squamous cell carcinoma, Thyroid disease,  "or Unspecified cerebral artery occlusion with cerebral infarction.    ROS:4 point ROS including Respiratory, CV, GI and , other than that noted in the HPI,  is negative    EXAM  Vitals: /79   Pulse 101   Temp 97.3  F (36.3  C)   Resp 20   Ht 1.626 m (5' 4\")   Wt 62.9 kg (138 lb 9.6 oz)   LMP 01/01/1990   SpO2 93%   BMI 23.79 kg/m  ;BMI= Body mass index is 23.79 kg/m .  GENERAL APPEARANCE:  Alert, in no distress  ENT:  Mouth and posterior oropharynx normal, moist mucous membranes, hearing acuity adequate   EYES:  EOM, conjunctivae, lids, pupils and irises chronic changes    RESP:  respiratory effort and palpation of chest normal, no respiratory distress, Lung sounds BB rales  CV:  Palpation and auscultation of heart done , rate and rhythm irreg, no murmur, no rub or gallop, Edema lymphedema wraps in place  ABDOMEN:  normal bowel sounds, soft, nontender,  M/S:   Gait and station steady, Digits and nails normal   SKIN:  Inspection/Palpation of skin and subcutaneous tissue no rash  NEURO: 2-12 in normal limits and at patient's baseline  PSYCH:  insight and judgement, memory intact , affect and mood normal    ASSESSMENT/PLAN:  1. Diastolic dysfunction    2. Pulmonary hypertension (H)    3. Chronic respiratory failure with hypoxia (H)    4. Mild persistent asthma without complication    5. CKD (chronic kidney disease) stage 3, GFR 30-59 ml/min (H)    6. Esophageal dysphagia    7. Esophageal stenosis    8. Esophageal diverticulum    9. Restless leg syndrome    10. Blindness of both eyes    11. Glaucoma suspect, left    12. Physical deconditioning    13. Restless legs syndrome (RLS)        Orders:  1. Continuous oxygen at 2L per minute per nasal cannula    ELECTRONICALLY SIGNED BY PECOS CERTIFIED PROVIDER:  YUNIEL Rick CNP   NPI: [id number]  [facility]  [address] [place] [address], [address]  [place] [address]        "

## 2022-05-17 NOTE — TELEPHONE ENCOUNTER
Reviewed patient's case with Dr. Butler. Per Dr. Butler, patient needs to be seen in clinic today by us or PCP or needs to go to the ER. No appointments available today in our clinic for the patient. Contacted patient to review Dr. Butler's recommendations, patient did not answer. Left detailed message with recommendations to be seen by her PCP clinic or go to the ER for evaluation. Instructed patient to call back to confirm she received the message.    What Type Of Note Output Would You Prefer (Optional)?: Standard Output How Severe Is Your Acne?: mild Is This A New Presentation, Or A Follow-Up?: Acne Additional Comments (Use Complete Sentences): AM\\nWash with mild cleanser\\nVitamin C holding off\\nHA \\nMoisturizer \\nCetaphil sunscreen \\n\\nPM\\ncleansing with micellar water or cleasner\\nRotates between exfoliating, tretinoin  and 2 days rest\\nMoisturizer\\n\\nPatient is interested in winlev, dislikes taking oral pills.

## 2023-09-27 NOTE — PROGRESS NOTES
HPI and Plan:   I had the pleasure of seeing Rupinder Tracy today in cardiology clinic follow up. She is a pleasant 79 year old patient of Dr. Butler.    Ms. Tracy has a history of pulmonary hypertension, systemic hypertension and patent foramen ovale.  She also has a history of mild aortic root enlargement and mild to moderate aortic insufficiency.  Left and right heart catheterization performed 01/2015 for pulmonary hypertension and aortic insufficiency demonstrated no significant coronary artery disease.  Aortogram showed moderate aortic insufficiency and moderate annuloaortic ectasia.  There was moderate pulmonary hypertension with a mean PA pressure of 32 mmHg and a wide pulse pressure in the PA tracing consistent with at least moderate pulmonary valve insufficiency.  Pulmonary vascular resistance was normal and there was no significant gradient between the end-diastolic pressure in the pulmonary capillary wedge pressure and the left ventricular end-diastolic pressure.  It was felt that the elevated pulmonary pressure was likely secondary to impaired relaxation both from aortic insufficiency and systemic hypertension and perhaps pulmonary causes such as obstructive sleep apnea or asthma.  We tested for sleep apnea and showed desaturations but she never reached REM sleep and did not snore.      Historically her blood pressure and weight have been under excellent control with Bumex at a very low dose.  At higher doses of Bumex her creatinine seems to bump up. The patient has significant COPD and asthma since the 1980s.  The patient has been unable to exercise due to left hip pain, she just had this hip replaced.  She has left greater than right lymphedema as well.  Ultrasounds of the lower extremities have not shown any deep vein thrombosis. Her breathing is somewhat limited but did improve after pulmonary rehab.  She was hospitalized in April after using ibuprofen and Aleve for her hip pain causing a  gastric ulcer and had recent eye surgery.      She was recently discharged from [facility] after a left hip replacement. Her Benicar was decreased to 5 mg for hypotension during her hospitalization.  She was discharged on aspirin, 2 days after stopping her aspirin she was evaluated and found to have DVT in her right leg, she is currently on Eliquis.  She is also had a bout of pneumonia while in the hospital.  Prior to surgery she had lymphedema treatment done and she still has appointments and her lymphedema is under control today, she has it in both legs is worse in the left.  Her weight is up since her surgery, before surgery her dry weight was 145 pounds at home.  More recently she is been up to 154, for that reason she increased her  Bumex to 1 mg daily, she is done this for a couple of days and brought her weight down 2 pounds, she was 152 at home today and 154 in clinic.  On exam she has  Labs Reviewed: from care everwhere: June 9, 2019 sodium 135, potassium 5.2, creatinine 1.36, BUN 71    Physical Exam  Please see Below   Bilateral lymphedema starting in the upper mid calf, her legs are soft and nontender.  Her JVP is elevated, this is not new.  Her lungs are clear though she has a cough.    Assessment and Plan  1.  Rupinder Tracy is a delightful 79-year-old female with a small patent foramen ovale that is inconsequential and without hemodynamic effect.  She has some dyspnea on exertion, this is multifactorial.  She has pulmonary hypertension on a secondary basis likely due to diastolic dysfunction of the left ventricle as well as aortic insufficiency, COPD and asthma.  We do not know if she has obstructive sleep apnea.  She will continue on a little bit extra Bumex, alternating doses of 1 mg and half milligram between now and when she sees her primary on Monday.  Think she is about as euvolemic as she can be, but she is having a cough, likely from her pneumonia and COPD.  2.  Normal coronary arteries.   3.   Mild to moderate aortic insufficiency and mild aortic root enlargement.  The plan was to repeat echocardiogram this fall, we will move it up to a month from now because she is worried about her breathing and her heart.  I like to wait a about 4 weeks so we know her pneumonia is resolved and her volume status is improved.  4.  The patient's peripheral edema left greater than right remains mild.  [facility] continues to treat her left lower extremity edema.  She wears compression stockings.   5.  Right lower extremity DVT.  This is followed by her primary, she is currently taking Eliquis 10 mg twice daily and will decrease the dose Monday to 5 mg twice daily.  6.  Hypertension.  Her Benicar was cut down from 40 to 5 mg during her recent hospitalization.  Her blood pressure today is controlled at 120/62 so we will keep her on this dose.    Thank you for allowing me to care for Rupinder Tracy today.  I will see her back in a month with a echo and a BMP prior.    YUNIEL Cage, CNP  Cardiology    Voice recognition software was used for this note, I have reviewed this note, but errors may have been missed.    Orders Placed This Encounter   Procedures     Basic metabolic panel     Orders Placed This Encounter   Medications     Cyanocobalamin (VITAMIN B-12 PO)     multivitamin w/minerals (MULTI-VITAMIN) tablet     Sig: Take 1 tablet by mouth daily     apixaban ANTICOAGULANT (ELIQUIS) 5 MG tablet     Sig: Take 5 mg by mouth 2 times daily     There are no discontinued medications.      CURRENT MEDICATIONS:  Current Outpatient Medications   Medication Sig Dispense Refill     albuterol (ALBUTEROL) 108 (90 BASE) MCG/ACT inhaler Inhale 2 puffs into the lungs every 4 hours as needed 1 Inhaler 5     apixaban ANTICOAGULANT (ELIQUIS) 5 MG tablet Take 5 mg by mouth 2 times daily       bumetanide (BUMEX) 0.5 MG tablet Take 1 tablet (0.5 mg) by mouth daily (with breakfast) (Patient taking differently: Take 1 mg by  mouth daily (with breakfast) ) 30 tablet 0     Calcium-Vitamin D-Vitamin K (CALCIUM + D) 500-1000-40 MG-UNT-MCG CHEW Take 2 tablets by mouth daily        Cholecalciferol (VITAMIN D) 2000 UNITS tablet Take 1,000 Units by mouth daily        Cyanocobalamin (VITAMIN B-12 PO)        DOXYCYCLINE HYCLATE PO Take 50 mg by mouth 3 times per week Monday,wednesday, friday       fluticasone-salmeterol (ADVAIR DISKUS) 250-50 MCG/DOSE diskus inhaler Inhale 1 puff into the lungs 2 times daily 60 Inhaler 1     hyaluronate in balanced salt ophthalmic solution (HEALON IN BSS) solution 1 drop every 4 hours (while awake)       multivitamin w/minerals (MULTI-VITAMIN) tablet Take 1 tablet by mouth daily       olmesartan (BENICAR) 20 MG tablet Take 5 mg by mouth daily       omeprazole (PRILOSEC) 40 MG DR capsule Take 40 mg by mouth daily       Ophthalmic Irrigation Solution (OCUSOFT EYE WASH OP)        order for DME 2L of O2 at night       RESTASIS 0.05 % OP EMUL 1 drop to left eye twice daily       rOPINIRole (REQUIP) 0.25 MG tablet Take 0.5 mg by mouth every evening. Take 2 tablets by mouth at bedtime.  Take 1 tablet by mouth in afternoon as needed.       Sodium Hyaluronate (HEALON IO) Combination with Pred       Sodium Hyaluronate (HEALON IO) WITH PREDNISOLONE MIXTURE 0.001- 0.25%       SYSTANE 0.4-0.3 % OP SOLN 1 drop to left eye 4 times daily       acetaminophen (TYLENOL) 500 MG tablet Take 1,000 mg by mouth 3 times daily       azelastine (ASTELIN) 0.1 % nasal spray Spray 2 sprays into both nostrils 2 times daily       COMPRESSION STOCKINGS 1 each daily (Patient not taking: Reported on 7/12/2019) 3 each 1     prednisoLONE acetate (PRED FORTE) 1 % ophthalmic susp INSTILL 1 DROP INTO INTO LEFT EYE TWICE A DAY  3       ALLERGIES     Allergies   Allergen Reactions     Atorvastatin Calcium      myalgias     Celecoxib Swelling     edema     Cholestyramine Diarrhea     Diarrhea       Crestor [Rosuvastatin Calcium]      leg aches, likely  "from medication     Cyclobenzaprine Hcl      flexeril--gets \"goofy\"     Dulera      tremors     Gabapentin      Nightmares.   Retrial of medication caused tremors.     Lisinopril Cough     Cough/ Dizziness at high dose.     Meperidine Hcl Nausea and Vomiting     demerol-severe nausea     Morphine Nausea and Vomiting     Naprosyn [Naproxen] GI Disturbance     Niaspan [Niacin]      flushing, severe     Percocet [Oxycodone-Acetaminophen] Nausea     Nausea, lightheadedness.   Tolerates med if taken with food.     Pravastatin Swelling     Edema, myalgias     Red Yeast Rice [Cholestin] GI Disturbance     Bloating, etc.     Simvastatin      myalgias     Timolol Maleate Difficulty breathing     timoptic--respiratory problems     Hctz Rash     rash     Sulfa Drugs Itching and Rash     rash on all mucous membranes and palms of hands with intense itching       PAST MEDICAL HISTORY:  Past Medical History:   Diagnosis Date     Aortic insufficiency      Aortic root enlargement (H)      Arthritis      Asthma      Cellulitis 4/13 in [place]    R ankle     CKD (chronic kidney disease) stage 3, GFR 30-59 ml/min (H)      COPD (chronic obstructive pulmonary disease) (H)      Diastolic dysfunction      Difficult airway for intubation      CRUZ (dyspnea on exertion)      Ductal Carcinoma in Situ of Left Breast 9/09     Duodenal ulcer, unspecified as acute or chronic, without mention of hemorrhage or perforation 1980's    felt due to high dose steroids     Female stress incontinence      H/O right and left heart catheterization     1-     HYPERLIPIDEMIA      Hypertension      Ischemic colitis 7/2001    [facility]     Lactose Intolerance     Mild     Legal blindness     Artificial right eye, severe vision loss left (Detached retina's, glaucoma, corneal transplants)     Lymphedema      Mild intermittent asthma ~1980's     Mitral valve disorder      Nocturnal oxygen desaturation      EVARISTO (obstructive sleep apnea)      Osteoporosis, " unspecified ~2002     Patent foramen ovale     small     PERIPHERAL VASCULAR DISEASE 5/05    mesenteric arteries, angioplasty     PRESBYESOPHAGUS 6/04     Pulmonary HTN (H) 11/2014     PVD (peripheral vascular disease) (H)      Restrictive lung disease      Serous retinal detachment     False eye right     Subarachnoid hemorrhage following injury (H) 12/10    due to fall, vascular evaluation negative     Syncope and collapse 12/10     Unspecified glaucoma(365.9)        PAST SURGICAL HISTORY:  Past Surgical History:   Procedure Laterality Date     BIOPSY       C APPENDECTOMY  1982     C NONSPECIFIC PROCEDURE  1945    adenoidectomy     C NONSPECIFIC PROCEDURE      bilat retinal detachment     C NONSPECIFIC PROCEDURE      blephoroplasty bilat     C NONSPECIFIC PROCEDURE  1997    glaucoma procedure L     C NONSPECIFIC PROCEDURE  1997, 2007, 2014    corneal transplant L     C NONSPECIFIC PROCEDURE  1945    strabismus procedure R eye     C NONSPECIFIC PROCEDURE  1960's    R breast bx     C NONSPECIFIC PROCEDURE  5/05    Angioplasty and stenting mesenteric vessels     C NONSPECIFIC PROCEDURE  2008    L corneal transplant     C TOTAL HIP ARTHROPLASTY Left 06/06/2019     COLONOSCOPY      due 2015     ENDOSCOPIC RETROGRADE CHOLANGIOPANCREATOGRAM N/A 10/13/2017    Procedure: ENDOSCOPIC RETROGRADE CHOLANGIOPANCREATOGRAM;  ENDOSCOPIC RETROGRADE CHOLANGIOPANCREATOGRAM ;  Surgeon: Isabel Yousif MD;  Location: [facility] OR     HC REMOVAL GALLBLADDER  1982    Cholecystectomy     HEART CATH RIGHT AND LEFT HEART CATH  1/19/15     HERNIORRHAPHY VENTRAL N/A 4/19/2016    Procedure: HERNIORRHAPHY VENTRAL;  Surgeon: Hamlet Ness MD;  Location: [facility] OR     MASTECTOMY SIMPLE BILATERAL  10/5/09    bilateral mastectomy     SURGICAL HISTORY OF -   6/2010    breast reconstruction       FAMILY HISTORY:  Family History   Adopted: Yes   Problem Relation Age of Onset     C.A.D. Paternal Uncle         MI 50's     Family History Negative Daughter         SOCIAL HISTORY:  Social History     Socioeconomic History     Marital status:      Spouse name: None     Number of children: 4     Years of education: None     Highest education level: None   Occupational History     Occupation: Retired nurse     Employer: RETIRED   Social Needs     Financial resource strain: None     Food insecurity:     Worry: None     Inability: None     Transportation needs:     Medical: None     Non-medical: None   Tobacco Use     Smoking status: Never Smoker     Smokeless tobacco: Never Used   Substance and Sexual Activity     Alcohol use: Yes     Alcohol/week: 4.2 oz     Types: 7 Glasses of wine per week     Comment: rarely     Drug use: No     Sexual activity: Never     Partners: Male   Lifestyle     Physical activity:     Days per week: None     Minutes per session: None     Stress: None   Relationships     Social connections:     Talks on phone: None     Gets together: None     Attends Nondenominational service: None     Active member of club or organization: None     Attends meetings of clubs or organizations: None     Relationship status: None     Intimate partner violence:     Fear of current or ex partner: None     Emotionally abused: None     Physically abused: None     Forced sexual activity: None   Other Topics Concern      Service Not Asked     Blood Transfusions Not Asked     Caffeine Concern No     Comment: 1- 2 cups coffee     Occupational Exposure Not Asked     Hobby Hazards Not Asked     Sleep Concern No     Stress Concern No     Weight Concern No     Special Diet Yes     Comment: low sodium     Back Care Not Asked     Exercise No     Comment: 2 days a week (abigail chi), walking program, stationary bike 10 minutes 3 times per week     Bike Helmet Not Asked     Seat Belt Yes     Self-Exams Not Asked     Parent/sibling w/ CABG, MI or angioplasty before 65F 55M? Not Asked   Social History Narrative    , has two children, is a retired nurse and lives in a Coop and  has very supportive neighbors.  She walks with a walker.  Is a full code.  (last updated 10/11/2017)        Review of Systems:  Skin:  Positive for bruising     Eyes:  Positive for glasses Foggy vision  ENT:  Negative sinus trouble Dry nose  Respiratory:  Positive for dyspnea on exertion;cough;shortness of breath uses O2 @ night- 2L   Cardiovascular:  Negative edema;Positive for;lower extremity symptoms;heaviness;fatigue lymphedema in left leg since March/April;  Gastroenterology: Negative nausea    Genitourinary:  Negative nocturia    Musculoskeletal:  Positive for joint pain hip pain at night  Neurologic:  Negative local weakness left leg  Psychiatric:  Negative anxiety    Heme/Lymph/Imm:  Positive for allergies    Endocrine:  Negative        Physical Exam:  Vitals: /62   Pulse 73   Wt 69.9 kg (154 lb)   LMP 01/01/1990   SpO2 98%   BMI 26.43 kg/m      Constitutional:  cooperative;in no acute distress        Skin:  warm and dry to the touch venous stasis changes        Head:  normocephalic, no masses or lesions   suborbital edema    Eyes:      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     Pulmonary HTN (H) Yes       Recent Lab Results:  LIPID RESULTS:  Lab Results   Component Value Date    CHOL 208 (A) 01/05/2016    CHOL 208 (A) 01/05/2016    HDL 52 01/05/2016    HDL 52 01/05/2016     01/05/2016     01/05/2016    TRIG 131 01/05/2016    TRIG 131  01/05/2016    CHOLHDLRATIO 2.4 08/25/2014       LIVER ENZYME RESULTS:  Lab Results   Component Value Date    AST 24 03/30/2018    ALT 14 03/30/2018       CBC RESULTS:  Lab Results   Component Value Date    WBC 6.7 07/02/2018    RBC 3.83 07/02/2018    HGB 12.2 07/02/2018    HCT 37.2 07/02/2018    MCV 97 07/02/2018    MCH 31.9 07/02/2018    MCHC 32.8 07/02/2018    RDW 14.2 07/02/2018     07/02/2018       BMP RESULTS:  Lab Results   Component Value Date     11/20/2018    POTASSIUM 4.9 11/20/2018    CHLORIDE 111 (H) 11/20/2018    CO2 25 11/20/2018    ANIONGAP 7 11/20/2018     (H) 11/20/2018    BUN 49 (H) 11/20/2018    CR 1.01 11/20/2018    GFRESTIMATED 53 (L) 11/20/2018    GFRESTBLACK 64 11/20/2018    AXEL 9.4 11/20/2018        A1C RESULTS:  No results found for: A1C    INR RESULTS:  Lab Results   Component Value Date    INR 1.09 10/12/2017    INR 0.95 01/19/2015           CC  No referring provider defined for this encounter.                   Adbry Counseling: I discussed with the patient the risks of tralokinumab including but not limited to eye infection and irritation, cold sores, injection site reactions, worsening of asthma, allergic reactions and increased risk of parasitic infection.  Live vaccines should be avoided while taking tralokinumab. The patient understands that monitoring is required and they must alert us or the primary physician if symptoms of infection or other concerning signs are noted.

## 2024-06-06 NOTE — PROGRESS NOTES
[facility]    Hospitalist Progress Note    Date of Service (when I saw the patient): 10/12/2017    Assessment & Plan   Rupinder Tracy is a 77 year old female who was admitted on 10/11/2017 with RUQ abdominal pain.    1. RUQ pain - Cholangitis vs PUD.  Continue Zosyn and dilaudid.  Pt is s/p cholecystectomy.  Labs pending from this morning.  GI consult pending.  RUQ US demonstrates possible air in biliary tree, concerning for cholangitis or prior sphincterotomy.    2. Mild persistent asthma - Restart home Advair.  Continue     3. RLS - Continue Requip    DVT Prophylaxis: Pneumatic Compression Devices  Code Status: Full Code    Disposition: Expected discharge in 2-3 days pending evaluation.    Miguel Cameron  Text Page (7 am to 6 pm)    Interval History   The patient continues to have RUQ pain that is controlled with dilaudid.  She also complains of chest pressure, pleuritic and similar inquality to RUQ pain.  No associated dyspnea, diaphoresis.    -Data reviewed today: I reviewed all new labs and imaging results over the last 24 hours. I personally reviewed no images or EKG's today.    Physical Exam   Temp: 97.3  F (36.3  C) Temp src: Oral BP: 99/55 Pulse: 81 Heart Rate: 69 Resp: 16 SpO2: 94 % O2 Device: None (Room air) Oxygen Delivery: 2 LPM  Vitals:    10/11/17 1404 10/11/17 2022 10/12/17 0700   Weight: 74.4 kg (164 lb) 71.7 kg (158 lb 1.1 oz) 72.3 kg (159 lb 6.3 oz)     Vital Signs with Ranges  Temp:  [97.3  F (36.3  C)-99.6  F (37.6  C)] 97.3  F (36.3  C)  Pulse:  [81] 81  Heart Rate:  [60-98] 69  Resp:  [16-85] 16  BP: ()/(51-89) 99/55  SpO2:  [90 %-97 %] 94 %  I/O last 3 completed shifts:  In: 968 [P.O.:100; I.V.:868]  Out: 250 [Urine:250]    Gen: Well nourished, well developed, alert and oriented x 3, no acute distressed  HEENT: Atraumatic, normocephalic  Lungs: Clear to ausculation without wheezes, rhonchi, or rales  Heart: Regular rate and rhythm, no murmurs, gallops, or  rubs  GI: Bowel sound normal, no hepatosplenomegaly or masses, mild RUQ tenderness w/o guarding  Lymph: No lymphadenopathy or edema  Skin: No rashes     Medications     NaCl 100 mL/hr at 10/12/17 0730       pantoprazole  40 mg Intravenous BID AC     multivitamin  with lutein  1 capsule Oral Daily     polyethylene glycol 0.4%- propylene glycol 0.3%  1 drop Left Eye 4x Daily     rOPINIRole  0.5 mg Oral At Bedtime     fluticasone  2 spray Both Nostrils Daily     loratadine  10 mg Oral Daily     cycloSPORINE  1 drop Left Eye BID     prednisoLONE acetate  1 drop Left Eye 4x Daily     piperacillin-tazobactam  2.25 g Intravenous Q6H     lidocaine  1 patch Transdermal Q24h    And     lidocaine   Transdermal Q24H    And     lidocaine   Transdermal Q8H     rOPINIRole  0.25 mg Oral Daily     fluticasone-vilanterol  1 puff Inhalation QPM       Data     Recent Labs  Lab 10/11/17  1515   WBC 5.9   HGB 12.2   MCV 97         POTASSIUM 4.3   CHLORIDE 106   CO2 25   BUN 41*   CR 1.25*   ANIONGAP 10   AXEL 9.0   *   ALBUMIN 3.2*   PROTTOTAL 7.1   BILITOTAL 2.1*   ALKPHOS 215*   ALT 50   AST 89*   LIPASE 144   TROPI <0.015       Recent Results (from the past 24 hour(s))   XR Chest 2 Views    Narrative    XR CHEST 2 VW 10/11/2017 4:01 PM    COMPARISON: 10/14/2016    HISTORY: Chest pain.      Impression    IMPRESSION: Mildly enlarged cardiac silhouette. Mild left basilar  atelectasis/consolidation. Lungs are otherwise clear. No pleural  effusion or pneumothorax.    HECTOR MENARD MD   Abdomen US, limited (RUQ only)    Narrative    US ABDOMEN LIMITED 10/11/2017 5:28 PM     HISTORY: RUQ pain.     FINDINGS:  Liver is normal in echogenicity without focal lesions.  Prominent intrahepatic bile ducts are present with ringdown artifact  possibly indicating pneumobilia. Prior CT from February 2016 showed  dilated intrahepatic and common bile duct as well. These findings may  be related to prior cholecystectomy changes. Question  whether patient  has had prior sphincterotomy procedure to account for the potential  air in the biliary system. Otherwise cholangitis is possible. The  gallbladder is surgically absent. Common bile duct is normal in  diameter. Pancreas is normal where visualized. Examination of the  right kidney is unremarkable.      Impression    IMPRESSION:  Prior cholecystectomy changes with dilated intrahepatic  and common bile duct similar to prior CT from 2016. Ringdown artifact  in the intrahepatic ducts near the confluence of the right and left  hepatic duct could indicate air in the biliary tree. Cholangitis or  prior sphincterotomy procedure could account for this finding.  Correlate with patient's liver enzymes.    FERMIN TOWNSEND MD        oral

## (undated) RX ORDER — FENTANYL CITRATE 50 UG/ML
INJECTION, SOLUTION INTRAMUSCULAR; INTRAVENOUS
Status: DISPENSED
Start: 2017-10-13

## (undated) RX ORDER — PROPOFOL 10 MG/ML
INJECTION, EMULSION INTRAVENOUS
Status: DISPENSED
Start: 2017-10-13

## (undated) RX ORDER — ONDANSETRON 2 MG/ML
INJECTION INTRAMUSCULAR; INTRAVENOUS
Status: DISPENSED
Start: 2017-10-13

## (undated) RX ORDER — LIDOCAINE HYDROCHLORIDE 20 MG/ML
INJECTION, SOLUTION EPIDURAL; INFILTRATION; INTRACAUDAL; PERINEURAL
Status: DISPENSED
Start: 2017-10-13